# Patient Record
Sex: FEMALE | Race: WHITE | NOT HISPANIC OR LATINO | Employment: OTHER | URBAN - METROPOLITAN AREA
[De-identification: names, ages, dates, MRNs, and addresses within clinical notes are randomized per-mention and may not be internally consistent; named-entity substitution may affect disease eponyms.]

---

## 2017-01-25 ENCOUNTER — HOSPITAL ENCOUNTER (OUTPATIENT)
Dept: URGENT CARE | Facility: CLINIC | Age: 82
Setting detail: SPECIMEN
Discharge: HOME | End: 2017-01-25
Attending: FAMILY MEDICINE | Admitting: FAMILY MEDICINE

## 2017-01-25 LAB
AMPICILLIN SUSC ISLT: ABNORMAL
AZTREONAM SUSC ISLT: ABNORMAL
BACTERIA ISLT: ABNORMAL
BACTERIA SPEC AEROBE CULT: ABNORMAL
CEFAZOLIN SUSC ISLT: ABNORMAL
CEFEPIME SUSC ISLT: ABNORMAL
CEFTRIAXONE SUSC ISLT: ABNORMAL
CIPROFLOXACIN SUSC ISLT: ABNORMAL
COLONY COUNT: ABNORMAL
ERTAPENEM SUSC ISLT: ABNORMAL
LEVOFLOXACIN SUSC ISLT: ABNORMAL
Lab: ABNORMAL
MEROPENEM SUSC ISLT: ABNORMAL
MICRO REPORT STATUS: ABNORMAL
NITROFURANTOIN SUSC ISLT: ABNORMAL
PIP+TAZO SUSC ISLT: ABNORMAL
SPECIMEN SOURCE: ABNORMAL
SUSC METH SPEC: ABNORMAL
TETRACYCLINE SUSC ISLT: ABNORMAL
TOBRAMYCIN SUSC ISLT: ABNORMAL
TRIMETHOPRIM/SULFA: ABNORMAL

## 2017-02-01 ENCOUNTER — HOSPITAL ENCOUNTER (OUTPATIENT)
Dept: ORTHOPEDIC SURGERY | Facility: CLINIC | Age: 82
Discharge: HOME | End: 2017-02-01
Attending: PHYSICIAN ASSISTANT | Admitting: PHYSICIAN ASSISTANT

## 2017-04-17 ENCOUNTER — HOSPITAL ENCOUNTER (OUTPATIENT)
Dept: ORTHOPEDIC SURGERY | Facility: CLINIC | Age: 82
Discharge: HOME | End: 2017-04-17
Attending: PODIATRIST | Admitting: PODIATRIST

## 2017-06-09 ENCOUNTER — HOSPITAL ENCOUNTER (OUTPATIENT)
Dept: OTHER | Facility: HOSPITAL | Age: 82
Discharge: HOME | End: 2017-06-09
Attending: PODIATRIST | Admitting: PODIATRIST

## 2017-06-09 ENCOUNTER — HOSPITAL ENCOUNTER (OUTPATIENT)
Dept: URGENT CARE | Facility: CLINIC | Age: 82
Discharge: HOME | End: 2017-06-09
Attending: FAMILY MEDICINE | Admitting: FAMILY MEDICINE

## 2017-06-09 LAB
ANION GAP SERPL CALC-SCNC: 11 MMOL/L (ref 10–20)
BACTERIA SPEC AEROBE CULT: NORMAL
BASOPHILS # BLD AUTO: 0 10*3/UL (ref 0–0.2)
BASOPHILS NFR BLD AUTO: 0 % (ref 0–2)
BUN SERPL-MCNC: 14 MG/DL (ref 8–20)
BUN/CREAT SERPL: 20 (ref 5.4–26.2)
CALCIUM SERPL-MCNC: 8.9 MG/DL (ref 8.9–10.3)
CHLORIDE SERPL-SCNC: 110 MMOL/L (ref 101–111)
CONV CO2: 26 MMOL/L (ref 22–32)
CREAT UR-MCNC: 0.7 MG/DL (ref 0.4–1)
DIFFERENTIAL METHOD BLD: (no result)
EOSINOPHIL # BLD AUTO: 0.2 10*3/UL (ref 0–0.3)
EOSINOPHIL # BLD AUTO: 2 % (ref 0–3)
ERYTHROCYTE [DISTWIDTH] IN BLOOD BY AUTOMATED COUNT: 14.7 % (ref 11.5–14.5)
GLUCOSE SERPL-MCNC: 109 MG/DL (ref 65–99)
HCT VFR BLD AUTO: 35.8 % (ref 35–49)
HGB BLD-MCNC: 11.7 G/DL (ref 12–15)
LYMPHOCYTES # BLD AUTO: 2.1 10*3/UL (ref 0.8–4.8)
LYMPHOCYTES NFR BLD AUTO: 28 % (ref 18–42)
Lab: NORMAL
MCH RBC QN AUTO: 27.9 PG (ref 26–32)
MCHC RBC AUTO-ENTMCNC: 32.7 G/DL (ref 32–36)
MCV RBC AUTO: 85.2 FL (ref 80–94)
MICRO REPORT STATUS: NORMAL
MONOCYTES # BLD AUTO: 0.6 10*3/UL (ref 0.1–1.3)
MONOCYTES NFR BLD AUTO: 8 % (ref 2–11)
NEUTROPHILS # BLD AUTO: 4.5 10*3/UL (ref 2.3–8.6)
NEUTROPHILS NFR BLD AUTO: 62 % (ref 50–75)
NRBC BLD AUTO-RTO: 0 /100{WBCS}
NRBC/RBC NFR BLD MANUAL: 0 10*3/UL
PLATELET # BLD AUTO: 258 10*3/UL (ref 150–450)
PMV BLD AUTO: 7.9 FL (ref 7.4–10.4)
POTASSIUM SERPL-SCNC: 4 MMOL/L (ref 3.6–5.1)
RBC # BLD AUTO: 4.2 10*6/UL (ref 4–5.4)
SODIUM SERPL-SCNC: 143 MMOL/L (ref 136–144)
SPECIMEN SOURCE: NORMAL
WBC # BLD AUTO: 7.4 10*3/UL (ref 4.5–11.5)

## 2017-06-16 ENCOUNTER — HOSPITAL ENCOUNTER (OUTPATIENT)
Dept: PREOP | Facility: HOSPITAL | Age: 82
Setting detail: HOSPITAL OUTPATIENT SURGERY
Discharge: HOME | End: 2017-06-16
Attending: PODIATRIST | Admitting: PODIATRIST

## 2017-07-24 ENCOUNTER — HOSPITAL ENCOUNTER (OUTPATIENT)
Dept: ORTHOPEDIC SURGERY | Facility: CLINIC | Age: 82
Discharge: HOME | End: 2017-07-24
Attending: PODIATRIST | Admitting: PODIATRIST

## 2017-09-23 ENCOUNTER — HOSPITAL ENCOUNTER (OUTPATIENT)
Dept: URGENT CARE | Facility: CLINIC | Age: 82
Setting detail: SPECIMEN
Discharge: HOME | End: 2017-09-23
Attending: FAMILY MEDICINE | Admitting: FAMILY MEDICINE

## 2017-12-16 ENCOUNTER — HOSPITAL ENCOUNTER (OUTPATIENT)
Dept: URGENT CARE | Facility: CLINIC | Age: 82
Setting detail: SPECIMEN
Discharge: HOME | End: 2017-12-16
Attending: FAMILY MEDICINE | Admitting: FAMILY MEDICINE

## 2017-12-27 ENCOUNTER — HOSPITAL ENCOUNTER (OUTPATIENT)
Dept: ORTHOPEDIC SURGERY | Facility: CLINIC | Age: 82
Discharge: HOME | End: 2017-12-27
Attending: PHYSICIAN ASSISTANT | Admitting: PHYSICIAN ASSISTANT

## 2017-12-27 ENCOUNTER — HOSPITAL ENCOUNTER (OUTPATIENT)
Dept: URGENT CARE | Facility: CLINIC | Age: 82
Setting detail: SPECIMEN
Discharge: HOME | End: 2017-12-27
Attending: EMERGENCY MEDICINE | Admitting: EMERGENCY MEDICINE

## 2017-12-28 ENCOUNTER — HOSPITAL ENCOUNTER (OUTPATIENT)
Dept: MRI IMAGING | Facility: HOSPITAL | Age: 82
Discharge: HOME | End: 2017-12-28
Attending: PHYSICIAN ASSISTANT | Admitting: PHYSICIAN ASSISTANT

## 2018-01-03 ENCOUNTER — HOSPITAL ENCOUNTER (OUTPATIENT)
Dept: LAB | Facility: HOSPITAL | Age: 83
Discharge: HOME | End: 2018-01-03
Attending: ORTHOPAEDIC SURGERY | Admitting: ORTHOPAEDIC SURGERY

## 2018-02-14 ENCOUNTER — OFFICE (AMBULATORY)
Dept: URBAN - METROPOLITAN AREA CLINIC 64 | Facility: CLINIC | Age: 83
End: 2018-02-14
Payer: COMMERCIAL

## 2018-02-14 VITALS
SYSTOLIC BLOOD PRESSURE: 117 MMHG | HEIGHT: 62 IN | WEIGHT: 126 LBS | DIASTOLIC BLOOD PRESSURE: 61 MMHG | HEART RATE: 57 BPM

## 2018-02-14 DIAGNOSIS — R13.10 DYSPHAGIA, UNSPECIFIED: ICD-10-CM

## 2018-02-14 DIAGNOSIS — R10.84 GENERALIZED ABDOMINAL PAIN: ICD-10-CM

## 2018-02-14 DIAGNOSIS — K21.9 GASTRO-ESOPHAGEAL REFLUX DISEASE WITHOUT ESOPHAGITIS: ICD-10-CM

## 2018-02-14 DIAGNOSIS — K59.00 CONSTIPATION, UNSPECIFIED: ICD-10-CM

## 2018-02-14 DIAGNOSIS — I21.9 ACUTE MYOCARDIAL INFARCTION, UNSPECIFIED: ICD-10-CM

## 2018-02-14 DIAGNOSIS — R14.0 ABDOMINAL DISTENSION (GASEOUS): ICD-10-CM

## 2018-02-14 PROCEDURE — 99214 OFFICE O/P EST MOD 30 MIN: CPT | Performed by: NURSE PRACTITIONER

## 2018-02-14 RX ORDER — LUBIPROSTONE 24 UG/1
24 CAPSULE, GELATIN COATED ORAL
Qty: 30 | Refills: 12 | Status: COMPLETED
Start: 2018-02-14 | End: 2018-02-16

## 2018-03-16 ENCOUNTER — HOSPITAL ENCOUNTER (OUTPATIENT)
Dept: URGENT CARE | Facility: CLINIC | Age: 83
Setting detail: SPECIMEN
Discharge: HOME | End: 2018-03-16
Attending: FAMILY MEDICINE | Admitting: FAMILY MEDICINE

## 2018-04-06 ENCOUNTER — HOSPITAL ENCOUNTER (OUTPATIENT)
Dept: ORTHOPEDIC SURGERY | Facility: CLINIC | Age: 83
Discharge: HOME | End: 2018-04-06
Attending: PHYSICIAN ASSISTANT | Admitting: PHYSICIAN ASSISTANT

## 2018-06-18 ENCOUNTER — HOSPITAL ENCOUNTER (OUTPATIENT)
Dept: URGENT CARE | Facility: CLINIC | Age: 83
Setting detail: SPECIMEN
Discharge: HOME | End: 2018-06-18
Attending: EMERGENCY MEDICINE | Admitting: EMERGENCY MEDICINE

## 2018-06-18 LAB
BACTERIA SPEC AEROBE CULT: NORMAL
Lab: NORMAL
MICRO REPORT STATUS: NORMAL
SPECIMEN SOURCE: NORMAL

## 2018-06-25 ENCOUNTER — HOSPITAL ENCOUNTER (OUTPATIENT)
Dept: URGENT CARE | Facility: CLINIC | Age: 83
Setting detail: SPECIMEN
Discharge: HOME | End: 2018-06-25
Attending: FAMILY MEDICINE | Admitting: FAMILY MEDICINE

## 2018-06-25 LAB
AMPICILLIN SUSC ISLT: ABNORMAL
AZTREONAM SUSC ISLT: ABNORMAL
BACTERIA ISLT: ABNORMAL
BACTERIA SPEC AEROBE CULT: ABNORMAL
CEFAZOLIN SUSC ISLT: ABNORMAL
CEFEPIME SUSC ISLT: ABNORMAL
CEFTRIAXONE SUSC ISLT: ABNORMAL
CIPROFLOXACIN SUSC ISLT: ABNORMAL
COLONY COUNT: ABNORMAL
LEVOFLOXACIN SUSC ISLT: ABNORMAL
Lab: ABNORMAL
MEROPENEM SUSC ISLT: ABNORMAL
MICRO REPORT STATUS: ABNORMAL
NITROFURANTOIN SUSC ISLT: ABNORMAL
PIP+TAZO SUSC ISLT: ABNORMAL
SPECIMEN SOURCE: ABNORMAL
SUSC METH SPEC: ABNORMAL
TETRACYCLINE SUSC ISLT: ABNORMAL
TOBRAMYCIN SUSC ISLT: ABNORMAL
TRIMETHOPRIM/SULFA: ABNORMAL

## 2018-07-06 ENCOUNTER — OFFICE (AMBULATORY)
Dept: URBAN - METROPOLITAN AREA CLINIC 64 | Facility: CLINIC | Age: 83
End: 2018-07-06
Payer: COMMERCIAL

## 2018-07-06 VITALS
DIASTOLIC BLOOD PRESSURE: 65 MMHG | HEART RATE: 60 BPM | HEIGHT: 62 IN | SYSTOLIC BLOOD PRESSURE: 126 MMHG | WEIGHT: 125 LBS

## 2018-07-06 DIAGNOSIS — R10.84 GENERALIZED ABDOMINAL PAIN: ICD-10-CM

## 2018-07-06 DIAGNOSIS — R11.0 NAUSEA: ICD-10-CM

## 2018-07-06 PROCEDURE — 99213 OFFICE O/P EST LOW 20 MIN: CPT | Performed by: NURSE PRACTITIONER

## 2018-08-22 ENCOUNTER — OFFICE (AMBULATORY)
Dept: URBAN - METROPOLITAN AREA CLINIC 64 | Facility: CLINIC | Age: 83
End: 2018-08-22
Payer: COMMERCIAL

## 2018-08-22 VITALS
SYSTOLIC BLOOD PRESSURE: 123 MMHG | WEIGHT: 127 LBS | HEIGHT: 62 IN | HEART RATE: 79 BPM | DIASTOLIC BLOOD PRESSURE: 70 MMHG

## 2018-08-22 DIAGNOSIS — K59.00 CONSTIPATION, UNSPECIFIED: ICD-10-CM

## 2018-08-22 DIAGNOSIS — R14.0 ABDOMINAL DISTENSION (GASEOUS): ICD-10-CM

## 2018-08-22 DIAGNOSIS — R13.10 DYSPHAGIA, UNSPECIFIED: ICD-10-CM

## 2018-08-22 PROCEDURE — 99213 OFFICE O/P EST LOW 20 MIN: CPT | Performed by: INTERNAL MEDICINE

## 2018-08-22 RX ORDER — PANTOPRAZOLE SODIUM 40 MG/1
40 TABLET, DELAYED RELEASE ORAL
Qty: 90 | Refills: 4 | Status: ACTIVE
Start: 2018-08-22

## 2018-08-22 RX ORDER — LACTULOSE 10 G/15ML
600 SOLUTION ORAL
Qty: 1800 | Refills: 11 | Status: COMPLETED
Start: 2018-02-16 | End: 2018-08-22 | Stop reason: SINTOL

## 2018-09-07 ENCOUNTER — HOSPITAL ENCOUNTER (OUTPATIENT)
Dept: PAIN MEDICINE | Facility: HOSPITAL | Age: 83
Discharge: HOME | End: 2018-09-07
Attending: ANESTHESIOLOGY | Admitting: ANESTHESIOLOGY

## 2018-09-21 ENCOUNTER — HOSPITAL ENCOUNTER (OUTPATIENT)
Dept: PAIN MEDICINE | Facility: HOSPITAL | Age: 83
Discharge: HOME | End: 2018-09-21
Attending: ANESTHESIOLOGY | Admitting: ANESTHESIOLOGY

## 2018-10-03 ENCOUNTER — ON CAMPUS - OUTPATIENT (AMBULATORY)
Dept: URBAN - METROPOLITAN AREA HOSPITAL 2 | Facility: HOSPITAL | Age: 83
End: 2018-10-03
Payer: COMMERCIAL

## 2018-10-03 VITALS
HEART RATE: 47 BPM | RESPIRATION RATE: 15 BRPM | SYSTOLIC BLOOD PRESSURE: 118 MMHG | RESPIRATION RATE: 20 BRPM | OXYGEN SATURATION: 99 % | DIASTOLIC BLOOD PRESSURE: 51 MMHG | RESPIRATION RATE: 16 BRPM | HEART RATE: 56 BPM | OXYGEN SATURATION: 100 % | RESPIRATION RATE: 18 BRPM | OXYGEN SATURATION: 98 % | DIASTOLIC BLOOD PRESSURE: 67 MMHG | TEMPERATURE: 97.6 F | SYSTOLIC BLOOD PRESSURE: 109 MMHG | SYSTOLIC BLOOD PRESSURE: 119 MMHG | HEART RATE: 58 BPM | SYSTOLIC BLOOD PRESSURE: 114 MMHG | OXYGEN SATURATION: 97 % | RESPIRATION RATE: 21 BRPM | HEIGHT: 62 IN | HEART RATE: 71 BPM | DIASTOLIC BLOOD PRESSURE: 58 MMHG | DIASTOLIC BLOOD PRESSURE: 64 MMHG | WEIGHT: 119 LBS | HEART RATE: 52 BPM | SYSTOLIC BLOOD PRESSURE: 134 MMHG | DIASTOLIC BLOOD PRESSURE: 63 MMHG | SYSTOLIC BLOOD PRESSURE: 173 MMHG

## 2018-10-03 DIAGNOSIS — Z48.815 ENCOUNTER FOR SURGICAL AFTERCARE FOLLOWING SURGERY ON THE DI: ICD-10-CM

## 2018-10-03 DIAGNOSIS — K21.9 GASTRO-ESOPHAGEAL REFLUX DISEASE WITHOUT ESOPHAGITIS: ICD-10-CM

## 2018-10-03 DIAGNOSIS — K20.9 ESOPHAGITIS, UNSPECIFIED: ICD-10-CM

## 2018-10-03 DIAGNOSIS — R13.10 DYSPHAGIA, UNSPECIFIED: ICD-10-CM

## 2018-10-03 PROCEDURE — 43450 DILATE ESOPHAGUS 1/MULT PASS: CPT | Performed by: INTERNAL MEDICINE

## 2018-10-03 PROCEDURE — 43235 EGD DIAGNOSTIC BRUSH WASH: CPT | Performed by: INTERNAL MEDICINE

## 2018-10-03 RX ORDER — PANTOPRAZOLE SODIUM 40 MG/1
40 TABLET, DELAYED RELEASE ORAL
Qty: 90 | Refills: 4 | Status: ACTIVE
Start: 2018-08-22

## 2018-10-12 ENCOUNTER — HOSPITAL ENCOUNTER (OUTPATIENT)
Dept: PAIN MEDICINE | Facility: HOSPITAL | Age: 83
Discharge: HOME | End: 2018-10-12
Attending: ANESTHESIOLOGY | Admitting: ANESTHESIOLOGY

## 2018-11-16 ENCOUNTER — HOSPITAL ENCOUNTER (OUTPATIENT)
Dept: PAIN MEDICINE | Facility: HOSPITAL | Age: 83
Discharge: HOME | End: 2018-11-16
Attending: ANESTHESIOLOGY | Admitting: ANESTHESIOLOGY

## 2019-05-15 ENCOUNTER — APPOINTMENT (OUTPATIENT)
Dept: PREADMISSION TESTING | Facility: HOSPITAL | Age: 84
End: 2019-05-15

## 2019-05-15 VITALS
WEIGHT: 128 LBS | OXYGEN SATURATION: 98 % | TEMPERATURE: 97.2 F | RESPIRATION RATE: 20 BRPM | DIASTOLIC BLOOD PRESSURE: 69 MMHG | HEIGHT: 62 IN | HEART RATE: 55 BPM | SYSTOLIC BLOOD PRESSURE: 141 MMHG | BODY MASS INDEX: 23.55 KG/M2

## 2019-05-15 LAB
ANION GAP SERPL CALCULATED.3IONS-SCNC: 11.3 MMOL/L
BUN BLD-MCNC: 21 MG/DL (ref 8–23)
BUN/CREAT SERPL: 30.4 (ref 7–25)
CALCIUM SPEC-SCNC: 9 MG/DL (ref 8.2–9.6)
CHLORIDE SERPL-SCNC: 105 MMOL/L (ref 98–107)
CO2 SERPL-SCNC: 24.7 MMOL/L (ref 22–29)
CREAT BLD-MCNC: 0.69 MG/DL (ref 0.57–1)
DEPRECATED RDW RBC AUTO: 44.2 FL (ref 37–54)
ERYTHROCYTE [DISTWIDTH] IN BLOOD BY AUTOMATED COUNT: 13.3 % (ref 12.3–15.4)
GFR SERPL CREATININE-BSD FRML MDRD: 79 ML/MIN/1.73
GLUCOSE BLD-MCNC: 103 MG/DL (ref 65–99)
HCT VFR BLD AUTO: 36.9 % (ref 34–46.6)
HGB BLD-MCNC: 11.7 G/DL (ref 12–15.9)
MCH RBC QN AUTO: 28.8 PG (ref 26.6–33)
MCHC RBC AUTO-ENTMCNC: 31.7 G/DL (ref 31.5–35.7)
MCV RBC AUTO: 90.9 FL (ref 79–97)
PLATELET # BLD AUTO: 226 10*3/MM3 (ref 140–450)
PMV BLD AUTO: 10.2 FL (ref 6–12)
POTASSIUM BLD-SCNC: 4.2 MMOL/L (ref 3.5–5.2)
RBC # BLD AUTO: 4.06 10*6/MM3 (ref 3.77–5.28)
SODIUM BLD-SCNC: 141 MMOL/L (ref 136–145)
WBC NRBC COR # BLD: 5.8 10*3/MM3 (ref 3.4–10.8)

## 2019-05-15 PROCEDURE — 80048 BASIC METABOLIC PNL TOTAL CA: CPT | Performed by: OTOLARYNGOLOGY

## 2019-05-15 PROCEDURE — 36415 COLL VENOUS BLD VENIPUNCTURE: CPT

## 2019-05-15 PROCEDURE — 85027 COMPLETE CBC AUTOMATED: CPT | Performed by: OTOLARYNGOLOGY

## 2019-05-15 RX ORDER — CARVEDILOL 3.12 MG/1
3.12 TABLET ORAL 2 TIMES DAILY WITH MEALS
COMMUNITY
End: 2020-02-28

## 2019-05-15 NOTE — DISCHARGE INSTRUCTIONS
Take the following medications the morning of surgery with a small sip of water:    CARVEDILOL, AMLODIPINE, ISOSORBIDE    ARRIVE TO OUTPT SURGERY AT 7 AM ON 5/23/19    General Instructions:  • Do not eat solid food after midnight the night before surgery.  • You may drink clear liquids day of surgery but must stop at least one hour before your hospital arrival time.  • It is beneficial for you to have a clear drink that contains carbohydrates the day of surgery.  We suggest a 12 to 20 ounce bottle of Gatorade or Powerade for non-diabetic patients or a 12 to 20 ounce bottle of G2 or Powerade Zero for diabetic patients. (Pediatric patients, are not advised to drink a 12 to 20 ounce carbohydrate drink)    Clear liquids are liquids you can see through.  Nothing red in color.     Plain water                               Sports drinks  Sodas                                   Gelatin (Jell-O)  Fruit juices without pulp such as white grape juice and apple juice  Popsicles that contain no fruit or yogurt  Tea or coffee (no cream or milk added)  Gatorade / Powerade  G2 / Powerade Zero    • Infants may have breast milk up to four hours before surgery.  • Infants drinking formula may drink formula up to six hours before surgery.   • Patients who avoid smoking, chewing tobacco and alcohol for 4 weeks prior to surgery have a reduced risk of post-operative complications.  Quit smoking as many days before surgery as you can.  • Do not smoke, use chewing tobacco or drink alcohol the day of surgery.   • If applicable bring your C-PAP/ BI-PAP machine.  • Bring any papers given to you in the doctor’s office.  • Wear clean comfortable clothes and socks.  • Do not wear contact lenses, false eyelashes or make-up.  Bring a case for your glasses.   • Bring crutches or walker if applicable.  • Remove all piercings.  Leave jewelry and any other valuables at home.  • Hair extensions with metal clips must be removed prior to surgery.  • The  Pre-Admission Testing nurse will instruct you to bring medications if unable to obtain an accurate list in Pre-Admission Testing.        Preventing a Surgical Site Infection:  • For 2 to 3 days before surgery, avoid shaving with a razor because the razor can irritate skin and make it easier to develop an infection.    • Any areas of open skin can increase the risk of a post-operative wound infection by allowing bacteria to enter and travel throughout the body.  Notify your surgeon if you have any skin wounds / rashes even if it is not near the expected surgical site.  The area will need assessed to determine if surgery should be delayed until it is healed.  • The night prior to surgery sleep in a clean bed with clean clothing.  Do not allow pets to sleep with you.  • Shower on the morning of surgery using a fresh bar of anti-bacterial soap (such as Dial) and clean washcloth.  Dry with a clean towel and dress in clean clothing.  • Ask your surgeon if you will be receiving antibiotics prior to surgery.  • Make sure you, your family, and all healthcare providers clean their hands with soap and water or an alcohol based hand  before caring for you or your wound.    Day of surgery:  Upon arrival, a Pre-op nurse and Anesthesiologist will review your health history, obtain vital signs, and answer questions you may have.  The only belongings needed at this time will be a list of your home medications and if applicable your C-PAP/BI-PAP machine.  If you are staying overnight your family can leave the rest of your belongings in the car and bring them to your room later.  A Pre-op nurse will start an IV and you may receive medication in preparation for surgery, including something to help you relax.  Your family will be able to see you in the Pre-op area.  While you are in surgery your family should notify the waiting room  if they leave the waiting room area and provide a contact phone number.    Please  be aware that surgery does come with discomfort.  We want to make every effort to control your discomfort so please discuss any uncontrolled symptoms with your nurse.   Your doctor will most likely have prescribed pain medications.      If you are going home after surgery you will receive individualized written care instructions before being discharged.  A responsible adult must drive you to and from the hospital on the day of your surgery and stay with you for 24 hours.    If you are staying overnight following surgery, you will be transported to your hospital room following the recovery period.  [facility] has all private rooms.    You have received a list of surgical assistants for your reference.  If you have any questions please call Pre-Admission Testing at [phone].  Deductibles and co-payments are collected on the day of service. Please be prepared to pay the required co-pay, deductible or deposit on the day of service as defined by your plan.

## 2019-05-23 ENCOUNTER — ANESTHESIA EVENT (OUTPATIENT)
Dept: PERIOP | Facility: HOSPITAL | Age: 84
End: 2019-05-23

## 2019-05-23 ENCOUNTER — ANESTHESIA (OUTPATIENT)
Dept: PERIOP | Facility: HOSPITAL | Age: 84
End: 2019-05-23

## 2019-05-23 ENCOUNTER — HOSPITAL ENCOUNTER (OUTPATIENT)
Facility: HOSPITAL | Age: 84
Setting detail: HOSPITAL OUTPATIENT SURGERY
Discharge: HOME | End: 2019-05-23
Attending: OTOLARYNGOLOGY | Admitting: OTOLARYNGOLOGY

## 2019-05-23 VITALS
OXYGEN SATURATION: 96 % | SYSTOLIC BLOOD PRESSURE: 169 MMHG | HEART RATE: 63 BPM | RESPIRATION RATE: 16 BRPM | BODY MASS INDEX: 23.43 KG/M2 | TEMPERATURE: 97.2 F | DIASTOLIC BLOOD PRESSURE: 84 MMHG | WEIGHT: 128.09 LBS

## 2019-05-23 PROCEDURE — 25010000002 FENTANYL CITRATE (PF) 100 MCG/2ML SOLUTION: Performed by: NURSE ANESTHETIST, CERTIFIED REGISTERED

## 2019-05-23 PROCEDURE — 25010000002 ONDANSETRON PER 1 MG: Performed by: NURSE ANESTHETIST, CERTIFIED REGISTERED

## 2019-05-23 PROCEDURE — 25010000002 PROPOFOL 10 MG/ML EMULSION: Performed by: NURSE ANESTHETIST, CERTIFIED REGISTERED

## 2019-05-23 PROCEDURE — 25010000002 PROPOFOL 1000 MG/ML EMULSION: Performed by: NURSE ANESTHETIST, CERTIFIED REGISTERED

## 2019-05-23 PROCEDURE — 25010000002 DEXAMETHASONE PER 1 MG: Performed by: NURSE ANESTHETIST, CERTIFIED REGISTERED

## 2019-05-23 RX ORDER — SODIUM CHLORIDE, SODIUM LACTATE, POTASSIUM CHLORIDE, CALCIUM CHLORIDE 600; 310; 30; 20 MG/100ML; MG/100ML; MG/100ML; MG/100ML
9 INJECTION, SOLUTION INTRAVENOUS CONTINUOUS
Status: DISCONTINUED | OUTPATIENT
Start: 2019-05-23 | End: 2019-05-23 | Stop reason: HOSPADM

## 2019-05-23 RX ORDER — ONDANSETRON 2 MG/ML
INJECTION INTRAMUSCULAR; INTRAVENOUS AS NEEDED
Status: DISCONTINUED | OUTPATIENT
Start: 2019-05-23 | End: 2019-05-23 | Stop reason: SURG

## 2019-05-23 RX ORDER — MIDAZOLAM HYDROCHLORIDE 1 MG/ML
2 INJECTION INTRAMUSCULAR; INTRAVENOUS
Status: DISCONTINUED | OUTPATIENT
Start: 2019-05-23 | End: 2019-05-23 | Stop reason: HOSPADM

## 2019-05-23 RX ORDER — LIDOCAINE HYDROCHLORIDE 20 MG/ML
INJECTION, SOLUTION INFILTRATION; PERINEURAL AS NEEDED
Status: DISCONTINUED | OUTPATIENT
Start: 2019-05-23 | End: 2019-05-23 | Stop reason: SURG

## 2019-05-23 RX ORDER — ACETAMINOPHEN 325 MG/1
650 TABLET ORAL ONCE
Status: DISCONTINUED | OUTPATIENT
Start: 2019-05-23 | End: 2019-05-23

## 2019-05-23 RX ORDER — PROMETHAZINE HYDROCHLORIDE 25 MG/ML
6.25 INJECTION, SOLUTION INTRAMUSCULAR; INTRAVENOUS
Status: DISCONTINUED | OUTPATIENT
Start: 2019-05-23 | End: 2019-05-23 | Stop reason: HOSPADM

## 2019-05-23 RX ORDER — SODIUM CHLORIDE, SODIUM LACTATE, POTASSIUM CHLORIDE, CALCIUM CHLORIDE 600; 310; 30; 20 MG/100ML; MG/100ML; MG/100ML; MG/100ML
50 INJECTION, SOLUTION INTRAVENOUS CONTINUOUS
Status: DISCONTINUED | OUTPATIENT
Start: 2019-05-23 | End: 2019-05-23 | Stop reason: HOSPADM

## 2019-05-23 RX ORDER — PROMETHAZINE HYDROCHLORIDE 25 MG/ML
12.5 INJECTION, SOLUTION INTRAMUSCULAR; INTRAVENOUS ONCE AS NEEDED
Status: DISCONTINUED | OUTPATIENT
Start: 2019-05-23 | End: 2019-05-23 | Stop reason: HOSPADM

## 2019-05-23 RX ORDER — MIDAZOLAM HYDROCHLORIDE 1 MG/ML
1 INJECTION INTRAMUSCULAR; INTRAVENOUS
Status: DISCONTINUED | OUTPATIENT
Start: 2019-05-23 | End: 2019-05-23 | Stop reason: HOSPADM

## 2019-05-23 RX ORDER — ACETAMINOPHEN 325 MG/1
650 TABLET ORAL ONCE AS NEEDED
Status: DISCONTINUED | OUTPATIENT
Start: 2019-05-23 | End: 2019-05-23 | Stop reason: HOSPADM

## 2019-05-23 RX ORDER — SODIUM CHLORIDE 0.9 % (FLUSH) 0.9 %
1-10 SYRINGE (ML) INJECTION AS NEEDED
Status: DISCONTINUED | OUTPATIENT
Start: 2019-05-23 | End: 2019-05-23 | Stop reason: HOSPADM

## 2019-05-23 RX ORDER — ACETAMINOPHEN 500 MG
TABLET ORAL
Status: COMPLETED
Start: 2019-05-23 | End: 2019-05-23

## 2019-05-23 RX ORDER — DEXAMETHASONE SODIUM PHOSPHATE 10 MG/ML
INJECTION INTRAMUSCULAR; INTRAVENOUS AS NEEDED
Status: DISCONTINUED | OUTPATIENT
Start: 2019-05-23 | End: 2019-05-23 | Stop reason: SURG

## 2019-05-23 RX ORDER — HYDROCODONE BITARTRATE AND ACETAMINOPHEN 5; 325 MG/1; MG/1
1 TABLET ORAL ONCE AS NEEDED
Status: DISCONTINUED | OUTPATIENT
Start: 2019-05-23 | End: 2019-05-23 | Stop reason: HOSPADM

## 2019-05-23 RX ORDER — OXYMETAZOLINE HYDROCHLORIDE 0.05 G/100ML
SPRAY NASAL AS NEEDED
Status: DISCONTINUED | OUTPATIENT
Start: 2019-05-23 | End: 2019-05-23 | Stop reason: HOSPADM

## 2019-05-23 RX ORDER — PROPOFOL 10 MG/ML
VIAL (ML) INTRAVENOUS AS NEEDED
Status: DISCONTINUED | OUTPATIENT
Start: 2019-05-23 | End: 2019-05-23 | Stop reason: SURG

## 2019-05-23 RX ORDER — NALOXONE HCL 0.4 MG/ML
0.2 VIAL (ML) INJECTION AS NEEDED
Status: DISCONTINUED | OUTPATIENT
Start: 2019-05-23 | End: 2019-05-23 | Stop reason: HOSPADM

## 2019-05-23 RX ORDER — FENTANYL CITRATE 50 UG/ML
50 INJECTION, SOLUTION INTRAMUSCULAR; INTRAVENOUS
Status: DISCONTINUED | OUTPATIENT
Start: 2019-05-23 | End: 2019-05-23 | Stop reason: HOSPADM

## 2019-05-23 RX ORDER — HYDRALAZINE HYDROCHLORIDE 20 MG/ML
5 INJECTION INTRAMUSCULAR; INTRAVENOUS
Status: DISCONTINUED | OUTPATIENT
Start: 2019-05-23 | End: 2019-05-23 | Stop reason: HOSPADM

## 2019-05-23 RX ORDER — ERYTHROMYCIN 5 MG/G
OINTMENT OPHTHALMIC 2 TIMES DAILY
Qty: 3.5 G | Refills: 2 | Status: SHIPPED | OUTPATIENT
Start: 2019-05-23 | End: 2019-05-30

## 2019-05-23 RX ORDER — ALBUTEROL SULFATE 2.5 MG/3ML
2.5 SOLUTION RESPIRATORY (INHALATION) ONCE AS NEEDED
Status: DISCONTINUED | OUTPATIENT
Start: 2019-05-23 | End: 2019-05-23 | Stop reason: HOSPADM

## 2019-05-23 RX ORDER — PROMETHAZINE HYDROCHLORIDE 25 MG/1
25 TABLET ORAL ONCE AS NEEDED
Status: DISCONTINUED | OUTPATIENT
Start: 2019-05-23 | End: 2019-05-23 | Stop reason: HOSPADM

## 2019-05-23 RX ORDER — PROMETHAZINE HYDROCHLORIDE 25 MG/1
25 SUPPOSITORY RECTAL ONCE AS NEEDED
Status: DISCONTINUED | OUTPATIENT
Start: 2019-05-23 | End: 2019-05-23 | Stop reason: HOSPADM

## 2019-05-23 RX ORDER — ACETAMINOPHEN 650 MG/1
650 SUPPOSITORY RECTAL ONCE AS NEEDED
Status: DISCONTINUED | OUTPATIENT
Start: 2019-05-23 | End: 2019-05-23 | Stop reason: HOSPADM

## 2019-05-23 RX ORDER — LIDOCAINE HYDROCHLORIDE 10 MG/ML
0.5 INJECTION, SOLUTION EPIDURAL; INFILTRATION; INTRACAUDAL; PERINEURAL ONCE AS NEEDED
Status: DISCONTINUED | OUTPATIENT
Start: 2019-05-23 | End: 2019-05-23 | Stop reason: HOSPADM

## 2019-05-23 RX ORDER — DIPHENHYDRAMINE HCL 25 MG
25 CAPSULE ORAL
Status: DISCONTINUED | OUTPATIENT
Start: 2019-05-23 | End: 2019-05-23 | Stop reason: HOSPADM

## 2019-05-23 RX ORDER — FENTANYL CITRATE 50 UG/ML
INJECTION, SOLUTION INTRAMUSCULAR; INTRAVENOUS AS NEEDED
Status: DISCONTINUED | OUTPATIENT
Start: 2019-05-23 | End: 2019-05-23 | Stop reason: SURG

## 2019-05-23 RX ORDER — EPHEDRINE SULFATE 50 MG/ML
5 INJECTION, SOLUTION INTRAVENOUS ONCE AS NEEDED
Status: DISCONTINUED | OUTPATIENT
Start: 2019-05-23 | End: 2019-05-23 | Stop reason: HOSPADM

## 2019-05-23 RX ORDER — FAMOTIDINE 10 MG/ML
20 INJECTION, SOLUTION INTRAVENOUS ONCE
Status: COMPLETED | OUTPATIENT
Start: 2019-05-23 | End: 2019-05-23

## 2019-05-23 RX ORDER — ACETAMINOPHEN 500 MG
500 TABLET ORAL ONCE
Status: COMPLETED | OUTPATIENT
Start: 2019-05-23 | End: 2019-05-23

## 2019-05-23 RX ORDER — FLUMAZENIL 0.1 MG/ML
0.2 INJECTION INTRAVENOUS AS NEEDED
Status: DISCONTINUED | OUTPATIENT
Start: 2019-05-23 | End: 2019-05-23 | Stop reason: HOSPADM

## 2019-05-23 RX ORDER — ERYTHROMYCIN 5 MG/G
OINTMENT OPHTHALMIC AS NEEDED
Status: DISCONTINUED | OUTPATIENT
Start: 2019-05-23 | End: 2019-05-23 | Stop reason: HOSPADM

## 2019-05-23 RX ORDER — MAGNESIUM HYDROXIDE 1200 MG/15ML
LIQUID ORAL AS NEEDED
Status: DISCONTINUED | OUTPATIENT
Start: 2019-05-23 | End: 2019-05-23 | Stop reason: HOSPADM

## 2019-05-23 RX ORDER — ONDANSETRON 2 MG/ML
4 INJECTION INTRAMUSCULAR; INTRAVENOUS ONCE AS NEEDED
Status: COMPLETED | OUTPATIENT
Start: 2019-05-23 | End: 2019-05-23

## 2019-05-23 RX ADMIN — LIDOCAINE HYDROCHLORIDE 60 MG: 20 INJECTION, SOLUTION INFILTRATION; PERINEURAL at 09:36

## 2019-05-23 RX ADMIN — FENTANYL CITRATE 25 MCG: 50 INJECTION INTRAMUSCULAR; INTRAVENOUS at 09:46

## 2019-05-23 RX ADMIN — ONDANSETRON HYDROCHLORIDE 4 MG: 2 SOLUTION INTRAMUSCULAR; INTRAVENOUS at 12:23

## 2019-05-23 RX ADMIN — SODIUM CHLORIDE, POTASSIUM CHLORIDE, SODIUM LACTATE AND CALCIUM CHLORIDE 9 ML/HR: 600; 310; 30; 20 INJECTION, SOLUTION INTRAVENOUS at 10:41

## 2019-05-23 RX ADMIN — FENTANYL CITRATE 25 MCG: 50 INJECTION INTRAMUSCULAR; INTRAVENOUS at 10:16

## 2019-05-23 RX ADMIN — PROPOFOL 100 MCG/KG/MIN: 10 INJECTION, EMULSION INTRAVENOUS at 09:38

## 2019-05-23 RX ADMIN — Medication 500 MG: at 12:03

## 2019-05-23 RX ADMIN — FENTANYL CITRATE 50 MCG: 50 INJECTION INTRAMUSCULAR; INTRAVENOUS at 13:13

## 2019-05-23 RX ADMIN — SODIUM CHLORIDE, POTASSIUM CHLORIDE, SODIUM LACTATE AND CALCIUM CHLORIDE 9 ML/HR: 600; 310; 30; 20 INJECTION, SOLUTION INTRAVENOUS at 08:43

## 2019-05-23 RX ADMIN — FENTANYL CITRATE 25 MCG: 50 INJECTION INTRAMUSCULAR; INTRAVENOUS at 10:23

## 2019-05-23 RX ADMIN — FENTANYL CITRATE 25 MCG: 50 INJECTION INTRAMUSCULAR; INTRAVENOUS at 09:36

## 2019-05-23 RX ADMIN — ACETAMINOPHEN 500 MG: 500 TABLET, FILM COATED ORAL at 12:03

## 2019-05-23 RX ADMIN — PROPOFOL 100 MG: 10 INJECTION, EMULSION INTRAVENOUS at 09:36

## 2019-05-23 RX ADMIN — DEXAMETHASONE SODIUM PHOSPHATE 8 MG: 10 INJECTION INTRAMUSCULAR; INTRAVENOUS at 09:41

## 2019-05-23 RX ADMIN — FENTANYL CITRATE 50 MCG: 50 INJECTION INTRAMUSCULAR; INTRAVENOUS at 10:52

## 2019-05-23 RX ADMIN — ONDANSETRON 4 MG: 2 INJECTION INTRAMUSCULAR; INTRAVENOUS at 10:10

## 2019-05-23 RX ADMIN — FAMOTIDINE 20 MG: 10 INJECTION INTRAVENOUS at 08:43

## 2019-05-23 NOTE — ANESTHESIA PROCEDURE NOTES
Airway  Urgency: elective    Airway not difficult    General Information and Staff    Patient location during procedure: OR  Anesthesiologist: Matthew Gupta MD  CRNA: Julia Arceo CRNA    Indications and Patient Condition  Indications for airway management: airway protection    Preoxygenated: yes  Mask difficulty assessment: 1 - vent by mask    Final Airway Details  Final airway type: supraglottic airway      Successful airway: classic  Size 4    Number of attempts at approach: 1    Additional Comments  PreO2, IV induction, easy mask, LMA placed w/o difficulty,secured in placed, EBBSH, +etCO2, atraumatic, teeth and lips as preop.

## 2019-05-23 NOTE — ANESTHESIA PREPROCEDURE EVALUATION
Anesthesia Evaluation     Patient summary reviewed and Nursing notes reviewed   history of anesthetic complications: PONV  NPO Solid Status: > 8 hours  NPO Liquid Status: > 2 hours           Airway   Mallampati: II  no difficulty expected  Dental - normal exam         Pulmonary     breath sounds clear to auscultation  (+) a smoker Former,   Cardiovascular     ECG reviewed  Rhythm: regular  Rate: normal    (+) hypertension,       Neuro/Psych  GI/Hepatic/Renal/Endo    (+)  GERD,      Musculoskeletal     Abdominal    Substance History      OB/GYN          Other                        Anesthesia Plan    ASA 3     general     intravenous induction   Anesthetic plan, all risks, benefits, and alternatives have been provided, discussed and informed consent has been obtained with: patient.    ? TIVA

## 2019-05-23 NOTE — NURSING NOTE
Dr moreira called and per patient and family request for a script to [facility] for zofran for nausea  MD  Agreed to call script

## 2019-05-23 NOTE — ANESTHESIA POSTPROCEDURE EVALUATION
Patient: Carrie Golden    Procedure Summary     Date:  05/23/19 Room / Location:   [facility]    Anesthesia Start:  0930 Anesthesia Stop:  1029    Procedures:       bilateral upper lid blepharoplasty (Bilateral Eye)      bilateral lower lid ectropion repair, bilateral punctoplasty (Bilateral Eye)      ALBERTO TUBE (Bilateral Eye) Diagnosis:      Surgeon:  Mauricio Pennington MD Provider:  Matthew Gupta MD    Anesthesia Type:  general ASA Status:  3          Anesthesia Type: general  Last vitals  BP   163/83 (05/23/19 1040)   Temp   36.2 °C (97.2 °F) (05/23/19 1024)   Pulse   64 (05/23/19 1024)   Resp   14 (05/23/19 1040)     SpO2   98 % (05/23/19 1024)     Post Anesthesia Care and Evaluation    Patient location during evaluation: bedside  Patient participation: complete - patient participated  Level of consciousness: awake  Pain score: 2  Pain management: adequate  Airway patency: patent  Anesthetic complications: No anesthetic complications    Cardiovascular status: acceptable  Respiratory status: acceptable  Hydration status: acceptable    Comments: /83   Pulse 64   Temp 36.2 °C (97.2 °F) (Oral)   Resp 14   Wt 58.1 kg (128 lb 1.4 oz)   SpO2 98%   BMI 23.43 kg/m²

## 2019-05-23 NOTE — H&P
History & Physical       Patient: Carrie Golden    Date of Admission: 5/23/2019  6:39 AM    YOB: 1925    Medical Record Number: [id number]      Chief Complaints: bilateral upper eyelid dermatochalasis, bilateral lower eyelid ectropion, bilateral punctal stenosis      History of Present Illness: 89 y.o. female presents with above. No new meds/health problems since office visit      Allergies:   Allergies   Allergen Reactions   • Codeine Nausea And Vomiting and Dizziness   • Hydrocodone Nausea And Vomiting and Dizziness   • Sulfa Antibiotics Rash       10 point review of systems negative, except pertaining to the HPI    Medications:   Home Medications:  No current facility-administered medications on file prior to encounter.      Current Outpatient Medications on File Prior to Encounter   Medication Sig   • acetaminophen (TYLENOL) 500 MG tablet Take 500 mg by mouth Every 6 (Six) Hours As Needed for mild pain (1-3).   • amLODIPine (NORVASC) 5 MG tablet Take 5 mg by mouth Every Morning.   • aspirin 81 MG EC tablet Take 81 mg by mouth Every Other Day.   • isosorbide mononitrate (IMDUR) 60 MG 24 hr tablet Take 60 mg by mouth Every Morning.   • simvastatin (ZOCOR) 10 MG tablet Take 10 mg by mouth Every Night.     Current Medications:  Scheduled Meds:  Continuous Infusions:  No current facility-administered medications for this encounter.   PRN Meds:.    Past Medical History:   Diagnosis Date   • Arthritis    • Frequent UTI    • GERD (gastroesophageal reflux disease)    • Hearing loss     bilateral hearing aides   • 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 sure which 1  contracted at age 8   • History of transfusion    • Hypertension    • Past heart attack     X2 MILD   • PONV (postoperative nausea and vomiting)         Past Surgical History:   Procedure Laterality Date   • ANTERIOR AND POSTERIOR VAGINAL REPAIR     • CATARACT EXTRACTION EXTRACAPSULAR W/ INTRAOCULAR LENS IMPLANTATION Bilateral    • CERVICAL SPINE  ANTERIOR      with instrumentation   • COLON SURGERY      for obstruction   • FOOT FUSION Left 2016    Procedure: LEFT HALLUX METATARSALPHALANGEAL ARTHRODESIS SILVER BUNIONECTOMY METATARSAL HEAD RESECTION 2-5 CALCANEAL BONE GRAFT;  Surgeon: Monster Harper Jr., MD;  Location: [facility];  Service:    • HYSTERECTOMY     • INGUINAL HERNIA REPAIR Bilateral    • STOMACH SURGERY      for ulcer        Social History     Occupational History   • Not on file   Tobacco Use   • Smoking status: Former Smoker     Years: 10.00     Types: Cigarettes     Last attempt to quit:      Years since quittin.4   • Smokeless tobacco: Never Used   • Tobacco comment: 1 pk a week   Substance and Sexual Activity   • Alcohol use: No   • Drug use: No   • Sexual activity: Defer    Social History     Social History Narrative   • Not on file        Family History   Problem Relation Age of Onset   • Malig Hyperthermia Neg Hx            Physical Exam   Constitutional: Alert, cooperative, in no acute distress    Head: Normocephalic.   Eyes:   bilateral upper eyelid dermatochalasis, bilateral lower eyelid ectropion, bilateral punctal stenosis  Neck: Normal range of motion.   Cardiovascular: Normal rate.    Pulmonary/Chest: Effort normal.   Neurological: Alert.   Skin: Skin is warm.   Psychiatric: Normal mood and affect.       Assessment/Plan:  The patient voiced understanding of the risks, benefits, and alternative forms of treatment that were discussed and the patient consents to proceed with bilateral upper lid blephorplasty.       Miguel Gudino Jr, MD

## 2019-05-23 NOTE — OP NOTE
OPERATIVE NOTE    Patient Identification:  Name: Carrie Golden  Age: 89 y.o.  Sex: female  :  1925  MRN: [id number]                                               Preoperative diagnosis: Bilateral upper eyelid dermatochalasis, bilateral lower eyelid ectropion, bilateral punctal stenosis  Postoperative diagnosis: same  Procedure: Bilateral upper eyelid blepharoplasty, bilateral lower eyelid ectropion repair, bilateral punctoplasty with mckay tube  Surgeon: Dr. Mauricio Pennington who was present and scrubbed throughout all critical portions of the operation  Assistants: Miguel Gudino Jr, MD   Anesthesia: General  EBL: less than 50cc  Specimens:  * No orders in the log *    Description of the procedure: The patient was taken to the operating room and placed on the table in the supine position, where anesthesia was induced. 2% lidocaine with epinephrine and 0.5% marcaine in a 1:1 fashion was injected over the surgical site, and the patient was prepped and draped in the usual manner for orbitofacial surgery.     Corneal protectors were placed in both eyes.    A 15 Bard-Torito blade incision was made 6 mm from the eyelash margin, across the entire horizontal extent of the left upper eyelid. A second incision was made 12 mm inferior to the junction of the brow and eyelid, and a pinch test was used to ensure the amount of skin excision was appropriate. The intervening tissue was excised with a 15 Bard-Torito blade and Kimani scissors. Excessive orbicularis tissue was removed. The orbital septum was opened horizontally, and excessive fatty tissue was also removed. Bleeding was controlled with electrocauterization. The skin was then closed with 5-0 fast absorbing suture in an interrupted and running fashion, with care to incorporate the prestarsal orbicularis over the pupil, nasal and temporal limbi respectively. The lid position and contour were examined and found to be satisfactory.     The exact same procedure  was preformed over the contralateral upper lid.     Attention was then turned to the punctoplasty.     A one-snip punctal incision was made through the upper and lower puncti on the left side. Mast tubing was inserted through the upper and lower canaliculi, through the nasal lacrimal duct, and into the nose, where they were tied in 3 half knots.    Attention was then turned to the ectropion repair.      A 15 Bard-Torito blade incision was made at the left lateral canthus. Sharp dissection was carried down to the lateral orbital rim periosteum. The inferior ramus of the lateral canthal tendon was identified and severed with sharp dissection. A full-thickness en bloc excision of the lateral aspect of the tarsal plate was carried out with sharp dissection, and bleeding was controlled with electrocauterization. The cut edge of the tarsal plate was advanced to the lateral orbital rim periosteum, where it was sutured with 5-0 vicryl suture to the internal aspect of the lateral orbital rim periosteum. The skin  was closed with 5-0 fast absorbing suture.     The exact same procedure was preformed over the contralateral lid    The corneal protectors were removed and antibiotic ophthalmic ointment was placed over the surgical site.     The patient was then awakened and taken from the operating room in good condition, having tolerated the procedure well. There were no complications, and the estimated blood loss was less than 50 cc.

## 2019-06-02 ENCOUNTER — HOSPITAL ENCOUNTER (OUTPATIENT)
Dept: URGENT CARE | Facility: CLINIC | Age: 84
Setting detail: SPECIMEN
Discharge: HOME | End: 2019-06-02
Attending: FAMILY MEDICINE | Admitting: FAMILY MEDICINE

## 2019-06-02 ENCOUNTER — CONVERSION ENCOUNTER (OUTPATIENT)
Dept: CARDIOLOGY | Facility: CLINIC | Age: 84
End: 2019-06-02

## 2019-06-04 VITALS
SYSTOLIC BLOOD PRESSURE: 123 MMHG | OXYGEN SATURATION: 98 % | BODY MASS INDEX: 22.45 KG/M2 | DIASTOLIC BLOOD PRESSURE: 73 MMHG | HEART RATE: 49 BPM | WEIGHT: 122 LBS | HEIGHT: 62 IN

## 2019-06-06 NOTE — PROGRESS NOTES
Foul-smelling cloudy urine    89-year-old female presents with complaints of cloudy foul urine.  Noticed it today.  States that she feels like she has to go to the bathroom but when she does she has very scant output.  No fever no chills no back pain.    Vital Signs:    Patient Profile:    89 Years Old Female  Height:     62 inches (157.48 cm)  Weight:     122 pounds  BMI:        22.31     O2 Sat:     98 %  Temp:       98.1 degrees F Temperol  Pulse rate: 49 / minute  BP Sittin / 73  (left arm)    Cuff size:  regular      Problems: Active problems were reviewed with the patient during this visit.  Medications: Medications were reviewed with the patient during this visit.  Allergies: Allergies were reviewed with the patient during this visit.        Vitals Entered By: Kain Bal (2019 2:50 PM)    Active Medications (reviewed today):  SIMVASTATIN 40MG    TAB (SIMVASTATIN) TAKE 1 TABLET BY MOUTH ONCE DAILY  CARVEDILOL 3.125MG  TAB (CARVEDILOL) TAKE 1 TABLET BY MOUTH TWICE DAILY  AMLODIPINE 2.5MG TAB (AMLODIPINE BESYLATE) TAKE 1 TABLET BY MOUTH ONCE DAILY  ISOSORB MONO ER 60MG TAB (ISOSORBIDE MONONITRATE) TAKE 1 TABLET BY MOUTH ONCE DAILY  ASPIRIN 81 MG ORAL TABLET (ASPIRIN) Take one by mouth daily    Current Allergies (reviewed today):  CODEINE (Critical)  SULFA (Critical)  HYDROCODONE (Critical)  MACRODANTIN (NITROFURANTOIN MACROCRYSTAL CAPS) (Critical)      Past Medical History:     Reviewed history from 2017 and no changes required:        Foraminal stenosis        Compression fracture L2        Chronic Thoracic back pain        G E R D        Cervicalgia        Degeneration CX        Right medial meniscal tear        Pneumonia        Hepatitis        stomach ulcer        Benign abd. wall mass 2012        LLQ abd. wall mass--hernia repair mesh w/scar tissue 2013        Bilateral inguinal hernias 2013                        Coronary Artery Disease        Hyperlipidemia         Myocardial Infarction - non ST segment elevated; 4/9/2014        RECENT MILD HEART ATTACK 2014        low back pain         DR Castro Left L4 SNRB -x2 no relief        Physical therapy C and L spine 2015           tens unit         CT guided Left SI injection performed at radiology 2015    Past Surgical History:     Reviewed history from 08/21/2018 and no changes required:        Kyphoplasty--Dr. Recio        Hysterectomy        Bladder repair        Stomach Ulcer surgery        Hands and feet surgery        Cholecystectomy        Colon Resection: Blockage        Cataract extraction with intraocular lens implant        Cervical fusion        Fusion both thumbs        Carpal tunnel release both hands        Left bunionectomy        Right knee surgery x2        Eye surgery 5/2019        left foot reconstruction        Esophageal Dilatation - 1/2016        Left hernia repair        CT guided bx. abd. wall mass 11/21/2012        Open right inguinal hernia repair w/PPS system & exc. left abdominal wall        mesh phlegmon w/repair of left inguinal hernia w/Ventralight mesh 1/7/2013        Heart Catherization - 4/9/2014; LAD diag branch 80 - 90 %; RCA proximal 50%        ACDF C3-4 2004  previous Dr Curtis        Vaginal/Bladder/Rectal Repair - 2/2015        foot reconstruction         Heart Cath - 7/27/17 - no stents        PDC & PSF L3-L4 3/9/2018 Dr. Recio    Family History Summary:      Reviewed history Last on 11/16/2018 and no changes required:06/02/2019  Sister - Has Family History of Other Cancer - Entered On: 11/6/2015  Father - Has Family History of Lung/Respiratory Disease - Entered On: 11/6/2015    General Comments - FH:  FH Stroke  FH Other Cancer      Social History:     Reviewed history from 06/18/2018 and no changes required:        Patient is a former smoker.        Passive Smoke: N        Alcohol Use: N        Drug Use: N        HIV/High Risk: N        Regular Exercise: N        Hx Domestic Abuse: N         Hoahaoism Affecting Care: N                2 cups of coffee daily        Risk Factors:     Smoked Tobacco Use:  Former smoker     Cigarettes:  Yes      Pack-years:  15 yrs at 1/3 ppd        Year quit:  1990        Years Since Last Quit:  29  Smokeless Tobacco Use:  Never  Passive smoke exposure:  no  Drug use:  no  HIV high-risk behavior:  no  Caffeine use:  3 drinks per day  Alcohol use:  no  Exercise:  no  Seatbelt use:  50 %  Sun Exposure:  frequently    Family History Risk Factors:     Family History of MI in females < 65 years old:  no     Family History of MI in males < 55 years old:  no    Previous Tobacco Use: Signed On - 11/16/2018  Smoked Tobacco Use:  Former smoker     Cigarettes:  Yes      Pack-years:  15 yrs at 1/3 ppd        Year quit:  1990        Years Since Last Quit:  29 years, 5 months, 1 days  Smokeless Tobacco Use:  Never  Passive smoke exposure:  no  Drug use:  no  HIV high-risk behavior:  no  Caffeine use:  3 drinks per day    Previous Alcohol Use: Signed On - 07/15/2018  Alcohol use:  no  Exercise:  no  Seatbelt use:  50 %  Sun Exposure:  frequently    Family History Risk Factors:     Family History of MI in females < 65 years old:  no     Family History of MI in males < 55 years old:  no    Colonoscopy History:     Date of Last Colonoscopy:  12/25/2015    Mammogram History:     Date of Last Mammogram:  12/17/2015      Review of Systems   General: Denies fevers, chills, sweats.   Gastrointestinal: Denies abdominal pain.   Genitourinary: Complains of urinary frequency. Denies incontinence, hematuria.         Physical Exam    General:      well developed, well nourished, in no acute distress.    Neck:      no masses, thyromegaly, or abnormal cervical nodes.    Lungs:      clear bilaterally to auscultation.    Heart:      regular rhythm and no murmurs.    Abdomen:        Bowel sounds present, soft, nontender, no HSM or mass  Additional Exam:       UA reviewed large leukocyte esterase 30  milligrams/deciliter protein and large blood      Blood Pressure:  Today's BP: 123/73 mm Hg            Impression & Recommendations:    Problem # 1:  Cystitis (ICD-595.9) (HTV96-G30.90)    Her updated medication list for this problem includes:     Cephalexin 500 Mg Oral Capsule (Cephalexin) ..... 1 cap p.o. bid    Orders:  Ofc Vst, Est Level III ([id number])  [facility]URE[facility]INE ([facility])      Medications Added to Medication List This Visit:  1)  Cephalexin 500 Mg Oral Capsule (Cephalexin) .... 1 cap p.o. bid    Other Orders:  Urinalysis Dip Stick/Tablet Rgnt AUTO W/O Sergei ([id number])      Patient Instructions:  1)  The exam and treatment that you have received at the Urgent Care has been rendered on an urgent or immediate care basis only and is not intended to to be a substitute for your primary care doctor. It is important that you report any persistant or   worsening problems and see your physician for follow up care. It is impossible to recognize and treat all elements of an injury or illness in a single visit. If you are unable to contact your physician please call or return to the Urgent Care Center.  2)  Please call in 1-3 days for the results of your laboratory and/or x-ray reports.  3)  After treatment you will need to check your urine at your physician's office to document that the blood has cleared  from your urine      ]          Laceration/ Wound     Objective     cm  Assessment:     Plan:   Rx:   CEPHALEXIN 500 MG ORAL CAPSULE 1 cap p.o. bid.      Technician: Kain Bal    Date/Time Collected: June 2, 2019 3:00 PM)  Date/Time Received: June 2, 2019 3:00 PM)  Performed by: Jacoby    Routine Urinalysis          Normal Range   Color:      dark            Color: Yellow   Appearance:  cloudy         Appearance: Clear  Leukocytes:  large      Leukocytes: Negative   Nitrite:         negative       Nitrite: Negative  Protein:     30 mg/dL       Protein:  Negative mg/dL  pH:      5.0        pH:  4.5-8.0  Blood:        large      Blood:  Negative  Spec. Gravity:   1.015      Spec Gravity:  1.005-1.030  Ketones:     negative mg/dL     Ketones:  Negative mg/dL  Bilirubin:   small      Bilirubin:  Negative  Glucose:     negative mg/dL     Glucose:  Negative mg/dL                      Electronically signed by Andrea Young MD on 06/02/2019 at 3:09 PM  ________________________________________________________________________  Clinical Lists Changes    Orders:  Added new Test order of [facility]ULTURE[facility]URINE ([facility]) - Signed                          Electronically signed by Mandy Wesley on 06/02/2019 at 3:55 PM  ________________________________________________________________________       Disclaimer: Converted Note message may not contain all data elements that existed in the legacy source system. Please see [facility] for the original note details.

## 2019-07-17 RX ORDER — SIMVASTATIN 40 MG
TABLET ORAL
Qty: 90 TABLET | Refills: 1 | Status: SHIPPED | OUTPATIENT
Start: 2019-07-17 | End: 2019-10-28

## 2019-07-19 ENCOUNTER — OFFICE (AMBULATORY)
Dept: URBAN - METROPOLITAN AREA CLINIC 64 | Facility: CLINIC | Age: 84
End: 2019-07-19
Payer: COMMERCIAL

## 2019-07-19 VITALS
SYSTOLIC BLOOD PRESSURE: 136 MMHG | WEIGHT: 132 LBS | HEIGHT: 62 IN | HEART RATE: 58 BPM | DIASTOLIC BLOOD PRESSURE: 72 MMHG

## 2019-07-19 DIAGNOSIS — R10.9 UNSPECIFIED ABDOMINAL PAIN: ICD-10-CM

## 2019-07-19 DIAGNOSIS — K59.00 CONSTIPATION, UNSPECIFIED: ICD-10-CM

## 2019-07-19 DIAGNOSIS — R13.10 DYSPHAGIA, UNSPECIFIED: ICD-10-CM

## 2019-07-19 DIAGNOSIS — K21.9 GASTRO-ESOPHAGEAL REFLUX DISEASE WITHOUT ESOPHAGITIS: ICD-10-CM

## 2019-07-19 PROCEDURE — 99213 OFFICE O/P EST LOW 20 MIN: CPT | Performed by: NURSE PRACTITIONER

## 2019-07-19 RX ORDER — SUCRALFATE 1 G/10ML
800 SUSPENSION ORAL
Qty: 1600 | Refills: 2 | Status: COMPLETED
Start: 2019-07-19 | End: 2022-02-03

## 2019-07-19 RX ORDER — PANTOPRAZOLE SODIUM 40 MG/1
80 TABLET, DELAYED RELEASE ORAL
Qty: 180 | Refills: 3 | Status: COMPLETED
Start: 2019-07-19 | End: 2022-02-03

## 2019-09-22 PROCEDURE — 87186 SC STD MICRODIL/AGAR DIL: CPT | Performed by: FAMILY MEDICINE

## 2019-09-22 PROCEDURE — 87088 URINE BACTERIA CULTURE: CPT | Performed by: FAMILY MEDICINE

## 2019-09-22 PROCEDURE — 87086 URINE CULTURE/COLONY COUNT: CPT | Performed by: FAMILY MEDICINE

## 2019-10-28 ENCOUNTER — HOSPITAL ENCOUNTER (INPATIENT)
Facility: HOSPITAL | Age: 84
LOS: 1 days | Discharge: HOME | End: 2019-10-29
Attending: INTERNAL MEDICINE | Admitting: INTERNAL MEDICINE

## 2019-10-28 ENCOUNTER — APPOINTMENT (OUTPATIENT)
Dept: CT IMAGING | Facility: HOSPITAL | Age: 84
End: 2019-10-28

## 2019-10-28 DIAGNOSIS — R10.11 RIGHT UPPER QUADRANT ABDOMINAL PAIN: Primary | ICD-10-CM

## 2019-10-28 DIAGNOSIS — K56.7 ILEUS (HCC): ICD-10-CM

## 2019-10-28 LAB
ALBUMIN SERPL-MCNC: 3.9 G/DL (ref 3.5–5.2)
ALBUMIN/GLOB SERPL: 1.6 G/DL
ALP SERPL-CCNC: 104 U/L (ref 39–117)
ALT SERPL W P-5'-P-CCNC: 12 U/L (ref 1–33)
ANION GAP SERPL CALCULATED.3IONS-SCNC: 10 MMOL/L (ref 5–15)
APAP SERPL-MCNC: 12.5 MCG/ML (ref 10–30)
AST SERPL-CCNC: 19 U/L (ref 1–32)
BASOPHILS # BLD AUTO: 0 10*3/MM3 (ref 0–0.2)
BASOPHILS NFR BLD AUTO: 0.6 % (ref 0–1.5)
BILIRUB SERPL-MCNC: 0.5 MG/DL (ref 0.2–1.2)
BUN BLD-MCNC: 18 MG/DL (ref 8–23)
BUN/CREAT SERPL: 24 (ref 7–25)
CALCIUM SPEC-SCNC: 9 MG/DL (ref 8.2–9.6)
CHLORIDE SERPL-SCNC: 107 MMOL/L (ref 98–107)
CO2 SERPL-SCNC: 26 MMOL/L (ref 22–29)
CREAT BLD-MCNC: 0.75 MG/DL (ref 0.57–1)
DEPRECATED RDW RBC AUTO: 45.1 FL (ref 37–54)
EOSINOPHIL # BLD AUTO: 0.2 10*3/MM3 (ref 0–0.4)
EOSINOPHIL NFR BLD AUTO: 4.1 % (ref 0.3–6.2)
ERYTHROCYTE [DISTWIDTH] IN BLOOD BY AUTOMATED COUNT: 14.6 % (ref 12.3–15.4)
GFR SERPL CREATININE-BSD FRML MDRD: 72 ML/MIN/1.73
GLOBULIN UR ELPH-MCNC: 2.4 GM/DL
GLUCOSE BLD-MCNC: 98 MG/DL (ref 65–99)
HCT VFR BLD AUTO: 36.7 % (ref 34–46.6)
HGB BLD-MCNC: 12 G/DL (ref 12–15.9)
LIPASE SERPL-CCNC: 13 U/L (ref 13–60)
LYMPHOCYTES # BLD AUTO: 1.4 10*3/MM3 (ref 0.7–3.1)
LYMPHOCYTES NFR BLD AUTO: 27.3 % (ref 19.6–45.3)
MCH RBC QN AUTO: 28.6 PG (ref 26.6–33)
MCHC RBC AUTO-ENTMCNC: 32.6 G/DL (ref 31.5–35.7)
MCV RBC AUTO: 87.7 FL (ref 79–97)
MONOCYTES # BLD AUTO: 0.5 10*3/MM3 (ref 0.1–0.9)
MONOCYTES NFR BLD AUTO: 9.2 % (ref 5–12)
NEUTROPHILS # BLD AUTO: 3.1 10*3/MM3 (ref 1.7–7)
NEUTROPHILS NFR BLD AUTO: 58.8 % (ref 42.7–76)
NRBC BLD AUTO-RTO: 0.1 /100 WBC (ref 0–0.2)
PLATELET # BLD AUTO: 235 10*3/MM3 (ref 140–450)
PMV BLD AUTO: 7.6 FL (ref 6–12)
POTASSIUM BLD-SCNC: 4.2 MMOL/L (ref 3.5–5.2)
PROT SERPL-MCNC: 6.3 G/DL (ref 6–8.5)
RBC # BLD AUTO: 4.18 10*6/MM3 (ref 3.77–5.28)
SODIUM BLD-SCNC: 143 MMOL/L (ref 136–145)
WBC NRBC COR # BLD: 5.3 10*3/MM3 (ref 3.4–10.8)

## 2019-10-28 PROCEDURE — 74177 CT ABD & PELVIS W/CONTRAST: CPT

## 2019-10-28 PROCEDURE — 83690 ASSAY OF LIPASE: CPT | Performed by: NURSE PRACTITIONER

## 2019-10-28 PROCEDURE — 80053 COMPREHEN METABOLIC PANEL: CPT | Performed by: NURSE PRACTITIONER

## 2019-10-28 PROCEDURE — 80307 DRUG TEST PRSMV CHEM ANLYZR: CPT | Performed by: NURSE PRACTITIONER

## 2019-10-28 PROCEDURE — 85025 COMPLETE CBC W/AUTO DIFF WBC: CPT | Performed by: NURSE PRACTITIONER

## 2019-10-28 PROCEDURE — 99284 EMERGENCY DEPT VISIT MOD MDM: CPT

## 2019-10-28 PROCEDURE — 99222 1ST HOSP IP/OBS MODERATE 55: CPT | Performed by: INTERNAL MEDICINE

## 2019-10-28 PROCEDURE — 0 IOPAMIDOL PER 1 ML: Performed by: NURSE PRACTITIONER

## 2019-10-28 RX ORDER — ACETAMINOPHEN 325 MG/1
650 TABLET ORAL EVERY 4 HOURS PRN
Status: DISCONTINUED | OUTPATIENT
Start: 2019-10-28 | End: 2019-10-29 | Stop reason: HOSPADM

## 2019-10-28 RX ORDER — ONDANSETRON 2 MG/ML
4 INJECTION INTRAMUSCULAR; INTRAVENOUS EVERY 6 HOURS PRN
Status: DISCONTINUED | OUTPATIENT
Start: 2019-10-28 | End: 2019-10-29 | Stop reason: HOSPADM

## 2019-10-28 RX ORDER — ATORVASTATIN CALCIUM 20 MG/1
20 TABLET, FILM COATED ORAL DAILY
Status: DISCONTINUED | OUTPATIENT
Start: 2019-10-28 | End: 2019-10-29 | Stop reason: HOSPADM

## 2019-10-28 RX ORDER — SODIUM CHLORIDE 9 MG/ML
75 INJECTION, SOLUTION INTRAVENOUS CONTINUOUS
Status: DISCONTINUED | OUTPATIENT
Start: 2019-10-28 | End: 2019-10-29 | Stop reason: HOSPADM

## 2019-10-28 RX ORDER — LACTULOSE 10 G/15ML
10 SOLUTION ORAL 2 TIMES DAILY
COMMUNITY
End: 2019-12-18

## 2019-10-28 RX ORDER — ISOSORBIDE MONONITRATE 60 MG/1
60 TABLET, EXTENDED RELEASE ORAL EVERY MORNING
Status: DISCONTINUED | OUTPATIENT
Start: 2019-10-29 | End: 2019-10-29 | Stop reason: HOSPADM

## 2019-10-28 RX ORDER — ASPIRIN 81 MG/1
81 TABLET ORAL DAILY
Status: DISCONTINUED | OUTPATIENT
Start: 2019-10-29 | End: 2019-10-29 | Stop reason: HOSPADM

## 2019-10-28 RX ORDER — ACETAMINOPHEN 160 MG/5ML
650 SOLUTION ORAL EVERY 4 HOURS PRN
Status: DISCONTINUED | OUTPATIENT
Start: 2019-10-28 | End: 2019-10-29 | Stop reason: HOSPADM

## 2019-10-28 RX ORDER — ACETAMINOPHEN 650 MG/1
650 SUPPOSITORY RECTAL EVERY 4 HOURS PRN
Status: DISCONTINUED | OUTPATIENT
Start: 2019-10-28 | End: 2019-10-29 | Stop reason: HOSPADM

## 2019-10-28 RX ORDER — AMLODIPINE BESYLATE 5 MG/1
2.5 TABLET ORAL EVERY MORNING
Status: DISCONTINUED | OUTPATIENT
Start: 2019-10-29 | End: 2019-10-29 | Stop reason: HOSPADM

## 2019-10-28 RX ORDER — LACTULOSE 10 G/15ML
10 SOLUTION ORAL 2 TIMES DAILY
Status: DISCONTINUED | OUTPATIENT
Start: 2019-10-28 | End: 2019-10-29 | Stop reason: HOSPADM

## 2019-10-28 RX ORDER — BISACODYL 5 MG/1
5 TABLET, DELAYED RELEASE ORAL DAILY PRN
Status: DISCONTINUED | OUTPATIENT
Start: 2019-10-28 | End: 2019-10-29 | Stop reason: HOSPADM

## 2019-10-28 RX ORDER — SODIUM CHLORIDE 0.9 % (FLUSH) 0.9 %
10 SYRINGE (ML) INJECTION EVERY 12 HOURS SCHEDULED
Status: DISCONTINUED | OUTPATIENT
Start: 2019-10-28 | End: 2019-10-29 | Stop reason: HOSPADM

## 2019-10-28 RX ORDER — ONDANSETRON 4 MG/1
4 TABLET, FILM COATED ORAL EVERY 6 HOURS PRN
Status: DISCONTINUED | OUTPATIENT
Start: 2019-10-28 | End: 2019-10-29 | Stop reason: HOSPADM

## 2019-10-28 RX ORDER — SODIUM CHLORIDE 0.9 % (FLUSH) 0.9 %
10 SYRINGE (ML) INJECTION AS NEEDED
Status: DISCONTINUED | OUTPATIENT
Start: 2019-10-28 | End: 2019-10-29 | Stop reason: HOSPADM

## 2019-10-28 RX ORDER — CARVEDILOL 3.12 MG/1
3.12 TABLET ORAL 2 TIMES DAILY WITH MEALS
Status: DISCONTINUED | OUTPATIENT
Start: 2019-10-28 | End: 2019-10-29 | Stop reason: HOSPADM

## 2019-10-28 RX ORDER — BISACODYL 10 MG
10 SUPPOSITORY, RECTAL RECTAL DAILY PRN
Status: DISCONTINUED | OUTPATIENT
Start: 2019-10-28 | End: 2019-10-29 | Stop reason: HOSPADM

## 2019-10-28 RX ADMIN — LACTULOSE 10 G: 10 SOLUTION ORAL at 20:37

## 2019-10-28 RX ADMIN — IOPAMIDOL 100 ML: 755 INJECTION, SOLUTION INTRAVENOUS at 13:56

## 2019-10-28 RX ADMIN — SODIUM CHLORIDE 500 ML: 900 INJECTION, SOLUTION INTRAVENOUS at 12:41

## 2019-10-28 RX ADMIN — SODIUM CHLORIDE 75 ML/HR: 900 INJECTION, SOLUTION INTRAVENOUS at 17:12

## 2019-10-28 RX ADMIN — ACETAMINOPHEN 650 MG: 325 TABLET ORAL at 20:37

## 2019-10-28 RX ADMIN — CARVEDILOL 3.12 MG: 3.12 TABLET, FILM COATED ORAL at 20:38

## 2019-10-28 RX ADMIN — ATORVASTATIN CALCIUM 20 MG: 20 TABLET, FILM COATED ORAL at 20:37

## 2019-10-29 ENCOUNTER — APPOINTMENT (OUTPATIENT)
Dept: GENERAL RADIOLOGY | Facility: HOSPITAL | Age: 84
End: 2019-10-29

## 2019-10-29 VITALS
SYSTOLIC BLOOD PRESSURE: 161 MMHG | HEART RATE: 51 BPM | DIASTOLIC BLOOD PRESSURE: 75 MMHG | OXYGEN SATURATION: 98 % | HEIGHT: 62 IN | TEMPERATURE: 97.9 F | BODY MASS INDEX: 24.14 KG/M2 | RESPIRATION RATE: 14 BRPM | WEIGHT: 131.17 LBS

## 2019-10-29 LAB
ANION GAP SERPL CALCULATED.3IONS-SCNC: 10 MMOL/L (ref 5–15)
BASOPHILS # BLD AUTO: 0 10*3/MM3 (ref 0–0.2)
BASOPHILS NFR BLD AUTO: 0.7 % (ref 0–1.5)
BUN BLD-MCNC: 14 MG/DL (ref 8–23)
BUN/CREAT SERPL: 22.2 (ref 7–25)
CALCIUM SPEC-SCNC: 8.3 MG/DL (ref 8.2–9.6)
CHLORIDE SERPL-SCNC: 110 MMOL/L (ref 98–107)
CO2 SERPL-SCNC: 24 MMOL/L (ref 22–29)
CREAT BLD-MCNC: 0.63 MG/DL (ref 0.57–1)
DEPRECATED RDW RBC AUTO: 45.1 FL (ref 37–54)
EOSINOPHIL # BLD AUTO: 0.2 10*3/MM3 (ref 0–0.4)
EOSINOPHIL NFR BLD AUTO: 5.1 % (ref 0.3–6.2)
ERYTHROCYTE [DISTWIDTH] IN BLOOD BY AUTOMATED COUNT: 14.5 % (ref 12.3–15.4)
GFR SERPL CREATININE-BSD FRML MDRD: 88 ML/MIN/1.73
GLUCOSE BLD-MCNC: 84 MG/DL (ref 65–99)
HCT VFR BLD AUTO: 32.8 % (ref 34–46.6)
HGB BLD-MCNC: 10.8 G/DL (ref 12–15.9)
LYMPHOCYTES # BLD AUTO: 1.2 10*3/MM3 (ref 0.7–3.1)
LYMPHOCYTES NFR BLD AUTO: 26.4 % (ref 19.6–45.3)
MCH RBC QN AUTO: 29 PG (ref 26.6–33)
MCHC RBC AUTO-ENTMCNC: 33 G/DL (ref 31.5–35.7)
MCV RBC AUTO: 87.7 FL (ref 79–97)
MONOCYTES # BLD AUTO: 0.5 10*3/MM3 (ref 0.1–0.9)
MONOCYTES NFR BLD AUTO: 10.7 % (ref 5–12)
NEUTROPHILS # BLD AUTO: 2.5 10*3/MM3 (ref 1.7–7)
NEUTROPHILS NFR BLD AUTO: 57.1 % (ref 42.7–76)
NRBC BLD AUTO-RTO: 0.1 /100 WBC (ref 0–0.2)
PLATELET # BLD AUTO: 188 10*3/MM3 (ref 140–450)
PMV BLD AUTO: 7.5 FL (ref 6–12)
POTASSIUM BLD-SCNC: 3.5 MMOL/L (ref 3.5–5.2)
RBC # BLD AUTO: 3.73 10*6/MM3 (ref 3.77–5.28)
SODIUM BLD-SCNC: 144 MMOL/L (ref 136–145)
WBC NRBC COR # BLD: 4.4 10*3/MM3 (ref 3.4–10.8)

## 2019-10-29 PROCEDURE — 99238 HOSP IP/OBS DSCHRG MGMT 30/<: CPT | Performed by: INTERNAL MEDICINE

## 2019-10-29 PROCEDURE — 85025 COMPLETE CBC W/AUTO DIFF WBC: CPT | Performed by: NURSE PRACTITIONER

## 2019-10-29 PROCEDURE — 74018 RADEX ABDOMEN 1 VIEW: CPT

## 2019-10-29 PROCEDURE — 80048 BASIC METABOLIC PNL TOTAL CA: CPT | Performed by: NURSE PRACTITIONER

## 2019-10-29 RX ADMIN — LACTULOSE 10 G: 10 SOLUTION ORAL at 09:16

## 2019-10-29 RX ADMIN — SODIUM CHLORIDE 75 ML/HR: 900 INJECTION, SOLUTION INTRAVENOUS at 06:14

## 2019-10-29 RX ADMIN — ASPIRIN 81 MG: 81 TABLET, COATED ORAL at 09:17

## 2019-10-29 RX ADMIN — ATORVASTATIN CALCIUM 20 MG: 20 TABLET, FILM COATED ORAL at 09:16

## 2019-10-29 RX ADMIN — CARVEDILOL 3.12 MG: 3.12 TABLET, FILM COATED ORAL at 09:16

## 2019-10-29 RX ADMIN — Medication 10 ML: at 09:19

## 2019-10-29 RX ADMIN — ISOSORBIDE MONONITRATE 60 MG: 60 TABLET, EXTENDED RELEASE ORAL at 06:14

## 2019-10-29 RX ADMIN — AMLODIPINE BESYLATE 2.5 MG: 5 TABLET ORAL at 06:14

## 2019-10-30 ENCOUNTER — READMISSION MANAGEMENT (OUTPATIENT)
Dept: CALL CENTER | Facility: HOSPITAL | Age: 84
End: 2019-10-30

## 2019-10-30 NOTE — OUTREACH NOTE
Prep Survey      Responses   Facility patient discharged from?  [facility]   Is patient eligible?  Yes   Discharge diagnosis  ABDOMINAL PAIN   Does the patient have one of the following disease processes/diagnoses(primary or secondary)?  Other   Does the patient have Home health ordered?  No   Is there a DME ordered?  No   Medication alerts for this patient  SEE AVS   Prep survey completed?  Yes          Kim Charles LPN

## 2019-10-31 ENCOUNTER — READMISSION MANAGEMENT (OUTPATIENT)
Dept: CALL CENTER | Facility: HOSPITAL | Age: 84
End: 2019-10-31

## 2019-10-31 NOTE — OUTREACH NOTE
Medical Week 1 Survey      Responses   Facility patient discharged from?  [facility]   Does the patient have one of the following disease processes/diagnoses(primary or secondary)?  Other   Is there a successful TCM telephone encounter documented?  No   Week 1 attempt successful?  Yes   Call start time  1440   Call end time  1443   Discharge diagnosis  ABDOMINAL PAIN   Meds reviewed with patient/caregiver?  Yes   Does the patient have all medications ordered at discharge?  N/A   Is the patient taking all medications as directed (includes completed medication regime)?  Yes   Does the patient have a primary care provider?   Yes   Does the patient have an appointment with their PCP within 7 days of discharge?  No   What is preventing the patient from scheduling follow up appointments within 7 days of discharge?  Haven't had time [PATIENT DECLINED ASSISTANCE MAKING FOLLOW UP APPOINTMENT]   Nursing Interventions  Educated patient on importance of making appointment, Advised patient to make appointment   Has the patient kept scheduled appointments due by today?  N/A   Has home health visited the patient within 72 hours of discharge?  N/A   Did the patient receive a copy of their discharge instructions?  Yes   Nursing interventions  Reviewed instructions with patient   What is the patient's perception of their health status since discharge?  Improving   Is the patient/caregiver able to teach back signs and symptoms related to disease process for when to call PCP?  Yes   Is the patient/caregiver able to teach back signs and symptoms related to disease process for when to call 911?  Yes   Is the patient/caregiver able to teach back the hierarchy of who to call/visit for symptoms/problems? PCP, Specialist, Home health nurse, Urgent Care, ED, 911  Yes   Week 1 call completed?  Yes   Graduated  Yes   Did the patient feel the follow up calls were helpful during their recovery period?  Yes   Was the number of calls appropriate?  Yes           Kim Charles LPN

## 2019-12-01 ENCOUNTER — HOSPITAL ENCOUNTER (INPATIENT)
Facility: HOSPITAL | Age: 84
LOS: 1 days | Discharge: HOME | End: 2019-12-04
Attending: EMERGENCY MEDICINE | Admitting: INTERNAL MEDICINE

## 2019-12-01 ENCOUNTER — APPOINTMENT (OUTPATIENT)
Dept: GENERAL RADIOLOGY | Facility: HOSPITAL | Age: 84
End: 2019-12-01

## 2019-12-01 DIAGNOSIS — R07.9 CHEST PAIN, UNSPECIFIED TYPE: Primary | ICD-10-CM

## 2019-12-01 DIAGNOSIS — I25.110 CORONARY ARTERY DISEASE INVOLVING NATIVE HEART WITH UNSTABLE ANGINA PECTORIS, UNSPECIFIED VESSEL OR LESION TYPE (HCC): ICD-10-CM

## 2019-12-01 DIAGNOSIS — I20.0 UNSTABLE ANGINA PECTORIS (HCC): ICD-10-CM

## 2019-12-01 PROBLEM — K56.7 ILEUS (HCC): Status: RESOLVED | Noted: 2019-10-28 | Resolved: 2019-12-01

## 2019-12-01 PROBLEM — E78.5 DYSLIPIDEMIA: Status: ACTIVE | Noted: 2019-12-01

## 2019-12-01 PROBLEM — K21.9 GASTROESOPHAGEAL REFLUX DISEASE: Status: ACTIVE | Noted: 2019-12-01

## 2019-12-01 PROBLEM — R10.11 RIGHT UPPER QUADRANT ABDOMINAL PAIN: Status: RESOLVED | Noted: 2019-10-28 | Resolved: 2019-12-01

## 2019-12-01 PROBLEM — I10 ESSENTIAL HYPERTENSION: Status: ACTIVE | Noted: 2017-08-14

## 2019-12-01 LAB
ANION GAP SERPL CALCULATED.3IONS-SCNC: 10 MMOL/L (ref 5–15)
APTT PPP: 25.6 SECONDS (ref 24–31)
BASOPHILS # BLD AUTO: 0 10*3/MM3 (ref 0–0.2)
BASOPHILS NFR BLD AUTO: 0.5 % (ref 0–1.5)
BILIRUB UR QL STRIP: NEGATIVE
BUN BLD-MCNC: 16 MG/DL (ref 8–23)
BUN/CREAT SERPL: 23.5 (ref 7–25)
CALCIUM SPEC-SCNC: 8.7 MG/DL (ref 8.2–9.6)
CHLORIDE SERPL-SCNC: 108 MMOL/L (ref 98–107)
CLARITY UR: CLEAR
CO2 SERPL-SCNC: 23 MMOL/L (ref 22–29)
COLOR UR: YELLOW
CREAT BLD-MCNC: 0.68 MG/DL (ref 0.57–1)
DEPRECATED RDW RBC AUTO: 46.4 FL (ref 37–54)
EOSINOPHIL # BLD AUTO: 0.2 10*3/MM3 (ref 0–0.4)
EOSINOPHIL NFR BLD AUTO: 3.7 % (ref 0.3–6.2)
ERYTHROCYTE [DISTWIDTH] IN BLOOD BY AUTOMATED COUNT: 14.9 % (ref 12.3–15.4)
GFR SERPL CREATININE-BSD FRML MDRD: 81 ML/MIN/1.73
GLUCOSE BLD-MCNC: 174 MG/DL (ref 65–99)
GLUCOSE UR STRIP-MCNC: NEGATIVE MG/DL
HCT VFR BLD AUTO: 35.1 % (ref 34–46.6)
HGB BLD-MCNC: 11.8 G/DL (ref 12–15.9)
HGB UR QL STRIP.AUTO: NEGATIVE
INR PPP: 0.95 (ref 0.9–1.1)
KETONES UR QL STRIP: NEGATIVE
LEUKOCYTE ESTERASE UR QL STRIP.AUTO: NEGATIVE
LYMPHOCYTES # BLD AUTO: 1.7 10*3/MM3 (ref 0.7–3.1)
LYMPHOCYTES NFR BLD AUTO: 28.2 % (ref 19.6–45.3)
MCH RBC QN AUTO: 29.4 PG (ref 26.6–33)
MCHC RBC AUTO-ENTMCNC: 33.8 G/DL (ref 31.5–35.7)
MCV RBC AUTO: 87 FL (ref 79–97)
MONOCYTES # BLD AUTO: 0.4 10*3/MM3 (ref 0.1–0.9)
MONOCYTES NFR BLD AUTO: 7.5 % (ref 5–12)
NEUTROPHILS # BLD AUTO: 3.6 10*3/MM3 (ref 1.7–7)
NEUTROPHILS NFR BLD AUTO: 60.1 % (ref 42.7–76)
NITRITE UR QL STRIP: NEGATIVE
NRBC BLD AUTO-RTO: 0.1 /100 WBC (ref 0–0.2)
PH UR STRIP.AUTO: 6 [PH] (ref 5–8)
PLATELET # BLD AUTO: 245 10*3/MM3 (ref 140–450)
PMV BLD AUTO: 7.4 FL (ref 6–12)
POTASSIUM BLD-SCNC: 3.7 MMOL/L (ref 3.5–5.2)
POTASSIUM BLD-SCNC: 3.9 MMOL/L (ref 3.5–5.2)
PROT UR QL STRIP: NEGATIVE
PROTHROMBIN TIME: 10.1 SECONDS (ref 9.6–11.7)
RBC # BLD AUTO: 4.03 10*6/MM3 (ref 3.77–5.28)
SODIUM BLD-SCNC: 141 MMOL/L (ref 136–145)
SP GR UR STRIP: 1.01 (ref 1–1.03)
TROPONIN T SERPL-MCNC: <0.01 NG/ML (ref 0–0.03)
UROBILINOGEN UR QL STRIP: NORMAL
WBC NRBC COR # BLD: 5.9 10*3/MM3 (ref 3.4–10.8)

## 2019-12-01 PROCEDURE — 93005 ELECTROCARDIOGRAM TRACING: CPT | Performed by: EMERGENCY MEDICINE

## 2019-12-01 PROCEDURE — 85610 PROTHROMBIN TIME: CPT | Performed by: EMERGENCY MEDICINE

## 2019-12-01 PROCEDURE — 85730 THROMBOPLASTIN TIME PARTIAL: CPT | Performed by: EMERGENCY MEDICINE

## 2019-12-01 PROCEDURE — 71045 X-RAY EXAM CHEST 1 VIEW: CPT

## 2019-12-01 PROCEDURE — G0378 HOSPITAL OBSERVATION PER HR: HCPCS

## 2019-12-01 PROCEDURE — 80048 BASIC METABOLIC PNL TOTAL CA: CPT | Performed by: EMERGENCY MEDICINE

## 2019-12-01 PROCEDURE — 99223 1ST HOSP IP/OBS HIGH 75: CPT | Performed by: INTERNAL MEDICINE

## 2019-12-01 PROCEDURE — 84484 ASSAY OF TROPONIN QUANT: CPT | Performed by: EMERGENCY MEDICINE

## 2019-12-01 PROCEDURE — 73560 X-RAY EXAM OF KNEE 1 OR 2: CPT

## 2019-12-01 PROCEDURE — 99284 EMERGENCY DEPT VISIT MOD MDM: CPT

## 2019-12-01 PROCEDURE — 25010000002 ENOXAPARIN PER 10 MG: Performed by: INTERNAL MEDICINE

## 2019-12-01 PROCEDURE — 84132 ASSAY OF SERUM POTASSIUM: CPT | Performed by: INTERNAL MEDICINE

## 2019-12-01 PROCEDURE — 85025 COMPLETE CBC W/AUTO DIFF WBC: CPT | Performed by: EMERGENCY MEDICINE

## 2019-12-01 PROCEDURE — 81003 URINALYSIS AUTO W/O SCOPE: CPT | Performed by: INTERNAL MEDICINE

## 2019-12-01 RX ORDER — NICOTINE 21 MG/24HR
1 PATCH, TRANSDERMAL 24 HOURS TRANSDERMAL EVERY 24 HOURS
Status: DISCONTINUED | OUTPATIENT
Start: 2019-12-01 | End: 2019-12-02

## 2019-12-01 RX ORDER — MAGNESIUM SULFATE HEPTAHYDRATE 40 MG/ML
2 INJECTION, SOLUTION INTRAVENOUS AS NEEDED
Status: DISCONTINUED | OUTPATIENT
Start: 2019-12-01 | End: 2019-12-04 | Stop reason: HOSPADM

## 2019-12-01 RX ORDER — NITROGLYCERIN 0.4 MG/1
0.4 TABLET SUBLINGUAL
Status: DISCONTINUED | OUTPATIENT
Start: 2019-12-01 | End: 2019-12-01

## 2019-12-01 RX ORDER — ACETAMINOPHEN 160 MG/5ML
650 SOLUTION ORAL EVERY 4 HOURS PRN
Status: DISCONTINUED | OUTPATIENT
Start: 2019-12-01 | End: 2019-12-04 | Stop reason: HOSPADM

## 2019-12-01 RX ORDER — FAMOTIDINE 20 MG/1
40 TABLET, FILM COATED ORAL DAILY
Status: DISCONTINUED | OUTPATIENT
Start: 2019-12-01 | End: 2019-12-01

## 2019-12-01 RX ORDER — ATORVASTATIN CALCIUM 10 MG/1
10 TABLET, FILM COATED ORAL NIGHTLY
Status: DISCONTINUED | OUTPATIENT
Start: 2019-12-01 | End: 2019-12-04 | Stop reason: HOSPADM

## 2019-12-01 RX ORDER — ISOSORBIDE MONONITRATE 60 MG/1
60 TABLET, EXTENDED RELEASE ORAL EVERY MORNING
Status: DISCONTINUED | OUTPATIENT
Start: 2019-12-02 | End: 2019-12-04 | Stop reason: HOSPADM

## 2019-12-01 RX ORDER — ACETAMINOPHEN 325 MG/1
650 TABLET ORAL EVERY 4 HOURS PRN
Status: DISCONTINUED | OUTPATIENT
Start: 2019-12-01 | End: 2019-12-04 | Stop reason: HOSPADM

## 2019-12-01 RX ORDER — CALCIUM GLUCONATE 20 MG/ML
2 INJECTION, SOLUTION INTRAVENOUS AS NEEDED
Status: DISCONTINUED | OUTPATIENT
Start: 2019-12-01 | End: 2019-12-04 | Stop reason: HOSPADM

## 2019-12-01 RX ORDER — ASPIRIN 81 MG/1
81 TABLET ORAL DAILY
Status: DISCONTINUED | OUTPATIENT
Start: 2019-12-02 | End: 2019-12-04 | Stop reason: HOSPADM

## 2019-12-01 RX ORDER — AMLODIPINE BESYLATE 5 MG/1
2.5 TABLET ORAL EVERY MORNING
Status: DISCONTINUED | OUTPATIENT
Start: 2019-12-02 | End: 2019-12-04 | Stop reason: HOSPADM

## 2019-12-01 RX ORDER — SODIUM CHLORIDE 0.9 % (FLUSH) 0.9 %
10 SYRINGE (ML) INJECTION EVERY 12 HOURS SCHEDULED
Status: DISCONTINUED | OUTPATIENT
Start: 2019-12-01 | End: 2019-12-04 | Stop reason: HOSPADM

## 2019-12-01 RX ORDER — ACETAMINOPHEN 500 MG
500 TABLET ORAL EVERY 6 HOURS PRN
Status: DISCONTINUED | OUTPATIENT
Start: 2019-12-01 | End: 2019-12-01

## 2019-12-01 RX ORDER — NITROGLYCERIN 0.4 MG/1
0.4 TABLET SUBLINGUAL
Status: DISCONTINUED | OUTPATIENT
Start: 2019-12-01 | End: 2019-12-04 | Stop reason: HOSPADM

## 2019-12-01 RX ORDER — ASPIRIN 81 MG/1
243 TABLET, CHEWABLE ORAL ONCE
Status: COMPLETED | OUTPATIENT
Start: 2019-12-01 | End: 2019-12-01

## 2019-12-01 RX ORDER — POTASSIUM CHLORIDE 20 MEQ/1
40 TABLET, EXTENDED RELEASE ORAL AS NEEDED
Status: DISCONTINUED | OUTPATIENT
Start: 2019-12-01 | End: 2019-12-04 | Stop reason: HOSPADM

## 2019-12-01 RX ORDER — DOCUSATE SODIUM 100 MG/1
100 CAPSULE, LIQUID FILLED ORAL 2 TIMES DAILY PRN
Status: DISCONTINUED | OUTPATIENT
Start: 2019-12-01 | End: 2019-12-04 | Stop reason: HOSPADM

## 2019-12-01 RX ORDER — ONDANSETRON 2 MG/ML
4 INJECTION INTRAMUSCULAR; INTRAVENOUS EVERY 6 HOURS PRN
Status: DISCONTINUED | OUTPATIENT
Start: 2019-12-01 | End: 2019-12-03

## 2019-12-01 RX ORDER — SODIUM CHLORIDE 0.9 % (FLUSH) 0.9 %
10 SYRINGE (ML) INJECTION AS NEEDED
Status: DISCONTINUED | OUTPATIENT
Start: 2019-12-01 | End: 2019-12-04 | Stop reason: HOSPADM

## 2019-12-01 RX ORDER — SODIUM CHLORIDE 9 MG/ML
100 INJECTION, SOLUTION INTRAVENOUS CONTINUOUS
Status: DISCONTINUED | OUTPATIENT
Start: 2019-12-01 | End: 2019-12-04 | Stop reason: HOSPADM

## 2019-12-01 RX ORDER — ACETAMINOPHEN 650 MG/1
650 SUPPOSITORY RECTAL EVERY 4 HOURS PRN
Status: DISCONTINUED | OUTPATIENT
Start: 2019-12-01 | End: 2019-12-04 | Stop reason: HOSPADM

## 2019-12-01 RX ORDER — MAGNESIUM SULFATE HEPTAHYDRATE 40 MG/ML
4 INJECTION, SOLUTION INTRAVENOUS AS NEEDED
Status: DISCONTINUED | OUTPATIENT
Start: 2019-12-01 | End: 2019-12-04 | Stop reason: HOSPADM

## 2019-12-01 RX ORDER — KETOROLAC TROMETHAMINE 15 MG/ML
15 INJECTION, SOLUTION INTRAMUSCULAR; INTRAVENOUS EVERY 6 HOURS PRN
Status: DISCONTINUED | OUTPATIENT
Start: 2019-12-01 | End: 2019-12-02

## 2019-12-01 RX ORDER — CALCIUM GLUCONATE 20 MG/ML
1 INJECTION, SOLUTION INTRAVENOUS AS NEEDED
Status: DISCONTINUED | OUTPATIENT
Start: 2019-12-01 | End: 2019-12-04 | Stop reason: HOSPADM

## 2019-12-01 RX ORDER — POTASSIUM CHLORIDE 1.5 G/1.77G
40 POWDER, FOR SOLUTION ORAL AS NEEDED
Status: DISCONTINUED | OUTPATIENT
Start: 2019-12-01 | End: 2019-12-04 | Stop reason: HOSPADM

## 2019-12-01 RX ORDER — LACTULOSE 10 G/15ML
10 SOLUTION ORAL 2 TIMES DAILY
Status: DISCONTINUED | OUTPATIENT
Start: 2019-12-01 | End: 2019-12-04 | Stop reason: HOSPADM

## 2019-12-01 RX ORDER — CARVEDILOL 3.12 MG/1
3.12 TABLET ORAL 2 TIMES DAILY WITH MEALS
Status: DISCONTINUED | OUTPATIENT
Start: 2019-12-01 | End: 2019-12-04 | Stop reason: HOSPADM

## 2019-12-01 RX ORDER — CHOLECALCIFEROL (VITAMIN D3) 125 MCG
5 CAPSULE ORAL NIGHTLY PRN
Status: DISCONTINUED | OUTPATIENT
Start: 2019-12-01 | End: 2019-12-04 | Stop reason: HOSPADM

## 2019-12-01 RX ADMIN — ENOXAPARIN SODIUM 40 MG: 40 INJECTION SUBCUTANEOUS at 22:56

## 2019-12-01 RX ADMIN — NITROGLYCERIN 0.4 MG: 0.4 TABLET SUBLINGUAL at 15:05

## 2019-12-01 RX ADMIN — Medication 10 ML: at 22:55

## 2019-12-01 RX ADMIN — CARVEDILOL 3.12 MG: 3.12 TABLET, FILM COATED ORAL at 22:58

## 2019-12-01 RX ADMIN — SODIUM CHLORIDE 100 ML/HR: 900 INJECTION, SOLUTION INTRAVENOUS at 22:55

## 2019-12-01 RX ADMIN — ATORVASTATIN CALCIUM 10 MG: 10 TABLET, FILM COATED ORAL at 22:55

## 2019-12-01 RX ADMIN — ASPIRIN 81 MG 243 MG: 81 TABLET ORAL at 15:03

## 2019-12-01 NOTE — ED PROVIDER NOTES
Subjective   89-year-old female with a history of coronary artery disease presents with chest pain.  Patient states the pain started about 30 minutes prior to arrival.  Pain is in the center of her chest and radiates to her bilateral shoulders as well as her neck and jaw.  She denies any shortness of breath, diaphoresis, nausea, palpitations.  She denies any alleviating or exacerbating factors.  She states the pain was moderate to severe when it first started but now it has eased up some.  She states the pain is no longer radiating and she just has mild central chest pain.  She also states that she is having left knee pain.  She states she felt a pop in her left knee earlier today and has been having pain since that time.  She denies any other injuries to the knee.        History provided by:  Patient      Review of Systems   Constitutional: Negative for fatigue and fever.   HENT: Negative for congestion and sore throat.    Eyes: Negative for pain and redness.   Respiratory: Negative for cough and shortness of breath.    Cardiovascular: Positive for chest pain. Negative for palpitations.   Gastrointestinal: Negative for abdominal pain and vomiting.   Genitourinary: Negative for dysuria and frequency.   Musculoskeletal: Negative for back pain.   Skin: Negative for rash.   Neurological: Negative for dizziness and headaches.   Psychiatric/Behavioral: Negative for behavioral problems and confusion.       Past Medical History:   Diagnosis Date   • Arthritis    • Frequent UTI    • GERD (gastroesophageal reflux disease)    • Hearing loss     bilateral hearing aides   • 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 sure which 1  contracted at age 8   • History of transfusion    • Hypertension    • Past heart attack     X2 MILD   • PONV (postoperative nausea and vomiting)        Allergies   Allergen Reactions   • Codeine Nausea And Vomiting and Dizziness   • Hydrocodone Nausea And Vomiting and Dizziness   • Sulfa Antibiotics Rash  "      Past Surgical History:   Procedure Laterality Date   • ANTERIOR AND POSTERIOR VAGINAL REPAIR     • BLEPHAROPLASTY Bilateral 2019    Procedure: bilateral upper lid blepharoplasty;  Surgeon: Mauricio Pennington MD;  [place] [facility] [place] [facility] [place]  Service: Ophthalmology   • CATARACT EXTRACTION EXTRACAPSULAR W/ INTRAOCULAR LENS IMPLANTATION Bilateral    • CERVICAL SPINE ANTERIOR      with instrumentation   • COLON SURGERY      for obstruction   • ALBERTO TUBE INSERTION Bilateral 2019    Procedure: ALBERTO TUBE;  Surgeon: Mauricio Peninngton MD;  [place] [facility] [place] [facility] [place]  Service: Ophthalmology   • ECTROPION REPAIR Bilateral 2019    Procedure: bilateral lower lid ectropion repair, bilateral punctoplasty;  Surgeon: Mauricio Pennington MD;  [place] [facility] [place] [facility] [place]  Service: Ophthalmology   • FOOT FUSION Left 2016    Procedure: LEFT HALLUX METATARSALPHALANGEAL ARTHRODESIS SILVER BUNIONECTOMY METATARSAL HEAD RESECTION 2-5 CALCANEAL BONE GRAFT;  Surgeon: Monster Harper Jr., MD;  Location: [facility];  Service:    • HYSTERECTOMY     • INGUINAL HERNIA REPAIR Bilateral    • STOMACH SURGERY      for ulcer       Family History   Problem Relation Age of Onset   • Malig Hyperthermia Neg Hx        Social History     Socioeconomic History   • Marital status:      Spouse name: Not on file   • Number of children: Not on file   • Years of education: Not on file   • Highest education level: Not on file   Tobacco Use   • Smoking status: Former Smoker     Years: 10.00     Types: Cigarettes     Last attempt to quit:      Years since quittin.9   • Smokeless tobacco: Never Used   • Tobacco comment: 1 pk a week   Substance and Sexual Activity   • Alcohol use: No   • Drug use: No   • Sexual activity: Defer       /69   Pulse 58   Temp 97.5 °F (36.4 °C) (Oral)   Resp 14   Ht 157.5 cm (62\")   Wt 56.2 kg (124 lb)   SpO2 99%   BMI 22.68 kg/m²       Objective   Physical Exam   Constitutional: She " is oriented to person, place, and time. She appears well-developed and well-nourished.   HENT:   Head: Normocephalic and atraumatic.   Eyes: EOM are normal. Pupils are equal, round, and reactive to light.   Neck: Normal range of motion. Neck supple.   Cardiovascular: Normal rate, regular rhythm and normal heart sounds.   Pulmonary/Chest: Effort normal and breath sounds normal. No respiratory distress.   Abdominal: Soft. Bowel sounds are normal. There is no tenderness.   Musculoskeletal: Normal range of motion.        Right lower leg: Normal.        Left lower leg: Normal.   There is some tenderness about the left knee with no obvious deformity, no rest intact distally   Neurological: She is alert and oriented to person, place, and time.   Skin: Skin is warm and dry.   Nursing note and vitals reviewed.      Procedures           ED Course      My interpretation of EKG shows sinus rhythm, rate of 72, no ST elevation.    Results for orders placed or performed during the hospital encounter of 12/01/19   Basic Metabolic Panel   Result Value Ref Range    Glucose 174 (H) 65 - 99 mg/dL    BUN 16 8 - 23 mg/dL    Creatinine 0.68 0.57 - 1.00 mg/dL    Sodium 141 136 - 145 mmol/L    Potassium 3.7 3.5 - 5.2 mmol/L    Chloride 108 (H) 98 - 107 mmol/L    CO2 23.0 22.0 - 29.0 mmol/L    Calcium 8.7 8.2 - 9.6 mg/dL    eGFR Non African Amer 81 >60 mL/min/1.73    BUN/Creatinine Ratio 23.5 7.0 - 25.0    Anion Gap 10.0 5.0 - 15.0 mmol/L   Protime-INR   Result Value Ref Range    Protime 10.1 9.6 - 11.7 Seconds    INR 0.95 0.90 - 1.10   aPTT   Result Value Ref Range    PTT 25.6 24.0 - 31.0 seconds   Troponin   Result Value Ref Range    Troponin T <0.010 0.000 - 0.030 ng/mL   CBC Auto Differential   Result Value Ref Range    WBC 5.90 3.40 - 10.80 10*3/mm3    RBC 4.03 3.77 - 5.28 10*6/mm3    Hemoglobin 11.8 (L) 12.0 - 15.9 g/dL    Hematocrit 35.1 34.0 - 46.6 %    MCV 87.0 79.0 - 97.0 fL    MCH 29.4 26.6 - 33.0 pg    MCHC 33.8 31.5 - 35.7 g/dL     RDW 14.9 12.3 - 15.4 %    RDW-SD 46.4 37.0 - 54.0 fl    MPV 7.4 6.0 - 12.0 fL    Platelets 245 140 - 450 10*3/mm3    Neutrophil % 60.1 42.7 - 76.0 %    Lymphocyte % 28.2 19.6 - 45.3 %    Monocyte % 7.5 5.0 - 12.0 %    Eosinophil % 3.7 0.3 - 6.2 %    Basophil % 0.5 0.0 - 1.5 %    Neutrophils, Absolute 3.60 1.70 - 7.00 10*3/mm3    Lymphocytes, Absolute 1.70 0.70 - 3.10 10*3/mm3    Monocytes, Absolute 0.40 0.10 - 0.90 10*3/mm3    Eosinophils, Absolute 0.20 0.00 - 0.40 10*3/mm3    Basophils, Absolute 0.00 0.00 - 0.20 10*3/mm3    nRBC 0.1 0.0 - 0.2 /100 WBC     Xr Knee 1 Or 2 View Left    Result Date: 12/1/2019  Impression: 1. Negative for fracture or other acute process. 2. Bony demineralization.  Electronically Signed BySabi Mejia On:12/1/2019 3:28 PM This report was finalized on [id number] by  Gaby Mejia .    Xr Chest 1 View    Result Date: 12/1/2019   1. Stable exam, with mild cardiomegaly and no evidence of active lung disease.  Electronically Signed BySabi Mejia On:12/1/2019 3:27 PM This report was finalized on [id number] by  Gaby Mejia, .            MDM   Patient had the above evaluation.  Results were discussed with the patient.  Patient was given 1 sublingual nitro and she had complete resolution of her pain.  She states she feels fine now.  She was also given aspirin.  Work-up is been unremarkable so far.  Chest x-ray shows no acute disease.  EKG shows no acute ischemia.  Troponin is negative.  X-ray of the left knee was also unremarkable.  Patient will be admitted for further evaluation of her chest pain.  I discussed with the hospitalist who agreed to admit.      Final diagnoses:   Chest pain, unspecified type              Jason Martins MD  12/01/19 [id number]

## 2019-12-01 NOTE — MEDICAL STUDENT
PA Student Note     Primary Care : Monster Myrick MD    CHIEF COMPLAINT: Chest pain    HISTORY OF PRESENT ILLNESS:    MG is a 89 yo WF with a PMH of Heart attack x 2, HTN, HLD, GERD, Hearing loss, and Arthritis who drove herself to [facility] ED 12/1/19 with chest pain since 1330. Pt was at home sitting down when she started having chest pain described as constant, 5/10, aching, and radiating to her back and jaw. The pain is different from when she had her last heart attack sometime between the first one in 2014 and now. She reports that nothing makes the pain better or worse and after it not dissipating she decided to come in. Pt has also been having left knee pain after hearing it pop earlier today. Admits chest pain. Admits left knee pain. Denies dyspnea, diaphoresis, dizziness, headache, cough, nausea, and injury.    In the ED labs showed Troponin <0.010 and Glucose 174  - EKG Sinus rhythm, Low voltage, precordial leads  - CXR 1. Stable exam, with mild cardiomegaly and no evidence of active lung disease.  - XR knee 1. Negative for fracture or other acute process. 2. Bony demineralization.  Pt was given Aspirin and sublingual Nitroglycerin with resolution of her chest pain.    Past Medical History:   Diagnosis Date   • Arthritis    • Frequent UTI    • GERD (gastroesophageal reflux disease)    • Hearing loss     bilateral hearing aides   • 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 sure which 1  contracted at age 8   • History of transfusion    • Hypertension    • Past heart attack     X2 MILD   • PONV (postoperative nausea and vomiting)        Past Surgical History:   Procedure Laterality Date   • ANTERIOR AND POSTERIOR VAGINAL REPAIR     • BLEPHAROPLASTY Bilateral 5/23/2019    Procedure: bilateral upper lid blepharoplasty;  Surgeon: Mauricio Pennington MD;  [place] [facility] [place] [facility] [place]  Service: Ophthalmology   • CATARACT EXTRACTION EXTRACAPSULAR W/ INTRAOCULAR LENS IMPLANTATION Bilateral    • CERVICAL SPINE ANTERIOR      with  instrumentation   • COLON SURGERY      for obstruction   • ALBERTO TUBE INSERTION Bilateral 2019    Procedure: ALBERTO TUBE;  Surgeon: Mauricio Pennington MD;  Location: [facility] OR [facility] [place];  Service: Ophthalmology   • ECTROPION REPAIR Bilateral 2019    Procedure: bilateral lower lid ectropion repair, bilateral punctoplasty;  Surgeon: Mauricio Pennington MD;  Location: [facility] [place] [facility] [place];  Service: Ophthalmology   • FOOT FUSION Left 2016    Procedure: LEFT HALLUX METATARSALPHALANGEAL ARTHRODESIS SILVER BUNIONECTOMY METATARSAL HEAD RESECTION 2-5 CALCANEAL BONE GRAFT;  Surgeon: Monster Harper Jr., MD;  Location: [facility] MAIN OR;  Service:    • HYSTERECTOMY     • INGUINAL HERNIA REPAIR Bilateral    • STOMACH SURGERY      for ulcer       Family History   Problem Relation Age of Onset   • Malig Hyperthermia Neg Hx        Social History     Tobacco Use   • Smoking status: Former Smoker     Years: 10.00     Types: Cigarettes     Last attempt to quit:      Years since quittin.9   • Smokeless tobacco: Never Used   • Tobacco comment: 1 pk a week   Substance Use Topics   • Alcohol use: No   • Drug use: No         (Not in a hospital admission)    Allergies:  Codeine; Hydrocodone; and Sulfa antibiotics      There is no immunization history on file for this patient.    REVIEW OF SYSTEMS:   Review of Systems   Constitutional: Negative for diaphoresis, fatigue and fever.   HENT: Positive for hearing loss. Negative for ear pain and sore throat.    Eyes: Negative for visual disturbance.   Respiratory: Negative for cough and shortness of breath.    Cardiovascular: Positive for chest pain. Negative for palpitations and leg swelling.   Gastrointestinal: Negative for abdominal pain, diarrhea, nausea and vomiting.   Genitourinary: Negative for difficulty urinating.   Musculoskeletal:        Left knee pain   Skin: Negative for color change.   Allergic/Immunologic: Negative for immunocompromised state.   Neurological: Negative  for dizziness, syncope and headaches.   Hematological: Negative for adenopathy.   Psychiatric/Behavioral: Negative for agitation.         Vital Signs  Temp:  [97.5 °F (36.4 °C)] 97.5 °F (36.4 °C)  Heart Rate:  [58-76] 58  Resp:  [14] 14  BP: (153-166)/(69-72) 153/69      Physical Exam   Constitutional: She is oriented to person, place, and time. She appears well-developed and well-nourished. No distress.   HENT:   Head: Normocephalic and atraumatic.   Eyes: Conjunctivae and EOM are normal. Pupils are equal, round, and reactive to light. No scleral icterus.   Neck: Normal range of motion. Neck supple.   Cardiovascular: Normal rate, regular rhythm, normal heart sounds and intact distal pulses.   Pulmonary/Chest: Effort normal and breath sounds normal.   Abdominal: Soft. Bowel sounds are normal.   Musculoskeletal: Normal range of motion.   Neurological: She is alert and oriented to person, place, and time.   Skin: Skin is warm and dry. Capillary refill takes less than 2 seconds. She is not diaphoretic. No erythema.   Psychiatric: She has a normal mood and affect.      Results Review:      Lab Results (most recent)     Procedure Component Value Units Date/Time    Basic Metabolic Panel [[id number]]  (Abnormal) Collected:  12/01/19 1451    Specimen:  Blood Updated:  12/01/19 1522     Glucose 174 mg/dL      BUN 16 mg/dL      Creatinine 0.68 mg/dL      Sodium 141 mmol/L      Potassium 3.7 mmol/L      Chloride 108 mmol/L      CO2 23.0 mmol/L      Calcium 8.7 mg/dL      eGFR Non African Amer 81 mL/min/1.73      BUN/Creatinine Ratio 23.5     Anion Gap 10.0 mmol/L     Narrative:       GFR Normal >60  Chronic Kidney Disease <60  Kidney Failure <15    Troponin [[id number]]  (Normal) Collected:  12/01/19 1451    Specimen:  Blood Updated:  12/01/19 1522     Troponin T <0.010 ng/mL     Narrative:       Troponin T Reference Range:  <= 0.03 ng/mL-   Negative for AMI  >0.03 ng/mL-     Abnormal for myocardial necrosis.  Clinicians would  have to utilize clinical acumen, EKG, Troponin and serial changes to determine if it is an Acute Myocardial Infarction or myocardial injury due to an underlying chronic condition.     Protime-INR [[id number]]  (Normal) Collected:  12/01/19 1451    Specimen:  Blood Updated:  12/01/19 1504     Protime 10.1 Seconds      INR 0.95    aPTT [[id number]]  (Normal) Collected:  12/01/19 1451    Specimen:  Blood Updated:  12/01/19 1504     PTT 25.6 seconds     CBC & Differential [[id number]] Collected:  12/01/19 1451    Specimen:  Blood Updated:  12/01/19 1457    Narrative:       The following orders were created for panel order CBC & Differential.  Procedure                               Abnormality         Status                     ---------                               -----------         ------                     CBC Auto Differential[[id number]]        Abnormal            Final result                 Please view results for these tests on the individual orders.    CBC Auto Differential [[id number]]  (Abnormal) Collected:  12/01/19 1451    Specimen:  Blood Updated:  12/01/19 1457     WBC 5.90 10*3/mm3      RBC 4.03 10*6/mm3      Hemoglobin 11.8 g/dL      Hematocrit 35.1 %      MCV 87.0 fL      MCH 29.4 pg      MCHC 33.8 g/dL      RDW 14.9 %      RDW-SD 46.4 fl      MPV 7.4 fL      Platelets 245 10*3/mm3      Neutrophil % 60.1 %      Lymphocyte % 28.2 %      Monocyte % 7.5 %      Eosinophil % 3.7 %      Basophil % 0.5 %      Neutrophils, Absolute 3.60 10*3/mm3      Lymphocytes, Absolute 1.70 10*3/mm3      Monocytes, Absolute 0.40 10*3/mm3      Eosinophils, Absolute 0.20 10*3/mm3      Basophils, Absolute 0.00 10*3/mm3      nRBC 0.1 /100 WBC           Imaging Results (Most Recent)     Procedure Component Value Units Date/Time    XR Knee 1 or 2 View Left [[id number]] Collected:  12/01/19 1527     Updated:  12/01/19 1530    Narrative:       Examination: XR KNEE 1 OR 2 VW LEFT-     Date of Exam: 12/1/2019 2:45 PM     Indication:  left knee pain.     Comparison: None available.     Technique: Three radiographic views of the knee were obtained.     Findings:  There is probably bony demineralization. The knee joint space is  preserved. There is no fracture or bone destruction. There is minor  spurring on the anterior superior patella. There is no significant joint  effusion. There is arterial vascular calcification. There is mild  meniscal calcification.       Impression:       Impression:  1. Negative for fracture or other acute process.  2. Bony demineralization.     Electronically Signed BySabi Mejia On:12/1/2019 3:28 PM  This report was finalized on [id number] by  Gaby Mejia .    XR Chest 1 View [[id number]] Collected:  12/01/19 1526     Updated:  12/01/19 1529    Narrative:       Examination: XR CHEST 1 VW-     Date of Exam: 12/1/2019 2:47 PM     Indication: chest pain.       Comparison: 02/27/2019     Technique: 1 view of the chest      FINDINGS:  There is mild cardiomegaly. Mediastinal and hilar contours are stable.  There is no vascular congestion, airspace opacity, or pleural effusion.  There is a thoracic scoliosis.       Impression:          1. Stable exam, with mild cardiomegaly and no evidence of active lung  disease.     Electronically Signed By-Gaby Mejia On:12/1/2019 3:27 PM  This report was finalized on [id number] by  Gaby Mejia .            ECG/EMG Results (most recent)     Procedure Component Value Units Date/Time    ECG 12 Lead [[id number]] Collected:  12/01/19 1417     Updated:  12/01/19 1420    Narrative:       HEART RATE= 72  bpm  RR Interval= 836  ms  VA Interval= 162  ms  P Horizontal Axis= 2  deg  P Front Axis= 49  deg  QRSD Interval= 86  ms  QT Interval= 408  ms  QRS Axis= -19  deg  T Wave Axis= 30  deg  - OTHERWISE NORMAL ECG -  Sinus rhythm  Low voltage, precordial leads  Electronically Signed By:   Date and Time of Study: 2019-12-01 14:17:22            Assessment/Plan     Active Hospital Problems     Diagnosis  POA   • **Chest pain [R07.9]  Yes      Resolved Hospital Problems   No resolved problems to display.     Chest pain most likely due to CAD  - Troponin <0.010, trend  - EKG Sinus rhythm, Low voltage, precordial leads  - CXR 1. Stable exam, with mild cardiomegaly and no evidence of active lung disease.  - HEART score Moderate (4-6)  - Consult cardiology    CAD chronic  - Continue Aspirin 81 mg PO daily  - Continue Carvedilol 3.125 mg PO BID  - Continue Isosorbide mononitrate 60 mg PO AC    Left knee pain most likely due to arthritis  - XR knee 1. Negative for fracture or other acute process. 2. Bony demineralization.  - Order MRI of left knee    HTN chronic  - Continue Amlodipine 2.5 mg PO daily    HLD chronic  - Continue Simvastatin 40 mg PO HS  - Order lipid panel    GERD chronic  - Pt says it doesn't bother her enough to treat    Arthritis chronic  - Pt says it doesn't bother her enough to treat    Constipation chronic  - Continue Lactulose 10 g PO BID

## 2019-12-02 PROBLEM — I24.9 ACUTE CORONARY SYNDROME: Status: ACTIVE | Noted: 2019-12-02

## 2019-12-02 PROBLEM — I20.0 UNSTABLE ANGINA PECTORIS: Status: ACTIVE | Noted: 2019-12-01

## 2019-12-02 PROBLEM — I25.110 CORONARY ARTERY DISEASE INVOLVING NATIVE HEART WITH UNSTABLE ANGINA PECTORIS: Status: ACTIVE | Noted: 2019-12-01

## 2019-12-02 PROBLEM — I21.4 NSTEMI, INITIAL EPISODE OF CARE (HCC): Status: ACTIVE | Noted: 2019-12-02

## 2019-12-02 LAB
ALBUMIN SERPL-MCNC: 3.5 G/DL (ref 3.5–5.2)
ALBUMIN/GLOB SERPL: 1.6 G/DL
ALP SERPL-CCNC: 89 U/L (ref 39–117)
ALT SERPL W P-5'-P-CCNC: 13 U/L (ref 1–33)
ANION GAP SERPL CALCULATED.3IONS-SCNC: 11 MMOL/L (ref 5–15)
AST SERPL-CCNC: 24 U/L (ref 1–32)
BASOPHILS # BLD AUTO: 0 10*3/MM3 (ref 0–0.2)
BASOPHILS NFR BLD AUTO: 0.4 % (ref 0–1.5)
BILIRUB SERPL-MCNC: 0.4 MG/DL (ref 0.2–1.2)
BUN BLD-MCNC: 11 MG/DL (ref 8–23)
BUN/CREAT SERPL: 19.3 (ref 7–25)
CALCIUM SPEC-SCNC: 8.4 MG/DL (ref 8.2–9.6)
CHLORIDE SERPL-SCNC: 111 MMOL/L (ref 98–107)
CHOLEST SERPL-MCNC: 114 MG/DL (ref 0–200)
CK SERPL-CCNC: 91 U/L (ref 20–180)
CO2 SERPL-SCNC: 23 MMOL/L (ref 22–29)
CREAT BLD-MCNC: 0.57 MG/DL (ref 0.57–1)
CRP SERPL-MCNC: 0.16 MG/DL (ref 0–0.5)
D-LACTATE SERPL-SCNC: 0.6 MMOL/L (ref 0.5–2)
DEPRECATED RDW RBC AUTO: 45.1 FL (ref 37–54)
EOSINOPHIL # BLD AUTO: 0.2 10*3/MM3 (ref 0–0.4)
EOSINOPHIL NFR BLD AUTO: 4.8 % (ref 0.3–6.2)
ERYTHROCYTE [DISTWIDTH] IN BLOOD BY AUTOMATED COUNT: 14.7 % (ref 12.3–15.4)
ERYTHROCYTE [SEDIMENTATION RATE] IN BLOOD: 9 MM/HR (ref 0–30)
FERRITIN SERPL-MCNC: 42.2 NG/ML (ref 13–150)
FOLATE SERPL-MCNC: >20 NG/ML (ref 4.78–24.2)
GFR SERPL CREATININE-BSD FRML MDRD: 99 ML/MIN/1.73
GLOBULIN UR ELPH-MCNC: 2.2 GM/DL
GLUCOSE BLD-MCNC: 86 MG/DL (ref 65–99)
HBA1C MFR BLD: 5.8 % (ref 3.5–5.6)
HCT VFR BLD AUTO: 33.3 % (ref 34–46.6)
HDLC SERPL-MCNC: 42 MG/DL (ref 40–60)
HGB BLD-MCNC: 11 G/DL (ref 12–15.9)
LDLC SERPL CALC-MCNC: 55 MG/DL (ref 0–100)
LDLC/HDLC SERPL: 1.3 {RATIO}
LYMPHOCYTES # BLD AUTO: 1.5 10*3/MM3 (ref 0.7–3.1)
LYMPHOCYTES NFR BLD AUTO: 30.1 % (ref 19.6–45.3)
MAGNESIUM SERPL-MCNC: 2 MG/DL (ref 1.7–2.3)
MCH RBC QN AUTO: 28.8 PG (ref 26.6–33)
MCHC RBC AUTO-ENTMCNC: 33.1 G/DL (ref 31.5–35.7)
MCV RBC AUTO: 87.2 FL (ref 79–97)
MONOCYTES # BLD AUTO: 0.5 10*3/MM3 (ref 0.1–0.9)
MONOCYTES NFR BLD AUTO: 9.3 % (ref 5–12)
NEUTROPHILS # BLD AUTO: 2.7 10*3/MM3 (ref 1.7–7)
NEUTROPHILS NFR BLD AUTO: 55.4 % (ref 42.7–76)
NRBC BLD AUTO-RTO: 0.1 /100 WBC (ref 0–0.2)
NT-PROBNP SERPL-MCNC: 974.5 PG/ML (ref 5–1800)
PHOSPHATE SERPL-MCNC: 3.2 MG/DL (ref 2.5–4.5)
PLATELET # BLD AUTO: 218 10*3/MM3 (ref 140–450)
PMV BLD AUTO: 7.2 FL (ref 6–12)
POTASSIUM BLD-SCNC: 3.8 MMOL/L (ref 3.5–5.2)
PROCALCITONIN SERPL-MCNC: 0.02 NG/ML (ref 0.1–0.25)
PROT SERPL-MCNC: 5.7 G/DL (ref 6–8.5)
RBC # BLD AUTO: 3.82 10*6/MM3 (ref 3.77–5.28)
SODIUM BLD-SCNC: 145 MMOL/L (ref 136–145)
TRIGL SERPL-MCNC: 86 MG/DL (ref 0–150)
TROPONIN T SERPL-MCNC: 0.2 NG/ML (ref 0–0.03)
TSH SERPL DL<=0.05 MIU/L-ACNC: 2.23 UIU/ML (ref 0.27–4.2)
URATE SERPL-MCNC: 3.7 MG/DL (ref 2.4–5.7)
VIT B12 BLD-MCNC: 390 PG/ML (ref 211–946)
VLDLC SERPL-MCNC: 17.2 MG/DL
WBC NRBC COR # BLD: 4.9 10*3/MM3 (ref 3.4–10.8)

## 2019-12-02 PROCEDURE — 82728 ASSAY OF FERRITIN: CPT | Performed by: INTERNAL MEDICINE

## 2019-12-02 PROCEDURE — 99214 OFFICE O/P EST MOD 30 MIN: CPT | Performed by: INTERNAL MEDICINE

## 2019-12-02 PROCEDURE — 84145 PROCALCITONIN (PCT): CPT | Performed by: INTERNAL MEDICINE

## 2019-12-02 PROCEDURE — 83605 ASSAY OF LACTIC ACID: CPT | Performed by: INTERNAL MEDICINE

## 2019-12-02 PROCEDURE — 82550 ASSAY OF CK (CPK): CPT | Performed by: INTERNAL MEDICINE

## 2019-12-02 PROCEDURE — G0378 HOSPITAL OBSERVATION PER HR: HCPCS

## 2019-12-02 PROCEDURE — 83880 ASSAY OF NATRIURETIC PEPTIDE: CPT | Performed by: INTERNAL MEDICINE

## 2019-12-02 PROCEDURE — 84484 ASSAY OF TROPONIN QUANT: CPT | Performed by: INTERNAL MEDICINE

## 2019-12-02 PROCEDURE — 85652 RBC SED RATE AUTOMATED: CPT | Performed by: INTERNAL MEDICINE

## 2019-12-02 PROCEDURE — 83036 HEMOGLOBIN GLYCOSYLATED A1C: CPT | Performed by: INTERNAL MEDICINE

## 2019-12-02 PROCEDURE — 85025 COMPLETE CBC W/AUTO DIFF WBC: CPT | Performed by: INTERNAL MEDICINE

## 2019-12-02 PROCEDURE — 82607 VITAMIN B-12: CPT | Performed by: INTERNAL MEDICINE

## 2019-12-02 PROCEDURE — 99232 SBSQ HOSP IP/OBS MODERATE 35: CPT | Performed by: INTERNAL MEDICINE

## 2019-12-02 PROCEDURE — 82746 ASSAY OF FOLIC ACID SERUM: CPT | Performed by: INTERNAL MEDICINE

## 2019-12-02 PROCEDURE — 84100 ASSAY OF PHOSPHORUS: CPT | Performed by: INTERNAL MEDICINE

## 2019-12-02 PROCEDURE — 80053 COMPREHEN METABOLIC PANEL: CPT | Performed by: INTERNAL MEDICINE

## 2019-12-02 PROCEDURE — 80061 LIPID PANEL: CPT | Performed by: INTERNAL MEDICINE

## 2019-12-02 PROCEDURE — 83735 ASSAY OF MAGNESIUM: CPT | Performed by: INTERNAL MEDICINE

## 2019-12-02 PROCEDURE — 84443 ASSAY THYROID STIM HORMONE: CPT | Performed by: INTERNAL MEDICINE

## 2019-12-02 PROCEDURE — 86140 C-REACTIVE PROTEIN: CPT | Performed by: INTERNAL MEDICINE

## 2019-12-02 PROCEDURE — 84550 ASSAY OF BLOOD/URIC ACID: CPT | Performed by: INTERNAL MEDICINE

## 2019-12-02 RX ORDER — NICOTINE 21 MG/24HR
1 PATCH, TRANSDERMAL 24 HOURS TRANSDERMAL
Status: DISCONTINUED | OUTPATIENT
Start: 2019-12-02 | End: 2019-12-04 | Stop reason: HOSPADM

## 2019-12-02 RX ADMIN — ATORVASTATIN CALCIUM 10 MG: 10 TABLET, FILM COATED ORAL at 21:04

## 2019-12-02 RX ADMIN — AMLODIPINE BESYLATE 2.5 MG: 5 TABLET ORAL at 06:03

## 2019-12-02 RX ADMIN — Medication 10 ML: at 09:00

## 2019-12-02 RX ADMIN — ASPIRIN 81 MG: 81 TABLET, DELAYED RELEASE ORAL at 08:05

## 2019-12-02 RX ADMIN — SODIUM CHLORIDE 100 ML/HR: 900 INJECTION, SOLUTION INTRAVENOUS at 16:51

## 2019-12-02 RX ADMIN — Medication 10 ML: at 21:05

## 2019-12-02 RX ADMIN — SODIUM CHLORIDE 100 ML/HR: 900 INJECTION, SOLUTION INTRAVENOUS at 08:06

## 2019-12-02 RX ADMIN — CARVEDILOL 3.12 MG: 3.12 TABLET, FILM COATED ORAL at 08:05

## 2019-12-02 RX ADMIN — ISOSORBIDE MONONITRATE 60 MG: 60 TABLET, EXTENDED RELEASE ORAL at 06:03

## 2019-12-02 RX ADMIN — CARVEDILOL 3.12 MG: 3.12 TABLET, FILM COATED ORAL at 16:50

## 2019-12-02 NOTE — ASSESSMENT & PLAN NOTE
Patient with a history of CAD and has chest pain which is concerning for unstable angina.    -Patient looks pretty good for her age and I will proceed with a stress Myoview on her.  We will continue her aspirin/Coreg/simvastatin.    Patient will be on telemetry and will rule out MI for her.

## 2019-12-02 NOTE — CONSULTS
CARDIOLOGY CONSULT NOTE      Referring Provider: Dr. Ashraf    Reason for Consultation: ACS/NSTEMI    Attending: Peter Ashraf MD    Chief complaint    Chest pain    Subjective .     History of present illness:  Carrie Golden is a 89 y.o. female who presents with complaints of chest pain that started while she was at rest.  She described this as a heavy aching pressure in her chest that radiated into herneck and jaw.  She has not had other episodes of chest pain like this recently.  It was so severe that she decided she should come to the emergency room for evaluation.    The patient does have a history of coronary artery disease with previous cardiac catheterization in 2017 showing significant disease in the diagonal that was thought to be a difficult location for intervention.  She was treated with antianginal and maximal medical therapy.    Her chest pain is resolved at this time.  Her initial troponin was less than 0.03 and subsequent troponin  0.19.    She reports compliance with medical therapy    Review of Systems   Cardiovascular: Positive for chest pain.   All other systems reviewed and are negative.      History  Past Medical History:   Diagnosis Date   • Arthritis    • Frequent UTI    • GERD (gastroesophageal reflux disease)    • Hearing loss     bilateral hearing aides   • 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 sure which 1  contracted at age 8   • History of transfusion    • Hypertension    • Past heart attack     X2 MILD   • PONV (postoperative nausea and vomiting)        Past Surgical History:   Procedure Laterality Date   • ANTERIOR AND POSTERIOR VAGINAL REPAIR     • BACK SURGERY     • BLEPHAROPLASTY Bilateral 5/23/2019    Procedure: bilateral upper lid blepharoplasty;  Surgeon: Mauricio Pennington MD;  [place] [facility] [place] [facility] [place]  Service: Ophthalmology   • CATARACT EXTRACTION EXTRACAPSULAR W/ INTRAOCULAR LENS IMPLANTATION Bilateral    • CERVICAL SPINE ANTERIOR      with instrumentation   • COLON SURGERY       for obstruction   • ALBERTO TUBE INSERTION Bilateral 2019    Procedure: ALBERTO TUBE;  Surgeon: Mauricio Pennington MD;  [place] [facility] [place] [facility] [place]  Service: Ophthalmology   • ECTROPION REPAIR Bilateral 2019    Procedure: bilateral lower lid ectropion repair, bilateral punctoplasty;  Surgeon: Mauricio Pennington MD;  [facility] [place] [facility] [place]  Service: Ophthalmology   • EYE SURGERY     • FOOT FUSION Left 2016    Procedure: LEFT HALLUX METATARSALPHALANGEAL ARTHRODESIS SILVER BUNIONECTOMY METATARSAL HEAD RESECTION 2-5 CALCANEAL BONE GRAFT;  Surgeon: Monster Harper Jr., MD;  Location: [facility] MAIN OR;  Service:    • HYSTERECTOMY     • INGUINAL HERNIA REPAIR Bilateral    • STOMACH SURGERY      for ulcer       Family History   Problem Relation Age of Onset   • Malig Hyperthermia Neg Hx        Social History     Tobacco Use   • Smoking status: Former Smoker     Years: 10.00     Types: Cigarettes     Last attempt to quit:      Years since quittin.9   • Smokeless tobacco: Never Used   • Tobacco comment: 1 pk a week   Substance Use Topics   • Alcohol use: No   • Drug use: No        Medications Prior to Admission   Medication Sig Dispense Refill Last Dose   • acetaminophen (TYLENOL) 500 MG tablet Take 500 mg by mouth Every 6 (Six) Hours As Needed for mild pain (1-3).   Past Week at Unknown time   • amLODIPine (NORVASC) 5 MG tablet Take 2.5 mg by mouth Every Morning.   2019 at Unknown time   • aspirin 81 MG EC tablet Take 81 mg by mouth Daily.   2019 at Unknown time   • carvedilol (COREG) 3.125 MG tablet Take 3.125 mg by mouth 2 (Two) Times a Day With Meals.   2019 at Unknown time   • isosorbide mononitrate (IMDUR) 60 MG 24 hr tablet Take 60 mg by mouth Every Morning.   2019 at Unknown time   • lactulose (CHRONULAC) 10 GM/15ML solution Take 10 g by mouth 2 (Two) Times a Day.   2019 at Unknown time   • simvastatin (ZOCOR) 10 MG tablet Take 40 mg by mouth Every Night.   2019  "at Unknown time         Codeine; Hydrocodone; and Sulfa antibiotics    Scheduled Meds:    amLODIPine 2.5 mg Oral QAM   aspirin 81 mg Oral Daily   atorvastatin 10 mg Oral Nightly   carvedilol 3.125 mg Oral BID With Meals   enoxaparin 40 mg Subcutaneous Daily   isosorbide mononitrate 60 mg Oral QAM   lactulose 10 g Oral BID   nicotine 1 patch Transdermal Q24H   sodium chloride 10 mL Intravenous Q12H     Continuous Infusions:    sodium chloride 100 mL/hr Last Rate: 100 mL/hr (12/02/19 0806)     PRN Meds:.•  acetaminophen **OR** acetaminophen **OR** acetaminophen  •  Calcium Gluconate-NaCl **AND** calcium gluconate **AND** Calcium, Ionized  •  docusate sodium  •  ketamine (KETALAR) infusion **AND** Ketamine Vital Signs & Assessment  •  ketorolac  •  magnesium sulfate **OR** magnesium sulfate **OR** magnesium sulfate  •  melatonin  •  nitroglycerin  •  ondansetron  •  potassium & sodium phosphates **OR** potassium & sodium phosphates  •  potassium chloride  •  potassium chloride  •  [COMPLETED] Insert peripheral IV **AND** sodium chloride  •  sodium chloride    Objective     VITAL SIGNS  Vitals:    12/01/19 1503 12/01/19 2011 12/02/19 0452 12/02/19 0804   BP: 153/69 141/68 133/65 162/74   BP Location:  Left arm Left arm Left arm   Patient Position:  Lying Lying Lying   Pulse: 58 51 51 57   Resp:  17 16 16   Temp:  97.5 °F (36.4 °C) 97.7 °F (36.5 °C)    TempSrc:  Oral Oral    SpO2: 99% 98% 93%    Weight:   58.3 kg (128 lb 8.5 oz)    Height:           Flowsheet Rows      First Filed Value   Admission Height  157.5 cm (62\") Documented at 12/01/2019 1408   Admission Weight  56.2 kg (124 lb) Documented at 12/01/2019 1408           TELEMETRY: SR    Physical Exam:  Physical Exam   Constitutional: She is oriented to person, place, and time. She appears well-developed and well-nourished. No distress.   Very Koi   HENT:   Head: Normocephalic and atraumatic.   Eyes: Pupils are equal, round, and reactive to light.   Neck: Normal " range of motion. Neck supple. No JVD present.   Cardiovascular: Normal rate, regular rhythm, S1 normal, S2 normal, normal heart sounds and intact distal pulses.   No murmur heard.  Pulmonary/Chest: Effort normal and breath sounds normal.   Abdominal: Soft. Normal appearance. She exhibits no distension. There is no tenderness.   Musculoskeletal: Normal range of motion. She exhibits no edema.   Neurological: She is alert and oriented to person, place, and time.   Skin: Skin is warm and dry.   Psychiatric: She has a normal mood and affect.        Results Review:   I reviewed the patient's new clinical results.    CBC    Results from last 7 days   Lab Units 12/02/19 0442 12/01/19  1451   WBC 10*3/mm3 4.90 5.90   HEMOGLOBIN g/dL 11.0* 11.8*   PLATELETS 10*3/mm3 218 245     BMP   Results from last 7 days   Lab Units 12/02/19 0442 12/01/19  1847 12/01/19  1451   SODIUM mmol/L 145  --  141   POTASSIUM mmol/L 3.8 3.9 3.7   CHLORIDE mmol/L 111*  --  108*   CO2 mmol/L 23.0  --  23.0   BUN mg/dL 11  --  16   CREATININE mg/dL 0.57  --  0.68   GLUCOSE mg/dL 86  --  174*   MAGNESIUM mg/dL 2.0  --   --    PHOSPHORUS mg/dL 3.2  --   --      Cr Clearance Estimated Creatinine Clearance: 39.6 mL/min (by C-G formula based on SCr of 0.57 mg/dL).  Coag   Results from last 7 days   Lab Units 12/01/19  1451   INR  0.95   APTT seconds 25.6     HbA1C No results found for: HGBA1C  Blood Glucose No results found for: POCGLU  Infection   Results from last 7 days   Lab Units 12/02/19  0442   PROCALCITONIN ng/mL 0.02*     CMP   Results from last 7 days   Lab Units 12/02/19  0442 12/01/19  1847 12/01/19  1451   SODIUM mmol/L 145  --  141   POTASSIUM mmol/L 3.8 3.9 3.7   CHLORIDE mmol/L 111*  --  108*   CO2 mmol/L 23.0  --  23.0   BUN mg/dL 11  --  16   CREATININE mg/dL 0.57  --  0.68   GLUCOSE mg/dL 86  --  174*   ALBUMIN g/dL 3.50  --   --    BILIRUBIN mg/dL 0.4  --   --    ALK PHOS U/L 89  --   --    AST (SGOT) U/L 24  --   --    ALT (SGPT) U/L  13  --   --      ABG      UA    Results from last 7 days   Lab Units 12/01/19  2300   NITRITE UA  Negative     DOREEN  No results found for: POCMETH, POCAMPHET, POCBARBITUR, POCBENZO, POCCOCAINE, POCOPIATES, POCOXYCODO, POCPHENCYC, POCPROPOXY, POCTHC, POCTRICYC  Lysis Labs   Results from last 7 days   Lab Units 12/02/19  0442 12/01/19  1451   INR   --  0.95   APTT seconds  --  25.6   HEMOGLOBIN g/dL 11.0* 11.8*   PLATELETS 10*3/mm3 218 245   CREATININE mg/dL 0.57 0.68     Radiology(recent) Xr Knee 1 Or 2 View Left    Result Date: 12/1/2019  Impression: 1. Negative for fracture or other acute process. 2. Bony demineralization.  Electronically Signed BySabi Mejia On:12/1/2019 3:28 PM This report was finalized on [id number] by  Gaby Mejia, .    Xr Chest 1 View    Result Date: 12/1/2019   1. Stable exam, with mild cardiomegaly and no evidence of active lung disease.  Electronically Signed BySabi Mejia On:12/1/2019 3:27 PM This report was finalized on [id number] by  Gaby Mejia, .      Results from last 7 days   Lab Units 12/02/19  0442   CK TOTAL U/L 91   TROPONIN T ng/mL 0.196*       Imaging Results (Last 24 Hours)     Procedure Component Value Units Date/Time    XR Knee 1 or 2 View Left [[id number]] Collected:  12/01/19 1527     Updated:  12/01/19 1530    Narrative:       Examination: XR KNEE 1 OR 2 VW LEFT-     Date of Exam: 12/1/2019 2:45 PM     Indication: left knee pain.     Comparison: None available.     Technique: Three radiographic views of the knee were obtained.     Findings:  There is probably bony demineralization. The knee joint space is  preserved. There is no fracture or bone destruction. There is minor  spurring on the anterior superior patella. There is no significant joint  effusion. There is arterial vascular calcification. There is mild  meniscal calcification.       Impression:       Impression:  1. Negative for fracture or other acute process.  2. Bony demineralization.      Electronically Signed By-Gaby Mejia On:12/1/2019 3:28 PM  This report was finalized on [id number] by  Gaby Mejia .    XR Chest 1 View [[id number]] Collected:  12/01/19 1526     Updated:  12/01/19 1529    Narrative:       Examination: XR CHEST 1 VW-     Date of Exam: 12/1/2019 2:47 PM     Indication: chest pain.       Comparison: 02/27/2019     Technique: 1 view of the chest      FINDINGS:  There is mild cardiomegaly. Mediastinal and hilar contours are stable.  There is no vascular congestion, airspace opacity, or pleural effusion.  There is a thoracic scoliosis.       Impression:          1. Stable exam, with mild cardiomegaly and no evidence of active lung  disease.     Electronically Signed By-Gaby Mejia On:12/1/2019 3:27 PM  This report was finalized on [id number] by  Gaby Mejia .          EKG          I personally viewed and interpreted the patient's EKG/Telemetry data:    ECHOCARDIOGRAM:      STRESS MYOVIEW:    CARDIAC CATHETERIZATION:   2017        OTHER:         Assessment/Plan       Chest pain    Acute coronary syndrome (CMS/[facility])    NSTEMI, initial episode of care (CMS/[facility])    Coronary artery disease    Dyslipidemia    Essential hypertension    Gastroesophageal reflux disease      Plan:  Patient's troponins ruled in for non-STEMI.  Last cardiac catheterization was in 2017 which showed 50 to 60% proximal LAD lesion with a 70 to 80% first diagonal lesion.  Other vessels had nonobstructive disease    Patient has been compliant with dual antiplatelet therapy, beta-blocker, statin, nitrates, amlodipine.    We will have interventional radiology review previous cath films.  Consider cardiac catheterization and possible PCI.    Additional recommendations pending review of Dr. Morales.    I discussed the patients findings and my recommendations with patient and Dr. Carmen Tobar, APRN  12/02/19  10:13 AM

## 2019-12-02 NOTE — NURSING NOTE
Patients morning labs resulted with elevated troponin 0.196.  Place call to NP, Spoke with joe Bryant consult cardio.

## 2019-12-02 NOTE — PROGRESS NOTES
Discharge Planning Assessment   [place]     Patient Name: Carrie Golden  MRN: [id number]  Today's Date: 12/2/2019    Admit Date: 12/1/2019    Discharge Needs Assessment     Row Name 12/02/19 1106       Living Environment    Lives With  alone    Current Living Arrangements  home/apartment/condo    Primary Care Provided by  self    Provides Primary Care For  no one    Family Caregiver if Needed  other (see comments) Neice    Able to Return to Prior Arrangements  yes       Resource/Environmental Concerns    Resource/Environmental Concerns  none    Transportation Concerns  car, none       Transition Planning    Patient/Family Anticipates Transition to  home    Patient/Family Anticipated Services at Transition  none    Transportation Anticipated  car, drives self       Discharge Needs Assessment    Readmission Within the Last 30 Days  no previous admission in last 30 days    Concerns to be Addressed  no discharge needs identified;denies needs/concerns at this time    Equipment Currently Used at Home  none    Anticipated Changes Related to Illness  none    Equipment Needed After Discharge  none        Discharge Plan     Row Name 12/02/19 1107       Plan    Plan  Anticipate routine home.     Patient/Family in Agreement with Plan  yes    Plan Comments  Met with patient at bedside who reports no anticipated needs at discharge. Barrier: Plan for myoview.             Expected Discharge Date and Time     Expected Discharge Date Expected Discharge Time    Dec 3, 2019         Demographic Summary     Row Name 12/02/19 1106       General Information    Admission Type  observation    Arrived From  home    Required Notices Provided  Observation Status Notice    Referral Source  admission list    Reason for Consult  discharge planning        Functional Status     Row Name 12/02/19 1106       Functional Status    Usual Activity Tolerance  good    Current Activity Tolerance  good       Functional Status, IADL    Medications  independent     Meal Preparation  independent    Housekeeping  independent    Laundry  independent    Shopping  independent       Mental Status    General Appearance WDL  WDL       Mental Status Summary    Recent Changes in Mental Status/Cognitive Functioning  no changes            Patient Forms     Row Name 12/02/19 1106       Patient Forms    Important Message from Medicare ([facility])  -- [place] 12/1    Observation Status/Condition Code 44 Notice  Delivered          Minerva Hanley  [phone]

## 2019-12-02 NOTE — PLAN OF CARE
Problem: Patient Care Overview  Goal: Plan of Care Review  Outcome: Ongoing (interventions implemented as appropriate)   12/02/19 0600   Coping/Psychosocial   Plan of Care Reviewed With patient   Plan of Care Review   Progress no change   OTHER   Outcome Summary Patient rested comfortably in bed throughout the night without complaints of chest pain. Troponin elevated at 0.196, cardio consulted.        Problem: Cardiac: ACS (Acute Coronary Syndrome) (Adult)  Goal: Signs and Symptoms of Listed Potential Problems Will be Absent, Minimized or Managed (Cardiac: ACS)  Outcome: Ongoing (interventions implemented as appropriate)   12/02/19 0607   Goal/Outcome Evaluation   Problems Assessed (Acute Coronary Syndrome) all   Problems Present (Acute Coronary Syn) none

## 2019-12-02 NOTE — ASSESSMENT & PLAN NOTE
Patient is on aspirin/beta-blocker/statin therapy.    Cardiology was consulted  Previous Angiogram in 2017 was reviewed.  Plan for cardiac Cath today - schedule in the afternoon.

## 2019-12-02 NOTE — H&P
[facility] Medicine Services      Patient Name: Carrie Golden  : 1925  MRN: [id number]  Primary Care Physician: Monster Myrick MD  Date of admission: 2019    Patient Care Team:  Monster Myrick MD as PCP - General (Family Medicine)  Monster Myrick MD as PCP - Claims Attributed          Subjective   History Present Illness     Chief Complaint:   Chief Complaint   Patient presents with   • Chest Pain     89 yo WF with a PMH of Heart attack x 2, HTN, HLD, GERD, Hearing loss, and Arthritis who drove herself to [facility] ED 19 with chest pain since 1330. Pt was at home sitting down when she started having chest pain described as constant, 5/10, aching, and radiating to her back and jaw. The pain is different from when she had her last heart attack sometime between the first one in  and now. She reports that nothing makes the pain better or worse and after it not dissipating she decided to come in. Pt has also been having left knee pain after hearing it pop earlier today. Admits chest pain. Admits left knee pain. Denies dyspnea, diaphoresis, dizziness, headache, cough, nausea, and injury.     In the ED labs showed Troponin <0.010 and Glucose 174  - EKG Sinus rhythm, Low voltage, precordial leads  - CXR 1. Stable exam, with mild cardiomegaly and no evidence of active lung disease.  - XR knee 1. Negative for fracture or other acute process. 2. Bony demineralization.  Pt was given Aspirin and sublingual Nitroglycerin with resolution of her chest pain.           History of Present Illness    Review of Systems   All other systems reviewed and are negative.          Personal History     Past Medical History:   Past Medical History:   Diagnosis Date   • Arthritis    • Frequent UTI    • GERD (gastroesophageal reflux disease)    • Hearing loss     bilateral hearing aides   • 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 sure which 1  contracted at age 8   • History of transfusion    • Hypertension    •  Past heart attack     X2 MILD   • PONV (postoperative nausea and vomiting)        Surgical History:      Past Surgical History:   Procedure Laterality Date   • ANTERIOR AND POSTERIOR VAGINAL REPAIR     • BACK SURGERY     • BLEPHAROPLASTY Bilateral 5/23/2019    Procedure: bilateral upper lid blepharoplasty;  Surgeon: Mauricio Pennington MD;  [place] [facility] [place] [facility] [place]  Service: [place]mology   • CATARACT EXTRACTION EXTRACAPSULAR W/ INTRAOCULAR LENS IMPLANTATION Bilateral    • CERVICAL SPINE ANTERIOR      with instrumentation   • COLON SURGERY      for obstruction   • ALBERTO TUBE INSERTION Bilateral 5/23/2019    Procedure: ALBERTO TUBE;  Surgeon: Mauricio Pennington MD;  [place] [facility] [place] [facility] [place]  Service: Ophthalmology   • ECTROPION REPAIR Bilateral 5/23/2019    Procedure: bilateral lower lid ectropion repair, bilateral punctoplasty;  Surgeon: Mauricio Pennington MD;  [place] [facility] [place] [facility] [place]  Service: Ophthalmology   • EYE SURGERY     • FOOT FUSION Left 12/30/2016    Procedure: LEFT HALLUX METATARSALPHALANGEAL ARTHRODESIS SILVER BUNIONECTOMY METATARSAL HEAD RESECTION 2-5 CALCANEAL BONE GRAFT;  Surgeon: Monster Harper Jr., MD;  Location: [facility];  Service:    • HYSTERECTOMY     • INGUINAL HERNIA REPAIR Bilateral    • STOMACH SURGERY      for ulcer           Family History: family history is not on file. Otherwise pertinent FHx was reviewed and unremarkable.     Social History:  reports that she quit smoking about 33 years ago. Her smoking use included cigarettes. She quit after 10.00 years of use. She has never used smokeless tobacco. She reports that she does not drink alcohol or use drugs.      Medications:  Prior to Admission medications    Medication Sig Start Date End Date Taking? Authorizing Provider   acetaminophen (TYLENOL) 500 MG tablet Take 500 mg by mouth Every 6 (Six) Hours As Needed for mild pain (1-3).   Yes Provider, MD Melania   amLODIPine (NORVASC) 5 MG tablet Take 2.5 mg by mouth Every  Morning.   Yes Melania Tomlin MD   aspirin 81 MG EC tablet Take 81 mg by mouth Daily.   Yes Melania Tomlin MD   carvedilol (COREG) 3.125 MG tablet Take 3.125 mg by mouth 2 (Two) Times a Day With Meals.   Yes Melania Tomlin MD   isosorbide mononitrate (IMDUR) 60 MG 24 hr tablet Take 60 mg by mouth Every Morning.   Yes Melania Tomlin MD   lactulose (CHRONULAC) 10 GM/15ML solution Take 10 g by mouth 2 (Two) Times a Day.   Yes Melania Tomlin MD   simvastatin (ZOCOR) 10 MG tablet Take 40 mg by mouth Every Night.   Yes Melania Tomlin MD       Allergies:    Allergies   Allergen Reactions   • Codeine Nausea And Vomiting and Dizziness   • Hydrocodone Nausea And Vomiting and Dizziness   • Sulfa Antibiotics Rash       Objective   Objective     Vital Signs  Temp:  [97.5 °F (36.4 °C)] 97.5 °F (36.4 °C)  Heart Rate:  [51-76] 51  Resp:  [14-17] 17  BP: (141-166)/(68-72) 141/68  SpO2:  [98 %-99 %] 98 %  on   ;   Device (Oxygen Therapy): room air  Body mass index is 22.68 kg/m².    Physical Exam   Constitutional: She is oriented to person, place, and time. She appears well-developed and well-nourished. No distress.   HENT:   Head: Normocephalic and atraumatic.   Right Ear: External ear normal.   Left Ear: External ear normal.   Nose: Nose normal.   Mouth/Throat: Oropharynx is clear and moist. No oropharyngeal exudate.   Eyes: Conjunctivae and EOM are normal. Pupils are equal, round, and reactive to light. Right eye exhibits no discharge. Left eye exhibits no discharge. No scleral icterus.   Neck: Normal range of motion. No JVD present. No tracheal deviation present. No thyromegaly present.   Cardiovascular: Normal rate, regular rhythm, normal heart sounds and intact distal pulses. Exam reveals no gallop and no friction rub.   No murmur heard.  Pulmonary/Chest: Effort normal and breath sounds normal. No stridor. No respiratory distress. She has no wheezes. She has no rales. She exhibits no  tenderness.   Abdominal: Soft. Bowel sounds are normal. She exhibits no distension and no mass. There is no tenderness. There is no rebound and no guarding. No hernia.   Musculoskeletal: Normal range of motion. She exhibits no edema, tenderness or deformity.   Lymphadenopathy:     She has no cervical adenopathy.   Neurological: She is alert and oriented to person, place, and time. No cranial nerve deficit or sensory deficit. She exhibits normal muscle tone. Coordination normal.   Skin: Skin is warm and dry. No rash noted. She is not diaphoretic. No erythema.   Psychiatric: She has a normal mood and affect. Her behavior is normal.   Nursing note and vitals reviewed.      Results Review:  I have personally reviewed most recent cardiac tracings and agree with findings, most notably: Normal sinus rhythm.    Results from last 7 days   Lab Units 12/01/19  1451   WBC 10*3/mm3 5.90   HEMOGLOBIN g/dL 11.8*   HEMATOCRIT % 35.1   PLATELETS 10*3/mm3 245   INR  0.95     Results from last 7 days   Lab Units 12/01/19  1847 12/01/19  1451   SODIUM mmol/L  --  141   POTASSIUM mmol/L 3.9 3.7   CHLORIDE mmol/L  --  108*   CO2 mmol/L  --  23.0   BUN mg/dL  --  16   CREATININE mg/dL  --  0.68   GLUCOSE mg/dL  --  174*   CALCIUM mg/dL  --  8.7   TROPONIN T ng/mL  --  <0.010     Estimated Creatinine Clearance: 38.1 mL/min (by C-G formula based on SCr of 0.68 mg/dL).  Brief Urine Lab Results  (Last result in the past 365 days)      Color   Clarity   Blood   Leuk Est   Nitrite   Protein   CREAT   Urine HCG        12/01/19 2300 Yellow Clear Negative Negative Negative Negative               Microbiology Results (last 10 days)     ** No results found for the last 240 hours. **          ECG/EMG Results (most recent)     Procedure Component Value Units Date/Time    ECG 12 Lead [[id number]] Collected:  12/01/19 1417     Updated:  12/01/19 1420    Narrative:       HEART RATE= 72  bpm  RR Interval= 836  ms  WY Interval= 162  ms  P Horizontal Axis= 2   deg  P Front Axis= 49  deg  QRSD Interval= 86  ms  QT Interval= 408  ms  QRS Axis= -19  deg  T Wave Axis= 30  deg  - OTHERWISE NORMAL ECG -  Sinus rhythm  Low voltage, precordial leads  Electronically Signed By:   Date and Time of Study: 2019-12-01 14:17:22                    Xr Knee 1 Or 2 View Left    Result Date: 12/1/2019  Impression: 1. Negative for fracture or other acute process. 2. Bony demineralization.  Electronically Signed By-Gaby Mejia On:12/1/2019 3:28 PM This report was finalized on [id number] by  Gaby Mejia, .    Xr Chest 1 View    Result Date: 12/1/2019   1. Stable exam, with mild cardiomegaly and no evidence of active lung disease.  Electronically Signed By-Gaby Mejia On:12/1/2019 3:27 PM This report was finalized on [id number] by  Gaby Mejia, .        Estimated Creatinine Clearance: 38.1 mL/min (by C-G formula based on SCr of 0.68 mg/dL).    Assessment/Plan   Assessment/Plan       Active Hospital Problems:  * Chest pain- (present on admission)     Patient with a history of CAD and has chest pain which is concerning for unstable angina.    -Patient looks pretty good for her age and I will proceed with a stress Myoview on her.  We will continue her aspirin/Coreg/simvastatin.    Patient will be on telemetry and will rule out MI for her.     Coronary artery disease- (present on admission)     Patient is on aspirin/beta-blocker/statin therapy.    Stable.     Gastroesophageal reflux disease- (present on admission)     Patient is not on PPI and I will start 1.     Dyslipidemia- (present on admission)     Patient is on statin therapy and will check lipid panel for her.                 VTE Prophylaxis - Lovenox 40 mg SC daily.    CODE STATUS:    Code Status and Medical Interventions:   Ordered at: 12/01/19 1821     Level Of Support Discussed With:    Patient     Code Status:    CPR     Medical Interventions (Level of Support Prior to Arrest):    Full       Admission Status:  I believe this  patient meets observation criteria.      I discussed the patients findings and my recommendations with patient.        Electronically signed by Clemente Warner MD, 12/01/19, 11:28 PM.  [facility] [place] Hospitalist Team

## 2019-12-03 LAB
ANION GAP SERPL CALCULATED.3IONS-SCNC: 11 MMOL/L (ref 5–15)
BASOPHILS # BLD AUTO: 0 10*3/MM3 (ref 0–0.2)
BASOPHILS NFR BLD AUTO: 0.5 % (ref 0–1.5)
BUN BLD-MCNC: 9 MG/DL (ref 8–23)
BUN/CREAT SERPL: 15.5 (ref 7–25)
CALCIUM SPEC-SCNC: 8.5 MG/DL (ref 8.2–9.6)
CHLORIDE SERPL-SCNC: 109 MMOL/L (ref 98–107)
CO2 SERPL-SCNC: 22 MMOL/L (ref 22–29)
CREAT BLD-MCNC: 0.58 MG/DL (ref 0.57–1)
DEPRECATED RDW RBC AUTO: 43.8 FL (ref 37–54)
EOSINOPHIL # BLD AUTO: 0.2 10*3/MM3 (ref 0–0.4)
EOSINOPHIL NFR BLD AUTO: 4.1 % (ref 0.3–6.2)
ERYTHROCYTE [DISTWIDTH] IN BLOOD BY AUTOMATED COUNT: 14.3 % (ref 12.3–15.4)
GFR SERPL CREATININE-BSD FRML MDRD: 97 ML/MIN/1.73
GLUCOSE BLD-MCNC: 98 MG/DL (ref 65–99)
HCT VFR BLD AUTO: 33.1 % (ref 34–46.6)
HGB BLD-MCNC: 11.1 G/DL (ref 12–15.9)
INR PPP: 1.05 (ref 0.9–1.1)
LYMPHOCYTES # BLD AUTO: 1.4 10*3/MM3 (ref 0.7–3.1)
LYMPHOCYTES NFR BLD AUTO: 24.6 % (ref 19.6–45.3)
MCH RBC QN AUTO: 29.2 PG (ref 26.6–33)
MCHC RBC AUTO-ENTMCNC: 33.5 G/DL (ref 31.5–35.7)
MCV RBC AUTO: 87 FL (ref 79–97)
MONOCYTES # BLD AUTO: 0.5 10*3/MM3 (ref 0.1–0.9)
MONOCYTES NFR BLD AUTO: 9.7 % (ref 5–12)
NEUTROPHILS # BLD AUTO: 3.4 10*3/MM3 (ref 1.7–7)
NEUTROPHILS NFR BLD AUTO: 61.1 % (ref 42.7–76)
NRBC BLD AUTO-RTO: 0 /100 WBC (ref 0–0.2)
PLATELET # BLD AUTO: 219 10*3/MM3 (ref 140–450)
PMV BLD AUTO: 8 FL (ref 6–12)
POTASSIUM BLD-SCNC: 3.9 MMOL/L (ref 3.5–5.2)
PROTHROMBIN TIME: 10.9 SECONDS (ref 9.6–11.7)
RBC # BLD AUTO: 3.8 10*6/MM3 (ref 3.77–5.28)
SODIUM BLD-SCNC: 142 MMOL/L (ref 136–145)
WBC NRBC COR # BLD: 5.6 10*3/MM3 (ref 3.4–10.8)

## 2019-12-03 PROCEDURE — C1769 GUIDE WIRE: HCPCS | Performed by: INTERNAL MEDICINE

## 2019-12-03 PROCEDURE — 85610 PROTHROMBIN TIME: CPT | Performed by: NURSE PRACTITIONER

## 2019-12-03 PROCEDURE — 99232 SBSQ HOSP IP/OBS MODERATE 35: CPT | Performed by: INTERNAL MEDICINE

## 2019-12-03 PROCEDURE — 4A023N7 MEASUREMENT OF CARDIAC SAMPLING AND PRESSURE, LEFT HEART, PERCUTANEOUS APPROACH: ICD-10-PCS | Performed by: PSYCHIATRY & NEUROLOGY

## 2019-12-03 PROCEDURE — 25010000002 MIDAZOLAM PER 1 MG: Performed by: INTERNAL MEDICINE

## 2019-12-03 PROCEDURE — 80048 BASIC METABOLIC PNL TOTAL CA: CPT | Performed by: NURSE PRACTITIONER

## 2019-12-03 PROCEDURE — B2151ZZ FLUOROSCOPY OF LEFT HEART USING LOW OSMOLAR CONTRAST: ICD-10-PCS | Performed by: PSYCHIATRY & NEUROLOGY

## 2019-12-03 PROCEDURE — 85025 COMPLETE CBC W/AUTO DIFF WBC: CPT | Performed by: NURSE PRACTITIONER

## 2019-12-03 PROCEDURE — C1894 INTRO/SHEATH, NON-LASER: HCPCS | Performed by: INTERNAL MEDICINE

## 2019-12-03 PROCEDURE — 93458 L HRT ARTERY/VENTRICLE ANGIO: CPT | Performed by: INTERNAL MEDICINE

## 2019-12-03 PROCEDURE — B2111ZZ FLUOROSCOPY OF MULTIPLE CORONARY ARTERIES USING LOW OSMOLAR CONTRAST: ICD-10-PCS | Performed by: PSYCHIATRY & NEUROLOGY

## 2019-12-03 PROCEDURE — 0 IOPAMIDOL PER 1 ML: Performed by: INTERNAL MEDICINE

## 2019-12-03 RX ORDER — MIDAZOLAM HYDROCHLORIDE 1 MG/ML
INJECTION INTRAMUSCULAR; INTRAVENOUS AS NEEDED
Status: DISCONTINUED | OUTPATIENT
Start: 2019-12-03 | End: 2019-12-03 | Stop reason: HOSPADM

## 2019-12-03 RX ORDER — ONDANSETRON 4 MG/1
4 TABLET, FILM COATED ORAL EVERY 6 HOURS PRN
Status: DISCONTINUED | OUTPATIENT
Start: 2019-12-03 | End: 2019-12-04 | Stop reason: HOSPADM

## 2019-12-03 RX ORDER — ONDANSETRON 2 MG/ML
4 INJECTION INTRAMUSCULAR; INTRAVENOUS EVERY 6 HOURS PRN
Status: DISCONTINUED | OUTPATIENT
Start: 2019-12-03 | End: 2019-12-04 | Stop reason: HOSPADM

## 2019-12-03 RX ORDER — ATROPINE SULFATE 1 MG/ML
.5-1 INJECTION, SOLUTION INTRAMUSCULAR; INTRAVENOUS; SUBCUTANEOUS
Status: DISCONTINUED | OUTPATIENT
Start: 2019-12-03 | End: 2019-12-04 | Stop reason: HOSPADM

## 2019-12-03 RX ORDER — DIPHENHYDRAMINE HCL 25 MG
25 TABLET ORAL EVERY 6 HOURS PRN
Status: DISCONTINUED | OUTPATIENT
Start: 2019-12-03 | End: 2019-12-04 | Stop reason: HOSPADM

## 2019-12-03 RX ORDER — SODIUM CHLORIDE 9 MG/ML
250 INJECTION, SOLUTION INTRAVENOUS ONCE AS NEEDED
Status: DISCONTINUED | OUTPATIENT
Start: 2019-12-03 | End: 2019-12-04 | Stop reason: HOSPADM

## 2019-12-03 RX ORDER — ACETAMINOPHEN 325 MG/1
650 TABLET ORAL EVERY 4 HOURS PRN
Status: DISCONTINUED | OUTPATIENT
Start: 2019-12-03 | End: 2019-12-04 | Stop reason: HOSPADM

## 2019-12-03 RX ORDER — DOCUSATE SODIUM 100 MG/1
100 CAPSULE, LIQUID FILLED ORAL 2 TIMES DAILY
Status: DISCONTINUED | OUTPATIENT
Start: 2019-12-03 | End: 2019-12-04 | Stop reason: HOSPADM

## 2019-12-03 RX ORDER — SODIUM CHLORIDE 9 MG/ML
INJECTION, SOLUTION INTRAVENOUS CONTINUOUS PRN
Status: COMPLETED | OUTPATIENT
Start: 2019-12-03 | End: 2019-12-03

## 2019-12-03 RX ORDER — LIDOCAINE HYDROCHLORIDE 20 MG/ML
INJECTION, SOLUTION INFILTRATION; PERINEURAL AS NEEDED
Status: DISCONTINUED | OUTPATIENT
Start: 2019-12-03 | End: 2019-12-03 | Stop reason: HOSPADM

## 2019-12-03 RX ADMIN — CARVEDILOL 3.12 MG: 3.12 TABLET, FILM COATED ORAL at 08:26

## 2019-12-03 RX ADMIN — CARVEDILOL 3.12 MG: 3.12 TABLET, FILM COATED ORAL at 20:17

## 2019-12-03 RX ADMIN — DOCUSATE SODIUM 100 MG: 100 CAPSULE, LIQUID FILLED ORAL at 20:16

## 2019-12-03 RX ADMIN — Medication 10 ML: at 20:19

## 2019-12-03 RX ADMIN — ISOSORBIDE MONONITRATE 60 MG: 60 TABLET, EXTENDED RELEASE ORAL at 05:22

## 2019-12-03 RX ADMIN — AMLODIPINE BESYLATE 2.5 MG: 5 TABLET ORAL at 05:21

## 2019-12-03 RX ADMIN — ATORVASTATIN CALCIUM 10 MG: 10 TABLET, FILM COATED ORAL at 20:16

## 2019-12-03 RX ADMIN — ASPIRIN 81 MG: 81 TABLET, DELAYED RELEASE ORAL at 05:22

## 2019-12-03 RX ADMIN — Medication 10 ML: at 08:25

## 2019-12-03 NOTE — PROGRESS NOTES
CARDIOLOGY FOLLOW-UP PROGRESS NOTE      Reason for follow-up:    Non-ST MI  CAD     Attending: Peter Ashraf MD      SUBJECTIVE:    Sitting up in bed comfortably with no complaints of chest pain or shortness of air or palpitations.  Anticipating cardiac catheterization with possible PCI today     Review of Systems   General: denies fever, chills, anorexia, weight loss  Eyes: denies blurring, diplopia  Ear/Nose/Throat: denies ear pain, nosebleeds, hoarseness  Cardiovascular: See HPI  Respiratory: denies excessive sputum, hemoptysis, wheezing  Gastrointestinal: denies nausea, vomiting, change in bowel habits, abdominal pain  Genitourinary: denies dysuria and hematuria  Musculoskeletal: denies back pain, joint pain, joint swelling, muscle cramps, weakness  Skin: denies rashes, itching, suspicious lesions  Neurologic: denies focal neuro deficits  Psychiatric: denies depression, anxiety  Endocrine: denies cold intolerance, heat intolerance  Hematologic/Lymphatic: denies abnormal bruising, bleeding  Allergic/Immunologic: denies urticaria or persistent infections      Allergies: Codeine; Hydrocodone; and Sulfa antibiotics    Scheduled Meds:  amLODIPine 2.5 mg Oral QAM   aspirin 81 mg Oral Daily   atorvastatin 10 mg Oral Nightly   carvedilol 3.125 mg Oral BID With Meals   enoxaparin 60 mg Subcutaneous Once   isosorbide mononitrate 60 mg Oral QAM   lactulose 10 g Oral BID   nicotine 1 patch Transdermal Q24H   sodium chloride 10 mL Intravenous Q12H       Continuous Infusions:  sodium chloride 100 mL/hr Last Rate: Stopped (12/03/19 4287)       PRN Meds:.•  acetaminophen **OR** acetaminophen **OR** acetaminophen  •  Calcium Gluconate-NaCl **AND** calcium gluconate **AND** Calcium, Ionized  •  docusate sodium  •  ketamine (KETALAR) infusion **AND** Ketamine Vital Signs & Assessment  •  magnesium sulfate **OR** magnesium sulfate **OR** magnesium sulfate  •  melatonin  •  nitroglycerin  •  ondansetron  •  potassium & sodium  phosphates **OR** potassium & sodium phosphates  •  potassium chloride  •  potassium chloride  •  [COMPLETED] Insert peripheral IV **AND** sodium chloride  •  sodium chloride    Objective     Vital Signs  Vitals:    12/02/19 1645 12/02/19 1955 12/03/19 0345 12/03/19 0824   BP: 142/69 152/67 171/74 145/71   BP Location: Left arm Left arm Right arm Left arm   Patient Position: Sitting Lying Lying Lying   Pulse: 64 54 60    Resp: 18 16 16    Temp:  98 °F (36.7 °C) 98.4 °F (36.9 °C)    TempSrc:   Oral    SpO2: 96% 96% 94%    Weight:       Height:         Wt Readings from Last 1 Encounters:   12/02/19 58.3 kg (128 lb 8.5 oz)       Physical Exam:     General Appearance:    Alert, oriented, no acute distress   Neck:  Supple without JVD or bruit   Lungs:    Grossly clear with adequate airflow bilaterally    Heart:    Regular rate and rhythm, normal S1 and S2, grade 2/6 systolic ejection murmur   Chest Wall/Thorax:   Moderate kyphosis   Abdomen:     Normal bowel sounds, no masses, no organomegaly, soft        non-tender, non-distended, no guarding, no rebound                tenderness   Extremities:  Range of motion adequate, no edema, no cyanosis, no             redness   Pulses:   Pulses palpable and equal bilaterally   Skin:   No bleeding, bruising or rash   Neurologic:  No focal deficits               Results Review:     CBC    Results from last 7 days   Lab Units 12/03/19  0402 12/02/19  0442 12/01/19  1451   WBC 10*3/mm3 5.60 4.90 5.90   HEMOGLOBIN g/dL 11.1* 11.0* 11.8*   PLATELETS 10*3/mm3 219 218 245     BMP   Results from last 7 days   Lab Units 12/03/19  0402 12/02/19  0442 12/01/19  1847 12/01/19  1451   SODIUM mmol/L 142 145  --  141   POTASSIUM mmol/L 3.9 3.8 3.9 3.7   CHLORIDE mmol/L 109* 111*  --  108*   CO2 mmol/L 22.0 23.0  --  23.0   BUN mg/dL 9 11  --  16   CREATININE mg/dL 0.58 0.57  --  0.68   GLUCOSE mg/dL 98 86  --  174*   MAGNESIUM mg/dL  --  2.0  --   --    PHOSPHORUS mg/dL  --  3.2  --   --       HbA1C   Lab Results   Component Value Date    HGBA1C 5.8 (H) 12/02/2019     Radiology(recent) Xr Knee 1 Or 2 View Left    Result Date: 12/1/2019  Impression: 1. Negative for fracture or other acute process. 2. Bony demineralization.  Electronically Signed BySabi Mejia On:12/1/2019 3:28 PM This report was finalized on [id number] by  Gaby Mejia .    Xr Chest 1 View    Result Date: 12/1/2019   1. Stable exam, with mild cardiomegaly and no evidence of active lung disease.  Electronically Signed BySabi Mejia On:12/1/2019 3:27 PM This report was finalized on [id number] by  Gaby Mejia .      Cardiac Studies:  No new studies; scheduled for cath with possible PCI today    Medication Review:   Scheduled Meds:  amLODIPine 2.5 mg Oral QAM   aspirin 81 mg Oral Daily   atorvastatin 10 mg Oral Nightly   carvedilol 3.125 mg Oral BID With Meals   enoxaparin 60 mg Subcutaneous Once   isosorbide mononitrate 60 mg Oral QAM   lactulose 10 g Oral BID   nicotine 1 patch Transdermal Q24H   sodium chloride 10 mL Intravenous Q12H     Continuous Infusions:  sodium chloride 100 mL/hr Last Rate: Stopped (12/03/19 0509)     PRN Meds:.•  acetaminophen **OR** acetaminophen **OR** acetaminophen  •  Calcium Gluconate-NaCl **AND** calcium gluconate **AND** Calcium, Ionized  •  docusate sodium  •  ketamine (KETALAR) infusion **AND** Ketamine Vital Signs & Assessment  •  magnesium sulfate **OR** magnesium sulfate **OR** magnesium sulfate  •  melatonin  •  nitroglycerin  •  ondansetron  •  potassium & sodium phosphates **OR** potassium & sodium phosphates  •  potassium chloride  •  potassium chloride  •  [COMPLETED] Insert peripheral IV **AND** sodium chloride  •  sodium chloride    Assessment:  #1 active 89-year-old female with known LAD and diagonal branch atherosclerotic plaque presents with non-ST MI with modest bump in troponin and no significant change on EKG    #2 essential hypertension    #3 history of GERD, dyslipidemia  and thoracic kyphosis    Plan:  Elective diagnostic cardiac catheterization scheduled for 1230 today per Dr. Briseno.  Will be considered for PCI to LAD and ostial diagonal branch if coronary anatomy is amenable.  We will continue current medical therapy including dual antiplatelet therapy, statin and beta-blocker.  Further recommendations pending outcome of her catheterization.    I discussed the patients findings and my recommendations with patient and bedside nurse as well as multiple family members present during my evaluation, for coordination of care.      ROBERTO CARLOS Morales MD  12/03/19  1:31 PM

## 2019-12-03 NOTE — PLAN OF CARE
Problem: Patient Care Overview  Goal: Plan of Care Review  Outcome: Ongoing (interventions implemented as appropriate)   12/02/19 1900   Coping/Psychosocial   Plan of Care Reviewed With patient;family   OTHER   Outcome Summary No complaints of chest pain today. VSS. Very [place]. Cardiac Cath tomorrow.     Goal: Interprofessional Rounds/Family Conf  Outcome: Ongoing (interventions implemented as appropriate)      Problem: Cardiac: ACS (Acute Coronary Syndrome) (Adult)  Goal: Signs and Symptoms of Listed Potential Problems Will be Absent, Minimized or Managed (Cardiac: ACS)  Outcome: Ongoing (interventions implemented as appropriate)

## 2019-12-03 NOTE — PROGRESS NOTES
[facility]   INPATIENT PROGRESS NOTE    Date of Admission: 12/1/2019  Length of Stay: 0  Primary Care Physician: Monster Myrick MD    Subjective   Subjective   CC: CP subsided    HPI: CAD,   Cath tomorrow      Review Of Systems:       Objective   Objective    Physical Exam:  Temp:  [97.5 °F (36.4 °C)-98.2 °F (36.8 °C)] 98.2 °F (36.8 °C)  Heart Rate:  [51-64] 64  Resp:  [16-18] 18  BP: (133-162)/(65-74) 142/69  Physical Exam   Constitutional: She is oriented to person, place, and time. She appears well-developed and well-nourished. No distress.   HENT:   Head: Normocephalic and atraumatic.   Right Ear: External ear normal.   Left Ear: External ear normal.   Nose: Nose normal.   Mouth/Throat: Oropharynx is clear and moist. No oropharyngeal exudate.   Eyes: Conjunctivae and EOM are normal. Pupils are equal, round, and reactive to light. Right eye exhibits no discharge. Left eye exhibits no discharge. No scleral icterus.   Neck: Normal range of motion. No JVD present. No tracheal deviation present. No thyromegaly present.   Cardiovascular: Normal rate, regular rhythm, normal heart sounds and intact distal pulses. Exam reveals no gallop and no friction rub.   No murmur heard.  Pulmonary/Chest: Effort normal and breath sounds normal. No stridor. No respiratory distress. She has no wheezes. She has no rales. She exhibits no tenderness.   Abdominal: Soft. Bowel sounds are normal. She exhibits no distension and no mass. There is no tenderness. There is no rebound and no guarding. No hernia.   Musculoskeletal: Normal range of motion. She exhibits no edema, tenderness or deformity.   Lymphadenopathy:     She has no cervical adenopathy.   Neurological: She is alert and oriented to person, place, and time. No cranial nerve deficit or sensory deficit. She exhibits normal muscle tone. Coordination normal.   Skin: Skin is warm and dry. No rash noted. She is not diaphoretic. No erythema.   Psychiatric: She has a normal mood and  affect. Her behavior is normal.   Nursing note and vitals reviewed.         Results Review:    I have personally reviewed most recent lab results and agree with findings, most notably: as below      Results from last 7 days   Lab Units 12/02/19  0442 12/01/19  1451   WBC 10*3/mm3 4.90 5.90   HEMOGLOBIN g/dL 11.0* 11.8*   HEMATOCRIT % 33.3* 35.1   PLATELETS 10*3/mm3 218 245   INR   --  0.95     Results from last 7 days   Lab Units 12/02/19  0442 12/01/19  1847 12/01/19  1451   SODIUM mmol/L 145  --  141   POTASSIUM mmol/L 3.8 3.9 3.7   CHLORIDE mmol/L 111*  --  108*   CO2 mmol/L 23.0  --  23.0   BUN mg/dL 11  --  16   CREATININE mg/dL 0.57  --  0.68   GLUCOSE mg/dL 86  --  174*   CALCIUM mg/dL 8.4  --  8.7   ALK PHOS U/L 89  --   --    ALT (SGPT) U/L 13  --   --    AST (SGOT) U/L 24  --   --      Hemoglobin A1C (%)   Date/Time Value   12/02/2019 0442 5.8 (H)     TSH (uIU/mL)   Date/Time Value   12/02/2019 0442 2.230     Microbiology Results Abnormal     None        Xr Knee 1 Or 2 View Left    Result Date: 12/1/2019  Impression: 1. Negative for fracture or other acute process. 2. Bony demineralization.  Electronically Signed BySabi Mejia On:12/1/2019 3:28 PM This report was finalized on [id number] by  Gaby Mejia .    Xr Chest 1 View    Result Date: 12/1/2019   1. Stable exam, with mild cardiomegaly and no evidence of active lung disease.  Electronically Signed BySabi Mejia On:12/1/2019 3:27 PM This report was finalized on [id number] by  Gaby Mejia, .           I have reviewed the medications.    Assessment/Plan   Assessment/Plan   Brief Hospital Course:  Cardiology was consulted. Plan for cath tomorrow    Active Hospital Problems:  * Chest pain- (present on admission)     Patient with a history of CAD and has chest pain which is concerning for unstable angina.    -Patient looks pretty good for her age and I will proceed with a stress Myoview on her.  We will continue her  aspirin/Coreg/simvastatin.    Patient will be on telemetry and will rule out MI for her.     Gastroesophageal reflux disease- (present on admission)     Patient is not on PPI and I will start 1.     Dyslipidemia- (present on admission)     Patient is on statin therapy and will check lipid panel for her.     Coronary artery disease- (present on admission)     Patient is on aspirin/beta-blocker/statin therapy.    Cardiology was consulted  Previous Angiogram in 2017 ws reviewed.  Plan for cardiac Cath tomorrow                 DVT prophylaxis:  Discharge Planning: I expect patient to be discharged to  2-3 days    Peter Ashraf MD, 12/02/19 7:43 PM

## 2019-12-03 NOTE — PROGRESS NOTES
[facility]   INPATIENT PROGRESS NOTE    Date of Admission: 12/1/2019  Length of Stay: 0  Primary Care Physician: Monster Myrick MD    Subjective   Subjective   CC: CP    HPI:CP - subsided  H/o CAD, Cath - 2007  Another angiogram was scheduled this afternoon      Review Of Systems: HPI and HMP, unremarkable      Objective   Objective    Physical Exam:  Temp:  [98 °F (36.7 °C)-98.4 °F (36.9 °C)] 98 °F (36.7 °C)  Heart Rate:  [54-64] 58  Resp:  [16-18] 17  BP: (139-171)/(67-74) 139/73    Physical Exam   Constitutional: She is oriented to person, place, and time. She appears well-developed and well-nourished. No distress.   HENT:   Head: Normocephalic and atraumatic.   Eyes: Conjunctivae and EOM are normal. Pupils are equal, round, and reactive to light. No scleral icterus.   Neck: Normal range of motion. Neck supple.   Cardiovascular: Normal rate, regular rhythm, normal heart sounds and intact distal pulses.   Pulmonary/Chest: Effort normal and breath sounds normal.   Abdominal: Soft. Bowel sounds are normal.   Musculoskeletal: Normal range of motion.   Neurological: She is alert and oriented to person, place, and time.   Skin: Skin is warm and dry. Capillary refill takes less than 2 seconds. She is not diaphoretic. No erythema.   Psychiatric: She has a normal mood and affect.                     Results Review:    I have personally reviewed most recent labs and agree with findings, most notably:       Results from last 7 days   Lab Units 12/03/19  0402 12/02/19  0442 12/01/19  1451   WBC 10*3/mm3 5.60 4.90 5.90   HEMOGLOBIN g/dL 11.1* 11.0* 11.8*   HEMATOCRIT % 33.1* 33.3* 35.1   PLATELETS 10*3/mm3 219 218 245   INR  1.05  --  0.95     Results from last 7 days   Lab Units 12/03/19  0402 12/02/19  0442 12/01/19  1847 12/01/19  1451   SODIUM mmol/L 142 145  --  141   POTASSIUM mmol/L 3.9 3.8 3.9 3.7   CHLORIDE mmol/L 109* 111*  --  108*   CO2 mmol/L 22.0 23.0  --  23.0   BUN mg/dL 9 11  --  16   CREATININE mg/dL  0.58 0.57  --  0.68   GLUCOSE mg/dL 98 86  --  174*   CALCIUM mg/dL 8.5 8.4  --  8.7   ALK PHOS U/L  --  89  --   --    ALT (SGPT) U/L  --  13  --   --    AST (SGOT) U/L  --  24  --   --      Hemoglobin A1C (%)   Date/Time Value   12/02/2019 0442 5.8 (H)     TSH (uIU/mL)   Date/Time Value   12/02/2019 0442 2.230     Microbiology Results Abnormal     None        No radiology results for the last day         I have reviewed the medications.    Assessment/Plan   Assessment/Plan   Brief Hospital Course: cath today      Active Hospital Problems:  * Chest pain- (present on admission)     Patient with a history of CAD and has chest pain which is concerning for unstable angina.    -Patient looks pretty good for her age and I will proceed with a stress Myoview on her.  We will continue her aspirin/Coreg/simvastatin.    Patient will be on telemetry and will rule out MI for her.     Coronary artery disease- (present on admission)     Patient is on aspirin/beta-blocker/statin therapy.    Cardiology was consulted  Previous Angiogram in 2017 was reviewed.  Plan for cardiac Cath today - schedule in the afternoon.         Gastroesophageal reflux disease- (present on admission)     Patient is not on PPI and I will start 1.     Dyslipidemia- (present on admission)     Patient is on statin therapy and will check lipid panel for her.             DVT prophylaxis:  Discharge Planning: I expect patient to be discharged to  1-2days    Peter Ashraf MD, 12/03/19 3:31 PM

## 2019-12-04 VITALS
HEIGHT: 62 IN | BODY MASS INDEX: 23.65 KG/M2 | RESPIRATION RATE: 16 BRPM | SYSTOLIC BLOOD PRESSURE: 125 MMHG | OXYGEN SATURATION: 95 % | DIASTOLIC BLOOD PRESSURE: 69 MMHG | TEMPERATURE: 98.1 F | WEIGHT: 128.53 LBS | HEART RATE: 63 BPM

## 2019-12-04 LAB
ANION GAP SERPL CALCULATED.3IONS-SCNC: 13 MMOL/L (ref 5–15)
BUN BLD-MCNC: 12 MG/DL (ref 8–23)
BUN/CREAT SERPL: 19.4 (ref 7–25)
CALCIUM SPEC-SCNC: 8.7 MG/DL (ref 8.2–9.6)
CHLORIDE SERPL-SCNC: 104 MMOL/L (ref 98–107)
CHOLEST SERPL-MCNC: 120 MG/DL (ref 0–200)
CO2 SERPL-SCNC: 22 MMOL/L (ref 22–29)
CREAT BLD-MCNC: 0.62 MG/DL (ref 0.57–1)
DEPRECATED RDW RBC AUTO: 44.2 FL (ref 37–54)
ERYTHROCYTE [DISTWIDTH] IN BLOOD BY AUTOMATED COUNT: 14.4 % (ref 12.3–15.4)
GFR SERPL CREATININE-BSD FRML MDRD: 90 ML/MIN/1.73
GLUCOSE BLD-MCNC: 93 MG/DL (ref 65–99)
HCT VFR BLD AUTO: 36 % (ref 34–46.6)
HDLC SERPL-MCNC: 43 MG/DL (ref 40–60)
HGB BLD-MCNC: 12.1 G/DL (ref 12–15.9)
LDLC SERPL CALC-MCNC: 58 MG/DL (ref 0–100)
LDLC/HDLC SERPL: 1.35 {RATIO}
MCH RBC QN AUTO: 29.4 PG (ref 26.6–33)
MCHC RBC AUTO-ENTMCNC: 33.7 G/DL (ref 31.5–35.7)
MCV RBC AUTO: 87.2 FL (ref 79–97)
PLATELET # BLD AUTO: 235 10*3/MM3 (ref 140–450)
PMV BLD AUTO: 7.5 FL (ref 6–12)
POTASSIUM BLD-SCNC: 3.8 MMOL/L (ref 3.5–5.2)
RBC # BLD AUTO: 4.13 10*6/MM3 (ref 3.77–5.28)
SODIUM BLD-SCNC: 139 MMOL/L (ref 136–145)
TRIGL SERPL-MCNC: 94 MG/DL (ref 0–150)
VLDLC SERPL-MCNC: 18.8 MG/DL
WBC NRBC COR # BLD: 6.7 10*3/MM3 (ref 3.4–10.8)

## 2019-12-04 PROCEDURE — 93005 ELECTROCARDIOGRAM TRACING: CPT | Performed by: INTERNAL MEDICINE

## 2019-12-04 PROCEDURE — 80061 LIPID PANEL: CPT | Performed by: INTERNAL MEDICINE

## 2019-12-04 PROCEDURE — 99239 HOSP IP/OBS DSCHRG MGMT >30: CPT | Performed by: INTERNAL MEDICINE

## 2019-12-04 PROCEDURE — 80048 BASIC METABOLIC PNL TOTAL CA: CPT | Performed by: INTERNAL MEDICINE

## 2019-12-04 PROCEDURE — 85027 COMPLETE CBC AUTOMATED: CPT | Performed by: INTERNAL MEDICINE

## 2019-12-04 PROCEDURE — 99232 SBSQ HOSP IP/OBS MODERATE 35: CPT | Performed by: INTERNAL MEDICINE

## 2019-12-04 RX ADMIN — ISOSORBIDE MONONITRATE 60 MG: 60 TABLET, EXTENDED RELEASE ORAL at 06:19

## 2019-12-04 RX ADMIN — Medication 10 ML: at 09:05

## 2019-12-04 RX ADMIN — AMLODIPINE BESYLATE 2.5 MG: 5 TABLET ORAL at 06:19

## 2019-12-04 RX ADMIN — ASPIRIN 81 MG: 81 TABLET, DELAYED RELEASE ORAL at 09:04

## 2019-12-04 RX ADMIN — DOCUSATE SODIUM 100 MG: 100 CAPSULE, LIQUID FILLED ORAL at 09:04

## 2019-12-04 RX ADMIN — CARVEDILOL 3.12 MG: 3.12 TABLET, FILM COATED ORAL at 09:08

## 2019-12-04 NOTE — PROGRESS NOTES
Case Management Discharge Note      Final Note: Home.        Final Discharge Disposition Code: 01 - home or self-care

## 2019-12-04 NOTE — PLAN OF CARE
Problem: Patient Care Overview  Goal: Plan of Care Review  Outcome: Ongoing (interventions implemented as appropriate)   12/04/19 3776   Coping/Psychosocial   Plan of Care Reviewed With patient   Plan of Care Review   Progress no change   OTHER   Outcome Summary Pt did well throughout the night following the heart cath. Still refusing the bedalarm.     Goal: Discharge Needs Assessment  Outcome: Ongoing (interventions implemented as appropriate)

## 2019-12-04 NOTE — CONSULTS
Patient given Cardiac Rehab brochure. Educated on risk factors for CAD, anti-platelet medication, post discharge exercise, and healthy heart diet.

## 2019-12-04 NOTE — PROGRESS NOTES
CARDIOLOGY FOLLOW-UP PROGRESS NOTE      Reason for follow-up:    Non-ST MI  CAD  HPTN     Attending: Peter Ashraf MD      SUBJECTIVE:    Sitting up in bed comfortably with no complaints of chest pain or shortness of air or palpitations.  Subjectively looks quite good and is anticipating discharge to home today     Review of Systems   General: denies fever, chills, anorexia, weight loss  Eyes: denies blurring, diplopia  Ear/Nose/Throat: denies ear pain, nosebleeds, hoarseness  Cardiovascular: See HPI  Respiratory: denies excessive sputum, hemoptysis, wheezing  Gastrointestinal: denies nausea, vomiting, change in bowel habits, abdominal pain  Genitourinary: denies dysuria and hematuria  Musculoskeletal: denies back pain, joint pain, joint swelling, muscle cramps, weakness  Skin: denies rashes, itching, suspicious lesions  Neurologic: denies focal neuro deficits  Psychiatric: denies depression, anxiety  Endocrine: denies cold intolerance, heat intolerance  Hematologic/Lymphatic: denies abnormal bruising, bleeding  Allergic/Immunologic: denies urticaria or persistent infections      Allergies: Codeine; Hydrocodone; and Sulfa antibiotics    Scheduled Meds:    amLODIPine 2.5 mg Oral QAM   aspirin 81 mg Oral Daily   atorvastatin 10 mg Oral Nightly   carvedilol 3.125 mg Oral BID With Meals   docusate sodium 100 mg Oral BID   enoxaparin 60 mg Subcutaneous Once   isosorbide mononitrate 60 mg Oral QAM   lactulose 10 g Oral BID   nicotine 1 patch Transdermal Q24H   sodium chloride 10 mL Intravenous Q12H       Continuous Infusions:    sodium chloride 100 mL/hr Last Rate: Stopped (12/03/19 6364)       PRN Meds:.•  acetaminophen **OR** acetaminophen **OR** acetaminophen  •  acetaminophen  •  atropine  •  Calcium Gluconate-NaCl **AND** calcium gluconate **AND** Calcium, Ionized  •  diphenhydrAMINE  •  docusate sodium  •  ketamine (KETALAR) infusion **AND** Ketamine Vital Signs & Assessment  •  magnesium sulfate **OR**  magnesium sulfate **OR** magnesium sulfate  •  melatonin  •  nitroglycerin  •  ondansetron **OR** ondansetron  •  potassium & sodium phosphates **OR** potassium & sodium phosphates  •  potassium chloride  •  potassium chloride  •  [COMPLETED] Insert peripheral IV **AND** sodium chloride  •  sodium chloride  •  sodium chloride    Objective     Vital Signs  Vitals:    12/03/19 2100 12/03/19 2130 12/04/19 0326 12/04/19 0705   BP: 157/73 155/78 148/65 125/69   BP Location: Left arm Left arm Left arm Left arm   Patient Position: Lying Lying Lying Sitting   Pulse: 57 64 57 63   Resp: 16 16 16 16   Temp: 97.8 °F (36.6 °C) 98.1 °F (36.7 °C) 98.1 °F (36.7 °C)    TempSrc: Oral Oral Oral    SpO2: 95% 95% 94% 95%   Weight:       Height:         Wt Readings from Last 1 Encounters:   12/02/19 58.3 kg (128 lb 8.5 oz)       Physical Exam:     General Appearance:    Alert, oriented, no acute distress   Neck:  Supple without JVD or bruit   Lungs:    Grossly clear with adequate airflow bilaterally    Heart:    Regular rate and rhythm, normal S1 and S2, grade 2/6 systolic ejection murmur   Chest Wall/Thorax:   Moderate kyphosis   Abdomen:     Normal bowel sounds, no masses, no organomegaly, soft        non-tender, non-distended, no guarding, no rebound                tenderness   Extremities:  Range of motion adequate, no edema, no cyanosis, no             redness; right femoral artery puncture site looks good with no evidence of complication   Pulses:   Pulses palpable and equal bilaterally   Skin:   No bleeding, bruising or rash   Neurologic:  No focal deficits               Results Review:     CBC    Results from last 7 days   Lab Units 12/04/19  0410 12/03/19  0402 12/02/19  0442 12/01/19  1451   WBC 10*3/mm3 6.70 5.60 4.90 5.90   HEMOGLOBIN g/dL 12.1 11.1* 11.0* 11.8*   PLATELETS 10*3/mm3 235 219 218 245     BMP   Results from last 7 days   Lab Units 12/04/19  0410 12/03/19  0402 12/02/19  0442 12/01/19  1847 12/01/19  1451    SODIUM mmol/L 139 142 145  --  141   POTASSIUM mmol/L 3.8 3.9 3.8 3.9 3.7   CHLORIDE mmol/L 104 109* 111*  --  108*   CO2 mmol/L 22.0 22.0 23.0  --  23.0   BUN mg/dL 12 9 11  --  16   CREATININE mg/dL 0.62 0.58 0.57  --  0.68   GLUCOSE mg/dL 93 98 86  --  174*   MAGNESIUM mg/dL  --   --  2.0  --   --    PHOSPHORUS mg/dL  --   --  3.2  --   --      HbA1C   Lab Results   Component Value Date    HGBA1C 5.8 (H) 12/02/2019     Radiology(recent) No radiology results for the last day    Cardiac Studies:  No new studies; scheduled for cath with possible PCI today    Medication Review:   Scheduled Meds:    amLODIPine 2.5 mg Oral QAM   aspirin 81 mg Oral Daily   atorvastatin 10 mg Oral Nightly   carvedilol 3.125 mg Oral BID With Meals   docusate sodium 100 mg Oral BID   enoxaparin 60 mg Subcutaneous Once   isosorbide mononitrate 60 mg Oral QAM   lactulose 10 g Oral BID   nicotine 1 patch Transdermal Q24H   sodium chloride 10 mL Intravenous Q12H     Continuous Infusions:    sodium chloride 100 mL/hr Last Rate: Stopped (12/03/19 0509)     PRN Meds:.•  acetaminophen **OR** acetaminophen **OR** acetaminophen  •  acetaminophen  •  atropine  •  Calcium Gluconate-NaCl **AND** calcium gluconate **AND** Calcium, Ionized  •  diphenhydrAMINE  •  docusate sodium  •  ketamine (KETALAR) infusion **AND** Ketamine Vital Signs & Assessment  •  magnesium sulfate **OR** magnesium sulfate **OR** magnesium sulfate  •  melatonin  •  nitroglycerin  •  ondansetron **OR** ondansetron  •  potassium & sodium phosphates **OR** potassium & sodium phosphates  •  potassium chloride  •  potassium chloride  •  [COMPLETED] Insert peripheral IV **AND** sodium chloride  •  sodium chloride  •  sodium chloride    Assessment:  #1 active 89-year-old female with known LAD and diagonal branch atherosclerotic plaque presents with non-ST MI with modest bump in troponin and no significant change on EKG  -Remains hemodynamic Freddy stable and angina free at this  time    #2 essential hypertension  -We will regulated on current medications    #3 history of GERD, dyslipidemia and thoracic kyphosis    Plan:  Angiographic findings reviewed with patient and family.  I pointed out that while there is still high-grade ostial LAD diagonal branch disease, the proximal LAD and even the diagonal flow remained very satisfactory and appeared no different on coronary angiogram and they did 2 years ago.  Given that she is hemodynamic Freddy stable and angina free at this point, feel that she is in stable and satisfactory condition for discharge to home on current medications as listed and reviewed in detail by me personally  I would be happy to see Carrie for follow-up in my office in 2 to 3 weeks or sooner for any cardio pony issues in the interim.    I discussed the patients findings and my recommendations with patient and bedside nurse as well as multiple family members present during my evaluation, for coordination of care.      ROBERTO CARLOS Morales MD  12/04/19  11:14 AM

## 2019-12-04 NOTE — DISCHARGE SUMMARY
[facility]   DISCHARGE SUMMARY    Patient Name: Carrie Golden  : 1925  MRN: [id number]    Date of Admission: 2019  Date of Discharge:  2019    Primary Care Physician: Monster Myrick MD    Consults     Date and Time Order Name Status Description    2019 0547 Inpatient Cardiology Consult Completed     2019 1546 Hospitalist (on-call MD unless specified) Completed           Hospital Course     Presenting Problem:   Chest pain, unspecified type [R07.9]  Chest pain, unspecified type [R07.9]    Active Hospital Problems:  * Chest pain- (present on admission)     Patient with a history of CAD and has chest pain which is concerning for unstable angina.    -Patient looks pretty good for her age and I will proceed with a stress Myoview on her.  We will continue her aspirin/Coreg/simvastatin.    Patient will be on telemetry and will rule out MI for her.     Coronary artery disease- (present on admission)     Patient is on aspirin/beta-blocker/statin therapy.    Cardiology was consulted  Previous Angiogram in 2017 was reviewed.  Plan for cardiac Cath today - schedule in the afternoon.         Gastroesophageal reflux disease- (present on admission)     Patient is not on PPI and I will start 1.     Dyslipidemia- (present on admission)     Patient is on statin therapy and will check lipid panel for her.         Resolved Hospital Problems:  No notes have been filed under this hospital service.  Service: Hospitalist        Hospital Course:  Carrie Golden is a 89 y.o. female patient was presented to ER due to chest pain. Although she is 89 years old, she used to be functional. Thus stress test was ordered.   The stress test reveal myocardial ischemia. Cardiology was consulted. She has h/o previous angiogam in 2017.  Angiogram was done.   The official report as follows:     Single-vessel coronary artery disease  Intermediate lesion of ostial D1 branch of LAD.  This was not different from cardiac  catheterization done in 2017  Preserved left ventricular function with mild global hypokinesis    Case briefly discussed with Dr. Ramirez.  She is cleared to be discharged.  Currently home medication will be continued including Imdur.  She will be followed by cardiologist in 1-2 weeks        Day of Discharge     HPI: as above      Vital Signs:   Temp:  [97.2 °F (36.2 °C)-98.1 °F (36.7 °C)] 98.1 °F (36.7 °C)  Heart Rate:  [54-83] 63  Resp:  [14-16] 16  BP: (125-174)/(62-86) 125/69     Physical Exam:  Constitutional: She is oriented to person, place, and time. She appears well-developed and well-nourished. No distress.   HENT:   Head: Normocephalic and atraumatic.   Eyes: Conjunctivae and EOM are normal. Pupils are equal, round, and reactive to light. No scleral icterus.   Neck: Normal range of motion. Neck supple.   Cardiovascular: Normal rate, regular rhythm, normal heart sounds and intact distal pulses.   Pulmonary/Chest: Effort normal and breath sounds normal.   Abdominal: Soft. Bowel sounds are normal.   Musculoskeletal: Normal range of motion.   Neurological: She is alert and oriented to person, place, and time.   Skin: Skin is warm and dry. Capillary refill takes less than 2 seconds. She is not diaphoretic. No erythema.   Psychiatric: She has a normal mood and affect.        Pertinent  and/or Most Recent Results     Results from last 7 days   Lab Units 12/04/19  0410 12/03/19  0402 12/02/19  0442 12/01/19  1847 12/01/19  1451   WBC 10*3/mm3 6.70 5.60 4.90  --  5.90   HEMOGLOBIN g/dL 12.1 11.1* 11.0*  --  11.8*   HEMATOCRIT % 36.0 33.1* 33.3*  --  35.1   PLATELETS 10*3/mm3 235 219 218  --  245   SODIUM mmol/L 139 142 145  --  141   POTASSIUM mmol/L 3.8 3.9 3.8 3.9 3.7   CHLORIDE mmol/L 104 109* 111*  --  108*   CO2 mmol/L 22.0 22.0 23.0  --  23.0   BUN mg/dL 12 9 11  --  16   CREATININE mg/dL 0.62 0.58 0.57  --  0.68   GLUCOSE mg/dL 93 98 86  --  174*   CALCIUM mg/dL 8.7 8.5 8.4  --  8.7     Results from last 7  days   Lab Units 12/03/19  0402 12/02/19  0442 12/01/19  1451   BILIRUBIN mg/dL  --  0.4  --    ALK PHOS U/L  --  89  --    ALT (SGPT) U/L  --  13  --    AST (SGOT) U/L  --  24  --    PROTIME Seconds 10.9  --  10.1   INR  1.05  --  0.95   APTT seconds  --   --  25.6     Results from last 7 days   Lab Units 12/04/19  0410 12/02/19  0442   CHOLESTEROL mg/dL 120 114   TRIGLYCERIDES mg/dL 94 86   HDL CHOL mg/dL 43 42     Results from last 7 days   Lab Units 12/02/19  0442 12/01/19  1451   TSH uIU/mL 2.230  --    HEMOGLOBIN A1C % 5.8*  --    PROBNP pg/mL 974.5  --    TROPONIN T ng/mL 0.196* <0.010   PROCALCITONIN ng/mL 0.02*  --    LACTATE mmol/L 0.6  --        Brief Urine Lab Results  (Last result in the past 365 days)      Color   Clarity   Blood   Leuk Est   Nitrite   Protein   CREAT   Urine HCG        12/01/19 2300 Yellow Clear Negative Negative Negative Negative               Microbiology Results Abnormal     None          Xr Knee 1 Or 2 View Left    Result Date: 12/1/2019  Impression: Impression: 1. Negative for fracture or other acute process. 2. Bony demineralization.  Electronically Signed BySabi Mejia On:12/1/2019 3:28 PM This report was finalized on [id number] by  Gaby Mejia, .    Xr Chest 1 View    Result Date: 12/1/2019  Impression:  1. Stable exam, with mild cardiomegaly and no evidence of active lung disease.  Electronically Signed BySabi Mejia On:12/1/2019 3:27 PM This report was finalized on [id number] by  Gaby Mejia, .                       Discharge Details        Discharge Medications      Continue These Medications      Instructions Start Date   acetaminophen 500 MG tablet  Commonly known as:  TYLENOL   500 mg, Oral, Every 6 Hours PRN      amLODIPine 5 MG tablet  Commonly known as:  NORVASC   2.5 mg, Oral, Every Morning      aspirin 81 MG EC tablet   81 mg, Oral, Daily      carvedilol 3.125 MG tablet  Commonly known as:  COREG   3.125 mg, Oral, 2 Times Daily With Meals       isosorbide mononitrate 60 MG 24 hr tablet  Commonly known as:  IMDUR   60 mg, Oral, Every Morning      lactulose 10 GM/15ML solution  Commonly known as:  CHRONULAC   10 g, Oral, 2 Times Daily      simvastatin 10 MG tablet  Commonly known as:  ZOCOR   40 mg, Oral, Nightly             Allergies   Allergen Reactions   • Codeine Nausea And Vomiting and Dizziness   • Hydrocodone Nausea And Vomiting and Dizziness   • Sulfa Antibiotics Rash         Discharge Disposition:  Home or Self Care    Diet:  Hospital:No active diet order        Discharge Activity:         CODE STATUS:    Code Status and Medical Interventions:   Ordered at: 12/03/19 1811     Level Of Support Discussed With:    Patient     Code Status:    CPR     Medical Interventions (Level of Support Prior to Arrest):    Full         Future Appointments   Date Time Provider Department Center   5/12/2020 10:15 AM ROBERTO CARLOS Morales MD [facility]           Time spent on Discharge including face to face service: 35 minutes    Electronically signed by Peter Ashraf MD, 12/04/19, 4:17 PM.

## 2019-12-05 ENCOUNTER — READMISSION MANAGEMENT (OUTPATIENT)
Dept: CALL CENTER | Facility: HOSPITAL | Age: 84
End: 2019-12-05

## 2019-12-05 NOTE — OUTREACH NOTE
Prep Survey      Responses   Facility patient discharged from?  [facility]   Is patient eligible?  Yes   Discharge diagnosis  CHEST PAIN   Does the patient have one of the following disease processes/diagnoses(primary or secondary)?  Other   Does the patient have Home health ordered?  No   Is there a DME ordered?  No   Medication alerts for this patient  NO CHANGES   Prep survey completed?  Yes          Kim Charles LPN

## 2019-12-06 ENCOUNTER — READMISSION MANAGEMENT (OUTPATIENT)
Dept: CALL CENTER | Facility: HOSPITAL | Age: 84
End: 2019-12-06

## 2019-12-06 NOTE — OUTREACH NOTE
Medical Week 2 Survey      Responses   Facility patient discharged from?  [facility]   Does the patient have one of the following disease processes/diagnoses(primary or secondary)?  Other   Call start time  0958   Discharge diagnosis  CHEST PAIN   Call end time  1002   Medication alerts for this patient  NO CHANGES   Does the patient have all medications ordered at discharge?  N/A   Does the patient have a primary care provider?   Yes   Comments regarding PCP  PATIENT VOICES THAT SHE COULD NOT GET AN APPOINTMENT WITH HER PCP UNTIL LATE JANUARY. PATIENT STATES SHE WAS OFFERED AN APPT WITH ANOTHER PROVIDER IN THE OFFICE, BUT SHE DECLINED.    What is preventing the patient from scheduling follow up appointments within 7 days of discharge?  Earlier appointment not available   Nursing Interventions  Verified appointment date/time/provider   Has the patient kept scheduled appointments due by today?  N/A   Has home health visited the patient within 72 hours of discharge?  N/A   Did the patient receive a copy of their discharge instructions?  Yes   Nursing interventions  Reviewed instructions with patient   What is the patient's perception of their health status since discharge?  Improving   Is the patient/caregiver able to teach back signs and symptoms related to disease process for when to call PCP?  Yes   Is the patient/caregiver able to teach back signs and symptoms related to disease process for when to call 911?  Yes   Is the patient/caregiver able to teach back the hierarchy of who to call/visit for symptoms/problems? PCP, Specialist, Home health nurse, Urgent Care, ED, 911  Yes   Graduated  Yes   Did the patient feel the follow up calls were helpful during their recovery period?  Yes   Was the number of calls appropriate?  Yes          Kim Charles LPN

## 2019-12-16 ENCOUNTER — TELEPHONE (OUTPATIENT)
Dept: CARDIAC REHAB | Facility: HOSPITAL | Age: 84
End: 2019-12-16

## 2019-12-16 NOTE — TELEPHONE ENCOUNTER
Spoke with pt.  She is to see Dr. Morales in a few days and wants to talk to him about whether she should come to cardiac rehab.  She will call us back if she is interested.

## 2019-12-18 ENCOUNTER — OFFICE VISIT (OUTPATIENT)
Dept: CARDIOLOGY | Facility: CLINIC | Age: 84
End: 2019-12-18

## 2019-12-18 VITALS
OXYGEN SATURATION: 97 % | HEIGHT: 62 IN | DIASTOLIC BLOOD PRESSURE: 74 MMHG | WEIGHT: 128.8 LBS | BODY MASS INDEX: 23.7 KG/M2 | SYSTOLIC BLOOD PRESSURE: 128 MMHG | HEART RATE: 63 BPM

## 2019-12-18 DIAGNOSIS — I10 ESSENTIAL HYPERTENSION: ICD-10-CM

## 2019-12-18 DIAGNOSIS — I25.118 CORONARY ARTERY DISEASE OF NATIVE HEART WITH STABLE ANGINA PECTORIS, UNSPECIFIED VESSEL OR LESION TYPE (HCC): Primary | ICD-10-CM

## 2019-12-18 PROCEDURE — 93000 ELECTROCARDIOGRAM COMPLETE: CPT | Performed by: NURSE PRACTITIONER

## 2019-12-18 PROCEDURE — 99213 OFFICE O/P EST LOW 20 MIN: CPT | Performed by: NURSE PRACTITIONER

## 2019-12-18 RX ORDER — NITROGLYCERIN 0.4 MG/1
TABLET SUBLINGUAL
Qty: 100 TABLET | Refills: 11 | Status: SHIPPED | OUTPATIENT
Start: 2019-12-18 | End: 2021-10-17

## 2019-12-18 RX ORDER — AMLODIPINE BESYLATE 2.5 MG/1
2.5 TABLET ORAL EVERY MORNING
Refills: 6 | COMMUNITY
Start: 2019-11-09 | End: 2020-08-17

## 2019-12-30 PROCEDURE — 93010 ELECTROCARDIOGRAM REPORT: CPT | Performed by: INTERNAL MEDICINE

## 2019-12-30 NOTE — PROGRESS NOTES
Cardiology Office Follow Up Visit      Primary Care Provider:  Monster Myrick MD    Reason for f/u:     CAD, non-STEMI      Subjective     CC:    1 isolated episode of chest pain occurring last night since hospital discharge    History of Present Illness       Carrie Golden is a 89 y.o. female.  She was recently admitted at [facility] when she presented with chest pain and had positive troponin.  She went on to have cardiac catheterization which revealed 10 to 20% in the left main, 30% in the proximal LAD, first diagonal 60 to 70% stenosis which appeared relatively unchanged from last cardiac catheterization in 2017, left circumflex diffuse 25 to 30% stenosis RCA 20% proximal stenosis.    It was thought best to proceed with medical therapy since this was not a significant change from cardiac catheterization in 2017.    Since her hospital discharge she has been feeling well she did say that she had one episode of chest pain that occurred last night that awoke her from sleep.  Otherwise she has had no exertional chest pain or dyspnea, she denies PND, orthopnea, palpitations, near syncope, lower extremity edema or feelings of her heart racing.  She reports she has been compliant with medical therapy.      Past Medical History:   Diagnosis Date   • Arthritis    • Frequent UTI    • GERD (gastroesophageal reflux disease)    • Hearing loss     bilateral hearing aides   • 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 sure which 1  contracted at age 8   • History of transfusion    • Hypertension    • Past heart attack     X2 MILD   • PONV (postoperative nausea and vomiting)        Past Surgical History:   Procedure Laterality Date   • ANTERIOR AND POSTERIOR VAGINAL REPAIR     • BACK SURGERY     • BLEPHAROPLASTY Bilateral 5/23/2019    Procedure: bilateral upper lid blepharoplasty;  Surgeon: Mauricio Pennington MD;  [place] [facility] [place] [facility] [place]  Service: Ophthalmology   • CARDIAC CATHETERIZATION N/A 12/3/2019    Procedure: Left Heart  Cath;  Surgeon: Puma Briseno MD;  Location: [facility] CATH INVASIVE LOCATION;  Service: Cardiovascular   • CARDIAC CATHETERIZATION N/A 12/3/2019    Procedure: Coronary angiography;  Surgeon: Puma Briseno MD;  Location: [facility] CATH INVASIVE LOCATION;  Service: Cardiovascular   • CARDIAC CATHETERIZATION N/A 12/3/2019    Procedure: Left ventriculography;  Surgeon: Puma Briseno MD;  Location: [facility] CATH INVASIVE LOCATION;  Service: Cardiovascular   • CATARACT EXTRACTION EXTRACAPSULAR W/ INTRAOCULAR LENS IMPLANTATION Bilateral    • CERVICAL SPINE ANTERIOR      with instrumentation   • COLON SURGERY      for obstruction   • ALBERTO TUBE INSERTION Bilateral 5/23/2019    Procedure: ALBERTO TUBE;  Surgeon: Mauricio Pennington MD;  [place] [facility] [place] [facility] [place]  Service: Op[place]mology   • ECTROPION REPAIR Bilateral 5/23/2019    Procedure: bilateral lower lid ectropion repair, bilateral punctoplasty;  Surgeon: Mauricio Pennington MD;  [place] [facility] [place] [facility] [place]  Service: Ophthalmology   • EYE SURGERY     • FOOT FUSION Left 12/30/2016    Procedure: LEFT HALLUX METATARSALPHALANGEAL ARTHRODESIS SILVER BUNIONECTOMY METATARSAL HEAD RESECTION 2-5 CALCANEAL BONE GRAFT;  Surgeon: Monster Harper Jr., MD;  Location: [facility] MAIN OR;  Service:    • HYSTERECTOMY     • INGUINAL HERNIA REPAIR Bilateral    • STOMACH SURGERY      for ulcer         Current Outpatient Medications:   •  acetaminophen (TYLENOL) 500 MG tablet, Take 500 mg by mouth Every 6 (Six) Hours As Needed for mild pain (1-3)., Disp: , Rfl:   •  amLODIPine (NORVASC) 2.5 MG tablet, Take 2.5 mg by mouth Daily., Disp: , Rfl: 6  •  aspirin 81 MG EC tablet, Take 81 mg by mouth Daily., Disp: , Rfl:   •  carvedilol (COREG) 3.125 MG tablet, Take 3.125 mg by mouth 2 (Two) Times a Day With Meals., Disp: , Rfl:   •  isosorbide mononitrate (IMDUR) 60 MG 24 hr tablet, Take 60 mg by mouth Every Morning., Disp: , Rfl:   •  simvastatin (ZOCOR) 10 MG tablet, Take 40 mg by mouth Every Night., Disp: ,  Rfl:   •  nitroglycerin (NITROSTAT) 0.4 MG SL tablet, 1 under the tongue as needed for angina, may repeat q5mins for up three doses, Disp: 100 tablet, Rfl: 11    Social History     Socioeconomic History   • Marital status:      Spouse name: Not on file   • Number of children: Not on file   • Years of education: Not on file   • Highest education level: Not on file   Tobacco Use   • Smoking status: Former Smoker     Years: 10.00     Types: Cigarettes     Last attempt to quit:      Years since quittin.0   • Smokeless tobacco: Never Used   • Tobacco comment: 1 pk a week   Substance and Sexual Activity   • Alcohol use: No   • Drug use: No   • Sexual activity: Defer       Family History   Problem Relation Age of Onset   • Malig Hyperthermia Neg Hx        The following portions of the patient's history were reviewed and updated as appropriate: allergies, current medications, past family history, past medical history, past social history, past surgical history and problem list.    Review of Systems   Constitution: Negative for decreased appetite and diaphoresis.   HENT: Negative for congestion, hearing loss and nosebleeds.    Cardiovascular: Positive for chest pain. Negative for claudication, dyspnea on exertion, irregular heartbeat, leg swelling, near-syncope, orthopnea, palpitations, paroxysmal nocturnal dyspnea and syncope.   Respiratory: Negative for cough, shortness of breath and sleep disturbances due to breathing.    Endocrine: Negative for polyuria.   Hematologic/Lymphatic: Does not bruise/bleed easily.   Skin: Negative for itching and rash.   Musculoskeletal: Negative for back pain, muscle weakness and myalgias.   Gastrointestinal: Negative for abdominal pain, change in bowel habit and nausea.   Genitourinary: Negative for dysuria, flank pain, frequency and hesitancy.   Neurological: Negative for dizziness, tremors and weakness.   Psychiatric/Behavioral: Negative for altered mental status. The  "patient does not have insomnia.      /74   Pulse 63   Ht 157.5 cm (62\")   Wt 58.4 kg (128 lb 12.8 oz)   SpO2 97% Comment: room air  BMI 23.56 kg/m² .  Objective     Physical Exam   Constitutional: She is oriented to person, place, and time. She appears well-developed and well-nourished. No distress.   HENT:   Head: Normocephalic and atraumatic.   Eyes: Pupils are equal, round, and reactive to light.   Neck: Normal range of motion. Neck supple. No JVD present.   Cardiovascular: Normal rate, regular rhythm, S1 normal, S2 normal, normal heart sounds and intact distal pulses.   No murmur heard.  Pulmonary/Chest: Effort normal and breath sounds normal.   Abdominal: Soft. Normal appearance. She exhibits no distension. There is no tenderness.   Musculoskeletal: Normal range of motion. She exhibits no edema.   Neurological: She is alert and oriented to person, place, and time.   Skin: Skin is warm and dry.   Psychiatric: She has a normal mood and affect.           ECG 12 Lead  Date/Time: 12/18/2019 3:09 PM  Performed by: Jessica Tobar APRN  Authorized by: Jessica Tobar APRN   Comparison: compared with previous ECG from 12/4/2019  Rhythm: sinus rhythm  Rate: normal  BPM: 63  Q waves: V1 and V2      Clinical impression: abnormal EKG                     Diagnoses and all orders for this visit:    1. Coronary artery disease of native heart with stable angina pectoris, unspecified vessel or lesion type (CMS/HCC) (Primary)    2. Essential hypertension    Other orders  -     nitroglycerin (NITROSTAT) 0.4 MG SL tablet; 1 under the tongue as needed for angina, may repeat q5mins for up three doses  Dispense: 100 tablet; Refill: 11                Plan:  Continue aspirin beta blocker nitrates statin  We will give a prescription for sublingual nitroglycerin and has been instructed on its use with specific instructions given to use caution and know that it can affect her blood pressure.    I have advised patient to " contact her office if she has any worsening chest pain symptoms otherwise we will plan on seeing her back for follow-up in 3 months.  Additionally have advised her to change the timing of her Imdur to evening since the majority of the time that she has chest pain seems to be in the overnight hours.

## 2020-01-21 RX ORDER — SIMVASTATIN 40 MG
TABLET ORAL
Qty: 90 TABLET | Refills: 0 | Status: SHIPPED | OUTPATIENT
Start: 2020-01-21 | End: 2020-04-24

## 2020-01-31 ENCOUNTER — OFFICE (AMBULATORY)
Dept: URBAN - METROPOLITAN AREA CLINIC 64 | Facility: CLINIC | Age: 85
End: 2020-01-31
Payer: COMMERCIAL

## 2020-01-31 VITALS
DIASTOLIC BLOOD PRESSURE: 79 MMHG | WEIGHT: 128 LBS | SYSTOLIC BLOOD PRESSURE: 149 MMHG | HEART RATE: 65 BPM | HEIGHT: 62 IN

## 2020-01-31 DIAGNOSIS — K56.690 OTHER PARTIAL INTESTINAL OBSTRUCTION: ICD-10-CM

## 2020-01-31 DIAGNOSIS — K59.00 CONSTIPATION, UNSPECIFIED: ICD-10-CM

## 2020-01-31 DIAGNOSIS — K21.9 GASTRO-ESOPHAGEAL REFLUX DISEASE WITHOUT ESOPHAGITIS: ICD-10-CM

## 2020-01-31 DIAGNOSIS — R13.10 DYSPHAGIA, UNSPECIFIED: ICD-10-CM

## 2020-01-31 PROCEDURE — 99213 OFFICE O/P EST LOW 20 MIN: CPT | Performed by: INTERNAL MEDICINE

## 2020-02-06 ENCOUNTER — APPOINTMENT (OUTPATIENT)
Dept: PREADMISSION TESTING | Facility: HOSPITAL | Age: 85
End: 2020-02-06

## 2020-02-06 VITALS
HEIGHT: 62 IN | HEART RATE: 59 BPM | DIASTOLIC BLOOD PRESSURE: 77 MMHG | SYSTOLIC BLOOD PRESSURE: 166 MMHG | OXYGEN SATURATION: 98 % | WEIGHT: 128 LBS | RESPIRATION RATE: 16 BRPM | TEMPERATURE: 97.6 F | BODY MASS INDEX: 23.55 KG/M2

## 2020-02-06 LAB
ANION GAP SERPL CALCULATED.3IONS-SCNC: 11.9 MMOL/L (ref 5–15)
BUN BLD-MCNC: 17 MG/DL (ref 8–23)
BUN/CREAT SERPL: 23 (ref 7–25)
CALCIUM SPEC-SCNC: 8.9 MG/DL (ref 8.2–9.6)
CHLORIDE SERPL-SCNC: 103 MMOL/L (ref 98–107)
CO2 SERPL-SCNC: 24.1 MMOL/L (ref 22–29)
CREAT BLD-MCNC: 0.74 MG/DL (ref 0.57–1)
DEPRECATED RDW RBC AUTO: 40.6 FL (ref 37–54)
ERYTHROCYTE [DISTWIDTH] IN BLOOD BY AUTOMATED COUNT: 12.9 % (ref 12.3–15.4)
GFR SERPL CREATININE-BSD FRML MDRD: 73 ML/MIN/1.73
GLUCOSE BLD-MCNC: 100 MG/DL (ref 65–99)
HCT VFR BLD AUTO: 35.4 % (ref 34–46.6)
HGB BLD-MCNC: 11.2 G/DL (ref 12–15.9)
MCH RBC QN AUTO: 27.8 PG (ref 26.6–33)
MCHC RBC AUTO-ENTMCNC: 31.6 G/DL (ref 31.5–35.7)
MCV RBC AUTO: 87.8 FL (ref 79–97)
PLATELET # BLD AUTO: 237 10*3/MM3 (ref 140–450)
PMV BLD AUTO: 9.1 FL (ref 6–12)
POTASSIUM BLD-SCNC: 4.2 MMOL/L (ref 3.5–5.2)
RBC # BLD AUTO: 4.03 10*6/MM3 (ref 3.77–5.28)
SODIUM BLD-SCNC: 139 MMOL/L (ref 136–145)
WBC NRBC COR # BLD: 6.74 10*3/MM3 (ref 3.4–10.8)

## 2020-02-06 PROCEDURE — 80048 BASIC METABOLIC PNL TOTAL CA: CPT | Performed by: OPHTHALMOLOGY

## 2020-02-06 PROCEDURE — 85027 COMPLETE CBC AUTOMATED: CPT | Performed by: OPHTHALMOLOGY

## 2020-02-06 PROCEDURE — 36415 COLL VENOUS BLD VENIPUNCTURE: CPT

## 2020-02-06 NOTE — DISCHARGE INSTRUCTIONS
Take the following medications the morning of surgery:    AMLODIPINE, CARVEDILOL, ISOSORBIDE    ARRIVE AT 8:00    General Instructions:  • Do not eat solid food after midnight the night before surgery.  • You may drink clear liquids day of surgery but must stop at least one hour before your hospital arrival time.  • It is beneficial for you to have a clear drink that contains carbohydrates the day of surgery.  We suggest a 12 to 20 ounce bottle of Gatorade or Powerade for non-diabetic patients or a 12 to 20 ounce bottle of G2 or Powerade Zero for diabetic patients. (Pediatric patients, are not advised to drink a 12 to 20 ounce carbohydrate drink)    Clear liquids are liquids you can see through.  Nothing red in color.     Plain water                               Sports drinks  Sodas                                   Gelatin (Jell-O)  Fruit juices without pulp such as white grape juice and apple juice  Popsicles that contain no fruit or yogurt  Tea or coffee (no cream or milk added)  Gatorade / Powerade  G2 / Powerade Zero    • Infants may have breast milk up to four hours before surgery.  • Infants drinking formula may drink formula up to six hours before surgery.   • Patients who avoid smoking, chewing tobacco and alcohol for 4 weeks prior to surgery have a reduced risk of post-operative complications.  Quit smoking as many days before surgery as you can.  • Do not smoke, use chewing tobacco or drink alcohol the day of surgery.   • If applicable bring your C-PAP/ BI-PAP machine.  • Bring any papers given to you in the doctor’s office.  • Wear clean comfortable clothes.  • Do not wear contact lenses, false eyelashes or make-up.  Bring a case for your glasses.   • Bring crutches or walker if applicable.  • Remove all piercings.  Leave jewelry and any other valuables at home.  • Hair extensions with metal clips must be removed prior to surgery.  • The Pre-Admission Testing nurse will instruct you to bring medications  if unable to obtain an accurate list in Pre-Admission Testing.        If you were given a blood bank ID arm band remember to bring it with you the day of surgery.    Preventing a Surgical Site Infection:  • For 2 to 3 days before surgery, avoid shaving with a razor because the razor can irritate skin and make it easier to develop an infection.    • Any areas of open skin can increase the risk of a post-operative wound infection by allowing bacteria to enter and travel throughout the body.  Notify your surgeon if you have any skin wounds / rashes even if it is not near the expected surgical site.  The area will need assessed to determine if surgery should be delayed until it is healed.  • The night prior to surgery sleep in a clean bed with clean clothing.  Do not allow pets to sleep with you.  • Shower on the morning of surgery using a fresh bar of anti-bacterial soap (such as Dial) and clean washcloth.  Dry with a clean towel and dress in clean clothing.  • Ask your surgeon if you will be receiving antibiotics prior to surgery.  • Make sure you, your family, and all healthcare providers clean their hands with soap and water or an alcohol based hand  before caring for you or your wound.    Day of surgery:  Your arrival time is approximately two hours before your scheduled surgery time.  Upon arrival, a Pre-op nurse and Anesthesiologist will review your health history, obtain vital signs, and answer questions you may have.  The only belongings needed at this time will be a list of your home medications and if applicable your C-PAP/BI-PAP machine.  If you are staying overnight your family can leave the rest of your belongings in the car and bring them to your room later.  A Pre-op nurse will start an IV and you may receive medication in preparation for surgery, including something to help you relax.  Your family will be able to see you in the Pre-op area.  Two visitors at a time will be allowed in the Pre-op  room.  While you are in surgery your family should notify the waiting room  if they leave the waiting room area and provide a contact phone number.    Please be aware that surgery does come with discomfort.  We want to make every effort to control your discomfort so please discuss any uncontrolled symptoms with your nurse.   Your doctor will most likely have prescribed pain medications.      If you are going home after surgery you will receive individualized written care instructions before being discharged.  A responsible adult must drive you to and from the hospital on the day of your surgery and stay with you for 24 hours.    If you are staying overnight following surgery, you will be transported to your hospital room following the recovery period.  [facility] has all private rooms.    If you have any questions please call Pre-Admission Testing at [phone].  Deductibles and co-payments are collected on the day of service. Please be prepared to pay the required co-pay, deductible or deposit on the day of service as defined by your plan.

## 2020-02-13 ENCOUNTER — ANESTHESIA (OUTPATIENT)
Dept: PERIOP | Facility: HOSPITAL | Age: 85
End: 2020-02-13

## 2020-02-13 ENCOUNTER — ANESTHESIA EVENT (OUTPATIENT)
Dept: PERIOP | Facility: HOSPITAL | Age: 85
End: 2020-02-13

## 2020-02-13 ENCOUNTER — HOSPITAL ENCOUNTER (OUTPATIENT)
Facility: HOSPITAL | Age: 85
Setting detail: HOSPITAL OUTPATIENT SURGERY
Discharge: HOME | End: 2020-02-13
Attending: OPHTHALMOLOGY | Admitting: OPHTHALMOLOGY

## 2020-02-13 VITALS
HEART RATE: 59 BPM | OXYGEN SATURATION: 100 % | DIASTOLIC BLOOD PRESSURE: 51 MMHG | RESPIRATION RATE: 16 BRPM | SYSTOLIC BLOOD PRESSURE: 134 MMHG | TEMPERATURE: 98 F

## 2020-02-13 PROCEDURE — 25010000002 ONDANSETRON PER 1 MG: Performed by: NURSE ANESTHETIST, CERTIFIED REGISTERED

## 2020-02-13 PROCEDURE — 25010000002 PROPOFOL 10 MG/ML EMULSION: Performed by: NURSE ANESTHETIST, CERTIFIED REGISTERED

## 2020-02-13 RX ORDER — PROMETHAZINE HYDROCHLORIDE 25 MG/ML
6.25 INJECTION, SOLUTION INTRAMUSCULAR; INTRAVENOUS ONCE AS NEEDED
Status: DISCONTINUED | OUTPATIENT
Start: 2020-02-13 | End: 2020-02-13 | Stop reason: HOSPADM

## 2020-02-13 RX ORDER — FLUMAZENIL 0.1 MG/ML
0.2 INJECTION INTRAVENOUS AS NEEDED
Status: DISCONTINUED | OUTPATIENT
Start: 2020-02-13 | End: 2020-02-13 | Stop reason: HOSPADM

## 2020-02-13 RX ORDER — ONDANSETRON 2 MG/ML
4 INJECTION INTRAMUSCULAR; INTRAVENOUS ONCE AS NEEDED
Status: DISCONTINUED | OUTPATIENT
Start: 2020-02-13 | End: 2020-02-13 | Stop reason: HOSPADM

## 2020-02-13 RX ORDER — TETRACAINE HYDROCHLORIDE 5 MG/ML
SOLUTION OPHTHALMIC AS NEEDED
Status: DISCONTINUED | OUTPATIENT
Start: 2020-02-13 | End: 2020-02-13 | Stop reason: HOSPADM

## 2020-02-13 RX ORDER — HYDROMORPHONE HYDROCHLORIDE 1 MG/ML
0.5 INJECTION, SOLUTION INTRAMUSCULAR; INTRAVENOUS; SUBCUTANEOUS
Status: DISCONTINUED | OUTPATIENT
Start: 2020-02-13 | End: 2020-02-13 | Stop reason: HOSPADM

## 2020-02-13 RX ORDER — MAGNESIUM HYDROXIDE 1200 MG/15ML
LIQUID ORAL AS NEEDED
Status: DISCONTINUED | OUTPATIENT
Start: 2020-02-13 | End: 2020-02-13 | Stop reason: HOSPADM

## 2020-02-13 RX ORDER — OXYCODONE AND ACETAMINOPHEN 7.5; 325 MG/1; MG/1
1 TABLET ORAL EVERY 4 HOURS PRN
Status: DISCONTINUED | OUTPATIENT
Start: 2020-02-13 | End: 2020-02-13 | Stop reason: HOSPADM

## 2020-02-13 RX ORDER — SODIUM CHLORIDE 0.9 % (FLUSH) 0.9 %
3 SYRINGE (ML) INJECTION EVERY 12 HOURS SCHEDULED
Status: DISCONTINUED | OUTPATIENT
Start: 2020-02-13 | End: 2020-02-13 | Stop reason: HOSPADM

## 2020-02-13 RX ORDER — HYDROCODONE BITARTRATE AND ACETAMINOPHEN 7.5; 325 MG/1; MG/1
1 TABLET ORAL ONCE AS NEEDED
Status: DISCONTINUED | OUTPATIENT
Start: 2020-02-13 | End: 2020-02-13 | Stop reason: HOSPADM

## 2020-02-13 RX ORDER — PROPOFOL 10 MG/ML
VIAL (ML) INTRAVENOUS CONTINUOUS PRN
Status: DISCONTINUED | OUTPATIENT
Start: 2020-02-13 | End: 2020-02-13 | Stop reason: SURG

## 2020-02-13 RX ORDER — LIDOCAINE HYDROCHLORIDE 10 MG/ML
0.5 INJECTION, SOLUTION EPIDURAL; INFILTRATION; INTRACAUDAL; PERINEURAL ONCE AS NEEDED
Status: DISCONTINUED | OUTPATIENT
Start: 2020-02-13 | End: 2020-02-13 | Stop reason: HOSPADM

## 2020-02-13 RX ORDER — ONDANSETRON 2 MG/ML
INJECTION INTRAMUSCULAR; INTRAVENOUS AS NEEDED
Status: DISCONTINUED | OUTPATIENT
Start: 2020-02-13 | End: 2020-02-13 | Stop reason: SURG

## 2020-02-13 RX ORDER — ACETAMINOPHEN 500 MG
500 TABLET ORAL ONCE
Status: CANCELLED | OUTPATIENT
Start: 2020-02-13

## 2020-02-13 RX ORDER — PROMETHAZINE HYDROCHLORIDE 25 MG/1
25 TABLET ORAL ONCE AS NEEDED
Status: DISCONTINUED | OUTPATIENT
Start: 2020-02-13 | End: 2020-02-13 | Stop reason: HOSPADM

## 2020-02-13 RX ORDER — LIDOCAINE HYDROCHLORIDE 20 MG/ML
INJECTION, SOLUTION INFILTRATION; PERINEURAL AS NEEDED
Status: DISCONTINUED | OUTPATIENT
Start: 2020-02-13 | End: 2020-02-13 | Stop reason: SURG

## 2020-02-13 RX ORDER — FAMOTIDINE 10 MG/ML
20 INJECTION, SOLUTION INTRAVENOUS ONCE
Status: COMPLETED | OUTPATIENT
Start: 2020-02-13 | End: 2020-02-13

## 2020-02-13 RX ORDER — SODIUM CHLORIDE, SODIUM LACTATE, POTASSIUM CHLORIDE, CALCIUM CHLORIDE 600; 310; 30; 20 MG/100ML; MG/100ML; MG/100ML; MG/100ML
9 INJECTION, SOLUTION INTRAVENOUS CONTINUOUS
Status: DISCONTINUED | OUTPATIENT
Start: 2020-02-13 | End: 2020-02-13 | Stop reason: HOSPADM

## 2020-02-13 RX ORDER — FENTANYL CITRATE 50 UG/ML
50 INJECTION, SOLUTION INTRAMUSCULAR; INTRAVENOUS
Status: DISCONTINUED | OUTPATIENT
Start: 2020-02-13 | End: 2020-02-13 | Stop reason: HOSPADM

## 2020-02-13 RX ORDER — SODIUM CHLORIDE 0.9 % (FLUSH) 0.9 %
3-10 SYRINGE (ML) INJECTION AS NEEDED
Status: DISCONTINUED | OUTPATIENT
Start: 2020-02-13 | End: 2020-02-13 | Stop reason: HOSPADM

## 2020-02-13 RX ORDER — HYDRALAZINE HYDROCHLORIDE 20 MG/ML
5 INJECTION INTRAMUSCULAR; INTRAVENOUS
Status: DISCONTINUED | OUTPATIENT
Start: 2020-02-13 | End: 2020-02-13 | Stop reason: HOSPADM

## 2020-02-13 RX ORDER — PROMETHAZINE HYDROCHLORIDE 25 MG/1
25 SUPPOSITORY RECTAL ONCE AS NEEDED
Status: DISCONTINUED | OUTPATIENT
Start: 2020-02-13 | End: 2020-02-13 | Stop reason: HOSPADM

## 2020-02-13 RX ORDER — MIDAZOLAM HYDROCHLORIDE 1 MG/ML
1 INJECTION INTRAMUSCULAR; INTRAVENOUS
Status: DISCONTINUED | OUTPATIENT
Start: 2020-02-13 | End: 2020-02-13 | Stop reason: HOSPADM

## 2020-02-13 RX ORDER — MIDAZOLAM HYDROCHLORIDE 1 MG/ML
2 INJECTION INTRAMUSCULAR; INTRAVENOUS
Status: DISCONTINUED | OUTPATIENT
Start: 2020-02-13 | End: 2020-02-13 | Stop reason: HOSPADM

## 2020-02-13 RX ORDER — PROPOFOL 10 MG/ML
VIAL (ML) INTRAVENOUS AS NEEDED
Status: DISCONTINUED | OUTPATIENT
Start: 2020-02-13 | End: 2020-02-13 | Stop reason: SURG

## 2020-02-13 RX ORDER — EPHEDRINE SULFATE 50 MG/ML
5 INJECTION, SOLUTION INTRAVENOUS ONCE AS NEEDED
Status: DISCONTINUED | OUTPATIENT
Start: 2020-02-13 | End: 2020-02-13 | Stop reason: HOSPADM

## 2020-02-13 RX ORDER — FENTANYL CITRATE 50 UG/ML
100 INJECTION, SOLUTION INTRAMUSCULAR; INTRAVENOUS
Status: DISCONTINUED | OUTPATIENT
Start: 2020-02-13 | End: 2020-02-13 | Stop reason: HOSPADM

## 2020-02-13 RX ORDER — ACETAMINOPHEN 500 MG
500 TABLET ORAL ONCE
Status: COMPLETED | OUTPATIENT
Start: 2020-02-13 | End: 2020-02-13

## 2020-02-13 RX ORDER — ERYTHROMYCIN 5 MG/G
OINTMENT OPHTHALMIC NIGHTLY
Qty: 3.5 G | Refills: 1 | Status: SHIPPED | OUTPATIENT
Start: 2020-02-13 | End: 2020-03-14

## 2020-02-13 RX ORDER — ERYTHROMYCIN 5 MG/G
OINTMENT OPHTHALMIC AS NEEDED
Status: DISCONTINUED | OUTPATIENT
Start: 2020-02-13 | End: 2020-02-13 | Stop reason: HOSPADM

## 2020-02-13 RX ADMIN — PROPOFOL 100 MCG/KG/MIN: 10 INJECTION, EMULSION INTRAVENOUS at 09:30

## 2020-02-13 RX ADMIN — ACETAMINOPHEN 500 MG: 500 TABLET, FILM COATED ORAL at 11:12

## 2020-02-13 RX ADMIN — ONDANSETRON HYDROCHLORIDE 4 MG: 2 SOLUTION INTRAMUSCULAR; INTRAVENOUS at 10:12

## 2020-02-13 RX ADMIN — LIDOCAINE HYDROCHLORIDE 60 MG: 20 INJECTION, SOLUTION INFILTRATION; PERINEURAL at 09:30

## 2020-02-13 RX ADMIN — SODIUM CHLORIDE, POTASSIUM CHLORIDE, SODIUM LACTATE AND CALCIUM CHLORIDE 9 ML/HR: 600; 310; 30; 20 INJECTION, SOLUTION INTRAVENOUS at 08:32

## 2020-02-13 RX ADMIN — PROPOFOL 50 MG: 10 INJECTION, EMULSION INTRAVENOUS at 09:30

## 2020-02-13 RX ADMIN — FAMOTIDINE 20 MG: 10 INJECTION INTRAVENOUS at 08:35

## 2020-02-13 NOTE — H&P
History & Physical       Patient: Carrie Golden    Date of Admission: No admission date for patient encounter.    YOB: 1925    Medical Record Number: [id number]      Chief Complaints: brow droop still blocking vision      History of Present Illness: 89 y.o. female presents with bul brow ptosis that is restricting superior field. No new meds/health problems since office visit      Allergies:   Allergies   Allergen Reactions   • Codeine Nausea And Vomiting and Dizziness   • Hydrocodone Nausea And Vomiting and Dizziness   • Lortab [Hydrocodone-Acetaminophen] Nausea And Vomiting   • Sulfa Antibiotics Rash       10 point review of systems negative, except pertaining to the HPI    Medications:   Home Medications:  No current facility-administered medications on file prior to encounter.      Current Outpatient Medications on File Prior to Encounter   Medication Sig   • acetaminophen (TYLENOL) 500 MG tablet Take 500 mg by mouth Every 6 (Six) Hours As Needed for mild pain (1-3).   • amLODIPine (NORVASC) 2.5 MG tablet Take 2.5 mg by mouth Every Morning.   • aspirin 81 MG EC tablet Take 81 mg by mouth Daily. PT HOLDING FOR SURGERY   • carvedilol (COREG) 3.125 MG tablet Take 3.125 mg by mouth 2 (Two) Times a Day With Meals.   • isosorbide mononitrate (IMDUR) 60 MG 24 hr tablet Take 60 mg by mouth Every Morning.   • nitroglycerin (NITROSTAT) 0.4 MG SL tablet 1 under the tongue as needed for angina, may repeat q5mins for up three doses (Patient taking differently: Place  under the tongue Every 5 (Five) Minutes As Needed. 1 under the tongue as needed for angina, may repeat q5mins for up three doses)   • simvastatin (ZOCOR) 40 MG tablet TAKE 1 TABLET BY MOUTH ONCE DAILY (Patient taking differently: Take 40 mg by mouth Every Night.)     Current Medications:  Scheduled Meds:  Continuous Infusions:  No current facility-administered medications for this encounter.   PRN Meds:.    Past Medical History:   Diagnosis Date    • Arthritis    • Drooping eyelid, bilateral    • Frequent UTI    • GERD (gastroesophageal reflux disease)    • Hearing loss     bilateral hearing aides   • 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 sure which 1  contracted at age 8   • History of transfusion    • Koi (hard of hearing)    • Hypertension    • Past heart attack     X2 MILD LAST ONE OCT 2019   • PONV (postoperative nausea and vomiting)         Past Surgical History:   Procedure Laterality Date   • ANTERIOR AND POSTERIOR VAGINAL REPAIR     • BACK SURGERY     • BLEPHAROPLASTY Bilateral 5/23/2019    Procedure: bilateral upper lid blepharoplasty;  Surgeon: Mauricio Pennington MD;  [place] [facility] [place] [facility] [place]  Service: Ophthalmology   • CARDIAC CATHETERIZATION N/A 12/3/2019    Procedure: Left Heart Cath;  Surgeon: Puma Briseno MD;  Location: [facility] CATH INVASIVE LOCATION;  Service: Cardiovascular   • CARDIAC CATHETERIZATION N/A 12/3/2019    Procedure: Coronary angiography;  Surgeon: Puma Briseno MD;  Location: [facility] CATH INVASIVE LOCATION;  Service: Cardiovascular   • CARDIAC CATHETERIZATION N/A 12/3/2019    Procedure: Left ventriculography;  Surgeon: Puma Briseno MD;  Location: [facility] CATH INVASIVE LOCATION;  Service: Cardiovascular   • CATARACT EXTRACTION EXTRACAPSULAR W/ INTRAOCULAR LENS IMPLANTATION Bilateral    • CERVICAL SPINE ANTERIOR      with instrumentation   • COLON SURGERY      for obstruction   • ALBERTO TUBE INSERTION Bilateral 5/23/2019    Procedure: ALBERTO TUBE;  Surgeon: Mauricio Pennington MD;  [place] [facility] [place] [facility] [place];  Service: Ophthalmology   • ECTROPION REPAIR Bilateral 5/23/2019    Procedure: bilateral lower lid ectropion repair, bilateral punctoplasty;  Surgeon: Mauricio Pennington MD;  [place] [facility] [place] [facility] [place];  Service: Ophthalmology   • EYE SURGERY     • FOOT FUSION Left 12/30/2016    Procedure: LEFT HALLUX METATARSALPHALANGEAL ARTHRODESIS SILVER BUNIONECTOMY METATARSAL HEAD RESECTION 2-5 CALCANEAL BONE GRAFT;  Surgeon: Monster Harper Jr.,  MD;  Location: [facility] OR;  Service:    • HYSTERECTOMY     • INGUINAL HERNIA REPAIR Bilateral    • ROTATOR CUFF REPAIR Right    • STOMACH SURGERY      for ulcer        Social History     Occupational History   • Not on file   Tobacco Use   • Smoking status: Former Smoker     Packs/day: 0.25     Years: 10.00     Pack years: 2.50     Types: Cigarettes     Last attempt to quit:      Years since quittin.1   • Smokeless tobacco: Never Used   Substance and Sexual Activity   • Alcohol use: No   • Drug use: No   • Sexual activity: Defer    Social History     Social History Narrative   • Not on file        Family History   Problem Relation Age of Onset   • Malig Hyperthermia Neg Hx            Physical Exam   There were no vitals taken for this visit.  Constitutional: Alert, cooperative, in no acute distress    Head: Normocephalic.   Eyes:   bul brow ptosis  Neck: Normal range of motion.   Cardiovascular: Normal rate.    Pulmonary/Chest: Effort normal.   Neurological: Alert.   Skin: Skin is warm.   Psychiatric: Normal mood and affect.       Assessment/Plan:  The patient voiced understanding of the risks, benefits, and alternative forms of treatment that were discussed and the patient consents to proceed with BILATERAL TEMPORAL DIRECT BROWLIFT.       Sreekanth Bird MD

## 2020-02-13 NOTE — ANESTHESIA POSTPROCEDURE EVALUATION
Patient: Carrie Golden    Procedure Summary     Date:  02/13/20 Room / Location:   [facility]    Anesthesia Start:  0924 Anesthesia Stop:  1022    Procedure:  BILATERAL TEMPORAL DIRECT BROWLIFT (Bilateral Face) Diagnosis:      Surgeon:  Sreekanth Bird MD Provider:  Willie Gandhi MD    Anesthesia Type:  MAC ASA Status:  3          Anesthesia Type: MAC    Vitals  Vitals Value Taken Time   /73 2/13/2020 10:35 AM   Temp 36.7 °C (98 °F) 2/13/2020 10:19 AM   Pulse 51 2/13/2020 10:35 AM   Resp 16 2/13/2020 10:35 AM   SpO2 100 % 2/13/2020 10:35 AM           Post Anesthesia Care and Evaluation    Patient location during evaluation: bedside  Patient participation: complete - patient participated  Level of consciousness: awake  Pain score: 1  Pain management: adequate  Airway patency: patent  Anesthetic complications: No anesthetic complications  PONV Status: controlled  Cardiovascular status: acceptable  Respiratory status: acceptable  Hydration status: acceptable    Comments: --------------------            02/13/20               1035     --------------------   BP:       149/73     Pulse:      51       Resp:       16       Temp:                SpO2:      100%     --------------------

## 2020-02-13 NOTE — OP NOTE
OPERATIVE NOTE    Patient Identification:  Name: Carrie Golden  Age: 89 y.o.  Sex: female  :  1925  MRN: [id number]                                               Preoperative diagnosis: Bilateral brow ptosis  Postoperative diagnosis: same  Procedure: Bilateral Temporal direct brow lift  Surgeon: Dr. Bird who was present and scrubbed throughout all critical portions of the operation  Assistants: Chucky White MD  Anesthesia: MAC  EBL: less than 50cc  Specimens:  * No orders in the log *    Description of the procedure:     The patient was taken to the operating room and placed on the table in the supine position, where anesthesia was induced. 2% lidocaine with epinephrine and 0.5% marcaine in a 1:1 fashion was injected over the surgical site, and the patient was prepped and draped in the usual manner for orbitofacial surgery.     Corneal protectors were placed in both eyes.     A 15 Bard-Torito blade incision was made above the right eyebrow line, across the temporal horizontal extent of the brow. A second incision line was drawn 7 mm above the eyebrow line, and the intervening tissue was excised with a Bard Torito blade and Kimani scissors. Sharp dissection was carried down to the frontalis muscle. The eyebrow was undermined inferiorly and was mobilized superiorly. The brow was fixated superiorly to the frontalis muscle with 3-0 vicryl sutures deeply. The incision was then closed with 5-0 Vicryl sutures subcutaneously and 6-0 Prolene sutures superficially.     The exact same procedure was performed on the contralateral side.    The corneal protectors were removed and antibiotic ophthalmic ointment was placed over the surgical site.     The patient was then awakened and taken from the operating room in good condition, having tolerated the procedure well. There were no complications, and the estimated blood loss was less than 50 cc.

## 2020-02-13 NOTE — ANESTHESIA PREPROCEDURE EVALUATION
Anesthesia Evaluation     Patient summary reviewed and Nursing notes reviewed   history of anesthetic complications: PONV  NPO Solid Status: > 8 hours  NPO Liquid Status: > 2 hours           Airway   Mallampati: II  Neck ROM: limited  no difficulty expected  Dental - normal exam   (+) partials        Pulmonary     breath sounds clear to auscultation  (+) a smoker Former,   Cardiovascular     ECG reviewed  Rhythm: regular  Rate: normal    (+) hypertension, past MI , CAD,     ROS comment: No cp/soa    Neuro/Psych  GI/Hepatic/Renal/Endo    (+)  GERD,      Musculoskeletal     Abdominal    Substance History      OB/GYN          Other                          Anesthesia Plan    ASA 3     MAC     intravenous induction     Anesthetic plan, all risks, benefits, and alternatives have been provided, discussed and informed consent has been obtained with: patient.    Plan discussed with CRNA.    ? TIVA

## 2020-02-26 ENCOUNTER — ON CAMPUS - OUTPATIENT (AMBULATORY)
Dept: URBAN - METROPOLITAN AREA HOSPITAL 77 | Facility: HOSPITAL | Age: 85
End: 2020-02-26
Payer: COMMERCIAL

## 2020-02-26 DIAGNOSIS — K21.0 GASTRO-ESOPHAGEAL REFLUX DISEASE WITH ESOPHAGITIS: ICD-10-CM

## 2020-02-26 DIAGNOSIS — R13.10 DYSPHAGIA, UNSPECIFIED: ICD-10-CM

## 2020-02-26 PROCEDURE — 43249 ESOPH EGD DILATION <30 MM: CPT | Performed by: INTERNAL MEDICINE

## 2020-02-28 RX ORDER — CARVEDILOL 3.12 MG/1
TABLET ORAL
Qty: 180 TABLET | Refills: 0 | Status: SHIPPED | OUTPATIENT
Start: 2020-02-28 | End: 2020-05-27

## 2020-03-12 PROBLEM — H02.839 DERMATOCHALASIS: Status: ACTIVE | Noted: 2020-03-12

## 2020-03-12 PROBLEM — H02.834 DERMATOCHALASIS OF LEFT UPPER EYELID: Status: ACTIVE | Noted: 2020-03-12

## 2020-03-12 PROBLEM — Z96.1 PRESENCE OF INTRAOCULAR LENS: Status: ACTIVE | Noted: 2020-03-12

## 2020-03-12 PROBLEM — H01.009 BLEPHARITIS: Status: ACTIVE | Noted: 2020-03-12

## 2020-03-12 PROBLEM — H02.831 DERMATOCHALASIS OF RIGHT UPPER EYELID: Status: ACTIVE | Noted: 2020-03-12

## 2020-03-12 PROBLEM — Z96.1 LENS REPLACED BY OTHER MEANS: Status: ACTIVE | Noted: 2020-03-12

## 2020-03-12 PROBLEM — H02.109 ECTROPION: Status: ACTIVE | Noted: 2020-03-12

## 2020-03-12 PROBLEM — H35.371 EPIRETINAL MEMBRANE (ERM) OF RIGHT EYE: Status: ACTIVE | Noted: 2020-03-12

## 2020-03-12 PROBLEM — H04.129 TEAR FILM INSUFFICIENCY: Status: ACTIVE | Noted: 2020-03-12

## 2020-03-12 PROBLEM — H53.002 AMBLYOPIA, LEFT: Status: ACTIVE | Noted: 2020-03-12

## 2020-03-17 PROBLEM — M54.12 CERVICAL RADICULOPATHY: Status: ACTIVE | Noted: 2020-03-17

## 2020-03-17 PROBLEM — M77.42 METATARSALGIA OF LEFT FOOT: Status: ACTIVE | Noted: 2020-03-17

## 2020-03-17 PROBLEM — M54.16 LUMBAR RADICULOPATHY: Status: ACTIVE | Noted: 2020-03-17

## 2020-03-17 PROBLEM — M19.91 LOCALIZED, PRIMARY OSTEOARTHRITIS: Status: ACTIVE | Noted: 2020-03-17

## 2020-04-24 RX ORDER — SIMVASTATIN 40 MG
TABLET ORAL
Qty: 90 TABLET | Refills: 2 | Status: SHIPPED | OUTPATIENT
Start: 2020-04-24 | End: 2021-01-25

## 2020-05-12 ENCOUNTER — OFFICE VISIT (OUTPATIENT)
Dept: CARDIOLOGY | Facility: CLINIC | Age: 85
End: 2020-05-12

## 2020-05-12 VITALS — HEIGHT: 63 IN | WEIGHT: 123 LBS | BODY MASS INDEX: 21.79 KG/M2

## 2020-05-12 DIAGNOSIS — E78.2 MIXED HYPERLIPIDEMIA: ICD-10-CM

## 2020-05-12 DIAGNOSIS — I21.4 NSTEMI, INITIAL EPISODE OF CARE (HCC): ICD-10-CM

## 2020-05-12 DIAGNOSIS — I25.110 CORONARY ARTERY DISEASE INVOLVING NATIVE CORONARY ARTERY OF NATIVE HEART WITH UNSTABLE ANGINA PECTORIS (HCC): Primary | ICD-10-CM

## 2020-05-12 DIAGNOSIS — I10 ESSENTIAL HYPERTENSION: ICD-10-CM

## 2020-05-12 PROBLEM — I24.9 ACUTE CORONARY SYNDROME (HCC): Status: RESOLVED | Noted: 2019-12-02 | Resolved: 2020-05-12

## 2020-05-12 PROBLEM — I20.0 UNSTABLE ANGINA PECTORIS: Status: RESOLVED | Noted: 2019-12-01 | Resolved: 2020-05-12

## 2020-05-12 PROCEDURE — 99442 PR PHYS/QHP TELEPHONE EVALUATION 11-20 MIN: CPT | Performed by: INTERNAL MEDICINE

## 2020-05-12 NOTE — ASSESSMENT & PLAN NOTE
Coronary artery disease is treated medically despite borderline LAD diag branch stenosis of 60 to 70% with multiple nonobstructive lesions in all 3 native coronary arteries per cardiac catheterization December 2019

## 2020-05-12 NOTE — ASSESSMENT & PLAN NOTE
Coronary artery disease is remarkably well compensated despite having cath proven 10 to 20% left main narrowing with 30% proximal LAD and a 60 to 70% diagonal branch stenosis, +25 to 30% LCx and 20% RCA stenotic lesions-per cardiac catheterization last performed December 2019.  Remains hemodynamically stable and angina free with remarkable functional activity status on current medical therapy.

## 2020-05-12 NOTE — ASSESSMENT & PLAN NOTE
Hypertension is currently very well regulated on multidrug regimen listed reviewed in detail today

## 2020-05-12 NOTE — PROGRESS NOTES
Cardiology Office Visit Note      Referring physician:  Dr. Monster Myrick     Reason For Followup: 6 month follow up  You have chosen to receive care through a telephone visit. Do you consent to use a telephone visit for your medical care today? yes  HPI:  Carrie Golden is a 89 y.o. female for a telephone visit. Patient has hx of chest pain and has underwent cardiac catheterization 12/2019 which revealed 10 to 20% in the left main, 30% in the proximal LAD, first diagonal 60 to 70% stenosis which appeared relatively unchanged from last cardiac catheterization in 2017, left circumflex diffuse 25 to 30% stenosis RCA 20% proximal stenosis.    It was thought best to proceed with medical therapy since this was not a significant change from cardiac catheterization in 2017.    Patient walks 5 miles a day and has been known to walk 10 miles per day which she did about 1 week ago!.    Patient denies any chest pain or dyspnea, she denies PND, orthopnea, palpitations, near syncope, lower extremity edema or feelings of her heart racing.  She reports she has been compliant with medical therapy.  This visit has been rescheduled as a phone visit to comply with patient safety concerns in accordance with CDC recommendations. Total time of discussion was 14 minutes.      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 sure which 1  contracted at age 8   • History of transfusion    • [place] (hard of hearing)    • Hyperlipidemia    • Hypertension    • Past heart attack     X2 MILD LAST ONE OCT 2019   • PONV (postoperative nausea and vomiting)        Past Surgical History:   Procedure Laterality Date   • ANTERIOR AND POSTERIOR VAGINAL REPAIR     • BACK SURGERY     • BLEPHAROPLASTY Bilateral 5/23/2019    Procedure: bilateral upper lid  blepharoplasty;  Surgeon: Mauricio Pennington MD;  Location: [facility] OR [facility] [place];  Service: Ophthalmology   • BROW LIFT Bilateral 2/13/2020    Procedure: BILATERAL TEMPORAL DIRECT BROWLIFT;  Surgeon: Sreekanth Bird MD;  Location: [facility] OR [facility] [place];  Service: Ophthalmology;  Laterality: Bilateral;   • CARDIAC CATHETERIZATION N/A 12/3/2019    Procedure: Left Heart Cath;  Surgeon: Puma Briseno MD;  Location: [facility] CATH INVASIVE LOCATION;  Service: Cardiovascular   • CARDIAC CATHETERIZATION N/A 12/3/2019    Procedure: Coronary angiography;  Surgeon: Puma Briseno MD;  Location: [facility] CATH INVASIVE LOCATION;  Service: Cardiovascular   • CARDIAC CATHETERIZATION N/A 12/3/2019    Procedure: Left ventriculography;  Surgeon: Puma Briseno MD;  Location: [facility] CATH INVASIVE LOCATION;  Service: Cardiovascular   • CATARACT EXTRACTION EXTRACAPSULAR W/ INTRAOCULAR LENS IMPLANTATION Bilateral    • CERVICAL SPINE ANTERIOR      with instrumentation   • COLON SURGERY      for obstruction   • ALBERTO TUBE INSERTION Bilateral 5/23/2019    Procedure: ALBERTO TUBE;  Surgeon: Mauricio Pennington MD;  Location: [facility] OR [facility] [place];  Service: Ophthalmology   • ECTROPION REPAIR Bilateral 5/23/2019    Procedure: bilateral lower lid ectropion repair, bilateral punctoplasty;  Surgeon: Mauricio Pennington MD;  Location: [facility] OR [facility] [place];  Service: Ophthalmology   • EYE SURGERY     • FOOT FUSION Left 12/30/2016    Procedure: LEFT HALLUX METATARSALPHALANGEAL ARTHRODESIS SILVER BUNIONECTOMY METATARSAL HEAD RESECTION 2-5 CALCANEAL BONE GRAFT;  Surgeon: Monster Harper Jr., MD;  Location: [facility] OR;  Service:    • HYSTERECTOMY     • INGUINAL HERNIA REPAIR Bilateral    • ROTATOR CUFF REPAIR Right    • STOMACH SURGERY      for ulcer         Current Outpatient Medications:   •  acetaminophen (TYLENOL) 500 MG tablet, Take 500 mg by mouth Every 6 (Six) Hours As Needed for mild pain (1-3)., Disp: , Rfl:   •  amLODIPine (NORVASC) 2.5 MG tablet, Take 2.5 mg by mouth  Every Morning., Disp: , Rfl: 6  •  carvedilol (COREG) 3.125 MG tablet, Take 1 tablet by mouth twice daily, Disp: 180 tablet, Rfl: 0  •  isosorbide mononitrate (IMDUR) 60 MG 24 hr tablet, Take 60 mg by mouth Every Morning., Disp: , Rfl:   •  nitroglycerin (NITROSTAT) 0.4 MG SL tablet, 1 under the tongue as needed for angina, may repeat q5mins for up three doses (Patient taking differently: Place  under the tongue Every 5 (Five) Minutes As Needed. 1 under the tongue as needed for angina, may repeat q5mins for up three doses), Disp: 100 tablet, Rfl: 11  •  simvastatin (ZOCOR) 40 MG tablet, Take 1 tablet by mouth once daily, Disp: 90 tablet, Rfl: 2    Social History     Socioeconomic History   • Marital status:      Spouse name: Not on file   • Number of children: Not on file   • Years of education: Not on file   • Highest education level: Not on file   Tobacco Use   • Smoking status: Former Smoker     Packs/day: 0.25     Years: 10.00     Pack years: 2.50     Types: Cigarettes     Last attempt to quit:      Years since quittin.3   • Smokeless tobacco: Never Used   Substance and Sexual Activity   • Alcohol use: No   • Drug use: No   • Sexual activity: Defer       Family History   Problem Relation Age of Onset   • [place] Hyperthermia Neg Hx          Review of Systems   General: denies fever, chills, anorexia, weight loss  Eyes: denies blurring, diplopia  Ear/Nose/Throat: denies ear pain, nosebleeds, hoarseness  Cardiovascular: See HPI  Respiratory: denies excessive sputum, hemoptysis, wheezing  Gastrointestinal: denies nausea, vomiting, change in bowel habits, abdominal pain  Genitourinary: No hematuria or dysuria  Musculoskeletal: Modest low back pain and minor weightbearing arthralgias yet walks 5 or more miles a day!.  Skin: denies rashes, itching, suspicious lesions  Neurologic: denies focal neuro deficits  Psychiatric: denies depression, anxiety  Endocrine: denies cold intolerance, heat  "intolerance  Hematologic/Lymphatic: denies abnormal bruising, bleeding  Allergic/Immunologic: denies urticaria or persistent infections      Objective     Visit Vitals  Ht 158.8 cm (62.52\")   Wt 55.8 kg (123 lb)   BMI 22.12 kg/m²           Physical Exam-limited physical exam description based on patient's observations as reported to me during today's telephone visit General:      Remarkably pleasant and functionally active, well developed,, in no acute distress.    HEENT: Unremarkable with no scleral icterus or visual changes or oropharyngeal lesions reported.  Neck: Remains supple with adequate ROM and no JVD or neck masses noted.  Lungs: Appear to have satisfactory respiratory rate and pattern with no conversational dyspnea.  Cardiac: Reports normal heart rhythm and satisfactory heart rate with no ectopy or dysrhythmia appreciated.  Abdomen: Soft without detectable masses or localizing tenderness.  Msk:    no deformity; adequate R OM  Pulses:    pulses normal in all 4 extremities.    Extremities:    no clubbing, cyanosis, edema   Neurologic:    no focal sensory or motor deficits  Skin:    intact without lesions or rashes.    Psych:    alert and cooperative; normal mood and affect; normal attention span and concentration.          Procedures      Assessment:   Problems Addressed this Visit        Cardiovascular and Mediastinum    Essential hypertension     Hypertension is currently very well regulated on multidrug regimen listed reviewed in detail today         NSTEMI, initial episode of care (CMS/HCC)     Coronary artery disease is treated medically despite borderline LAD diag branch stenosis of 60 to 70% with multiple nonobstructive lesions in all 3 native coronary arteries per cardiac catheterization December 2019         Coronary artery disease involving native heart with unstable angina pectoris (CMS/HCC) - Primary     Coronary artery disease is remarkably well compensated despite having cath proven 10 to 20% " left main narrowing with 30% proximal LAD and a 60 to 70% diagonal branch stenosis, +25 to 30% LCx and 20% RCA stenotic lesions-per cardiac catheterization last performed December 2019.  Remains hemodynamically stable and angina free with remarkable functional activity status on current medical therapy.         Mixed hyperlipidemia     Lipid abnormalities are maintained on simvastatin; followed by PCP                 Plan:  Ms. Sheehan is advised to continue with current medications as listed reviewed in detail today she appears remarkably well compensated with exception of functional activity tolerance.  She is encouraged to continue her remarkable daily walking activities though cautioned about taking any undue risk for falls.  We hope to see Carrie deng for follow-up in the office in 6 months or sooner if needed.    ROBERTO CARLOS Morales MD  5/12/2020 15:49    This report was generated using the Dragon voice recognition system.

## 2020-05-27 RX ORDER — CARVEDILOL 3.12 MG/1
TABLET ORAL
Qty: 180 TABLET | Refills: 0 | Status: SHIPPED | OUTPATIENT
Start: 2020-05-27 | End: 2020-08-24

## 2020-06-10 RX ORDER — ISOSORBIDE MONONITRATE 60 MG/1
TABLET, EXTENDED RELEASE ORAL
Qty: 90 TABLET | Refills: 0 | Status: SHIPPED | OUTPATIENT
Start: 2020-06-10 | End: 2020-09-08

## 2020-07-20 ENCOUNTER — OFFICE VISIT (OUTPATIENT)
Dept: PODIATRY | Facility: CLINIC | Age: 85
End: 2020-07-20

## 2020-07-20 VITALS
WEIGHT: 127 LBS | SYSTOLIC BLOOD PRESSURE: 150 MMHG | HEART RATE: 56 BPM | HEIGHT: 63 IN | BODY MASS INDEX: 22.5 KG/M2 | DIASTOLIC BLOOD PRESSURE: 88 MMHG

## 2020-07-20 DIAGNOSIS — M79.672 FOOT PAIN, BILATERAL: Primary | ICD-10-CM

## 2020-07-20 DIAGNOSIS — M20.22 HALLUX RIGIDUS, LEFT FOOT: ICD-10-CM

## 2020-07-20 DIAGNOSIS — M79.671 FOOT PAIN, BILATERAL: Primary | ICD-10-CM

## 2020-07-20 DIAGNOSIS — M21.962 DEFORMITY OF LEFT FOOT: ICD-10-CM

## 2020-07-20 DIAGNOSIS — M77.42 METATARSALGIA OF LEFT FOOT: ICD-10-CM

## 2020-07-20 PROCEDURE — 99213 OFFICE O/P EST LOW 20 MIN: CPT | Performed by: PODIATRIST

## 2020-07-20 RX ORDER — LACTULOSE 10 G/15ML
10 SOLUTION ORAL NIGHTLY
COMMUNITY
Start: 2020-06-29 | End: 2021-08-19

## 2020-07-20 RX ORDER — PANTOPRAZOLE SODIUM 40 MG/1
40 TABLET, DELAYED RELEASE ORAL EVERY MORNING
COMMUNITY
Start: 2020-05-24 | End: 2021-05-13 | Stop reason: ALTCHOICE

## 2020-07-21 NOTE — PROGRESS NOTES
07/20/2020  Foot and Ankle Surgery - Established Patient/Follow-up  Provider: Dr. Owen Rees DPM  Location: [facility]    Subjective:  Carrie Golden is a 89 y.o. female.     Chief Complaint   Patient presents with   • Left Foot - Pain   • Right Foot - Pain       HPI: Patient is a 89-year-old female that I have seen in the past and perform surgery on.  She has had multiple surgeries involving the left foot.  Most recently, we proceeded with first metatarsal phalangeal joint fusion takedown with Patricio implant.  Patient states that she did quite well after surgery and only started noticing discomfort approximately 6 months ago.  She localizes the pain to the lateral aspect of the foot with weightbearing activities.  Symptoms are improved with rest.  She rates the pain an 8 out of 10.  She states that she remains quite active and loves to walk.  She would like to discuss treatment options for this issue.  She has similar symptoms on the right foot which is mild.    Allergies   Allergen Reactions   • Acetaminophen Other (See Comments)   • Codeine Nausea And Vomiting and Dizziness   • Hydrocodone Nausea And Vomiting and Dizziness   • Lortab [Hydrocodone-Acetaminophen] Nausea And Vomiting   • Nitrofurantoin Other (See Comments)   • Sulfa Antibiotics Rash       Current Outpatient Medications on File Prior to Visit   Medication Sig Dispense Refill   • acetaminophen (TYLENOL) 500 MG tablet Take 500 mg by mouth Every 6 (Six) Hours As Needed for mild pain (1-3).     • amLODIPine (NORVASC) 2.5 MG tablet Take 2.5 mg by mouth Every Morning.  6   • carvedilol (COREG) 3.125 MG tablet Take 1 tablet by mouth twice daily 180 tablet 0   • isosorbide mononitrate (IMDUR) 60 MG 24 hr tablet Take 1 tablet by mouth once daily 90 tablet 0   • lactulose (CHRONULAC) 10 GM/15ML solution TAKE 15 ML BY MOUTH TWICE DAILY     • nitroglycerin (NITROSTAT) 0.4 MG SL tablet 1 under the tongue as needed for angina, may repeat q5mins for  "up three doses (Patient taking differently: Place  under the tongue Every 5 (Five) Minutes As Needed. 1 under the tongue as needed for angina, may repeat q5mins for up three doses) 100 tablet 11   • pantoprazole (PROTONIX) 40 MG EC tablet TAKE 1 TABLET BY MOUTH ONCE DAILY IN THE MORNING 30 MINUTES BEFORE MEAL(S)     • simvastatin (ZOCOR) 40 MG tablet Take 1 tablet by mouth once daily 90 tablet 2     No current facility-administered medications on file prior to visit.        Objective   /88   Pulse 56   Ht 158.8 cm (62.5\")   Wt 57.6 kg (127 lb)   BMI 22.86 kg/m²     Podiatry Exam       General Appearance:  well nourished; well hydrated; no acute distress  Mental Status Exam        Judgement and Insight:  Intact       Orientation:  Oriented to time, place, and person       Memory:  Intact for recent and remote events  Cardiovascular (Left)       Dorsalis Pedis Pulse (Lt):   2/4       Posterior Tibialis Pulse (Lt):   2/4       Capillary Filling Time (Lt):  1-3 Seconds       Edema (Lt):  No Edema       Varicosities (Lt):   positive       Telangiectasias (Lt):  positive  Dermatological Exam       Temperature:  warm to warm       Hair Growth:  sparse to absent       Skin Elasticity:  normal skin elasticity  Skin: Grossly intact without any open wounds or signs of infection.  Moderate fat pad atrophy  Neurological Exam (Left)       Paresthesia (Lt):  negative       Patella DTRs (Lt):  symmetric       Achilles DTRs (Lt):  symmetric       Tinel over Tarsal Tunnel(Lt):  negative       Intermedial Dorsal Cutaneous Nerve (Lt):  negative  Monofilament Test (Lt):   intact        MusculoSkeletal Exam (Left)       Gait and Stance (Lt):  Boot assisted       ROM (Lt):  , Not rigidity to the first metatarsal phalangeal joint.  No significant tenderness with palpation or attempted range of motion.  Other metatarsal phalangeal joints are also limited       Muscle Strength (Lt):  symmetrical 5/5       Subluxed Digits (Lt):  " Rectus alignment of the hallux       Inflammed Joints (Lt):  no inflammation of joints       Post tibial tendon (Lt):  no soreness noted       Peroneal Tendon (Lt):  no soreness noted  Additional Musculoskelatal Findings  Moderate discomfort with palpation to the lateral column of the foot and the fifth metatarsal head region.  No obvious progressive deformity or instability    Assessment/Plan   Carrie was seen today for pain and pain.    Diagnoses and all orders for this visit:    Foot pain, bilateral  -     XR Foot 3+ View Bilateral    Deformity of left foot    Hallux rigidus, left foot    Metatarsalgia of left foot      Patient presents with pain which has been progressive over the last 6 months.  She isolates the majority of the discomfort to the lateral column of the foot.  Symptoms are present with increased activity and typically improved with rest.  Imaging was performed today showing progressive consolidation at the level of the metatarsal phalangeal joints.  Clinically, she is very rigid to all these joint levels as well.  I explained that her symptoms are secondary to excessive stress and lack of support to the lateral aspect of the foot.  We did discuss treatment options including offloading with accommodative type inserts.  I have asked that she acquire a pair of over-the-counter devices.  We also reviewed appropriate shoes and to avoid barefoot walking.  I do feel that this is the most appropriate treatment option to proceed with at this time.  She does understand that surgical options are available however I would prefer to defer if improvement is noted with different shoes and inserts.  I would like to see her in 4 weeks for reevaluation    Orders Placed This Encounter   Procedures   • XR Foot 3+ View Bilateral     RM 10     Order Specific Question:   Reason for Exam:     Answer:   bilateral foot pain     Order Specific Question:   Does this patient have a diabetic monitoring/medication delivering  device on?     Answer:   No          Note is dictated utilizing voice recognition software. Unfortunately this leads to occasional typographical errors. I apologize in advance if the situation occurs. If questions occur please do not hesitate to call our office.

## 2020-08-17 ENCOUNTER — OFFICE VISIT (OUTPATIENT)
Dept: PODIATRY | Facility: CLINIC | Age: 85
End: 2020-08-17

## 2020-08-17 VITALS
BODY MASS INDEX: 22.75 KG/M2 | WEIGHT: 128.4 LBS | HEART RATE: 57 BPM | SYSTOLIC BLOOD PRESSURE: 135 MMHG | DIASTOLIC BLOOD PRESSURE: 69 MMHG | HEIGHT: 63 IN

## 2020-08-17 DIAGNOSIS — M77.42 METATARSALGIA OF LEFT FOOT: Primary | ICD-10-CM

## 2020-08-17 DIAGNOSIS — M21.962 DEFORMITY OF LEFT FOOT: ICD-10-CM

## 2020-08-17 DIAGNOSIS — M20.22 HALLUX RIGIDUS, LEFT FOOT: ICD-10-CM

## 2020-08-17 PROCEDURE — 99213 OFFICE O/P EST LOW 20 MIN: CPT | Performed by: PODIATRIST

## 2020-08-17 RX ORDER — AMLODIPINE BESYLATE 2.5 MG/1
TABLET ORAL
Qty: 90 TABLET | Refills: 0 | Status: SHIPPED | OUTPATIENT
Start: 2020-08-17 | End: 2020-11-17

## 2020-08-17 NOTE — PROGRESS NOTES
08/17/2020  Foot and Ankle Surgery - Established Patient/Follow-up  Provider: Dr. Owen Rees DPM  Location: [facility]    Subjective:  Carrie Golden is a 89 y.o. female.     Chief Complaint   Patient presents with   • Left Foot - Follow-up       HPI: Patient returns for follow-up on left foot pain.  She did obtain the inserts and feels that the shoes and inserts have made her symptoms worse.  She is very concerned that she will be unable to perform normal daily activities.  She does want to discuss further treatment options.    Allergies   Allergen Reactions   • Acetaminophen Other (See Comments)   • Codeine Nausea And Vomiting and Dizziness   • Hydrocodone Nausea And Vomiting and Dizziness   • Lortab [Hydrocodone-Acetaminophen] Nausea And Vomiting   • Nitrofurantoin Other (See Comments)   • Sulfa Antibiotics Rash       Current Outpatient Medications on File Prior to Visit   Medication Sig Dispense Refill   • acetaminophen (TYLENOL) 500 MG tablet Take 500 mg by mouth Every 6 (Six) Hours As Needed for mild pain (1-3).     • amLODIPine (NORVASC) 2.5 MG tablet Take 1 tablet by mouth once daily 90 tablet 0   • carvedilol (COREG) 3.125 MG tablet Take 1 tablet by mouth twice daily 180 tablet 0   • isosorbide mononitrate (IMDUR) 60 MG 24 hr tablet Take 1 tablet by mouth once daily 90 tablet 0   • lactulose (CHRONULAC) 10 GM/15ML solution TAKE 15 ML BY MOUTH TWICE DAILY     • nitroglycerin (NITROSTAT) 0.4 MG SL tablet 1 under the tongue as needed for angina, may repeat q5mins for up three doses (Patient taking differently: Place  under the tongue Every 5 (Five) Minutes As Needed. 1 under the tongue as needed for angina, may repeat q5mins for up three doses) 100 tablet 11   • pantoprazole (PROTONIX) 40 MG EC tablet TAKE 1 TABLET BY MOUTH ONCE DAILY IN THE MORNING 30 MINUTES BEFORE MEAL(S)     • simvastatin (ZOCOR) 40 MG tablet Take 1 tablet by mouth once daily 90 tablet 2   • [DISCONTINUED] amLODIPine  "(NORVASC) 2.5 MG tablet Take 2.5 mg by mouth Every Morning.  6     No current facility-administered medications on file prior to visit.        Objective   /69   Pulse 57   Ht 158.8 cm (62.5\")   Wt 58.2 kg (128 lb 6.4 oz)   BMI 23.11 kg/m²     Podiatry Exam       General Appearance:  well nourished; well hydrated; no acute distress  Mental Status Exam        Judgement and Insight:  Intact       Orientation:  Oriented to time, place, and person       Memory:  Intact for recent and remote events  Cardiovascular (Left)       Dorsalis Pedis Pulse (Lt):   2/4       Posterior Tibialis Pulse (Lt):   2/4       Capillary Filling Time (Lt):  1-3 Seconds       Edema (Lt):  No Edema       Varicosities (Lt):   positive       Telangiectasias (Lt):  positive  Dermatological Exam       Temperature:  warm to warm       Hair Growth:  sparse to absent       Skin Elasticity:  normal skin elasticity  Skin: Grossly intact without any open wounds or signs of infection.  Moderate fat pad atrophy  Neurological Exam (Left)       Paresthesia (Lt):  negative       Patella DTRs (Lt):  symmetric       Achilles DTRs (Lt):  symmetric       Tinel over Tarsal Tunnel(Lt):  negative       Intermedial Dorsal Cutaneous Nerve (Lt):  negative  Monofilament Test (Lt):   intact        MusculoSkeletal Exam (Left)       Gait and Stance (Lt):  Boot assisted       ROM (Lt):  , Not rigidity to the first metatarsal phalangeal joint.  No significant tenderness with palpation or attempted range of motion.  Other metatarsal phalangeal joints are also limited       Muscle Strength (Lt):  symmetrical 5/5       Subluxed Digits (Lt):  Rectus alignment of the hallux       Inflammed Joints (Lt):  no inflammation of joints       Post tibial tendon (Lt):  no soreness noted       Peroneal Tendon (Lt):  no soreness noted  Additional Musculoskelatal Findings  Moderate discomfort with palpation to the lateral column of the foot and the fifth metatarsal head region.  No " obvious progressive deformity or instability    Assessment/Plan   Carrie was seen today for follow-up.    Diagnoses and all orders for this visit:    Metatarsalgia of left foot    Deformity of left foot  -     CBC (No Diff); Future  -     Basic Metabolic Panel; Future  -     ECG 12 Lead; Future  -     XR Chest 2 View; Future  -     Case Request; Standing  -     Case Request    Hallux rigidus, left foot  -     CBC (No Diff); Future  -     Basic Metabolic Panel; Future  -     ECG 12 Lead; Future  -     XR Chest 2 View; Future  -     Case Request; Standing  -     Case Request    Other orders  -     Follow Anesthesia Guidelines / Standing Orders; Future  -     Obtain Informed Consent; Future  -     Provide NPO Instructions to Patient; Future  -     Chlorhexidine Skin Prep; Future      Patient returns with continued pain involving the left foot.  She feels that the shoes and inserts actually made her symptoms worse.  She continues to work and states that she ideally would like to walk 5 miles a day for activity.  She is concerned that if she becomes less active that this will cause further deterioration to her body.  She feels that she is very healthy and would like to remain as active as she possibly can.  She does have very significant deformity involving the left foot from previous surgery.  She did respond well after previous Patricio implant to the great toe.  Her symptoms are located more to the lateral aspect of the foot at this time.  Given her symptoms and limitation, we did discuss consideration for surgery involving partial bony resection to metatarsals 2 through 5 along with replacement of the Patricio implant.  I did review the procedures, risks, goals, and recovery at length.  She does understand that a fairly palliative surgery option.  She is anxious to proceed with surgery when possible.  We will plan for the near future.    Orders Placed This Encounter   Procedures   • XR Chest 2 View     Standing Status:    Future     Standing Expiration Date:   8/17/2021     Order Specific Question:   Reason for Exam:     Answer:   Preop   • CBC (No Diff)     Standing Status:   Future     Standing Expiration Date:   8/17/2021   • Basic Metabolic Panel     Standing Status:   Future     Standing Expiration Date:   8/17/2021   • Follow Anesthesia Guidelines / Standing Orders     Standing Status:   Future   • Obtain Informed Consent     Standing Status:   Future     Order Specific Question:   Informed Consent Given For     Answer:   Implant removal and replacement to the first metatarsal phalangeal joint with partial bone resection of metatarsals 2, 3, 4, and 5 all to the left foot   • Provide NPO Instructions to Patient     Standing Status:   Future   • Chlorhexidine Skin Prep     Chlorhexidine Skin Prep and Instructions For All Patients Having A Procedure Requiring an Outward Incision if Not Allergic. If Allergic, Give Antibacterial Skin Wipes and Instructions. Do Not Use For Facial Cases or on Any Mucus Membranes.     Standing Status:   Future   • ECG 12 Lead     Standing Status:   Future     Standing Expiration Date:   8/17/2021     Order Specific Question:   Reason for Exam:     Answer:   Preop          Note is dictated utilizing voice recognition software. Unfortunately this leads to occasional typographical errors. I apologize in advance if the situation occurs. If questions occur please do not hesitate to call our office.

## 2020-08-18 PROBLEM — M20.22 HALLUX RIGIDUS, LEFT FOOT: Status: ACTIVE | Noted: 2020-08-18

## 2020-08-18 PROBLEM — M21.962 DEFORMITY OF LEFT FOOT: Status: ACTIVE | Noted: 2020-08-18

## 2020-08-20 RX ORDER — ASPIRIN 81 MG/1
81 TABLET ORAL EVERY OTHER DAY
COMMUNITY
End: 2021-10-20 | Stop reason: HOSPADM

## 2020-08-24 RX ORDER — CARVEDILOL 3.12 MG/1
TABLET ORAL
Qty: 180 TABLET | Refills: 0 | Status: SHIPPED | OUTPATIENT
Start: 2020-08-24 | End: 2020-11-25

## 2020-08-26 ENCOUNTER — LAB (OUTPATIENT)
Dept: LAB | Facility: HOSPITAL | Age: 85
End: 2020-08-26

## 2020-08-26 ENCOUNTER — HOSPITAL ENCOUNTER (OUTPATIENT)
Dept: GENERAL RADIOLOGY | Facility: HOSPITAL | Age: 85
Discharge: HOME | End: 2020-08-26
Admitting: PODIATRIST

## 2020-08-26 ENCOUNTER — HOSPITAL ENCOUNTER (OUTPATIENT)
Dept: CARDIOLOGY | Facility: HOSPITAL | Age: 85
Discharge: HOME | End: 2020-08-26

## 2020-08-26 DIAGNOSIS — M20.22 HALLUX RIGIDUS, LEFT FOOT: ICD-10-CM

## 2020-08-26 DIAGNOSIS — M21.962 DEFORMITY OF LEFT FOOT: ICD-10-CM

## 2020-08-26 LAB
ANION GAP SERPL CALCULATED.3IONS-SCNC: 8.3 MMOL/L (ref 5–15)
BUN SERPL-MCNC: 15 MG/DL (ref 8–23)
BUN/CREAT SERPL: 17.6 (ref 7–25)
CALCIUM SPEC-SCNC: 8.9 MG/DL (ref 8.2–9.6)
CHLORIDE SERPL-SCNC: 110 MMOL/L (ref 98–107)
CO2 SERPL-SCNC: 26.7 MMOL/L (ref 22–29)
CREAT SERPL-MCNC: 0.85 MG/DL (ref 0.57–1)
DEPRECATED RDW RBC AUTO: 41.6 FL (ref 37–54)
ERYTHROCYTE [DISTWIDTH] IN BLOOD BY AUTOMATED COUNT: 13.3 % (ref 12.3–15.4)
GFR SERPL CREATININE-BSD FRML MDRD: 62 ML/MIN/1.73
GLUCOSE SERPL-MCNC: 83 MG/DL (ref 65–99)
HCT VFR BLD AUTO: 37.2 % (ref 34–46.6)
HGB BLD-MCNC: 12.1 G/DL (ref 12–15.9)
MCH RBC QN AUTO: 28.2 PG (ref 26.6–33)
MCHC RBC AUTO-ENTMCNC: 32.5 G/DL (ref 31.5–35.7)
MCV RBC AUTO: 86.7 FL (ref 79–97)
MRSA DNA SPEC QL NAA+PROBE: NORMAL
PLATELET # BLD AUTO: 227 10*3/MM3 (ref 140–450)
PMV BLD AUTO: 9.5 FL (ref 6–12)
POTASSIUM SERPL-SCNC: 4.1 MMOL/L (ref 3.5–5.2)
RBC # BLD AUTO: 4.29 10*6/MM3 (ref 3.77–5.28)
SODIUM SERPL-SCNC: 145 MMOL/L (ref 136–145)
WBC # BLD AUTO: 5.46 10*3/MM3 (ref 3.4–10.8)

## 2020-08-26 PROCEDURE — 85027 COMPLETE CBC AUTOMATED: CPT

## 2020-08-26 PROCEDURE — U0002 COVID-19 LAB TEST NON-CDC: HCPCS

## 2020-08-26 PROCEDURE — 80048 BASIC METABOLIC PNL TOTAL CA: CPT

## 2020-08-26 PROCEDURE — C9803 HOPD COVID-19 SPEC COLLECT: HCPCS

## 2020-08-26 PROCEDURE — U0004 COV-19 TEST NON-CDC HGH THRU: HCPCS

## 2020-08-26 PROCEDURE — 93005 ELECTROCARDIOGRAM TRACING: CPT | Performed by: PODIATRIST

## 2020-08-26 PROCEDURE — 87641 MR-STAPH DNA AMP PROBE: CPT

## 2020-08-26 PROCEDURE — 71046 X-RAY EXAM CHEST 2 VIEWS: CPT

## 2020-08-26 PROCEDURE — 36415 COLL VENOUS BLD VENIPUNCTURE: CPT

## 2020-08-26 PROCEDURE — 93010 ELECTROCARDIOGRAM REPORT: CPT | Performed by: INTERNAL MEDICINE

## 2020-08-27 ENCOUNTER — ANESTHESIA EVENT (OUTPATIENT)
Dept: PERIOP | Facility: HOSPITAL | Age: 85
End: 2020-08-27

## 2020-08-27 LAB
REF LAB TEST METHOD: NORMAL
SARS-COV-2 RNA RESP QL NAA+PROBE: NOT DETECTED

## 2020-08-28 ENCOUNTER — ANESTHESIA (OUTPATIENT)
Dept: PERIOP | Facility: HOSPITAL | Age: 85
End: 2020-08-28

## 2020-08-28 ENCOUNTER — HOSPITAL ENCOUNTER (OUTPATIENT)
Facility: HOSPITAL | Age: 85
Setting detail: HOSPITAL OUTPATIENT SURGERY
Discharge: HOME | End: 2020-08-28
Attending: PODIATRIST | Admitting: PODIATRIST

## 2020-08-28 VITALS
BODY MASS INDEX: 22.66 KG/M2 | OXYGEN SATURATION: 94 % | HEIGHT: 63 IN | HEART RATE: 54 BPM | RESPIRATION RATE: 16 BRPM | DIASTOLIC BLOOD PRESSURE: 77 MMHG | TEMPERATURE: 97.6 F | WEIGHT: 127.87 LBS | SYSTOLIC BLOOD PRESSURE: 169 MMHG

## 2020-08-28 DIAGNOSIS — M21.962 DEFORMITY OF LEFT FOOT: ICD-10-CM

## 2020-08-28 DIAGNOSIS — M20.22 HALLUX RIGIDUS, LEFT FOOT: ICD-10-CM

## 2020-08-28 PROCEDURE — 28289 CORRJ HALUX RIGDUS W/O IMPLT: CPT | Performed by: PODIATRIST

## 2020-08-28 PROCEDURE — 25010000003 LIDOCAINE 1 % SOLUTION 20 ML VIAL: Performed by: PODIATRIST

## 2020-08-28 PROCEDURE — 28124 PARTIAL REMOVAL OF TOE: CPT | Performed by: PODIATRIST

## 2020-08-28 PROCEDURE — 25010000002 PROPOFOL 10 MG/ML EMULSION: Performed by: ANESTHESIOLOGY

## 2020-08-28 PROCEDURE — 25010000002 FENTANYL CITRATE (PF) 100 MCG/2ML SOLUTION: Performed by: ANESTHESIOLOGY

## 2020-08-28 PROCEDURE — 28113 PART REMOVAL OF METATARSAL: CPT | Performed by: PODIATRIST

## 2020-08-28 RX ORDER — BUPIVACAINE HYDROCHLORIDE 5 MG/ML
INJECTION, SOLUTION PERINEURAL AS NEEDED
Status: DISCONTINUED | OUTPATIENT
Start: 2020-08-28 | End: 2020-08-28 | Stop reason: HOSPADM

## 2020-08-28 RX ORDER — SODIUM CHLORIDE, SODIUM LACTATE, POTASSIUM CHLORIDE, CALCIUM CHLORIDE 600; 310; 30; 20 MG/100ML; MG/100ML; MG/100ML; MG/100ML
9 INJECTION, SOLUTION INTRAVENOUS CONTINUOUS PRN
Status: DISCONTINUED | OUTPATIENT
Start: 2020-08-28 | End: 2020-08-28 | Stop reason: HOSPADM

## 2020-08-28 RX ORDER — FENTANYL CITRATE 50 UG/ML
25 INJECTION, SOLUTION INTRAMUSCULAR; INTRAVENOUS
Status: DISCONTINUED | OUTPATIENT
Start: 2020-08-28 | End: 2020-08-28 | Stop reason: HOSPADM

## 2020-08-28 RX ORDER — SODIUM CHLORIDE 0.9 % (FLUSH) 0.9 %
10 SYRINGE (ML) INJECTION AS NEEDED
Status: DISCONTINUED | OUTPATIENT
Start: 2020-08-28 | End: 2020-08-28 | Stop reason: HOSPADM

## 2020-08-28 RX ORDER — HYDROCODONE BITARTRATE AND ACETAMINOPHEN 7.5; 325 MG/1; MG/1
1 TABLET ORAL EVERY 6 HOURS PRN
Qty: 28 TABLET | Refills: 0 | Status: SHIPPED | OUTPATIENT
Start: 2020-08-28 | End: 2020-09-16

## 2020-08-28 RX ORDER — FENTANYL CITRATE 50 UG/ML
INJECTION, SOLUTION INTRAMUSCULAR; INTRAVENOUS AS NEEDED
Status: DISCONTINUED | OUTPATIENT
Start: 2020-08-28 | End: 2020-08-28 | Stop reason: SURG

## 2020-08-28 RX ORDER — ONDANSETRON 2 MG/ML
4 INJECTION INTRAMUSCULAR; INTRAVENOUS ONCE AS NEEDED
Status: DISCONTINUED | OUTPATIENT
Start: 2020-08-28 | End: 2020-08-28 | Stop reason: HOSPADM

## 2020-08-28 RX ORDER — SODIUM CHLORIDE 0.9 % (FLUSH) 0.9 %
10 SYRINGE (ML) INJECTION EVERY 12 HOURS SCHEDULED
Status: DISCONTINUED | OUTPATIENT
Start: 2020-08-28 | End: 2020-08-28 | Stop reason: HOSPADM

## 2020-08-28 RX ADMIN — FENTANYL CITRATE 50 MCG: 50 INJECTION, SOLUTION INTRAMUSCULAR; INTRAVENOUS at 08:08

## 2020-08-28 RX ADMIN — SODIUM CHLORIDE, SODIUM LACTATE, POTASSIUM CHLORIDE, AND CALCIUM CHLORIDE 9 ML/HR: 600; 310; 30; 20 INJECTION, SOLUTION INTRAVENOUS at 06:21

## 2020-08-28 RX ADMIN — CEFAZOLIN SODIUM 2 G: 1 INJECTION, POWDER, FOR SOLUTION INTRAMUSCULAR; INTRAVENOUS at 07:43

## 2020-08-28 RX ADMIN — PROPOFOL 50 MCG/KG/MIN: 10 INJECTION, EMULSION INTRAVENOUS at 07:39

## 2020-08-28 RX ADMIN — FENTANYL CITRATE 50 MCG: 50 INJECTION, SOLUTION INTRAMUSCULAR; INTRAVENOUS at 08:19

## 2020-08-28 NOTE — ANESTHESIA POSTPROCEDURE EVALUATION
Patient: Carrie Golden    Procedure Summary     Date:  08/28/20 Room / Location:  [facility] OR 09 / [facility] MAIN OR    Anesthesia Start:  0738 Anesthesia Stop:  0903    Procedures:       CHEILECTOMY (Left )      Metatarsal head resection 5 (Left Foot) Diagnosis:       Deformity of left foot      Hallux rigidus, left foot      (Deformity of left foot [M21.962])      (Hallux rigidus, left foot [M20.22])    Surgeon:  GAB Rees DPM Provider:  Art Hayes MD    Anesthesia Type:  MAC ASA Status:  3          Anesthesia Type: MAC    Vitals  Vitals Value Taken Time   /70 8/28/2020  9:32 AM   Temp 96.9 °F (36.1 °C) 8/28/2020  9:03 AM   Pulse 53 8/28/2020  9:32 AM   Resp 16 8/28/2020  9:32 AM   SpO2 94 % 8/28/2020  9:32 AM   Vitals shown include unvalidated device data.        Post Anesthesia Care and Evaluation    Patient location during evaluation: PACU  Patient participation: complete - patient participated  Level of consciousness: awake  Pain scale: See nurse's notes for pain score.  Pain management: adequate  Airway patency: patent  Anesthetic complications: No anesthetic complications  PONV Status: none  Cardiovascular status: acceptable  Respiratory status: acceptable  Hydration status: acceptable    Comments: Patient seen and examined postoperatively; vital signs stable; SpO2 greater than or equal to 90%; cardiopulmonary status stable; nausea/vomiting adequately controlled; pain adequately controlled; no apparent anesthesia complications; patient discharged from anesthesia care when discharge criteria were met

## 2020-08-28 NOTE — ANESTHESIA PREPROCEDURE EVALUATION
Anesthesia Evaluation     Patient summary reviewed and Nursing notes reviewed   history of anesthetic complications: PONV  NPO Solid Status: > 8 hours  NPO Liquid Status: > 8 hours           Airway   Mallampati: II  TM distance: >3 FB  Neck ROM: full  No difficulty expected  Dental - normal exam     Pulmonary    Cardiovascular     (+) hypertension, past MI , CAD, angina, hyperlipidemia,       Neuro/Psych  (+) numbness,     GI/Hepatic/Renal/Endo    (+)  GERD,      Musculoskeletal     Abdominal     Bowel sounds: normal.   Substance History      OB/GYN          Other   arthritis,                      Anesthesia Plan    ASA 3     MAC     intravenous induction     Anesthetic plan, all risks, benefits, and alternatives have been provided, discussed and informed consent has been obtained with: patient.

## 2020-09-08 RX ORDER — ISOSORBIDE MONONITRATE 60 MG/1
TABLET, EXTENDED RELEASE ORAL
Qty: 90 TABLET | Refills: 0 | Status: SHIPPED | OUTPATIENT
Start: 2020-09-08 | End: 2020-12-10

## 2020-09-16 ENCOUNTER — OFFICE VISIT (OUTPATIENT)
Dept: PODIATRY | Facility: CLINIC | Age: 85
End: 2020-09-16

## 2020-09-16 VITALS
HEIGHT: 63 IN | HEART RATE: 51 BPM | SYSTOLIC BLOOD PRESSURE: 155 MMHG | BODY MASS INDEX: 22.5 KG/M2 | DIASTOLIC BLOOD PRESSURE: 70 MMHG | WEIGHT: 127 LBS

## 2020-09-16 DIAGNOSIS — M21.962 DEFORMITY OF LEFT FOOT: Primary | ICD-10-CM

## 2020-09-16 PROCEDURE — 99024 POSTOP FOLLOW-UP VISIT: CPT | Performed by: PODIATRIST

## 2020-09-17 NOTE — PROGRESS NOTES
09/16/2020  Foot and Ankle Surgery - Established Patient/Follow-up  Provider: Dr. Owen Rees DPM  Location: [facility]    Subjective:  Carrie Golden is a 89 y.o. female.     Chief Complaint   Patient presents with   • Left Foot - Follow-up, Post-op       HPI: Patient returns 2 weeks after left foot surgery.  She states that she is doing well with only mild discomfort.  She has been ambulating in the postop shoe.  She is anxious to return to baseline activity.  No additional issues today    Allergies   Allergen Reactions   • Codeine Nausea And Vomiting and Dizziness   • Hydrocodone Nausea And Vomiting and Dizziness   • Lortab [Hydrocodone-Acetaminophen] Nausea And Vomiting   • Nitrofurantoin Other (See Comments)   • Sulfa Antibiotics Rash       Current Outpatient Medications on File Prior to Visit   Medication Sig Dispense Refill   • acetaminophen (TYLENOL) 500 MG tablet Take 500 mg by mouth Every 6 (Six) Hours As Needed for mild pain (1-3).     • amLODIPine (NORVASC) 2.5 MG tablet Take 1 tablet by mouth once daily (Patient taking differently: Take 2.5 mg by mouth Every Morning. Take DOS) 90 tablet 0   • aspirin 81 MG EC tablet Take 81 mg by mouth Daily. LD 8/19     • carvedilol (COREG) 3.125 MG tablet Take 1 tablet by mouth twice daily 180 tablet 0   • isosorbide mononitrate (IMDUR) 60 MG 24 hr tablet Take 1 tablet by mouth once daily 90 tablet 0   • lactulose (CHRONULAC) 10 GM/15ML solution Take 10 g by mouth Every Night.     • nitroglycerin (NITROSTAT) 0.4 MG SL tablet 1 under the tongue as needed for angina, may repeat q5mins for up three doses (Patient taking differently: Place  under the tongue Every 5 (Five) Minutes As Needed. 1 under the tongue as needed for angina, may repeat q5mins for up three doses) 100 tablet 11   • pantoprazole (PROTONIX) 40 MG EC tablet Take 40 mg by mouth Every Morning.     • simvastatin (ZOCOR) 40 MG tablet Take 1 tablet by mouth once daily (Patient taking  "differently: Take 20 mg by mouth Every Night.) 90 tablet 2     No current facility-administered medications on file prior to visit.        Objective   /70   Pulse 51   Ht 160 cm (63\")   Wt 57.6 kg (127 lb)   BMI 22.50 kg/m²     Podiatry Exam       General Appearance:  well nourished; well hydrated; no acute distress  Mental Status Exam        Judgement and Insight:  Intact       Orientation:  Oriented to time, place, and person       Memory:  Intact for recent and remote events  Cardiovascular (Left)       Dorsalis Pedis Pulse (Lt):   2/4       Posterior Tibialis Pulse (Lt):   2/4       Capillary Filling Time (Lt):  1-3 Seconds       Edema (Lt):  No Edema       Varicosities (Lt):   positive       Telangiectasias (Lt):  positive  Dermatological Exam       Temperature:  warm to warm       Hair Growth:  sparse to absent       Skin Elasticity:  normal skin elasticity  Skin: Grossly intact without any open wounds or signs of infection.  Moderate fat pad atrophy  Neurological Exam (Left)       Paresthesia (Lt):  negative       Patella DTRs (Lt):  symmetric       Achilles DTRs (Lt):  symmetric       Tinel over Tarsal Tunnel(Lt):  negative       Intermedial Dorsal Cutaneous Nerve (Lt):  negative  Monofilament Test (Lt):   intact        MusculoSkeletal Exam (Left)       Gait and Stance (Lt): Postop shoe assisted       ROM (Lt): Improved range of motion to the first metatarsal phalangeal joint.       Muscle Strength (Lt):  symmetrical 5/5       Subluxed Digits (Lt):  Rectus alignment of the hallux       Inflammed Joints (Lt):  no inflammation of joints       Post tibial tendon (Lt):  no soreness noted       Peroneal Tendon (Lt):  no soreness noted  Additional Musculoskelatal Findings  Less discomfort with palpation to the plantar aspect of the forefoot.  No progressive deformity or instability.    Assessment/Plan   Carrie was seen today for follow-up and post-op.    Diagnoses and all orders for this " visit:    Deformity of left foot      Patient appears to be doing quite well at this time.  She does have improved range of motion to the first metatarsal phalangeal joint and no excessive plantar pressure to the fifth metatarsal head region.  Sutures were removed today without complication.  I have asked that she start range of motion exercises and gradually increase activity as tolerated.  I would like her to avoid excessive walking until completely comfortable.  We did discuss appropriate shoes and inserts.  I have asked that she follow-up with me in 4 weeks for reevaluation and imaging.    No orders of the defined types were placed in this encounter.         Note is dictated utilizing voice recognition software. Unfortunately this leads to occasional typographical errors. I apologize in advance if the situation occurs. If questions occur please do not hesitate to call our office.

## 2020-11-12 ENCOUNTER — OFFICE VISIT (OUTPATIENT)
Dept: CARDIOLOGY | Facility: CLINIC | Age: 85
End: 2020-11-12

## 2020-11-12 VITALS
DIASTOLIC BLOOD PRESSURE: 66 MMHG | OXYGEN SATURATION: 98 % | WEIGHT: 127 LBS | HEART RATE: 53 BPM | BODY MASS INDEX: 22.5 KG/M2 | SYSTOLIC BLOOD PRESSURE: 142 MMHG | HEIGHT: 63 IN

## 2020-11-12 DIAGNOSIS — E78.5 DYSLIPIDEMIA: ICD-10-CM

## 2020-11-12 DIAGNOSIS — I20.0 UNSTABLE ANGINA PECTORIS (HCC): ICD-10-CM

## 2020-11-12 DIAGNOSIS — I25.110 CORONARY ARTERY DISEASE INVOLVING NATIVE CORONARY ARTERY OF NATIVE HEART WITH UNSTABLE ANGINA PECTORIS (HCC): ICD-10-CM

## 2020-11-12 DIAGNOSIS — I21.4 NSTEMI, INITIAL EPISODE OF CARE (HCC): ICD-10-CM

## 2020-11-12 DIAGNOSIS — I10 ESSENTIAL HYPERTENSION: Primary | ICD-10-CM

## 2020-11-12 PROCEDURE — 99213 OFFICE O/P EST LOW 20 MIN: CPT | Performed by: INTERNAL MEDICINE

## 2020-11-12 PROCEDURE — 93000 ELECTROCARDIOGRAM COMPLETE: CPT | Performed by: INTERNAL MEDICINE

## 2020-11-12 NOTE — PROGRESS NOTES
Cardiology Office Visit Note      Referring physician:  Monster Myrick MD    Reason For Followup: 6 month    HPI:  Carrie Golden is a 89 y.o. female.  Presents to [facility] for a follow up visit. Patient has hx of chest pain and has underwent cardiac catheterization 12/2019 which revealed 10 to 20% in the left main, 30% in the proximal LAD, first diagonal 60 to 70% stenosis which appeared relatively unchanged from last cardiac catheterization in 2017, left circumflex diffuse 25 to 30% stenosis RCA 20% proximal stenosis.    It was thought best to proceed with medical therapy since this was not a significant change from cardiac catheterization in 2017.    Patient walks 5 miles a day and has been known to walk 10 miles per day at times.     Patient denies any chest pain or dyspnea, she denies PND, orthopnea, palpitations, near syncope, lower extremity edema or feelings of her heart racing.  She reports she has been compliant with medical therapy.            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 sure which 1  contracted at age 8   • History of transfusion    • [place] (hard of hearing)    • Hyperlipidemia    • Hypertension    • Past heart attack     X2 MILD LAST ONE OCT 2019   • PONV (postoperative nausea and vomiting)        Past Surgical History:   Procedure Laterality Date   • ANTERIOR AND POSTERIOR VAGINAL REPAIR     • BACK SURGERY     • BLEPHAROPLASTY Bilateral 5/23/2019    Procedure: bilateral upper lid blepharoplasty;  Surgeon: Mauricio Pennington MD;  Location: [facility] [place] [facility] [place];  Service: Ophthalmology   • BROW LIFT Bilateral 2/13/2020    Procedure: BILATERAL TEMPORAL DIRECT BROWLIFT;  Surgeon: Sreekanth Bird MD;  Location: [facility];  Service: Ophthalmology;  Laterality: Bilateral;   • CARDIAC  CATHETERIZATION N/A 12/3/2019    Procedure: Left Heart Cath;  Surgeon: Puma Briseno MD;  Location: [facility] CATH INVASIVE LOCATION;  Service: Cardiovascular   • CARDIAC CATHETERIZATION N/A 12/3/2019    Procedure: Coronary angiography;  Surgeon: Puma Briseno MD;  Location: [facility] CATH INVASIVE LOCATION;  Service: Cardiovascular   • CARDIAC CATHETERIZATION N/A 12/3/2019    Procedure: Left ventriculography;  Surgeon: Puma Briseno MD;  Location: [facility] CATH INVASIVE LOCATION;  Service: Cardiovascular   • CATARACT EXTRACTION EXTRACAPSULAR W/ INTRAOCULAR LENS IMPLANTATION Bilateral    • CERVICAL SPINE ANTERIOR      with instrumentation   • CHEILECTOMY Left 8/28/2020    Procedure: CHEILECTOMY;  Surgeon: GAB Rees DPM;  Location: [facility] MAIN OR;  Service: Podiatry;  Laterality: Left;   • COLON SURGERY      for obstruction   • ALBERTO TUBE INSERTION Bilateral 5/23/2019    Procedure: ALBERTO TUBE;  Surgeon: Mauricio Pennington MD;  [place] [facility] [place] [facility] [place];  Service: Ophthalmology   • ECTROPION REPAIR Bilateral 5/23/2019    Procedure: bilateral lower lid ectropion repair, bilateral punctoplasty;  Surgeon: Mauricio Pennington MD;  Location: [facility] [place] [facility] [place];  Service: Ophthalmology   • EYE SURGERY     • FOOT FUSION Left 12/30/2016    Procedure: LEFT HALLUX METATARSALPHALANGEAL ARTHRODESIS SILVER BUNIONECTOMY METATARSAL HEAD RESECTION 2-5 CALCANEAL BONE GRAFT;  Surgeon: Monster Harper Jr., MD;  Location: [facility] MAIN OR;  Service:    • HYSTERECTOMY     • INGUINAL HERNIA REPAIR Bilateral    • METATARSAL OSTEOTOMY Left 8/28/2020    Procedure: Metatarsal head resection 5;  Surgeon: GAB Rees DPM;  Location: [facility] MAIN OR;  Service: Podiatry;  Laterality: Left;   • ROTATOR CUFF REPAIR Right    • STOMACH SURGERY      for ulcer         Current Outpatient Medications:   •  acetaminophen (TYLENOL) 500 MG tablet, Take 500 mg by mouth Every 6 (Six) Hours As Needed for mild pain (1-3)., Disp: , Rfl:   •  amLODIPine (NORVASC)  2.5 MG tablet, Take 1 tablet by mouth once daily (Patient taking differently: Take 2.5 mg by mouth Every Morning. Take DOS), Disp: 90 tablet, Rfl: 0  •  aspirin 81 MG EC tablet, Take 81 mg by mouth Daily. LD , Disp: , Rfl:   •  carvedilol (COREG) 3.125 MG tablet, Take 1 tablet by mouth twice daily, Disp: 180 tablet, Rfl: 0  •  isosorbide mononitrate (IMDUR) 60 MG 24 hr tablet, Take 1 tablet by mouth once daily, Disp: 90 tablet, Rfl: 0  •  lactulose (CHRONULAC) 10 GM/15ML solution, Take 10 g by mouth Every Night., Disp: , Rfl:   •  nitroglycerin (NITROSTAT) 0.4 MG SL tablet, 1 under the tongue as needed for angina, may repeat q5mins for up three doses (Patient taking differently: Place  under the tongue Every 5 (Five) Minutes As Needed. 1 under the tongue as needed for angina, may repeat q5mins for up three doses), Disp: 100 tablet, Rfl: 11  •  pantoprazole (PROTONIX) 40 MG EC tablet, Take 40 mg by mouth Every Morning., Disp: , Rfl:   •  simvastatin (ZOCOR) 40 MG tablet, Take 1 tablet by mouth once daily (Patient taking differently: Take 20 mg by mouth Every Night.), Disp: 90 tablet, Rfl: 2    Social History     Socioeconomic History   • Marital status:      Spouse name: Not on file   • Number of children: Not on file   • Years of education: Not on file   • Highest education level: Not on file   Tobacco Use   • Smoking status: Former Smoker     Packs/day: 0.25     Years: 10.00     Pack years: 2.50     Types: Cigarettes     Quit date:      Years since quittin.8   • Smokeless tobacco: Never Used   Substance and Sexual Activity   • Alcohol use: No   • Drug use: No   • Sexual activity: Defer       Family History   Problem Relation Age of Onset   • [place] Hyperthermia Neg Hx          Review of Systems   General: denies fever, chills, anorexia, weight loss  Eyes: denies blurring, diplopia  Ear/Nose/Throat: denies ear pain, nosebleeds, hoarseness  Cardiovascular: See HPI  Respiratory: denies excessive  "sputum, hemoptysis, wheezing  Gastrointestinal: denies nausea, vomiting, change in bowel habits, abdominal pain  Genitourinary: No hematuria or dysuria reported  Musculoskeletal: Only minor generalized arthralgias, he remains remarkably functionally active  Skin: denies rashes, itching, suspicious lesions  Neurologic: denies focal neuro deficits  Psychiatric: denies depression, anxiety  Endocrine: denies cold intolerance, heat intolerance  Hematologic/Lymphatic: denies abnormal bruising, bleeding  Allergic/Immunologic: denies urticaria or persistent infections      Objective     Visit Vitals  /66   Pulse 53   Ht 160 cm (62.99\")   Wt 57.6 kg (127 lb)   SpO2 98%   BMI 22.50 kg/m²           Physical Exam  General:     Obese, well developed,, in no acute distress.    Head:     normocephalic and atraumatic.    Eyes:    PERRL/EOM intact, conjunctiva and sclera clear with out nystagmus.    Neck:    no jvd or bruits  Chest Wall:    no deformities aside from mild to moderate thoracic kyphosis  Lungs:    clear bilaterally to auscultation with adequate global airflow   Heart:    non-displaced PMI; regular rate and rhythm, normal S1, S2 without murmurs, rubs, or gallops  Abdomen:  Soft, nontender without HSM  Msk:    no deformity; adequate R OM with modest DJD changes for age  Pulses:    pulses normal in all 4 extremities.    Extremities:    no clubbing, cyanosis, edema   Neurologic:    no focal sensory or motor deficits  Skin:    intact without lesions or rashes.    Psych:    alert and cooperative; normal mood and affect; normal attention span and concentration.            ECG 12 Lead    Date/Time: 11/12/2020 9:32 AM  Performed by: ROBERTO CARLOS Morales MD  Authorized by: ROBERTO CARLOS Morales MD   Comparison: compared with previous ECG from 8/26/2020  Similar to previous ECG  Rhythm: sinus rhythm    Clinical impression: normal ECG  Comments: No change in previous tracing              Assessment:   Problems Addressed this Visit  "       Cardiovascular and Mediastinum    Essential hypertension - Primary  -Remains well-regulated on current medication listed in detail      NSTEMI, initial episode of care (CMS/[facility])  -Remains hemodynamically stable and angina free with only borderline CAD by last heart catheterization.  Advised to continue with medical therapy      Coronary artery disease involving native heart with stable angina pectoris (CMS/[facility])  -Remains hemodynamically stable well compensated and angina free at this time with very good functional activity tolerance       Nervous and Auditory    Chest pain-now chest pain-free       Other    Dyslipidemia-maintained on statin therapy; followed by PCP      Diagnoses       Codes Comments    Essential hypertension    -  Primary ICD-10-CM: I10  ICD-9-CM: 401.9     NSTEMI, initial episode of care (CMS/[facility])     ICD-10-CM: I21.4  ICD-9-CM: 410.71     Coronary artery disease involving native coronary artery     ICD-10-CM: I25.110  ICD-9-CM: 414.01, 411.1           Dyslipidemia     ICD-10-CM: E78.5  ICD-9-CM: 272.4             Plan:  Continue with current medication as listed and reviewed in detail today.  Cautioned against risk for falls.  Return to clinic annually or sooner if needed.      Stacey Barragan MA  11/12/2020 11:37 EST    This report was generated using the Dragon voice recognition system.

## 2020-11-19 RX ORDER — AMLODIPINE BESYLATE 2.5 MG/1
TABLET ORAL
Qty: 90 TABLET | Refills: 1 | Status: SHIPPED | OUTPATIENT
Start: 2020-11-19 | End: 2021-02-16

## 2020-11-24 DIAGNOSIS — I10 ESSENTIAL HYPERTENSION: Primary | ICD-10-CM

## 2020-11-25 RX ORDER — CARVEDILOL 3.12 MG/1
TABLET ORAL
Qty: 180 TABLET | Refills: 0 | Status: SHIPPED | OUTPATIENT
Start: 2020-11-25 | End: 2021-02-25

## 2020-12-10 RX ORDER — ISOSORBIDE MONONITRATE 60 MG/1
TABLET, EXTENDED RELEASE ORAL
Qty: 90 TABLET | Refills: 0 | Status: SHIPPED | OUTPATIENT
Start: 2020-12-10 | End: 2021-03-12

## 2020-12-14 ENCOUNTER — LAB (OUTPATIENT)
Dept: LAB | Facility: HOSPITAL | Age: 85
End: 2020-12-14

## 2020-12-14 ENCOUNTER — TELEPHONE (OUTPATIENT)
Dept: CARDIOLOGY | Facility: CLINIC | Age: 85
End: 2020-12-14

## 2020-12-14 DIAGNOSIS — I10 ESSENTIAL HYPERTENSION: ICD-10-CM

## 2020-12-14 LAB
ALBUMIN SERPL-MCNC: 4.1 G/DL (ref 3.5–5.2)
ALBUMIN/GLOB SERPL: 1.7 G/DL
ALP SERPL-CCNC: 108 U/L (ref 39–117)
ALT SERPL W P-5'-P-CCNC: 12 U/L (ref 1–33)
ANION GAP SERPL CALCULATED.3IONS-SCNC: 9.2 MMOL/L (ref 5–15)
AST SERPL-CCNC: 19 U/L (ref 1–32)
BILIRUB SERPL-MCNC: 0.6 MG/DL (ref 0–1.2)
BUN SERPL-MCNC: 16 MG/DL (ref 8–23)
BUN/CREAT SERPL: 21.6 (ref 7–25)
CALCIUM SPEC-SCNC: 8.9 MG/DL (ref 8.2–9.6)
CHLORIDE SERPL-SCNC: 107 MMOL/L (ref 98–107)
CHOLEST SERPL-MCNC: 126 MG/DL (ref 0–200)
CO2 SERPL-SCNC: 26.8 MMOL/L (ref 22–29)
CREAT SERPL-MCNC: 0.74 MG/DL (ref 0.57–1)
GFR SERPL CREATININE-BSD FRML MDRD: 73 ML/MIN/1.73
GLOBULIN UR ELPH-MCNC: 2.4 GM/DL
GLUCOSE SERPL-MCNC: 107 MG/DL (ref 65–99)
HDLC SERPL-MCNC: 59 MG/DL (ref 40–60)
LDLC SERPL CALC-MCNC: 52 MG/DL (ref 0–100)
LDLC/HDLC SERPL: 0.88 {RATIO}
POTASSIUM SERPL-SCNC: 4.2 MMOL/L (ref 3.5–5.2)
PROT SERPL-MCNC: 6.5 G/DL (ref 6–8.5)
SODIUM SERPL-SCNC: 143 MMOL/L (ref 136–145)
TRIGL SERPL-MCNC: 74 MG/DL (ref 0–150)
VLDLC SERPL-MCNC: 15 MG/DL (ref 5–40)

## 2020-12-14 PROCEDURE — 80053 COMPREHEN METABOLIC PANEL: CPT

## 2020-12-14 PROCEDURE — 36415 COLL VENOUS BLD VENIPUNCTURE: CPT

## 2020-12-14 PROCEDURE — 80061 LIPID PANEL: CPT

## 2021-01-25 RX ORDER — SIMVASTATIN 40 MG
20 TABLET ORAL NIGHTLY
Qty: 90 TABLET | Refills: 3 | Status: SHIPPED | OUTPATIENT
Start: 2021-01-25 | End: 2022-04-20 | Stop reason: SDUPTHER

## 2021-02-16 RX ORDER — AMLODIPINE BESYLATE 2.5 MG/1
TABLET ORAL
Qty: 90 TABLET | Refills: 0 | Status: SHIPPED | OUTPATIENT
Start: 2021-02-16 | End: 2021-04-28 | Stop reason: HOSPADM

## 2021-02-25 RX ORDER — CARVEDILOL 3.12 MG/1
TABLET ORAL
Qty: 180 TABLET | Refills: 1 | OUTPATIENT
Start: 2021-02-25 | End: 2021-04-28 | Stop reason: HOSPADM

## 2021-03-12 RX ORDER — ISOSORBIDE MONONITRATE 60 MG/1
TABLET, EXTENDED RELEASE ORAL
Qty: 90 TABLET | Refills: 1 | Status: SHIPPED | OUTPATIENT
Start: 2021-03-12 | End: 2021-09-13

## 2021-04-26 ENCOUNTER — HOSPITAL ENCOUNTER (OUTPATIENT)
Facility: HOSPITAL | Age: 86
Setting detail: OBSERVATION
Discharge: HOME | End: 2021-04-28
Attending: EMERGENCY MEDICINE | Admitting: FAMILY MEDICINE

## 2021-04-26 ENCOUNTER — TELEPHONE (OUTPATIENT)
Dept: CARDIOLOGY | Facility: CLINIC | Age: 86
End: 2021-04-26

## 2021-04-26 ENCOUNTER — APPOINTMENT (OUTPATIENT)
Dept: CARDIOLOGY | Facility: HOSPITAL | Age: 86
End: 2021-04-26

## 2021-04-26 ENCOUNTER — APPOINTMENT (OUTPATIENT)
Dept: GENERAL RADIOLOGY | Facility: HOSPITAL | Age: 86
End: 2021-04-26

## 2021-04-26 ENCOUNTER — APPOINTMENT (OUTPATIENT)
Dept: RESPIRATORY THERAPY | Facility: HOSPITAL | Age: 86
End: 2021-04-26

## 2021-04-26 DIAGNOSIS — R55 NEAR SYNCOPE: ICD-10-CM

## 2021-04-26 DIAGNOSIS — R07.9 CHEST PAIN, UNSPECIFIED TYPE: Primary | ICD-10-CM

## 2021-04-26 LAB
ALBUMIN SERPL-MCNC: 4 G/DL (ref 3.5–5.2)
ALBUMIN/GLOB SERPL: 1.7 G/DL
ALP SERPL-CCNC: 111 U/L (ref 39–117)
ALT SERPL W P-5'-P-CCNC: 10 U/L (ref 1–33)
ANION GAP SERPL CALCULATED.3IONS-SCNC: 10 MMOL/L (ref 5–15)
AST SERPL-CCNC: 15 U/L (ref 1–32)
BASOPHILS # BLD AUTO: 0 10*3/MM3 (ref 0–0.2)
BASOPHILS NFR BLD AUTO: 0.5 % (ref 0–1.5)
BH CV ECHO MEAS - ACS: 1.6 CM
BH CV ECHO MEAS - AO MAX PG (FULL): 1.8 MMHG
BH CV ECHO MEAS - AO MAX PG: 5.1 MMHG
BH CV ECHO MEAS - AO MEAN PG (FULL): 0.63 MMHG
BH CV ECHO MEAS - AO MEAN PG: 2.4 MMHG
BH CV ECHO MEAS - AO ROOT AREA (BSA CORRECTED): 2
BH CV ECHO MEAS - AO ROOT AREA: 8.3 CM^2
BH CV ECHO MEAS - AO ROOT DIAM: 3.3 CM
BH CV ECHO MEAS - AO V2 MAX: 113 CM/SEC
BH CV ECHO MEAS - AO V2 MEAN: 71.5 CM/SEC
BH CV ECHO MEAS - AO V2 VTI: 25.8 CM
BH CV ECHO MEAS - AORTIC HR: 57.5 BPM
BH CV ECHO MEAS - AORTIC R-R: 1 SEC
BH CV ECHO MEAS - ASC AORTA: 2.4 CM
BH CV ECHO MEAS - AVA(I,A): 2.6 CM^2
BH CV ECHO MEAS - AVA(I,D): 2.6 CM^2
BH CV ECHO MEAS - AVA(V,A): 2.2 CM^2
BH CV ECHO MEAS - AVA(V,D): 2.2 CM^2
BH CV ECHO MEAS - BSA(HAYCOCK): 1.6 M^2
BH CV ECHO MEAS - BSA: 1.6 M^2
BH CV ECHO MEAS - BZI_BMI: 22.3 KILOGRAMS/M^2
BH CV ECHO MEAS - BZI_METRIC_HEIGHT: 160 CM
BH CV ECHO MEAS - BZI_METRIC_WEIGHT: 57.2 KG
BH CV ECHO MEAS - CI(AO): 7.8 L/MIN/M^2
BH CV ECHO MEAS - CI(CUBED): 5.8 L/MIN/M^2
BH CV ECHO MEAS - CI(LVOT): 2.4 L/MIN/M^2
BH CV ECHO MEAS - CI(MOD-SP4): 3.1 L/MIN/M^2
BH CV ECHO MEAS - CI(TEICH): 4.5 L/MIN/M^2
BH CV ECHO MEAS - CO(AO): 12.4 L/MIN
BH CV ECHO MEAS - CO(CUBED): 9.2 L/MIN
BH CV ECHO MEAS - CO(LVOT): 3.8 L/MIN
BH CV ECHO MEAS - CO(MOD-SP4): 4.8 L/MIN
BH CV ECHO MEAS - CO(TEICH): 7.2 L/MIN
BH CV ECHO MEAS - EDV(CUBED): 122 ML
BH CV ECHO MEAS - EDV(MOD-SP4): 61.3 ML
BH CV ECHO MEAS - EDV(TEICH): 116 ML
BH CV ECHO MEAS - EF(CUBED): 38.8 %
BH CV ECHO MEAS - EF(MOD-SP4): 40.9 %
BH CV ECHO MEAS - EF(TEICH): 31.9 %
BH CV ECHO MEAS - ESV(CUBED): 74.7 ML
BH CV ECHO MEAS - ESV(MOD-SP4): 36.2 ML
BH CV ECHO MEAS - ESV(TEICH): 79 ML
BH CV ECHO MEAS - FS: 15.1 %
BH CV ECHO MEAS - IVS/LVPW: 0.72
BH CV ECHO MEAS - IVSD: 0.66 CM
BH CV ECHO MEAS - LA DIMENSION(2D): 3.5 CM
BH CV ECHO MEAS - LV DIASTOLIC VOL/BSA (35-75): 38.6 ML/M^2
BH CV ECHO MEAS - LV MASS(C)D: 131.9 GRAMS
BH CV ECHO MEAS - LV MASS(C)DI: 83 GRAMS/M^2
BH CV ECHO MEAS - LV MAX PG: 3.3 MMHG
BH CV ECHO MEAS - LV MEAN PG: 1.8 MMHG
BH CV ECHO MEAS - LV SYSTOLIC VOL/BSA (12-30): 22.8 ML/M^2
BH CV ECHO MEAS - LV V1 MAX: 91.2 CM/SEC
BH CV ECHO MEAS - LV V1 MEAN: 61.6 CM/SEC
BH CV ECHO MEAS - LV V1 VTI: 24.4 CM
BH CV ECHO MEAS - LVIDD: 5 CM
BH CV ECHO MEAS - LVIDS: 4.2 CM
BH CV ECHO MEAS - LVOT AREA: 2.7 CM^2
BH CV ECHO MEAS - LVOT DIAM: 1.9 CM
BH CV ECHO MEAS - LVPWD: 0.92 CM
BH CV ECHO MEAS - MM HR: 193.4 BPM
BH CV ECHO MEAS - MM R-R INT: 0.31 SEC
BH CV ECHO MEAS - MV A MAX VEL: 62.3 CM/SEC
BH CV ECHO MEAS - MV DEC SLOPE: 451.3 CM/SEC^2
BH CV ECHO MEAS - MV DEC TIME: 0.18 SEC
BH CV ECHO MEAS - MV E MAX VEL: 81.7 CM/SEC
BH CV ECHO MEAS - MV E/A: 1.3
BH CV ECHO MEAS - MV MAX PG: 4 MMHG
BH CV ECHO MEAS - MV MEAN PG: 1.3 MMHG
BH CV ECHO MEAS - MV V2 MAX: 100.2 CM/SEC
BH CV ECHO MEAS - MV V2 MEAN: 53.6 CM/SEC
BH CV ECHO MEAS - MV V2 VTI: 34.9 CM
BH CV ECHO MEAS - MVA(VTI): 1.9 CM^2
BH CV ECHO MEAS - PA ACC TIME: 0.07 SEC
BH CV ECHO MEAS - PA MAX PG (FULL): 1.8 MMHG
BH CV ECHO MEAS - PA MAX PG: 4.3 MMHG
BH CV ECHO MEAS - PA MEAN PG (FULL): 0.45 MMHG
BH CV ECHO MEAS - PA MEAN PG: 1.9 MMHG
BH CV ECHO MEAS - PA PR(ACCEL): 48.2 MMHG
BH CV ECHO MEAS - PA V2 MAX: 104 CM/SEC
BH CV ECHO MEAS - PA V2 MEAN: 63.8 CM/SEC
BH CV ECHO MEAS - PA V2 VTI: 23.1 CM
BH CV ECHO MEAS - PULM A REVS DUR: 0.13 SEC
BH CV ECHO MEAS - PULM A REVS VEL: 27.3 CM/SEC
BH CV ECHO MEAS - PULM DIAS VEL: 36.6 CM/SEC
BH CV ECHO MEAS - PULM S/D: 1.6
BH CV ECHO MEAS - PULM SYS VEL: 57.5 CM/SEC
BH CV ECHO MEAS - PVA(I,A): 7.4 CM^2
BH CV ECHO MEAS - PVA(I,D): 7.4 CM^2
BH CV ECHO MEAS - PVA(V,A): 5.9 CM^2
BH CV ECHO MEAS - PVA(V,D): 5.9 CM^2
BH CV ECHO MEAS - QP/QS: 2.6
BH CV ECHO MEAS - RAP SYSTOLE: 8 MMHG
BH CV ECHO MEAS - RV MAX PG: 2.5 MMHG
BH CV ECHO MEAS - RV MEAN PG: 1.4 MMHG
BH CV ECHO MEAS - RV V1 MAX: 79.3 CM/SEC
BH CV ECHO MEAS - RV V1 MEAN: 56.4 CM/SEC
BH CV ECHO MEAS - RV V1 VTI: 22.1 CM
BH CV ECHO MEAS - RVDD: 1.7 CM
BH CV ECHO MEAS - RVOT AREA: 7.7 CM^2
BH CV ECHO MEAS - RVOT DIAM: 3.1 CM
BH CV ECHO MEAS - RVSP: 42.2 MMHG
BH CV ECHO MEAS - SI(AO): 135.3 ML/M^2
BH CV ECHO MEAS - SI(CUBED): 29.8 ML/M^2
BH CV ECHO MEAS - SI(LVOT): 41.6 ML/M^2
BH CV ECHO MEAS - SI(MOD-SP4): 15.8 ML/M^2
BH CV ECHO MEAS - SI(TEICH): 23.3 ML/M^2
BH CV ECHO MEAS - SV(AO): 214.9 ML
BH CV ECHO MEAS - SV(CUBED): 47.3 ML
BH CV ECHO MEAS - SV(LVOT): 66.1 ML
BH CV ECHO MEAS - SV(MOD-SP4): 25.1 ML
BH CV ECHO MEAS - SV(RVOT): 170.2 ML
BH CV ECHO MEAS - SV(TEICH): 37 ML
BH CV ECHO MEAS - TR MAX VEL: 292.5 CM/SEC
BILIRUB SERPL-MCNC: 0.6 MG/DL (ref 0–1.2)
BILIRUB UR QL STRIP: NEGATIVE
BUN SERPL-MCNC: 18 MG/DL (ref 8–23)
BUN/CREAT SERPL: 25.4 (ref 7–25)
CALCIUM SPEC-SCNC: 8.8 MG/DL (ref 8.2–9.6)
CHLORIDE SERPL-SCNC: 108 MMOL/L (ref 98–107)
CHOLEST SERPL-MCNC: 113 MG/DL (ref 0–200)
CLARITY UR: CLEAR
CO2 SERPL-SCNC: 24 MMOL/L (ref 22–29)
COLOR UR: YELLOW
CREAT SERPL-MCNC: 0.71 MG/DL (ref 0.57–1)
D DIMER PPP FEU-MCNC: 2.66 MG/L (FEU) (ref 0–0.59)
DEPRECATED RDW RBC AUTO: 42.9 FL (ref 37–54)
EOSINOPHIL # BLD AUTO: 0.2 10*3/MM3 (ref 0–0.4)
EOSINOPHIL NFR BLD AUTO: 2.8 % (ref 0.3–6.2)
ERYTHROCYTE [DISTWIDTH] IN BLOOD BY AUTOMATED COUNT: 14.1 % (ref 12.3–15.4)
GFR SERPL CREATININE-BSD FRML MDRD: 76 ML/MIN/1.73
GLOBULIN UR ELPH-MCNC: 2.3 GM/DL
GLUCOSE SERPL-MCNC: 96 MG/DL (ref 65–99)
GLUCOSE UR STRIP-MCNC: NEGATIVE MG/DL
HCT VFR BLD AUTO: 35.5 % (ref 34–46.6)
HDLC SERPL-MCNC: 48 MG/DL (ref 40–60)
HGB BLD-MCNC: 11.9 G/DL (ref 12–15.9)
HGB UR QL STRIP.AUTO: NEGATIVE
HOLD SPECIMEN: NORMAL
KETONES UR QL STRIP: NEGATIVE
LDLC SERPL CALC-MCNC: 50 MG/DL (ref 0–100)
LDLC/HDLC SERPL: 1.04 {RATIO}
LEUKOCYTE ESTERASE UR QL STRIP.AUTO: NEGATIVE
LYMPHOCYTES # BLD AUTO: 1.7 10*3/MM3 (ref 0.7–3.1)
LYMPHOCYTES NFR BLD AUTO: 26.5 % (ref 19.6–45.3)
MCH RBC QN AUTO: 28.9 PG (ref 26.6–33)
MCHC RBC AUTO-ENTMCNC: 33.4 G/DL (ref 31.5–35.7)
MCV RBC AUTO: 86.7 FL (ref 79–97)
MONOCYTES # BLD AUTO: 0.6 10*3/MM3 (ref 0.1–0.9)
MONOCYTES NFR BLD AUTO: 9 % (ref 5–12)
NEUTROPHILS NFR BLD AUTO: 4 10*3/MM3 (ref 1.7–7)
NEUTROPHILS NFR BLD AUTO: 61.2 % (ref 42.7–76)
NITRITE UR QL STRIP: NEGATIVE
NRBC BLD AUTO-RTO: 0.1 /100 WBC (ref 0–0.2)
PH UR STRIP.AUTO: 6.5 [PH] (ref 5–8)
PLATELET # BLD AUTO: 249 10*3/MM3 (ref 140–450)
PMV BLD AUTO: 7.3 FL (ref 6–12)
POTASSIUM SERPL-SCNC: 4.3 MMOL/L (ref 3.5–5.2)
PROT SERPL-MCNC: 6.3 G/DL (ref 6–8.5)
PROT UR QL STRIP: NEGATIVE
RBC # BLD AUTO: 4.1 10*6/MM3 (ref 3.77–5.28)
SODIUM SERPL-SCNC: 142 MMOL/L (ref 136–145)
SP GR UR STRIP: 1.01 (ref 1–1.03)
TRIGL SERPL-MCNC: 75 MG/DL (ref 0–150)
TROPONIN T SERPL-MCNC: <0.01 NG/ML (ref 0–0.03)
TROPONIN T SERPL-MCNC: <0.01 NG/ML (ref 0–0.03)
TSH SERPL DL<=0.05 MIU/L-ACNC: 0.97 UIU/ML (ref 0.27–4.2)
UROBILINOGEN UR QL STRIP: NORMAL
VLDLC SERPL-MCNC: 15 MG/DL (ref 5–40)
WBC # BLD AUTO: 6.5 10*3/MM3 (ref 3.4–10.8)
WHOLE BLOOD HOLD SPECIMEN: NORMAL

## 2021-04-26 PROCEDURE — 93306 TTE W/DOPPLER COMPLETE: CPT | Performed by: INTERNAL MEDICINE

## 2021-04-26 PROCEDURE — 85025 COMPLETE CBC W/AUTO DIFF WBC: CPT | Performed by: EMERGENCY MEDICINE

## 2021-04-26 PROCEDURE — 96372 THER/PROPH/DIAG INJ SC/IM: CPT

## 2021-04-26 PROCEDURE — 84484 ASSAY OF TROPONIN QUANT: CPT | Performed by: EMERGENCY MEDICINE

## 2021-04-26 PROCEDURE — 93005 ELECTROCARDIOGRAM TRACING: CPT | Performed by: EMERGENCY MEDICINE

## 2021-04-26 PROCEDURE — 96360 HYDRATION IV INFUSION INIT: CPT

## 2021-04-26 PROCEDURE — 25010000002 HEPARIN (PORCINE) PER 1000 UNITS: Performed by: FAMILY MEDICINE

## 2021-04-26 PROCEDURE — 84134 ASSAY OF PREALBUMIN: CPT | Performed by: FAMILY MEDICINE

## 2021-04-26 PROCEDURE — 80053 COMPREHEN METABOLIC PANEL: CPT | Performed by: EMERGENCY MEDICINE

## 2021-04-26 PROCEDURE — 84484 ASSAY OF TROPONIN QUANT: CPT | Performed by: FAMILY MEDICINE

## 2021-04-26 PROCEDURE — G0378 HOSPITAL OBSERVATION PER HR: HCPCS

## 2021-04-26 PROCEDURE — 99283 EMERGENCY DEPT VISIT LOW MDM: CPT

## 2021-04-26 PROCEDURE — 85379 FIBRIN DEGRADATION QUANT: CPT | Performed by: FAMILY MEDICINE

## 2021-04-26 PROCEDURE — 96361 HYDRATE IV INFUSION ADD-ON: CPT

## 2021-04-26 PROCEDURE — 71045 X-RAY EXAM CHEST 1 VIEW: CPT

## 2021-04-26 PROCEDURE — 81003 URINALYSIS AUTO W/O SCOPE: CPT | Performed by: FAMILY MEDICINE

## 2021-04-26 PROCEDURE — 80061 LIPID PANEL: CPT | Performed by: FAMILY MEDICINE

## 2021-04-26 PROCEDURE — 93225 XTRNL ECG REC<48 HRS REC: CPT

## 2021-04-26 PROCEDURE — 99214 OFFICE O/P EST MOD 30 MIN: CPT | Performed by: INTERNAL MEDICINE

## 2021-04-26 PROCEDURE — 84443 ASSAY THYROID STIM HORMONE: CPT | Performed by: FAMILY MEDICINE

## 2021-04-26 PROCEDURE — 93306 TTE W/DOPPLER COMPLETE: CPT

## 2021-04-26 RX ORDER — TRAMADOL HYDROCHLORIDE 50 MG/1
50 TABLET ORAL EVERY 6 HOURS PRN
Status: DISCONTINUED | OUTPATIENT
Start: 2021-04-26 | End: 2021-04-27

## 2021-04-26 RX ORDER — SODIUM CHLORIDE 0.9 % (FLUSH) 0.9 %
3 SYRINGE (ML) INJECTION EVERY 12 HOURS SCHEDULED
Status: DISCONTINUED | OUTPATIENT
Start: 2021-04-26 | End: 2021-04-28 | Stop reason: HOSPADM

## 2021-04-26 RX ORDER — ACETAMINOPHEN 500 MG
500 TABLET ORAL EVERY 6 HOURS PRN
Status: DISCONTINUED | OUTPATIENT
Start: 2021-04-26 | End: 2021-04-28 | Stop reason: HOSPADM

## 2021-04-26 RX ORDER — ATORVASTATIN CALCIUM 20 MG/1
20 TABLET, FILM COATED ORAL NIGHTLY
Status: DISCONTINUED | OUTPATIENT
Start: 2021-04-26 | End: 2021-04-28 | Stop reason: HOSPADM

## 2021-04-26 RX ORDER — ISOSORBIDE MONONITRATE 60 MG/1
60 TABLET, EXTENDED RELEASE ORAL DAILY
Status: DISCONTINUED | OUTPATIENT
Start: 2021-04-27 | End: 2021-04-28 | Stop reason: HOSPADM

## 2021-04-26 RX ORDER — SODIUM CHLORIDE 9 MG/ML
100 INJECTION, SOLUTION INTRAVENOUS CONTINUOUS
Status: DISCONTINUED | OUTPATIENT
Start: 2021-04-26 | End: 2021-04-27

## 2021-04-26 RX ORDER — PANTOPRAZOLE SODIUM 40 MG/1
40 TABLET, DELAYED RELEASE ORAL EVERY MORNING
Status: DISCONTINUED | OUTPATIENT
Start: 2021-04-27 | End: 2021-04-28 | Stop reason: HOSPADM

## 2021-04-26 RX ORDER — POLYETHYLENE GLYCOL 3350 17 G/17G
17 POWDER, FOR SOLUTION ORAL DAILY PRN
Status: DISCONTINUED | OUTPATIENT
Start: 2021-04-26 | End: 2021-04-28 | Stop reason: HOSPADM

## 2021-04-26 RX ORDER — SODIUM CHLORIDE 0.9 % (FLUSH) 0.9 %
10 SYRINGE (ML) INJECTION AS NEEDED
Status: DISCONTINUED | OUTPATIENT
Start: 2021-04-26 | End: 2021-04-28 | Stop reason: HOSPADM

## 2021-04-26 RX ORDER — BISACODYL 5 MG/1
5 TABLET, DELAYED RELEASE ORAL DAILY PRN
Status: DISCONTINUED | OUTPATIENT
Start: 2021-04-26 | End: 2021-04-28 | Stop reason: HOSPADM

## 2021-04-26 RX ORDER — AMLODIPINE BESYLATE 2.5 MG/1
2.5 TABLET ORAL DAILY
Status: DISCONTINUED | OUTPATIENT
Start: 2021-04-27 | End: 2021-04-28 | Stop reason: HOSPADM

## 2021-04-26 RX ORDER — CARVEDILOL 3.12 MG/1
3.12 TABLET ORAL 2 TIMES DAILY
Status: DISCONTINUED | OUTPATIENT
Start: 2021-04-26 | End: 2021-04-27

## 2021-04-26 RX ORDER — ASPIRIN 325 MG
325 TABLET ORAL ONCE
Status: COMPLETED | OUTPATIENT
Start: 2021-04-26 | End: 2021-04-26

## 2021-04-26 RX ORDER — ONDANSETRON 2 MG/ML
4 INJECTION INTRAMUSCULAR; INTRAVENOUS EVERY 6 HOURS PRN
Status: DISCONTINUED | OUTPATIENT
Start: 2021-04-26 | End: 2021-04-28 | Stop reason: HOSPADM

## 2021-04-26 RX ORDER — CHOLECALCIFEROL (VITAMIN D3) 125 MCG
5 CAPSULE ORAL NIGHTLY PRN
Status: DISCONTINUED | OUTPATIENT
Start: 2021-04-26 | End: 2021-04-28 | Stop reason: HOSPADM

## 2021-04-26 RX ORDER — CALCIUM CARBONATE 200(500)MG
2 TABLET,CHEWABLE ORAL 2 TIMES DAILY PRN
Status: DISCONTINUED | OUTPATIENT
Start: 2021-04-26 | End: 2021-04-28 | Stop reason: HOSPADM

## 2021-04-26 RX ORDER — ASPIRIN 81 MG/1
81 TABLET ORAL DAILY
Status: DISCONTINUED | OUTPATIENT
Start: 2021-04-27 | End: 2021-04-28 | Stop reason: HOSPADM

## 2021-04-26 RX ORDER — SODIUM CHLORIDE 0.9 % (FLUSH) 0.9 %
3-10 SYRINGE (ML) INJECTION AS NEEDED
Status: DISCONTINUED | OUTPATIENT
Start: 2021-04-26 | End: 2021-04-28 | Stop reason: HOSPADM

## 2021-04-26 RX ORDER — AMOXICILLIN 250 MG
2 CAPSULE ORAL 2 TIMES DAILY
Status: DISCONTINUED | OUTPATIENT
Start: 2021-04-26 | End: 2021-04-28 | Stop reason: HOSPADM

## 2021-04-26 RX ORDER — ONDANSETRON 4 MG/1
4 TABLET, FILM COATED ORAL EVERY 6 HOURS PRN
Status: DISCONTINUED | OUTPATIENT
Start: 2021-04-26 | End: 2021-04-28 | Stop reason: HOSPADM

## 2021-04-26 RX ORDER — HEPARIN SODIUM 5000 [USP'U]/ML
5000 INJECTION, SOLUTION INTRAVENOUS; SUBCUTANEOUS EVERY 12 HOURS SCHEDULED
Status: DISCONTINUED | OUTPATIENT
Start: 2021-04-26 | End: 2021-04-28 | Stop reason: HOSPADM

## 2021-04-26 RX ORDER — ACETAMINOPHEN 325 MG/1
650 TABLET ORAL EVERY 4 HOURS PRN
Status: DISCONTINUED | OUTPATIENT
Start: 2021-04-26 | End: 2021-04-28 | Stop reason: HOSPADM

## 2021-04-26 RX ORDER — LACTULOSE 10 G/15ML
10 SOLUTION ORAL NIGHTLY
Status: DISCONTINUED | OUTPATIENT
Start: 2021-04-26 | End: 2021-04-28 | Stop reason: HOSPADM

## 2021-04-26 RX ORDER — NITROGLYCERIN 0.4 MG/1
0.4 TABLET SUBLINGUAL
Status: DISCONTINUED | OUTPATIENT
Start: 2021-04-26 | End: 2021-04-28 | Stop reason: HOSPADM

## 2021-04-26 RX ORDER — BISACODYL 10 MG
10 SUPPOSITORY, RECTAL RECTAL DAILY PRN
Status: DISCONTINUED | OUTPATIENT
Start: 2021-04-26 | End: 2021-04-28 | Stop reason: HOSPADM

## 2021-04-26 RX ORDER — ACETAMINOPHEN 650 MG/1
650 SUPPOSITORY RECTAL EVERY 4 HOURS PRN
Status: DISCONTINUED | OUTPATIENT
Start: 2021-04-26 | End: 2021-04-28 | Stop reason: HOSPADM

## 2021-04-26 RX ORDER — ACETAMINOPHEN 160 MG/5ML
650 SOLUTION ORAL EVERY 4 HOURS PRN
Status: DISCONTINUED | OUTPATIENT
Start: 2021-04-26 | End: 2021-04-28 | Stop reason: HOSPADM

## 2021-04-26 RX ADMIN — ASPIRIN 325 MG ORAL TABLET 325 MG: 325 PILL ORAL at 11:27

## 2021-04-26 RX ADMIN — Medication 3 ML: at 20:08

## 2021-04-26 RX ADMIN — ATORVASTATIN CALCIUM 20 MG: 20 TABLET, FILM COATED ORAL at 20:08

## 2021-04-26 RX ADMIN — SODIUM CHLORIDE 100 ML/HR: 9 INJECTION, SOLUTION INTRAVENOUS at 14:54

## 2021-04-26 RX ADMIN — DOCUSATE SODIUM 50 MG AND SENNOSIDES 8.6 MG 2 TABLET: 8.6; 5 TABLET, FILM COATED ORAL at 20:08

## 2021-04-26 RX ADMIN — HEPARIN SODIUM 5000 UNITS: 5000 INJECTION INTRAVENOUS; SUBCUTANEOUS at 20:08

## 2021-04-26 NOTE — TELEPHONE ENCOUNTER
Patient called this morning with complaints on chest pressure and near syncope over the weekend. She reports she felt very dizzy Saturday and nearly passed out but caught herself. Advised going to the ER for evaluation. She verbalized she would go.

## 2021-04-27 ENCOUNTER — APPOINTMENT (OUTPATIENT)
Dept: CT IMAGING | Facility: HOSPITAL | Age: 86
End: 2021-04-27

## 2021-04-27 LAB
ANION GAP SERPL CALCULATED.3IONS-SCNC: 11 MMOL/L (ref 5–15)
BASOPHILS # BLD AUTO: 0 10*3/MM3 (ref 0–0.2)
BASOPHILS NFR BLD AUTO: 0.7 % (ref 0–1.5)
BUN SERPL-MCNC: 15 MG/DL (ref 8–23)
BUN/CREAT SERPL: 21.4 (ref 7–25)
CALCIUM SPEC-SCNC: 8.7 MG/DL (ref 8.2–9.6)
CHLORIDE SERPL-SCNC: 109 MMOL/L (ref 98–107)
CO2 SERPL-SCNC: 24 MMOL/L (ref 22–29)
CREAT SERPL-MCNC: 0.7 MG/DL (ref 0.57–1)
DEPRECATED RDW RBC AUTO: 42.4 FL (ref 37–54)
EOSINOPHIL # BLD AUTO: 0.2 10*3/MM3 (ref 0–0.4)
EOSINOPHIL NFR BLD AUTO: 3.8 % (ref 0.3–6.2)
ERYTHROCYTE [DISTWIDTH] IN BLOOD BY AUTOMATED COUNT: 14.1 % (ref 12.3–15.4)
GFR SERPL CREATININE-BSD FRML MDRD: 78 ML/MIN/1.73
GLUCOSE SERPL-MCNC: 78 MG/DL (ref 65–99)
HCT VFR BLD AUTO: 33.3 % (ref 34–46.6)
HGB BLD-MCNC: 11.1 G/DL (ref 12–15.9)
LYMPHOCYTES # BLD AUTO: 1.8 10*3/MM3 (ref 0.7–3.1)
LYMPHOCYTES NFR BLD AUTO: 35 % (ref 19.6–45.3)
MCH RBC QN AUTO: 28.7 PG (ref 26.6–33)
MCHC RBC AUTO-ENTMCNC: 33.2 G/DL (ref 31.5–35.7)
MCV RBC AUTO: 86.3 FL (ref 79–97)
MONOCYTES # BLD AUTO: 0.5 10*3/MM3 (ref 0.1–0.9)
MONOCYTES NFR BLD AUTO: 8.7 % (ref 5–12)
NEUTROPHILS NFR BLD AUTO: 2.7 10*3/MM3 (ref 1.7–7)
NEUTROPHILS NFR BLD AUTO: 51.8 % (ref 42.7–76)
NRBC BLD AUTO-RTO: 0 /100 WBC (ref 0–0.2)
PLATELET # BLD AUTO: 195 10*3/MM3 (ref 140–450)
PMV BLD AUTO: 7.6 FL (ref 6–12)
POTASSIUM SERPL-SCNC: 4.1 MMOL/L (ref 3.5–5.2)
PREALB SERPL-MCNC: 16.7 MG/DL (ref 20–40)
QT INTERVAL: 461 MS
RBC # BLD AUTO: 3.86 10*6/MM3 (ref 3.77–5.28)
SARS-COV-2 ORF1AB RESP QL NAA+PROBE: NOT DETECTED
SODIUM SERPL-SCNC: 144 MMOL/L (ref 136–145)
WBC # BLD AUTO: 5.2 10*3/MM3 (ref 3.4–10.8)

## 2021-04-27 PROCEDURE — 0 IOPAMIDOL PER 1 ML: Performed by: FAMILY MEDICINE

## 2021-04-27 PROCEDURE — 71275 CT ANGIOGRAPHY CHEST: CPT

## 2021-04-27 PROCEDURE — 96372 THER/PROPH/DIAG INJ SC/IM: CPT

## 2021-04-27 PROCEDURE — 85025 COMPLETE CBC W/AUTO DIFF WBC: CPT | Performed by: FAMILY MEDICINE

## 2021-04-27 PROCEDURE — G0378 HOSPITAL OBSERVATION PER HR: HCPCS

## 2021-04-27 PROCEDURE — 96361 HYDRATE IV INFUSION ADD-ON: CPT

## 2021-04-27 PROCEDURE — 99214 OFFICE O/P EST MOD 30 MIN: CPT | Performed by: INTERNAL MEDICINE

## 2021-04-27 PROCEDURE — 80048 BASIC METABOLIC PNL TOTAL CA: CPT | Performed by: FAMILY MEDICINE

## 2021-04-27 PROCEDURE — U0004 COV-19 TEST NON-CDC HGH THRU: HCPCS | Performed by: FAMILY MEDICINE

## 2021-04-27 PROCEDURE — 25010000002 HEPARIN (PORCINE) PER 1000 UNITS: Performed by: FAMILY MEDICINE

## 2021-04-27 RX ORDER — MIRTAZAPINE 7.5 MG/1
7.5 TABLET, FILM COATED ORAL NIGHTLY
Status: DISCONTINUED | OUTPATIENT
Start: 2021-04-27 | End: 2021-04-28 | Stop reason: HOSPADM

## 2021-04-27 RX ORDER — SODIUM CHLORIDE 9 MG/ML
50 INJECTION, SOLUTION INTRAVENOUS CONTINUOUS
Status: DISCONTINUED | OUTPATIENT
Start: 2021-04-27 | End: 2021-04-28 | Stop reason: HOSPADM

## 2021-04-27 RX ADMIN — DOCUSATE SODIUM 50 MG AND SENNOSIDES 8.6 MG 2 TABLET: 8.6; 5 TABLET, FILM COATED ORAL at 20:38

## 2021-04-27 RX ADMIN — IOPAMIDOL 100 ML: 755 INJECTION, SOLUTION INTRAVENOUS at 10:43

## 2021-04-27 RX ADMIN — PANTOPRAZOLE SODIUM 40 MG: 40 TABLET, DELAYED RELEASE ORAL at 06:00

## 2021-04-27 RX ADMIN — Medication 3 ML: at 20:39

## 2021-04-27 RX ADMIN — Medication 3 ML: at 08:08

## 2021-04-27 RX ADMIN — MIRTAZAPINE 7.5 MG: 7.5 TABLET ORAL at 20:38

## 2021-04-27 RX ADMIN — ISOSORBIDE MONONITRATE 60 MG: 60 TABLET, EXTENDED RELEASE ORAL at 08:08

## 2021-04-27 RX ADMIN — SODIUM CHLORIDE 100 ML/HR: 9 INJECTION, SOLUTION INTRAVENOUS at 01:47

## 2021-04-27 RX ADMIN — SODIUM CHLORIDE 50 ML/HR: 9 INJECTION, SOLUTION INTRAVENOUS at 15:15

## 2021-04-27 RX ADMIN — ATORVASTATIN CALCIUM 20 MG: 20 TABLET, FILM COATED ORAL at 20:38

## 2021-04-27 RX ADMIN — ASPIRIN 81 MG: 81 TABLET, COATED ORAL at 08:08

## 2021-04-27 RX ADMIN — HEPARIN SODIUM 5000 UNITS: 5000 INJECTION INTRAVENOUS; SUBCUTANEOUS at 20:38

## 2021-04-27 RX ADMIN — HEPARIN SODIUM 5000 UNITS: 5000 INJECTION INTRAVENOUS; SUBCUTANEOUS at 08:08

## 2021-04-27 RX ADMIN — DOCUSATE SODIUM 50 MG AND SENNOSIDES 8.6 MG 2 TABLET: 8.6; 5 TABLET, FILM COATED ORAL at 08:08

## 2021-04-28 ENCOUNTER — TELEPHONE (OUTPATIENT)
Dept: CARDIOLOGY | Facility: CLINIC | Age: 86
End: 2021-04-28

## 2021-04-28 VITALS
HEART RATE: 68 BPM | TEMPERATURE: 97.6 F | OXYGEN SATURATION: 97 % | HEIGHT: 63 IN | DIASTOLIC BLOOD PRESSURE: 75 MMHG | WEIGHT: 149.25 LBS | RESPIRATION RATE: 14 BRPM | BODY MASS INDEX: 26.45 KG/M2 | SYSTOLIC BLOOD PRESSURE: 178 MMHG

## 2021-04-28 LAB
ANION GAP SERPL CALCULATED.3IONS-SCNC: 9 MMOL/L (ref 5–15)
BASOPHILS # BLD AUTO: 0 10*3/MM3 (ref 0–0.2)
BASOPHILS NFR BLD AUTO: 0.6 % (ref 0–1.5)
BUN SERPL-MCNC: 14 MG/DL (ref 8–23)
BUN/CREAT SERPL: 19.2 (ref 7–25)
CALCIUM SPEC-SCNC: 8.8 MG/DL (ref 8.2–9.6)
CHLORIDE SERPL-SCNC: 109 MMOL/L (ref 98–107)
CO2 SERPL-SCNC: 25 MMOL/L (ref 22–29)
CREAT SERPL-MCNC: 0.73 MG/DL (ref 0.57–1)
DEPRECATED RDW RBC AUTO: 42.9 FL (ref 37–54)
EOSINOPHIL # BLD AUTO: 0.3 10*3/MM3 (ref 0–0.4)
EOSINOPHIL NFR BLD AUTO: 4.5 % (ref 0.3–6.2)
ERYTHROCYTE [DISTWIDTH] IN BLOOD BY AUTOMATED COUNT: 14.1 % (ref 12.3–15.4)
GFR SERPL CREATININE-BSD FRML MDRD: 74 ML/MIN/1.73
GLUCOSE SERPL-MCNC: 81 MG/DL (ref 65–99)
HCT VFR BLD AUTO: 33.2 % (ref 34–46.6)
HGB BLD-MCNC: 11.4 G/DL (ref 12–15.9)
LYMPHOCYTES # BLD AUTO: 1.9 10*3/MM3 (ref 0.7–3.1)
LYMPHOCYTES NFR BLD AUTO: 33.7 % (ref 19.6–45.3)
MCH RBC QN AUTO: 29.2 PG (ref 26.6–33)
MCHC RBC AUTO-ENTMCNC: 34.2 G/DL (ref 31.5–35.7)
MCV RBC AUTO: 85.4 FL (ref 79–97)
MONOCYTES # BLD AUTO: 0.6 10*3/MM3 (ref 0.1–0.9)
MONOCYTES NFR BLD AUTO: 10.5 % (ref 5–12)
NEUTROPHILS NFR BLD AUTO: 2.9 10*3/MM3 (ref 1.7–7)
NEUTROPHILS NFR BLD AUTO: 50.7 % (ref 42.7–76)
NRBC BLD AUTO-RTO: 0.1 /100 WBC (ref 0–0.2)
PLATELET # BLD AUTO: 218 10*3/MM3 (ref 140–450)
PMV BLD AUTO: 7.4 FL (ref 6–12)
POTASSIUM SERPL-SCNC: 3.7 MMOL/L (ref 3.5–5.2)
RBC # BLD AUTO: 3.89 10*6/MM3 (ref 3.77–5.28)
SODIUM SERPL-SCNC: 143 MMOL/L (ref 136–145)
WBC # BLD AUTO: 5.8 10*3/MM3 (ref 3.4–10.8)

## 2021-04-28 PROCEDURE — 25010000002 HEPARIN (PORCINE) PER 1000 UNITS: Performed by: FAMILY MEDICINE

## 2021-04-28 PROCEDURE — 96372 THER/PROPH/DIAG INJ SC/IM: CPT

## 2021-04-28 PROCEDURE — G0378 HOSPITAL OBSERVATION PER HR: HCPCS

## 2021-04-28 PROCEDURE — 80048 BASIC METABOLIC PNL TOTAL CA: CPT | Performed by: FAMILY MEDICINE

## 2021-04-28 PROCEDURE — 85025 COMPLETE CBC W/AUTO DIFF WBC: CPT | Performed by: FAMILY MEDICINE

## 2021-04-28 PROCEDURE — 93227 XTRNL ECG REC<48 HR R&I: CPT | Performed by: INTERNAL MEDICINE

## 2021-04-28 RX ORDER — MIRTAZAPINE 7.5 MG/1
7.5 TABLET, FILM COATED ORAL NIGHTLY
Qty: 30 TABLET | Refills: 0 | Status: SHIPPED | OUTPATIENT
Start: 2021-04-28 | End: 2021-05-13

## 2021-04-28 RX ORDER — AMLODIPINE BESYLATE 5 MG/1
5 TABLET ORAL DAILY
Qty: 30 TABLET | Refills: 0 | Status: SHIPPED | OUTPATIENT
Start: 2021-04-28 | End: 2021-05-24 | Stop reason: SDUPTHER

## 2021-04-28 RX ADMIN — DOCUSATE SODIUM 50 MG AND SENNOSIDES 8.6 MG 2 TABLET: 8.6; 5 TABLET, FILM COATED ORAL at 08:06

## 2021-04-28 RX ADMIN — HEPARIN SODIUM 5000 UNITS: 5000 INJECTION INTRAVENOUS; SUBCUTANEOUS at 08:08

## 2021-04-28 RX ADMIN — PANTOPRAZOLE SODIUM 40 MG: 40 TABLET, DELAYED RELEASE ORAL at 08:06

## 2021-04-28 RX ADMIN — AMLODIPINE BESYLATE 2.5 MG: 2.5 TABLET ORAL at 08:06

## 2021-04-28 RX ADMIN — ISOSORBIDE MONONITRATE 60 MG: 60 TABLET, EXTENDED RELEASE ORAL at 08:06

## 2021-04-28 RX ADMIN — Medication 3 ML: at 08:08

## 2021-04-28 RX ADMIN — ASPIRIN 81 MG: 81 TABLET, COATED ORAL at 08:06

## 2021-05-13 ENCOUNTER — OFFICE VISIT (OUTPATIENT)
Dept: CARDIOLOGY | Facility: CLINIC | Age: 86
End: 2021-05-13

## 2021-05-13 VITALS
WEIGHT: 128 LBS | HEIGHT: 63 IN | SYSTOLIC BLOOD PRESSURE: 150 MMHG | HEART RATE: 68 BPM | OXYGEN SATURATION: 98 % | BODY MASS INDEX: 22.68 KG/M2 | DIASTOLIC BLOOD PRESSURE: 72 MMHG

## 2021-05-13 DIAGNOSIS — E78.2 MIXED HYPERLIPIDEMIA: ICD-10-CM

## 2021-05-13 DIAGNOSIS — I10 ESSENTIAL HYPERTENSION: ICD-10-CM

## 2021-05-13 DIAGNOSIS — I25.110 CORONARY ARTERY DISEASE INVOLVING NATIVE CORONARY ARTERY OF NATIVE HEART WITH UNSTABLE ANGINA PECTORIS (HCC): Primary | ICD-10-CM

## 2021-05-13 PROCEDURE — 99213 OFFICE O/P EST LOW 20 MIN: CPT | Performed by: INTERNAL MEDICINE

## 2021-05-13 PROCEDURE — 93000 ELECTROCARDIOGRAM COMPLETE: CPT | Performed by: INTERNAL MEDICINE

## 2021-05-24 RX ORDER — AMLODIPINE BESYLATE 5 MG/1
5 TABLET ORAL DAILY
Qty: 30 TABLET | Refills: 6 | Status: SHIPPED | OUTPATIENT
Start: 2021-05-24 | End: 2022-01-04 | Stop reason: SDUPTHER

## 2021-05-24 NOTE — TELEPHONE ENCOUNTER
Needs amlodipine 5 mg sent to [facility] on [address] Was put on this in the hospital

## 2021-08-19 ENCOUNTER — OFFICE VISIT (OUTPATIENT)
Dept: CARDIOLOGY | Facility: CLINIC | Age: 86
End: 2021-08-19

## 2021-08-19 VITALS
OXYGEN SATURATION: 98 % | HEIGHT: 63 IN | HEART RATE: 61 BPM | WEIGHT: 124 LBS | BODY MASS INDEX: 21.97 KG/M2 | SYSTOLIC BLOOD PRESSURE: 150 MMHG | DIASTOLIC BLOOD PRESSURE: 68 MMHG

## 2021-08-19 DIAGNOSIS — I10 ESSENTIAL HYPERTENSION: ICD-10-CM

## 2021-08-19 DIAGNOSIS — I25.110 CORONARY ARTERY DISEASE INVOLVING NATIVE CORONARY ARTERY OF NATIVE HEART WITH UNSTABLE ANGINA PECTORIS (HCC): Primary | ICD-10-CM

## 2021-08-19 PROCEDURE — 93000 ELECTROCARDIOGRAM COMPLETE: CPT | Performed by: INTERNAL MEDICINE

## 2021-08-19 PROCEDURE — 99213 OFFICE O/P EST LOW 20 MIN: CPT | Performed by: INTERNAL MEDICINE

## 2021-08-19 NOTE — PROGRESS NOTES
Cardiology Office Visit Note      Referring physician:  Monster Myrick MD    Reason For Followup: CAD, HTN    HPI:  Carrie Golden is a 89 y.o. female who returns to the office for a 3 month follow up, following most recent hospitalization for breakthrough angina pectoris which was treated conservatively in view of her advanced age and her wishes to avoid further invasive evaluation or treatment.  Consequently, I uptitrated her isosorbide mononitrate, and she has done well clinically with no further episodes of unstable angina pectoris.  She also denies any PND, orthopnea or lower leg edema and has had no palpitations, dizziness or near syncope.  Carrie remains remarkably functionally active for her advanced age.     She has a history of chest pain, CAD and HLD.  An Echo was performed on 4/26/2021 revealed essentially normal valve structures and leaflet excursion aside from modest trileaflet aortic valve with thickening with adequate opening coaptation. Modest Doppler abnormalities include mild pan valvular regurgitation with RV systolic pressure quantitated at 42 mmHg, EF is 51-55%. Cardiac catheterization 12/2019 which revealed 10 to 20% in the left main, 30% in the proximal LAD, first diagonal 60 to 70% stenosis which appeared relatively unchanged from last cardiac catheterization in 2017, left circumflex diffuse 25 to 30% stenosis RCA 20% proximal stenosis. She has been started on Norvasc 5mg- Coreg was discontinued due to bradycardia.      Today, she is feeling much better- no chest pain, shortness of air, dizziness or edema.  She denies other constitutional symptoms aside from mild chronic back pain with known advanced kyphoscoliosis.    She has received COVID-19 vaccination and she is very compliant with medications as listed and reviewed in detail today.        Past Medical History:   Diagnosis Date   • Arthritis    • Coronary artery disease    • Drooping eyelid, bilateral    • Frequent UTI    • GERD  (gastroesophageal reflux disease)    • Hearing loss     bilateral hearing aides   • 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 sure which 1  contracted at age 8   • History of transfusion    • [place] (hard of hearing)    • Hyperlipidemia    • Hypertension    • Past heart attack     X2 MILD LAST ONE OCT 2019   • PONV (postoperative nausea and vomiting)        Past Surgical History:   Procedure Laterality Date   • ANTERIOR AND POSTERIOR VAGINAL REPAIR     • BACK SURGERY     • BLEPHAROPLASTY Bilateral 5/23/2019    Procedure: bilateral upper lid blepharoplasty;  Surgeon: Mauricio Pennington MD;  Location: [facility] OR [facility] [place];  Service: Ophthalmology   • BROW LIFT Bilateral 2/13/2020    Procedure: BILATERAL TEMPORAL DIRECT BROWLIFT;  Surgeon: Sreekanth Bird MD;  Location: [facility] OR [facility] [place];  Service: Ophthalmology;  Laterality: Bilateral;   • CARDIAC CATHETERIZATION N/A 12/3/2019    Procedure: Left Heart Cath;  Surgeon: Puma Briseno MD;  Location: [facility] CATH INVASIVE LOCATION;  Service: Cardiovascular   • CARDIAC CATHETERIZATION N/A 12/3/2019    Procedure: Coronary angiography;  Surgeon: Puma Briseno MD;  Location: [facility] CATH INVASIVE LOCATION;  Service: Cardiovascular   • CARDIAC CATHETERIZATION N/A 12/3/2019    Procedure: Left ventriculography;  Surgeon: Puma Briseno MD;  Location: [facility] CATH INVASIVE LOCATION;  Service: Cardiovascular   • CATARACT EXTRACTION EXTRACAPSULAR W/ INTRAOCULAR LENS IMPLANTATION Bilateral    • CERVICAL SPINE ANTERIOR      with instrumentation   • CHEILECTOMY Left 8/28/2020    Procedure: CHEILECTOMY;  Surgeon: GAB Rees DPM;  Location: [facility] MAIN OR;  Service: Podiatry;  Laterality: Left;   • COLON SURGERY      for obstruction   • ALBERTO TUBE INSERTION Bilateral 5/23/2019    Procedure: ALBERTO TUBE;  Surgeon: Mauricio Pennington MD;  Location: [facility] OR [facility] [place];  Service: Ophthalmology   • ECTROPION REPAIR Bilateral 5/23/2019    Procedure: bilateral lower lid ectropion repair, bilateral  punctoplasty;  Surgeon: Mauricio Pennington MD;  Location: [facility] OR [facility] [place];  Service: Ophthalmology   • EYE SURGERY     • FOOT FUSION Left 2016    Procedure: LEFT HALLUX METATARSALPHALANGEAL ARTHRODESIS SILVER BUNIONECTOMY METATARSAL HEAD RESECTION 2-5 CALCANEAL BONE GRAFT;  Surgeon: Monster Harper Jr., MD;  Location: [facility] MAIN OR;  Service:    • HYSTERECTOMY     • INGUINAL HERNIA REPAIR Bilateral    • METATARSAL OSTEOTOMY Left 2020    Procedure: Metatarsal head resection 5;  Surgeon: GAB Rees DPM;  Location: [facility] MAIN OR;  Service: Podiatry;  Laterality: Left;   • ROTATOR CUFF REPAIR Right    • STOMACH SURGERY      for ulcer         Current Outpatient Medications:   •  acetaminophen (TYLENOL) 500 MG tablet, Take 500 mg by mouth Every 6 (Six) Hours As Needed for mild pain (1-3)., Disp: , Rfl:   •  amLODIPine (NORVASC) 5 MG tablet, Take 1 tablet by mouth Daily., Disp: 30 tablet, Rfl: 6  •  aspirin 81 MG EC tablet, Take 81 mg by mouth Daily. LD , Disp: , Rfl:   •  isosorbide mononitrate (IMDUR) 60 MG 24 hr tablet, Take 1 tablet by mouth once daily, Disp: 90 tablet, Rfl: 1  •  simvastatin (ZOCOR) 40 MG tablet, Take 0.5 tablets by mouth Every Night., Disp: 90 tablet, Rfl: 3  •  nitroglycerin (NITROSTAT) 0.4 MG SL tablet, 1 under the tongue as needed for angina, may repeat q5mins for up three doses (Patient taking differently: Place  under the tongue Every 5 (Five) Minutes As Needed. 1 under the tongue as needed for angina, may repeat q5mins for up three doses), Disp: 100 tablet, Rfl: 11    Social History     Socioeconomic History   • Marital status:      Spouse name: Not on file   • Number of children: Not on file   • Years of education: Not on file   • Highest education level: Not on file   Tobacco Use   • Smoking status: Former Smoker     Packs/day: 0.25     Years: 10.00     Pack years: 2.50     Types: Cigarettes     Quit date:      Years since quittin.6   • Smokeless tobacco:  "Never Used   Vaping Use   • Vaping Use: Never used   Substance and Sexual Activity   • Alcohol use: No   • Drug use: No   • Sexual activity: Defer       Family History   Problem Relation Age of Onset   • No Known Problems Mother    • No Known Problems Father    • Malig Hyperthermia Neg Hx          Review of Systems   General: denies fever, chills, anorexia, weight loss  Eyes: denies blurring, diplopia  Ear/Nose/Throat: denies ear pain, nosebleeds, hoarseness  Cardiovascular: See HPI  Respiratory: denies excessive sputum, hemoptysis, wheezing  Gastrointestinal: denies nausea, vomiting, change in bowel habits, abdominal pain  Genitourinary: Denies hematuria dysuria has some urinary incontinence  Musculoskeletal: Chronic back pain with known kyphoscoliosis-see above, it remains impressively functionally active.  Skin: denies rashes, itching, suspicious lesions  Neurologic: denies focal neuro deficits  Psychiatric: denies depression, anxiety  Endocrine: denies cold intolerance, heat intolerance  Hematologic/Lymphatic: denies abnormal bruising, bleeding  Allergic/Immunologic: denies urticaria or persistent infections      Objective     Visit Vitals  /68   Pulse 61   Ht 160 cm (63\")   Wt 56.2 kg (124 lb)   SpO2 98%   BMI 21.97 kg/m²           Physical Exam  General:      Very pleasant elderly lady who appears well-nourished, well developed,, in no acute distress.    Head:     normocephalic and atraumatic.    Eyes:    PERRL/EOM intact, conjunctiva and sclera clear with out nystagmus.    Neck:    no jvd or bruits  Chest Wall:     Moderate kyphoscoliosis  Lungs:    clear bilaterally to auscultation with adequate though diminished airflow in the bases  Heart:    non-displaced PMI; regular rate and rhythm, normal S1, S2 without murmurs, rubs, or gallops  Abdomen:  Soft, nontender without HSM  Msk:    no deformity; adequate R OM  Pulses:    pulses normal in all 4 extremities.    Extremities:    no clubbing, cyanosis, " edema   Neurologic:    no focal sensory or motor deficits  Skin:    intact without lesions or rashes.    Psych:    alert and cooperative; normal mood and affect; normal attention span and concentration.            ECG 12 Lead    Date/Time: 8/19/2021 7:59 AM  Performed by: ROBERTO CARLOS Morales MD  Authorized by: ROBERTO CARLOS Morales MD   Comparison: compared with previous ECG from 5/13/2021  Similar to previous ECG  Rhythm: sinus rhythm  Rate: normal  QRS axis: left    Clinical impression: normal ECG and non-specific ECG              Assessment:   Problems Addressed this Visit        Other    Essential hypertension  -Generally adequately regulated on medications listed and reviewed  -Has mild whitecoat syndrome/office hypertension      Coronary artery disease involving native heart with unstable angina pectoris (CMS/HCC) - Primary  -Remains hemodynamically stable well compensated and angina free on current medical therapy    DJD with kyphosis-stable      Diagnoses       Codes Comments    Coronary artery disease involving native coronary artery of native heart with unstable angina pectoris (CMS/HCC)    -  Primary ICD-10-CM: I25.110  ICD-9-CM: 414.01, 411.1     Essential hypertension     ICD-10-CM: I10  ICD-9-CM: 401.9             Plan:  Cardiac dunbar, Ms. Golden appears hemodynamically stable well compensated and angina free at this time on medications as listed and reviewed.  She is advised to continue on current medical therapy and strongly cautioned against risk of falls.  RTC 6 months or sooner if needed.    ROBERTO CARLOS Morales MD  8/19/2021 22:18 EDT    This report was generated using the Dragon voice recognition system.

## 2021-09-13 RX ORDER — ISOSORBIDE MONONITRATE 60 MG/1
TABLET, EXTENDED RELEASE ORAL
Qty: 90 TABLET | Refills: 0 | Status: SHIPPED | OUTPATIENT
Start: 2021-09-13 | End: 2021-12-20

## 2021-09-24 PROCEDURE — 87186 SC STD MICRODIL/AGAR DIL: CPT | Performed by: NURSE PRACTITIONER

## 2021-09-24 PROCEDURE — 87086 URINE CULTURE/COLONY COUNT: CPT | Performed by: NURSE PRACTITIONER

## 2021-09-24 PROCEDURE — 87077 CULTURE AEROBIC IDENTIFY: CPT | Performed by: NURSE PRACTITIONER

## 2021-10-02 ENCOUNTER — HOSPITAL ENCOUNTER (INPATIENT)
Facility: HOSPITAL | Age: 86
LOS: 6 days | Discharge: HOME | End: 2021-10-09
Attending: EMERGENCY MEDICINE | Admitting: FAMILY MEDICINE

## 2021-10-02 ENCOUNTER — APPOINTMENT (OUTPATIENT)
Dept: GENERAL RADIOLOGY | Facility: HOSPITAL | Age: 86
End: 2021-10-02

## 2021-10-02 ENCOUNTER — APPOINTMENT (OUTPATIENT)
Dept: CT IMAGING | Facility: HOSPITAL | Age: 86
End: 2021-10-02

## 2021-10-02 DIAGNOSIS — R11.2 NON-INTRACTABLE VOMITING WITH NAUSEA, UNSPECIFIED VOMITING TYPE: ICD-10-CM

## 2021-10-02 DIAGNOSIS — K52.9 COLITIS: Primary | ICD-10-CM

## 2021-10-02 DIAGNOSIS — K62.5 RECTAL BLEEDING: ICD-10-CM

## 2021-10-02 DIAGNOSIS — R10.84 GENERALIZED ABDOMINAL PAIN: ICD-10-CM

## 2021-10-02 LAB
ALBUMIN SERPL-MCNC: 4.3 G/DL (ref 3.5–5.2)
ALBUMIN/GLOB SERPL: 1.7 G/DL
ALP SERPL-CCNC: 120 U/L (ref 39–117)
ALT SERPL W P-5'-P-CCNC: 17 U/L (ref 1–33)
ANION GAP SERPL CALCULATED.3IONS-SCNC: 15 MMOL/L (ref 5–15)
AST SERPL-CCNC: 21 U/L (ref 1–32)
BASOPHILS # BLD AUTO: 0 10*3/MM3 (ref 0–0.2)
BASOPHILS NFR BLD AUTO: 0.3 % (ref 0–1.5)
BILIRUB SERPL-MCNC: 0.9 MG/DL (ref 0–1.2)
BUN SERPL-MCNC: 40 MG/DL (ref 8–23)
BUN/CREAT SERPL: 41.7 (ref 7–25)
CALCIUM SPEC-SCNC: 9 MG/DL (ref 8.2–9.6)
CHLORIDE SERPL-SCNC: 104 MMOL/L (ref 98–107)
CO2 SERPL-SCNC: 20 MMOL/L (ref 22–29)
CREAT SERPL-MCNC: 0.96 MG/DL (ref 0.57–1)
D-LACTATE SERPL-SCNC: 1.4 MMOL/L (ref 0.5–2)
DEPRECATED RDW RBC AUTO: 44.6 FL (ref 37–54)
EOSINOPHIL # BLD AUTO: 0.2 10*3/MM3 (ref 0–0.4)
EOSINOPHIL NFR BLD AUTO: 1.4 % (ref 0.3–6.2)
ERYTHROCYTE [DISTWIDTH] IN BLOOD BY AUTOMATED COUNT: 14.7 % (ref 12.3–15.4)
GFR SERPL CREATININE-BSD FRML MDRD: 54 ML/MIN/1.73
GLOBULIN UR ELPH-MCNC: 2.6 GM/DL
GLUCOSE SERPL-MCNC: 173 MG/DL (ref 65–99)
HCT VFR BLD AUTO: 40.4 % (ref 34–46.6)
HGB BLD-MCNC: 13.4 G/DL (ref 12–15.9)
HOLD SPECIMEN: NORMAL
HOLD SPECIMEN: NORMAL
LYMPHOCYTES # BLD AUTO: 2.1 10*3/MM3 (ref 0.7–3.1)
LYMPHOCYTES NFR BLD AUTO: 14.3 % (ref 19.6–45.3)
MCH RBC QN AUTO: 29 PG (ref 26.6–33)
MCHC RBC AUTO-ENTMCNC: 33.1 G/DL (ref 31.5–35.7)
MCV RBC AUTO: 87.6 FL (ref 79–97)
MONOCYTES # BLD AUTO: 1.1 10*3/MM3 (ref 0.1–0.9)
MONOCYTES NFR BLD AUTO: 7.2 % (ref 5–12)
NEUTROPHILS NFR BLD AUTO: 11.4 10*3/MM3 (ref 1.7–7)
NEUTROPHILS NFR BLD AUTO: 76.8 % (ref 42.7–76)
NRBC BLD AUTO-RTO: 0 /100 WBC (ref 0–0.2)
PLATELET # BLD AUTO: 297 10*3/MM3 (ref 140–450)
PMV BLD AUTO: 7.2 FL (ref 6–12)
POTASSIUM SERPL-SCNC: 4.3 MMOL/L (ref 3.5–5.2)
PROT SERPL-MCNC: 6.9 G/DL (ref 6–8.5)
RBC # BLD AUTO: 4.61 10*6/MM3 (ref 3.77–5.28)
SARS-COV-2 RNA PNL SPEC NAA+PROBE: NOT DETECTED
SODIUM SERPL-SCNC: 139 MMOL/L (ref 136–145)
WBC # BLD AUTO: 14.8 10*3/MM3 (ref 3.4–10.8)
WHOLE BLOOD HOLD SPECIMEN: NORMAL

## 2021-10-02 PROCEDURE — 71045 X-RAY EXAM CHEST 1 VIEW: CPT

## 2021-10-02 PROCEDURE — 93005 ELECTROCARDIOGRAM TRACING: CPT | Performed by: EMERGENCY MEDICINE

## 2021-10-02 PROCEDURE — G0378 HOSPITAL OBSERVATION PER HR: HCPCS

## 2021-10-02 PROCEDURE — 25010000002 HYDROMORPHONE PER 4 MG: Performed by: EMERGENCY MEDICINE

## 2021-10-02 PROCEDURE — 80053 COMPREHEN METABOLIC PANEL: CPT | Performed by: EMERGENCY MEDICINE

## 2021-10-02 PROCEDURE — U0003 INFECTIOUS AGENT DETECTION BY NUCLEIC ACID (DNA OR RNA); SEVERE ACUTE RESPIRATORY SYNDROME CORONAVIRUS 2 (SARS-COV-2) (CORONAVIRUS DISEASE [COVID-19]), AMPLIFIED PROBE TECHNIQUE, MAKING USE OF HIGH THROUGHPUT TECHNOLOGIES AS DESCRIBED BY CMS-2020-01-R: HCPCS | Performed by: EMERGENCY MEDICINE

## 2021-10-02 PROCEDURE — 99284 EMERGENCY DEPT VISIT MOD MDM: CPT

## 2021-10-02 PROCEDURE — 25010000002 CEFTRIAXONE PER 250 MG: Performed by: EMERGENCY MEDICINE

## 2021-10-02 PROCEDURE — 25010000002 ONDANSETRON PER 1 MG: Performed by: EMERGENCY MEDICINE

## 2021-10-02 PROCEDURE — 74176 CT ABD & PELVIS W/O CONTRAST: CPT

## 2021-10-02 PROCEDURE — 83605 ASSAY OF LACTIC ACID: CPT

## 2021-10-02 PROCEDURE — 85025 COMPLETE CBC W/AUTO DIFF WBC: CPT | Performed by: EMERGENCY MEDICINE

## 2021-10-02 RX ORDER — NITROGLYCERIN 0.4 MG/1
0.4 TABLET SUBLINGUAL
Status: ACTIVE | OUTPATIENT
Start: 2021-10-02 | End: 2021-10-03

## 2021-10-02 RX ORDER — SODIUM CHLORIDE 0.9 % (FLUSH) 0.9 %
10 SYRINGE (ML) INJECTION AS NEEDED
Status: DISCONTINUED | OUTPATIENT
Start: 2021-10-02 | End: 2021-10-09 | Stop reason: HOSPADM

## 2021-10-02 RX ORDER — ONDANSETRON 2 MG/ML
4 INJECTION INTRAMUSCULAR; INTRAVENOUS ONCE
Status: COMPLETED | OUTPATIENT
Start: 2021-10-02 | End: 2021-10-02

## 2021-10-02 RX ORDER — ISOSORBIDE MONONITRATE 60 MG/1
60 TABLET, EXTENDED RELEASE ORAL DAILY
Status: DISCONTINUED | OUTPATIENT
Start: 2021-10-03 | End: 2021-10-04

## 2021-10-02 RX ORDER — HYDROMORPHONE HCL 110MG/55ML
0.5 PATIENT CONTROLLED ANALGESIA SYRINGE INTRAVENOUS ONCE
Status: COMPLETED | OUTPATIENT
Start: 2021-10-02 | End: 2021-10-02

## 2021-10-02 RX ORDER — ACETAMINOPHEN 325 MG/1
650 TABLET ORAL EVERY 4 HOURS PRN
Status: DISCONTINUED | OUTPATIENT
Start: 2021-10-02 | End: 2021-10-03

## 2021-10-02 RX ORDER — SODIUM CHLORIDE 0.9 % (FLUSH) 0.9 %
10 SYRINGE (ML) INJECTION EVERY 12 HOURS SCHEDULED
Status: DISCONTINUED | OUTPATIENT
Start: 2021-10-02 | End: 2021-10-09 | Stop reason: HOSPADM

## 2021-10-02 RX ORDER — CHOLECALCIFEROL (VITAMIN D3) 125 MCG
5 CAPSULE ORAL NIGHTLY PRN
Status: DISCONTINUED | OUTPATIENT
Start: 2021-10-02 | End: 2021-10-09 | Stop reason: HOSPADM

## 2021-10-02 RX ORDER — ONDANSETRON 2 MG/ML
4 INJECTION INTRAMUSCULAR; INTRAVENOUS EVERY 6 HOURS PRN
Status: DISCONTINUED | OUTPATIENT
Start: 2021-10-02 | End: 2021-10-06 | Stop reason: SDUPTHER

## 2021-10-02 RX ORDER — SODIUM CHLORIDE, SODIUM LACTATE, POTASSIUM CHLORIDE, CALCIUM CHLORIDE 600; 310; 30; 20 MG/100ML; MG/100ML; MG/100ML; MG/100ML
125 INJECTION, SOLUTION INTRAVENOUS CONTINUOUS
Status: DISCONTINUED | OUTPATIENT
Start: 2021-10-02 | End: 2021-10-03

## 2021-10-02 RX ORDER — AMLODIPINE BESYLATE 5 MG/1
5 TABLET ORAL DAILY
Status: DISCONTINUED | OUTPATIENT
Start: 2021-10-03 | End: 2021-10-04

## 2021-10-02 RX ORDER — ACETAMINOPHEN 500 MG
500 TABLET ORAL EVERY 6 HOURS PRN
Status: DISCONTINUED | OUTPATIENT
Start: 2021-10-02 | End: 2021-10-02 | Stop reason: SDUPTHER

## 2021-10-02 RX ADMIN — HYDROMORPHONE HYDROCHLORIDE 0.5 MG: 2 INJECTION, SOLUTION INTRAMUSCULAR; INTRAVENOUS; SUBCUTANEOUS at 16:25

## 2021-10-02 RX ADMIN — ONDANSETRON 4 MG: 2 INJECTION INTRAMUSCULAR; INTRAVENOUS at 18:45

## 2021-10-02 RX ADMIN — SODIUM CHLORIDE, POTASSIUM CHLORIDE, SODIUM LACTATE AND CALCIUM CHLORIDE 125 ML/HR: 600; 310; 30; 20 INJECTION, SOLUTION INTRAVENOUS at 16:25

## 2021-10-02 RX ADMIN — CEFTRIAXONE SODIUM 1 G: 10 INJECTION, POWDER, FOR SOLUTION INTRAVENOUS at 16:25

## 2021-10-02 RX ADMIN — ONDANSETRON 4 MG: 2 INJECTION INTRAMUSCULAR; INTRAVENOUS at 15:14

## 2021-10-02 RX ADMIN — ACETAMINOPHEN 650 MG: 325 TABLET, FILM COATED ORAL at 20:57

## 2021-10-02 RX ADMIN — HYDROMORPHONE HYDROCHLORIDE 0.5 MG: 2 INJECTION, SOLUTION INTRAMUSCULAR; INTRAVENOUS; SUBCUTANEOUS at 15:14

## 2021-10-02 RX ADMIN — SODIUM CHLORIDE, POTASSIUM CHLORIDE, SODIUM LACTATE AND CALCIUM CHLORIDE 500 ML: 600; 310; 30; 20 INJECTION, SOLUTION INTRAVENOUS at 14:46

## 2021-10-02 RX ADMIN — Medication 10 ML: at 20:57

## 2021-10-02 NOTE — ED PROVIDER NOTES
Subjective   History of Present Illness  89-year-old female has a 2-hour history of diffuse abdominal pain associated with nausea and vomiting but no diarrhea. There has been no fever chills cough or congestion. The patient has a previous history of surgery for bowel obstruction a cholecystectomy as well as a hysterectomy. Last bowel movement was 2 days ago  Review of Systems   Constitutional: Positive for activity change.   HENT: Negative.    Eyes: Negative.    Respiratory: Negative.    Cardiovascular: Negative.    Gastrointestinal: Positive for abdominal pain, nausea and vomiting.   Endocrine: Negative.    Genitourinary: Negative.    Musculoskeletal: Positive for back pain.   Skin: Negative.    Allergic/Immunologic: Negative.    Neurological: Negative.    Hematological: Negative.    Psychiatric/Behavioral: Negative.        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 sure which 1  contracted at age 8   • History of transfusion    • [place] (hard of hearing)    • Hyperlipidemia    • Hypertension    • Past heart attack     X2 MILD LAST ONE OCT 2019   • PONV (postoperative nausea and vomiting)        Allergies   Allergen Reactions   • Codeine Nausea And Vomiting and Dizziness   • Lortab [Hydrocodone-Acetaminophen] Nausea And Vomiting   • Nitrofurantoin Other (See Comments)   • Sulfa Antibiotics Rash       Past Surgical History:   Procedure Laterality Date   • ANTERIOR AND POSTERIOR VAGINAL REPAIR     • BACK SURGERY     • BLEPHAROPLASTY Bilateral 5/23/2019    Procedure: bilateral upper lid blepharoplasty;  Surgeon: Mauricio Pennington MD;  [place] [facility] [place] [facility] [place]  Service: Ophthalmology   • BROW LIFT Bilateral 2/13/2020    Procedure: BILATERAL TEMPORAL DIRECT BROWLIFT;  Surgeon: Sreekanth Bird MD;   [place] [facility] [place] [facility] [place];  Service: Ophthalmology;  Laterality: Bilateral;   • CARDIAC CATHETERIZATION N/A 12/3/2019    Procedure: Left Heart Cath;  Surgeon: Puma Briseno MD;  Location: [facility] CATH INVASIVE LOCATION;  Service: Cardiovascular   • CARDIAC CATHETERIZATION N/A 12/3/2019    Procedure: Coronary angiography;  Surgeon: Puma Briseno MD;  Location: [facility] CATH INVASIVE LOCATION;  Service: Cardiovascular   • CARDIAC CATHETERIZATION N/A 12/3/2019    Procedure: Left ventriculography;  Surgeon: Puma Briseno MD;  Location: [facility] CATH INVASIVE LOCATION;  Service: Cardiovascular   • CATARACT EXTRACTION EXTRACAPSULAR W/ INTRAOCULAR LENS IMPLANTATION Bilateral    • CERVICAL SPINE ANTERIOR      with instrumentation   • CHEILECTOMY Left 8/28/2020    Procedure: CHEILECTOMY;  Surgeon: GAB Rees DPM;  Location: [facility] MAIN OR;  Service: Podiatry;  Laterality: Left;   • COLON SURGERY      for obstruction   • ALBERTO TUBE INSERTION Bilateral 5/23/2019    Procedure: ALBERTO TUBE;  Surgeon: Mauricio Pennington MD;  Location: [facility] [place] [facility] [place];  Service: Ophthalmology   • ECTROPION REPAIR Bilateral 5/23/2019    Procedure: bilateral lower lid ectropion repair, bilateral punctoplasty;  Surgeon: Mauricio Pennington MD;  Location: [facility] [place] [facility] [place];  Service: Ophthalmology   • EYE SURGERY     • FOOT FUSION Left 12/30/2016    Procedure: LEFT HALLUX METATARSALPHALANGEAL ARTHRODESIS SILVER BUNIONECTOMY METATARSAL HEAD RESECTION 2-5 CALCANEAL BONE GRAFT;  Surgeon: Monster Harper Jr., MD;  Location: [facility] OR;  Service:    • HYSTERECTOMY     • INGUINAL HERNIA REPAIR Bilateral    • METATARSAL OSTEOTOMY Left 8/28/2020    Procedure: Metatarsal head resection 5;  Surgeon: GAB Rees DPM;  Location: [facility] MAIN OR;  Service: Podiatry;  Laterality: Left;   • ROTATOR CUFF REPAIR Right    • STOMACH SURGERY      for ulcer       Family History   Problem Relation Age of Onset   • No Known Problems Mother    • No Known Problems Father     • Malig Hyperthermia Neg Hx        Social History     Socioeconomic History   • Marital status:      Spouse name: Not on file   • Number of children: Not on file   • Years of education: Not on file   • Highest education level: Not on file   Tobacco Use   • Smoking status: Former Smoker     Packs/day: 0.25     Years: 10.00     Pack years: 2.50     Types: Cigarettes     Quit date:      Years since quittin.7   • Smokeless tobacco: Never Used   Vaping Use   • Vaping Use: Never used   Substance and Sexual Activity   • Alcohol use: No   • Drug use: No   • Sexual activity: Defer           Objective   Physical Exam  Patient is awake and alert but complaining of severe abdominal pain. The patient is vomiting. The HEENT exam shows no scleral icterus mucous membranes look to be dry no exudate was noted chest is clear cardiovascular exam reveals a regular rhythm her abdomen is slightly distended bowel sounds are diminished she has diffuse tenderness she has multiple scars from prior surgeries the patient has no CVAT she has no cyanosis clubbing or edema she has tone in all extremities neurologic exam shows no focal deficit.  Procedures           ED Course           Results for orders placed or performed during the hospital encounter of 10/02/21   Comprehensive Metabolic Panel    Specimen: Blood   Result Value Ref Range    Glucose 173 (H) 65 - 99 mg/dL    BUN 40 (H) 8 - 23 mg/dL    Creatinine 0.96 0.57 - 1.00 mg/dL    Sodium 139 136 - 145 mmol/L    Potassium 4.3 3.5 - 5.2 mmol/L    Chloride 104 98 - 107 mmol/L    CO2 20.0 (L) 22.0 - 29.0 mmol/L    Calcium 9.0 8.2 - 9.6 mg/dL    Total Protein 6.9 6.0 - 8.5 g/dL    Albumin 4.30 3.50 - 5.20 g/dL    ALT (SGPT) 17 1 - 33 U/L    AST (SGOT) 21 1 - 32 U/L    Alkaline Phosphatase 120 (H) 39 - 117 U/L    Total Bilirubin 0.9 0.0 - 1.2 mg/dL    eGFR Non African Amer 54 (L) >60 mL/min/1.73    Globulin 2.6 gm/dL    A/G Ratio 1.7 g/dL    BUN/Creatinine Ratio 41.7 (H) 7.0 -  25.0    Anion Gap 15.0 5.0 - 15.0 mmol/L   CBC Auto Differential    Specimen: Blood   Result Value Ref Range    WBC 14.80 (H) 3.40 - 10.80 10*3/mm3    RBC 4.61 3.77 - 5.28 10*6/mm3    Hemoglobin 13.4 12.0 - 15.9 g/dL    Hematocrit 40.4 34.0 - 46.6 %    MCV 87.6 79.0 - 97.0 fL    MCH 29.0 26.6 - 33.0 pg    MCHC 33.1 31.5 - 35.7 g/dL    RDW 14.7 12.3 - 15.4 %    RDW-SD 44.6 37.0 - 54.0 fl    MPV 7.2 6.0 - 12.0 fL    Platelets 297 140 - 450 10*3/mm3    Neutrophil % 76.8 (H) 42.7 - 76.0 %    Lymphocyte % 14.3 (L) 19.6 - 45.3 %    Monocyte % 7.2 5.0 - 12.0 %    Eosinophil % 1.4 0.3 - 6.2 %    Basophil % 0.3 0.0 - 1.5 %    Neutrophils, Absolute 11.40 (H) 1.70 - 7.00 10*3/mm3    Lymphocytes, Absolute 2.10 0.70 - 3.10 10*3/mm3    Monocytes, Absolute 1.10 (H) 0.10 - 0.90 10*3/mm3    Eosinophils, Absolute 0.20 0.00 - 0.40 10*3/mm3    Basophils, Absolute 0.00 0.00 - 0.20 10*3/mm3    nRBC 0.0 0.0 - 0.2 /100 WBC   POC Lactate    Specimen: Blood   Result Value Ref Range    Lactate 1.4 0.5 - 2.0 mmol/L   ECG 12 Lead   Result Value Ref Range    QT Interval 389 ms   Green Top (Gel)   Result Value Ref Range    Extra Tube Hold for add-ons.    Lavender Top   Result Value Ref Range    Extra Tube hold for add-on    Gold Top - SST   Result Value Ref Range    Extra Tube Hold for add-ons.      Medications   sodium chloride 0.9 % flush 10 mL (has no administration in time range)   sodium chloride 0.9 % flush 10 mL (has no administration in time range)   lactated ringers bolus 500 mL (500 mL Intravenous New Bag 10/2/21 7381)   HYDROmorphone (DILAUDID) injection 0.5 mg (0.5 mg Intravenous Given 10/2/21 1514)   ondansetron (ZOFRAN) injection 4 mg (4 mg Intravenous Given 10/2/21 1514)     CT Abdomen Pelvis Without Contrast    Result Date: 10/2/2021   1. There is abnormal thickening of the wall the colon in the left upper quadrant. This involves the transverse colon, splenic flexure, and ascending colon consistent with a nonspecific colitis. 2.  There is also thickening of several small bowel loops in the left upper quadrant and the lower pelvis suspicious for a superimposed enteritis. 3. Postsurgical changes. 4. No free air or abscess formation. 5. Additional findings as noted above. The study is limited by noncontrast technique.  Electronically Signed By-Winston Scott MD On:10/2/2021 3:54 PM This report was finalized on [id number] by  Winston Scott MD.    XR Chest 1 View    Result Date: 10/2/2021   1. Mild cardiomegaly. 2. No active pulmonary disease.  Electronically Signed By-Winston Scott MD On:10/2/2021 3:42 PM This report was finalized on [id number] by  Winston Scott MD.                                    MDM  Patient has evidence of colitis on her CT scan but no evidence of bowel obstruction or diverticulitis.  She does have a slight elevation of her white count of 14,500 but there is no immature forms.  The patient felt better after IV fluids as well as pain meds and antiemetics.  She also had a bowel movement and felt better.  Patient to be admitted overnight on IV fluids and will be given a dose of antibiotics.  Patient is not being treated for sepsis  Final diagnoses:   Colitis   Non-intractable vomiting with nausea, unspecified vomiting type   Generalized abdominal pain       ED Disposition  ED Disposition     None          No follow-up provider specified.       Medication List      No changes were made to your prescriptions during this visit.          Dmitri Golden MD  10/02/21 2879       Dmitri Golden MD  10/02/21 2139

## 2021-10-02 NOTE — ED NOTES
Pt c/o abdominal pain that started 1230 today.     Mary Melendez, LPN  10/02/21 3103     Rhombic Flap Text: The defect edges were debeveled with a #15 scalpel blade.  Given the location of the defect and the proximity to free margins a rhombic flap was deemed most appropriate.  Using a sterile surgical marker, an appropriate rhombic flap was drawn incorporating the defect.    The area thus outlined was incised deep to adipose tissue with a #15 scalpel blade.  The skin margins were undermined to an appropriate distance in all directions utilizing iris scissors.

## 2021-10-03 PROBLEM — M51.36 DEGENERATION OF LUMBAR INTERVERTEBRAL DISC: Status: ACTIVE | Noted: 2021-10-03

## 2021-10-03 PROBLEM — M17.9 OSTEOARTHRITIS OF KNEE: Status: ACTIVE | Noted: 2021-10-03

## 2021-10-03 PROBLEM — M51.369 DEGENERATION OF LUMBAR INTERVERTEBRAL DISC: Status: ACTIVE | Noted: 2021-10-03

## 2021-10-03 LAB
ANION GAP SERPL CALCULATED.3IONS-SCNC: 14 MMOL/L (ref 5–15)
BASOPHILS # BLD AUTO: 0.1 10*3/MM3 (ref 0–0.2)
BASOPHILS NFR BLD AUTO: 0.3 % (ref 0–1.5)
BUN SERPL-MCNC: 28 MG/DL (ref 8–23)
BUN/CREAT SERPL: 41.8 (ref 7–25)
CALCIUM SPEC-SCNC: 7.8 MG/DL (ref 8.2–9.6)
CHLORIDE SERPL-SCNC: 106 MMOL/L (ref 98–107)
CO2 SERPL-SCNC: 19 MMOL/L (ref 22–29)
CREAT SERPL-MCNC: 0.67 MG/DL (ref 0.57–1)
DEPRECATED RDW RBC AUTO: 45.1 FL (ref 37–54)
EOSINOPHIL # BLD AUTO: 0 10*3/MM3 (ref 0–0.4)
EOSINOPHIL NFR BLD AUTO: 0 % (ref 0.3–6.2)
ERYTHROCYTE [DISTWIDTH] IN BLOOD BY AUTOMATED COUNT: 14.7 % (ref 12.3–15.4)
GFR SERPL CREATININE-BSD FRML MDRD: 82 ML/MIN/1.73
GLUCOSE SERPL-MCNC: 149 MG/DL (ref 65–99)
HCT VFR BLD AUTO: 35.5 % (ref 34–46.6)
HGB BLD-MCNC: 11.8 G/DL (ref 12–15.9)
LYMPHOCYTES # BLD AUTO: 0.9 10*3/MM3 (ref 0.7–3.1)
LYMPHOCYTES NFR BLD AUTO: 3.8 % (ref 19.6–45.3)
MCH RBC QN AUTO: 28.9 PG (ref 26.6–33)
MCHC RBC AUTO-ENTMCNC: 33.1 G/DL (ref 31.5–35.7)
MCV RBC AUTO: 87.2 FL (ref 79–97)
MONOCYTES # BLD AUTO: 1 10*3/MM3 (ref 0.1–0.9)
MONOCYTES NFR BLD AUTO: 3.9 % (ref 5–12)
NEUTROPHILS NFR BLD AUTO: 22.5 10*3/MM3 (ref 1.7–7)
NEUTROPHILS NFR BLD AUTO: 92 % (ref 42.7–76)
NRBC BLD AUTO-RTO: 0 /100 WBC (ref 0–0.2)
PLATELET # BLD AUTO: 246 10*3/MM3 (ref 140–450)
PMV BLD AUTO: 7.2 FL (ref 6–12)
POTASSIUM SERPL-SCNC: 4.2 MMOL/L (ref 3.5–5.2)
RBC # BLD AUTO: 4.07 10*6/MM3 (ref 3.77–5.28)
SODIUM SERPL-SCNC: 139 MMOL/L (ref 136–145)
WBC # BLD AUTO: 24.4 10*3/MM3 (ref 3.4–10.8)

## 2021-10-03 PROCEDURE — 36415 COLL VENOUS BLD VENIPUNCTURE: CPT | Performed by: EMERGENCY MEDICINE

## 2021-10-03 PROCEDURE — 80048 BASIC METABOLIC PNL TOTAL CA: CPT | Performed by: EMERGENCY MEDICINE

## 2021-10-03 PROCEDURE — 25010000002 PIPERACILLIN SOD-TAZOBACTAM PER 1 G: Performed by: FAMILY MEDICINE

## 2021-10-03 PROCEDURE — 85025 COMPLETE CBC W/AUTO DIFF WBC: CPT | Performed by: EMERGENCY MEDICINE

## 2021-10-03 RX ORDER — TRAMADOL HYDROCHLORIDE 50 MG/1
50 TABLET ORAL EVERY 6 HOURS PRN
Status: DISCONTINUED | OUTPATIENT
Start: 2021-10-03 | End: 2021-10-05

## 2021-10-03 RX ORDER — ROSUVASTATIN CALCIUM 5 MG/1
5 TABLET, COATED ORAL NIGHTLY
Status: DISCONTINUED | OUTPATIENT
Start: 2021-10-03 | End: 2021-10-09 | Stop reason: HOSPADM

## 2021-10-03 RX ORDER — ACETAMINOPHEN 325 MG/1
325 TABLET ORAL EVERY 4 HOURS PRN
Status: DISCONTINUED | OUTPATIENT
Start: 2021-10-03 | End: 2021-10-09 | Stop reason: HOSPADM

## 2021-10-03 RX ORDER — HYDROMORPHONE HCL 110MG/55ML
0.25 PATIENT CONTROLLED ANALGESIA SYRINGE INTRAVENOUS EVERY 4 HOURS PRN
Status: DISCONTINUED | OUTPATIENT
Start: 2021-10-03 | End: 2021-10-09 | Stop reason: HOSPADM

## 2021-10-03 RX ORDER — IPRATROPIUM BROMIDE AND ALBUTEROL SULFATE 2.5; .5 MG/3ML; MG/3ML
3 SOLUTION RESPIRATORY (INHALATION) 3 TIMES DAILY
Status: DISCONTINUED | OUTPATIENT
Start: 2021-10-03 | End: 2021-10-04

## 2021-10-03 RX ORDER — SODIUM CHLORIDE, SODIUM LACTATE, POTASSIUM CHLORIDE, CALCIUM CHLORIDE 600; 310; 30; 20 MG/100ML; MG/100ML; MG/100ML; MG/100ML
50 INJECTION, SOLUTION INTRAVENOUS CONTINUOUS
Status: DISPENSED | OUTPATIENT
Start: 2021-10-03 | End: 2021-10-03

## 2021-10-03 RX ADMIN — PIPERACILLIN AND TAZOBACTAM 3.38 G: 3; .375 INJECTION, POWDER, LYOPHILIZED, FOR SOLUTION INTRAVENOUS at 15:43

## 2021-10-03 RX ADMIN — ROSUVASTATIN CALCIUM 5 MG: 5 TABLET, FILM COATED ORAL at 22:00

## 2021-10-03 RX ADMIN — TRAMADOL HYDROCHLORIDE 50 MG: 50 TABLET, FILM COATED ORAL at 11:48

## 2021-10-03 RX ADMIN — SODIUM CHLORIDE, POTASSIUM CHLORIDE, SODIUM LACTATE AND CALCIUM CHLORIDE 125 ML/HR: 600; 310; 30; 20 INJECTION, SOLUTION INTRAVENOUS at 01:08

## 2021-10-03 RX ADMIN — TRAMADOL HYDROCHLORIDE 50 MG: 50 TABLET, FILM COATED ORAL at 22:00

## 2021-10-03 RX ADMIN — AMLODIPINE BESYLATE 5 MG: 5 TABLET ORAL at 08:39

## 2021-10-03 RX ADMIN — ISOSORBIDE MONONITRATE 60 MG: 60 TABLET, EXTENDED RELEASE ORAL at 08:39

## 2021-10-03 RX ADMIN — Medication 10 ML: at 08:39

## 2021-10-03 RX ADMIN — Medication 5 MG: at 22:00

## 2021-10-03 RX ADMIN — ACETAMINOPHEN 650 MG: 325 TABLET, FILM COATED ORAL at 04:39

## 2021-10-03 RX ADMIN — PIPERACILLIN AND TAZOBACTAM 3.38 G: 3; .375 INJECTION, POWDER, LYOPHILIZED, FOR SOLUTION INTRAVENOUS at 08:39

## 2021-10-03 RX ADMIN — Medication 10 ML: at 22:01

## 2021-10-03 RX ADMIN — PIPERACILLIN AND TAZOBACTAM 3.38 G: 3; .375 INJECTION, POWDER, LYOPHILIZED, FOR SOLUTION INTRAVENOUS at 22:01

## 2021-10-03 NOTE — PLAN OF CARE
Pt admitted to [facility] 411 at approximately 2030 on 10/02/2021. Family member at bedside during admission. C/o pain treated per MAR. Pt reports improvement in nausea after being treated in ER. Pt ambulatory in room, up to BS with stand by assistance. IVF therapy maintained; LR @ 125 mL/hr. Call light within reach. Will continue to monitor.

## 2021-10-03 NOTE — ED NOTES
Assisted pt to toilet. Pt co feeling cold. Temp taken reading 98.1. pt asking for tylenol. Will give per orders.      Reny Klein RN  10/02/21 2001

## 2021-10-03 NOTE — PLAN OF CARE
Goal Outcome Evaluation:  Plan of Care Reviewed With: patient        Progress: improving  Outcome Summary: Pt is stable and resting in bed. Having small bright red stools. Zosyn started today. Will continue to monitor.

## 2021-10-03 NOTE — H&P
Patient Care Team:  Monster Myrick MD as PCP - General (Family Medicine)  ROBERTO CARLOS Morales MD as Consulting Physician (Cardiology)    Chief Conplaint  Subjective     The patient is a 89 y.o. female presents with a several hour of some progressive abdominal pain with evidence of an acute colitis necessitating inpatient hospitalization for definitive care    HPI  The patient was in usual state of health until the day of presentation when she began to experience the rather sudden onset of some progressive abdominal pain which was located diffusely across her abdomen.  She attempted to rest and use home medications but decompensated quite quickly and presented to the [facility] emergency room for evaluation where she was admitted.  At the time of my evaluation she is feeling somewhat better.  She denies shortness of breath orthopnea proximal nocturnal dyspnea or other constitutional complaint.  She denied gross hematochezia.    Review of Systems  Review of Systems   Constitutional: Positive for activity change and appetite change.   Respiratory: Negative.    Cardiovascular: Negative.    Gastrointestinal: Positive for abdominal distention, abdominal pain and nausea. Negative for anal bleeding, blood in stool, constipation, diarrhea and vomiting.   Genitourinary: Negative.        History  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 sure which 1  contracted at age 8   • History of transfusion    • Tribe (hard of hearing)    • Hyperlipidemia    • Hypertension    • Past heart attack     X2 MILD LAST ONE OCT 2019   • PONV (postoperative nausea and vomiting)      Past Surgical History:   Procedure Laterality Date   • ANTERIOR AND POSTERIOR VAGINAL REPAIR     • BACK SURGERY     • BLEPHAROPLASTY Bilateral  5/23/2019    Procedure: bilateral upper lid blepharoplasty;  Surgeon: Mauricio Pennington MD;  Location: [facility] OR [facility];  Service: Ophthalmology   • BROW LIFT Bilateral 2/13/2020    Procedure: BILATERAL TEMPORAL DIRECT BROWLIFT;  Surgeon: Sreekanth Bird MD;  Location: [facility] OR [facility];  Service: Ophthalmology;  Laterality: Bilateral;   • CARDIAC CATHETERIZATION N/A 12/3/2019    Procedure: Left Heart Cath;  Surgeon: Puma Briseno MD;  Location: [facility] CATH INVASIVE LOCATION;  Service: Cardiovascular   • CARDIAC CATHETERIZATION N/A 12/3/2019    Procedure: Coronary angiography;  Surgeon: Puma Briseno MD;  Location: [facility] CATH INVASIVE LOCATION;  Service: Cardiovascular   • CARDIAC CATHETERIZATION N/A 12/3/2019    Procedure: Left ventriculography;  Surgeon: Puma Briseno MD;  Location: [facility] CATH INVASIVE LOCATION;  Service: Cardiovascular   • CATARACT EXTRACTION EXTRACAPSULAR W/ INTRAOCULAR LENS IMPLANTATION Bilateral    • CERVICAL SPINE ANTERIOR      with instrumentation   • CHEILECTOMY Left 8/28/2020    Procedure: CHEILECTOMY;  Surgeon: GAB Rees DPM;  Location: [facility] MAIN OR;  Service: Podiatry;  Laterality: Left;   • COLON SURGERY      for obstruction   • ALBERTO TUBE INSERTION Bilateral 5/23/2019    Procedure: ALBERTO TUBE;  Surgeon: Mauricio Pennington MD;  Location: [facility] OR [facility];  Service: Ophthalmology   • ECTROPION REPAIR Bilateral 5/23/2019    Procedure: bilateral lower lid ectropion repair, bilateral punctoplasty;  Surgeon: Mauricio Pennington MD;  Location: [facility] OR [facility];  Service: Ophthalmology   • EYE SURGERY     • FOOT FUSION Left 12/30/2016    Procedure: LEFT HALLUX METATARSALPHALANGEAL ARTHRODESIS SILVER BUNIONECTOMY METATARSAL HEAD RESECTION 2-5 CALCANEAL BONE GRAFT;  Surgeon: Monster Harper Jr., MD;  Location: [facility] MAIN OR;  Service:    • HYSTERECTOMY     • INGUINAL HERNIA REPAIR Bilateral    • METATARSAL OSTEOTOMY Left 8/28/2020    Procedure: Metatarsal head resection 5;  Surgeon:  GAB Rees DPM;  Location: [facility] MAIN OR;  Service: Podiatry;  Laterality: Left;   • ROTATOR CUFF REPAIR Right    • STOMACH SURGERY      for ulcer     Family History   Problem Relation Age of Onset   • No Known Problems Mother    • No Known Problems Father    • Malig Hyperthermia Neg Hx      Social History     Tobacco Use   • Smoking status: Former Smoker     Packs/day: 0.25     Years: 10.00     Pack years: 2.50     Types: Cigarettes     Quit date:      Years since quittin.7   • Smokeless tobacco: Never Used   Vaping Use   • Vaping Use: Never used   Substance Use Topics   • Alcohol use: No   • Drug use: No     Allergies:  Codeine, Lortab [hydrocodone-acetaminophen], Nitrofurantoin, and Sulfa antibiotics    Objective     Vital Signs  Temp:  [97.9 °F (36.6 °C)-100.5 °F (38.1 °C)] 99.5 °F (37.5 °C)  Heart Rate:  [] 87  Resp:  [16-24] 16  BP: (119-168)/(62-89) 141/65      Physical Exam:   Physical Exam  Vitals and nursing note reviewed.   Constitutional:       Appearance: Normal appearance.   Cardiovascular:      Rate and Rhythm: Normal rate.      Pulses: Normal pulses.   Pulmonary:      Breath sounds: Normal breath sounds.   Abdominal:      Palpations: Abdomen is soft.   Skin:     General: Skin is warm.   Neurological:      Mental Status: She is alert.              Results Review:   CBC    Results from last 7 days   Lab Units 10/03/21  0308 10/02/21  1442   WBC 10*3/mm3 24.40* 14.80*   HEMOGLOBIN g/dL 11.8* 13.4   PLATELETS 10*3/mm3 246 297     BMP   Results from last 7 days   Lab Units 10/03/21  0308 10/02/21  1442   SODIUM mmol/L 139 139   POTASSIUM mmol/L 4.2 4.3   CHLORIDE mmol/L 106 104   CO2 mmol/L 19.0* 20.0*   BUN mg/dL 28* 40*   CREATININE mg/dL 0.67 0.96   GLUCOSE mg/dL 149* 173*     Cr Clearance Estimated Creatinine Clearance: 36.9 mL/min (by C-G formula based on SCr of 0.67 mg/dL).  Coag     HbA1C   Lab Results   Component Value Date    HGBA1C 5.8 (H) 2019     Blood Glucose No  results found for: POCGLU  Infection     CMP   Results from last 7 days   Lab Units 10/03/21  0308 10/02/21  1442   SODIUM mmol/L 139 139   POTASSIUM mmol/L 4.2 4.3   CHLORIDE mmol/L 106 104   CO2 mmol/L 19.0* 20.0*   BUN mg/dL 28* 40*   CREATININE mg/dL 0.67 0.96   GLUCOSE mg/dL 149* 173*   ALBUMIN g/dL  --  4.30   BILIRUBIN mg/dL  --  0.9   ALK PHOS U/L  --  120*   AST (SGOT) U/L  --  21   ALT (SGPT) U/L  --  17     UA      Radiology(recent) CT Abdomen Pelvis Without Contrast    Result Date: 10/2/2021   1. There is abnormal thickening of the wall the colon in the left upper quadrant. This involves the transverse colon, splenic flexure, and ascending colon consistent with a nonspecific colitis. 2. There is also thickening of several small bowel loops in the left upper quadrant and the lower pelvis suspicious for a superimposed enteritis. 3. Postsurgical changes. 4. No free air or abscess formation. 5. Additional findings as noted above. The study is limited by noncontrast technique.  Electronically Signed By-Winston Scott MD On:10/2/2021 3:54 PM This report was finalized on [id number] by  Winston Scott MD.    XR Chest 1 View    Result Date: 10/2/2021   1. Mild cardiomegaly. 2. No active pulmonary disease.  Electronically Signed By-Winston Scott MD On:10/2/2021 3:42 PM This report was finalized on [id number] by  Winston Scott MD.       Assessment:      Colitis    Acute colitis  Acute enteritis  Acute abdominal pain secondary to the above  Atherosclerotic heart disease of native coronary arteries with angina pectoris  Hypertension associated chronic kidney disease stage II  Chronic kidney disease stage II  Panlobular COPD with emphysema  Dyslipidemia  History of myocardial infarction  Gastroesophageal reflux disease without esophagitis  Dermatochalasis  Degenerative disc disease lumbosacral spine  Osteoarthritis    Plan:  Empiric antimicrobial therapy//bowel rest//pain control  Expected Length of Stay 2-3 days    I  discussed the patients findings and my recommendations with patient and nursing staff.     Winston Amaya MD  10/03/21  07:51 EDT

## 2021-10-04 ENCOUNTER — INPATIENT HOSPITAL (AMBULATORY)
Dept: URBAN - METROPOLITAN AREA HOSPITAL 84 | Facility: HOSPITAL | Age: 86
End: 2021-10-04
Payer: MEDICARE

## 2021-10-04 ENCOUNTER — APPOINTMENT (OUTPATIENT)
Dept: CARDIOLOGY | Facility: HOSPITAL | Age: 86
End: 2021-10-04

## 2021-10-04 DIAGNOSIS — K52.9 NONINFECTIVE GASTROENTERITIS AND COLITIS, UNSPECIFIED: ICD-10-CM

## 2021-10-04 DIAGNOSIS — R10.30 LOWER ABDOMINAL PAIN, UNSPECIFIED: ICD-10-CM

## 2021-10-04 DIAGNOSIS — R11.2 NAUSEA WITH VOMITING, UNSPECIFIED: ICD-10-CM

## 2021-10-04 LAB
ANION GAP SERPL CALCULATED.3IONS-SCNC: 11 MMOL/L (ref 5–15)
BASOPHILS # BLD AUTO: 0.1 10*3/MM3 (ref 0–0.2)
BASOPHILS NFR BLD AUTO: 0.3 % (ref 0–1.5)
BH CV ECHO MEAS - ACS: 1.7 CM
BH CV ECHO MEAS - AO MAX PG (FULL): 2.9 MMHG
BH CV ECHO MEAS - AO MAX PG: 6.9 MMHG
BH CV ECHO MEAS - AO MEAN PG (FULL): 1.2 MMHG
BH CV ECHO MEAS - AO MEAN PG: 3.7 MMHG
BH CV ECHO MEAS - AO ROOT AREA (BSA CORRECTED): 2
BH CV ECHO MEAS - AO ROOT AREA: 8 CM^2
BH CV ECHO MEAS - AO ROOT DIAM: 3.2 CM
BH CV ECHO MEAS - AO V2 MAX: 130.9 CM/SEC
BH CV ECHO MEAS - AO V2 MEAN: 90.4 CM/SEC
BH CV ECHO MEAS - AO V2 VTI: 20.6 CM
BH CV ECHO MEAS - AORTIC HR: 258.4 BPM
BH CV ECHO MEAS - AORTIC R-R: 0.23 SEC
BH CV ECHO MEAS - ASC AORTA: 2.7 CM
BH CV ECHO MEAS - AVA(I,A): 2.7 CM^2
BH CV ECHO MEAS - AVA(I,D): 2.7 CM^2
BH CV ECHO MEAS - AVA(V,A): 2.4 CM^2
BH CV ECHO MEAS - AVA(V,D): 2.4 CM^2
BH CV ECHO MEAS - BSA(HAYCOCK): 1.6 M^2
BH CV ECHO MEAS - BSA: 1.6 M^2
BH CV ECHO MEAS - BZI_BMI: 22.1 KILOGRAMS/M^2
BH CV ECHO MEAS - BZI_METRIC_HEIGHT: 160 CM
BH CV ECHO MEAS - BZI_METRIC_WEIGHT: 56.7 KG
BH CV ECHO MEAS - CI(AO): 26.9 L/MIN/M^2
BH CV ECHO MEAS - CI(LVOT): 9.1 L/MIN/M^2
BH CV ECHO MEAS - CO(AO): 42.6 L/MIN
BH CV ECHO MEAS - CO(LVOT): 14.3 L/MIN
BH CV ECHO MEAS - EDV(CUBED): 105.3 ML
BH CV ECHO MEAS - EDV(MOD-SP4): 44.2 ML
BH CV ECHO MEAS - EDV(TEICH): 103.5 ML
BH CV ECHO MEAS - EF(CUBED): 81.7 %
BH CV ECHO MEAS - EF(MOD-BP): 57 %
BH CV ECHO MEAS - EF(MOD-SP4): 57.3 %
BH CV ECHO MEAS - EF(TEICH): 74.4 %
BH CV ECHO MEAS - ESV(CUBED): 19.2 ML
BH CV ECHO MEAS - ESV(MOD-SP4): 18.9 ML
BH CV ECHO MEAS - ESV(TEICH): 26.5 ML
BH CV ECHO MEAS - FS: 43.3 %
BH CV ECHO MEAS - IVS/LVPW: 0.56
BH CV ECHO MEAS - IVSD: 0.68 CM
BH CV ECHO MEAS - LA DIMENSION(2D): 3.3 CM
BH CV ECHO MEAS - LV DIASTOLIC VOL/BSA (35-75): 27.9 ML/M^2
BH CV ECHO MEAS - LV MASS(C)D: 152.5 GRAMS
BH CV ECHO MEAS - LV MASS(C)DI: 96.3 GRAMS/M^2
BH CV ECHO MEAS - LV MAX PG: 3.9 MMHG
BH CV ECHO MEAS - LV MEAN PG: 2.5 MMHG
BH CV ECHO MEAS - LV SYSTOLIC VOL/BSA (12-30): 11.9 ML/M^2
BH CV ECHO MEAS - LV V1 MAX: 98.9 CM/SEC
BH CV ECHO MEAS - LV V1 MEAN: 74.6 CM/SEC
BH CV ECHO MEAS - LV V1 VTI: 17.3 CM
BH CV ECHO MEAS - LVIDD: 4.7 CM
BH CV ECHO MEAS - LVIDS: 2.7 CM
BH CV ECHO MEAS - LVOT AREA: 3.2 CM^2
BH CV ECHO MEAS - LVOT DIAM: 2 CM
BH CV ECHO MEAS - LVPWD: 1.2 CM
BH CV ECHO MEAS - MR MAX PG: 74 MMHG
BH CV ECHO MEAS - MR MAX VEL: 430 CM/SEC
BH CV ECHO MEAS - MV MAX PG: 7.3 MMHG
BH CV ECHO MEAS - MV MEAN PG: 2.4 MMHG
BH CV ECHO MEAS - MV V2 MAX: 134.9 CM/SEC
BH CV ECHO MEAS - MV V2 MEAN: 69 CM/SEC
BH CV ECHO MEAS - MV V2 VTI: 25.2 CM
BH CV ECHO MEAS - MVA(VTI): 2.2 CM^2
BH CV ECHO MEAS - PA ACC TIME: 0.08 SEC
BH CV ECHO MEAS - PA MAX PG (FULL): 2.3 MMHG
BH CV ECHO MEAS - PA MAX PG: 6.3 MMHG
BH CV ECHO MEAS - PA MEAN PG (FULL): 1.2 MMHG
BH CV ECHO MEAS - PA MEAN PG: 2.7 MMHG
BH CV ECHO MEAS - PA PR(ACCEL): 43 MMHG
BH CV ECHO MEAS - PA V2 MAX: 125.7 CM/SEC
BH CV ECHO MEAS - PA V2 MEAN: 74.9 CM/SEC
BH CV ECHO MEAS - PA V2 VTI: 18.5 CM
BH CV ECHO MEAS - PI END-D VEL: 91.1 CM/SEC
BH CV ECHO MEAS - PI MAX PG: 18.5 MMHG
BH CV ECHO MEAS - PI MAX VEL: 215.2 CM/SEC
BH CV ECHO MEAS - PULM A REVS DUR: 0.08 SEC
BH CV ECHO MEAS - PULM A REVS VEL: 36.2 CM/SEC
BH CV ECHO MEAS - PULM DIAS VEL: 54 CM/SEC
BH CV ECHO MEAS - PULM S/D: 0.89
BH CV ECHO MEAS - PULM SYS VEL: 48.2 CM/SEC
BH CV ECHO MEAS - PVA(I,A): 4 CM^2
BH CV ECHO MEAS - PVA(I,D): 4 CM^2
BH CV ECHO MEAS - PVA(V,A): 3.7 CM^2
BH CV ECHO MEAS - PVA(V,D): 3.7 CM^2
BH CV ECHO MEAS - QP/QS: 1.3
BH CV ECHO MEAS - RAP SYSTOLE: 3 MMHG
BH CV ECHO MEAS - RV MAX PG: 4 MMHG
BH CV ECHO MEAS - RV MEAN PG: 1.5 MMHG
BH CV ECHO MEAS - RV V1 MAX: 100.4 CM/SEC
BH CV ECHO MEAS - RV V1 MEAN: 55.6 CM/SEC
BH CV ECHO MEAS - RV V1 VTI: 16.1 CM
BH CV ECHO MEAS - RVDD: 1.8 CM
BH CV ECHO MEAS - RVOT AREA: 4.6 CM^2
BH CV ECHO MEAS - RVOT DIAM: 2.4 CM
BH CV ECHO MEAS - RVSP: 32 MMHG
BH CV ECHO MEAS - SI(AO): 104.2 ML/M^2
BH CV ECHO MEAS - SI(CUBED): 54.3 ML/M^2
BH CV ECHO MEAS - SI(LVOT): 35.1 ML/M^2
BH CV ECHO MEAS - SI(MOD-SP4): 16 ML/M^2
BH CV ECHO MEAS - SI(TEICH): 48.6 ML/M^2
BH CV ECHO MEAS - SV(AO): 165 ML
BH CV ECHO MEAS - SV(CUBED): 86.1 ML
BH CV ECHO MEAS - SV(LVOT): 55.5 ML
BH CV ECHO MEAS - SV(MOD-SP4): 25.3 ML
BH CV ECHO MEAS - SV(RVOT): 74.3 ML
BH CV ECHO MEAS - SV(TEICH): 77 ML
BH CV ECHO MEAS - TR MAX VEL: 269.1 CM/SEC
BUN SERPL-MCNC: 21 MG/DL (ref 8–23)
BUN/CREAT SERPL: 28 (ref 7–25)
CALCIUM SPEC-SCNC: 7.9 MG/DL (ref 8.2–9.6)
CHLORIDE SERPL-SCNC: 104 MMOL/L (ref 98–107)
CO2 SERPL-SCNC: 21 MMOL/L (ref 22–29)
CREAT SERPL-MCNC: 0.75 MG/DL (ref 0.57–1)
D-LACTATE SERPL-SCNC: 1.3 MMOL/L (ref 0.5–2)
DEPRECATED RDW RBC AUTO: 45.5 FL (ref 37–54)
EOSINOPHIL # BLD AUTO: 0.1 10*3/MM3 (ref 0–0.4)
EOSINOPHIL NFR BLD AUTO: 0.3 % (ref 0.3–6.2)
ERYTHROCYTE [DISTWIDTH] IN BLOOD BY AUTOMATED COUNT: 14.7 % (ref 12.3–15.4)
GFR SERPL CREATININE-BSD FRML MDRD: 72 ML/MIN/1.73
GLUCOSE SERPL-MCNC: 95 MG/DL (ref 65–99)
HCT VFR BLD AUTO: 32.6 % (ref 34–46.6)
HGB BLD-MCNC: 10.8 G/DL (ref 12–15.9)
LYMPHOCYTES # BLD AUTO: 1.3 10*3/MM3 (ref 0.7–3.1)
LYMPHOCYTES NFR BLD AUTO: 6.9 % (ref 19.6–45.3)
MCH RBC QN AUTO: 29 PG (ref 26.6–33)
MCHC RBC AUTO-ENTMCNC: 33.1 G/DL (ref 31.5–35.7)
MCV RBC AUTO: 87.7 FL (ref 79–97)
MONOCYTES # BLD AUTO: 0.9 10*3/MM3 (ref 0.1–0.9)
MONOCYTES NFR BLD AUTO: 5 % (ref 5–12)
NEUTROPHILS NFR BLD AUTO: 16 10*3/MM3 (ref 1.7–7)
NEUTROPHILS NFR BLD AUTO: 87.5 % (ref 42.7–76)
NRBC BLD AUTO-RTO: 0 /100 WBC (ref 0–0.2)
PLATELET # BLD AUTO: 203 10*3/MM3 (ref 140–450)
PMV BLD AUTO: 7 FL (ref 6–12)
POTASSIUM SERPL-SCNC: 3.9 MMOL/L (ref 3.5–5.2)
RBC # BLD AUTO: 3.72 10*6/MM3 (ref 3.77–5.28)
SODIUM SERPL-SCNC: 136 MMOL/L (ref 136–145)
TROPONIN T SERPL-MCNC: 0.01 NG/ML (ref 0–0.03)
WBC # BLD AUTO: 18.3 10*3/MM3 (ref 3.4–10.8)

## 2021-10-04 PROCEDURE — 93005 ELECTROCARDIOGRAM TRACING: CPT | Performed by: FAMILY MEDICINE

## 2021-10-04 PROCEDURE — 99222 1ST HOSP IP/OBS MODERATE 55: CPT | Performed by: INTERNAL MEDICINE

## 2021-10-04 PROCEDURE — 83605 ASSAY OF LACTIC ACID: CPT | Performed by: FAMILY MEDICINE

## 2021-10-04 PROCEDURE — 93010 ELECTROCARDIOGRAM REPORT: CPT | Performed by: INTERNAL MEDICINE

## 2021-10-04 PROCEDURE — 93306 TTE W/DOPPLER COMPLETE: CPT | Performed by: INTERNAL MEDICINE

## 2021-10-04 PROCEDURE — 84484 ASSAY OF TROPONIN QUANT: CPT | Performed by: FAMILY MEDICINE

## 2021-10-04 PROCEDURE — 93306 TTE W/DOPPLER COMPLETE: CPT

## 2021-10-04 PROCEDURE — 25010000002 PIPERACILLIN SOD-TAZOBACTAM PER 1 G: Performed by: FAMILY MEDICINE

## 2021-10-04 PROCEDURE — 99221 1ST HOSP IP/OBS SF/LOW 40: CPT | Performed by: NURSE PRACTITIONER

## 2021-10-04 PROCEDURE — 85025 COMPLETE CBC W/AUTO DIFF WBC: CPT | Performed by: FAMILY MEDICINE

## 2021-10-04 PROCEDURE — 80048 BASIC METABOLIC PNL TOTAL CA: CPT | Performed by: FAMILY MEDICINE

## 2021-10-04 RX ORDER — AMIODARONE HYDROCHLORIDE 200 MG/1
300 TABLET ORAL EVERY 12 HOURS SCHEDULED
Status: DISCONTINUED | OUTPATIENT
Start: 2021-10-04 | End: 2021-10-04

## 2021-10-04 RX ORDER — AMIODARONE HYDROCHLORIDE 200 MG/1
200 TABLET ORAL
Status: DISCONTINUED | OUTPATIENT
Start: 2021-10-05 | End: 2021-10-09 | Stop reason: HOSPADM

## 2021-10-04 RX ORDER — IPRATROPIUM BROMIDE AND ALBUTEROL SULFATE 2.5; .5 MG/3ML; MG/3ML
3 SOLUTION RESPIRATORY (INHALATION) EVERY 6 HOURS PRN
Status: DISCONTINUED | OUTPATIENT
Start: 2021-10-04 | End: 2021-10-08

## 2021-10-04 RX ORDER — AMIODARONE HYDROCHLORIDE 200 MG/1
200 TABLET ORAL EVERY 12 HOURS SCHEDULED
Status: DISCONTINUED | OUTPATIENT
Start: 2021-10-04 | End: 2021-10-04

## 2021-10-04 RX ADMIN — PIPERACILLIN AND TAZOBACTAM 3.38 G: 3; .375 INJECTION, POWDER, LYOPHILIZED, FOR SOLUTION INTRAVENOUS at 06:29

## 2021-10-04 RX ADMIN — SODIUM CHLORIDE 250 ML: 9 INJECTION, SOLUTION INTRAVENOUS at 05:37

## 2021-10-04 RX ADMIN — AMIODARONE HYDROCHLORIDE 200 MG: 200 TABLET ORAL at 22:08

## 2021-10-04 RX ADMIN — Medication 10 ML: at 09:03

## 2021-10-04 RX ADMIN — ISOSORBIDE MONONITRATE 60 MG: 60 TABLET, EXTENDED RELEASE ORAL at 09:04

## 2021-10-04 RX ADMIN — PIPERACILLIN AND TAZOBACTAM 3.38 G: 3; .375 INJECTION, POWDER, LYOPHILIZED, FOR SOLUTION INTRAVENOUS at 15:24

## 2021-10-04 RX ADMIN — ROSUVASTATIN CALCIUM 5 MG: 5 TABLET, FILM COATED ORAL at 22:08

## 2021-10-04 RX ADMIN — PIPERACILLIN AND TAZOBACTAM 3.38 G: 3; .375 INJECTION, POWDER, LYOPHILIZED, FOR SOLUTION INTRAVENOUS at 22:08

## 2021-10-04 RX ADMIN — AMLODIPINE BESYLATE 5 MG: 5 TABLET ORAL at 09:04

## 2021-10-04 RX ADMIN — METOPROLOL TARTRATE 12.5 MG: 25 TABLET, FILM COATED ORAL at 12:02

## 2021-10-04 RX ADMIN — Medication 10 ML: at 22:09

## 2021-10-04 RX ADMIN — METOPROLOL TARTRATE 12.5 MG: 25 TABLET, FILM COATED ORAL at 22:08

## 2021-10-04 NOTE — CASE MANAGEMENT/SOCIAL WORK
Discharge Planning Assessment  [facility]     Patient Name: Carrie Golden  MRN: [id number]  Today's Date: 10/4/2021    Admit Date: 10/2/2021    Discharge Needs Assessment     Row Name 10/04/21 1245       Living Environment    Lives With  alone    Current Living Arrangements  home/apartment/condo    Primary Care Provided by  self    Provides Primary Care For  no one    Family Caregiver if Needed  other relative(s)    Family Caregiver Names  Niece-Rita    Quality of Family Relationships  helpful;involved;supportive    Able to Return to Prior Arrangements  yes       Resource/Environmental Concerns    Resource/Environmental Concerns  none    Transportation Concerns  car, none       Transition Planning    Patient/Family Anticipates Transition to  home    Patient/Family Anticipated Services at Transition  none    Transportation Anticipated  family or friend will provide       Discharge Needs Assessment    Readmission Within the Last 30 Days  no previous admission in last 30 days    Equipment Currently Used at Home  none    Concerns to be Addressed  denies needs/concerns at this time;no discharge needs identified    Anticipated Changes Related to Illness  none    Equipment Needed After Discharge  none    Provided Post Acute Provider List?  N/A    Provided Post Acute Provider Quality & Resource List?  N/A        Discharge Plan     Row Name 10/04/21 5773       Plan    Plan  DC plan: anticipate routine home. No equipment needs/services identified at this time.    Provided Post Acute Provider List?  N/A    Provided Post Acute Provider Quality & Resource List?  N/A    Patient/Family in Agreement with Plan  yes    Plan Comments  Met with patient and niece at bedside. Patient lives at home alone and is independent of ADL's. She does not use any equipment and denies any need. Confirmed PCP and preferred pharmacy-[facility] on [address]. Her niece will be able to drive home upon discarge. Denies any needs/services at this time.         Demographic Summary     Row Name 10/04/21 1244       General Information    Admission Type  inpatient    Arrived From  emergency department    Referral Source  admission list    Reason for Consult  discharge planning;care coordination/care conference    Preferred Language  English     Used During This Interaction  no       Contact Information    Permission Granted to Share Info With          Functional Status     Row Name 10/04/21 1244       Functional Status    Usual Activity Tolerance  good    Current Activity Tolerance  good       Functional Status, IADL    Medications  independent    Meal Preparation  independent    Housekeeping  independent    Laundry  independent    Shopping  independent        Met with patient in room wearing PPE: mask, goggles.    Maintained distance greater than six feet and spent less than 15 minutes in the room.      Megan Naegele, RN      Office Phone: [phone]  Office Cell: [phone]

## 2021-10-04 NOTE — PLAN OF CARE
Pt resting. C/o pain treated per MAR. No c/o nausea or vomiting, but pt reported poor appetite yesterday. At approximately 0215, this nurse took note of change in pt's vital signs. HR was fluctuating frequently between 120-140 bpm and blood pressure was hypotensive. EKG ordered and significant changes were noted. MD notified; cardiology consulted and 250cc bolus administered. Continuous cardiac monitoring and  initiated. Call light within reach. Will continue to monitor.

## 2021-10-04 NOTE — PLAN OF CARE
Goal Outcome Evaluation:  Plan of Care Reviewed With: patient      Pt has not complained of pain this shift. Pt ambulated in hallway with stand by assistance IVF therapy maintained; LR @ 125 mL/hr. Call light within reach. Will continue to monitor.   Progress: improving

## 2021-10-04 NOTE — PROGRESS NOTES
LOS: 1 day   Patient Care Team:  Monster Myrick MD as PCP - General (Family Medicine)  ROBERTO CARLOS Morales MD as Consulting Physician (Cardiology)    Subjective:  Progressive abdominal pain//palpitations    Objective:   Tachyarrhythmia//relative hypotension//status post fluid bolus last evening      Review of Systems:   Review of Systems   Constitutional: Positive for activity change and appetite change.   Respiratory: Negative.    Cardiovascular: Positive for chest pain and palpitations.   Gastrointestinal: Positive for abdominal distention and abdominal pain.   Neurological: Positive for weakness.   Psychiatric/Behavioral: Negative.        Vital Signs  Temp:  [98.1 °F (36.7 °C)-99.1 °F (37.3 °C)] 98.1 °F (36.7 °C)  Heart Rate:  [] 89  Resp:  [16-17] 16  BP: ()/(56-67) 110/67    Physical Exam:  Physical Exam  Vitals and nursing note reviewed.   Constitutional:       Appearance: Normal appearance.   Cardiovascular:      Rate and Rhythm: Rhythm irregular.      Heart sounds: Normal heart sounds.   Pulmonary:      Breath sounds: Normal breath sounds.   Skin:     General: Skin is warm.   Neurological:      General: No focal deficit present.      Mental Status: She is alert and oriented to person, place, and time.          Radiology:  CT Abdomen Pelvis Without Contrast    Result Date: 10/2/2021   1. There is abnormal thickening of the wall the colon in the left upper quadrant. This involves the transverse colon, splenic flexure, and ascending colon consistent with a nonspecific colitis. 2. There is also thickening of several small bowel loops in the left upper quadrant and the lower pelvis suspicious for a superimposed enteritis. 3. Postsurgical changes. 4. No free air or abscess formation. 5. Additional findings as noted above. The study is limited by noncontrast technique.  Electronically Signed By-Winston Scott MD On:10/2/2021 3:54 PM This report was finalized on [id number] by  Winston Scott MD.    XR  Chest 1 View    Result Date: 10/2/2021   1. Mild cardiomegaly. 2. No active pulmonary disease.  Electronically Signed By-Winston Scott MD On:10/2/2021 3:42 PM This report was finalized on [id number] by  Winston Scott MD.         Results Review:     I reviewed the patient's new clinical results.  I reviewed the patient's new imaging results and agree with the interpretation.    Medication Review:   Scheduled Meds:amLODIPine, 5 mg, Oral, Daily  ipratropium-albuterol, 3 mL, Nebulization, TID  isosorbide mononitrate, 60 mg, Oral, Daily  piperacillin-tazobactam, 3.375 g, Intravenous, Q8H  rosuvastatin, 5 mg, Oral, Nightly  sodium chloride, 10 mL, Intravenous, Q12H      Continuous Infusions:   PRN Meds:.•  acetaminophen  •  HYDROmorphone  •  melatonin  •  ondansetron  •  sodium chloride  •  [COMPLETED] Insert peripheral IV **AND** sodium chloride  •  sodium chloride  •  traMADol    Labs:    CBC    Results from last 7 days   Lab Units 10/04/21  0259 10/03/21  0308 10/02/21  1442   WBC 10*3/mm3 18.30* 24.40* 14.80*   HEMOGLOBIN g/dL 10.8* 11.8* 13.4   PLATELETS 10*3/mm3 203 246 297     BMP   Results from last 7 days   Lab Units 10/04/21  0259 10/03/21  0308 10/02/21  1442   SODIUM mmol/L 136 139 139   POTASSIUM mmol/L 3.9 4.2 4.3   CHLORIDE mmol/L 104 106 104   CO2 mmol/L 21.0* 19.0* 20.0*   BUN mg/dL 21 28* 40*   CREATININE mg/dL 0.75 0.67 0.96   GLUCOSE mg/dL 95 149* 173*     Cr Clearance Estimated Creatinine Clearance: 36.9 mL/min (by C-G formula based on SCr of 0.75 mg/dL).  Coag     HbA1C   Lab Results   Component Value Date    HGBA1C 5.8 (H) 12/02/2019     Blood Glucose No results found for: POCGLU  Infection     CMP   Results from last 7 days   Lab Units 10/04/21  0259 10/03/21  0308 10/02/21  1442   SODIUM mmol/L 136 139 139   POTASSIUM mmol/L 3.9 4.2 4.3   CHLORIDE mmol/L 104 106 104   CO2 mmol/L 21.0* 19.0* 20.0*   BUN mg/dL 21 28* 40*   CREATININE mg/dL 0.75 0.67 0.96   GLUCOSE mg/dL 95 149* 173*   ALBUMIN g/dL   --   --  4.30   BILIRUBIN mg/dL  --   --  0.9   ALK PHOS U/L  --   --  120*   AST (SGOT) U/L  --   --  21   ALT (SGPT) U/L  --   --  17     UA      Radiology(recent) CT Abdomen Pelvis Without Contrast    Result Date: 10/2/2021   1. There is abnormal thickening of the wall the colon in the left upper quadrant. This involves the transverse colon, splenic flexure, and ascending colon consistent with a nonspecific colitis. 2. There is also thickening of several small bowel loops in the left upper quadrant and the lower pelvis suspicious for a superimposed enteritis. 3. Postsurgical changes. 4. No free air or abscess formation. 5. Additional findings as noted above. The study is limited by noncontrast technique.  Electronically Signed By-Winston Scott MD On:10/2/2021 3:54 PM This report was finalized on [id number] by  Winston Scott MD.    XR Chest 1 View    Result Date: 10/2/2021   1. Mild cardiomegaly. 2. No active pulmonary disease.  Electronically Signed By-Winston Scott MD On:10/2/2021 3:42 PM This report was finalized on [id number] by  Winston Scott MD.     Assessment:      Acute tachyarrhythmia  Acute colitis  Acute enteritis  Acute abdominal pain secondary to the above  Atherosclerotic heart disease of native coronary arteries with angina pectoris  Hypertension associated chronic kidney disease stage II  Chronic kidney disease stage II  Panlobular COPD with emphysema  Dyslipidemia  History of myocardial infarction  Gastroesophageal reflux disease without esophagitis  Dermatochalasis  Degenerative disc disease lumbosacral spine  Osteoarthritis    Plan:  Cardiology and gastroenterology evaluations//pain control//hemodynamic support        Winston Amaya MD  10/04/21  08:13 EDT

## 2021-10-04 NOTE — CONSULTS
Cardiology progress note  Gavin Quiroz MD, PhD      Patient Care Team:  Monster Myrick MD as PCP - General (Family Medicine)  Gavin Quiroz MD as Consulting Physician (Cardiology)    CHIEF COMPLAINT: New onset atrial fibrillation    HISTORY OF PRESENT ILLNESS:    At the pleasure to see this 89-year-old female who is a known patient of cardiology but is a new patient to me today.  She has history of essential hypertension hyperlipidemia, mild heart attack in the past 2019 most recently, there are no stents no history bypass surgery with disease that was felt to be small vessel not amenable to intervention.  She does not meet conditions for secondary prevention goals but is again at advanced age at 89 with overall reasonable functional status.  She presented with acute onset lower abdominal pain a couple of days ago which is improved since admission.  She had some nausea and vomiting diarrhea was ultimately seen to have colitis on CT scan.  In the setting of this acute illness she had A. fib RVR which responded with spontaneous conversion back to sinus rhythm on my encounter today at rate of 70s to 80s she also had some low blood pressures likely in the setting of acute illness in combination with doses of Imdur and amlodipine given with volume depletion as well.  This responded to IV fluids.  She denies any chest pain, she is on room air with daughter at bedside.  She is in sinus rhythm on telemetry with no ischemic changes and she denies any chest pain.    She is well compensated with no heart failure signs or symptoms presently on my encounter    Review of systems negative x14 point review of systems except was mentioned above    Historical data copied forward from previous encounters in EMR is unchanged          Past Medical History:   Diagnosis Date   • Arthritis    • Coronary artery disease    • Drooping eyelid, bilateral    • Frequent UTI    • GERD (gastroesophageal reflux disease)    • Hearing loss      bilateral hearing aides   • 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 sure which 1  contracted at age 8   • History of transfusion    • [place] (hard of hearing)    • Hyperlipidemia    • Hypertension    • Past heart attack     X2 MILD LAST ONE OCT 2019   • PONV (postoperative nausea and vomiting)      Past Surgical History:   Procedure Laterality Date   • ANTERIOR AND POSTERIOR VAGINAL REPAIR     • BACK SURGERY     • BLEPHAROPLASTY Bilateral 5/23/2019    Procedure: bilateral upper lid blepharoplasty;  Surgeon: Mauricio Pennington MD;  [place] [facility];  Service: Ophthalmology   • BROW LIFT Bilateral 2/13/2020    Procedure: BILATERAL TEMPORAL DIRECT BROWLIFT;  Surgeon: Sreekanth Bird MD;  [facility] [facility];  Service: Ophthalmology;  Laterality: Bilateral;   • CARDIAC CATHETERIZATION N/A 12/3/2019    Procedure: Left Heart Cath;  Surgeon: Puma Briseno MD;  Location: [facility] CATH INVASIVE LOCATION;  Service: Cardiovascular   • CARDIAC CATHETERIZATION N/A 12/3/2019    Procedure: Coronary angiography;  Surgeon: Puma Briseno MD;  Location: [facility] CATH INVASIVE LOCATION;  Service: Cardiovascular   • CARDIAC CATHETERIZATION N/A 12/3/2019    Procedure: Left ventriculography;  Surgeon: Puma Briseno MD;  Location: [facility] CATH INVASIVE LOCATION;  Service: Cardiovascular   • CATARACT EXTRACTION EXTRACAPSULAR W/ INTRAOCULAR LENS IMPLANTATION Bilateral    • CERVICAL SPINE ANTERIOR      with instrumentation   • CHEILECTOMY Left 8/28/2020    Procedure: CHEILECTOMY;  Surgeon: GAB Rees DPM;  Location: [facility] MAIN OR;  Service: Podiatry;  Laterality: Left;   • COLON SURGERY      for obstruction   • ALBERTO TUBE INSERTION Bilateral 5/23/2019    Procedure: ALBERTO TUBE;  Surgeon: Mauricio Pennington MD;  [facility] [facility];  Service: Ophthalmology   • ECTROPION REPAIR Bilateral 5/23/2019    Procedure: bilateral lower lid ectropion repair, bilateral punctoplasty;  Surgeon: Mauricio Pennington MD;  [facility] [place];  Service: Ophthalmology   • EYE SURGERY     • FOOT FUSION Left 2016    Procedure: LEFT HALLUX METATARSALPHALANGEAL ARTHRODESIS SILVER BUNIONECTOMY METATARSAL HEAD RESECTION 2-5 CALCANEAL BONE GRAFT;  Surgeon: Monster Harper Jr., MD;  Location: [facility] MAIN OR;  Service:    • HYSTERECTOMY     • INGUINAL HERNIA REPAIR Bilateral    • METATARSAL OSTEOTOMY Left 2020    Procedure: Metatarsal head resection 5;  Surgeon: GAB Rees DPM;  Location: [facility] MAIN OR;  Service: Podiatry;  Laterality: Left;   • ROTATOR CUFF REPAIR Right    • STOMACH SURGERY      for ulcer     Family History   Problem Relation Age of Onset   • No Known Problems Mother    • No Known Problems Father    • [place]ig Hyperthermia Neg Hx      Social History     Tobacco Use   • Smoking status: Former Smoker     Packs/day: 0.25     Years: 10.00     Pack years: 2.50     Types: Cigarettes     Quit date:      Years since quittin.   • Smokeless tobacco: Never Used   Vaping Use   • Vaping Use: Never used   Substance Use Topics   • Alcohol use: No   • Drug use: No     Medications Prior to Admission   Medication Sig Dispense Refill Last Dose   • acetaminophen (TYLENOL) 500 MG tablet Take 500 mg by mouth Every 6 (Six) Hours As Needed for mild pain (1-3).      • amLODIPine (NORVASC) 5 MG tablet Take 1 tablet by mouth Daily. 30 tablet 6 10/2/2021 at Unknown time   • aspirin 81 MG EC tablet Take 81 mg by mouth Daily. LD 8/19   10/2/2021 at Unknown time   • isosorbide mononitrate (IMDUR) 60 MG 24 hr tablet Take 1 tablet by mouth once daily 90 tablet 0 10/2/2021 at Unknown time   • nitroglycerin (NITROSTAT) 0.4 MG SL tablet 1 under the tongue as needed for angina, may repeat q5mins for up three doses (Patient taking differently: Place  under the tongue Every 5 (Five) Minutes As Needed. 1 under the tongue as needed for angina, may repeat q5mins for up three doses) 100 tablet 11    • simvastatin (ZOCOR) 40 MG tablet Take 0.5 tablets by mouth  "Every Night. 90 tablet 3 10/1/2021 at Unknown time     Allergies:  Codeine, Lortab [hydrocodone-acetaminophen], Nitrofurantoin, and Sulfa antibiotics    REVIEW OF SYSTEMS:  Please see the above history of present illness for pertinent positives and negatives.  The remainder of the patient's systems have been reviewed and are negative.     Vital Signs  Temp:  [98.1 °F (36.7 °C)-99.3 °F (37.4 °C)] 99.3 °F (37.4 °C)  Heart Rate:  [] 68  Resp:  [16-18] 17  BP: ()/(56-67) 104/60    Flowsheet Rows      First Filed Value   Admission Height  160 cm (63\") Documented at 10/02/2021 1422   Admission Weight  54.4 kg (120 lb) Documented at 10/02/2021 1422           Physical Exam:  Physical Exam   Constitutional: Patient appears well-developed and well-nourished and in no acute distress   HEENT:   Head: Normocephalic and atraumatic.   Eyes:  Pupils are equal, round, and reactive to light. EOM are intact. Sclerae are anicteric and noninjected.  Mouth and Throat: Patient has moist mucous membranes. Oropharynx is clear of any erythema or exudate.     Neck: Neck supple. No JVD present. No thyromegaly present. No lymphadenopathy present.  Cardiovascular: Regular rate, regular rhythm, S1 normal and S2 normal.  Exam reveals no gallop and no friction rub.  No murmur heard.  Pulmonary/Chest: Lungs are clear to auscultation bilaterally. No respiratory distress. No wheezes. No rhonchi. No rales.   Abdominal: Soft. Bowel sounds are normal. No distension and no mass. There is no hepatosplenomegaly. There is no tenderness.   Musculoskeletal: Normal muscle tone  Extremities: No edema. Pulses are palpable in all 4 extremities.  Neurological: Patient is alert and oriented to person, place, and time. Cranial nerves II-XII are grossly intact with no focal deficits.  Skin: Skin is warm. No rash noted. Nails show no clubbing.  No cyanosis or erythema.     Results Review:    I reviewed the patient's new clinical results.  Lab Results (most " recent)     Procedure Component Value Units Date/Time    Troponin [[id number]]  (Normal) Collected: 10/04/21 0924    Specimen: Blood Updated: 10/04/21 0951     Troponin T 0.013 ng/mL     Narrative:      Troponin T Reference Range:  <= 0.03 ng/mL-   Negative for AMI  >0.03 ng/mL-     Abnormal for myocardial necrosis.  Clinicians would have to utilize clinical acumen, EKG, Troponin and serial changes to determine if it is an Acute Myocardial Infarction or myocardial injury due to an underlying chronic condition.       Results may be falsely decreased if patient taking Biotin.      Basic Metabolic Panel [[id number]]  (Abnormal) Collected: 10/04/21 0259    Specimen: Blood Updated: 10/04/21 0347     Glucose 95 mg/dL      BUN 21 mg/dL      Creatinine 0.75 mg/dL      Sodium 136 mmol/L      Potassium 3.9 mmol/L      Chloride 104 mmol/L      CO2 21.0 mmol/L      Calcium 7.9 mg/dL      eGFR Non African Amer 72 mL/min/1.73      BUN/Creatinine Ratio 28.0     Anion Gap 11.0 mmol/L     Narrative:      GFR Normal >60  Chronic Kidney Disease <60  Kidney Failure <15      Lactic Acid, Plasma [[id number]]  (Normal) Collected: 10/04/21 0259    Specimen: Blood Updated: 10/04/21 0346     Lactate 1.3 mmol/L     CBC & Differential [[id number]]  (Abnormal) Collected: 10/04/21 0259    Specimen: Blood Updated: 10/04/21 0321    Narrative:      The following orders were created for panel order CBC & Differential.  Procedure                               Abnormality         Status                     ---------                               -----------         ------                     CBC Auto Differential[[id number]]        Abnormal            Final result                 Please view results for these tests on the individual orders.    CBC Auto Differential [[id number]]  (Abnormal) Collected: 10/04/21 0259    Specimen: Blood Updated: 10/04/21 0321     WBC 18.30 10*3/mm3      RBC 3.72 10*6/mm3      Hemoglobin 10.8 g/dL      Hematocrit 32.6 %      MCV  87.7 fL      MCH 29.0 pg      MCHC 33.1 g/dL      RDW 14.7 %      RDW-SD 45.5 fl      MPV 7.0 fL      Platelets 203 10*3/mm3      Neutrophil % 87.5 %      Lymphocyte % 6.9 %      Monocyte % 5.0 %      Eosinophil % 0.3 %      Basophil % 0.3 %      Neutrophils, Absolute 16.00 10*3/mm3      Lymphocytes, Absolute 1.30 10*3/mm3      Monocytes, Absolute 0.90 10*3/mm3      Eosinophils, Absolute 0.10 10*3/mm3      Basophils, Absolute 0.10 10*3/mm3      nRBC 0.0 /100 WBC     Basic Metabolic Panel [[id number]]  (Abnormal) Collected: 10/03/21 0308    Specimen: Blood Updated: 10/03/21 0421     Glucose 149 mg/dL      BUN 28 mg/dL      Creatinine 0.67 mg/dL      Sodium 139 mmol/L      Potassium 4.2 mmol/L      Chloride 106 mmol/L      CO2 19.0 mmol/L      Calcium 7.8 mg/dL      eGFR Non African Amer 82 mL/min/1.73      BUN/Creatinine Ratio 41.8     Anion Gap 14.0 mmol/L     Narrative:      GFR Normal >60  Chronic Kidney Disease <60  Kidney Failure <15      CBC Auto Differential [[id number]]  (Abnormal) Collected: 10/03/21 0308    Specimen: Blood Updated: 10/03/21 0404     WBC 24.40 10*3/mm3      RBC 4.07 10*6/mm3      Hemoglobin 11.8 g/dL      Hematocrit 35.5 %      MCV 87.2 fL      MCH 28.9 pg      MCHC 33.1 g/dL      RDW 14.7 %      RDW-SD 45.1 fl      MPV 7.2 fL      Platelets 246 10*3/mm3      Neutrophil % 92.0 %      Lymphocyte % 3.8 %      Monocyte % 3.9 %      Eosinophil % 0.0 %      Basophil % 0.3 %      Neutrophils, Absolute 22.50 10*3/mm3      Lymphocytes, Absolute 0.90 10*3/mm3      Monocytes, Absolute 1.00 10*3/mm3      Eosinophils, Absolute 0.00 10*3/mm3      Basophils, Absolute 0.10 10*3/mm3      nRBC 0.0 /100 WBC     [place] Draw [[id number]] Collected: 10/02/21 1442    Specimen: Blood Updated: 10/02/21 2256    Narrative:      The following orders were created for panel order [place] Draw.  Procedure                               Abnormality         Status                     ---------                                -----------         ------                     Green Top (Gel)[[id number]]                                  Final result               Lavender Top[[id number]]                                     Final result               Gold Top - SST[[id number]]                                                              Light Blue Top[[id number]]                                                                Please view results for these tests on the individual orders.    COVID PRE-OP / PRE-PROCEDURE SCREENING ORDER (NO ISOLATION) - Swab, Nasopharynx [[id number]]  (Normal) Collected: 10/02/21 1615    Specimen: Swab from Nasopharynx Updated: 10/02/21 1737    Narrative:      The following orders were created for panel order COVID PRE-OP / PRE-PROCEDURE SCREENING ORDER (NO ISOLATION) - Swab, Nasopharynx.  Procedure                               Abnormality         Status                     ---------                               -----------         ------                     COVID-19,CEPHEID/KALA/BD...[[id number]]  Normal              Final result                 Please view results for these tests on the individual orders.    COVID-19,CEPHEID/KALA/BDMAX,COR/TIGRE/PAD/DARLING IN-HOUSE(OR EMERGENT/ADD-ON),NP SWAB IN TRANSPORT MEDIA 3-4 HR TAT, RT-PCR - Swab, Nasopharynx [[id number]]  (Normal) Collected: 10/02/21 1615    Specimen: Swab from Nasopharynx Updated: 10/02/21 1737     COVID19 Not Detected    Narrative:      Fact sheet for providers: https://www.fda.gov/media/817011/download     Fact sheet for patients: https://www.fda.gov/media/110257/download  Fact sheet for providers: https://www.fda.gov/media/676780/download     Fact sheet for patients: https://www.fda.gov/media/724827/download    Extra Tubes [[id number]] Collected: 10/02/21 1442    Specimen: Blood, Venous Line Updated: 10/02/21 9530    Narrative:      The following orders were created for panel order Extra Tubes.  Procedure                               Abnormality         Status                      ---------                               -----------         ------                     Gold Top - SST[[id number]]                                   Final result                 Please view results for these tests on the individual orders.    Gold Top - SST [[id number]] Collected: 10/02/21 1442    Specimen: Blood Updated: 10/02/21 1546     Extra Tube Hold for add-ons.     Comment: Auto resulted.       Green Top (Gel) [[id number]] Collected: 10/02/21 1442    Specimen: Blood Updated: 10/02/21 1546     Extra Tube Hold for add-ons.     Comment: Auto resulted.       Lavender Top [[id number]] Collected: 10/02/21 1442    Specimen: Blood Updated: 10/02/21 1546     Extra Tube hold for add-on     Comment: Auto resulted       Comprehensive Metabolic Panel [[id number]]  (Abnormal) Collected: 10/02/21 1442    Specimen: Blood Updated: 10/02/21 1510     Glucose 173 mg/dL      BUN 40 mg/dL      Creatinine 0.96 mg/dL      Sodium 139 mmol/L      Potassium 4.3 mmol/L      Comment: Slight hemolysis detected by analyzer. Results may be affected.        Chloride 104 mmol/L      CO2 20.0 mmol/L      Calcium 9.0 mg/dL      Total Protein 6.9 g/dL      Albumin 4.30 g/dL      ALT (SGPT) 17 U/L      AST (SGOT) 21 U/L      Comment: Slight hemolysis detected by analyzer. Results may be affected.        Alkaline Phosphatase 120 U/L      Total Bilirubin 0.9 mg/dL      eGFR Non African Amer 54 mL/min/1.73      Globulin 2.6 gm/dL      A/G Ratio 1.7 g/dL      BUN/Creatinine Ratio 41.7     Anion Gap 15.0 mmol/L     Narrative:      GFR Normal >60  Chronic Kidney Disease <60  Kidney Failure <15      POC Lactate [[id number]]  (Normal) Collected: 10/02/21 1504    Specimen: Blood Updated: 10/02/21 1504    CBC & Differential [[id number]]  (Abnormal) Collected: 10/02/21 1442    Specimen: Blood Updated: 10/02/21 1451    Narrative:      The following orders were created for panel order CBC & Differential.  Procedure                                Abnormality         Status                     ---------                               -----------         ------                     CBC Auto Differential[[id number]]        Abnormal            Final result                 Please view results for these tests on the individual orders.    POC Lactate [[id number]]  (Normal) Collected: 10/02/21 1448    Specimen: Blood Updated: 10/02/21 1449     Lactate 1.4 mmol/L      Comment: Serial Number: [id number]:  [id number]             Imaging Results (Most Recent)     Procedure Component Value Units Date/Time    CT Abdomen Pelvis Without Contrast [[id number]] Collected: 10/02/21 1542     Updated: 10/02/21 1556    Narrative:         DATE OF EXAM:  10/2/2021 3:18 PM     PROCEDURE:  CT ABDOMEN PELVIS WO CONTRAST-     INDICATIONS:  Abdominal pain and vomiting and history of bowel obstruction     COMPARISON:  10/28/2019.     TECHNIQUE:  Routine transaxial slices were obtained through the abdomen and pelvis  without the administration of intravenous contrast. Reconstructed  coronal and sagittal images were also obtained. Automated exposure  control and iterative construction methods were used.      FINDINGS:  There are several bowel loops within the left aspect of the abdomen  which demonstrate wall thickening. This involves mainly the transverse  colon, splenic flexure, and ascending colon suspicious for a nonspecific  colitis. There is also some thickening of the wall the adjacent small  bowel loops in the left upper quadrant and within the lower pelvis  consistent with a nonspecific enteritis. The patient has had previous  gastric bypass surgery. There are lipomatous changes of the ileocecal  valve. The appendix is not visualized and is likely surgically absent.  The gallbladder is surgically absent. The liver, adrenal glands,  pancreas, and spleen are normal. There are parapelvic cysts in both  kidneys. There are atherosclerotic vascular calcifications in the  abdomen and  pelvis. The uterus is surgically absent. The urinary bladder  is normal. There is no free air or abscess formation. There is chronic  scarring in the lung bases. There are no suspicious osteolytic or  sclerotic lesions within the bony structures. The patient has had a  previous L2 kyphoplasty procedure. There is a Coflex type device  posteriorly at L4-L5.        Impression:         1. There is abnormal thickening of the wall the colon in the left upper  quadrant. This involves the transverse colon, splenic flexure, and  ascending colon consistent with a nonspecific colitis.  2. There is also thickening of several small bowel loops in the left  upper quadrant and the lower pelvis suspicious for a superimposed  enteritis.  3. Postsurgical changes.  4. No free air or abscess formation.  5. Additional findings as noted above. The study is limited by  noncontrast technique.     Electronically Signed By-Winston Scott MD On:10/2/2021 3:54 PM  This report was finalized on [id number] by  Winston Scott MD.    XR Chest 1 View [[id number]] Collected: 10/02/21 1541     Updated: 10/02/21 1554    Narrative:      DATE OF EXAM:  10/2/2021 3:22 PM     PROCEDURE:  XR CHEST 1 VW-     INDICATIONS:  Abdominal pain and vomiting      COMPARISON:  04/26/2021.     TECHNIQUE:   Single radiographic view of the chest was obtained.     FINDINGS:  The heart is enlarged. The pulmonary vascular markings are normal. The  lungs and pleural spaces are clear of active disease.  There are chronic  age-related changes involving the bony thorax and thoracic aorta. There  is fusion hardware in the lower cervical spine.       Impression:         1. Mild cardiomegaly.  2. No active pulmonary disease.     Electronically Signed By-Winston Scott MD On:10/2/2021 3:42 PM  This report was finalized on [id number] by  Winston Scott MD.        reviewed    ECG/EMG Results (most recent)     Procedure Component Value Units Date/Time    ECG 12 Lead [[id number]] Collected:  10/02/21 1503     Updated: 10/02/21 1504     QT Interval 389 ms     Narrative:      HEART RATE= 79  bpm  RR Interval= 764  ms  OH Interval= 132  ms  P Horizontal Axis= 245  deg  P Front Axis= -1  deg  QRSD Interval= 84  ms  QT Interval= 389  ms  QRS Axis= -30  deg  T Wave Axis= 11  deg  - OTHERWISE NORMAL ECG -  Sinus rhythm  Low voltage, precordial leads  Electronically Signed By:   Date and Time of Study: 2021-10-02 15:03:08    ECG 12 Lead [[id number]] Collected: 10/04/21 0330     Updated: 10/04/21 0333     QT Interval 311 ms     Narrative:      HEART RATE= 130  bpm  RR Interval= 461  ms  OH Interval=   ms  P Horizontal Axis=   deg  P Front Axis=   deg  QRSD Interval= 75  ms  QT Interval= 311  ms  QRS Axis= -15  deg  T Wave Axis= 0  deg  - ABNORMAL ECG -  Atrial fibrillation  Inferior infarct, old  When compared with ECG of 02-Oct-2021 15:03:08,  Significant change in rhythm: previously sinus  New or worsened ischemia or infarction  Significant axis, voltage or hypertrophy change  Electronically Signed By:   Date and Time of Study: 2021-10-04 03:30:27    Adult Transthoracic Echo Complete W/ Cont if Necessary Per Protocol [[id number]] Resulted: 10/04/21 1435     Updated: 10/04/21 1455     Target HR (85%) 105 bpm      Max. Pred. HR (100%) 124 bpm         Reviewed    EKG reviewed interpreted by me demonstrates sinus rhythm with no ischemic changes on present encounter  Prior EKG during this admission reviewed interpreted me demonstrates A. fib RVR rate 150s to 160s    Assessment/Plan    New onset atrial fibrillation now converted to sinus rhythm  Coronary disease  Essential hypertension hyperlipidemia  Advanced age  High risk medicines    New onset atrial fibrillation, spontaneous cardioversion to sinus rhythm  Paroxysmal by nature  Likely the secondary of acute illness and hypotension  Hold amlodipine and isosorbide mononitrate without any chest pain and low blood pressures previously  Start amiodarone in the  short-term 200 twice daily, would like to stop this in 3 months or even sooner may be at 30 days  Start metoprolol 12.5 twice daily  Watch blood pressures and will treat for goal heart rate in the 60s to 70s and systolics in the 120s to 130s  No underlying conduction abnormality seen  No indication for pacemaker  No indication for ischemic evaluation at this time, no evidence of ACS  Continue Zocor for secondary prevention and lipid-lowering therapy with good functional status  Likely high risk for anticoagulation 89 years old will pursue aspirin only at this point  Monitor for high risk medicines    Continue to follow, modify medicines as clinically indicated  Hemodynamically electrically stable on my encounter today    Gavin Quiroz MD, PhD    I discussed the patient's findings and my recommendations with patient and staff    Gavin Quiroz MD  10/04/21  15:16 EDT

## 2021-10-04 NOTE — CONSULTS
GI CONSULT  NOTE:    Referring Provider:  Dr. Amaya     Chief complaint: Abdominal pain, colitis, enteritis    Subjective .     History of present illness: Carrie Golden is a 89 y.o. female with history of hypertension, hyperlipidemia, CAD, hysterectomy, inguinal hernia repair, and Billroth I anatomy who presents with complaints of abdominal pain.  The patient reports that she had an acute onset of lower abdominal pain 2 days ago.  States that her pain has gradually improved since admission and is now described as a soreness.  She states that with the acute onset of her pain, she was having nausea/vomiting and began having diarrhea with rectal bleeding.  She reports multiple bowel movements with bright red blood per rectum.  Denies any melena.  States that rectal bleeding has now resolved.  Nausea/vomiting has also now resolved.  She denies any hematemesis or coffee-ground emesis.  Does complain of heartburn along with dysphagia to both solids and liquids.  Does admit to using ibuprofen as needed, but denies daily NSAID use.  Also reports recent fever.      Endo History:  2/2020 EGD (Dr. Parks) -LA grade B erosive esophagitis, Billroth I anatomy, empiric dilation to 18 mm  10/2018 EGD (Dr. Parks) -LA grade a erosive esophagitis, empiric dilation to 50 French, Billroth I anatomy  Multiple other previous EGDs.  1/2015 EGD/colonoscopy (Dr. Parks) -normal EGD, empiric dilation to 52 French, polyps (TA, HP), grade 2 internal hemorrhoids    Past Medical History:  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 sure which 1  contracted at age 8   • History of transfusion    • [place]hard [place] hearing[place]    • Hyperlipidemia    • Hypertension    • Past heart attack     X2 MILD LAST ONE OCT 2019   • PONV (postoperative  nausea and vomiting)        Past Surgical History:  Past Surgical History:   Procedure Laterality Date   • ANTERIOR AND POSTERIOR VAGINAL REPAIR     • BACK SURGERY     • BLEPHAROPLASTY Bilateral 5/23/2019    Procedure: bilateral upper lid blepharoplasty;  Surgeon: Mauricio Pennington MD;  Location: [facility] OR [facility];  Service: Ophthalmology   • BROW LIFT Bilateral 2/13/2020    Procedure: BILATERAL TEMPORAL DIRECT BROWLIFT;  Surgeon: Sreekanth Bird MD;  Location: [facility];  Service: Ophthalmology;  Laterality: Bilateral;   • CARDIAC CATHETERIZATION N/A 12/3/2019    Procedure: Left Heart Cath;  Surgeon: Puma Briseno MD;  Location: [facility] CATH INVASIVE LOCATION;  Service: Cardiovascular   • CARDIAC CATHETERIZATION N/A 12/3/2019    Procedure: Coronary angiography;  Surgeon: Puma Briseno MD;  Location: [facility] CATH INVASIVE LOCATION;  Service: Cardiovascular   • CARDIAC CATHETERIZATION N/A 12/3/2019    Procedure: Left ventriculography;  Surgeon: Puma Briseno MD;  Location: [facility] CATH INVASIVE LOCATION;  Service: Cardiovascular   • CATARACT EXTRACTION EXTRACAPSULAR W/ INTRAOCULAR LENS IMPLANTATION Bilateral    • CERVICAL SPINE ANTERIOR      with instrumentation   • CHEILECTOMY Left 8/28/2020    Procedure: CHEILECTOMY;  Surgeon: GAB Rees DPM;  Location: [facility] MAIN OR;  Service: Podiatry;  Laterality: Left;   • COLON SURGERY      for obstruction   • ALBERTO TUBE INSERTION Bilateral 5/23/2019    Procedure: ALBERTO TUBE;  Surgeon: Mauricio Pennington MD;  Location: [facility];  Service: Ophthalmology   • ECTROPION REPAIR Bilateral 5/23/2019    Procedure: bilateral lower lid ectropion repair, bilateral punctoplasty;  Surgeon: Mauricio Pennington MD;  Location: [facility];  Service: Ophthalmology   • EYE SURGERY     • FOOT FUSION Left 12/30/2016    Procedure: LEFT HALLUX METATARSALPHALANGEAL ARTHRODESIS SILVER BUNIONECTOMY METATARSAL HEAD RESECTION 2-5 CALCANEAL BONE GRAFT;  Surgeon: Monster Harper Jr.,  MD;  Location: [facility] MAIN OR;  Service:    • HYSTERECTOMY     • INGUINAL HERNIA REPAIR Bilateral    • METATARSAL OSTEOTOMY Left 2020    Procedure: Metatarsal head resection 5;  Surgeon: GAB Rees DPM;  Location: [facility] MAIN OR;  Service: Podiatry;  Laterality: Left;   • ROTATOR CUFF REPAIR Right    • STOMACH SURGERY      for ulcer       Social History:  Social History     Tobacco Use   • Smoking status: Former Smoker     Packs/day: 0.25     Years: 10.00     Pack years: 2.50     Types: Cigarettes     Quit date:      Years since quittin.7   • Smokeless tobacco: Never Used   Vaping Use   • Vaping Use: Never used   Substance Use Topics   • Alcohol use: No   • Drug use: No       Family History:  Family History   Problem Relation Age of Onset   • No Known Problems Mother    • No Known Problems Father    • [place]ig Hyperthermia Neg Hx        Medications:  Medications Prior to Admission   Medication Sig Dispense Refill Last Dose   • acetaminophen (TYLENOL) 500 MG tablet Take 500 mg by mouth Every 6 (Six) Hours As Needed for mild pain (1-3).      • amLODIPine (NORVASC) 5 MG tablet Take 1 tablet by mouth Daily. 30 tablet 6 10/2/2021 at Unknown time   • aspirin 81 MG EC tablet Take 81 mg by mouth Daily. LD 8/19   10/2/2021 at Unknown time   • isosorbide mononitrate (IMDUR) 60 MG 24 hr tablet Take 1 tablet by mouth once daily 90 tablet 0 10/2/2021 at Unknown time   • nitroglycerin (NITROSTAT) 0.4 MG SL tablet 1 under the tongue as needed for angina, may repeat q5mins for up three doses (Patient taking differently: Place  under the tongue Every 5 (Five) Minutes As Needed. 1 under the tongue as needed for angina, may repeat q5mins for up three doses) 100 tablet 11    • simvastatin (ZOCOR) 40 MG tablet Take 0.5 tablets by mouth Every Night. 90 tablet 3 10/1/2021 at Unknown time       Scheduled Meds:amiodarone, 200 mg, Oral, Q12H  metoprolol tartrate, 12.5 mg, Oral, Q12H  piperacillin-tazobactam, 3.375 g,  Intravenous, Q8H  rosuvastatin, 5 mg, Oral, Nightly  sodium chloride, 10 mL, Intravenous, Q12H      Continuous Infusions:   PRN Meds:.•  acetaminophen  •  HYDROmorphone  •  ipratropium-albuterol  •  melatonin  •  ondansetron  •  sodium chloride  •  [COMPLETED] Insert peripheral IV **AND** sodium chloride  •  sodium chloride  •  traMADol    ALLERGIES:  Codeine, Lortab [hydrocodone-acetaminophen], Nitrofurantoin, and Sulfa antibiotics    ROS:  The following systems were reviewed;   Constitution:  Fevers, no chills, no unintentional weight loss  Skin: no rash, no jaundice  Eyes:  No blurry vision, no eye pain  HENT:  No change in hearing or smell  Resp:  No dyspnea or cough  CV:  No chest pain or palpitations  :  No dysuria, hematuria  Musculoskeletal:  No leg cramps or arthralgias  Neuro:  No tremor, no numbness  Psych:  No depression or confusion    Objective     Vital Signs:   Vitals:    10/04/21 0418 10/04/21 0500 10/04/21 0542 10/04/21 0834   BP: 110/67   92/56   BP Location: Left arm   Left arm   Patient Position: Lying   Lying   Pulse: (!) 137 118 89 76   Resp: 16   18   Temp: 98.1 °F (36.7 °C)   98.3 °F (36.8 °C)   TempSrc: Oral   Oral   SpO2: 97% 98% 95% 93%   Weight:       Height:           Physical Exam:       General Appearance:    Awake and alert, in no acute distress   Head:    Normocephalic, without obvious abnormality, atraumatic   Throat:   No oral lesions, no thrush, oral mucosa moist   Lungs:     Respirations regular, even and unlabored   Chest Wall:    No abnormalities observed   Abdomen:     Soft, mild lower abdominal tenderness, no rebound or guarding, non-distended   Rectal:     Deferred   Extremities:   Moves all extremities, no edema, no cyanosis   Pulses:   Pulses palpable and equal bilaterally   Skin:   No rash, no jaundice, normal palpation   Lymph nodes:   No cervical, supraclavicular or submandibular palpable adenopathy   Neurologic:   Cranial nerves 2 - 12 grossly intact, no asterixis        Results Review:   I reviewed the patient's labs and imaging.  CBC    Results from last 7 days   Lab Units 10/04/21  0259 10/03/21  0308 10/02/21  1442   WBC 10*3/mm3 18.30* 24.40* 14.80*   HEMOGLOBIN g/dL 10.8* 11.8* 13.4   PLATELETS 10*3/mm3 203 246 297     CMP   Results from last 7 days   Lab Units 10/04/21  0259 10/03/21  0308 10/02/21  1442   SODIUM mmol/L 136 139 139   POTASSIUM mmol/L 3.9 4.2 4.3   CHLORIDE mmol/L 104 106 104   CO2 mmol/L 21.0* 19.0* 20.0*   BUN mg/dL 21 28* 40*   CREATININE mg/dL 0.75 0.67 0.96   GLUCOSE mg/dL 95 149* 173*   ALBUMIN g/dL  --   --  4.30   BILIRUBIN mg/dL  --   --  0.9   ALK PHOS U/L  --   --  120*   AST (SGOT) U/L  --   --  21   ALT (SGPT) U/L  --   --  17     Cr Clearance Estimated Creatinine Clearance: 36.9 mL/min (by C-G formula based on SCr of 0.75 mg/dL).  Coag     HbA1C   Lab Results   Component Value Date    HGBA1C 5.8 (H) 12/02/2019     Blood Glucose No results found for: POCGLU  Infection     UA      Radiology(recent) CT Abdomen Pelvis Without Contrast    Result Date: 10/2/2021   1. There is abnormal thickening of the wall the colon in the left upper quadrant. This involves the transverse colon, splenic flexure, and ascending colon consistent with a nonspecific colitis. 2. There is also thickening of several small bowel loops in the left upper quadrant and the lower pelvis suspicious for a superimposed enteritis. 3. Postsurgical changes. 4. No free air or abscess formation. 5. Additional findings as noted above. The study is limited by noncontrast technique.  Electronically Signed By-Winston Scott MD On:10/2/2021 3:54 PM This report was finalized on [id number] by  Winston Scott MD.    XR Chest 1 View    Result Date: 10/2/2021   1. Mild cardiomegaly. 2. No active pulmonary disease.  Electronically Signed By-Winston Scott MD On:10/2/2021 3:42 PM This report was finalized on [id number] by  Winston Scott MD.         ASSESSMENT:  -Lower abdominal pain  -Abnormal CT  showing colitis and enteritis  -Leukocytosis/fever  -Nausea/vomiting -resolved  -Diarrhea with rectal bleeding -resolved  -Dyspepsia  -Dysphagia  -Mild normocytic anemia  -Hypertension  -Hyperlipidemia  -CAD  -History of Billroth I anatomy  -History of hysterectomy  -History of inguinal hernia repair    PLAN:  Patient presented on 10/2 with complaints of abdominal pain.  CT abdomen/pelvis on admission does show abnormal wall thickening of the colon in the transverse, splenic flexure, and ascending colon.  There is also superimposed enteritis.  Significant leukocytosis (WBC 24.4) on admission.  Down to 18.3 today.  Differential diagnosis includes infectious etiology vs ischemia vs other.  Patient reports that nausea/vomiting, diarrhea, and rectal bleeding have resolved.  Abdominal pain is slowly improving.  Continue antibiotics.  Hemoglobin is currently stable at 10.8.  Continue to monitor H/H and transfuse as needed.  No plans for endoscopy at this time unless there is persistent rectal bleeding or significant drop in hemoglobin.  Could consider outpatient EGD/colonoscopy for complaints of dysphagia and to further evaluate CT findings.  If patient is able to tolerate regular diet as ordered by PCP without worsening symptoms and there is no further rectal bleeding, likely okay for discharge from GI standpoint in the morning if hemoglobin remains stable.  Supportive care.      I discussed the patients findings and my recommendations with the patient.  I will discuss the case with Dr. Vaz and change plan accordingly.    We appreciate the referral.    Electronically signed by EFREN Jones, 10/04/21, 12:59 PM EDT.

## 2021-10-05 ENCOUNTER — INPATIENT HOSPITAL (AMBULATORY)
Dept: URBAN - METROPOLITAN AREA HOSPITAL 84 | Facility: HOSPITAL | Age: 86
End: 2021-10-05
Payer: MEDICARE

## 2021-10-05 DIAGNOSIS — R50.9 FEVER, UNSPECIFIED: ICD-10-CM

## 2021-10-05 DIAGNOSIS — R10.30 LOWER ABDOMINAL PAIN, UNSPECIFIED: ICD-10-CM

## 2021-10-05 DIAGNOSIS — D72.829 ELEVATED WHITE BLOOD CELL COUNT, UNSPECIFIED: ICD-10-CM

## 2021-10-05 DIAGNOSIS — R93.3 ABNORMAL FINDINGS ON DIAGNOSTIC IMAGING OF OTHER PARTS OF DI: ICD-10-CM

## 2021-10-05 DIAGNOSIS — K30 FUNCTIONAL DYSPEPSIA: ICD-10-CM

## 2021-10-05 DIAGNOSIS — D64.89 OTHER SPECIFIED ANEMIAS: ICD-10-CM

## 2021-10-05 DIAGNOSIS — R13.10 DYSPHAGIA, UNSPECIFIED: ICD-10-CM

## 2021-10-05 LAB
ALBUMIN SERPL-MCNC: 2.8 G/DL (ref 3.5–5.2)
ALBUMIN/GLOB SERPL: 1.3 G/DL
ALP SERPL-CCNC: 148 U/L (ref 39–117)
ALT SERPL W P-5'-P-CCNC: 11 U/L (ref 1–33)
ANION GAP SERPL CALCULATED.3IONS-SCNC: 11 MMOL/L (ref 5–15)
AST SERPL-CCNC: 21 U/L (ref 1–32)
BASOPHILS # BLD AUTO: 0 10*3/MM3 (ref 0–0.2)
BASOPHILS NFR BLD AUTO: 0.3 % (ref 0–1.5)
BILIRUB SERPL-MCNC: 0.8 MG/DL (ref 0–1.2)
BUN SERPL-MCNC: 13 MG/DL (ref 8–23)
BUN/CREAT SERPL: 18.6 (ref 7–25)
CALCIUM SPEC-SCNC: 7.7 MG/DL (ref 8.2–9.6)
CHLORIDE SERPL-SCNC: 105 MMOL/L (ref 98–107)
CO2 SERPL-SCNC: 20 MMOL/L (ref 22–29)
CREAT SERPL-MCNC: 0.7 MG/DL (ref 0.57–1)
DEPRECATED RDW RBC AUTO: 45.9 FL (ref 37–54)
EOSINOPHIL # BLD AUTO: 0.4 10*3/MM3 (ref 0–0.4)
EOSINOPHIL NFR BLD AUTO: 2.5 % (ref 0.3–6.2)
ERYTHROCYTE [DISTWIDTH] IN BLOOD BY AUTOMATED COUNT: 14.9 % (ref 12.3–15.4)
GFR SERPL CREATININE-BSD FRML MDRD: 78 ML/MIN/1.73
GLOBULIN UR ELPH-MCNC: 2.2 GM/DL
GLUCOSE SERPL-MCNC: 94 MG/DL (ref 65–99)
HCT VFR BLD AUTO: 30.9 % (ref 34–46.6)
HGB BLD-MCNC: 10.1 G/DL (ref 12–15.9)
LYMPHOCYTES # BLD AUTO: 1.2 10*3/MM3 (ref 0.7–3.1)
LYMPHOCYTES NFR BLD AUTO: 8.2 % (ref 19.6–45.3)
MCH RBC QN AUTO: 28.6 PG (ref 26.6–33)
MCHC RBC AUTO-ENTMCNC: 32.8 G/DL (ref 31.5–35.7)
MCV RBC AUTO: 87.1 FL (ref 79–97)
MONOCYTES # BLD AUTO: 0.8 10*3/MM3 (ref 0.1–0.9)
MONOCYTES NFR BLD AUTO: 5.1 % (ref 5–12)
NEUTROPHILS NFR BLD AUTO: 12.5 10*3/MM3 (ref 1.7–7)
NEUTROPHILS NFR BLD AUTO: 83.9 % (ref 42.7–76)
NRBC BLD AUTO-RTO: 0 /100 WBC (ref 0–0.2)
PLATELET # BLD AUTO: 204 10*3/MM3 (ref 140–450)
PMV BLD AUTO: 6.7 FL (ref 6–12)
POTASSIUM SERPL-SCNC: 3.8 MMOL/L (ref 3.5–5.2)
PROT SERPL-MCNC: 5 G/DL (ref 6–8.5)
RBC # BLD AUTO: 3.54 10*6/MM3 (ref 3.77–5.28)
SODIUM SERPL-SCNC: 136 MMOL/L (ref 136–145)
WBC # BLD AUTO: 14.9 10*3/MM3 (ref 3.4–10.8)

## 2021-10-05 PROCEDURE — 85025 COMPLETE CBC W/AUTO DIFF WBC: CPT | Performed by: NURSE PRACTITIONER

## 2021-10-05 PROCEDURE — 25010000002 ONDANSETRON PER 1 MG: Performed by: FAMILY MEDICINE

## 2021-10-05 PROCEDURE — 25010000002 PIPERACILLIN SOD-TAZOBACTAM PER 1 G: Performed by: FAMILY MEDICINE

## 2021-10-05 PROCEDURE — 80053 COMPREHEN METABOLIC PANEL: CPT | Performed by: NURSE PRACTITIONER

## 2021-10-05 PROCEDURE — 99233 SBSQ HOSP IP/OBS HIGH 50: CPT | Performed by: NURSE PRACTITIONER

## 2021-10-05 PROCEDURE — 25010000002 HYDROMORPHONE PER 4 MG: Performed by: FAMILY MEDICINE

## 2021-10-05 PROCEDURE — 99233 SBSQ HOSP IP/OBS HIGH 50: CPT | Performed by: INTERNAL MEDICINE

## 2021-10-05 RX ORDER — TRAMADOL HYDROCHLORIDE 50 MG/1
50 TABLET ORAL EVERY 4 HOURS PRN
Status: DISCONTINUED | OUTPATIENT
Start: 2021-10-05 | End: 2021-10-09 | Stop reason: HOSPADM

## 2021-10-05 RX ORDER — SODIUM, POTASSIUM,MAG SULFATES 17.5-3.13G
1 SOLUTION, RECONSTITUTED, ORAL ORAL ONCE
Status: COMPLETED | OUTPATIENT
Start: 2021-10-05 | End: 2021-10-05

## 2021-10-05 RX ADMIN — METOPROLOL TARTRATE 12.5 MG: 25 TABLET, FILM COATED ORAL at 08:19

## 2021-10-05 RX ADMIN — METOPROLOL TARTRATE 12.5 MG: 25 TABLET, FILM COATED ORAL at 20:31

## 2021-10-05 RX ADMIN — ONDANSETRON 4 MG: 2 INJECTION INTRAMUSCULAR; INTRAVENOUS at 15:33

## 2021-10-05 RX ADMIN — AMIODARONE HYDROCHLORIDE 200 MG: 200 TABLET ORAL at 18:24

## 2021-10-05 RX ADMIN — AMIODARONE HYDROCHLORIDE 200 MG: 200 TABLET ORAL at 12:46

## 2021-10-05 RX ADMIN — ROSUVASTATIN CALCIUM 5 MG: 5 TABLET, FILM COATED ORAL at 20:31

## 2021-10-05 RX ADMIN — PIPERACILLIN AND TAZOBACTAM 3.38 G: 3; .375 INJECTION, POWDER, LYOPHILIZED, FOR SOLUTION INTRAVENOUS at 23:40

## 2021-10-05 RX ADMIN — HYDROMORPHONE HYDROCHLORIDE 0.25 MG: 2 INJECTION INTRAMUSCULAR; INTRAVENOUS; SUBCUTANEOUS at 23:47

## 2021-10-05 RX ADMIN — Medication 10 ML: at 20:31

## 2021-10-05 RX ADMIN — Medication 1 BOTTLE: at 13:22

## 2021-10-05 RX ADMIN — HYDROMORPHONE HYDROCHLORIDE 0.25 MG: 2 INJECTION INTRAMUSCULAR; INTRAVENOUS; SUBCUTANEOUS at 15:33

## 2021-10-05 RX ADMIN — AMIODARONE HYDROCHLORIDE 200 MG: 200 TABLET ORAL at 08:19

## 2021-10-05 RX ADMIN — PIPERACILLIN AND TAZOBACTAM 3.38 G: 3; .375 INJECTION, POWDER, LYOPHILIZED, FOR SOLUTION INTRAVENOUS at 06:26

## 2021-10-05 RX ADMIN — PIPERACILLIN AND TAZOBACTAM 3.38 G: 3; .375 INJECTION, POWDER, LYOPHILIZED, FOR SOLUTION INTRAVENOUS at 16:16

## 2021-10-05 RX ADMIN — Medication 10 ML: at 08:19

## 2021-10-05 NOTE — PROGRESS NOTES
LOS: 2 days   Patient Care Team:  Monster Myrick MD as PCP - General (Family Medicine)  ROBERTO CARLOS Morales MD as Consulting Physician (Cardiology)      Subjective     Interval History:     Subjective: Patient reports rectal bleeding this morning.  Also complains of persistent left lower quadrant pain.      ROS:   No chest pain, shortness of breath, or cough.        Medication Review:     Current Facility-Administered Medications:   •  acetaminophen (TYLENOL) tablet 325 mg, 325 mg, Oral, Q4H PRN, Winston Amaya MD  •  amiodarone (PACERONE) tablet 200 mg, 200 mg, Oral, TID Richie BROWN Matthew Cody Lee, MD, 200 mg at 10/05/21 0819  •  HYDROmorphone (DILAUDID) injection 0.25 mg, 0.25 mg, Intravenous, Q4H PRN, Winston Amaya MD  •  ipratropium-albuterol (DUO-NEB) nebulizer solution 3 mL, 3 mL, Nebulization, Q6H PRN, Yaa Miranda APRN  •  melatonin tablet 5 mg, 5 mg, Oral, Nightly PRN, Winston Amaya MD, 5 mg at 10/03/21 2200  •  metoprolol tartrate (LOPRESSOR) half tablet 12.5 mg, 12.5 mg, Oral, Q12H, Winston Amaya MD, 12.5 mg at 10/05/21 0819  •  ondansetron (ZOFRAN) injection 4 mg, 4 mg, Intravenous, Q6H PRN, Winston Amaya MD, 4 mg at 10/02/21 1845  •  piperacillin-tazobactam (ZOSYN) IVPB 3.375 g in 100 mL NS (CD), 3.375 g, Intravenous, Q8H, Winston Amaya MD, 3.375 g at 10/05/21 0626  •  rosuvastatin (CRESTOR) tablet 5 mg, 5 mg, Oral, Nightly, Winston Amaya MD, 5 mg at 10/04/21 2208  •  sodium chloride 0.9 % flush 10 mL, 10 mL, Intravenous, PRN, Winston Amaya MD  •  [COMPLETED] Insert peripheral IV, , , Once **AND** sodium chloride 0.9 % flush 10 mL, 10 mL, Intravenous, PRN, Winston Amaya MD  •  sodium chloride 0.9 % flush 10 mL, 10 mL, Intravenous, Q12H, Winston Amaya MD, 10 mL at 10/05/21 0819  •  sodium chloride 0.9 % flush 10 mL, 10 mL, Intravenous, PRN, Winston Amaya MD  •  sodium-potassium-magnesium sulfates (SUPREP) oral solution 1 bottle, 1 bottle, Oral, Once, Kyra Weinberg,  APRN  •  traMADol (ULTRAM) tablet 50 mg, 50 mg, Oral, Q4H PRN, Winston Amaya MD      Objective     Vital Signs  Vitals:    10/04/21 2100 10/05/21 0008 10/05/21 0500 10/05/21 0828   BP: 95/55 95/59 103/61 127/67   BP Location: Right arm Right arm Right arm    Patient Position: Lying Lying Lying    Pulse: 71 61 66 75   Resp: 18 18 18 16   Temp: 98 °F (36.7 °C) 98.7 °F (37.1 °C) 97.7 °F (36.5 °C) 97.9 °F (36.6 °C)   TempSrc: Oral Oral Oral Oral   SpO2: 95% 96% 93% 94%   Weight:   54.8 kg (120 lb 13 oz)    Height:           Physical Exam:     General Appearance:    Awake and alert, in no acute distress   Head:    Normocephalic, without obvious abnormality   Eyes:          Conjunctivae normal, anicteric sclera   Throat:   No oral lesions, no thrush, oral mucosa moist   Neck:   No adenopathy, supple, no JVD   Lungs:     respirations regular, even and unlabored   Abdomen:     Soft, LLQ tenderness, no rebound or guarding, non-distended   Rectal:     Deferred   Extremities:   No edema, no cyanosis   Skin:   No bruising or rash, no jaundice        Results Review:    CBC    Results from last 7 days   Lab Units 10/05/21  0325 10/04/21  0259 10/03/21  0308 10/02/21  1442   WBC 10*3/mm3 14.90* 18.30* 24.40* 14.80*   HEMOGLOBIN g/dL 10.1* 10.8* 11.8* 13.4   PLATELETS 10*3/mm3 204 203 246 297     CMP   Results from last 7 days   Lab Units 10/05/21  0325 10/04/21  0259 10/03/21  0308 10/02/21  1442   SODIUM mmol/L 136 136 139 139   POTASSIUM mmol/L 3.8 3.9 4.2 4.3   CHLORIDE mmol/L 105 104 106 104   CO2 mmol/L 20.0* 21.0* 19.0* 20.0*   BUN mg/dL 13 21 28* 40*   CREATININE mg/dL 0.70 0.75 0.67 0.96   GLUCOSE mg/dL 94 95 149* 173*   ALBUMIN g/dL 2.80*  --   --  4.30   BILIRUBIN mg/dL 0.8  --   --  0.9   ALK PHOS U/L 148*  --   --  120*   AST (SGOT) U/L 21  --   --  21   ALT (SGPT) U/L 11  --   --  17     Cr Clearance Estimated Creatinine Clearance: 35.6 mL/min (by C-G formula based on SCr of 0.7 mg/dL).  Coag     HbA1C   Lab  Results   Component Value Date    HGBA1C 5.8 (H) 12/02/2019     Blood Glucose No results found for: POCGLU  Infection     UA      Radiology(recent) No radiology results for the last day       Assessment/Plan     ASSESSMENT:  -Lower abdominal pain  -Abnormal CT showing colitis and enteritis  -Leukocytosis/fever  -Nausea/vomiting -resolved  -Diarrhea with rectal bleeding -resolved  -Dyspepsia  -Dysphagia  -Mild normocytic anemia  -Hypertension  -Hyperlipidemia  -CAD  -History of Billroth I anatomy  -History of hysterectomy  -History of inguinal hernia repair     PLAN:  Patient reports rectal bleeding overnight.  Continues to have left lower quadrant pain.  CT abdomen/pelvis on admission does show abnormal wall thickening of the colon in the transverse, splenic flexure, and ascending colon.  There is also superimposed enteritis.  WBC down to 14.9 from 18.3 yesterday.  Continue antibiotics.  Hemoglobin 10.1 today from 10.8 yesterday.  Continue to monitor H/H and transfuse as needed.  Could consider flexible sigmoidoscopy for persistent rectal bleeding, but suspect that symptoms are due to ischemic colitis and this would likely not .  We will plan a one-time dose of movie prep today and monitor for further bleeding.  Decrease to clear liquid diet.  Continue supportive care.    Electronically signed by EFREN Jones, 10/05/21, 10:10 AM EDT.

## 2021-10-05 NOTE — PLAN OF CARE
Pt resting. No c/o pain during this shift. Pt still not tolerating food r/t loss of appetite/difficulty swallowing. Pt had two bloody bowel movements during this shift. GI aware. Pt likely to d/c home today or tomorrow. Call light within reach. VSS. Will continue to monitor.

## 2021-10-05 NOTE — PLAN OF CARE
Goal Outcome Evaluation:   Pt has had bloody stools when she gets up to [facility]SC. She complained of nausea and abdominal cramping due to bowel prep prescribed. She has had several large bowel movements since. Patient is now resting quietly. Oral hydration encouraged - diet reduced to clear liquids. Will continue to monitor.

## 2021-10-05 NOTE — PROGRESS NOTES
LOS: 2 days   Patient Care Team:  Monster Myrick MD as PCP - General (Family Medicine)  ROBERTO CARLOS Morales MD as Consulting Physician (Cardiology)    Subjective:  Discomfort and distress    Objective:   Afebrile      Review of Systems:   Review of Systems   Constitutional: Positive for activity change and appetite change.   Respiratory: Negative.    Cardiovascular: Negative.    Gastrointestinal: Positive for abdominal pain. Negative for abdominal distention and blood in stool.   Neurological: Positive for weakness.           Vital Signs  Temp:  [97.7 °F (36.5 °C)-99.3 °F (37.4 °C)] 97.7 °F (36.5 °C)  Heart Rate:  [61-76] 66  Resp:  [16-18] 18  BP: ()/(53-61) 103/61    Physical Exam:  Physical Exam  Vitals and nursing note reviewed.   Constitutional:       Appearance: Normal appearance.   Cardiovascular:      Rate and Rhythm: Normal rate.   Pulmonary:      Breath sounds: Normal breath sounds.   Skin:     General: Skin is warm.   Neurological:      General: No focal deficit present.      Mental Status: She is alert and oriented to person, place, and time.          Radiology:  CT Abdomen Pelvis Without Contrast    Result Date: 10/2/2021   1. There is abnormal thickening of the wall the colon in the left upper quadrant. This involves the transverse colon, splenic flexure, and ascending colon consistent with a nonspecific colitis. 2. There is also thickening of several small bowel loops in the left upper quadrant and the lower pelvis suspicious for a superimposed enteritis. 3. Postsurgical changes. 4. No free air or abscess formation. 5. Additional findings as noted above. The study is limited by noncontrast technique.  Electronically Signed By-Winston Scott MD On:10/2/2021 3:54 PM This report was finalized on [id number] by  Winston Scott MD.    XR Chest 1 View    Result Date: 10/2/2021   1. Mild cardiomegaly. 2. No active pulmonary disease.  Electronically Signed By-Winston Scott MD On:10/2/2021 3:42 PM This  report was finalized on [id number] by  Winston Scott MD.         Results Review:     I reviewed the patient's new clinical results.  I reviewed the patient's new imaging results and agree with the interpretation.    Medication Review:   Scheduled Meds:amiodarone, 200 mg, Oral, TID AC  metoprolol tartrate, 12.5 mg, Oral, Q12H  piperacillin-tazobactam, 3.375 g, Intravenous, Q8H  rosuvastatin, 5 mg, Oral, Nightly  sodium chloride, 10 mL, Intravenous, Q12H      Continuous Infusions:   PRN Meds:.•  acetaminophen  •  HYDROmorphone  •  ipratropium-albuterol  •  melatonin  •  ondansetron  •  sodium chloride  •  [COMPLETED] Insert peripheral IV **AND** sodium chloride  •  sodium chloride  •  traMADol    Labs:    CBC    Results from last 7 days   Lab Units 10/05/21  0325 10/04/21  0259 10/03/21  0308 10/02/21  1442   WBC 10*3/mm3 14.90* 18.30* 24.40* 14.80*   HEMOGLOBIN g/dL 10.1* 10.8* 11.8* 13.4   PLATELETS 10*3/mm3 204 203 246 297     BMP   Results from last 7 days   Lab Units 10/05/21  0325 10/04/21  0259 10/03/21  0308 10/02/21  1442   SODIUM mmol/L 136 136 139 139   POTASSIUM mmol/L 3.8 3.9 4.2 4.3   CHLORIDE mmol/L 105 104 106 104   CO2 mmol/L 20.0* 21.0* 19.0* 20.0*   BUN mg/dL 13 21 28* 40*   CREATININE mg/dL 0.70 0.75 0.67 0.96   GLUCOSE mg/dL 94 95 149* 173*     Cr Clearance Estimated Creatinine Clearance: 35.6 mL/min (by C-G formula based on SCr of 0.7 mg/dL).  Coag     HbA1C   Lab Results   Component Value Date    HGBA1C 5.8 (H) 12/02/2019     Blood Glucose No results found for: POCGLU  Infection     CMP   Results from last 7 days   Lab Units 10/05/21  0325 10/04/21  0259 10/03/21  0308 10/02/21  1442   SODIUM mmol/L 136 136 139 139   POTASSIUM mmol/L 3.8 3.9 4.2 4.3   CHLORIDE mmol/L 105 104 106 104   CO2 mmol/L 20.0* 21.0* 19.0* 20.0*   BUN mg/dL 13 21 28* 40*   CREATININE mg/dL 0.70 0.75 0.67 0.96   GLUCOSE mg/dL 94 95 149* 173*   ALBUMIN g/dL 2.80*  --   --  4.30   BILIRUBIN mg/dL 0.8  --   --  0.9   ALK  PHOS U/L 148*  --   --  120*   AST (SGOT) U/L 21  --   --  21   ALT (SGPT) U/L 11  --   --  17     UA      Radiology(recent) No radiology results for the last day     Assessment:    Acute tachyarrhythmia  Acute colitis  Acute enteritis  Acute abdominal pain secondary to the above  History of peptic ulcer surgery/Billroth I  Atherosclerotic heart disease of native coronary arteries with angina pectoris  Hypertension associated chronic kidney disease stage II  Chronic kidney disease stage II  Panlobular COPD with emphysema  Dyslipidemia  History of myocardial infarction  Gastroesophageal reflux disease without esophagitis  Dermatochalasis  Degenerative disc disease lumbosacral spine  Osteoarthritis    Plan:  Continue present approach//pain control//antimicrobial therapy        Winston Amaya MD  10/05/21  07:06 EDT

## 2021-10-06 ENCOUNTER — ANESTHESIA (OUTPATIENT)
Dept: GASTROENTEROLOGY | Facility: HOSPITAL | Age: 86
End: 2021-10-06

## 2021-10-06 ENCOUNTER — ANESTHESIA EVENT (OUTPATIENT)
Dept: GASTROENTEROLOGY | Facility: HOSPITAL | Age: 86
End: 2021-10-06

## 2021-10-06 ENCOUNTER — INPATIENT HOSPITAL (AMBULATORY)
Dept: URBAN - METROPOLITAN AREA HOSPITAL 84 | Facility: HOSPITAL | Age: 86
End: 2021-10-06
Payer: MEDICARE

## 2021-10-06 DIAGNOSIS — K62.5 HEMORRHAGE OF ANUS AND RECTUM: ICD-10-CM

## 2021-10-06 DIAGNOSIS — K55.9 VASCULAR DISORDER OF INTESTINE, UNSPECIFIED: ICD-10-CM

## 2021-10-06 LAB
ALBUMIN SERPL-MCNC: 2.9 G/DL (ref 3.5–5.2)
ALBUMIN/GLOB SERPL: 1.3 G/DL
ALP SERPL-CCNC: 79 U/L (ref 39–117)
ALT SERPL W P-5'-P-CCNC: 13 U/L (ref 1–33)
ANION GAP SERPL CALCULATED.3IONS-SCNC: 14 MMOL/L (ref 5–15)
AST SERPL-CCNC: 16 U/L (ref 1–32)
BASOPHILS # BLD AUTO: 0 10*3/MM3 (ref 0–0.2)
BASOPHILS NFR BLD AUTO: 0.3 % (ref 0–1.5)
BILIRUB SERPL-MCNC: 0.6 MG/DL (ref 0–1.2)
BUN SERPL-MCNC: 11 MG/DL (ref 8–23)
BUN/CREAT SERPL: 19 (ref 7–25)
CALCIUM SPEC-SCNC: 7.6 MG/DL (ref 8.2–9.6)
CHLORIDE SERPL-SCNC: 107 MMOL/L (ref 98–107)
CO2 SERPL-SCNC: 19 MMOL/L (ref 22–29)
CREAT SERPL-MCNC: 0.58 MG/DL (ref 0.57–1)
DEPRECATED RDW RBC AUTO: 44.2 FL (ref 37–54)
EOSINOPHIL # BLD AUTO: 0.1 10*3/MM3 (ref 0–0.4)
EOSINOPHIL NFR BLD AUTO: 0.9 % (ref 0.3–6.2)
ERYTHROCYTE [DISTWIDTH] IN BLOOD BY AUTOMATED COUNT: 14.4 % (ref 12.3–15.4)
GFR SERPL CREATININE-BSD FRML MDRD: 96 ML/MIN/1.73
GLOBULIN UR ELPH-MCNC: 2.2 GM/DL
GLUCOSE SERPL-MCNC: 88 MG/DL (ref 65–99)
HCT VFR BLD AUTO: 31.5 % (ref 34–46.6)
HGB BLD-MCNC: 10.4 G/DL (ref 12–15.9)
LYMPHOCYTES # BLD AUTO: 0.9 10*3/MM3 (ref 0.7–3.1)
LYMPHOCYTES NFR BLD AUTO: 5.7 % (ref 19.6–45.3)
MCH RBC QN AUTO: 29.1 PG (ref 26.6–33)
MCHC RBC AUTO-ENTMCNC: 33.1 G/DL (ref 31.5–35.7)
MCV RBC AUTO: 87.9 FL (ref 79–97)
MONOCYTES # BLD AUTO: 0.9 10*3/MM3 (ref 0.1–0.9)
MONOCYTES NFR BLD AUTO: 5.5 % (ref 5–12)
NEUTROPHILS NFR BLD AUTO: 14 10*3/MM3 (ref 1.7–7)
NEUTROPHILS NFR BLD AUTO: 87.6 % (ref 42.7–76)
NRBC BLD AUTO-RTO: 0 /100 WBC (ref 0–0.2)
PLATELET # BLD AUTO: 230 10*3/MM3 (ref 140–450)
PMV BLD AUTO: 7.4 FL (ref 6–12)
POTASSIUM SERPL-SCNC: 3.6 MMOL/L (ref 3.5–5.2)
PROT SERPL-MCNC: 5.1 G/DL (ref 6–8.5)
RBC # BLD AUTO: 3.58 10*6/MM3 (ref 3.77–5.28)
SODIUM SERPL-SCNC: 140 MMOL/L (ref 136–145)
WBC # BLD AUTO: 16 10*3/MM3 (ref 3.4–10.8)

## 2021-10-06 PROCEDURE — 93005 ELECTROCARDIOGRAM TRACING: CPT | Performed by: FAMILY MEDICINE

## 2021-10-06 PROCEDURE — 45331 SIGMOIDOSCOPY AND BIOPSY: CPT | Performed by: INTERNAL MEDICINE

## 2021-10-06 PROCEDURE — 88305 TISSUE EXAM BY PATHOLOGIST: CPT | Performed by: INTERNAL MEDICINE

## 2021-10-06 PROCEDURE — 0DBM8ZX EXCISION OF DESCENDING COLON, VIA NATURAL OR ARTIFICIAL OPENING ENDOSCOPIC, DIAGNOSTIC: ICD-10-PCS | Performed by: INTERNAL MEDICINE

## 2021-10-06 PROCEDURE — 93010 ELECTROCARDIOGRAM REPORT: CPT | Performed by: INTERNAL MEDICINE

## 2021-10-06 PROCEDURE — 25010000002 PIPERACILLIN SOD-TAZOBACTAM PER 1 G: Performed by: INTERNAL MEDICINE

## 2021-10-06 PROCEDURE — 25010000002 ONDANSETRON PER 1 MG: Performed by: FAMILY MEDICINE

## 2021-10-06 PROCEDURE — 25010000002 ONDANSETRON PER 1 MG: Performed by: INTERNAL MEDICINE

## 2021-10-06 PROCEDURE — 25010000002 PROPOFOL 10 MG/ML EMULSION: Performed by: ANESTHESIOLOGY

## 2021-10-06 PROCEDURE — 25010000002 PIPERACILLIN SOD-TAZOBACTAM PER 1 G: Performed by: FAMILY MEDICINE

## 2021-10-06 PROCEDURE — 80053 COMPREHEN METABOLIC PANEL: CPT | Performed by: NURSE PRACTITIONER

## 2021-10-06 PROCEDURE — 0DBL8ZX EXCISION OF TRANSVERSE COLON, VIA NATURAL OR ARTIFICIAL OPENING ENDOSCOPIC, DIAGNOSTIC: ICD-10-PCS | Performed by: INTERNAL MEDICINE

## 2021-10-06 PROCEDURE — 25010000002 HYDROMORPHONE PER 4 MG: Performed by: FAMILY MEDICINE

## 2021-10-06 PROCEDURE — 85025 COMPLETE CBC W/AUTO DIFF WBC: CPT | Performed by: NURSE PRACTITIONER

## 2021-10-06 PROCEDURE — 99233 SBSQ HOSP IP/OBS HIGH 50: CPT | Performed by: INTERNAL MEDICINE

## 2021-10-06 RX ORDER — LIDOCAINE HYDROCHLORIDE 10 MG/ML
INJECTION, SOLUTION EPIDURAL; INFILTRATION; INTRACAUDAL; PERINEURAL AS NEEDED
Status: DISCONTINUED | OUTPATIENT
Start: 2021-10-06 | End: 2021-10-06 | Stop reason: SURG

## 2021-10-06 RX ORDER — NALBUPHINE HCL 10 MG/ML
10 AMPUL (ML) INJECTION EVERY 4 HOURS PRN
Status: CANCELLED | OUTPATIENT
Start: 2021-10-06

## 2021-10-06 RX ORDER — ONDANSETRON 2 MG/ML
4 INJECTION INTRAMUSCULAR; INTRAVENOUS ONCE AS NEEDED
Status: CANCELLED | OUTPATIENT
Start: 2021-10-06

## 2021-10-06 RX ORDER — MEPERIDINE HYDROCHLORIDE 25 MG/ML
12.5 INJECTION INTRAMUSCULAR; INTRAVENOUS; SUBCUTANEOUS
Status: CANCELLED | OUTPATIENT
Start: 2021-10-06 | End: 2021-10-07

## 2021-10-06 RX ORDER — SODIUM, POTASSIUM,MAG SULFATES 17.5-3.13G
1 SOLUTION, RECONSTITUTED, ORAL ORAL ONCE
Status: DISCONTINUED | OUTPATIENT
Start: 2021-10-07 | End: 2021-10-09 | Stop reason: HOSPADM

## 2021-10-06 RX ORDER — LORAZEPAM 2 MG/ML
1 INJECTION INTRAMUSCULAR
Status: CANCELLED | OUTPATIENT
Start: 2021-10-06 | End: 2021-10-16

## 2021-10-06 RX ORDER — FLUMAZENIL 0.1 MG/ML
0.2 INJECTION INTRAVENOUS AS NEEDED
Status: CANCELLED | OUTPATIENT
Start: 2021-10-06

## 2021-10-06 RX ORDER — DIPHENHYDRAMINE HYDROCHLORIDE 50 MG/ML
12.5 INJECTION INTRAMUSCULAR; INTRAVENOUS
Status: CANCELLED | OUTPATIENT
Start: 2021-10-06

## 2021-10-06 RX ORDER — HYDROMORPHONE HCL 110MG/55ML
0.5 PATIENT CONTROLLED ANALGESIA SYRINGE INTRAVENOUS
Status: CANCELLED | OUTPATIENT
Start: 2021-10-06 | End: 2021-10-16

## 2021-10-06 RX ORDER — NALOXONE HCL 0.4 MG/ML
0.4 VIAL (ML) INJECTION AS NEEDED
Status: CANCELLED | OUTPATIENT
Start: 2021-10-06

## 2021-10-06 RX ORDER — SODIUM CHLORIDE 0.9 % (FLUSH) 0.9 %
3-10 SYRINGE (ML) INJECTION AS NEEDED
Status: CANCELLED | OUTPATIENT
Start: 2021-10-06

## 2021-10-06 RX ORDER — NALBUPHINE HCL 10 MG/ML
2 AMPUL (ML) INJECTION EVERY 4 HOURS PRN
Status: CANCELLED | OUTPATIENT
Start: 2021-10-06

## 2021-10-06 RX ORDER — MIDAZOLAM HYDROCHLORIDE 1 MG/ML
1 INJECTION INTRAMUSCULAR; INTRAVENOUS
Status: CANCELLED | OUTPATIENT
Start: 2021-10-06

## 2021-10-06 RX ORDER — ALBUTEROL SULFATE 2.5 MG/3ML
2.5 SOLUTION RESPIRATORY (INHALATION) ONCE AS NEEDED
Status: CANCELLED | OUTPATIENT
Start: 2021-10-06

## 2021-10-06 RX ORDER — SODIUM CHLORIDE 0.9 % (FLUSH) 0.9 %
3 SYRINGE (ML) INJECTION EVERY 12 HOURS SCHEDULED
Status: CANCELLED | OUTPATIENT
Start: 2021-10-06

## 2021-10-06 RX ORDER — ONDANSETRON 4 MG/1
4 TABLET, FILM COATED ORAL EVERY 6 HOURS PRN
Status: DISCONTINUED | OUTPATIENT
Start: 2021-10-06 | End: 2021-10-09 | Stop reason: HOSPADM

## 2021-10-06 RX ORDER — ONDANSETRON 2 MG/ML
4 INJECTION INTRAMUSCULAR; INTRAVENOUS EVERY 6 HOURS PRN
Status: DISCONTINUED | OUTPATIENT
Start: 2021-10-06 | End: 2021-10-09 | Stop reason: HOSPADM

## 2021-10-06 RX ORDER — SODIUM CHLORIDE 9 MG/ML
INJECTION, SOLUTION INTRAVENOUS CONTINUOUS PRN
Status: DISCONTINUED | OUTPATIENT
Start: 2021-10-06 | End: 2021-10-06 | Stop reason: SURG

## 2021-10-06 RX ORDER — HYDROMORPHONE HCL 110MG/55ML
0.5 PATIENT CONTROLLED ANALGESIA SYRINGE INTRAVENOUS
Status: CANCELLED | OUTPATIENT
Start: 2021-10-06

## 2021-10-06 RX ORDER — SODIUM, POTASSIUM,MAG SULFATES 17.5-3.13G
1 SOLUTION, RECONSTITUTED, ORAL ORAL ONCE
Status: COMPLETED | OUTPATIENT
Start: 2021-10-06 | End: 2021-10-06

## 2021-10-06 RX ORDER — PROPOFOL 10 MG/ML
VIAL (ML) INTRAVENOUS AS NEEDED
Status: DISCONTINUED | OUTPATIENT
Start: 2021-10-06 | End: 2021-10-06 | Stop reason: SURG

## 2021-10-06 RX ADMIN — SODIUM CHLORIDE: 0.9 INJECTION, SOLUTION INTRAVENOUS at 15:05

## 2021-10-06 RX ADMIN — Medication 10 ML: at 20:51

## 2021-10-06 RX ADMIN — HYDROMORPHONE HYDROCHLORIDE 0.25 MG: 2 INJECTION INTRAMUSCULAR; INTRAVENOUS; SUBCUTANEOUS at 10:48

## 2021-10-06 RX ADMIN — PIPERACILLIN AND TAZOBACTAM 3.38 G: 3; .375 INJECTION, POWDER, LYOPHILIZED, FOR SOLUTION INTRAVENOUS at 06:16

## 2021-10-06 RX ADMIN — ONDANSETRON 4 MG: 2 INJECTION INTRAMUSCULAR; INTRAVENOUS at 19:19

## 2021-10-06 RX ADMIN — AMIODARONE HYDROCHLORIDE 200 MG: 200 TABLET ORAL at 12:05

## 2021-10-06 RX ADMIN — AMIODARONE HYDROCHLORIDE 200 MG: 200 TABLET ORAL at 08:24

## 2021-10-06 RX ADMIN — PROPOFOL 300 MG: 10 INJECTION, EMULSION INTRAVENOUS at 15:03

## 2021-10-06 RX ADMIN — HYDROMORPHONE HYDROCHLORIDE 0.25 MG: 2 INJECTION INTRAMUSCULAR; INTRAVENOUS; SUBCUTANEOUS at 14:19

## 2021-10-06 RX ADMIN — LIDOCAINE HYDROCHLORIDE 50 MG: 10 INJECTION, SOLUTION EPIDURAL; INFILTRATION; INTRACAUDAL; PERINEURAL at 15:03

## 2021-10-06 RX ADMIN — ACETAMINOPHEN 325 MG: 325 TABLET, FILM COATED ORAL at 20:51

## 2021-10-06 RX ADMIN — PIPERACILLIN AND TAZOBACTAM 3.38 G: 3; .375 INJECTION, POWDER, LYOPHILIZED, FOR SOLUTION INTRAVENOUS at 17:25

## 2021-10-06 RX ADMIN — AMIODARONE HYDROCHLORIDE 200 MG: 200 TABLET ORAL at 17:25

## 2021-10-06 RX ADMIN — METOPROLOL TARTRATE 12.5 MG: 25 TABLET, FILM COATED ORAL at 08:24

## 2021-10-06 RX ADMIN — TRAMADOL HYDROCHLORIDE 50 MG: 50 TABLET, FILM COATED ORAL at 08:25

## 2021-10-06 RX ADMIN — ROSUVASTATIN CALCIUM 5 MG: 5 TABLET, FILM COATED ORAL at 20:51

## 2021-10-06 RX ADMIN — Medication 1 BOTTLE: at 10:49

## 2021-10-06 RX ADMIN — Medication 10 ML: at 08:24

## 2021-10-06 RX ADMIN — METOPROLOL TARTRATE 12.5 MG: 25 TABLET, FILM COATED ORAL at 20:51

## 2021-10-06 RX ADMIN — ONDANSETRON 4 MG: 2 INJECTION INTRAMUSCULAR; INTRAVENOUS at 10:48

## 2021-10-06 NOTE — ANESTHESIA PREPROCEDURE EVALUATION
Anesthesia Evaluation     Patient summary reviewed and Nursing notes reviewed   history of anesthetic complications: PONV  NPO Solid Status: > 8 hours  NPO Liquid Status: > 8 hours           Airway   Mallampati: II  TM distance: >3 FB  Neck ROM: full  No difficulty expected  Dental - normal exam     Pulmonary - negative pulmonary ROS and normal exam    breath sounds clear to auscultation  Cardiovascular - normal exam  Exercise tolerance: unable to assess    ECG reviewed  Rhythm: regular  Rate: normal    (+) hypertension, past MI , CAD, angina, CHF Diastolic >=55%, hyperlipidemia,     ROS comment: Interpretation Summary    · Estimated left ventricular EF was in agreement with the calculated left ventricular EF. Left ventricular ejection fraction appears to be 56 - 60%. Left ventricular systolic function is normal.  · Left ventricular diastolic function is consistent with (grade II w/high LAP) pseudonormalization.  · Left atrial volume is mildly increased.  · Estimated right ventricular systolic pressure from tricuspid regurgitation is normal (<35 mmHg).       Cath 2019 : 1 V CAD  Med TX    Neuro/Psych- negative ROS  GI/Hepatic/Renal/Endo    (+)  GERD, GI bleeding lower ,     Musculoskeletal     Abdominal  - normal exam   Substance History - negative use     OB/GYN negative ob/gyn ROS         Other   arthritis,                      Anesthesia Plan    ASA 4     MAC     intravenous induction     Anesthetic plan, all risks, benefits, and alternatives have been provided, discussed and informed consent has been obtained with: patient.

## 2021-10-06 NOTE — PROGRESS NOTES
LOS: 3 days   Patient Care Team:  Monster Myrick MD as PCP - General (Family Medicine)  ROBERTO CARLOS Morales MD as Consulting Physician (Cardiology)    Subjective:  Pain somewhat improved    Objective:   Resolution of paroxysmal atrial fibrillation    Review of Systems:   Review of Systems   Constitutional: Positive for activity change.   Respiratory: Negative.    Cardiovascular: Positive for palpitations.   Gastrointestinal: Positive for abdominal pain.   Neurological: Positive for weakness.           Vital Signs  Temp:  [97.3 °F (36.3 °C)-98.9 °F (37.2 °C)] 97.3 °F (36.3 °C)  Heart Rate:  [54-84] 54  Resp:  [16-18] 16  BP: (116-134)/(64-72) 123/72    Physical Exam:  Physical Exam  Cardiovascular:      Rate and Rhythm: Rhythm irregular.      Heart sounds: Normal heart sounds.   Pulmonary:      Breath sounds: Normal breath sounds.   Abdominal:      Palpations: There is no mass.      Tenderness: There is abdominal tenderness. There is no guarding or rebound.      Hernia: No hernia is present.   Skin:     General: Skin is warm.   Neurological:      Mental Status: She is alert.          Radiology:  CT Abdomen Pelvis Without Contrast    Result Date: 10/2/2021   1. There is abnormal thickening of the wall the colon in the left upper quadrant. This involves the transverse colon, splenic flexure, and ascending colon consistent with a nonspecific colitis. 2. There is also thickening of several small bowel loops in the left upper quadrant and the lower pelvis suspicious for a superimposed enteritis. 3. Postsurgical changes. 4. No free air or abscess formation. 5. Additional findings as noted above. The study is limited by noncontrast technique.  Electronically Signed By-Winston Scott MD On:10/2/2021 3:54 PM This report was finalized on [id number] by  Winston Scott MD.    XR Chest 1 View    Result Date: 10/2/2021   1. Mild cardiomegaly. 2. No active pulmonary disease.  Electronically Signed By-Winston Scott MD On:10/2/2021  3:42 PM This report was finalized on [id number] by  Winston Scott MD.         Results Review:     I reviewed the patient's new clinical results.  I reviewed the patient's new imaging results and agree with the interpretation.    Medication Review:   Scheduled Meds:amiodarone, 200 mg, Oral, TID AC  metoprolol tartrate, 12.5 mg, Oral, Q12H  piperacillin-tazobactam, 3.375 g, Intravenous, Q8H  rosuvastatin, 5 mg, Oral, Nightly  sodium chloride, 10 mL, Intravenous, Q12H      Continuous Infusions:   PRN Meds:.•  acetaminophen  •  HYDROmorphone  •  ipratropium-albuterol  •  melatonin  •  ondansetron  •  sodium chloride  •  [COMPLETED] Insert peripheral IV **AND** sodium chloride  •  sodium chloride  •  traMADol    Labs:    CBC    Results from last 7 days   Lab Units 10/06/21  0040 10/05/21  0325 10/04/21  0259 10/03/21  0308 10/02/21  1442   WBC 10*3/mm3 16.00* 14.90* 18.30* 24.40* 14.80*   HEMOGLOBIN g/dL 10.4* 10.1* 10.8* 11.8* 13.4   PLATELETS 10*3/mm3 230 204 203 246 297     BMP   Results from last 7 days   Lab Units 10/06/21  0040 10/05/21  0325 10/04/21  0259 10/03/21  0308 10/02/21  1442   SODIUM mmol/L 140 136 136 139 139   POTASSIUM mmol/L 3.6 3.8 3.9 4.2 4.3   CHLORIDE mmol/L 107 105 104 106 104   CO2 mmol/L 19.0* 20.0* 21.0* 19.0* 20.0*   BUN mg/dL 11 13 21 28* 40*   CREATININE mg/dL 0.58 0.70 0.75 0.67 0.96   GLUCOSE mg/dL 88 94 95 149* 173*     Cr Clearance Estimated Creatinine Clearance: 35.6 mL/min (by C-G formula based on SCr of 0.58 mg/dL).  Coag     HbA1C   Lab Results   Component Value Date    HGBA1C 5.8 (H) 12/02/2019     Blood Glucose No results found for: POCGLU  Infection     CMP   Results from last 7 days   Lab Units 10/06/21  0040 10/05/21  0325 10/04/21  0259 10/03/21  0308 10/02/21  1442   SODIUM mmol/L 140 136 136 139 139   POTASSIUM mmol/L 3.6 3.8 3.9 4.2 4.3   CHLORIDE mmol/L 107 105 104 106 104   CO2 mmol/L 19.0* 20.0* 21.0* 19.0* 20.0*   BUN mg/dL 11 13 21 28* 40*   CREATININE mg/dL 0.58  0.70 0.75 0.67 0.96   GLUCOSE mg/dL 88 94 95 149* 173*   ALBUMIN g/dL 2.90* 2.80*  --   --  4.30   BILIRUBIN mg/dL 0.6 0.8  --   --  0.9   ALK PHOS U/L 79 148*  --   --  120*   AST (SGOT) U/L 16 21  --   --  21   ALT (SGPT) U/L 13 11  --   --  17     UA      Radiology(recent) No radiology results for the last day   Assessment:    Acute tachyarrhythmia  Acute atrial fibrillation with rapid ventricular response  Acute colitis  Acute enteritis  Acute abdominal pain secondary to the above  History of peptic ulcer surgery/[place] I  Atherosclerotic heart disease of native coronary arteries with angina pectoris  Hypertension associated chronic kidney disease stage II  Chronic kidney disease stage II  Panlobular COPD with emphysema  Dyslipidemia  History of myocardial infarction  Gastroesophageal reflux disease without esophagitis  Dermatochalasis  Degenerative disc disease lumbosacral spine  Osteoarthritis    Plan:    Continue present approach//agree with use of antiarrhythmics        Winston Amaya MD  10/06/21  07:33 EDT

## 2021-10-06 NOTE — PLAN OF CARE
Goal Outcome Evaluation:  Plan of Care Reviewed With: patient, durable power of         Progress: improving      Patient resting comfortably in bed currently. Patient was able to ambulate in room independently. Patient had sigmoidoscopy today. Patient has some complaints of pain and nausea but able to have relief with PRN pain and anti-nausea medications. Patient's VSS. Will continue to monitor.

## 2021-10-06 NOTE — PROGRESS NOTES
Cardiology progress note  Gavin Quiroz MD, PhD      Patient Care Team:  Monster Myrick MD as PCP - General (Family Medicine)  Gavin Quiroz MD as Consulting Physician (Cardiology)    CHIEF COMPLAINT: New onset atrial fibrillation    HISTORY OF PRESENT ILLNESS:    At the pleasure to see this 89-year-old female who is a known patient of cardiology but is a new patient to me today.  She has history of essential hypertension hyperlipidemia, mild heart attack in the past 2019 most recently, there are no stents no history bypass surgery with disease that was felt to be small vessel not amenable to intervention.  She does not meet conditions for secondary prevention goals but is again at advanced age at 89 with overall reasonable functional status.  She presented with acute onset lower abdominal pain a couple of days ago which is improved since admission.  She had some nausea and vomiting diarrhea was ultimately seen to have colitis on CT scan.  In the setting of this acute illness she had A. fib RVR which responded with spontaneous conversion back to sinus rhythm on my encounter today at rate of 70s to 80s she also had some low blood pressures likely in the setting of acute illness in combination with doses of Imdur and amlodipine given with volume depletion as well.  This responded to IV fluids.  She denies any chest pain, she is on room air with daughter at bedside.  She is in sinus rhythm on telemetry with no ischemic changes and she denies any chest pain.    She is well compensated with no heart failure signs or symptoms presently on my encounter  ============================================  Seen and examined, resting comfortably, no acute CV events overnight, sinus rhythm  Status post sigmoidoscopy   GI suspicious of ischemic colitis.  Afterload reduction with amlodipine has been discontinued in lieu of of amiodarone for rate and rhythm control strategy of A. fib RVR as well as very low-dose beta-blocker, heart  rates in the 60s to 80s while resting maintaining sinus rhythm today.  No clinical heart failure.  Avoid hypotension we will not restart antihypertensives at this time, discussed with nursing staff at bedside with parameters    Still off anticoagulation, agree given GI bleeding  Limited atrial fibrillation now back to sinus rhythm with improvement as of acute illness    Review of systems negative x14 point review of systems except was mentioned above    Historical data copied forward from previous encounters in EMR is unchanged          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 sure which 1  contracted at age 8   • History of transfusion    • [place] (hard of hearing)    • Hyperlipidemia    • Hypertension    • Past heart attack     X2 MILD LAST ONE OCT 2019   • PONV (postoperative nausea and vomiting)      Past Surgical History:   Procedure Laterality Date   • ANTERIOR AND POSTERIOR VAGINAL REPAIR     • BACK SURGERY     • BLEPHAROPLASTY Bilateral 5/23/2019    Procedure: bilateral upper lid blepharoplasty;  Surgeon: Mauricio Pennington MD;  Location:  [facility];  Service: Ophthalmology   • BROW LIFT Bilateral 2/13/2020    Procedure: BILATERAL TEMPORAL DIRECT BROWLIFT;  Surgeon: Sreekanth Bird MD;  [facility];  Service: Ophthalmology;  Laterality: Bilateral;   • CARDIAC CATHETERIZATION N/A 12/3/2019    Procedure: Left Heart Cath;  Surgeon: Puma Briseno MD;  Location: [facility] CATH INVASIVE LOCATION;  Service: Cardiovascular   • CARDIAC CATHETERIZATION N/A 12/3/2019    Procedure: Coronary angiography;  Surgeon: Puma Briseno MD;  Location: [facility] CATH INVASIVE LOCATION;  Service: Cardiovascular   • CARDIAC CATHETERIZATION N/A 12/3/2019    Procedure: Left ventriculography;  Surgeon: Finn  Puma DE GUZMAN MD;  Location: [facility] CATH INVASIVE LOCATION;  Service: Cardiovascular   • CATARACT EXTRACTION EXTRACAPSULAR W/ INTRAOCULAR LENS IMPLANTATION Bilateral    • CERVICAL SPINE ANTERIOR      with instrumentation   • CHEILECTOMY Left 2020    Procedure: CHEILECTOMY;  Surgeon: GAB Rees DPM;  Location: [facility] MAIN OR;  Service: Podiatry;  Laterality: Left;   • COLON SURGERY      for obstruction   • ALBERTO TUBE INSERTION Bilateral 2019    Procedure: ALBERTO TUBE;  Surgeon: Mauricio Pennington MD;  Location: [facility];  Service: Ophthalmology   • ECTROPION REPAIR Bilateral 2019    Procedure: bilateral lower lid ectropion repair, bilateral punctoplasty;  Surgeon: Mauricio Pennington MD;  Location: [facility] OR [facility];  Service: Ophthalmology   • EYE SURGERY     • FOOT FUSION Left 2016    Procedure: LEFT HALLUX METATARSALPHALANGEAL ARTHRODESIS SILVER BUNIONECTOMY METATARSAL HEAD RESECTION 2-5 CALCANEAL BONE GRAFT;  Surgeon: Monster Harper Jr., MD;  Location: [facility] OR;  Service:    • HYSTERECTOMY     • INGUINAL HERNIA REPAIR Bilateral    • METATARSAL OSTEOTOMY Left 2020    Procedure: Metatarsal head resection 5;  Surgeon: GAB Rees DPM;  Location: [facility] MAIN OR;  Service: Podiatry;  Laterality: Left;   • ROTATOR CUFF REPAIR Right    • STOMACH SURGERY      for ulcer     Family History   Problem Relation Age of Onset   • No Known Problems Mother    • No Known Problems Father    • Malig Hyperthermia Neg Hx      Social History     Tobacco Use   • Smoking status: Former Smoker     Packs/day: 0.25     Years: 10.00     Pack years: 2.50     Types: Cigarettes     Quit date:      Years since quittin.7   • Smokeless tobacco: Never Used   Vaping Use   • Vaping Use: Never used   Substance Use Topics   • Alcohol use: No   • Drug use: No     Medications Prior to Admission   Medication Sig Dispense Refill Last Dose   • acetaminophen (TYLENOL) 500 MG tablet Take 500 mg by mouth Every  "6 (Six) Hours As Needed for mild pain (1-3).      • amLODIPine (NORVASC) 5 MG tablet Take 1 tablet by mouth Daily. 30 tablet 6 10/2/2021 at Unknown time   • aspirin 81 MG EC tablet Take 81 mg by mouth Daily. LD 8/19   10/2/2021 at Unknown time   • isosorbide mononitrate (IMDUR) 60 MG 24 hr tablet Take 1 tablet by mouth once daily 90 tablet 0 10/2/2021 at Unknown time   • nitroglycerin (NITROSTAT) 0.4 MG SL tablet 1 under the tongue as needed for angina, may repeat q5mins for up three doses (Patient taking differently: Place  under the tongue Every 5 (Five) Minutes As Needed. 1 under the tongue as needed for angina, may repeat q5mins for up three doses) 100 tablet 11    • simvastatin (ZOCOR) 40 MG tablet Take 0.5 tablets by mouth Every Night. 90 tablet 3 10/1/2021 at Unknown time     Allergies:  Codeine, Lortab [hydrocodone-acetaminophen], Nitrofurantoin, and Sulfa antibiotics    REVIEW OF SYSTEMS:  Please see the above history of present illness for pertinent positives and negatives.  The remainder of the patient's systems have been reviewed and are negative.     Vital Signs  Temp:  [97.3 °F (36.3 °C)-98.9 °F (37.2 °C)] 98.2 °F (36.8 °C)  Heart Rate:  [54-91] 91  Resp:  [16-28] 20  BP: (115-155)/(47-75) 155/71    Flowsheet Rows      First Filed Value   Admission Height  160 cm (63\") Documented at 10/02/2021 1422   Admission Weight  54.4 kg (120 lb) Documented at 10/02/2021 1422           Physical Exam:  Physical Exam   Constitutional: Patient appears well-developed and well-nourished and in no acute distress Resting comfortably on encounter  HEENT:   Head: Normocephalic and atraumatic.   Eyes:  Pupils are equal, round, and reactive to light. EOM are intact. Sclerae are anicteric and noninjected.  Mouth and Throat: Patient has moist mucous membranes. Oropharynx is clear of any erythema or exudate.     Neck: Neck supple. No JVD present. No thyromegaly present. No lymphadenopathy present.  Cardiovascular: Regular rate, " regular rhythm, S1 normal and S2 normal.  Exam reveals no gallop and no friction rub.  No murmur heard.  Pulmonary/Chest: Lungs are clear to auscultation bilaterally. No respiratory distress. No wheezes. No rhonchi. No rales.   Abdominal: Soft. Bowel sounds are normal. No distension and no mass. There is no hepatosplenomegaly. There is no tenderness.   Musculoskeletal: Normal muscle tone  Extremities: No edema. Pulses are palpable in all 4 extremities.  Neurological: Patient is alert and oriented to person, place, and time. Cranial nerves II-XII are grossly intact with no focal deficits.  Skin: Skin is warm. No rash noted. Nails show no clubbing.  No cyanosis or erythema.     Results Review:    I reviewed the patient's new clinical results.  Lab Results (most recent)     Procedure Component Value Units Date/Time    Troponin [[id number]]  (Normal) Collected: 10/04/21 0924    Specimen: Blood Updated: 10/04/21 0951     Troponin T 0.013 ng/mL     Narrative:      Troponin T Reference Range:  <= 0.03 ng/mL-   Negative for AMI  >0.03 ng/mL-     Abnormal for myocardial necrosis.  Clinicians would have to utilize clinical acumen, EKG, Troponin and serial changes to determine if it is an Acute Myocardial Infarction or myocardial injury due to an underlying chronic condition.       Results may be falsely decreased if patient taking Biotin.      Basic Metabolic Panel [[id number]]  (Abnormal) Collected: 10/04/21 0259    Specimen: Blood Updated: 10/04/21 0347     Glucose 95 mg/dL      BUN 21 mg/dL      Creatinine 0.75 mg/dL      Sodium 136 mmol/L      Potassium 3.9 mmol/L      Chloride 104 mmol/L      CO2 21.0 mmol/L      Calcium 7.9 mg/dL      eGFR Non African Amer 72 mL/min/1.73      BUN/Creatinine Ratio 28.0     Anion Gap 11.0 mmol/L     Narrative:      GFR Normal >60  Chronic Kidney Disease <60  Kidney Failure <15      Lactic Acid, Plasma [[id number]]  (Normal) Collected: 10/04/21 0259    Specimen: Blood Updated: 10/04/21 0346      Lactate 1.3 mmol/L     CBC & Differential [[id number]]  (Abnormal) Collected: 10/04/21 0259    Specimen: Blood Updated: 10/04/21 0321    Narrative:      The following orders were created for panel order CBC & Differential.  Procedure                               Abnormality         Status                     ---------                               -----------         ------                     CBC Auto Differential[[id number]]        Abnormal            Final result                 Please view results for these tests on the individual orders.    CBC Auto Differential [[id number]]  (Abnormal) Collected: 10/04/21 0259    Specimen: Blood Updated: 10/04/21 0321     WBC 18.30 10*3/mm3      RBC 3.72 10*6/mm3      Hemoglobin 10.8 g/dL      Hematocrit 32.6 %      MCV 87.7 fL      MCH 29.0 pg      MCHC 33.1 g/dL      RDW 14.7 %      RDW-SD 45.5 fl      MPV 7.0 fL      Platelets 203 10*3/mm3      Neutrophil % 87.5 %      Lymphocyte % 6.9 %      Monocyte % 5.0 %      Eosinophil % 0.3 %      Basophil % 0.3 %      Neutrophils, Absolute 16.00 10*3/mm3      Lymphocytes, Absolute 1.30 10*3/mm3      Monocytes, Absolute 0.90 10*3/mm3      Eosinophils, Absolute 0.10 10*3/mm3      Basophils, Absolute 0.10 10*3/mm3      nRBC 0.0 /100 WBC     Basic Metabolic Panel [[id number]]  (Abnormal) Collected: 10/03/21 0308    Specimen: Blood Updated: 10/03/21 0421     Glucose 149 mg/dL      BUN 28 mg/dL      Creatinine 0.67 mg/dL      Sodium 139 mmol/L      Potassium 4.2 mmol/L      Chloride 106 mmol/L      CO2 19.0 mmol/L      Calcium 7.8 mg/dL      eGFR Non African Amer 82 mL/min/1.73      BUN/Creatinine Ratio 41.8     Anion Gap 14.0 mmol/L     Narrative:      GFR Normal >60  Chronic Kidney Disease <60  Kidney Failure <15      CBC Auto Differential [[id number]]  (Abnormal) Collected: 10/03/21 0308    Specimen: Blood Updated: 10/03/21 0404     WBC 24.40 10*3/mm3      RBC 4.07 10*6/mm3      Hemoglobin 11.8 g/dL      Hematocrit 35.5 %      MCV 87.2  fL      MCH 28.9 pg      MCHC 33.1 g/dL      RDW 14.7 %      RDW-SD 45.1 fl      MPV 7.2 fL      Platelets 246 10*3/mm3      Neutrophil % 92.0 %      Lymphocyte % 3.8 %      Monocyte % 3.9 %      Eosinophil % 0.0 %      Basophil % 0.3 %      Neutrophils, Absolute 22.50 10*3/mm3      Lymphocytes, Absolute 0.90 10*3/mm3      Monocytes, Absolute 1.00 10*3/mm3      Eosinophils, Absolute 0.00 10*3/mm3      Basophils, Absolute 0.10 10*3/mm3      nRBC 0.0 /100 WBC     [place] Draw [[id number]] Collected: 10/02/21 1442    Specimen: Blood Updated: 10/02/21 5706    Narrative:      The following orders were created for panel order [place] Draw.  Procedure                               Abnormality         Status                     ---------                               -----------         ------                     Green Top (Gel)[[id number]]                                  Final result               Lavender Top[[id number]]                                     Final result               Gold Top - SST[[id number]]                                                              Light Blue Top[[id number]]                                                                Please view results for these tests on the individual orders.    COVID PRE-OP / PRE-PROCEDURE SCREENING ORDER (NO ISOLATION) - Swab, Nasopharynx [[id number]]  (Normal) Collected: 10/02/21 1615    Specimen: Swab from Nasopharynx Updated: 10/02/21 2410    Narrative:      The following orders were created for panel order COVID PRE-OP / PRE-PROCEDURE SCREENING ORDER (NO ISOLATION) - Swab, Nasopharynx.  Procedure                               Abnormality         Status                     ---------                               -----------         ------                     COVID-19,CEPHEID/KALA/BD...[[id number]]  Normal              Final result                 Please view results for these tests on the individual orders.    COVID-19,CEPHEID/KALA/BDMAX,COR/TIGRE/PAD/DARLING  IN-HOUSE(OR EMERGENT/ADD-ON),NP SWAB IN TRANSPORT MEDIA 3-4 HR TAT, RT-PCR - Swab, Nasopharynx [[id number]]  (Normal) Collected: 10/02/21 1615    Specimen: Swab from Nasopharynx Updated: 10/02/21 1737     COVID19 Not Detected    Narrative:      Fact sheet for providers: https://www.fda.gov/media/176026/download     Fact sheet for patients: https://www.fda.gov/media/386634/download  Fact sheet for providers: https://www.fda.gov/media/037830/download     Fact sheet for patients: https://www.fda.gov/media/278918/download    Extra Tubes [[id number]] Collected: 10/02/21 1442    Specimen: Blood, Venous Line Updated: 10/02/21 1546    Narrative:      The following orders were created for panel order Extra Tubes.  Procedure                               Abnormality         Status                     ---------                               -----------         ------                     Gold Top - SST[[id number]]                                   Final result                 Please view results for these tests on the individual orders.    Gold Top - SST [[id number]] Collected: 10/02/21 1442    Specimen: Blood Updated: 10/02/21 1546     Extra Tube Hold for add-ons.     Comment: Auto resulted.       Green Top (Gel) [[id number]] Collected: 10/02/21 1442    Specimen: Blood Updated: 10/02/21 1546     Extra Tube Hold for add-ons.     Comment: Auto resulted.       Lavender Top [[id number]] Collected: 10/02/21 1442    Specimen: Blood Updated: 10/02/21 1546     Extra Tube hold for add-on     Comment: Auto resulted       Comprehensive Metabolic Panel [[id number]]  (Abnormal) Collected: 10/02/21 1442    Specimen: Blood Updated: 10/02/21 1510     Glucose 173 mg/dL      BUN 40 mg/dL      Creatinine 0.96 mg/dL      Sodium 139 mmol/L      Potassium 4.3 mmol/L      Comment: Slight hemolysis detected by analyzer. Results may be affected.        Chloride 104 mmol/L      CO2 20.0 mmol/L      Calcium 9.0 mg/dL      Total Protein 6.9 g/dL      Albumin  4.30 g/dL      ALT (SGPT) 17 U/L      AST (SGOT) 21 U/L      Comment: Slight hemolysis detected by analyzer. Results may be affected.        Alkaline Phosphatase 120 U/L      Total Bilirubin 0.9 mg/dL      eGFR Non African Amer 54 mL/min/1.73      Globulin 2.6 gm/dL      A/G Ratio 1.7 g/dL      BUN/Creatinine Ratio 41.7     Anion Gap 15.0 mmol/L     Narrative:      GFR Normal >60  Chronic Kidney Disease <60  Kidney Failure <15      POC Lactate [[id number]]  (Normal) Collected: 10/02/21 1504    Specimen: Blood Updated: 10/02/21 1504    CBC & Differential [[id number]]  (Abnormal) Collected: 10/02/21 1442    Specimen: Blood Updated: 10/02/21 1451    Narrative:      The following orders were created for panel order CBC & Differential.  Procedure                               Abnormality         Status                     ---------                               -----------         ------                     CBC Auto Differential[[id number]]        Abnormal            Final result                 Please view results for these tests on the individual orders.    POC Lactate [[id number]]  (Normal) Collected: 10/02/21 1448    Specimen: Blood Updated: 10/02/21 1449     Lactate 1.4 mmol/L      Comment: Serial Number: [id number]:  [id number]             Imaging Results (Most Recent)     Procedure Component Value Units Date/Time    CT Abdomen Pelvis Without Contrast [[id number]] Collected: 10/02/21 1542     Updated: 10/02/21 1556    Narrative:         DATE OF EXAM:  10/2/2021 3:18 PM     PROCEDURE:  CT ABDOMEN PELVIS WO CONTRAST-     INDICATIONS:  Abdominal pain and vomiting and history of bowel obstruction     COMPARISON:  10/28/2019.     TECHNIQUE:  Routine transaxial slices were obtained through the abdomen and pelvis  without the administration of intravenous contrast. Reconstructed  coronal and sagittal images were also obtained. Automated exposure  control and iterative construction methods were used.      FINDINGS:  There  are several bowel loops within the left aspect of the abdomen  which demonstrate wall thickening. This involves mainly the transverse  colon, splenic flexure, and ascending colon suspicious for a nonspecific  colitis. There is also some thickening of the wall the adjacent small  bowel loops in the left upper quadrant and within the lower pelvis  consistent with a nonspecific enteritis. The patient has had previous  gastric bypass surgery. There are lipomatous changes of the ileocecal  valve. The appendix is not visualized and is likely surgically absent.  The gallbladder is surgically absent. The liver, adrenal glands,  pancreas, and spleen are normal. There are parapelvic cysts in both  kidneys. There are atherosclerotic vascular calcifications in the  abdomen and pelvis. The uterus is surgically absent. The urinary bladder  is normal. There is no free air or abscess formation. There is chronic  scarring in the lung bases. There are no suspicious osteolytic or  sclerotic lesions within the bony structures. The patient has had a  previous L2 kyphoplasty procedure. There is a Coflex type device  posteriorly at L4-L5.        Impression:         1. There is abnormal thickening of the wall the colon in the left upper  quadrant. This involves the transverse colon, splenic flexure, and  ascending colon consistent with a nonspecific colitis.  2. There is also thickening of several small bowel loops in the left  upper quadrant and the lower pelvis suspicious for a superimposed  enteritis.  3. Postsurgical changes.  4. No free air or abscess formation.  5. Additional findings as noted above. The study is limited by  noncontrast technique.     Electronically Signed By-Winston Scott MD On:10/2/2021 3:54 PM  This report was finalized on [id number] by  Winston Scott MD.    XR Chest 1 View [[id number]] Collected: 10/02/21 1541     Updated: 10/02/21 1554    Narrative:      DATE OF EXAM:  10/2/2021 3:22 PM     PROCEDURE:  XR CHEST 1  VW-     INDICATIONS:  Abdominal pain and vomiting      COMPARISON:  04/26/2021.     TECHNIQUE:   Single radiographic view of the chest was obtained.     FINDINGS:  The heart is enlarged. The pulmonary vascular markings are normal. The  lungs and pleural spaces are clear of active disease.  There are chronic  age-related changes involving the bony thorax and thoracic aorta. There  is fusion hardware in the lower cervical spine.       Impression:         1. Mild cardiomegaly.  2. No active pulmonary disease.     Electronically Signed By-Winston Scott MD On:10/2/2021 3:42 PM  This report was finalized on [id number] by  Winston Scott MD.        reviewed    ECG/EMG Results (most recent)     Procedure Component Value Units Date/Time    ECG 12 Lead [[id number]] Collected: 10/02/21 1503     Updated: 10/02/21 1504     QT Interval 389 ms     Narrative:      HEART RATE= 79  bpm  RR Interval= 764  ms  MN Interval= 132  ms  P Horizontal Axis= 245  deg  P Front Axis= -1  deg  QRSD Interval= 84  ms  QT Interval= 389  ms  QRS Axis= -30  deg  T Wave Axis= 11  deg  - OTHERWISE NORMAL ECG -  Sinus rhythm  Low voltage, precordial leads  Electronically Signed By:   Date and Time of Study: 2021-10-02 15:03:08    ECG 12 Lead [[id number]] Collected: 10/04/21 0330     Updated: 10/04/21 0333     QT Interval 311 ms     Narrative:      HEART RATE= 130  bpm  RR Interval= 461  ms  MN Interval=   ms  P Horizontal Axis=   deg  P Front Axis=   deg  QRSD Interval= 75  ms  QT Interval= 311  ms  QRS Axis= -15  deg  T Wave Axis= 0  deg  - ABNORMAL ECG -  Atrial fibrillation  Inferior infarct, old  When compared with ECG of 02-Oct-2021 15:03:08,  Significant change in rhythm: previously sinus  New or worsened ischemia or infarction  Significant axis, voltage or hypertrophy change  Electronically Signed By:   Date and Time of Study: 2021-10-04 03:30:27    Adult Transthoracic Echo Complete W/ Cont if Necessary Per Protocol [[id number]] Collected: 10/04/21  1434     Updated: 10/04/21 2315     BSA 1.6 m^2      RVIDd 1.8 cm      IVSd 0.68 cm      LVIDd 4.7 cm      LVIDs 2.7 cm      LVPWd 1.2 cm      IVS/LVPW 0.56     FS 43.3 %      EDV(Teich) 103.5 ml      ESV(Teich) 26.5 ml      EF(Teich) 74.4 %      EDV(cubed) 105.3 ml      ESV(cubed) 19.2 ml      EF(cubed) 81.7 %      LV mass(C)d 152.5 grams      LV mass(C)dI 96.3 grams/m^2      SV(Teich) 77.0 ml      SI(Teich) 48.6 ml/m^2      SV(cubed) 86.1 ml      SI(cubed) 54.3 ml/m^2      Ao root diam 3.2 cm      Ao root area 8.0 cm^2      ACS 1.7 cm      asc Aorta Diam 2.7 cm      LVOT diam 2.0 cm      LVOT area 3.2 cm^2      RVOT diam 2.4 cm      RVOT area 4.6 cm^2      EDV(MOD-sp4) 44.2 ml      ESV(MOD-sp4) 18.9 ml      EF(MOD-sp4) 57.3 %      SV(MOD-sp4) 25.3 ml      SI(MOD-sp4) 16.0 ml/m^2      Ao root area (BSA corrected) 2.0     LV Conklin Vol (BSA corrected) 27.9 ml/m^2      LV Sys Vol (BSA corrected) 11.9 ml/m^2      Aortic R-R 0.23 sec      Aortic .4 BPM      MV V2 max 134.9 cm/sec      MV max PG 7.3 mmHg      MV V2 mean 69.0 cm/sec      MV mean PG 2.4 mmHg      MV V2 VTI 25.2 cm      MVA(VTI) 2.2 cm^2      Ao pk chriss 130.9 cm/sec      Ao max PG 6.9 mmHg      Ao max PG (full) 2.9 mmHg      Ao V2 mean 90.4 cm/sec      Ao mean PG 3.7 mmHg      Ao mean PG (full) 1.2 mmHg      Ao V2 VTI 20.6 cm      JOHANA(I,A) 2.7 cm^2      JOHANA(I,D) 2.7 cm^2      JOHANA(V,A) 2.4 cm^2      JOHANA(V,D) 2.4 cm^2      LV V1 max PG 3.9 mmHg      LV V1 mean PG 2.5 mmHg      LV V1 max 98.9 cm/sec      LV V1 mean 74.6 cm/sec      LV V1 VTI 17.3 cm      MR max chriss 430.0 cm/sec      MR max PG 74.0 mmHg      CO(Ao) 42.6 l/min      CI(Ao) 26.9 l/min/m^2      SV(Ao) 165.0 ml      SI(Ao) 104.2 ml/m^2      CO(LVOT) 14.3 l/min      CI(LVOT) 9.1 l/min/m^2      SV(LVOT) 55.5 ml      SV(RVOT) 74.3 ml      SI(LVOT) 35.1 ml/m^2      PA V2 max 125.7 cm/sec      PA max PG 6.3 mmHg      PA max PG (full) 2.3 mmHg      PA V2 mean 74.9 cm/sec      PA mean PG 2.7 mmHg       PA mean PG (full) 1.2 mmHg      PA V2 VTI 18.5 cm      PVA(I,A) 4.0 cm^2       CV ECHO ZURI - PVA(I,D) 4.0 cm^2       CV ECHO ZURI - PVA(V,A) 3.7 cm^2       CV ECHO ZURI - PVA(V,D) 3.7 cm^2      PA acc time 0.08 sec      PI end-d ty 91.1 cm/sec      PI max ty 215.2 cm/sec      PI max PG 18.5 mmHg      RV V1 max PG 4.0 mmHg      RV V1 mean PG 1.5 mmHg      RV V1 max 100.4 cm/sec      RV V1 mean 55.6 cm/sec      RV V1 VTI 16.1 cm      TR max ty 269.1 cm/sec      RVSP(TR) 32.0 mmHg      RAP systole 3.0 mmHg      PA pr(Accel) 43.0 mmHg      Pulm Sys Ty 48.2 cm/sec      Pulm Conklin Ty 54.0 cm/sec      Pulm S/D 0.89     Qp/Qs 1.3     Pulm A Revs Dur 0.08 sec      Pulm A Revs Ty 36.2 cm/sec       CV ECHO ZURI - BZI_BMI 22.1 kilograms/m^2       CV ECHO ZURI - BSA(HAYCOCK) 1.6 m^2       CV ECHO ZURI - BZI_METRIC_WEIGHT 56.7 kg       CV ECHO ZURI - BZI_METRIC_HEIGHT 160.0 cm      EF(MOD-bp) 57.0 %      LA dimension(2D) 3.3 cm     Narrative:      · Estimated left ventricular EF was in agreement with the calculated left   ventricular EF. Left ventricular ejection fraction appears to be 56 - 60%.   Left ventricular systolic function is normal.  · Left ventricular diastolic function is consistent with (grade II w/high   LAP) pseudonormalization.  · Left atrial volume is mildly increased.  · Estimated right ventricular systolic pressure from tricuspid   regurgitation is normal (<35 mmHg).           Reviewed    EKG reviewed interpreted by me demonstrates sinus rhythm with no ischemic changes on present encounter  Prior EKG during this admission reviewed interpreted me demonstrates A. fib RVR rate 150s to 160s    Assessment/Plan    New onset atrial fibrillation now converted to sinus rhythm  Coronary disease  Essential hypertension hyperlipidemia  Advanced age  High risk medicines    New onset atrial fibrillation, spontaneous cardioversion to sinus rhythm  Paroxysmal by nature  Likely the secondary of acute illness  and hypotension, GI bleeding  Hold amlodipine and isosorbide mononitrate without any chest pain and low blood pressures previously, possibly with ischemic colitis, avoid hypotension  Continue amiodarone in the short-term 200 twice daily, would like to stop this in 3 months or even sooner may be at 30 days  Start metoprolol 12.5 twice daily  Watch blood pressures and will treat for goal heart rate in the 60s to 70s and systolics in the 120s to 130s  No underlying conduction abnormality seen  No indication for pacemaker  No indication for ischemic evaluation at this time, no evidence of ACS  Continue Zocor for secondary prevention and lipid-lowering therapy with good functional status  Likely high risk for anticoagulation 89 years old will pursue aspirin only at this point, GI bleeding also prevent anticoagulation systemically  Monitor for high risk medicines    Avoid hypotension  Continue antiarrhythmics  Monitor for side effects  Continue telemetry  Further recommendations to follow findings    Continue to follow, modify medicines as clinically indicated  Hemodynamically electrically stable on my encounter today    Maintaining sinus rhythm  Continue antiarrhythmics  Continue off anticoagulation with GI bleeding and possible ischemic colitis  Avoid hypotension  Continue on metoprolol and amiodarone off other afterload reduction, maintain good blood pressures  Static status post sigmoidoscopy with severe colitis, ischemic colitis descending and splenic flexure, biopsies obtained        Gavin Quiroz MD, PhD    I discussed the patient's findings and my recommendations with patient and staff    Gavin Quiroz MD  10/06/21  18:39 EDT

## 2021-10-06 NOTE — OP NOTE
SIGMOIDOSCOPY Procedure Report    Patient Name:  Carrie Golden  YOB: 1925    Date of Surgery:  10/6/2021     Pre-Op Diagnosis:  Rectal bleeding [K62.5]       Post-Op Diagnosis Codes:     * Rectal bleeding [K62.5]      Procedure/CPT® Codes:      Procedure(s):  SIGMOIDOSCOPY WITH BIOPSY X1 AREA    Staff:  Surgeon(s):  Uche Vaz MD      Anesthesia: Monitored Anesthesia Care    Description of Procedure:  A description of the procedure as well as risks, benefits and alternative methods were explained to the patient who voiced understanding and signed the corresponding consent form. A physical exam was performed and vital signs were monitored throughout the procedure.    A rectal exam was performed which was normal. An Olympus EGD scope was placed into the rectum and proceeded under direct visualization through the colon until the transverse colon was reached. Careful visualization occurred upon slow withdraw of the scope. The scope was then retroflexed and the distal rectum was visualized. The quality of the prep was good. The procedure was not difficult and there were no immediate complications.  There was no blood loss.    Impression:  1.  Severe colitis with the appearance of ischemic colitis in the descending colon and splenic flexure.  Cold forcep biopsies were taken to confirm  2.  Otherwise normal flexible sigmoidoscopy to the transverse colon    Recommendations:  Follow-up biopsy results  Imaging of the mesenteric vessels       Uche Vaz MD     Date: 10/6/2021    Time: 15:33 EDT       normal sinus rhythm

## 2021-10-06 NOTE — PROGRESS NOTES
Cardiology progress note  Gavin Quiroz MD, PhD      Patient Care Team:  Monster Myrick MD as PCP - General (Family Medicine)  Gavin Quiroz MD as Consulting Physician (Cardiology)    CHIEF COMPLAINT: New onset atrial fibrillation    HISTORY OF PRESENT ILLNESS:    At the pleasure to see this 89-year-old female who is a known patient of cardiology but is a new patient to me today.  She has history of essential hypertension hyperlipidemia, mild heart attack in the past 2019 most recently, there are no stents no history bypass surgery with disease that was felt to be small vessel not amenable to intervention.  She does not meet conditions for secondary prevention goals but is again at advanced age at 89 with overall reasonable functional status.  She presented with acute onset lower abdominal pain a couple of days ago which is improved since admission.  She had some nausea and vomiting diarrhea was ultimately seen to have colitis on CT scan.  In the setting of this acute illness she had A. fib RVR which responded with spontaneous conversion back to sinus rhythm on my encounter today at rate of 70s to 80s she also had some low blood pressures likely in the setting of acute illness in combination with doses of Imdur and amlodipine given with volume depletion as well.  This responded to IV fluids.  She denies any chest pain, she is on room air with daughter at bedside.  She is in sinus rhythm on telemetry with no ischemic changes and she denies any chest pain.    She is well compensated with no heart failure signs or symptoms presently on my encounter  ============================================  Seen and examined, resting comfortably, reported about bloody bowel movements today, GI suspicious of ischemic colitis.  Afterload reduction with amlodipine has been discontinued in lieu of of amiodarone for rate and rhythm control strategy of A. fib RVR as well as very low-dose beta-blocker, heart rates in the 60s to 80s  while resting maintaining sinus rhythm today.  No clinical heart failure.  Avoid hypotension we will not restart antihypertensives at this time, discussed with nursing staff at bedside with parameters    Review of systems negative x14 point review of systems except was mentioned above    Historical data copied forward from previous encounters in EMR is unchanged          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 sure which 1  contracted at age 8   • History of transfusion    • [place] (hard of hearing)    • Hyperlipidemia    • Hypertension    • Past heart attack     X2 MILD LAST ONE OCT 2019   • PONV (postoperative nausea and vomiting)      Past Surgical History:   Procedure Laterality Date   • ANTERIOR AND POSTERIOR VAGINAL REPAIR     • BACK SURGERY     • BLEPHAROPLASTY Bilateral 5/23/2019    Procedure: bilateral upper lid blepharoplasty;  Surgeon: Mauricio Pennington MD;  Location: [facility] OR [facility] [place];  Service: Ophthalmology   • BROW LIFT Bilateral 2/13/2020    Procedure: BILATERAL TEMPORAL DIRECT BROWLIFT;  Surgeon: Sreekanth Bird MD;  Location: [facility] OR [facility] [place];  Service: Ophthalmology;  Laterality: Bilateral;   • CARDIAC CATHETERIZATION N/A 12/3/2019    Procedure: Left Heart Cath;  Surgeon: Puma Briseno MD;  Location: [facility] CATH INVASIVE LOCATION;  Service: Cardiovascular   • CARDIAC CATHETERIZATION N/A 12/3/2019    Procedure: Coronary angiography;  Surgeon: Puma Briseno MD;  Location: [facility] CATH INVASIVE LOCATION;  Service: Cardiovascular   • CARDIAC CATHETERIZATION N/A 12/3/2019    Procedure: Left ventriculography;  Surgeon: Puma Briseno MD;  Location: [facility] CATH INVASIVE LOCATION;  Service: Cardiovascular   • CATARACT EXTRACTION EXTRACAPSULAR W/ INTRAOCULAR LENS IMPLANTATION Bilateral    •  CERVICAL SPINE ANTERIOR      with instrumentation   • CHEILECTOMY Left 2020    Procedure: CHEILECTOMY;  Surgeon: GAB Rees DPM;  Location: [facility] MAIN OR;  Service: Podiatry;  Laterality: Left;   • COLON SURGERY      for obstruction   • LABERTO TUBE INSERTION Bilateral 2019    Procedure: ALBERTO TUBE;  Surgeon: Mauricio Pennington MD;  Location: [facility] OR [facility] [place];  Service: Ophthalmology   • ECTROPION REPAIR Bilateral 2019    Procedure: bilateral lower lid ectropion repair, bilateral punctoplasty;  Surgeon: Mauricio Pennington MD;  Location: [facility] OR [facility] [place];  Service: Ophthalmology   • EYE SURGERY     • FOOT FUSION Left 2016    Procedure: LEFT HALLUX METATARSALPHALANGEAL ARTHRODESIS SILVER BUNIONECTOMY METATARSAL HEAD RESECTION 2-5 CALCANEAL BONE GRAFT;  Surgeon: Monster Harper Jr., MD;  Location: [facility] MAIN OR;  Service:    • HYSTERECTOMY     • INGUINAL HERNIA REPAIR Bilateral    • METATARSAL OSTEOTOMY Left 2020    Procedure: Metatarsal head resection 5;  Surgeon: GAB Rees DPM;  Location: [facility] MAIN OR;  Service: Podiatry;  Laterality: Left;   • ROTATOR CUFF REPAIR Right    • STOMACH SURGERY      for ulcer     Family History   Problem Relation Age of Onset   • No Known Problems Mother    • No Known Problems Father    • Malig Hyperthermia Neg Hx      Social History     Tobacco Use   • Smoking status: Former Smoker     Packs/day: 0.25     Years: 10.00     Pack years: 2.50     Types: Cigarettes     Quit date:      Years since quittin.7   • Smokeless tobacco: Never Used   Vaping Use   • Vaping Use: Never used   Substance Use Topics   • Alcohol use: No   • Drug use: No     Medications Prior to Admission   Medication Sig Dispense Refill Last Dose   • acetaminophen (TYLENOL) 500 MG tablet Take 500 mg by mouth Every 6 (Six) Hours As Needed for mild pain (1-3).      • amLODIPine (NORVASC) 5 MG tablet Take 1 tablet by mouth Daily. 30 tablet 6 10/2/2021 at Unknown time   • aspirin  "81 MG EC tablet Take 81 mg by mouth Daily. LD 8/19   10/2/2021 at Unknown time   • isosorbide mononitrate (IMDUR) 60 MG 24 hr tablet Take 1 tablet by mouth once daily 90 tablet 0 10/2/2021 at Unknown time   • nitroglycerin (NITROSTAT) 0.4 MG SL tablet 1 under the tongue as needed for angina, may repeat q5mins for up three doses (Patient taking differently: Place  under the tongue Every 5 (Five) Minutes As Needed. 1 under the tongue as needed for angina, may repeat q5mins for up three doses) 100 tablet 11    • simvastatin (ZOCOR) 40 MG tablet Take 0.5 tablets by mouth Every Night. 90 tablet 3 10/1/2021 at Unknown time     Allergies:  Codeine, Lortab [hydrocodone-acetaminophen], Nitrofurantoin, and Sulfa antibiotics    REVIEW OF SYSTEMS:  Please see the above history of present illness for pertinent positives and negatives.  The remainder of the patient's systems have been reviewed and are negative.     Vital Signs  Temp:  [97.7 °F (36.5 °C)-98.7 °F (37.1 °C)] 98.7 °F (37.1 °C)  Heart Rate:  [61-84] 71  Resp:  [16-18] 16  BP: ()/(59-67) 126/64    Flowsheet Rows      First Filed Value   Admission Height  160 cm (63\") Documented at 10/02/2021 1422   Admission Weight  54.4 kg (120 lb) Documented at 10/02/2021 1422           Physical Exam:  Physical Exam   Constitutional: Patient appears well-developed and well-nourished and in no acute distress Resting comfortably on encounter  HEENT:   Head: Normocephalic and atraumatic.   Eyes:  Pupils are equal, round, and reactive to light. EOM are intact. Sclerae are anicteric and noninjected.  Mouth and Throat: Patient has moist mucous membranes. Oropharynx is clear of any erythema or exudate.     Neck: Neck supple. No JVD present. No thyromegaly present. No lymphadenopathy present.  Cardiovascular: Regular rate, regular rhythm, S1 normal and S2 normal.  Exam reveals no gallop and no friction rub.  No murmur heard.  Pulmonary/Chest: Lungs are clear to auscultation " bilaterally. No respiratory distress. No wheezes. No rhonchi. No rales.   Abdominal: Soft. Bowel sounds are normal. No distension and no mass. There is no hepatosplenomegaly. There is no tenderness.   Musculoskeletal: Normal muscle tone  Extremities: No edema. Pulses are palpable in all 4 extremities.  Neurological: Patient is alert and oriented to person, place, and time. Cranial nerves II-XII are grossly intact with no focal deficits.  Skin: Skin is warm. No rash noted. Nails show no clubbing.  No cyanosis or erythema.     Results Review:    I reviewed the patient's new clinical results.  Lab Results (most recent)     Procedure Component Value Units Date/Time    Troponin [[id number]]  (Normal) Collected: 10/04/21 0924    Specimen: Blood Updated: 10/04/21 0951     Troponin T 0.013 ng/mL     Narrative:      Troponin T Reference Range:  <= 0.03 ng/mL-   Negative for AMI  >0.03 ng/mL-     Abnormal for myocardial necrosis.  Clinicians would have to utilize clinical acumen, EKG, Troponin and serial changes to determine if it is an Acute Myocardial Infarction or myocardial injury due to an underlying chronic condition.       Results may be falsely decreased if patient taking Biotin.      Basic Metabolic Panel [[id number]]  (Abnormal) Collected: 10/04/21 0259    Specimen: Blood Updated: 10/04/21 0347     Glucose 95 mg/dL      BUN 21 mg/dL      Creatinine 0.75 mg/dL      Sodium 136 mmol/L      Potassium 3.9 mmol/L      Chloride 104 mmol/L      CO2 21.0 mmol/L      Calcium 7.9 mg/dL      eGFR Non African Amer 72 mL/min/1.73      BUN/Creatinine Ratio 28.0     Anion Gap 11.0 mmol/L     Narrative:      GFR Normal >60  Chronic Kidney Disease <60  Kidney Failure <15      Lactic Acid, Plasma [[id number]]  (Normal) Collected: 10/04/21 0259    Specimen: Blood Updated: 10/04/21 0346     Lactate 1.3 mmol/L     CBC & Differential [[id number]]  (Abnormal) Collected: 10/04/21 0259    Specimen: Blood Updated: 10/04/21 0321    Narrative:       The following orders were created for panel order CBC & Differential.  Procedure                               Abnormality         Status                     ---------                               -----------         ------                     CBC Auto Differential[[id number]]        Abnormal            Final result                 Please view results for these tests on the individual orders.    CBC Auto Differential [[id number]]  (Abnormal) Collected: 10/04/21 0259    Specimen: Blood Updated: 10/04/21 0321     WBC 18.30 10*3/mm3      RBC 3.72 10*6/mm3      Hemoglobin 10.8 g/dL      Hematocrit 32.6 %      MCV 87.7 fL      MCH 29.0 pg      MCHC 33.1 g/dL      RDW 14.7 %      RDW-SD 45.5 fl      MPV 7.0 fL      Platelets 203 10*3/mm3      Neutrophil % 87.5 %      Lymphocyte % 6.9 %      Monocyte % 5.0 %      Eosinophil % 0.3 %      Basophil % 0.3 %      Neutrophils, Absolute 16.00 10*3/mm3      Lymphocytes, Absolute 1.30 10*3/mm3      Monocytes, Absolute 0.90 10*3/mm3      Eosinophils, Absolute 0.10 10*3/mm3      Basophils, Absolute 0.10 10*3/mm3      nRBC 0.0 /100 WBC     Basic Metabolic Panel [[id number]]  (Abnormal) Collected: 10/03/21 0308    Specimen: Blood Updated: 10/03/21 0421     Glucose 149 mg/dL      BUN 28 mg/dL      Creatinine 0.67 mg/dL      Sodium 139 mmol/L      Potassium 4.2 mmol/L      Chloride 106 mmol/L      CO2 19.0 mmol/L      Calcium 7.8 mg/dL      eGFR Non African Amer 82 mL/min/1.73      BUN/Creatinine Ratio 41.8     Anion Gap 14.0 mmol/L     Narrative:      GFR Normal >60  Chronic Kidney Disease <60  Kidney Failure <15      CBC Auto Differential [[id number]]  (Abnormal) Collected: 10/03/21 0308    Specimen: Blood Updated: 10/03/21 0404     WBC 24.40 10*3/mm3      RBC 4.07 10*6/mm3      Hemoglobin 11.8 g/dL      Hematocrit 35.5 %      MCV 87.2 fL      MCH 28.9 pg      MCHC 33.1 g/dL      RDW 14.7 %      RDW-SD 45.1 fl      MPV 7.2 fL      Platelets 246 10*3/mm3      Neutrophil % 92.0 %       Lymphocyte % 3.8 %      Monocyte % 3.9 %      Eosinophil % 0.0 %      Basophil % 0.3 %      Neutrophils, Absolute 22.50 10*3/mm3      Lymphocytes, Absolute 0.90 10*3/mm3      Monocytes, Absolute 1.00 10*3/mm3      Eosinophils, Absolute 0.00 10*3/mm3      Basophils, Absolute 0.10 10*3/mm3      nRBC 0.0 /100 WBC     [place] Draw [[id number]] Collected: 10/02/21 1442    Specimen: Blood Updated: 10/02/21 3676    Narrative:      The following orders were created for panel order [place] Draw.  Procedure                               Abnormality         Status                     ---------                               -----------         ------                     Green Top (Gel)[[id number]]                                  Final result               Lavender Top[[id number]]                                     Final result               Gold Top - SST[[id number]]                                                              Light Blue Top[[id number]]                                                                Please view results for these tests on the individual orders.    COVID PRE-OP / PRE-PROCEDURE SCREENING ORDER (NO ISOLATION) - Swab, Nasopharynx [[id number]]  (Normal) Collected: 10/02/21 1615    Specimen: Swab from Nasopharynx Updated: 10/02/21 0700    Narrative:      The following orders were created for panel order COVID PRE-OP / PRE-PROCEDURE SCREENING ORDER (NO ISOLATION) - Swab, Nasopharynx.  Procedure                               Abnormality         Status                     ---------                               -----------         ------                     COVID-19,CEPHEID/KALA/BD...[[id number]]  Normal              Final result                 Please view results for these tests on the individual orders.    COVID-19,CEPHEID/KALA/BDMAX,COR/TIGRE/PAD/DARLING IN-HOUSE(OR EMERGENT/ADD-ON),NP SWAB IN TRANSPORT MEDIA 3-4 HR TAT, RT-PCR - Swab, Nasopharynx [[id number]]  (Normal) Collected: 10/02/21 1615    Specimen: Swab  from Nasopharynx Updated: 10/02/21 1737     COVID19 Not Detected    Narrative:      Fact sheet for providers: https://www.fda.gov/media/471212/download     Fact sheet for patients: https://www.fda.gov/media/498936/download  Fact sheet for providers: https://www.fda.gov/media/547823/download     Fact sheet for patients: https://www.fda.gov/media/677946/download    Extra Tubes [[id number]] Collected: 10/02/21 1442    Specimen: Blood, Venous Line Updated: 10/02/21 1546    Narrative:      The following orders were created for panel order Extra Tubes.  Procedure                               Abnormality         Status                     ---------                               -----------         ------                     Gold Top - SST[[id number]]                                   Final result                 Please view results for these tests on the individual orders.    Gold Top - SST [[id number]] Collected: 10/02/21 1442    Specimen: Blood Updated: 10/02/21 1546     Extra Tube Hold for add-ons.     Comment: Auto resulted.       Green Top (Gel) [[id number]] Collected: 10/02/21 1442    Specimen: Blood Updated: 10/02/21 1546     Extra Tube Hold for add-ons.     Comment: Auto resulted.       Lavender Top [[id number]] Collected: 10/02/21 1442    Specimen: Blood Updated: 10/02/21 1546     Extra Tube hold for add-on     Comment: Auto resulted       Comprehensive Metabolic Panel [[id number]]  (Abnormal) Collected: 10/02/21 1442    Specimen: Blood Updated: 10/02/21 1510     Glucose 173 mg/dL      BUN 40 mg/dL      Creatinine 0.96 mg/dL      Sodium 139 mmol/L      Potassium 4.3 mmol/L      Comment: Slight hemolysis detected by analyzer. Results may be affected.        Chloride 104 mmol/L      CO2 20.0 mmol/L      Calcium 9.0 mg/dL      Total Protein 6.9 g/dL      Albumin 4.30 g/dL      ALT (SGPT) 17 U/L      AST (SGOT) 21 U/L      Comment: Slight hemolysis detected by analyzer. Results may be affected.        Alkaline Phosphatase  120 U/L      Total Bilirubin 0.9 mg/dL      eGFR Non African Amer 54 mL/min/1.73      Globulin 2.6 gm/dL      A/G Ratio 1.7 g/dL      BUN/Creatinine Ratio 41.7     Anion Gap 15.0 mmol/L     Narrative:      GFR Normal >60  Chronic Kidney Disease <60  Kidney Failure <15      POC Lactate [[id number]]  (Normal) Collected: 10/02/21 1504    Specimen: Blood Updated: 10/02/21 1504    CBC & Differential [[id number]]  (Abnormal) Collected: 10/02/21 1442    Specimen: Blood Updated: 10/02/21 1451    Narrative:      The following orders were created for panel order CBC & Differential.  Procedure                               Abnormality         Status                     ---------                               -----------         ------                     CBC Auto Differential[[id number]]        Abnormal            Final result                 Please view results for these tests on the individual orders.    POC Lactate [[id number]]  (Normal) Collected: 10/02/21 1448    Specimen: Blood Updated: 10/02/21 1449     Lactate 1.4 mmol/L      Comment: Serial Number: [id number]:  [id number]             Imaging Results (Most Recent)     Procedure Component Value Units Date/Time    CT Abdomen Pelvis Without Contrast [[id number]] Collected: 10/02/21 1542     Updated: 10/02/21 1556    Narrative:         DATE OF EXAM:  10/2/2021 3:18 PM     PROCEDURE:  CT ABDOMEN PELVIS WO CONTRAST-     INDICATIONS:  Abdominal pain and vomiting and history of bowel obstruction     COMPARISON:  10/28/2019.     TECHNIQUE:  Routine transaxial slices were obtained through the abdomen and pelvis  without the administration of intravenous contrast. Reconstructed  coronal and sagittal images were also obtained. Automated exposure  control and iterative construction methods were used.      FINDINGS:  There are several bowel loops within the left aspect of the abdomen  which demonstrate wall thickening. This involves mainly the transverse  colon, splenic flexure,  and ascending colon suspicious for a nonspecific  colitis. There is also some thickening of the wall the adjacent small  bowel loops in the left upper quadrant and within the lower pelvis  consistent with a nonspecific enteritis. The patient has had previous  gastric bypass surgery. There are lipomatous changes of the ileocecal  valve. The appendix is not visualized and is likely surgically absent.  The gallbladder is surgically absent. The liver, adrenal glands,  pancreas, and spleen are normal. There are parapelvic cysts in both  kidneys. There are atherosclerotic vascular calcifications in the  abdomen and pelvis. The uterus is surgically absent. The urinary bladder  is normal. There is no free air or abscess formation. There is chronic  scarring in the lung bases. There are no suspicious osteolytic or  sclerotic lesions within the bony structures. The patient has had a  previous L2 kyphoplasty procedure. There is a Coflex type device  posteriorly at L4-L5.        Impression:         1. There is abnormal thickening of the wall the colon in the left upper  quadrant. This involves the transverse colon, splenic flexure, and  ascending colon consistent with a nonspecific colitis.  2. There is also thickening of several small bowel loops in the left  upper quadrant and the lower pelvis suspicious for a superimposed  enteritis.  3. Postsurgical changes.  4. No free air or abscess formation.  5. Additional findings as noted above. The study is limited by  noncontrast technique.     Electronically Signed By-Winsotn Scott MD On:10/2/2021 3:54 PM  This report was finalized on [id number] by  Winston Scott MD.    XR Chest 1 View [[id number]] Collected: 10/02/21 1541     Updated: 10/02/21 1554    Narrative:      DATE OF EXAM:  10/2/2021 3:22 PM     PROCEDURE:  XR CHEST 1 VW-     INDICATIONS:  Abdominal pain and vomiting      COMPARISON:  04/26/2021.     TECHNIQUE:   Single radiographic view of the chest was obtained.      FINDINGS:  The heart is enlarged. The pulmonary vascular markings are normal. The  lungs and pleural spaces are clear of active disease.  There are chronic  age-related changes involving the bony thorax and thoracic aorta. There  is fusion hardware in the lower cervical spine.       Impression:         1. Mild cardiomegaly.  2. No active pulmonary disease.     Electronically Signed By-Winston Scott MD On:10/2/2021 3:42 PM  This report was finalized on [id number] by  Winston Scott MD.        reviewed    ECG/EMG Results (most recent)     Procedure Component Value Units Date/Time    ECG 12 Lead [[id number]] Collected: 10/02/21 1503     Updated: 10/02/21 1504     QT Interval 389 ms     Narrative:      HEART RATE= 79  bpm  RR Interval= 764  ms  MO Interval= 132  ms  P Horizontal Axis= 245  deg  P Front Axis= -1  deg  QRSD Interval= 84  ms  QT Interval= 389  ms  QRS Axis= -30  deg  T Wave Axis= 11  deg  - OTHERWISE NORMAL ECG -  Sinus rhythm  Low voltage, precordial leads  Electronically Signed By:   Date and Time of Study: 2021-10-02 15:03:08    ECG 12 Lead [[id number]] Collected: 10/04/21 0330     Updated: 10/04/21 0333     QT Interval 311 ms     Narrative:      HEART RATE= 130  bpm  RR Interval= 461  ms  MO Interval=   ms  P Horizontal Axis=   deg  P Front Axis=   deg  QRSD Interval= 75  ms  QT Interval= 311  ms  QRS Axis= -15  deg  T Wave Axis= 0  deg  - ABNORMAL ECG -  Atrial fibrillation  Inferior infarct, old  When compared with ECG of 02-Oct-2021 15:03:08,  Significant change in rhythm: previously sinus  New or worsened ischemia or infarction  Significant axis, voltage or hypertrophy change  Electronically Signed By:   Date and Time of Study: 2021-10-04 03:30:27    Adult Transthoracic Echo Complete W/ Cont if Necessary Per Protocol [[id number]] Collected: 10/04/21 1434     Updated: 10/04/21 2314     BSA 1.6 m^2      RVIDd 1.8 cm      IVSd 0.68 cm      LVIDd 4.7 cm      LVIDs 2.7 cm      LVPWd 1.2 cm      IVS/LVPW  0.56     FS 43.3 %      EDV(Teich) 103.5 ml      ESV(Teich) 26.5 ml      EF(Teich) 74.4 %      EDV(cubed) 105.3 ml      ESV(cubed) 19.2 ml      EF(cubed) 81.7 %      LV mass(C)d 152.5 grams      LV mass(C)dI 96.3 grams/m^2      SV(Teich) 77.0 ml      SI(Teich) 48.6 ml/m^2      SV(cubed) 86.1 ml      SI(cubed) 54.3 ml/m^2      Ao root diam 3.2 cm      Ao root area 8.0 cm^2      ACS 1.7 cm      asc Aorta Diam 2.7 cm      LVOT diam 2.0 cm      LVOT area 3.2 cm^2      RVOT diam 2.4 cm      RVOT area 4.6 cm^2      EDV(MOD-sp4) 44.2 ml      ESV(MOD-sp4) 18.9 ml      EF(MOD-sp4) 57.3 %      SV(MOD-sp4) 25.3 ml      SI(MOD-sp4) 16.0 ml/m^2      Ao root area (BSA corrected) 2.0     LV [place]klin Vol (BSA corrected) 27.9 ml/m^2      LV Sys Vol (BSA corrected) 11.9 ml/m^2      Aortic R-R 0.23 sec      Aortic .4 BPM      MV V2 max 134.9 cm/sec      MV max PG 7.3 mmHg      MV V2 mean 69.0 cm/sec      MV mean PG 2.4 mmHg      MV V2 VTI 25.2 cm      MVA(VTI) 2.2 cm^2      Ao pk chriss 130.9 cm/sec      Ao max PG 6.9 mmHg      Ao max PG (full) 2.9 mmHg      Ao V2 mean 90.4 cm/sec      Ao mean PG 3.7 mmHg      Ao mean PG (full) 1.2 mmHg      Ao V2 VTI 20.6 cm      JOHANA(I,A) 2.7 cm^2      JOHANA(I,D) 2.7 cm^2      JOHANA(V,A) 2.4 cm^2      JOHANA(V,D) 2.4 cm^2      LV V1 max PG 3.9 mmHg      LV V1 mean PG 2.5 mmHg      LV V1 max 98.9 cm/sec      LV V1 mean 74.6 cm/sec      LV V1 VTI 17.3 cm      MR max chriss 430.0 cm/sec      MR max PG 74.0 mmHg      CO(Ao) 42.6 l/min      CI(Ao) 26.9 l/min/m^2      SV(Ao) 165.0 ml      SI(Ao) 104.2 ml/m^2      CO(LVOT) 14.3 l/min      CI(LVOT) 9.1 l/min/m^2      SV(LVOT) 55.5 ml      SV(RVOT) 74.3 ml      SI(LVOT) 35.1 ml/m^2      PA V2 max 125.7 cm/sec      PA max PG 6.3 mmHg      PA max PG (full) 2.3 mmHg      PA V2 mean 74.9 cm/sec      PA mean PG 2.7 mmHg      PA mean PG (full) 1.2 mmHg      PA V2 VTI 18.5 cm      PVA(I,A) 4.0 cm^2      BH CV ECHO ZURI - PVA(I,D) 4.0 cm^2      BH CV ECHO ZURI - PVA(V,A) 3.7  cm^2       CV ECHO ZURI - PVA(V,D) 3.7 cm^2      PA acc time 0.08 sec      PI end-d ty 91.1 cm/sec      PI max ty 215.2 cm/sec      PI max PG 18.5 mmHg      RV V1 max PG 4.0 mmHg      RV V1 mean PG 1.5 mmHg      RV V1 max 100.4 cm/sec      RV V1 mean 55.6 cm/sec      RV V1 VTI 16.1 cm      TR max ty 269.1 cm/sec      RVSP(TR) 32.0 mmHg      RAP systole 3.0 mmHg      PA pr(Accel) 43.0 mmHg      Pulm Sys Ty 48.2 cm/sec      Pulm Conklin Ty 54.0 cm/sec      Pulm S/D 0.89     Qp/Qs 1.3     Pulm A Revs Dur 0.08 sec      Pulm A Revs Ty 36.2 cm/sec       CV ECHO ZURI - BZI_BMI 22.1 kilograms/m^2       CV ECHO ZURI - BSA(HAYCOCK) 1.6 m^2       CV ECHO ZURI - BZI_METRIC_WEIGHT 56.7 kg       CV ECHO ZURI - BZI_METRIC_HEIGHT 160.0 cm      EF(MOD-bp) 57.0 %      LA dimension(2D) 3.3 cm     Narrative:      · Estimated left ventricular EF was in agreement with the calculated left   ventricular EF. Left ventricular ejection fraction appears to be 56 - 60%.   Left ventricular systolic function is normal.  · Left ventricular diastolic function is consistent with (grade II w/high   LAP) pseudonormalization.  · Left atrial volume is mildly increased.  · Estimated right ventricular systolic pressure from tricuspid   regurgitation is normal (<35 mmHg).           Reviewed    EKG reviewed interpreted by me demonstrates sinus rhythm with no ischemic changes on present encounter  Prior EKG during this admission reviewed interpreted me demonstrates A. fib RVR rate 150s to 160s    Assessment/Plan    New onset atrial fibrillation now converted to sinus rhythm  Coronary disease  Essential hypertension hyperlipidemia  Advanced age  High risk medicines    New onset atrial fibrillation, spontaneous cardioversion to sinus rhythm  Paroxysmal by nature  Likely the secondary of acute illness and hypotension, GI bleeding  Hold amlodipine and isosorbide mononitrate without any chest pain and low blood pressures previously, possibly with  ischemic colitis, avoid hypotension  Continue amiodarone in the short-term 200 twice daily, would like to stop this in 3 months or even sooner may be at 30 days  Start metoprolol 12.5 twice daily  Watch blood pressures and will treat for goal heart rate in the 60s to 70s and systolics in the 120s to 130s  No underlying conduction abnormality seen  No indication for pacemaker  No indication for ischemic evaluation at this time, no evidence of ACS  Continue Zocor for secondary prevention and lipid-lowering therapy with good functional status  Likely high risk for anticoagulation 89 years old will pursue aspirin only at this point, GI bleeding also prevent anticoagulation systemically  Monitor for high risk medicines    Avoid hypotension  Continue antiarrhythmics  Monitor for side effects  Continue telemetry  Further recommendations to follow findings    Continue to follow, modify medicines as clinically indicated  Hemodynamically electrically stable on my encounter today    Gavin Quiroz MD, PhD    I discussed the patient's findings and my recommendations with patient and staff    Gavin Quiroz MD  10/05/21  22:20 EDT

## 2021-10-06 NOTE — PLAN OF CARE
Goal Outcome Evaluation:   Patient has been painful and abdomen is distended with tenderness on palpation to luq and llq.  3L NC applied after IV pain medication to maintain sats above 90.  Bed alarm set for safety due to pain medication, will continue to monitor

## 2021-10-07 ENCOUNTER — APPOINTMENT (OUTPATIENT)
Dept: MRI IMAGING | Facility: HOSPITAL | Age: 86
End: 2021-10-07

## 2021-10-07 ENCOUNTER — INPATIENT HOSPITAL (AMBULATORY)
Dept: URBAN - METROPOLITAN AREA HOSPITAL 84 | Facility: HOSPITAL | Age: 86
End: 2021-10-07
Payer: MEDICARE

## 2021-10-07 DIAGNOSIS — R10.30 LOWER ABDOMINAL PAIN, UNSPECIFIED: ICD-10-CM

## 2021-10-07 DIAGNOSIS — R93.3 ABNORMAL FINDINGS ON DIAGNOSTIC IMAGING OF OTHER PARTS OF DI: ICD-10-CM

## 2021-10-07 DIAGNOSIS — R11.2 NAUSEA WITH VOMITING, UNSPECIFIED: ICD-10-CM

## 2021-10-07 DIAGNOSIS — R50.9 FEVER, UNSPECIFIED: ICD-10-CM

## 2021-10-07 DIAGNOSIS — D72.829 ELEVATED WHITE BLOOD CELL COUNT, UNSPECIFIED: ICD-10-CM

## 2021-10-07 DIAGNOSIS — K55.9 VASCULAR DISORDER OF INTESTINE, UNSPECIFIED: ICD-10-CM

## 2021-10-07 LAB
ALBUMIN SERPL-MCNC: 2.9 G/DL (ref 3.5–5.2)
ALBUMIN/GLOB SERPL: 1.3 G/DL
ALP SERPL-CCNC: 77 U/L (ref 39–117)
ALT SERPL W P-5'-P-CCNC: 15 U/L (ref 1–33)
ANION GAP SERPL CALCULATED.3IONS-SCNC: 16 MMOL/L (ref 5–15)
AST SERPL-CCNC: 18 U/L (ref 1–32)
BILIRUB SERPL-MCNC: 0.8 MG/DL (ref 0–1.2)
BUN SERPL-MCNC: 11 MG/DL (ref 8–23)
BUN/CREAT SERPL: 18 (ref 7–25)
CALCIUM SPEC-SCNC: 7.8 MG/DL (ref 8.2–9.6)
CHLORIDE SERPL-SCNC: 107 MMOL/L (ref 98–107)
CO2 SERPL-SCNC: 21 MMOL/L (ref 22–29)
CREAT SERPL-MCNC: 0.61 MG/DL (ref 0.57–1)
DEPRECATED RDW RBC AUTO: 45.1 FL (ref 37–54)
ERYTHROCYTE [DISTWIDTH] IN BLOOD BY AUTOMATED COUNT: 14.5 % (ref 12.3–15.4)
GFR SERPL CREATININE-BSD FRML MDRD: 91 ML/MIN/1.73
GLOBULIN UR ELPH-MCNC: 2.2 GM/DL
GLUCOSE SERPL-MCNC: 123 MG/DL (ref 65–99)
HCT VFR BLD AUTO: 33.1 % (ref 34–46.6)
HGB BLD-MCNC: 10.9 G/DL (ref 12–15.9)
LARGE PLATELETS: ABNORMAL
LYMPHOCYTES # BLD MANUAL: 0.16 10*3/MM3 (ref 0.7–3.1)
LYMPHOCYTES NFR BLD MANUAL: 1 % (ref 19.6–45.3)
LYMPHOCYTES NFR BLD MANUAL: 2 % (ref 5–12)
MCH RBC QN AUTO: 28.9 PG (ref 26.6–33)
MCHC RBC AUTO-ENTMCNC: 32.9 G/DL (ref 31.5–35.7)
MCV RBC AUTO: 87.9 FL (ref 79–97)
METAMYELOCYTES NFR BLD MANUAL: 1 % (ref 0–0)
MONOCYTES # BLD AUTO: 0.33 10*3/MM3 (ref 0.1–0.9)
NEUTROPHILS # BLD AUTO: 15.65 10*3/MM3 (ref 1.7–7)
NEUTROPHILS NFR BLD MANUAL: 76 % (ref 42.7–76)
NEUTS BAND NFR BLD MANUAL: 20 % (ref 0–5)
PLATELET # BLD AUTO: 254 10*3/MM3 (ref 140–450)
PMV BLD AUTO: 7.1 FL (ref 6–12)
POTASSIUM SERPL-SCNC: 3.7 MMOL/L (ref 3.5–5.2)
PROT SERPL-MCNC: 5.1 G/DL (ref 6–8.5)
QT INTERVAL: 328 MS
QT INTERVAL: 389 MS
RBC # BLD AUTO: 3.76 10*6/MM3 (ref 3.77–5.28)
RBC MORPH BLD: NORMAL
SCAN SLIDE: NORMAL
SODIUM SERPL-SCNC: 144 MMOL/L (ref 136–145)
WBC # BLD AUTO: 16.3 10*3/MM3 (ref 3.4–10.8)
WBC MORPH BLD: NORMAL

## 2021-10-07 PROCEDURE — 25010000002 PIPERACILLIN SOD-TAZOBACTAM PER 1 G: Performed by: FAMILY MEDICINE

## 2021-10-07 PROCEDURE — C8902 MRA W/O FOL W/CONT, ABD: HCPCS

## 2021-10-07 PROCEDURE — 25010000002 PIPERACILLIN SOD-TAZOBACTAM PER 1 G: Performed by: INTERNAL MEDICINE

## 2021-10-07 PROCEDURE — 99233 SBSQ HOSP IP/OBS HIGH 50: CPT | Performed by: INTERNAL MEDICINE

## 2021-10-07 PROCEDURE — 85007 BL SMEAR W/DIFF WBC COUNT: CPT | Performed by: INTERNAL MEDICINE

## 2021-10-07 PROCEDURE — A9579 GAD-BASE MR CONTRAST NOS,1ML: HCPCS | Performed by: FAMILY MEDICINE

## 2021-10-07 PROCEDURE — 85025 COMPLETE CBC W/AUTO DIFF WBC: CPT | Performed by: INTERNAL MEDICINE

## 2021-10-07 PROCEDURE — 25010000002 GADOTERIDOL PER 1 ML: Performed by: FAMILY MEDICINE

## 2021-10-07 PROCEDURE — 99232 SBSQ HOSP IP/OBS MODERATE 35: CPT | Performed by: NURSE PRACTITIONER

## 2021-10-07 PROCEDURE — 80053 COMPREHEN METABOLIC PANEL: CPT | Performed by: INTERNAL MEDICINE

## 2021-10-07 RX ADMIN — METOPROLOL TARTRATE 12.5 MG: 25 TABLET, FILM COATED ORAL at 09:09

## 2021-10-07 RX ADMIN — AMIODARONE HYDROCHLORIDE 200 MG: 200 TABLET ORAL at 16:52

## 2021-10-07 RX ADMIN — AMIODARONE HYDROCHLORIDE 200 MG: 200 TABLET ORAL at 09:08

## 2021-10-07 RX ADMIN — PIPERACILLIN AND TAZOBACTAM 3.38 G: 3; .375 INJECTION, POWDER, LYOPHILIZED, FOR SOLUTION INTRAVENOUS at 01:42

## 2021-10-07 RX ADMIN — PIPERACILLIN AND TAZOBACTAM 3.38 G: 3; .375 INJECTION, POWDER, LYOPHILIZED, FOR SOLUTION INTRAVENOUS at 16:52

## 2021-10-07 RX ADMIN — Medication 10 ML: at 09:09

## 2021-10-07 RX ADMIN — GADOTERIDOL 20 ML: 279.3 INJECTION, SOLUTION INTRAVENOUS at 10:10

## 2021-10-07 RX ADMIN — PIPERACILLIN AND TAZOBACTAM 3.38 G: 3; .375 INJECTION, POWDER, LYOPHILIZED, FOR SOLUTION INTRAVENOUS at 09:08

## 2021-10-07 NOTE — CASE MANAGEMENT/SOCIAL WORK
Continued Stay Note  RANDALL Natarajan     Patient Name: Carrie Golden  MRN: [id number]  Today's Date: 10/7/2021    Admit Date: 10/2/2021    Discharge Plan     Row Name 10/07/21 1338       Plan    Plan Comments  DC Barriers: MRI abdomen, GI following. Anticipate dc home once medically cleared.        Phone communication or documentation only - no physical contact with patient or family.    Gaby Ratliff RN     Office Phone: [phone]  Office Cell: [phone]

## 2021-10-07 NOTE — PLAN OF CARE
Goal Outcome Evaluation:         Patient resting comfortably. Today. Did ambulate with walker today. Tolerating diet

## 2021-10-07 NOTE — PROGRESS NOTES
LOS: 4 days   Patient Care Team:  Monster Myrick MD as PCP - General (Family Medicine)  ROBERTO CARLOS Morales MD as Consulting Physician (Cardiology)    Subjective:  She looks and feels better today with less discomfort    Objective:   Labile heart rate with accelerations into the 110-120 range//no hematochezia//afebrile      Review of Systems:   Review of Systems   Constitutional: Positive for activity change and appetite change.   Respiratory: Negative.    Cardiovascular: Positive for palpitations.   Gastrointestinal: Positive for abdominal pain.   Neurological: Negative.            Vital Signs  Temp:  [97.4 °F (36.3 °C)-99.7 °F (37.6 °C)] 98.2 °F (36.8 °C)  Heart Rate:  [] 107  Resp:  [12-28] 15  BP: ()/(47-82) 120/74    Physical Exam:  Physical Exam  Constitutional:       Appearance: Normal appearance.   Cardiovascular:      Rate and Rhythm: Tachycardia present. Rhythm irregular.      Pulses: Normal pulses.   Pulmonary:      Breath sounds: Normal breath sounds.   Abdominal:      General: There is no distension.      Palpations: Abdomen is soft.      Tenderness: There is abdominal tenderness. There is no guarding or rebound.   Skin:     General: Skin is warm.   Neurological:      General: No focal deficit present.      Mental Status: She is alert and oriented to person, place, and time.          Radiology:  CT Abdomen Pelvis Without Contrast    Result Date: 10/2/2021   1. There is abnormal thickening of the wall the colon in the left upper quadrant. This involves the transverse colon, splenic flexure, and ascending colon consistent with a nonspecific colitis. 2. There is also thickening of several small bowel loops in the left upper quadrant and the lower pelvis suspicious for a superimposed enteritis. 3. Postsurgical changes. 4. No free air or abscess formation. 5. Additional findings as noted above. The study is limited by noncontrast technique.  Electronically Signed By-Winston Scott MD On:10/2/2021  3:54 PM This report was finalized on [id number] by  Winston Scott MD.    XR Chest 1 View    Result Date: 10/2/2021   1. Mild cardiomegaly. 2. No active pulmonary disease.  Electronically Signed By-Winston Scott MD On:10/2/2021 3:42 PM This report was finalized on [id number] by  Winston Scott MD.         Results Review:     I reviewed the patient's new clinical results.  I reviewed the patient's new imaging results and agree with the interpretation.    Medication Review:   Scheduled Meds:amiodarone, 200 mg, Oral, TID AC  metoprolol tartrate, 12.5 mg, Oral, Q12H  piperacillin-tazobactam, 3.375 g, Intravenous, Q8H  rosuvastatin, 5 mg, Oral, Nightly  sodium chloride, 10 mL, Intravenous, Q12H  sodium-potassium-magnesium sulfates, 1 bottle, Oral, Once      Continuous Infusions:   PRN Meds:.•  acetaminophen  •  HYDROmorphone  •  ipratropium-albuterol  •  melatonin  •  ondansetron **OR** ondansetron  •  sodium chloride  •  [COMPLETED] Insert peripheral IV **AND** sodium chloride  •  sodium chloride  •  traMADol    Labs:    CBC    Results from last 7 days   Lab Units 10/07/21  0115 10/06/21  0040 10/05/21  0325 10/04/21  0259 10/03/21  0308 10/02/21  1442   WBC 10*3/mm3 16.30* 16.00* 14.90* 18.30* 24.40* 14.80*   HEMOGLOBIN g/dL 10.9* 10.4* 10.1* 10.8* 11.8* 13.4   PLATELETS 10*3/mm3 254 230 204 203 246 297     BMP   Results from last 7 days   Lab Units 10/07/21  0115 10/06/21  0040 10/05/21  0325 10/04/21  0259 10/03/21  0308 10/02/21  1442   SODIUM mmol/L 144 140 136 136 139 139   POTASSIUM mmol/L 3.7 3.6 3.8 3.9 4.2 4.3   CHLORIDE mmol/L 107 107 105 104 106 104   CO2 mmol/L 21.0* 19.0* 20.0* 21.0* 19.0* 20.0*   BUN mg/dL 11 11 13 21 28* 40*   CREATININE mg/dL 0.61 0.58 0.70 0.75 0.67 0.96   GLUCOSE mg/dL 123* 88 94 95 149* 173*     Cr Clearance Estimated Creatinine Clearance: 35.4 mL/min (by C-G formula based on SCr of 0.61 mg/dL).  Coag     HbA1C   Lab Results   Component Value Date    HGBA1C 5.8 (H) 12/02/2019      Blood Glucose No results found for: POCGLU  Infection     CMP   Results from last 7 days   Lab Units 10/07/21  0115 10/06/21  0040 10/05/21  0325 10/04/21  0259 10/03/21  0308 10/02/21  1442   SODIUM mmol/L 144 140 136 136 139 139   POTASSIUM mmol/L 3.7 3.6 3.8 3.9 4.2 4.3   CHLORIDE mmol/L 107 107 105 104 106 104   CO2 mmol/L 21.0* 19.0* 20.0* 21.0* 19.0* 20.0*   BUN mg/dL 11 11 13 21 28* 40*   CREATININE mg/dL 0.61 0.58 0.70 0.75 0.67 0.96   GLUCOSE mg/dL 123* 88 94 95 149* 173*   ALBUMIN g/dL 2.90* 2.90* 2.80*  --   --  4.30   BILIRUBIN mg/dL 0.8 0.6 0.8  --   --  0.9   ALK PHOS U/L 77 79 148*  --   --  120*   AST (SGOT) U/L 18 16 21  --   --  21   ALT (SGPT) U/L 15 13 11  --   --  17     UA      Radiology(recent) No radiology results for the last day   Assessment:      Acute atrial fibrillation with rapid ventricular response  Acute ischemic descending colon and splenic flexure colitis  Acute enteritis  Acute abdominal pain secondary to the above  Status post elective sigmoidoscopy 10/6/2021  History of peptic ulcer surgery/Billroth I  Atherosclerotic heart disease of native coronary arteries with angina pectoris  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

## 2021-10-07 NOTE — PROGRESS NOTES
LOS: 4 days   Patient Care Team:  Monster Myrick MD as PCP - General (Family Medicine)  ROBERTO CARLOS Morales MD as Consulting Physician (Cardiology)      Subjective     Interval History:     Subjective: Patient reports that she is feeling better today.  Denies any current abdominal pain.  Denies any further rectal bleeding overnight.      ROS:   No chest pain, shortness of breath, or cough.        Medication Review:     Current Facility-Administered Medications:   •  acetaminophen (TYLENOL) tablet 325 mg, 325 mg, Oral, Q4H PRN, Uche Vaz MD, 325 mg at 10/06/21 2051  •  amiodarone (PACERONE) tablet 200 mg, 200 mg, Oral, TID AC, Uche Vaz MD, 200 mg at 10/07/21 0908  •  HYDROmorphone (DILAUDID) injection 0.25 mg, 0.25 mg, Intravenous, Q4H PRN, Uche Vaz MD, 0.25 mg at 10/06/21 1419  •  ipratropium-albuterol (DUO-NEB) nebulizer solution 3 mL, 3 mL, Nebulization, Q6H PRN, Uche Vaz MD  •  melatonin tablet 5 mg, 5 mg, Oral, Nightly PRN, Uche Vaz MD, 5 mg at 10/03/21 2200  •  metoprolol tartrate (LOPRESSOR) half tablet 12.5 mg, 12.5 mg, Oral, Q12H, Uche Vaz MD, 12.5 mg at 10/07/21 0909  •  ondansetron (ZOFRAN) tablet 4 mg, 4 mg, Oral, Q6H PRN **OR** ondansetron (ZOFRAN) injection 4 mg, 4 mg, Intravenous, Q6H PRN, Uche Vaz MD, 4 mg at 10/06/21 1919  •  piperacillin-tazobactam (ZOSYN) IVPB 3.375 g in 100 mL NS (CD), 3.375 g, Intravenous, Q8H, Winston Amaya MD, 3.375 g at 10/07/21 0908  •  rosuvastatin (CRESTOR) tablet 5 mg, 5 mg, Oral, Nightly, Uche Vaz MD, 5 mg at 10/06/21 2051  •  sodium chloride 0.9 % flush 10 mL, 10 mL, Intravenous, PRN, Uche Vaz MD  •  [COMPLETED] Insert peripheral IV, , , Once **AND** sodium chloride 0.9 % flush 10 mL, 10 mL, Intravenous, PRN, Uche Vaz MD  •  sodium chloride 0.9 % flush 10 mL, 10 mL, Intravenous, Q12H, Uche Vaz MD, 10 mL at 10/07/21 0909  •  sodium chloride 0.9 % flush 10 mL, 10 mL, Intravenous, PRN,  Uche Vaz MD  •  sodium-potassium-magnesium sulfates (SUPREP) oral solution 1 bottle, 1 bottle, Oral, Once, Uche Vaz MD  •  traMADol (ULTRAM) tablet 50 mg, 50 mg, Oral, Q4H PRN, Uche Vaz MD, 50 mg at 10/06/21 0825      Objective     Vital Signs  Vitals:    10/07/21 0131 10/07/21 0440 10/07/21 0732 10/07/21 0908   BP:  120/74 111/68    BP Location:  Right arm Right arm    Patient Position:  Lying Lying    Pulse: 68 107 106 104   Resp:  15 14    Temp:  98.2 °F (36.8 °C) 98.1 °F (36.7 °C)    TempSrc:  Oral Oral    SpO2:  94% 93%    Weight: 54.5 kg (120 lb 2.4 oz)      Height:           Physical Exam:     General Appearance:    Awake and alert, in no acute distress   Head:    Normocephalic, without obvious abnormality   Eyes:          Conjunctivae normal, anicteric sclera   Throat:   No oral lesions, no thrush, oral mucosa moist   Neck:   No adenopathy, supple, no JVD   Lungs:     respirations regular, even and unlabored   Abdomen:     Soft, non-tender, no rebound or guarding, non-distended   Rectal:     Deferred   Extremities:   No edema, no cyanosis   Skin:   No bruising or rash, no jaundice        Results Review:    CBC    Results from last 7 days   Lab Units 10/07/21  0115 10/06/21  0040 10/05/21  0325 10/04/21  0259 10/03/21  0308 10/02/21  1442   WBC 10*3/mm3 16.30* 16.00* 14.90* 18.30* 24.40* 14.80*   HEMOGLOBIN g/dL 10.9* 10.4* 10.1* 10.8* 11.8* 13.4   PLATELETS 10*3/mm3 254 230 204 203 246 297     CMP   Results from last 7 days   Lab Units 10/07/21  0115 10/06/21  0040 10/05/21  0325 10/04/21  0259 10/03/21  0308 10/02/21  1442   SODIUM mmol/L 144 140 136 136 139 139   POTASSIUM mmol/L 3.7 3.6 3.8 3.9 4.2 4.3   CHLORIDE mmol/L 107 107 105 104 106 104   CO2 mmol/L 21.0* 19.0* 20.0* 21.0* 19.0* 20.0*   BUN mg/dL 11 11 13 21 28* 40*   CREATININE mg/dL 0.61 0.58 0.70 0.75 0.67 0.96   GLUCOSE mg/dL 123* 88 94 95 149* 173*   ALBUMIN g/dL 2.90* 2.90* 2.80*  --   --  4.30   BILIRUBIN mg/dL 0.8  0.6 0.8  --   --  0.9   ALK PHOS U/L 77 79 148*  --   --  120*   AST (SGOT) U/L 18 16 21  --   --  21   ALT (SGPT) U/L 15 13 11  --   --  17     Cr Clearance Estimated Creatinine Clearance: 35.4 mL/min (by C-G formula based on SCr of 0.61 mg/dL).  Coag     HbA1C   Lab Results   Component Value Date    HGBA1C 5.8 (H) 12/02/2019     Blood Glucose No results found for: POCGLU  Infection     UA      Radiology(recent) No radiology results for the last day       Assessment/Plan     ASSESSMENT:  -Lower abdominal pain  -Abnormal CT showing colitis and enteritis  -Leukocytosis/fever  -Nausea/vomiting -resolved  -Diarrhea with rectal bleeding -resolved  -Dyspepsia  -Dysphagia  -Mild normocytic anemia  -Hypertension  -Hyperlipidemia  -CAD  -History of Billroth I anatomy  -History of hysterectomy  -History of inguinal hernia repair     PLAN:  Patient reports that she is feeling some better today.  Denies any current abdominal pain.  No further rectal bleeding overnight.  S/p flexible sigmoidoscopy yesterday showing severe colitis which is likely ischemic in etiology involving the descending colon and splenic flexure.  Await pathology.  MRA is planned for today to further evaluate the mesenteric vessels.  Hemoglobin is stable at 10.9 today from 10.4 yesterday.  Continue to monitor H/H and transfuse as needed.  Okay to advance diet as tolerated after MRI.  Continue supportive care.      Electronically signed by EFREN Jones, 10/07/21, 9:41 AM EDT.

## 2021-10-07 NOTE — NURSING NOTE
Patient's heart rhythm has been in and out of afib. Patient's on metoprolol PO and amiodarone. Will continue to monitor. MD has been notified.

## 2021-10-07 NOTE — NURSING NOTE
EKG ordered. Pt's heart rate started to be elevated,  running between 120s-140s, without any chest pain. Patient had an afib the other night, but converted back to sinus.  EKG is being done right now. Vital signs taken and recorded.

## 2021-10-07 NOTE — NURSING NOTE
Patient's heart rate is running right now between 107-115 denies any chest pain or shortness of air.

## 2021-10-07 NOTE — PROGRESS NOTES
Cardiology    Patient Care Team:  Monster Myrick MD as PCP - General (Family Medicine)  ROBERTO CARLOS Morales MD as Consulting Physician (Cardiology)        CHIEF COMPLAINT: Atrial fibrillation    ADMITTING DIAGNOSES: Atrial fibrillation    SUBJECTIVE: No complaints from cardiovascular standpoint    Review of Symptoms:  Constitutional: Patient afebrile no chills or unexpected weight changes  Respiratory: No cough, no wheezing or dyspnea  Cardiovascular: No chest pain, palpitations, dyspnea, orthopnea and no edema  Gastrointestinal: No nausea, vomiting, constipation or diarrhea.  No melena or dark stools    All other systems reviewed and are negative     PAST MEDICAL HISTORY:   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 sure which 1  contracted at age 8   • History of transfusion    • [place] (hard of hearing)    • Hyperlipidemia    • Hypertension    • Past heart attack     X2 MILD LAST ONE OCT 2019   • PONV (postoperative nausea and vomiting)        ALLERGIES:   Allergies   Allergen Reactions   • Codeine Nausea And Vomiting and Dizziness   • Lortab [Hydrocodone-Acetaminophen] Nausea And Vomiting   • Nitrofurantoin Other (See Comments)   • Sulfa Antibiotics Rash         PAST SURGICAL HISTORY:   Past Surgical History:   Procedure Laterality Date   • ANTERIOR AND POSTERIOR VAGINAL REPAIR     • BACK SURGERY     • BLEPHAROPLASTY Bilateral 5/23/2019    Procedure: bilateral upper lid blepharoplasty;  Surgeon: Mauricio Pennington MD;  Location: [facility] OR [facility];  Service: Ophthalmology   • BROW LIFT Bilateral 2/13/2020    Procedure: BILATERAL TEMPORAL DIRECT BROWLIFT;  Surgeon: Sreekanth Bird MD;  Location: [facility] OR [facility];  Service: Ophthalmology;  Laterality: Bilateral;   • CARDIAC CATHETERIZATION N/A 12/3/2019     Procedure: Left Heart Cath;  Surgeon: Puma Briseno MD;  Location: [facility] CATH INVASIVE LOCATION;  Service: Cardiovascular   • CARDIAC CATHETERIZATION N/A 12/3/2019    Procedure: Coronary angiography;  Surgeon: Puma Briseno MD;  Location: [facility] CATH INVASIVE LOCATION;  Service: Cardiovascular   • CARDIAC CATHETERIZATION N/A 12/3/2019    Procedure: Left ventriculography;  Surgeon: Puma Briseno MD;  Location: [facility] CATH INVASIVE LOCATION;  Service: Cardiovascular   • CATARACT EXTRACTION EXTRACAPSULAR W/ INTRAOCULAR LENS IMPLANTATION Bilateral    • CERVICAL SPINE ANTERIOR      with instrumentation   • CHEILECTOMY Left 8/28/2020    Procedure: CHEILECTOMY;  Surgeon: GAB Rees DPM;  Location: [facility] MAIN OR;  Service: Podiatry;  Laterality: Left;   • COLON SURGERY      for obstruction   • ALBERTO TUBE INSERTION Bilateral 5/23/2019    Procedure: ALBERTO TUBE;  Surgeon: Mauricio Pennington MD;  Location: [facility] OR [facility];  Service: Ophthalmology   • ECTROPION REPAIR Bilateral 5/23/2019    Procedure: bilateral lower lid ectropion repair, bilateral punctoplasty;  Surgeon: Mauricio Pennington MD;  Location: [facility] OR [facility];  Service: Ophthalmology   • EYE SURGERY     • FOOT FUSION Left 12/30/2016    Procedure: LEFT HALLUX METATARSALPHALANGEAL ARTHRODESIS SILVER BUNIONECTOMY METATARSAL HEAD RESECTION 2-5 CALCANEAL BONE GRAFT;  Surgeon: Monster Harper Jr., MD;  Location: [facility] MAIN OR;  Service:    • HYSTERECTOMY     • INGUINAL HERNIA REPAIR Bilateral    • METATARSAL OSTEOTOMY Left 8/28/2020    Procedure: Metatarsal head resection 5;  Surgeon: GAB Rees DPM;  Location: [facility] MAIN OR;  Service: Podiatry;  Laterality: Left;   • ROTATOR CUFF REPAIR Right    • STOMACH SURGERY      for ulcer          FAMILY HISTORY:   Family History   Problem Relation Age of Onset   • No Known Problems Mother    • No Known Problems Father    • Malig Hyperthermia Neg Hx          SOCIAL HISTORY:   Social History     Socioeconomic History    • Marital status:      Spouse name: Not on file   • Number of children: Not on file   • Years of education: Not on file   • Highest education level: Not on file   Tobacco Use   • Smoking status: Former Smoker     Packs/day: 0.25     Years: 10.00     Pack years: 2.50     Types: Cigarettes     Quit date:      Years since quittin.   • Smokeless tobacco: Never Used   Vaping Use   • Vaping Use: Never used   Substance and Sexual Activity   • Alcohol use: No   • Drug use: No   • Sexual activity: Defer       CURRENT MEDICATIONS:     Current Facility-Administered Medications:   •  acetaminophen (TYLENOL) tablet 325 mg, 325 mg, Oral, Q4H PRN, Uche Vaz MD, 325 mg at 10/06/21 2051  •  amiodarone (PACERONE) tablet 200 mg, 200 mg, Oral, TID AC, Uche Vaz MD, 200 mg at 10/07/21 0908  •  HYDROmorphone (DILAUDID) injection 0.25 mg, 0.25 mg, Intravenous, Q4H PRN, Uche Vaz MD, 0.25 mg at 10/06/21 1419  •  ipratropium-albuterol (DUO-NEB) nebulizer solution 3 mL, 3 mL, Nebulization, Q6H PRN, Uche Vaz MD  •  melatonin tablet 5 mg, 5 mg, Oral, Nightly PRN, Uche Vaz MD, 5 mg at 10/03/21 2200  •  metoprolol tartrate (LOPRESSOR) half tablet 12.5 mg, 12.5 mg, Oral, Q12H, Uche Vaz MD, 12.5 mg at 10/07/21 0909  •  ondansetron (ZOFRAN) tablet 4 mg, 4 mg, Oral, Q6H PRN **OR** ondansetron (ZOFRAN) injection 4 mg, 4 mg, Intravenous, Q6H PRN, Uche Vaz MD, 4 mg at 10/06/21 1919  •  piperacillin-tazobactam (ZOSYN) IVPB 3.375 g in 100 mL NS (CD), 3.375 g, Intravenous, Q8H, Winston Amaya MD, 3.375 g at 10/07/21 0908  •  rosuvastatin (CRESTOR) tablet 5 mg, 5 mg, Oral, Nightly, Uche Vaz MD, 5 mg at 10/06/21 2051  •  sodium chloride 0.9 % flush 10 mL, 10 mL, Intravenous, PRN, Uche Vaz MD  •  [COMPLETED] Insert peripheral IV, , , Once **AND** sodium chloride 0.9 % flush 10 mL, 10 mL, Intravenous, PRN, Uche Vaz MD  •  sodium chloride 0.9 % flush 10 mL, 10  mL, Intravenous, Q12H, Uche Vaz MD, 10 mL at 10/07/21 0909  •  sodium chloride 0.9 % flush 10 mL, 10 mL, Intravenous, PRN, Uche Vaz MD  •  sodium-potassium-magnesium sulfates (SUPREP) oral solution 1 bottle, 1 bottle, Oral, Once, Uche Vaz MD  •  traMADol (ULTRAM) tablet 50 mg, 50 mg, Oral, Q4H PRN, Uche Vaz MD, 50 mg at 10/06/21 0825      DIAGNOSTIC DATA:     I reviewed the patient's new clinical results.    Lab Results (last 24 hours)     Procedure Component Value Units Date/Time    Tissue Pathology Exam [[id number]] Collected: 10/06/21 1527    Specimen: Tissue from Large Intestine, Left / Descending Colon Updated: 10/07/21 1003    Manual Differential [[id number]]  (Abnormal) Collected: 10/07/21 0115    Specimen: Blood Updated: 10/07/21 0253     Neutrophil % 76.0 %      Lymphocyte % 1.0 %      Monocyte % 2.0 %      Bands %  20.0 %      Metamyelocyte % 1.0 %      Neutrophils Absolute 15.65 10*3/mm3      Lymphocytes Absolute 0.16 10*3/mm3      Monocytes Absolute 0.33 10*3/mm3      RBC Morphology Normal     WBC Morphology Normal     Large Platelets Slight/1+    CBC & Differential [[id number]]  (Abnormal) Collected: 10/07/21 0115    Specimen: Blood Updated: 10/07/21 0253    Narrative:      The following orders were created for panel order CBC & Differential.  Procedure                               Abnormality         Status                     ---------                               -----------         ------                     CBC Auto Differential[[id number]]        Abnormal            Final result               Scan Slide[[id number]]                                       Final result                 Please view results for these tests on the individual orders.    Scan Slide [[id number]] Collected: 10/07/21 0115    Specimen: Blood Updated: 10/07/21 0253     Scan Slide --     Comment: See Manual Differential Results       CBC Auto Differential [[id number]]  (Abnormal) Collected: 10/07/21  0115    Specimen: Blood Updated: 10/07/21 0253     WBC 16.30 10*3/mm3      RBC 3.76 10*6/mm3      Hemoglobin 10.9 g/dL      Hematocrit 33.1 %      MCV 87.9 fL      MCH 28.9 pg      MCHC 32.9 g/dL      RDW 14.5 %      RDW-SD 45.1 fl      MPV 7.1 fL      Platelets 254 10*3/mm3     Narrative:      The previously reported component NRBC is no longer being reported. Previous result was 0.0 /100 WBC (Reference Range: 0.0-0.2 /100 WBC) on 10/7/2021 at 0218 EDT.    Comprehensive Metabolic Panel [[id number]]  (Abnormal) Collected: 10/07/21 0115    Specimen: Blood Updated: 10/07/21 0242     Glucose 123 mg/dL      BUN 11 mg/dL      Creatinine 0.61 mg/dL      Sodium 144 mmol/L      Potassium 3.7 mmol/L      Chloride 107 mmol/L      CO2 21.0 mmol/L      Calcium 7.8 mg/dL      Total Protein 5.1 g/dL      Albumin 2.90 g/dL      ALT (SGPT) 15 U/L      AST (SGOT) 18 U/L      Alkaline Phosphatase 77 U/L      Total Bilirubin 0.8 mg/dL      eGFR Non African Amer 91 mL/min/1.73      Globulin 2.2 gm/dL      A/G Ratio 1.3 g/dL      BUN/Creatinine Ratio 18.0     Anion Gap 16.0 mmol/L     Narrative:      GFR Normal >60  Chronic Kidney Disease <60  Kidney Failure <15            Imaging Results (Last 24 Hours)     Procedure Component Value Units Date/Time    MRI Angiogram Abdomen With & Without Contrast [[id number]] Resulted: 10/07/21 1010     Updated: 10/07/21 1011          Xray reviewed personally by physician.      ECG reviewed personally by physician  ECG/EMG Results (most recent)     Procedure Component Value Units Date/Time    ECG 12 Lead [[id number]] Collected: 10/02/21 1503     Updated: 10/02/21 1504     QT Interval 389 ms     Narrative:      HEART RATE= 79  bpm  RR Interval= 764  ms  TN Interval= 132  ms  P Horizontal Axis= 245  deg  P Front Axis= -1  deg  QRSD Interval= 84  ms  QT Interval= 389  ms  QRS Axis= -30  deg  T Wave Axis= 11  deg  - OTHERWISE NORMAL ECG -  Sinus rhythm  Low voltage, precordial leads  Electronically Signed By:    Date and Time of Study: 2021-10-02 15:03:08    ECG 12 Lead [[id number]] Collected: 10/04/21 0330     Updated: 10/04/21 0333     QT Interval 311 ms     Narrative:      HEART RATE= 130  bpm  RR Interval= 461  ms  KY Interval=   ms  P Horizontal Axis=   deg  P Front Axis=   deg  QRSD Interval= 75  ms  QT Interval= 311  ms  QRS Axis= -15  deg  T Wave Axis= 0  deg  - ABNORMAL ECG -  Atrial fibrillation  Inferior infarct, old  When compared with ECG of 02-Oct-2021 15:03:08,  Significant change in rhythm: previously sinus  New or worsened ischemia or infarction  Significant axis, voltage or hypertrophy change  Electronically Signed By:   Date and Time of Study: 2021-10-04 03:30:27    Adult Transthoracic Echo Complete W/ Cont if Necessary Per Protocol [[id number]] Collected: 10/04/21 1434     Updated: 10/04/21 2314     BSA 1.6 m^2      RVIDd 1.8 cm      IVSd 0.68 cm      LVIDd 4.7 cm      LVIDs 2.7 cm      LVPWd 1.2 cm      IVS/LVPW 0.56     FS 43.3 %      EDV(Teich) 103.5 ml      ESV(Teich) 26.5 ml      EF(Teich) 74.4 %      EDV(cubed) 105.3 ml      ESV(cubed) 19.2 ml      EF(cubed) 81.7 %      LV mass(C)d 152.5 grams      LV mass(C)dI 96.3 grams/m^2      SV(Teich) 77.0 ml      SI(Teich) 48.6 ml/m^2      SV(cubed) 86.1 ml      SI(cubed) 54.3 ml/m^2      Ao root diam 3.2 cm      Ao root area 8.0 cm^2      ACS 1.7 cm      asc Aorta Diam 2.7 cm      LVOT diam 2.0 cm      LVOT area 3.2 cm^2      RVOT diam 2.4 cm      RVOT area 4.6 cm^2      EDV(MOD-sp4) 44.2 ml      ESV(MOD-sp4) 18.9 ml      EF(MOD-sp4) 57.3 %      SV(MOD-sp4) 25.3 ml      SI(MOD-sp4) 16.0 ml/m^2      Ao root area (BSA corrected) 2.0     LV Conklin Vol (BSA corrected) 27.9 ml/m^2      LV Sys Vol (BSA corrected) 11.9 ml/m^2      Aortic R-R 0.23 sec      Aortic .4 BPM      MV V2 max 134.9 cm/sec      MV max PG 7.3 mmHg      MV V2 mean 69.0 cm/sec      MV mean PG 2.4 mmHg      MV V2 VTI 25.2 cm      MVA(VTI) 2.2 cm^2      Ao pk chriss 130.9 cm/sec      Ao max PG  6.9 mmHg      Ao max PG (full) 2.9 mmHg      Ao V2 mean 90.4 cm/sec      Ao mean PG 3.7 mmHg      Ao mean PG (full) 1.2 mmHg      Ao V2 VTI 20.6 cm      JOHANA(I,A) 2.7 cm^2      JOHANA(I,D) 2.7 cm^2      JOHANA(V,A) 2.4 cm^2      JOHANA(V,D) 2.4 cm^2      LV V1 max PG 3.9 mmHg      LV V1 mean PG 2.5 mmHg      LV V1 max 98.9 cm/sec      LV V1 mean 74.6 cm/sec      LV V1 VTI 17.3 cm      MR max ty 430.0 cm/sec      MR max PG 74.0 mmHg      CO(Ao) 42.6 l/min      CI(Ao) 26.9 l/min/m^2      SV(Ao) 165.0 ml      SI(Ao) 104.2 ml/m^2      CO(LVOT) 14.3 l/min      CI(LVOT) 9.1 l/min/m^2      SV(LVOT) 55.5 ml      SV(RVOT) 74.3 ml      SI(LVOT) 35.1 ml/m^2      PA V2 max 125.7 cm/sec      PA max PG 6.3 mmHg      PA max PG (full) 2.3 mmHg      PA V2 mean 74.9 cm/sec      PA mean PG 2.7 mmHg      PA mean PG (full) 1.2 mmHg      PA V2 VTI 18.5 cm      PVA(I,A) 4.0 cm^2      BH CV ECHO ZURI - PVA(I,D) 4.0 cm^2      BH CV ECHO ZURI - PVA(V,A) 3.7 cm^2      BH CV ECHO ZURI - PVA(V,D) 3.7 cm^2      PA acc time 0.08 sec      PI end-d ty 91.1 cm/sec      PI max ty 215.2 cm/sec      PI max PG 18.5 mmHg      RV V1 max PG 4.0 mmHg      RV V1 mean PG 1.5 mmHg      RV V1 max 100.4 cm/sec      RV V1 mean 55.6 cm/sec      RV V1 VTI 16.1 cm      TR max ty 269.1 cm/sec      RVSP(TR) 32.0 mmHg      RAP systole 3.0 mmHg      PA pr(Accel) 43.0 mmHg      Pulm Sys Ty 48.2 cm/sec      Pulm Conklin Ty 54.0 cm/sec      Pulm S/D 0.89     Qp/Qs 1.3     Pulm A Revs Dur 0.08 sec      Pulm A Revs Ty 36.2 cm/sec      BH CV ECHO ZURI - BZI_BMI 22.1 kilograms/m^2      BH CV ECHO ZURI - BSA(HAYCOCK) 1.6 m^2       CV ECHO ZURI - BZI_METRIC_WEIGHT 56.7 kg       CV ECHO ZURI - BZI_METRIC_HEIGHT 160.0 cm      EF(MOD-bp) 57.0 %      LA dimension(2D) 3.3 cm     Narrative:      · Estimated left ventricular EF was in agreement with the calculated left   ventricular EF. Left ventricular ejection fraction appears to be 56 - 60%.   Left ventricular systolic function is  "normal.  · Left ventricular diastolic function is consistent with (grade II w/high   LAP) pseudonormalization.  · Left atrial volume is mildly increased.  · Estimated right ventricular systolic pressure from tricuspid   regurgitation is normal (<35 mmHg).       ECG 12 Lead [[id number]] Collected: 10/06/21 2214     Updated: 10/06/21 2215     QT Interval 328 ms     Narrative:      HEART RATE= 122  bpm  RR Interval= 492  ms  WV Interval= 74  ms  P Horizontal Axis= 179  deg  P Front Axis= 0  deg  QRSD Interval= 83  ms  QT Interval= 328  ms  QRS Axis= -13  deg  T Wave Axis= 3  deg  - BORDERLINE ECG -  Sinus tachycardia  Consider left ventricular hypertrophy  When compared with ECG of 04-Oct-2021 3:30:27,  Significant change in rhythm: previously atrial fibrillation  Significant axis, voltage or hypertrophy change  Electronically Signed By:   Date and Time of Study: 2021-10-06 22:14:23              VITAL SIGNS: Temp:  [97.4 °F (36.3 °C)-99.7 °F (37.6 °C)] 98.1 °F (36.7 °C)  Heart Rate:  [] 104  Resp:  [12-28] 14  BP: ()/(47-82) 111/68   Flowsheet Rows      First Filed Value   Admission Height  160 cm (63\") Documented at 10/02/2021 1422   Admission Weight  54.4 kg (120 lb) Documented at 10/02/2021 1422        Physical exam  Constitutional: well-nourished, and appears stated age in no acute distress  PERRL: Conjunctiva clear, no pallor, anicteric  HENMT: normocephalic, normal dentition, no cyanosis or pallor  Neck:no bruits, or thrills and bilateral normal carotid upstroke. Normal jugular venous pressure  Cardiovascular: No parasternal heaves an non-displaced focal PMI. Normal rate and rhythm: no rub, gallop, murmur or click and normal S1 and S2; no lower or upper extremity edema.   Lungs: unlabored, no wheezing with no rales or rhonchi on auscultation.  Extremities: Warm, no clubbing, cyanosis, or edema. Full and equal peripheral pulses in extremities with no bruits appreciated.   Abdomen: soft, non-tender, " non-distended  Musculoskeletal: no joint tenderness or swelling and no erythema  Skin: Warm and dry, non-erythematous   Neuro:alert and normal affect. Oriented to time, place and person.     ASSESSMENT AND PLAN:   New onset A. fib  CAD  Hypertension  Atherogenic dyslipidemia    New onset A. fib: Spontaneous conversion to sinus rhythm denoting paroxysmal atrial fibrillation.  This is likely secondary to underlying acute illness and hypotension.  We will continue amiodarone in the short-term 200 mg p.o. twice daily with AV david agent to be added in the form of metoprolol 12.5 twice daily.  Given her age and frailty and GI bleed, anticoagulation would not be safe for prudent at this point.    Greater than 35 minutes total of face-to-face/floor  time was spent with the patient and family and nursing coordinating plan and patient management.  Of which counseling of patient with regard specifically to atrial fibrillation comprised 50% of this total time.          Won Bhatia MD  10/07/21  11:03 EDT

## 2021-10-08 ENCOUNTER — INPATIENT HOSPITAL (AMBULATORY)
Dept: URBAN - METROPOLITAN AREA HOSPITAL 84 | Facility: HOSPITAL | Age: 86
End: 2021-10-08
Payer: MEDICARE

## 2021-10-08 DIAGNOSIS — Z98.0 INTESTINAL BYPASS AND ANASTOMOSIS STATUS: ICD-10-CM

## 2021-10-08 DIAGNOSIS — E78.5 HYPERLIPIDEMIA, UNSPECIFIED: ICD-10-CM

## 2021-10-08 DIAGNOSIS — D72.829 ELEVATED WHITE BLOOD CELL COUNT, UNSPECIFIED: ICD-10-CM

## 2021-10-08 DIAGNOSIS — R10.30 LOWER ABDOMINAL PAIN, UNSPECIFIED: ICD-10-CM

## 2021-10-08 DIAGNOSIS — R10.13 EPIGASTRIC PAIN: ICD-10-CM

## 2021-10-08 DIAGNOSIS — Z87.19 PERSONAL HISTORY OF OTHER DISEASES OF THE DIGESTIVE SYSTEM: ICD-10-CM

## 2021-10-08 DIAGNOSIS — R93.3 ABNORMAL FINDINGS ON DIAGNOSTIC IMAGING OF OTHER PARTS OF DI: ICD-10-CM

## 2021-10-08 DIAGNOSIS — R13.10 DYSPHAGIA, UNSPECIFIED: ICD-10-CM

## 2021-10-08 DIAGNOSIS — K52.9 NONINFECTIVE GASTROENTERITIS AND COLITIS, UNSPECIFIED: ICD-10-CM

## 2021-10-08 DIAGNOSIS — D64.9 ANEMIA, UNSPECIFIED: ICD-10-CM

## 2021-10-08 LAB
ALBUMIN SERPL-MCNC: 2.6 G/DL (ref 3.5–5.2)
ALBUMIN/GLOB SERPL: 1.1 G/DL
ALP SERPL-CCNC: 82 U/L (ref 39–117)
ALT SERPL W P-5'-P-CCNC: 15 U/L (ref 1–33)
ANION GAP SERPL CALCULATED.3IONS-SCNC: 14 MMOL/L (ref 5–15)
AST SERPL-CCNC: 22 U/L (ref 1–32)
BASOPHILS # BLD AUTO: 0 10*3/MM3 (ref 0–0.2)
BASOPHILS NFR BLD AUTO: 0.2 % (ref 0–1.5)
BILIRUB SERPL-MCNC: 0.6 MG/DL (ref 0–1.2)
BUN SERPL-MCNC: 10 MG/DL (ref 8–23)
BUN/CREAT SERPL: 17.2 (ref 7–25)
CALCIUM SPEC-SCNC: 7.9 MG/DL (ref 8.2–9.6)
CHLORIDE SERPL-SCNC: 105 MMOL/L (ref 98–107)
CO2 SERPL-SCNC: 21 MMOL/L (ref 22–29)
CREAT SERPL-MCNC: 0.58 MG/DL (ref 0.57–1)
DEPRECATED RDW RBC AUTO: 44.6 FL (ref 37–54)
EOSINOPHIL # BLD AUTO: 0.2 10*3/MM3 (ref 0–0.4)
EOSINOPHIL NFR BLD AUTO: 1.7 % (ref 0.3–6.2)
ERYTHROCYTE [DISTWIDTH] IN BLOOD BY AUTOMATED COUNT: 14.6 % (ref 12.3–15.4)
GFR SERPL CREATININE-BSD FRML MDRD: 96 ML/MIN/1.73
GLOBULIN UR ELPH-MCNC: 2.3 GM/DL
GLUCOSE SERPL-MCNC: 104 MG/DL (ref 65–99)
HCT VFR BLD AUTO: 30.4 % (ref 34–46.6)
HGB BLD-MCNC: 10 G/DL (ref 12–15.9)
LAB AP CASE REPORT: NORMAL
LYMPHOCYTES # BLD AUTO: 1.2 10*3/MM3 (ref 0.7–3.1)
LYMPHOCYTES NFR BLD AUTO: 10.3 % (ref 19.6–45.3)
MCH RBC QN AUTO: 29 PG (ref 26.6–33)
MCHC RBC AUTO-ENTMCNC: 33 G/DL (ref 31.5–35.7)
MCV RBC AUTO: 87.9 FL (ref 79–97)
MONOCYTES # BLD AUTO: 1.1 10*3/MM3 (ref 0.1–0.9)
MONOCYTES NFR BLD AUTO: 9.1 % (ref 5–12)
NEUTROPHILS NFR BLD AUTO: 78.7 % (ref 42.7–76)
NEUTROPHILS NFR BLD AUTO: 9.1 10*3/MM3 (ref 1.7–7)
NRBC BLD AUTO-RTO: 0 /100 WBC (ref 0–0.2)
PATH REPORT.FINAL DX SPEC: NORMAL
PATH REPORT.GROSS SPEC: NORMAL
PLATELET # BLD AUTO: 238 10*3/MM3 (ref 140–450)
PMV BLD AUTO: 7.2 FL (ref 6–12)
POTASSIUM SERPL-SCNC: 2.9 MMOL/L (ref 3.5–5.2)
POTASSIUM SERPL-SCNC: 3.3 MMOL/L (ref 3.5–5.2)
PROT SERPL-MCNC: 4.9 G/DL (ref 6–8.5)
RBC # BLD AUTO: 3.46 10*6/MM3 (ref 3.77–5.28)
SODIUM SERPL-SCNC: 140 MMOL/L (ref 136–145)
WBC # BLD AUTO: 11.6 10*3/MM3 (ref 3.4–10.8)

## 2021-10-08 PROCEDURE — 94799 UNLISTED PULMONARY SVC/PX: CPT

## 2021-10-08 PROCEDURE — 99231 SBSQ HOSP IP/OBS SF/LOW 25: CPT | Performed by: NURSE PRACTITIONER

## 2021-10-08 PROCEDURE — 80053 COMPREHEN METABOLIC PANEL: CPT | Performed by: NURSE PRACTITIONER

## 2021-10-08 PROCEDURE — 94640 AIRWAY INHALATION TREATMENT: CPT

## 2021-10-08 PROCEDURE — 85025 COMPLETE CBC W/AUTO DIFF WBC: CPT | Performed by: NURSE PRACTITIONER

## 2021-10-08 PROCEDURE — 93005 ELECTROCARDIOGRAM TRACING: CPT | Performed by: FAMILY MEDICINE

## 2021-10-08 PROCEDURE — 93010 ELECTROCARDIOGRAM REPORT: CPT | Performed by: INTERNAL MEDICINE

## 2021-10-08 PROCEDURE — 84132 ASSAY OF SERUM POTASSIUM: CPT | Performed by: FAMILY MEDICINE

## 2021-10-08 PROCEDURE — 99233 SBSQ HOSP IP/OBS HIGH 50: CPT | Performed by: INTERNAL MEDICINE

## 2021-10-08 PROCEDURE — 25010000002 PIPERACILLIN SOD-TAZOBACTAM PER 1 G: Performed by: FAMILY MEDICINE

## 2021-10-08 PROCEDURE — 25010000002 METHYLPREDNISOLONE PER 40 MG: Performed by: FAMILY MEDICINE

## 2021-10-08 PROCEDURE — 25010000002 ONDANSETRON PER 1 MG: Performed by: INTERNAL MEDICINE

## 2021-10-08 RX ORDER — IPRATROPIUM BROMIDE AND ALBUTEROL SULFATE 2.5; .5 MG/3ML; MG/3ML
3 SOLUTION RESPIRATORY (INHALATION)
Status: DISCONTINUED | OUTPATIENT
Start: 2021-10-08 | End: 2021-10-09 | Stop reason: HOSPADM

## 2021-10-08 RX ORDER — POTASSIUM CHLORIDE 20 MEQ/1
40 TABLET, EXTENDED RELEASE ORAL AS NEEDED
Status: DISCONTINUED | OUTPATIENT
Start: 2021-10-08 | End: 2021-10-09 | Stop reason: HOSPADM

## 2021-10-08 RX ORDER — POTASSIUM CHLORIDE 1.5 G/1.77G
40 POWDER, FOR SOLUTION ORAL AS NEEDED
Status: DISCONTINUED | OUTPATIENT
Start: 2021-10-08 | End: 2021-10-08 | Stop reason: SDUPTHER

## 2021-10-08 RX ORDER — MAGNESIUM SULFATE HEPTAHYDRATE 40 MG/ML
2 INJECTION, SOLUTION INTRAVENOUS AS NEEDED
Status: DISCONTINUED | OUTPATIENT
Start: 2021-10-08 | End: 2021-10-09 | Stop reason: HOSPADM

## 2021-10-08 RX ORDER — POTASSIUM CHLORIDE 7.45 MG/ML
10 INJECTION INTRAVENOUS
Status: DISCONTINUED | OUTPATIENT
Start: 2021-10-08 | End: 2021-10-08 | Stop reason: SDUPTHER

## 2021-10-08 RX ORDER — PANTOPRAZOLE SODIUM 40 MG/1
40 TABLET, DELAYED RELEASE ORAL
Status: DISCONTINUED | OUTPATIENT
Start: 2021-10-08 | End: 2021-10-09 | Stop reason: HOSPADM

## 2021-10-08 RX ORDER — MAGNESIUM SULFATE HEPTAHYDRATE 40 MG/ML
4 INJECTION, SOLUTION INTRAVENOUS AS NEEDED
Status: DISCONTINUED | OUTPATIENT
Start: 2021-10-08 | End: 2021-10-09 | Stop reason: HOSPADM

## 2021-10-08 RX ORDER — POTASSIUM CHLORIDE 20 MEQ/1
40 TABLET, EXTENDED RELEASE ORAL AS NEEDED
Status: DISCONTINUED | OUTPATIENT
Start: 2021-10-08 | End: 2021-10-08 | Stop reason: SDUPTHER

## 2021-10-08 RX ORDER — POTASSIUM CHLORIDE 7.45 MG/ML
10 INJECTION INTRAVENOUS
Status: DISCONTINUED | OUTPATIENT
Start: 2021-10-08 | End: 2021-10-09 | Stop reason: HOSPADM

## 2021-10-08 RX ORDER — POTASSIUM CHLORIDE 1.5 G/1.77G
40 POWDER, FOR SOLUTION ORAL AS NEEDED
Status: DISCONTINUED | OUTPATIENT
Start: 2021-10-08 | End: 2021-10-09 | Stop reason: HOSPADM

## 2021-10-08 RX ORDER — METHYLPREDNISOLONE SODIUM SUCCINATE 40 MG/ML
20 INJECTION, POWDER, LYOPHILIZED, FOR SOLUTION INTRAMUSCULAR; INTRAVENOUS EVERY 12 HOURS
Status: COMPLETED | OUTPATIENT
Start: 2021-10-08 | End: 2021-10-09

## 2021-10-08 RX ADMIN — Medication 10 ML: at 08:18

## 2021-10-08 RX ADMIN — METOPROLOL TARTRATE 12.5 MG: 25 TABLET, FILM COATED ORAL at 00:00

## 2021-10-08 RX ADMIN — METHYLPREDNISOLONE SODIUM SUCCINATE 20 MG: 40 INJECTION, POWDER, FOR SOLUTION INTRAMUSCULAR; INTRAVENOUS at 21:12

## 2021-10-08 RX ADMIN — IPRATROPIUM BROMIDE AND ALBUTEROL SULFATE 3 ML: 2.5; .5 SOLUTION RESPIRATORY (INHALATION) at 15:05

## 2021-10-08 RX ADMIN — METOPROLOL TARTRATE 12.5 MG: 25 TABLET, FILM COATED ORAL at 21:12

## 2021-10-08 RX ADMIN — PIPERACILLIN AND TAZOBACTAM 3.38 G: 3; .375 INJECTION, POWDER, LYOPHILIZED, FOR SOLUTION INTRAVENOUS at 08:18

## 2021-10-08 RX ADMIN — METHYLPREDNISOLONE SODIUM SUCCINATE 20 MG: 40 INJECTION, POWDER, FOR SOLUTION INTRAMUSCULAR; INTRAVENOUS at 11:35

## 2021-10-08 RX ADMIN — PIPERACILLIN AND TAZOBACTAM 3.38 G: 3; .375 INJECTION, POWDER, LYOPHILIZED, FOR SOLUTION INTRAVENOUS at 18:37

## 2021-10-08 RX ADMIN — POTASSIUM CHLORIDE 40 MEQ: 1500 TABLET, EXTENDED RELEASE ORAL at 08:09

## 2021-10-08 RX ADMIN — IPRATROPIUM BROMIDE AND ALBUTEROL SULFATE 3 ML: 2.5; .5 SOLUTION RESPIRATORY (INHALATION) at 02:00

## 2021-10-08 RX ADMIN — ROSUVASTATIN CALCIUM 5 MG: 5 TABLET, FILM COATED ORAL at 21:12

## 2021-10-08 RX ADMIN — Medication 10 ML: at 00:00

## 2021-10-08 RX ADMIN — Medication 10 ML: at 21:13

## 2021-10-08 RX ADMIN — ROSUVASTATIN CALCIUM 5 MG: 5 TABLET, FILM COATED ORAL at 00:00

## 2021-10-08 RX ADMIN — POTASSIUM CHLORIDE 40 MEQ: 1500 TABLET, EXTENDED RELEASE ORAL at 13:41

## 2021-10-08 RX ADMIN — PIPERACILLIN AND TAZOBACTAM 3.38 G: 3; .375 INJECTION, POWDER, LYOPHILIZED, FOR SOLUTION INTRAVENOUS at 01:09

## 2021-10-08 RX ADMIN — AMIODARONE HYDROCHLORIDE 200 MG: 200 TABLET ORAL at 08:10

## 2021-10-08 RX ADMIN — IPRATROPIUM BROMIDE AND ALBUTEROL SULFATE 3 ML: 2.5; .5 SOLUTION RESPIRATORY (INHALATION) at 19:19

## 2021-10-08 RX ADMIN — AMIODARONE HYDROCHLORIDE 200 MG: 200 TABLET ORAL at 11:36

## 2021-10-08 RX ADMIN — PANTOPRAZOLE SODIUM 40 MG: 40 TABLET, DELAYED RELEASE ORAL at 11:35

## 2021-10-08 RX ADMIN — AMIODARONE HYDROCHLORIDE 200 MG: 200 TABLET ORAL at 18:37

## 2021-10-08 RX ADMIN — ONDANSETRON 4 MG: 2 INJECTION INTRAMUSCULAR; INTRAVENOUS at 01:43

## 2021-10-08 NOTE — PLAN OF CARE
Goal Outcome Evaluation:  Plan of Care Reviewed With: patient        Progress: no change   Patient up in chair, Stated feeling better this afternoon after dose of steroids and breathing treatments. Continues on Iv antibiotics, 0 adverse reaction.

## 2021-10-08 NOTE — PROGRESS NOTES
LOS: 5 days   Patient Care Team:  Monster Myrick MD as PCP - General (Family Medicine)  ROBERTO CARLOS Morales MD as Consulting Physician (Cardiology)      Subjective     Interval History:     Subjective: Patient denies any further abdominal pain.  No rectal bleeding.  Tolerating diet.      ROS:   No chest pain, shortness of breath, or cough.        Medication Review:     Current Facility-Administered Medications:   •  acetaminophen (TYLENOL) tablet 325 mg, 325 mg, Oral, Q4H PRN, Uche Vaz MD, 325 mg at 10/06/21 2051  •  amiodarone (PACERONE) tablet 200 mg, 200 mg, Oral, TID AC, Uche Vaz MD, 200 mg at 10/08/21 0810  •  HYDROmorphone (DILAUDID) injection 0.25 mg, 0.25 mg, Intravenous, Q4H PRN, Winston Amaya MD, 0.25 mg at 10/06/21 1419  •  ipratropium-albuterol (DUO-NEB) nebulizer solution 3 mL, 3 mL, Nebulization, TID - RT, Winston Amaya MD  •  Magnesium Sulfate 2 gram Bolus, followed by 8 gram infusion (total Mg dose 10 grams)- Mg less than or equal to 1mg/dL, 2 g, Intravenous, PRN **OR** Magnesium Sulfate 2 gram / 50mL Infusion (GIVE X 3 BAGS TO EQUAL 6GM TOTAL DOSE) - Mg 1.1 - 1.5 mg/dl, 2 g, Intravenous, PRN **OR** Magnesium Sulfate 4 gram infusion- Mg 1.6-1.9 mg/dL, 4 g, Intravenous, PRN, Winston Amaya MD  •  melatonin tablet 5 mg, 5 mg, Oral, Nightly PRN, Uche Vaz MD, 5 mg at 10/03/21 2200  •  methylPREDNISolone sodium succinate (SOLU-Medrol) injection 20 mg, 20 mg, Intravenous, Q12H, Winston Amaya MD  •  ondansetron (ZOFRAN) tablet 4 mg, 4 mg, Oral, Q6H PRN **OR** ondansetron (ZOFRAN) injection 4 mg, 4 mg, Intravenous, Q6H PRN, Uche Vaz MD, 4 mg at 10/08/21 0143  •  pantoprazole (PROTONIX) EC tablet 40 mg, 40 mg, Oral, Q AM, Winston Amaya MD  •  piperacillin-tazobactam (ZOSYN) IVPB 3.375 g in 100 mL NS (CD), 3.375 g, Intravenous, Q8H, Winston Amaya MD, 3.375 g at 10/08/21 0818  •  potassium chloride (K-DUR,KLOR-CON) CR tablet 40 mEq, 40 mEq, Oral, PRN, 40 mEq at  10/08/21 0809 **OR** potassium chloride (KLOR-CON) packet 40 mEq, 40 mEq, Oral, PRN **OR** potassium chloride 10 mEq in 100 mL IVPB, 10 mEq, Intravenous, Q1H PRN, Winston Amaya MD  •  rosuvastatin (CRESTOR) tablet 5 mg, 5 mg, Oral, Nightly, Uche Vaz MD, 5 mg at 10/08/21 0000  •  sodium chloride 0.9 % flush 10 mL, 10 mL, Intravenous, PRN, Uche Vaz MD  •  [COMPLETED] Insert peripheral IV, , , Once **AND** sodium chloride 0.9 % flush 10 mL, 10 mL, Intravenous, PRN, Uche Vaz MD  •  sodium chloride 0.9 % flush 10 mL, 10 mL, Intravenous, Q12H, Uche Vaz MD, 10 mL at 10/08/21 0818  •  sodium chloride 0.9 % flush 10 mL, 10 mL, Intravenous, PRN, Uche Vaz MD  •  sodium-potassium-magnesium sulfates (SUPREP) oral solution 1 bottle, 1 bottle, Oral, Once, Uche Vaz MD  •  traMADol (ULTRAM) tablet 50 mg, 50 mg, Oral, Q4H PRN, Uche Vaz MD, 50 mg at 10/06/21 0825      Objective     Vital Signs  Vitals:    10/08/21 0200 10/08/21 0205 10/08/21 0500 10/08/21 0810   BP:   107/57    BP Location:       Patient Position:       Pulse: 69 67 70 80   Resp: 18 18 20 20   Temp:   98.3 °F (36.8 °C)    TempSrc:       SpO2: 97% 99% 97% 94%   Weight:   53.9 kg (118 lb 13.3 oz)    Height:         Physical Exam:     General Appearance:    Awake and alert, in no acute distress   Head:    Normocephalic, without obvious abnormality   Eyes:          Conjunctivae normal, anicteric sclera   Throat:   No oral lesions, no thrush, oral mucosa moist   Neck:   No adenopathy, supple, no JVD   Lungs:     respirations regular, even and unlabored   Abdomen:     Soft, non-tender, no rebound or guarding, non-distended   Rectal:     Deferred   Extremities:   No edema, no cyanosis   Skin:   No bruising or rash, no jaundice        Results Review:    CBC    Results from last 7 days   Lab Units 10/08/21  0307 10/07/21  0115 10/06/21  0040 10/05/21  0325 10/04/21  0259 10/03/21  0308 10/02/21  1442   WBC 10*3/mm3  11.60* 16.30* 16.00* 14.90* 18.30* 24.40* 14.80*   HEMOGLOBIN g/dL 10.0* 10.9* 10.4* 10.1* 10.8* 11.8* 13.4   PLATELETS 10*3/mm3 238 254 230 204 203 246 297     CMP   Results from last 7 days   Lab Units 10/08/21  0926 10/08/21  0307 10/07/21  0115 10/06/21  0040 10/05/21  0325 10/04/21  0259 10/03/21  0308 10/02/21  1442 10/02/21  1442   SODIUM mmol/L  --  140 144 140 136 136 139  --  139   POTASSIUM mmol/L 3.3* 2.9* 3.7 3.6 3.8 3.9 4.2   < > 4.3   CHLORIDE mmol/L  --  105 107 107 105 104 106  --  104   CO2 mmol/L  --  21.0* 21.0* 19.0* 20.0* 21.0* 19.0*  --  20.0*   BUN mg/dL  --  10 11 11 13 21 28*  --  40*   CREATININE mg/dL  --  0.58 0.61 0.58 0.70 0.75 0.67  --  0.96   GLUCOSE mg/dL  --  104* 123* 88 94 95 149*  --  173*   ALBUMIN g/dL  --  2.60* 2.90* 2.90* 2.80*  --   --   --  4.30   BILIRUBIN mg/dL  --  0.6 0.8 0.6 0.8  --   --   --  0.9   ALK PHOS U/L  --  82 77 79 148*  --   --   --  120*   AST (SGOT) U/L  --  22 18 16 21  --   --   --  21   ALT (SGPT) U/L  --  15 15 13 11  --   --   --  17    < > = values in this interval not displayed.     Cr Clearance Estimated Creatinine Clearance: 35 mL/min (by C-G formula based on SCr of 0.58 mg/dL).  Coag     HbA1C   Lab Results   Component Value Date    HGBA1C 5.8 (H) 12/02/2019     Blood Glucose No results found for: POCGLU  Infection     UA      Radiology(recent) MRI Angiogram Abdomen With & Without Contrast    Result Date: 10/7/2021  IMPRESSION :  1. Study is limited by motion degradation 2. Flow is identified in all three mesenteric vessels. 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 There is no significant hemodynamically significant narrowing of the mesenteric vessels.  Electronically Signed By-Monster Grant MD On:10/7/2021 11:34 AM This report was finalized on [id number] by  Monster Grant MD.         Assessment/Plan     ASSESSMENT:  -Lower abdominal pain  -Abnormal CT showing colitis and  enteritis  -Leukocytosis/fever  -Nausea/vomiting -resolved  -Diarrhea with rectal bleeding -resolved  -Dyspepsia  -Dysphagia  -Mild normocytic anemia  -Hypertension  -Hyperlipidemia  -CAD  -History of Billroth I anatomy  -History of hysterectomy  -History of inguinal hernia repair     PLAN:  Patient denies any further abdominal pain.  No further rectal bleeding.  S/p flexible sigmoidoscopy on 10/6/2021 showing severe colitis which is likely ischemic in etiology involving the descending colon and splenic flexure.  Await pathology.  MRA yesterday shows flow identified in all 3 mesenteric vessels.  Hemoglobin remained stable at 10.0.  Continue to monitor H/H and transfuse as needed.  Okay for discharge home from GI standpoint.  We will see as needed.    Electronically signed by EFREN Jones, 10/08/21, 11:13 AM EDT.

## 2021-10-08 NOTE — PLAN OF CARE
Pt resting. No c/o pain during this shift. Pt had an acute episode of SOA during shift; see nursing note for further details. Pt ambulatory in room with stand by assistance. Pt to d/c home when medically cleared. Call light within reach. Will continue to monitor.

## 2021-10-08 NOTE — PROGRESS NOTES
LOS: 5 days   Patient Care Team:  Monster Myrick MD as PCP - General (Family Medicine)  ROBERTO CARLOS Morales MD as Consulting Physician (Cardiology)    Subjective:  Short of breath today    Objective:   Wheezing//electrolytes noted//refusing nebulizer treatments      Review of Systems:   Review of Systems   Constitutional: Positive for activity change.   Respiratory: Positive for shortness of breath.    Musculoskeletal: Negative.            Vital Signs  Temp:  [97.6 °F (36.4 °C)-98.9 °F (37.2 °C)] 98.3 °F (36.8 °C)  Heart Rate:  [] 80  Resp:  [14-24] 20  BP: (107-144)/(56-75) 107/57    Physical Exam:  Physical Exam  Vitals reviewed.   Cardiovascular:      Rate and Rhythm: Rhythm irregular.      Heart sounds: Normal heart sounds.   Pulmonary:      Breath sounds: Wheezing present.   Skin:     General: Skin is warm.   Neurological:      Mental Status: She is alert. Mental status is at baseline.          Radiology:  CT Abdomen Pelvis Without Contrast    Result Date: 10/2/2021   1. There is abnormal thickening of the wall the colon in the left upper quadrant. This involves the transverse colon, splenic flexure, and ascending colon consistent with a nonspecific colitis. 2. There is also thickening of several small bowel loops in the left upper quadrant and the lower pelvis suspicious for a superimposed enteritis. 3. Postsurgical changes. 4. No free air or abscess formation. 5. Additional findings as noted above. The study is limited by noncontrast technique.  Electronically Signed By-Winston Scott MD On:10/2/2021 3:54 PM This report was finalized on [id number] by  Winston Scott MD.    XR Chest 1 View    Result Date: 10/2/2021   1. Mild cardiomegaly. 2. No active pulmonary disease.  Electronically Signed By-Winston Scott MD On:10/2/2021 3:42 PM This report was finalized on [id number] by  Winston Scott MD.    MRI Angiogram Abdomen With & Without Contrast    Result Date: 10/7/2021  IMPRESSION :  1. Study is limited by  motion degradation 2. Flow is identified in all three mesenteric vessels. 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 There is no significant hemodynamically significant narrowing of the mesenteric vessels.  Electronically Signed By-Monster Grant MD On:10/7/2021 11:34 AM This report was finalized on [id number] by  Monster Grant MD.         Results Review:     I reviewed the patient's new clinical results.  I reviewed the patient's new imaging results and agree with the interpretation.    Medication Review:   Scheduled Meds:amiodarone, 200 mg, Oral, TID AC  ipratropium-albuterol, 3 mL, Nebulization, TID  methylPREDNISolone sodium succinate, 20 mg, Intravenous, Q12H  pantoprazole, 40 mg, Oral, Q AM  piperacillin-tazobactam, 3.375 g, Intravenous, Q8H  rosuvastatin, 5 mg, Oral, Nightly  sodium chloride, 10 mL, Intravenous, Q12H  sodium-potassium-magnesium sulfates, 1 bottle, Oral, Once      Continuous Infusions:   PRN Meds:.•  acetaminophen  •  HYDROmorphone  •  magnesium sulfate **OR** magnesium sulfate **OR** magnesium sulfate  •  melatonin  •  ondansetron **OR** ondansetron  •  potassium chloride **OR** potassium chloride **OR** potassium chloride  •  potassium chloride **OR** potassium chloride **OR** potassium chloride  •  sodium chloride  •  [COMPLETED] Insert peripheral IV **AND** sodium chloride  •  sodium chloride  •  traMADol    Labs:    CBC    Results from last 7 days   Lab Units 10/08/21  0307 10/07/21  0115 10/06/21  0040 10/05/21  0325 10/04/21  0259 10/03/21  0308 10/02/21  1442   WBC 10*3/mm3 11.60* 16.30* 16.00* 14.90* 18.30* 24.40* 14.80*   HEMOGLOBIN g/dL 10.0* 10.9* 10.4* 10.1* 10.8* 11.8* 13.4   PLATELETS 10*3/mm3 238 254 230 204 203 246 297     BMP   Results from last 7 days   Lab Units 10/08/21  0307 10/07/21  0115 10/06/21  0040 10/05/21  0325 10/04/21  0259 10/03/21  0308 10/02/21  1442   SODIUM mmol/L 140 144 140 136 136 139 139   POTASSIUM mmol/L 2.9*  3.7 3.6 3.8 3.9 4.2 4.3   CHLORIDE mmol/L 105 107 107 105 104 106 104   CO2 mmol/L 21.0* 21.0* 19.0* 20.0* 21.0* 19.0* 20.0*   BUN mg/dL 10 11 11 13 21 28* 40*   CREATININE mg/dL 0.58 0.61 0.58 0.70 0.75 0.67 0.96   GLUCOSE mg/dL 104* 123* 88 94 95 149* 173*     Cr Clearance Estimated Creatinine Clearance: 35 mL/min (by C-G formula based on SCr of 0.58 mg/dL).  Coag     HbA1C   Lab Results   Component Value Date    HGBA1C 5.8 (H) 12/02/2019     Blood Glucose No results found for: POCGLU  Infection     CMP   Results from last 7 days   Lab Units 10/08/21  0307 10/07/21  0115 10/06/21  0040 10/05/21  0325 10/04/21  0259 10/03/21  0308 10/02/21  1442   SODIUM mmol/L 140 144 140 136 136 139 139   POTASSIUM mmol/L 2.9* 3.7 3.6 3.8 3.9 4.2 4.3   CHLORIDE mmol/L 105 107 107 105 104 106 104   CO2 mmol/L 21.0* 21.0* 19.0* 20.0* 21.0* 19.0* 20.0*   BUN mg/dL 10 11 11 13 21 28* 40*   CREATININE mg/dL 0.58 0.61 0.58 0.70 0.75 0.67 0.96   GLUCOSE mg/dL 104* 123* 88 94 95 149* 173*   ALBUMIN g/dL 2.60* 2.90* 2.90* 2.80*  --   --  4.30   BILIRUBIN mg/dL 0.6 0.8 0.6 0.8  --   --  0.9   ALK PHOS U/L 82 77 79 148*  --   --  120*   AST (SGOT) U/L 22 18 16 21  --   --  21   ALT (SGPT) U/L 15 15 13 11  --   --  17     UA      Radiology(recent) MRI Angiogram Abdomen With & Without Contrast    Result Date: 10/7/2021  IMPRESSION :  1. Study is limited by motion degradation 2. Flow is identified in all three mesenteric vessels. 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 There is no significant hemodynamically significant narrowing of the mesenteric vessels.  Electronically Signed By-Monster Grant MD On:10/7/2021 11:34 AM This report was finalized on [id number] by  Monster Grant MD.     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

## 2021-10-08 NOTE — NURSING NOTE
"Pt put on call light at approximately 0140. This nurse entered the room and pt stated she woke up from a \"hallucination\" and was experiencing an acute episode of difficulty breathing. Pt reports feeling anxious, tachypneic. This nurse connected pt to the  for continuous monitoring of oxygen level and implemented the use of 2L O2/NC. HOB was elevated and pt was repositioned. Pt stated that she felt nauseated and zofran was administered per MAR. This nurse contacted respiratory and a treatment was administered per MAR. Pt states she believes this is r/t to new medications being administered to her here at the hospital, possible allergy to metoprolol. Pt reports interventions effective; breathing and nausea improved at this time.   "

## 2021-10-09 ENCOUNTER — READMISSION MANAGEMENT (OUTPATIENT)
Dept: CALL CENTER | Facility: HOSPITAL | Age: 86
End: 2021-10-09

## 2021-10-09 VITALS
WEIGHT: 118.83 LBS | OXYGEN SATURATION: 96 % | DIASTOLIC BLOOD PRESSURE: 73 MMHG | HEART RATE: 63 BPM | SYSTOLIC BLOOD PRESSURE: 135 MMHG | RESPIRATION RATE: 16 BRPM | HEIGHT: 63 IN | TEMPERATURE: 97.5 F | BODY MASS INDEX: 21.05 KG/M2

## 2021-10-09 PROBLEM — K62.5 RECTAL BLEEDING: Status: RESOLVED | Noted: 2021-10-02 | Resolved: 2021-10-09

## 2021-10-09 LAB
ANION GAP SERPL CALCULATED.3IONS-SCNC: 12 MMOL/L (ref 5–15)
BASOPHILS # BLD AUTO: 0 10*3/MM3 (ref 0–0.2)
BASOPHILS NFR BLD AUTO: 0.1 % (ref 0–1.5)
BUN SERPL-MCNC: 15 MG/DL (ref 8–23)
BUN/CREAT SERPL: 28.3 (ref 7–25)
CALCIUM SPEC-SCNC: 7.8 MG/DL (ref 8.2–9.6)
CHLORIDE SERPL-SCNC: 108 MMOL/L (ref 98–107)
CO2 SERPL-SCNC: 19 MMOL/L (ref 22–29)
CREAT SERPL-MCNC: 0.53 MG/DL (ref 0.57–1)
DEPRECATED RDW RBC AUTO: 42.4 FL (ref 37–54)
EOSINOPHIL # BLD AUTO: 0 10*3/MM3 (ref 0–0.4)
EOSINOPHIL NFR BLD AUTO: 0.1 % (ref 0.3–6.2)
ERYTHROCYTE [DISTWIDTH] IN BLOOD BY AUTOMATED COUNT: 14.2 % (ref 12.3–15.4)
GFR SERPL CREATININE-BSD FRML MDRD: 107 ML/MIN/1.73
GLUCOSE BLDC GLUCOMTR-MCNC: 160 MG/DL (ref 70–105)
GLUCOSE BLDC GLUCOMTR-MCNC: 173 MG/DL (ref 70–105)
GLUCOSE SERPL-MCNC: 290 MG/DL (ref 65–99)
HCT VFR BLD AUTO: 28.9 % (ref 34–46.6)
HGB BLD-MCNC: 9.8 G/DL (ref 12–15.9)
LYMPHOCYTES # BLD AUTO: 0.3 10*3/MM3 (ref 0.7–3.1)
LYMPHOCYTES NFR BLD AUTO: 3.9 % (ref 19.6–45.3)
MAGNESIUM SERPL-MCNC: 1.9 MG/DL (ref 1.7–2.3)
MCH RBC QN AUTO: 29.4 PG (ref 26.6–33)
MCHC RBC AUTO-ENTMCNC: 34.1 G/DL (ref 31.5–35.7)
MCV RBC AUTO: 86 FL (ref 79–97)
MONOCYTES # BLD AUTO: 0.2 10*3/MM3 (ref 0.1–0.9)
MONOCYTES NFR BLD AUTO: 3.2 % (ref 5–12)
NEUTROPHILS NFR BLD AUTO: 6.9 10*3/MM3 (ref 1.7–7)
NEUTROPHILS NFR BLD AUTO: 92.7 % (ref 42.7–76)
NRBC BLD AUTO-RTO: 0 /100 WBC (ref 0–0.2)
PLATELET # BLD AUTO: 225 10*3/MM3 (ref 140–450)
PMV BLD AUTO: 7.6 FL (ref 6–12)
POTASSIUM SERPL-SCNC: 4.4 MMOL/L (ref 3.5–5.2)
RBC # BLD AUTO: 3.35 10*6/MM3 (ref 3.77–5.28)
SODIUM SERPL-SCNC: 139 MMOL/L (ref 136–145)
WBC # BLD AUTO: 7.4 10*3/MM3 (ref 3.4–10.8)

## 2021-10-09 PROCEDURE — 25010000002 PIPERACILLIN SOD-TAZOBACTAM PER 1 G: Performed by: FAMILY MEDICINE

## 2021-10-09 PROCEDURE — 94799 UNLISTED PULMONARY SVC/PX: CPT

## 2021-10-09 PROCEDURE — 25010000002 METHYLPREDNISOLONE PER 40 MG: Performed by: FAMILY MEDICINE

## 2021-10-09 PROCEDURE — 85025 COMPLETE CBC W/AUTO DIFF WBC: CPT | Performed by: FAMILY MEDICINE

## 2021-10-09 PROCEDURE — 82962 GLUCOSE BLOOD TEST: CPT

## 2021-10-09 PROCEDURE — 80048 BASIC METABOLIC PNL TOTAL CA: CPT | Performed by: FAMILY MEDICINE

## 2021-10-09 PROCEDURE — 83735 ASSAY OF MAGNESIUM: CPT | Performed by: FAMILY MEDICINE

## 2021-10-09 RX ORDER — PANTOPRAZOLE SODIUM 40 MG/1
40 TABLET, DELAYED RELEASE ORAL
Qty: 30 TABLET | Refills: 1 | Status: SHIPPED | OUTPATIENT
Start: 2021-10-10 | End: 2021-10-17

## 2021-10-09 RX ORDER — DEXTROSE MONOHYDRATE 25 G/50ML
25 INJECTION, SOLUTION INTRAVENOUS
Status: DISCONTINUED | OUTPATIENT
Start: 2021-10-09 | End: 2021-10-09 | Stop reason: HOSPADM

## 2021-10-09 RX ORDER — AMOXICILLIN AND CLAVULANATE POTASSIUM 875; 125 MG/1; MG/1
1 TABLET, FILM COATED ORAL EVERY 12 HOURS SCHEDULED
Qty: 6 TABLET | Refills: 0 | Status: SHIPPED | OUTPATIENT
Start: 2021-10-09 | End: 2021-10-12

## 2021-10-09 RX ORDER — INSULIN LISPRO 100 [IU]/ML
0-7 INJECTION, SOLUTION INTRAVENOUS; SUBCUTANEOUS AS NEEDED
Status: DISCONTINUED | OUTPATIENT
Start: 2021-10-09 | End: 2021-10-09 | Stop reason: HOSPADM

## 2021-10-09 RX ORDER — OLANZAPINE 10 MG/2ML
1 INJECTION, POWDER, LYOPHILIZED, FOR SOLUTION INTRAMUSCULAR
Status: DISCONTINUED | OUTPATIENT
Start: 2021-10-09 | End: 2021-10-09 | Stop reason: HOSPADM

## 2021-10-09 RX ORDER — INSULIN LISPRO 100 [IU]/ML
0-7 INJECTION, SOLUTION INTRAVENOUS; SUBCUTANEOUS
Status: DISCONTINUED | OUTPATIENT
Start: 2021-10-09 | End: 2021-10-09 | Stop reason: HOSPADM

## 2021-10-09 RX ORDER — AMIODARONE HYDROCHLORIDE 200 MG/1
200 TABLET ORAL
Qty: 90 TABLET | Refills: 0 | Status: SHIPPED | OUTPATIENT
Start: 2021-10-09 | End: 2021-10-17

## 2021-10-09 RX ORDER — AMOXICILLIN AND CLAVULANATE POTASSIUM 875; 125 MG/1; MG/1
1 TABLET, FILM COATED ORAL EVERY 12 HOURS SCHEDULED
Status: DISCONTINUED | OUTPATIENT
Start: 2021-10-09 | End: 2021-10-09 | Stop reason: HOSPADM

## 2021-10-09 RX ORDER — NICOTINE POLACRILEX 4 MG
15 LOZENGE BUCCAL
Status: DISCONTINUED | OUTPATIENT
Start: 2021-10-09 | End: 2021-10-09 | Stop reason: HOSPADM

## 2021-10-09 RX ADMIN — PIPERACILLIN AND TAZOBACTAM 3.38 G: 3; .375 INJECTION, POWDER, LYOPHILIZED, FOR SOLUTION INTRAVENOUS at 00:49

## 2021-10-09 RX ADMIN — Medication 10 ML: at 08:00

## 2021-10-09 RX ADMIN — AMOXICILLIN AND CLAVULANATE POTASSIUM 1 TABLET: 875; 125 TABLET, FILM COATED ORAL at 11:30

## 2021-10-09 RX ADMIN — METHYLPREDNISOLONE SODIUM SUCCINATE 20 MG: 40 INJECTION, POWDER, FOR SOLUTION INTRAMUSCULAR; INTRAVENOUS at 08:03

## 2021-10-09 RX ADMIN — AMIODARONE HYDROCHLORIDE 200 MG: 200 TABLET ORAL at 11:45

## 2021-10-09 RX ADMIN — PANTOPRAZOLE SODIUM 40 MG: 40 TABLET, DELAYED RELEASE ORAL at 06:11

## 2021-10-09 RX ADMIN — AMIODARONE HYDROCHLORIDE 200 MG: 200 TABLET ORAL at 07:55

## 2021-10-09 RX ADMIN — IPRATROPIUM BROMIDE AND ALBUTEROL SULFATE 3 ML: 2.5; .5 SOLUTION RESPIRATORY (INHALATION) at 07:37

## 2021-10-09 RX ADMIN — PIPERACILLIN AND TAZOBACTAM 3.38 G: 3; .375 INJECTION, POWDER, LYOPHILIZED, FOR SOLUTION INTRAVENOUS at 07:59

## 2021-10-09 RX ADMIN — METOPROLOL TARTRATE 12.5 MG: 25 TABLET, FILM COATED ORAL at 07:59

## 2021-10-09 NOTE — PROGRESS NOTES
Cardiology    Patient Care Team:  Monster Myrick MD as PCP - General (Family Medicine)  ROBERTO CARLOS Morales MD as Consulting Physician (Cardiology)        CHIEF COMPLAINT: Atrial fibrillation    ADMITTING DIAGNOSES: Atrial fibrillation    SUBJECTIVE: No complaints from cardiovascular standpoint, hemodynamically electrically stable, maintaining sinus rhythm  No chest pain or shortness of breath    Review of Symptoms:  Constitutional: Patient afebrile no chills or unexpected weight changes  Respiratory: No cough, no wheezing or dyspnea  Cardiovascular: No chest pain, palpitations, dyspnea, orthopnea and no edema  Gastrointestinal: No nausea, vomiting, constipation or diarrhea.  No melena or dark stools    All other systems reviewed and are negative     PAST MEDICAL HISTORY:   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 sure which 1  contracted at age 8   • History of transfusion    • [place] (hard of hearing)    • Hyperlipidemia    • Hypertension    • Past heart attack     X2 MILD LAST ONE OCT 2019   • PONV (postoperative nausea and vomiting)        ALLERGIES:   Allergies   Allergen Reactions   • Codeine Nausea And Vomiting and Dizziness   • Lortab [Hydrocodone-Acetaminophen] Nausea And Vomiting   • Nitrofurantoin Other (See Comments)   • Sulfa Antibiotics Rash         PAST SURGICAL HISTORY:   Past Surgical History:   Procedure Laterality Date   • ANTERIOR AND POSTERIOR VAGINAL REPAIR     • BACK SURGERY     • BLEPHAROPLASTY Bilateral 5/23/2019    Procedure: bilateral upper lid blepharoplasty;  Surgeon: Mauricio Pennington MD;  [place] [facility] [place] [facility] [place]  Service: Ophthalmology   • BROW LIFT Bilateral 2/13/2020    Procedure: BILATERAL TEMPORAL DIRECT BROWLIFT;  Surgeon: Sreekanth Bird MD;  Location: [facility];  Service: Ophthalmology;  Laterality: Bilateral;   • CARDIAC CATHETERIZATION N/A 12/3/2019    Procedure: Left Heart Cath;  Surgeon: Puma Briseno MD;  Location: [facility] CATH INVASIVE LOCATION;  Service: Cardiovascular   • CARDIAC CATHETERIZATION N/A 12/3/2019    Procedure: Coronary angiography;  Surgeon: Puma Briseno MD;  Location: [facility] CATH INVASIVE LOCATION;  Service: Cardiovascular   • CARDIAC CATHETERIZATION N/A 12/3/2019    Procedure: Left ventriculography;  Surgeon: Puma Briseno MD;  Location: [facility] CATH INVASIVE LOCATION;  Service: Cardiovascular   • CATARACT EXTRACTION EXTRACAPSULAR W/ INTRAOCULAR LENS IMPLANTATION Bilateral    • CERVICAL SPINE ANTERIOR      with instrumentation   • CHEILECTOMY Left 8/28/2020    Procedure: CHEILECTOMY;  Surgeon: GAB Rees DPM;  Location: [facility] MAIN OR;  Service: Podiatry;  Laterality: Left;   • COLON SURGERY      for obstruction   • ALBERTO TUBE INSERTION Bilateral 5/23/2019    Procedure: ALBERTO TUBE;  Surgeon: Mauricio Pennington MD;  [place] [facility] [place] [facility] [place];  Service: Ophthalmology   • ECTROPION REPAIR Bilateral 5/23/2019    Procedure: bilateral lower lid ectropion repair, bilateral punctoplasty;  Surgeon: Mauricio Pennington MD;  Location: [facility];  Service: Ophthalmology   • EYE SURGERY     • FOOT FUSION Left 12/30/2016    Procedure: LEFT HALLUX METATARSALPHALANGEAL ARTHRODESIS SILVER BUNIONECTOMY METATARSAL HEAD RESECTION 2-5 CALCANEAL BONE GRAFT;  Surgeon: Monster Harper Jr., MD;  Location: [facility] MAIN OR;  Service:    • HYSTERECTOMY     • INGUINAL HERNIA REPAIR Bilateral    • METATARSAL OSTEOTOMY Left 8/28/2020    Procedure: Metatarsal head resection 5;  Surgeon: GAB Rees DPM;  Location: [facility] MAIN OR;  Service: Podiatry;  Laterality: Left;   • ROTATOR CUFF REPAIR Right    • SIGMOIDOSCOPY N/A 10/6/2021    Procedure: SIGMOIDOSCOPY WITH BIOPSY X1 AREA;  Surgeon: Uche Vaz MD;  Location: [facility] ENDOSCOPY;  Service:  Gastroenterology;  Laterality: N/A;  ischemic colitis   • STOMACH SURGERY      for ulcer          FAMILY HISTORY:   Family History   Problem Relation Age of Onset   • No Known Problems Mother    • No Known Problems Father    • Malig Hyperthermia Neg Hx          SOCIAL HISTORY:   Social History     Socioeconomic History   • Marital status:      Spouse name: Not on file   • Number of children: Not on file   • Years of education: Not on file   • Highest education level: Not on file   Tobacco Use   • Smoking status: Former Smoker     Packs/day: 0.25     Years: 10.00     Pack years: 2.50     Types: Cigarettes     Quit date:      Years since quittin.7   • Smokeless tobacco: Never Used   Vaping Use   • Vaping Use: Never used   Substance and Sexual Activity   • Alcohol use: No   • Drug use: No   • Sexual activity: Defer       CURRENT MEDICATIONS:     Current Facility-Administered Medications:   •  acetaminophen (TYLENOL) tablet 325 mg, 325 mg, Oral, Q4H PRN, Uche Vaz MD, 325 mg at 10/06/21 2051  •  amiodarone (PACERONE) tablet 200 mg, 200 mg, Oral, TID AC, Uche Vaz MD, 200 mg at 10/08/21 1837  •  HYDROmorphone (DILAUDID) injection 0.25 mg, 0.25 mg, Intravenous, Q4H PRN, Winston Amaya MD, 0.25 mg at 10/06/21 1419  •  ipratropium-albuterol (DUO-NEB) nebulizer solution 3 mL, 3 mL, Nebulization, TID - RT, Winston Amaya MD, 3 mL at 10/08/21 1919  •  Magnesium Sulfate 2 gram Bolus, followed by 8 gram infusion (total Mg dose 10 grams)- Mg less than or equal to 1mg/dL, 2 g, Intravenous, PRN **OR** Magnesium Sulfate 2 gram / 50mL Infusion (GIVE X 3 BAGS TO EQUAL 6GM TOTAL DOSE) - Mg 1.1 - 1.5 mg/dl, 2 g, Intravenous, PRN **OR** Magnesium Sulfate 4 gram infusion- Mg 1.6-1.9 mg/dL, 4 g, Intravenous, PRN, Winston Amaya MD  •  melatonin tablet 5 mg, 5 mg, Oral, Nightly PRN, Uche Vaz MD, 5 mg at 10/03/21 2200  •  methylPREDNISolone sodium succinate (SOLU-Medrol) injection 20 mg, 20 mg,  Intravenous, Q12H, Winston Amaya MD, 20 mg at 10/08/21 2112  •  metoprolol tartrate (LOPRESSOR) half tablet 12.5 mg, 12.5 mg, Oral, Q12H, Vera Miguel MD, 12.5 mg at 10/08/21 2112  •  ondansetron (ZOFRAN) tablet 4 mg, 4 mg, Oral, Q6H PRN **OR** ondansetron (ZOFRAN) injection 4 mg, 4 mg, Intravenous, Q6H PRN, Uche Vaz MD, 4 mg at 10/08/21 0143  •  pantoprazole (PROTONIX) EC tablet 40 mg, 40 mg, Oral, Q AM, Winston Amaya MD, 40 mg at 10/08/21 1135  •  piperacillin-tazobactam (ZOSYN) IVPB 3.375 g in 100 mL NS (CD), 3.375 g, Intravenous, Q8H, Winston Amaya MD, 3.375 g at 10/08/21 1837  •  potassium chloride (K-DUR,KLOR-CON) CR tablet 40 mEq, 40 mEq, Oral, PRN, 40 mEq at 10/08/21 1341 **OR** potassium chloride (KLOR-CON) packet 40 mEq, 40 mEq, Oral, PRN **OR** potassium chloride 10 mEq in 100 mL IVPB, 10 mEq, Intravenous, Q1H PRN, Winston Amaya MD  •  rosuvastatin (CRESTOR) tablet 5 mg, 5 mg, Oral, Nightly, Uche Vaz MD, 5 mg at 10/08/21 2112  •  sodium chloride 0.9 % flush 10 mL, 10 mL, Intravenous, PRN, Uche Vaz MD  •  [COMPLETED] Insert peripheral IV, , , Once **AND** sodium chloride 0.9 % flush 10 mL, 10 mL, Intravenous, PRN, Uche Vaz MD  •  sodium chloride 0.9 % flush 10 mL, 10 mL, Intravenous, Q12H, Uche Vaz MD, 10 mL at 10/08/21 2113  •  sodium chloride 0.9 % flush 10 mL, 10 mL, Intravenous, PRN, Uche Vaz MD  •  sodium-potassium-magnesium sulfates (SUPREP) oral solution 1 bottle, 1 bottle, Oral, Once, Uche Vaz MD  •  traMADol (ULTRAM) tablet 50 mg, 50 mg, Oral, Q4H PRN, Uche Vaz MD, 50 mg at 10/06/21 0825      DIAGNOSTIC DATA:     I reviewed the patient's new clinical results.    Lab Results (last 24 hours)     Procedure Component Value Units Date/Time    Tissue Pathology Exam [[id number]] Collected: 10/06/21 1527    Specimen: Tissue from Large Intestine, Left / Descending Colon Updated: 10/08/21 1232     Case Report --     Surgical  "Pathology Report                         Case: [id number]                                  Authorizing Provider:  Uche Vaz MD        Collected:           10/06/2021 03:27 PM          Ordering Location:     [facility]  Received:            10/07/2021 10:03 AM                                 SUITES                                                                       Pathologist:           Wiley Almanza MD                                                            Specimen:    Large Intestine, Left / Descending Colon, ischemic colitis                                  Final Diagnosis --     Mucosa, descending, biopsies:    Ischemic colitis in a background of necroinflammatory debris    No dysplasia or malignancy identified    GABBY/tkd        Gross Description --     Received in formalin designated \"Descending colon\" are several fragments of tan tissue measuring up to 0.2 cm in greatest dimension, submitted in one cassette.     GABBY/sms       Potassium [[id number]]  (Abnormal) Collected: 10/08/21 0926    Specimen: Blood Updated: 10/08/21 1002     Potassium 3.3 mmol/L     Comprehensive Metabolic Panel [[id number]]  (Abnormal) Collected: 10/08/21 0307    Specimen: Blood Updated: 10/08/21 0404     Glucose 104 mg/dL      BUN 10 mg/dL      Creatinine 0.58 mg/dL      Sodium 140 mmol/L      Potassium 2.9 mmol/L      Chloride 105 mmol/L      CO2 21.0 mmol/L      Calcium 7.9 mg/dL      Total Protein 4.9 g/dL      Albumin 2.60 g/dL      ALT (SGPT) 15 U/L      AST (SGOT) 22 U/L      Alkaline Phosphatase 82 U/L      Total Bilirubin 0.6 mg/dL      eGFR Non African Amer 96 mL/min/1.73      Globulin 2.3 gm/dL      A/G Ratio 1.1 g/dL      BUN/Creatinine Ratio 17.2     Anion Gap 14.0 mmol/L     Narrative:      GFR Normal >60  Chronic Kidney Disease <60  Kidney Failure <15      CBC & Differential [[id number]]  (Abnormal) Collected: 10/08/21 0307    Specimen: Blood Updated: 10/08/21 0346    Narrative:      The following " orders were created for panel order CBC & Differential.  Procedure                               Abnormality         Status                     ---------                               -----------         ------                     CBC Auto Differential[[id number]]        Abnormal            Final result                 Please view results for these tests on the individual orders.    CBC Auto Differential [[id number]]  (Abnormal) Collected: 10/08/21 0307    Specimen: Blood Updated: 10/08/21 0346     WBC 11.60 10*3/mm3      RBC 3.46 10*6/mm3      Hemoglobin 10.0 g/dL      Hematocrit 30.4 %      MCV 87.9 fL      MCH 29.0 pg      MCHC 33.0 g/dL      RDW 14.6 %      RDW-SD 44.6 fl      MPV 7.2 fL      Platelets 238 10*3/mm3      Neutrophil % 78.7 %      Lymphocyte % 10.3 %      Monocyte % 9.1 %      Eosinophil % 1.7 %      Basophil % 0.2 %      Neutrophils, Absolute 9.10 10*3/mm3      Lymphocytes, Absolute 1.20 10*3/mm3      Monocytes, Absolute 1.10 10*3/mm3      Eosinophils, Absolute 0.20 10*3/mm3      Basophils, Absolute 0.00 10*3/mm3      nRBC 0.0 /100 WBC           Imaging Results (Last 24 Hours)     ** No results found for the last 24 hours. **          Xray reviewed personally by physician.      ECG reviewed personally by physician  ECG/EMG Results (most recent)     Procedure Component Value Units Date/Time    ECG 12 Lead [[id number]] Collected: 10/04/21 0330     Updated: 10/04/21 0333     QT Interval 311 ms     Narrative:      HEART RATE= 130  bpm  RR Interval= 461  ms  [place] Interval=   ms  P Horizontal Axis=   deg  P Front Axis=   deg  QRSD Interval= 75  ms  QT Interval= 311  ms  QRS Axis= -15  deg  T Wave Axis= 0  deg  - ABNORMAL ECG -  Atrial fibrillation  Inferior infarct, old  When compared with ECG of 02-Oct-2021 15:03:08,  Significant change in rhythm: previously sinus  New or worsened ischemia or infarction  Significant axis, voltage or hypertrophy change  Electronically Signed By:   Date and Time of Study:  2021-10-04 03:30:27    Adult Transthoracic Echo Complete W/ Cont if Necessary Per Protocol [[id number]] Collected: 10/04/21 1434     Updated: 10/04/21 2314     BSA 1.6 m^2      RVIDd 1.8 cm      IVSd 0.68 cm      LVIDd 4.7 cm      LVIDs 2.7 cm      LVPWd 1.2 cm      IVS/LVPW 0.56     FS 43.3 %      EDV(Teich) 103.5 ml      ESV(Teich) 26.5 ml      EF(Teich) 74.4 %      EDV(cubed) 105.3 ml      ESV(cubed) 19.2 ml      EF(cubed) 81.7 %      LV mass(C)d 152.5 grams      LV mass(C)dI 96.3 grams/m^2      SV(Teich) 77.0 ml      SI(Teich) 48.6 ml/m^2      SV(cubed) 86.1 ml      SI(cubed) 54.3 ml/m^2      Ao root diam 3.2 cm      Ao root area 8.0 cm^2      ACS 1.7 cm      asc Aorta Diam 2.7 cm      LVOT diam 2.0 cm      LVOT area 3.2 cm^2      RVOT diam 2.4 cm      RVOT area 4.6 cm^2      EDV(MOD-sp4) 44.2 ml      ESV(MOD-sp4) 18.9 ml      EF(MOD-sp4) 57.3 %      SV(MOD-sp4) 25.3 ml      SI(MOD-sp4) 16.0 ml/m^2      Ao root area (BSA corrected) 2.0     LV Conklin Vol (BSA corrected) 27.9 ml/m^2      LV Sys Vol (BSA corrected) 11.9 ml/m^2      Aortic R-R 0.23 sec      Aortic .4 BPM      MV V2 max 134.9 cm/sec      MV max PG 7.3 mmHg      MV V2 mean 69.0 cm/sec      MV mean PG 2.4 mmHg      MV V2 VTI 25.2 cm      MVA(VTI) 2.2 cm^2      Ao pk chriss 130.9 cm/sec      Ao max PG 6.9 mmHg      Ao max PG (full) 2.9 mmHg      Ao V2 mean 90.4 cm/sec      Ao mean PG 3.7 mmHg      Ao mean PG (full) 1.2 mmHg      Ao V2 VTI 20.6 cm      JOHANA(I,A) 2.7 cm^2      JOHANA(I,D) 2.7 cm^2      JOHANA(V,A) 2.4 cm^2      JOHANA(V,D) 2.4 cm^2      LV V1 max PG 3.9 mmHg      LV V1 mean PG 2.5 mmHg      LV V1 max 98.9 cm/sec      LV V1 mean 74.6 cm/sec      LV V1 VTI 17.3 cm      MR max chriss 430.0 cm/sec      MR max PG 74.0 mmHg      CO(Ao) 42.6 l/min      CI(Ao) 26.9 l/min/m^2      SV(Ao) 165.0 ml      SI(Ao) 104.2 ml/m^2      CO(LVOT) 14.3 l/min      CI(LVOT) 9.1 l/min/m^2      SV(LVOT) 55.5 ml      SV(RVOT) 74.3 ml      SI(LVOT) 35.1 ml/m^2      PA V2 max  125.7 cm/sec      PA max PG 6.3 mmHg      PA max PG (full) 2.3 mmHg      PA V2 mean 74.9 cm/sec      PA mean PG 2.7 mmHg      PA mean PG (full) 1.2 mmHg      PA V2 VTI 18.5 cm      PVA(I,A) 4.0 cm^2       CV ECHO ZURI - PVA(I,D) 4.0 cm^2       CV ECHO ZURI - PVA(V,A) 3.7 cm^2       CV ECHO ZURI - PVA(V,D) 3.7 cm^2      PA acc time 0.08 sec      PI end-d ty 91.1 cm/sec      PI max ty 215.2 cm/sec      PI max PG 18.5 mmHg      RV V1 max PG 4.0 mmHg      RV V1 mean PG 1.5 mmHg      RV V1 max 100.4 cm/sec      RV V1 mean 55.6 cm/sec      RV V1 VTI 16.1 cm      TR max ty 269.1 cm/sec      RVSP(TR) 32.0 mmHg      RAP systole 3.0 mmHg      PA pr(Accel) 43.0 mmHg      Pulm Sys Ty 48.2 cm/sec      Pulm Conklin Ty 54.0 cm/sec      Pulm S/D 0.89     Qp/Qs 1.3     Pulm A Revs Dur 0.08 sec      Pulm A Revs Ty 36.2 cm/sec       CV ECHO ZURI - BZI_BMI 22.1 kilograms/m^2       CV ECHO ZURI - BSA(HAYCOCK) 1.6 m^2       CV ECHO ZURI - BZI_METRIC_WEIGHT 56.7 kg       CV ECHO ZURI - BZI_METRIC_HEIGHT 160.0 cm      EF(MOD-bp) 57.0 %      LA dimension(2D) 3.3 cm     Narrative:      · Estimated left ventricular EF was in agreement with the calculated left   ventricular EF. Left ventricular ejection fraction appears to be 56 - 60%.   Left ventricular systolic function is normal.  · Left ventricular diastolic function is consistent with (grade II w/high   LAP) pseudonormalization.  · Left atrial volume is mildly increased.  · Estimated right ventricular systolic pressure from tricuspid   regurgitation is normal (<35 mmHg).       ECG 12 Lead [[id number]] Collected: 10/02/21 1503     Updated: 10/07/21 1240     QT Interval 389 ms     Narrative:      HEART RATE= 79  bpm  RR Interval= 764  ms  UT Interval= 132  ms  P Horizontal Axis= 245  deg  P Front Axis= -1  deg  QRSD Interval= 84  ms  QT Interval= 389  ms  QRS Axis= -30  deg  T Wave Axis= 11  deg  - OTHERWISE NORMAL ECG -  Sinus rhythm  Low voltage, precordial leads  When  "compared with ECG of 26-Apr-2021 10:20:42,  Significant rate increase  Significant axis, voltage or hypertrophy change  Electronically Signed By: Dmitri Golden ([facility]) 07-Oct-2021 12:40:22  Date and Time of Study: 2021-10-02 15:03:08    ECG 12 Lead [[id number]] Collected: 10/06/21 2214     Updated: 10/07/21 2029     QT Interval 328 ms     Narrative:      HEART RATE= 122  bpm  RR Interval= 492  ms  CT Interval= 74  ms  P Horizontal Axis= 179  deg  P Front Axis= 0  deg  QRSD Interval= 83  ms  QT Interval= 328  ms  QRS Axis= -13  deg  T Wave Axis= 3  deg  - BORDERLINE ECG -  Atrial fibrillation with rapid ventricle response  Consider left ventricular hypertrophy  When compared with ECG of 04-Oct-2021 3:30:27,  Significant change in rhythm: previously atrial fibrillation  Significant axis, voltage or hypertrophy change  Electronically Signed By: Jose King ([facility]) 07-Oct-2021 20:28:36  Date and Time of Study: 2021-10-06 22:14:23    ECG 12 Lead [[id number]] Collected: 10/08/21 2106     Updated: 10/08/21 2109     QT Interval 324 ms     Narrative:      HEART RATE= 131  bpm  RR Interval= 457  ms  CT Interval=   ms  P Horizontal Axis=   deg  P Front Axis=   deg  QRSD Interval= 78  ms  QT Interval= 324  ms  QRS Axis= -11  deg  T Wave Axis= 11  deg  - ABNORMAL ECG -  Atrial fibrillation  Consider left ventricular hypertrophy  When compared with ECG of 06-Oct-2021 22:14:23,  Significant change in rhythm  Electronically Signed By:   Date and Time of Study: 2021-10-08 21:06:46              VITAL SIGNS: Temp:  [97.9 °F (36.6 °C)-98.3 °F (36.8 °C)] 97.9 °F (36.6 °C)  Heart Rate:  [] 130  Resp:  [16-24] 18  BP: (107-144)/(57-75) 109/62   Flowsheet Rows      First Filed Value   Admission Height  160 cm (63\") Documented at 10/02/2021 1422   Admission Weight  54.4 kg (120 lb) Documented at 10/02/2021 1422        Physical exam  Constitutional: well-nourished, and appears stated age in no acute distress  PERRL: Conjunctiva clear, no " pallor, anicteric  HENMT: normocephalic, normal dentition, no cyanosis or pallor  Neck:no bruits, or thrills and bilateral normal carotid upstroke. Normal jugular venous pressure  Cardiovascular: No parasternal heaves an non-displaced focal PMI. Normal rate and rhythm: no rub, gallop, murmur or click and normal S1 and S2; no lower or upper extremity edema.   Lungs: unlabored, no wheezing with no rales or rhonchi on auscultation.  Extremities: Warm, no clubbing, cyanosis, or edema. Full and equal peripheral pulses in extremities with no bruits appreciated.   Abdomen: soft, non-tender, non-distended  Musculoskeletal: no joint tenderness or swelling and no erythema  Skin: Warm and dry, non-erythematous   Neuro:alert and normal affect. Oriented to time, place and person.     ASSESSMENT AND PLAN:   New onset A. fib  CAD  Hypertension  Atherogenic dyslipidemia    New onset A. fib: Spontaneous conversion to sinus rhythm denoting paroxysmal atrial fibrillation.  This is likely secondary to underlying acute illness and hypotension.  We will continue amiodarone in the short-term 200 mg p.o. twice daily with AV david agent to be added in the form of metoprolol 12.5 twice daily.  Given her age and frailty and GI bleed, anticoagulation would not be safe for prudent at this point.    Continue to monitor high risk medicines amiodarone for side effects  Plan to get this off as an outpatient in 1 to 3 months  Preserved LV systolic function  Continue medical management and optimization of medical therapy and rate and rhythm control strategy  No anticoagulation is advised as above documented    Greater than 35 minutes total of face-to-face/floor  time was spent with the patient and family and nursing coordinating plan and patient management.  Of which counseling of patient with regard specifically to atrial fibrillation comprised 50% of this total time.          Gavin Quiroz MD  10/08/21  21:43 EDT

## 2021-10-09 NOTE — PLAN OF CARE
Goal Outcome Evaluation:  Plan of Care Reviewed With: patient        Progress: improving    Patient doing well. Discharging soon.

## 2021-10-09 NOTE — DISCHARGE SUMMARY
Date of Discharge:  10/9/2021    Discharge Diagnosis: Rectal Bleeding, colitis, enteritis, N/V, diarrhea, leukocytosis, fever, Afib    Presenting Problem/History of Present Illness  Active Hospital Problems    Diagnosis  POA   • Colitis [K52.9]  Yes      Resolved Hospital Problems    Diagnosis Date Resolved POA   • **Rectal bleeding [K62.5] 10/09/2021 Unknown        Hospital Course  Patient is a 89 y.o. female presented to ER with abd pain and rectal bleeding. CT scan showed colitis/enteritis and she was started on IV antibiotics. GI was consulted and pt underwent a sigmoidoscopy that revealed severe ischemic colitis. Her pain, rectal bleeding and diarrhea resolved with IV meds and she is desiree a diet fairly well. Labs and VS are stable and she is anxious to go home. She will be DC home to finish out her course of antibiotics    Procedures Performed    Procedure(s):  SIGMOIDOSCOPY WITH BIOPSY X1 AREA  -------------------       Consults:   Consults     Date and Time Order Name Status Description    10/4/2021  8:12 AM Inpatient Gastroenterology Consult Completed     10/4/2021  4:15 AM Inpatient Cardiology Consult Completed           Pertinent Test Results:     Condition on Discharge:  Fair    Vital Signs  Temp:  [97.5 °F (36.4 °C)-98 °F (36.7 °C)] 98 °F (36.7 °C)  Heart Rate:  [] 70  Resp:  [16-18] 16  BP: (109-143)/(62-87) 143/87    Physical Exam:     General Appearance:    Alert, cooperative, in no acute distress   Head:    Normocephalic, without obvious abnormality, atraumatic   Eyes:            Lids and lashes normal, conjunctivae and sclerae normal, no   icterus   Ears:    Ears appear intact with no abnormalities noted   Throat:   No oral lesions, no thrush, oral mucosa moist   Neck:   No adenopathy, supple, trachea midline   Back:     No kyphosis present, no scoliosis present, no skin lesions,      erythema or scars, no tenderness to percussion or                   palpation,   range of motion normal    Lungs:     Clear to auscultation,respirations regular, even and                  unlabored    Heart:    Regular rhythm and normal rate, normal S1 and S2, no            murmur, no gallop, no rub, no click   Chest Wall:    No abnormalities observed   Abdomen:     Normal bowel sounds, no masses, no organomegaly, soft        non-tender, non-distended, no guarding, no rebound                tenderness   Rectal:     Deferred   Extremities:   Moves all extremities well, no edema, no cyanosis, no             redness   Pulses:   Pulses palpable and equal bilaterally   Skin:   No bleeding, bruising or rash   Lymph nodes:   No palpable adenopathy   Neurologic:   Cranial nerves 2 - 12 grossly intact, sensation intact       Discharge Disposition  Home or Self Care    Discharge Medications     Discharge Medications      New Medications      Instructions Start Date   amiodarone 200 MG tablet  Commonly known as: PACERONE   200 mg, Oral, 3 Times Daily Before Meals      amoxicillin-clavulanate 875-125 MG per tablet  Commonly known as: AUGMENTIN   1 tablet, Oral, Every 12 Hours Scheduled      metoprolol tartrate 25 MG tablet  Commonly known as: LOPRESSOR   12.5 mg, Oral, Every 12 Hours Scheduled      pantoprazole 40 MG EC tablet  Commonly known as: PROTONIX   40 mg, Oral, Every Early Morning   Start Date: October 10, 2021        Changes to Medications      Instructions Start Date   nitroglycerin 0.4 MG SL tablet  Commonly known as: NITROSTAT  What changed:   · how to take this  · when to take this  · reasons to take this   1 under the tongue as needed for angina, may repeat q5mins for up three doses         Continue These Medications      Instructions Start Date   acetaminophen 500 MG tablet  Commonly known as: TYLENOL   500 mg, Oral, Every 6 Hours PRN      amLODIPine 5 MG tablet  Commonly known as: NORVASC   5 mg, Oral, Daily      aspirin 81 MG EC tablet   81 mg, Oral, Daily, LD 8/19      isosorbide mononitrate 60 MG 24 hr  tablet  Commonly known as: IMDUR   Take 1 tablet by mouth once daily      simvastatin 40 MG tablet  Commonly known as: ZOCOR   20 mg, Oral, Nightly             Discharge Diet: As desiree    Activity at Discharge: As desiree    Follow-up Appointments  Future Appointments   Date Time Provider Department Center   2/14/2022 11:00 AM Gavin Quiroz MD [facility]         Test Results Pending at Discharge       Myrna Nick, EFREN  10/09/21  09:58 EDT

## 2021-10-09 NOTE — PLAN OF CARE
Goal Outcome Evaluation:      Patient alert oriented able to make needs known. Up in chair this shift. Patient went into Afib this shift. Call placed to Dr Miguel who restarted Lopressor 12.5 mg bid first dose now. Patient converted back to SR. Patient on room air.

## 2021-10-10 NOTE — OUTREACH NOTE
Prep Survey      Responses   [facility]? [facility]   Is LACE score < 7 ? No   Emergency Room discharge w/ pulse ox? No   Eligibility Readm Mgmt   Discharge diagnosis Rectal Bleeding, colitis, enteritis, N/V, diarrhea, leukocytosis, fever, Afib   Does the patient have one of the following disease processes/diagnoses(primary or secondary)? Other   Does the patient have Home health ordered? No   Is there a DME ordered? No   Prep survey completed? Yes          Siria Doty RN

## 2021-10-11 ENCOUNTER — TELEPHONE (OUTPATIENT)
Dept: CARDIOLOGY | Facility: CLINIC | Age: 86
End: 2021-10-11

## 2021-10-11 LAB — QT INTERVAL: 324 MS

## 2021-10-11 RX ORDER — FUROSEMIDE 20 MG/1
20 TABLET ORAL DAILY
Qty: 30 TABLET | Refills: 3 | Status: SHIPPED | OUTPATIENT
Start: 2021-10-11 | End: 2022-01-31

## 2021-10-11 NOTE — TELEPHONE ENCOUNTER
Per dr major, start on lasix 20mg daily. For the next two days she may take lasic 40mg. Spoke with patient. Will send to pharmacy.

## 2021-10-11 NOTE — CASE MANAGEMENT/SOCIAL WORK
Case Management Discharge Note      Final Note: Home    Provided Post Acute Provider List?: N/A  Provided Post Acute Provider Quality & Resource List?: N/A    Selected Continued Care - Discharged on 10/9/2021 Admission date: 10/2/2021 - Discharge disposition: Home or Self Care        Final Discharge Disposition Code: 01 - home or self-care

## 2021-10-11 NOTE — TELEPHONE ENCOUNTER
Patient called explaining she is short of breath and she said she has developed a cough. She stated that her feet and legs are swelling and she is concerned he may need to adjust her medicine.

## 2021-10-13 ENCOUNTER — READMISSION MANAGEMENT (OUTPATIENT)
Dept: CALL CENTER | Facility: HOSPITAL | Age: 86
End: 2021-10-13

## 2021-10-13 NOTE — OUTREACH NOTE
Medical Week 1 Survey      Responses   [facility] patient discharged from? [facility]   Does the patient have one of the following disease processes/diagnoses(primary or secondary)? Other   Week 1 attempt successful? No   Unsuccessful attempts Attempt 1          Gavi Hull LPN

## 2021-10-17 ENCOUNTER — HOSPITAL ENCOUNTER (INPATIENT)
Facility: HOSPITAL | Age: 86
LOS: 3 days | Discharge: HOME | End: 2021-10-20
Attending: INTERNAL MEDICINE | Admitting: INTERNAL MEDICINE

## 2021-10-17 ENCOUNTER — APPOINTMENT (OUTPATIENT)
Dept: GENERAL RADIOLOGY | Facility: HOSPITAL | Age: 86
End: 2021-10-17

## 2021-10-17 ENCOUNTER — APPOINTMENT (OUTPATIENT)
Dept: CARDIOLOGY | Facility: HOSPITAL | Age: 86
End: 2021-10-17

## 2021-10-17 ENCOUNTER — APPOINTMENT (OUTPATIENT)
Dept: CT IMAGING | Facility: HOSPITAL | Age: 86
End: 2021-10-17

## 2021-10-17 DIAGNOSIS — I82.4Z1 ACUTE DEEP VEIN THROMBOSIS (DVT) OF DISTAL VEIN OF RIGHT LOWER EXTREMITY (HCC): ICD-10-CM

## 2021-10-17 DIAGNOSIS — R60.0 LEG EDEMA, RIGHT: ICD-10-CM

## 2021-10-17 DIAGNOSIS — I26.99 BILATERAL PULMONARY EMBOLISM (HCC): ICD-10-CM

## 2021-10-17 DIAGNOSIS — M79.604 RIGHT LEG PAIN: Primary | ICD-10-CM

## 2021-10-17 LAB
ALBUMIN SERPL-MCNC: 3.6 G/DL (ref 3.5–5.2)
ALBUMIN/GLOB SERPL: 1.2 G/DL
ALP SERPL-CCNC: 106 U/L (ref 39–117)
ALT SERPL W P-5'-P-CCNC: 21 U/L (ref 1–33)
ANION GAP SERPL CALCULATED.3IONS-SCNC: 12 MMOL/L (ref 5–15)
ANION GAP SERPL CALCULATED.3IONS-SCNC: 16 MMOL/L (ref 5–15)
APTT PPP: 26.1 SECONDS (ref 24–31)
APTT PPP: 45.6 SECONDS (ref 61–76.5)
AST SERPL-CCNC: 27 U/L (ref 1–32)
BH CV LOW VAS RIGHT COMMON FEMORAL SPONT: 1
BH CV LOW VAS RIGHT DISTAL FEMORAL SPONT: 1
BH CV LOW VAS RIGHT GASTRONEMIUS VESSEL: 1
BH CV LOW VAS RIGHT LESSER SAPH VESSEL: 1
BH CV LOW VAS RIGHT MID FEMORAL SPONT: 1
BH CV LOW VAS RIGHT PERONEAL VESSEL: 1
BH CV LOW VAS RIGHT POPLITEAL SPONT: 1
BH CV LOW VAS RIGHT POSTERIOR TIBIAL VESSEL: 1
BH CV LOW VAS RIGHT PROXIMAL FEMORAL SPONT: 1
BH CV LOW VAS RIGHT SOLEAL VESSEL: 1
BH CV LOWER VASCULAR LEFT COMMON FEMORAL AUGMENT: NORMAL
BH CV LOWER VASCULAR LEFT COMMON FEMORAL COMPETENT: NORMAL
BH CV LOWER VASCULAR LEFT COMMON FEMORAL COMPRESS: NORMAL
BH CV LOWER VASCULAR LEFT COMMON FEMORAL PHASIC: NORMAL
BH CV LOWER VASCULAR LEFT COMMON FEMORAL SPONT: NORMAL
BH CV LOWER VASCULAR RIGHT COMMON FEMORAL AUGMENT: NORMAL
BH CV LOWER VASCULAR RIGHT COMMON FEMORAL COMPETENT: NORMAL
BH CV LOWER VASCULAR RIGHT COMMON FEMORAL COMPRESS: NORMAL
BH CV LOWER VASCULAR RIGHT COMMON FEMORAL PHASIC: NORMAL
BH CV LOWER VASCULAR RIGHT COMMON FEMORAL SPONT: NORMAL
BH CV LOWER VASCULAR RIGHT DISTAL FEMORAL AUGMENT: NORMAL
BH CV LOWER VASCULAR RIGHT DISTAL FEMORAL COMPETENT: NORMAL
BH CV LOWER VASCULAR RIGHT DISTAL FEMORAL COMPRESS: NORMAL
BH CV LOWER VASCULAR RIGHT DISTAL FEMORAL PHASIC: NORMAL
BH CV LOWER VASCULAR RIGHT DISTAL FEMORAL SPONT: NORMAL
BH CV LOWER VASCULAR RIGHT DISTAL FEMORAL THROMBUS: NORMAL
BH CV LOWER VASCULAR RIGHT GASTRONEMIUS COMPRESS: NORMAL
BH CV LOWER VASCULAR RIGHT GASTRONEMIUS THROMBUS: NORMAL
BH CV LOWER VASCULAR RIGHT GREATER SAPH AK COMPRESS: NORMAL
BH CV LOWER VASCULAR RIGHT GREATER SAPH BK COMPRESS: NORMAL
BH CV LOWER VASCULAR RIGHT LESSER SAPH COMPRESS: NORMAL
BH CV LOWER VASCULAR RIGHT LESSER SAPH THROMBUS: NORMAL
BH CV LOWER VASCULAR RIGHT MID FEMORAL AUGMENT: NORMAL
BH CV LOWER VASCULAR RIGHT MID FEMORAL COMPETENT: NORMAL
BH CV LOWER VASCULAR RIGHT MID FEMORAL COMPRESS: NORMAL
BH CV LOWER VASCULAR RIGHT MID FEMORAL PHASIC: NORMAL
BH CV LOWER VASCULAR RIGHT MID FEMORAL SPONT: NORMAL
BH CV LOWER VASCULAR RIGHT MID FEMORAL THROMBUS: NORMAL
BH CV LOWER VASCULAR RIGHT PERONEAL COMPRESS: NORMAL
BH CV LOWER VASCULAR RIGHT PERONEAL THROMBUS: NORMAL
BH CV LOWER VASCULAR RIGHT POPLITEAL AUGMENT: NORMAL
BH CV LOWER VASCULAR RIGHT POPLITEAL COMPETENT: NORMAL
BH CV LOWER VASCULAR RIGHT POPLITEAL COMPRESS: NORMAL
BH CV LOWER VASCULAR RIGHT POPLITEAL PHASIC: NORMAL
BH CV LOWER VASCULAR RIGHT POPLITEAL SPONT: NORMAL
BH CV LOWER VASCULAR RIGHT POPLITEAL THROMBUS: NORMAL
BH CV LOWER VASCULAR RIGHT POSTERIOR TIBIAL COMPRESS: NORMAL
BH CV LOWER VASCULAR RIGHT POSTERIOR TIBIAL THROMBUS: NORMAL
BH CV LOWER VASCULAR RIGHT PROXIMAL FEMORAL AUGMENT: NORMAL
BH CV LOWER VASCULAR RIGHT PROXIMAL FEMORAL COMPETENT: NORMAL
BH CV LOWER VASCULAR RIGHT PROXIMAL FEMORAL COMPRESS: NORMAL
BH CV LOWER VASCULAR RIGHT PROXIMAL FEMORAL PHASIC: NORMAL
BH CV LOWER VASCULAR RIGHT PROXIMAL FEMORAL SPONT: NORMAL
BH CV LOWER VASCULAR RIGHT PROXIMAL FEMORAL THROMBUS: NORMAL
BH CV LOWER VASCULAR RIGHT SAPHENOFEMORAL JUNCTION COMPRESS: NORMAL
BH CV LOWER VASCULAR RIGHT SOLEAL COMPRESS: NORMAL
BH CV LOWER VASCULAR RIGHT SOLEAL THROMBUS: NORMAL
BILIRUB SERPL-MCNC: 0.9 MG/DL (ref 0–1.2)
BUN SERPL-MCNC: 13 MG/DL (ref 8–23)
BUN SERPL-MCNC: 15 MG/DL (ref 8–23)
BUN/CREAT SERPL: 16.5 (ref 7–25)
BUN/CREAT SERPL: 18.3 (ref 7–25)
CALCIUM SPEC-SCNC: 7.6 MG/DL (ref 8.2–9.6)
CALCIUM SPEC-SCNC: 8.8 MG/DL (ref 8.2–9.6)
CHLORIDE SERPL-SCNC: 101 MMOL/L (ref 98–107)
CHLORIDE SERPL-SCNC: 103 MMOL/L (ref 98–107)
CO2 SERPL-SCNC: 23 MMOL/L (ref 22–29)
CO2 SERPL-SCNC: 24 MMOL/L (ref 22–29)
CREAT SERPL-MCNC: 0.71 MG/DL (ref 0.57–1)
CREAT SERPL-MCNC: 0.91 MG/DL (ref 0.57–1)
D DIMER PPP FEU-MCNC: 9.2 MG/L (FEU) (ref 0–0.59)
DEPRECATED RDW RBC AUTO: 44.6 FL (ref 37–54)
EOSINOPHIL # BLD MANUAL: 0.23 10*3/MM3 (ref 0–0.4)
EOSINOPHIL NFR BLD MANUAL: 2 % (ref 0.3–6.2)
ERYTHROCYTE [DISTWIDTH] IN BLOOD BY AUTOMATED COUNT: 14.9 % (ref 12.3–15.4)
GFR SERPL CREATININE-BSD FRML MDRD: 57 ML/MIN/1.73
GFR SERPL CREATININE-BSD FRML MDRD: 76 ML/MIN/1.73
GLOBULIN UR ELPH-MCNC: 3.1 GM/DL
GLUCOSE SERPL-MCNC: 103 MG/DL (ref 65–99)
GLUCOSE SERPL-MCNC: 115 MG/DL (ref 65–99)
HCT VFR BLD AUTO: 36 % (ref 34–46.6)
HGB BLD-MCNC: 12.1 G/DL (ref 12–15.9)
HOLD SPECIMEN: NORMAL
HOLD SPECIMEN: NORMAL
INR PPP: 0.96 (ref 0.93–1.1)
LYMPHOCYTES # BLD MANUAL: 1.94 10*3/MM3 (ref 0.7–3.1)
LYMPHOCYTES NFR BLD MANUAL: 10 % (ref 5–12)
LYMPHOCYTES NFR BLD MANUAL: 17 % (ref 19.6–45.3)
MAXIMAL PREDICTED HEART RATE: 124 BPM
MCH RBC QN AUTO: 28.6 PG (ref 26.6–33)
MCHC RBC AUTO-ENTMCNC: 33.6 G/DL (ref 31.5–35.7)
MCV RBC AUTO: 85.1 FL (ref 79–97)
MONOCYTES # BLD AUTO: 1.14 10*3/MM3 (ref 0.1–0.9)
NEUTROPHILS # BLD AUTO: 8.09 10*3/MM3 (ref 1.7–7)
NEUTROPHILS NFR BLD MANUAL: 69 % (ref 42.7–76)
NEUTS BAND NFR BLD MANUAL: 2 % (ref 0–5)
NRBC SPEC MANUAL: 1 /100 WBC (ref 0–0.2)
NT-PROBNP SERPL-MCNC: 1142 PG/ML (ref 0–1800)
PLAT MORPH BLD: NORMAL
PLATELET # BLD AUTO: 443 10*3/MM3 (ref 140–450)
PMV BLD AUTO: 6.9 FL (ref 6–12)
POTASSIUM SERPL-SCNC: 3 MMOL/L (ref 3.5–5.2)
POTASSIUM SERPL-SCNC: 3.6 MMOL/L (ref 3.5–5.2)
PROT SERPL-MCNC: 6.7 G/DL (ref 6–8.5)
PROTHROMBIN TIME: 10.7 SECONDS (ref 9.6–11.7)
RBC # BLD AUTO: 4.23 10*6/MM3 (ref 3.77–5.28)
RBC MORPH BLD: NORMAL
SARS-COV-2 RNA PNL SPEC NAA+PROBE: NOT DETECTED
SCAN SLIDE: NORMAL
SODIUM SERPL-SCNC: 138 MMOL/L (ref 136–145)
SODIUM SERPL-SCNC: 141 MMOL/L (ref 136–145)
STRESS TARGET HR: 105 BPM
TOXIC GRANULATION: ABNORMAL
WBC # BLD AUTO: 11.4 10*3/MM3 (ref 3.4–10.8)
WHOLE BLOOD HOLD SPECIMEN: NORMAL
WHOLE BLOOD HOLD SPECIMEN: NORMAL

## 2021-10-17 PROCEDURE — 85025 COMPLETE CBC W/AUTO DIFF WBC: CPT | Performed by: INTERNAL MEDICINE

## 2021-10-17 PROCEDURE — 83880 ASSAY OF NATRIURETIC PEPTIDE: CPT | Performed by: NURSE PRACTITIONER

## 2021-10-17 PROCEDURE — 25010000002 HEPARIN (PORCINE) 25000-0.45 UT/250ML-% SOLUTION: Performed by: NURSE PRACTITIONER

## 2021-10-17 PROCEDURE — 80053 COMPREHEN METABOLIC PANEL: CPT | Performed by: NURSE PRACTITIONER

## 2021-10-17 PROCEDURE — 85025 COMPLETE CBC W/AUTO DIFF WBC: CPT | Performed by: NURSE PRACTITIONER

## 2021-10-17 PROCEDURE — 85730 THROMBOPLASTIN TIME PARTIAL: CPT | Performed by: NURSE PRACTITIONER

## 2021-10-17 PROCEDURE — U0003 INFECTIOUS AGENT DETECTION BY NUCLEIC ACID (DNA OR RNA); SEVERE ACUTE RESPIRATORY SYNDROME CORONAVIRUS 2 (SARS-COV-2) (CORONAVIRUS DISEASE [COVID-19]), AMPLIFIED PROBE TECHNIQUE, MAKING USE OF HIGH THROUGHPUT TECHNOLOGIES AS DESCRIBED BY CMS-2020-01-R: HCPCS | Performed by: INTERNAL MEDICINE

## 2021-10-17 PROCEDURE — 71045 X-RAY EXAM CHEST 1 VIEW: CPT

## 2021-10-17 PROCEDURE — 85730 THROMBOPLASTIN TIME PARTIAL: CPT | Performed by: INTERNAL MEDICINE

## 2021-10-17 PROCEDURE — 99284 EMERGENCY DEPT VISIT MOD MDM: CPT

## 2021-10-17 PROCEDURE — 71275 CT ANGIOGRAPHY CHEST: CPT

## 2021-10-17 PROCEDURE — 85610 PROTHROMBIN TIME: CPT | Performed by: NURSE PRACTITIONER

## 2021-10-17 PROCEDURE — 0 IOPAMIDOL PER 1 ML: Performed by: NURSE PRACTITIONER

## 2021-10-17 PROCEDURE — 85379 FIBRIN DEGRADATION QUANT: CPT | Performed by: NURSE PRACTITIONER

## 2021-10-17 PROCEDURE — 85007 BL SMEAR W/DIFF WBC COUNT: CPT | Performed by: NURSE PRACTITIONER

## 2021-10-17 PROCEDURE — 85007 BL SMEAR W/DIFF WBC COUNT: CPT | Performed by: INTERNAL MEDICINE

## 2021-10-17 PROCEDURE — 93005 ELECTROCARDIOGRAM TRACING: CPT | Performed by: NURSE PRACTITIONER

## 2021-10-17 PROCEDURE — 93971 EXTREMITY STUDY: CPT

## 2021-10-17 RX ORDER — SODIUM CHLORIDE 0.9 % (FLUSH) 0.9 %
10 SYRINGE (ML) INJECTION AS NEEDED
Status: DISCONTINUED | OUTPATIENT
Start: 2021-10-17 | End: 2021-10-20 | Stop reason: HOSPADM

## 2021-10-17 RX ORDER — ONDANSETRON 2 MG/ML
4 INJECTION INTRAMUSCULAR; INTRAVENOUS EVERY 6 HOURS PRN
Status: DISCONTINUED | OUTPATIENT
Start: 2021-10-17 | End: 2021-10-20 | Stop reason: HOSPADM

## 2021-10-17 RX ORDER — SODIUM CHLORIDE 0.9 % (FLUSH) 0.9 %
3 SYRINGE (ML) INJECTION EVERY 12 HOURS SCHEDULED
Status: DISCONTINUED | OUTPATIENT
Start: 2021-10-17 | End: 2021-10-20 | Stop reason: HOSPADM

## 2021-10-17 RX ORDER — PANTOPRAZOLE SODIUM 40 MG/1
40 TABLET, DELAYED RELEASE ORAL
Status: DISCONTINUED | OUTPATIENT
Start: 2021-10-18 | End: 2021-10-18

## 2021-10-17 RX ORDER — ATORVASTATIN CALCIUM 10 MG/1
10 TABLET, FILM COATED ORAL NIGHTLY
Status: DISCONTINUED | OUTPATIENT
Start: 2021-10-17 | End: 2021-10-20 | Stop reason: HOSPADM

## 2021-10-17 RX ORDER — ISOSORBIDE MONONITRATE 60 MG/1
60 TABLET, EXTENDED RELEASE ORAL DAILY
Status: DISCONTINUED | OUTPATIENT
Start: 2021-10-18 | End: 2021-10-20 | Stop reason: HOSPADM

## 2021-10-17 RX ORDER — AMIODARONE HYDROCHLORIDE 200 MG/1
200 TABLET ORAL EVERY 12 HOURS SCHEDULED
Status: DISCONTINUED | OUTPATIENT
Start: 2021-10-17 | End: 2021-10-17

## 2021-10-17 RX ORDER — AMLODIPINE BESYLATE 5 MG/1
5 TABLET ORAL DAILY
Status: DISCONTINUED | OUTPATIENT
Start: 2021-10-18 | End: 2021-10-20 | Stop reason: HOSPADM

## 2021-10-17 RX ORDER — SODIUM CHLORIDE 0.9 % (FLUSH) 0.9 %
3-10 SYRINGE (ML) INJECTION AS NEEDED
Status: DISCONTINUED | OUTPATIENT
Start: 2021-10-17 | End: 2021-10-20 | Stop reason: HOSPADM

## 2021-10-17 RX ORDER — CHOLECALCIFEROL (VITAMIN D3) 125 MCG
5 CAPSULE ORAL NIGHTLY PRN
Status: DISCONTINUED | OUTPATIENT
Start: 2021-10-17 | End: 2021-10-20 | Stop reason: HOSPADM

## 2021-10-17 RX ORDER — ACETAMINOPHEN 500 MG
500 TABLET ORAL EVERY 6 HOURS PRN
Status: DISCONTINUED | OUTPATIENT
Start: 2021-10-17 | End: 2021-10-20 | Stop reason: HOSPADM

## 2021-10-17 RX ORDER — HEPARIN SODIUM 10000 [USP'U]/100ML
18 INJECTION, SOLUTION INTRAVENOUS
Status: DISCONTINUED | OUTPATIENT
Start: 2021-10-17 | End: 2021-10-20

## 2021-10-17 RX ORDER — NITROGLYCERIN 0.4 MG/1
0.4 TABLET SUBLINGUAL
Status: DISCONTINUED | OUTPATIENT
Start: 2021-10-17 | End: 2021-10-20 | Stop reason: HOSPADM

## 2021-10-17 RX ADMIN — ATORVASTATIN CALCIUM 10 MG: 10 TABLET, FILM COATED ORAL at 22:45

## 2021-10-17 RX ADMIN — Medication 3 ML: at 22:45

## 2021-10-17 RX ADMIN — IOPAMIDOL 100 ML: 755 INJECTION, SOLUTION INTRAVENOUS at 16:22

## 2021-10-17 RX ADMIN — METOPROLOL TARTRATE 12.5 MG: 25 TABLET, FILM COATED ORAL at 22:45

## 2021-10-17 RX ADMIN — HEPARIN SODIUM 18 UNITS/KG/HR: 10000 INJECTION, SOLUTION INTRAVENOUS at 16:49

## 2021-10-17 NOTE — ED PROVIDER NOTES
Subjective   Patient is a 89-year-old female who presents to the ER today with lower extremity swelling.  Family at bedside reports that she was discharged from the hospital on October 8 following ischemic colitis.  They reported a couple days after her discharge she began to develop lower extremity swelling.  Primary care was notified and Lasix 20 mg once a day was started.  She reports left leg improved but right leg continued to get worse, developing whelps redness.  Family at bedside reports Lasix was doubled to 40 mg twice a day yesterday.  Patient reports pain to the right lower extremity, she is able to ambulate states that she has been walking around her apartment intermittently using a cane.  She denies chest pain.  Has had some exertional shortness of breath that she reports has improved over the last few days.            Review of Systems   Constitutional: Negative for fatigue and fever.   HENT: Negative.         Patient baseline hard of hearing   Respiratory: Positive for shortness of breath.         Exertional shortness of breath   Cardiovascular: Positive for leg swelling. Negative for chest pain and palpitations.   Gastrointestinal: Negative.    Genitourinary: Negative.    Musculoskeletal: Negative for gait problem.   Neurological: Negative for syncope and weakness.   Psychiatric/Behavioral: Negative.        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 sure which 1  contracted at age 8   • History of transfusion    • [place] (hard of hearing)    • Hyperlipidemia    • Hypertension    • Past heart attack     X2 MILD LAST ONE OCT 2019   • PONV (postoperative nausea and vomiting)        Allergies   Allergen Reactions   • Codeine Nausea And Vomiting and Dizziness   • Lortab [Hydrocodone-Acetaminophen]  Nausea And Vomiting   • Nitrofurantoin Other (See Comments)   • Sulfa Antibiotics Rash       Past Surgical History:   Procedure Laterality Date   • ANTERIOR AND POSTERIOR VAGINAL REPAIR     • BACK SURGERY     • BLEPHAROPLASTY Bilateral 5/23/2019    Procedure: bilateral upper lid blepharoplasty;  Surgeon: Mauricio Pennington MD;  [place] [facility] [place] [facility]  Service: Ophthalmology   • BROW LIFT Bilateral 2/13/2020    Procedure: BILATERAL TEMPORAL DIRECT BROWLIFT;  Surgeon: Sreekanth Bird MD;  [place] [facility] [place] [facility] [place]  Service: Ophthalmology;  Laterality: Bilateral;   • CARDIAC CATHETERIZATION N/A 12/3/2019    Procedure: Left Heart Cath;  Surgeon: Puma Briseno MD;  Location: [facility] CATH INVASIVE LOCATION;  Service: Cardiovascular   • CARDIAC CATHETERIZATION N/A 12/3/2019    Procedure: Coronary angiography;  Surgeon: Puma Briseno MD;  Location: [facility] CATH INVASIVE LOCATION;  Service: Cardiovascular   • CARDIAC CATHETERIZATION N/A 12/3/2019    Procedure: Left ventriculography;  Surgeon: Puma Briseno MD;  Location: [facility] CATH INVASIVE LOCATION;  Service: Cardiovascular   • CATARACT EXTRACTION EXTRACAPSULAR W/ INTRAOCULAR LENS IMPLANTATION Bilateral    • CERVICAL SPINE ANTERIOR      with instrumentation   • CHEILECTOMY Left 8/28/2020    Procedure: CHEILECTOMY;  Surgeon: GAB Rees DPM;  Location: [facility] OR;  Service: Podiatry;  Laterality: Left;   • COLON SURGERY      for obstruction   • ALBERTO TUBE INSERTION Bilateral 5/23/2019    Procedure: ALBERTO TUBE;  Surgeon: Mauricio Pennington MD;  [place] [facility] [place] [facility] [place]  Service: Ophthalmology   • ECTROPION REPAIR Bilateral 5/23/2019    Procedure: bilateral lower lid ectropion repair, bilateral punctoplasty;  Surgeon: Mauricio Pennington MD;  [place] [facility] [place] [facility]  Service: Ophthalmology   • EYE SURGERY     • FOOT FUSION Left 12/30/2016    Procedure: LEFT HALLUX METATARSALPHALANGEAL ARTHRODESIS SILVER BUNIONECTOMY METATARSAL HEAD RESECTION 2-5 CALCANEAL  BONE GRAFT;  Surgeon: Monster Harper Jr., MD;  Location: [facility] OR;  Service:    • HYSTERECTOMY     • INGUINAL HERNIA REPAIR Bilateral    • METATARSAL OSTEOTOMY Left 2020    Procedure: Metatarsal head resection 5;  Surgeon: GAB Rees DPM;  Location: [facility] MAIN OR;  Service: Podiatry;  Laterality: Left;   • ROTATOR CUFF REPAIR Right    • SIGMOIDOSCOPY N/A 10/6/2021    Procedure: SIGMOIDOSCOPY WITH BIOPSY X1 AREA;  Surgeon: Uche Vaz MD;  Location: [facility] ENDOSCOPY;  Service: Gastroenterology;  Laterality: N/A;  ischemic colitis   • STOMACH SURGERY      for ulcer       Family History   Problem Relation Age of Onset   • No Known Problems Mother    • No Known Problems Father    • Malig Hyperthermia Neg Hx        Social History     Socioeconomic History   • Marital status:    Tobacco Use   • Smoking status: Former Smoker     Packs/day: 0.25     Years: 10.00     Pack years: 2.50     Types: Cigarettes     Quit date:      Years since quittin.8   • Smokeless tobacco: Never Used   Vaping Use   • Vaping Use: Never used   Substance and Sexual Activity   • Alcohol use: No   • Drug use: No   • Sexual activity: Defer           Objective   Physical Exam  Vitals reviewed.   Constitutional:       Appearance: She is not ill-appearing or toxic-appearing.   Cardiovascular:      Rate and Rhythm: Normal rate and regular rhythm.      Pulses: Normal pulses.           Dorsalis pedis pulses are 2+ on the right side and 2+ on the left side.        Posterior tibial pulses are 2+ on the right side and 2+ on the left side.      Heart sounds: Normal heart sounds.   Pulmonary:      Effort: Pulmonary effort is normal. No respiratory distress.      Breath sounds: Normal breath sounds. No wheezing.   Abdominal:      Palpations: Abdomen is soft.      Tenderness: There is no abdominal tenderness.   Musculoskeletal:         General: Tenderness present.      Right lower leg: Tenderness present. 4+ Pitting Edema  "present.      Left lower leg: Normal. No edema.      Right ankle: Swelling present. Tenderness present. Normal range of motion. Normal pulse.      Left ankle: Normal.      Right foot: Normal range of motion. Swelling and tenderness present. Normal pulse.      Left foot: Normal.   Skin:     General: Skin is warm.      Capillary Refill: Capillary refill takes 2 to 3 seconds.   Neurological:      General: No focal deficit present.      Mental Status: She is alert.         Procedures           ED Course  ED Course as of 10/17/21 1727   Sun Oct 17, 2021   1358 Vascular was consulted patient waiting to go for Doppler [KW]   1456 Awaiting vascular for testing [KW]   1535 Vascular Technician Key reported that the patient has a DVT that is acute in the entire right leg  [KW]   1704 Spoke with the radiologist who reports that the patient has extensive bilateral pulmonary emboli [KW]   1722 Dr. Livingston accepted patient for admission.   [KW]      ED Course User Index  [KW] Linda Hwang, APRN      /51   Pulse 64   Temp 97.6 °F (36.4 °C)   Resp 18   Ht 157.5 cm (62\")   Wt 54 kg (119 lb)   SpO2 96%   BMI 21.77 kg/m²   Labs Reviewed   COMPREHENSIVE METABOLIC PANEL - Abnormal; Notable for the following components:       Result Value    Glucose 115 (*)     eGFR Non African Amer 57 (*)     Anion Gap 16.0 (*)     All other components within normal limits    Narrative:     GFR Normal >60  Chronic Kidney Disease <60  Kidney Failure <15     D-DIMER, QUANTITATIVE - Abnormal; Notable for the following components:    D-Dimer, Quantitative 9.20 (*)     All other components within normal limits    Narrative:     Reference Range  --------------------------------------------------------------------     < 0.50   Negative Predictive Value  0.50-0.59   Indeterminate    >= 0.60   Probable VTE             A very low percentage of patients with DVT may yield D-Dimer results   below the cut-off of 0.50 mg/L FEU.  This is known to " be more   prevalent in patients with distal DVT.             Results of this test should always be interpreted in conjunction with   the patient's medical history, clinical presentation and other   findings.  Clinical diagnosis should not be based on the result of   INNOVANCE D-Dimer alone.   CBC WITH AUTO DIFFERENTIAL - Abnormal; Notable for the following components:    WBC 11.40 (*)     All other components within normal limits   MANUAL DIFFERENTIAL - Abnormal; Notable for the following components:    Lymphocyte % 17.0 (*)     Neutrophils Absolute 8.09 (*)     Monocytes Absolute 1.14 (*)     nRBC 1.0 (*)     All other components within normal limits   BNP (IN-HOUSE) - Normal    Narrative:     Among patients with dyspnea, NT-proBNP is highly sensitive for the detection of acute congestive heart failure. In addition NT-proBNP of <300 pg/ml effectively rules out acute congestive heart failure with 99% negative predictive value.    Results may be falsely decreased if patient taking Biotin.     PROTIME-INR - Normal   APTT - Normal   COVID PRE-OP / PRE-PROCEDURE SCREENING ORDER (NO ISOLATION)    Narrative:     The following orders were created for panel order COVID PRE-OP / PRE-PROCEDURE SCREENING ORDER (NO ISOLATION) - Swab, Nasopharynx.  Procedure                               Abnormality         Status                     ---------                               -----------         ------                     COVID-19,CEPHEID/KALA/BD...[[id number]]                                                   Please view results for these tests on the individual orders.   COVID-19,CEPHEID/KALA/BDMAX,COR/TIGRE/PAD/DARLING IN-HOUSE,NP SWAB IN TRANSPORT MEDIA 3-4 HR TAT, RT-PCR   RAINBOW DRAW    Narrative:     The following orders were created for panel order [facility] Draw.  Procedure                               Abnormality         Status                     ---------                               -----------         ------                      Green Top (Gel)[[id number]]                                  Final result               Lavender Top[[id number]]                                     Final result               Gold Top - SST[[id number]]                                   Final result               Light Blue Top[[id number]]                                   Final result                 Please view results for these tests on the individual orders.   SCAN SLIDE   GREEN TOP   LAVENDER TOP   GOLD TOP - SST   LIGHT BLUE TOP   CBC AND DIFFERENTIAL    Narrative:     The following orders were created for panel order CBC & Differential.  Procedure                               Abnormality         Status                     ---------                               -----------         ------                     CBC Auto Differential[[id number]]        Abnormal            Final result               Scan Slide[[id number]]                                       Final result                 Please view results for these tests on the individual orders.     Medications   sodium chloride 0.9 % flush 10 mL (has no administration in time range)   heparin 56448 units/250 mL (100 units/mL) in 0.45 % NaCl infusion (18 Units/kg/hr × 54 kg Intravenous New Bag 10/17/21 1649)   heparin bolus from bag 2,200 Units (has no administration in time range)   heparin bolus from bag 4,300 Units (has no administration in time range)   iopamidol (ISOVUE-370) 76 % injection 100 mL (100 mL Intravenous Given 10/17/21 1622)     XR Chest 1 View    Result Date: 10/17/2021  1.Mildly enlarged cardiac silhouette. 2.No definite acute pulmonary abnormality or significant change.  Electronically Signed By-Jackie Goyal MD On:10/17/2021 1:53 PM This report was finalized on [id number] by  Jackie Goyal MD.    CT Chest Pulmonary Embolism    Result Date: 10/17/2021  Bilateral pulmonary emboli, as described above.  I called this result to Linda SCHWARTZ at 5:00 PM on 10/17/2020.  Electronically  Signed By-Jackie Goyal MD On:10/17/2021 5:02 PM This report was finalized on [id number] by  Jackie Goyal MD.                                         MDM  Number of Diagnoses or Management Options  Acute deep vein thrombosis (DVT) of distal vein of right lower extremity (HCC)  Bilateral pulmonary embolism (HCC)  Leg edema, right  Right leg pain  Diagnosis management comments: Patient is a 89-year-old female she had her IV established and blood work was obtained.  She had a venous Doppler of the right lower leg which was reported as having clot throughout the entire right leg her dimer was over 9 and subsequent CT PE protocol showed bilateral extensive pulmonary emboli as reported by the radiologist.  The patient was discussed the pros and cons of heparin and it will be initiated at this time.  We also had an extensive discussion with the patient about her CODE STATUS and she wishes to be DNR at this time her daughter is at bedside and confirms this and is agreeable with this.    The patient was discussed with Dr. Curtis she remained stable throughout her emergency room stay       Amount and/or Complexity of Data Reviewed  Clinical lab tests: reviewed  Tests in the radiology section of CPT®: reviewed  Tests in the medicine section of CPT®: reviewed    Risk of Complications, Morbidity, and/or Mortality  Presenting problems: moderate  Diagnostic procedures: moderate  Management options: moderate    Patient Progress  Patient progress: stable      Final diagnoses:   Right leg pain   Acute deep vein thrombosis (DVT) of distal vein of right lower extremity (HCC)   Leg edema, right   Bilateral pulmonary embolism (HCC)       ED Disposition  ED Disposition     ED Disposition Condition Comment    Decision to Admit  Level of Care: Telemetry [5]   Admitting Physician: JACKY CURTIS [[id number]]   Attending Physician: JACKY CURTIS [[id number]]            No follow-up provider specified.       Medication List      No changes were made to  your prescriptions during this visit.          Linda Hwang, APRN  10/17/21 1712

## 2021-10-18 ENCOUNTER — READMISSION MANAGEMENT (OUTPATIENT)
Dept: CALL CENTER | Facility: HOSPITAL | Age: 86
End: 2021-10-18

## 2021-10-18 LAB
ANION GAP SERPL CALCULATED.3IONS-SCNC: 10 MMOL/L (ref 5–15)
APTT PPP: 53.2 SECONDS (ref 61–76.5)
APTT PPP: 91.2 SECONDS (ref 61–76.5)
BUN SERPL-MCNC: 12 MG/DL (ref 8–23)
BUN/CREAT SERPL: 19.4 (ref 7–25)
CALCIUM SPEC-SCNC: 7.9 MG/DL (ref 8.2–9.6)
CHLORIDE SERPL-SCNC: 105 MMOL/L (ref 98–107)
CO2 SERPL-SCNC: 23 MMOL/L (ref 22–29)
CREAT SERPL-MCNC: 0.62 MG/DL (ref 0.57–1)
DEPRECATED RDW RBC AUTO: 44.2 FL (ref 37–54)
DEPRECATED RDW RBC AUTO: 45.5 FL (ref 37–54)
EOSINOPHIL # BLD MANUAL: 0.1 10*3/MM3 (ref 0–0.4)
EOSINOPHIL NFR BLD MANUAL: 1 % (ref 0.3–6.2)
ERYTHROCYTE [DISTWIDTH] IN BLOOD BY AUTOMATED COUNT: 14.8 % (ref 12.3–15.4)
ERYTHROCYTE [DISTWIDTH] IN BLOOD BY AUTOMATED COUNT: 15.1 % (ref 12.3–15.4)
GFR SERPL CREATININE-BSD FRML MDRD: 89 ML/MIN/1.73
GIANT PLATELETS: ABNORMAL
GLUCOSE SERPL-MCNC: 107 MG/DL (ref 65–99)
HCT VFR BLD AUTO: 30.1 % (ref 34–46.6)
HCT VFR BLD AUTO: 31.4 % (ref 34–46.6)
HGB BLD-MCNC: 10.3 G/DL (ref 12–15.9)
HGB BLD-MCNC: 10.5 G/DL (ref 12–15.9)
LARGE PLATELETS: ABNORMAL
LYMPHOCYTES # BLD MANUAL: 1.34 10*3/MM3 (ref 0.7–3.1)
LYMPHOCYTES # BLD MANUAL: 1.9 10*3/MM3 (ref 0.7–3.1)
LYMPHOCYTES NFR BLD MANUAL: 1 % (ref 5–12)
LYMPHOCYTES NFR BLD MANUAL: 11 % (ref 19.6–45.3)
LYMPHOCYTES NFR BLD MANUAL: 2 % (ref 5–12)
LYMPHOCYTES NFR BLD MANUAL: 20 % (ref 19.6–45.3)
MCH RBC QN AUTO: 28.7 PG (ref 26.6–33)
MCH RBC QN AUTO: 29.8 PG (ref 26.6–33)
MCHC RBC AUTO-ENTMCNC: 33.4 G/DL (ref 31.5–35.7)
MCHC RBC AUTO-ENTMCNC: 34.3 G/DL (ref 31.5–35.7)
MCV RBC AUTO: 85.7 FL (ref 79–97)
MCV RBC AUTO: 87 FL (ref 79–97)
METAMYELOCYTES NFR BLD MANUAL: 1 % (ref 0–0)
MONOCYTES # BLD AUTO: 0.1 10*3/MM3 (ref 0.1–0.9)
MONOCYTES # BLD AUTO: 0.21 10*3/MM3 (ref 0.1–0.9)
NEUTROPHILS # BLD AUTO: 7.51 10*3/MM3 (ref 1.7–7)
NEUTROPHILS # BLD AUTO: 8.55 10*3/MM3 (ref 1.7–7)
NEUTROPHILS NFR BLD MANUAL: 74 % (ref 42.7–76)
NEUTROPHILS NFR BLD MANUAL: 78 % (ref 42.7–76)
NEUTS BAND NFR BLD MANUAL: 5 % (ref 0–5)
NEUTS BAND NFR BLD MANUAL: 5 % (ref 0–5)
PLAT MORPH BLD: NORMAL
PLATELET # BLD AUTO: 366 10*3/MM3 (ref 140–450)
PLATELET # BLD AUTO: 402 10*3/MM3 (ref 140–450)
PMV BLD AUTO: 6.7 FL (ref 6–12)
PMV BLD AUTO: 7.1 FL (ref 6–12)
POTASSIUM SERPL-SCNC: 3.4 MMOL/L (ref 3.5–5.2)
POTASSIUM SERPL-SCNC: 3.4 MMOL/L (ref 3.5–5.2)
QT INTERVAL: 520 MS
RBC # BLD AUTO: 3.45 10*6/MM3 (ref 3.77–5.28)
RBC # BLD AUTO: 3.66 10*6/MM3 (ref 3.77–5.28)
RBC MORPH BLD: NORMAL
RBC MORPH BLD: NORMAL
SCAN SLIDE: NORMAL
SCAN SLIDE: NORMAL
SMALL PLATELETS BLD QL SMEAR: ADEQUATE
SODIUM SERPL-SCNC: 138 MMOL/L (ref 136–145)
TOXIC GRANULATION: ABNORMAL
VARIANT LYMPHS NFR BLD MANUAL: 2 % (ref 0–5)
WBC # BLD AUTO: 10.3 10*3/MM3 (ref 3.4–10.8)
WBC # BLD AUTO: 9.5 10*3/MM3 (ref 3.4–10.8)
WBC MORPH BLD: NORMAL

## 2021-10-18 PROCEDURE — 93010 ELECTROCARDIOGRAM REPORT: CPT | Performed by: INTERNAL MEDICINE

## 2021-10-18 PROCEDURE — 84132 ASSAY OF SERUM POTASSIUM: CPT | Performed by: INTERNAL MEDICINE

## 2021-10-18 PROCEDURE — 25010000002 HEPARIN (PORCINE) 25000-0.45 UT/250ML-% SOLUTION: Performed by: INTERNAL MEDICINE

## 2021-10-18 PROCEDURE — 93005 ELECTROCARDIOGRAM TRACING: CPT | Performed by: INTERNAL MEDICINE

## 2021-10-18 PROCEDURE — 85025 COMPLETE CBC W/AUTO DIFF WBC: CPT | Performed by: INTERNAL MEDICINE

## 2021-10-18 PROCEDURE — 85007 BL SMEAR W/DIFF WBC COUNT: CPT | Performed by: INTERNAL MEDICINE

## 2021-10-18 PROCEDURE — 80048 BASIC METABOLIC PNL TOTAL CA: CPT | Performed by: INTERNAL MEDICINE

## 2021-10-18 PROCEDURE — 85730 THROMBOPLASTIN TIME PARTIAL: CPT | Performed by: INTERNAL MEDICINE

## 2021-10-18 RX ORDER — POTASSIUM CHLORIDE 7.45 MG/ML
10 INJECTION INTRAVENOUS
Status: DISCONTINUED | OUTPATIENT
Start: 2021-10-18 | End: 2021-10-20 | Stop reason: HOSPADM

## 2021-10-18 RX ORDER — POTASSIUM CHLORIDE 1.5 G/1.77G
40 POWDER, FOR SOLUTION ORAL AS NEEDED
Status: DISCONTINUED | OUTPATIENT
Start: 2021-10-18 | End: 2021-10-20 | Stop reason: HOSPADM

## 2021-10-18 RX ORDER — PANTOPRAZOLE SODIUM 40 MG/1
40 TABLET, DELAYED RELEASE ORAL
Status: DISCONTINUED | OUTPATIENT
Start: 2021-10-18 | End: 2021-10-20 | Stop reason: HOSPADM

## 2021-10-18 RX ORDER — POTASSIUM CHLORIDE 20 MEQ/1
40 TABLET, EXTENDED RELEASE ORAL AS NEEDED
Status: DISCONTINUED | OUTPATIENT
Start: 2021-10-18 | End: 2021-10-20 | Stop reason: HOSPADM

## 2021-10-18 RX ADMIN — Medication 3 ML: at 10:32

## 2021-10-18 RX ADMIN — PANTOPRAZOLE SODIUM 40 MG: 40 TABLET, DELAYED RELEASE ORAL at 14:52

## 2021-10-18 RX ADMIN — AMLODIPINE BESYLATE 5 MG: 5 TABLET ORAL at 10:31

## 2021-10-18 RX ADMIN — HEPARIN SODIUM 18 UNITS/KG/HR: 10000 INJECTION, SOLUTION INTRAVENOUS at 15:04

## 2021-10-18 RX ADMIN — METOPROLOL TARTRATE 12.5 MG: 25 TABLET, FILM COATED ORAL at 10:31

## 2021-10-18 RX ADMIN — ATORVASTATIN CALCIUM 10 MG: 10 TABLET, FILM COATED ORAL at 21:15

## 2021-10-18 RX ADMIN — METOPROLOL TARTRATE 12.5 MG: 25 TABLET, FILM COATED ORAL at 21:15

## 2021-10-18 RX ADMIN — ISOSORBIDE MONONITRATE 60 MG: 60 TABLET, EXTENDED RELEASE ORAL at 10:31

## 2021-10-18 RX ADMIN — Medication 3 ML: at 21:15

## 2021-10-18 NOTE — CASE MANAGEMENT/SOCIAL WORK
Discharge Planning Assessment   [facility]     Patient Name: Carrie Golden  MRN: [id number]  Today's Date: 10/18/2021    Admit Date: 10/17/2021     Discharge Needs Assessment     Row Name 10/18/21 1310       Living Environment    Lives With alone    Current Living Arrangements home/apartment/condo    Primary Care Provided by self    Provides Primary Care For no one    Family Caregiver if Needed other relative(s)    Family Caregiver Names Niece- Rita    Quality of Family Relationships supportive    Able to Return to Prior Arrangements yes       Resource/Environmental Concerns    Resource/Environmental Concerns none    Transportation Concerns car, none       Transition Planning    Patient/Family Anticipates Transition to home    Patient/Family Anticipated Services at Transition none    Transportation Anticipated family or friend will provide; car, drives self       Discharge Needs Assessment    Readmission Within the Last 30 Days no previous admission in last 30 days    Equipment Currently Used at Home none    Anticipated Changes Related to Illness none    Equipment Needed After Discharge none    Provided Post Acute Provider List? Refused               Discharge Plan     Row Name 10/18/21 1311       Plan    Plan DC Plan: Anticipate routine home, declined needs at this time.    Patient/Family in Agreement with Plan yes    Plan Comments CM met with patient at bedside to discuss dc planning. PCP and pharmacy confirmed. She reported no trouble affording medications. Reported she does not use any DME, is independent with her own activities like cooking, cleaning, driving, etc. Declined needs for any DME/HH services at this time. DC Barriers: Heparin gtt.                 Demographic Summary     Row Name 10/18/21 1310       General Information    Admission Type inpatient    Referral Source admission list    Reason for Consult discharge planning    Preferred Language English     Used During This Interaction no        Contact Information    Permission Granted to Share Info With                Functional Status     Row Name 10/18/21 1310       Functional Status    Usual Activity Tolerance good    Current Activity Tolerance moderate       Functional Status, IADL    Medications independent    Meal Preparation independent    Housekeeping independent    Laundry independent    Shopping independent              Case Management Readmission Assessment Note    Case Management Readmission Assessment (all recorded)     Readmission Interview     Row Name 10/18/21 1313             Readmission Indications    Is this hospitalization related to the prior hospital diagnosis? Yes  Patient reported this hospitalization is related to previous hospitalization.      What was the reason you were admitted? Patient had previous hospitalization from 10/2/21-10/9/21. Admission diagnosis for previous visit- right leg pain.      Row Name 10/18/21 1313             Follow up appointment    Do you have a PCP? Yes      Row Name 10/18/21 1313             Index discharge location/services    Where did you go upon discharge? Home                 LACE: 9    Met with patient in room wearing PPE: mask/goggles.      Maintained distance greater than six feet and spent less than 15 minutes in the room.      Gaby Ratliff RN     Office Phone: [phone]  Office Cell: [phone]

## 2021-10-18 NOTE — PLAN OF CARE
Pt admitted to [facility] at approximately 2100 on 10/17/2021. Admission completed by this nurse. Pt denies pain and SOA at this times. Continuous cardiac monitoring initiated; heparin gtt continued per MAR. VSS. Will continue to monitor.

## 2021-10-18 NOTE — OUTREACH NOTE
Medical Week 1 Survey      Responses   [facility] patient discharged from? [facility]   Does the patient have one of the following disease processes/diagnoses(primary or secondary)? Other   Week 1 attempt successful? No   Revoke Readmitted          Laura Morales RN

## 2021-10-18 NOTE — H&P
Patient Care Team:  Monster Myrick MD as PCP - General (Family Medicine)  ROBERTO CARLOS Morales MD as Consulting Physician (Cardiology)    Chief complaint RightLeg swelling    Subjective     Patient is a 89 y.o. female who presents with past medical history of CAD, GERD, [place], HTN, MI, frequent UTI, arthritis, and drooping bilateral eyelids. It is noted that she was recently discharged for    abd pain and rectal bleeding and she underwent a sigmoidoscopy that revealed severe ischemic colitis. Patient has swelling in leg and pain she called primary care and did not improve with diuretics so she was asked to go to ED. She is admitted with bilateral pulmonary emboli's and right leg dvt.     Review of Systems   Constitutional: Positive for activity change.   Respiratory: Negative for cough and shortness of breath.    Cardiovascular: Positive for leg swelling.          History  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 sure which 1  contracted at age 8   • History of transfusion    • [place] (hard of hearing)    • Hyperlipidemia    • Hypertension    • Past heart attack     X2 MILD LAST ONE OCT 2019   • PONV (postoperative nausea and vomiting)      Past Surgical History:   Procedure Laterality Date   • ANTERIOR AND POSTERIOR VAGINAL REPAIR     • BACK SURGERY     • BLEPHAROPLASTY Bilateral 5/23/2019    Procedure: bilateral upper lid blepharoplasty;  Surgeon: Mauricio Pennington MD;  Location: [facility] OR [facility];  Service: Ophthalmology   • BROW LIFT Bilateral 2/13/2020    Procedure: BILATERAL TEMPORAL DIRECT BROWLIFT;  Surgeon: Sreekanth Bird MD;  Location: [facility] OR [facility];  Service: Ophthalmology;  Laterality: Bilateral;   • CARDIAC CATHETERIZATION N/A 12/3/2019    Procedure: Left Heart Cath;  Surgeon: Puma Briseno,  MD;  Location: [facility] CATH INVASIVE LOCATION;  Service: Cardiovascular   • CARDIAC CATHETERIZATION N/A 12/3/2019    Procedure: Coronary angiography;  Surgeon: Puma Briseno MD;  Location: [facility] CATH INVASIVE LOCATION;  Service: Cardiovascular   • CARDIAC CATHETERIZATION N/A 12/3/2019    Procedure: Left ventriculography;  Surgeon: Puam Briseno MD;  Location: [facility] CATH INVASIVE LOCATION;  Service: Cardiovascular   • CATARACT EXTRACTION EXTRACAPSULAR W/ INTRAOCULAR LENS IMPLANTATION Bilateral    • CERVICAL SPINE ANTERIOR      with instrumentation   • CHEILECTOMY Left 8/28/2020    Procedure: CHEILECTOMY;  Surgeon: GAB Rees DPM;  Location: [facility] MAIN OR;  Service: Podiatry;  Laterality: Left;   • COLON SURGERY      for obstruction   • ALBERTO TUBE INSERTION Bilateral 5/23/2019    Procedure: ALBERTO TUBE;  Surgeon: Mauricio Pennington MD;  [place] [facility] [place] [facility]  Service: Ophthalmology   • ECTROPION REPAIR Bilateral 5/23/2019    Procedure: bilateral lower lid ectropion repair, bilateral punctoplasty;  Surgeon: Mauricio Pennington MD;  Location: [facility] OR [facility];  Service: Ophthalmology   • EYE SURGERY     • FOOT FUSION Left 12/30/2016    Procedure: LEFT HALLUX METATARSALPHALANGEAL ARTHRODESIS SILVER BUNIONECTOMY METATARSAL HEAD RESECTION 2-5 CALCANEAL BONE GRAFT;  Surgeon: Monster Harper Jr., MD;  Location: [facility] MAIN OR;  Service:    • HYSTERECTOMY     • INGUINAL HERNIA REPAIR Bilateral    • METATARSAL OSTEOTOMY Left 8/28/2020    Procedure: Metatarsal head resection 5;  Surgeon: GAB Rees DPM;  Location: [facility] MAIN OR;  Service: Podiatry;  Laterality: Left;   • ROTATOR CUFF REPAIR Right    • SIGMOIDOSCOPY N/A 10/6/2021    Procedure: SIGMOIDOSCOPY WITH BIOPSY X1 AREA;  Surgeon: Uche Vaz MD;  Location: [facility] ENDOSCOPY;  Service: Gastroenterology;  Laterality: N/A;  ischemic colitis   • STOMACH SURGERY      for ulcer     Family History   Problem Relation Age of Onset   • No Known Problems  Mother    • No Known Problems Father    • [place] Hyperthermia Neg Hx      Social History     Tobacco Use   • Smoking status: Former Smoker     Packs/day: 0.25     Years: 10.00     Pack years: 2.50     Types: Cigarettes     Quit date:      Years since quittin.8   • Smokeless tobacco: Never Used   Vaping Use   • Vaping Use: Never used   Substance Use Topics   • Alcohol use: No   • Drug use: No     Medications Prior to Admission   Medication Sig Dispense Refill Last Dose   • amLODIPine (NORVASC) 5 MG tablet Take 1 tablet by mouth Daily. 30 tablet 6    • aspirin 81 MG EC tablet Take 81 mg by mouth Every Other Day.   10/17/2021 at Unknown time   • furosemide (LASIX) 20 MG tablet Take 1 tablet by mouth Daily. 30 tablet 3    • isosorbide mononitrate (IMDUR) 60 MG 24 hr tablet Take 1 tablet by mouth once daily 90 tablet 0    • metoprolol tartrate (LOPRESSOR) 25 MG tablet Take 0.5 tablets by mouth Every 12 (Twelve) Hours. 30 tablet 1    • simvastatin (ZOCOR) 40 MG tablet Take 0.5 tablets by mouth Every Night. 90 tablet 3      Allergies:  Codeine, Lortab [hydrocodone-acetaminophen], Nitrofurantoin, and Sulfa antibiotics    Objective     Vital Signs  Temp:  [97.6 °F (36.4 °C)-98.3 °F (36.8 °C)] 98.2 °F (36.8 °C)  Heart Rate:  [55-93] 56  Resp:  [16-21] 20  BP: ()/(31-64) 115/46       Physical Exam  Vitals reviewed.   Constitutional:       Appearance: Normal appearance.   HENT:      Head: Normocephalic and atraumatic.      Right Ear: External ear normal.      Left Ear: External ear normal.      Nose: Nose normal.      Mouth/Throat:      Mouth: Mucous membranes are moist.   Eyes:      Conjunctiva/sclera: Conjunctivae normal.   Cardiovascular:      Rate and Rhythm: Normal rate and regular rhythm.      Pulses: Normal pulses.      Heart sounds: Normal heart sounds.   Pulmonary:      Effort: Pulmonary effort is normal.      Breath sounds: Normal breath sounds.   Abdominal:      General: Bowel sounds are normal.       Palpations: Abdomen is soft.   Musculoskeletal:         General: Swelling present.      Cervical back: Normal range of motion. No tenderness.      Right lower leg: Edema present.   Skin:     General: Skin is warm and dry.      Findings: No erythema or rash.   Neurological:      Mental Status: She is alert and oriented to person, place, and time.   Psychiatric:         Behavior: Behavior normal.          Results Review:     Imaging Results (Last 24 Hours)     Procedure Component Value Units Date/Time    CT Chest Pulmonary Embolism [[id number]] Collected: 10/17/21 1656     Updated: 10/17/21 1704    Narrative:         DATE OF EXAM:  10/17/2021 4:21 PM     PROCEDURE:  CT CHEST PULMONARY EMBOLISM-     INDICATIONS:   PE suspected, high prob, shortness of air; M79.604-Pain in right leg;  I82.4Z1-Acute embolism and thrombosis of unspecified deep veins of right  distal lower extremity; R60.0-Localized edema     COMPARISON:   AP chest x-ray 10/17/2021, CT chest PE protocol 04/27/2021.     TECHNIQUE:  Routine transaxial slices were obtained through chest after  administration of intravenous 100 ml of Isovue 370. Reconstructed  coronal and sagittal images were also obtained. Automated exposure  control and iterative reconstruction methods were used.      FINDINGS:  There are bilateral pulmonary emboli, including left upper lobe are and  interlobar pulmonary arteries extending into segmental and subsegmental  left pulmonary arteries and in the right upper, middle, and lower lobar,  segmental, and subsegmental pulmonary arteries. The main pulmonary  artery is nondilated. Heart size is mildly enlarged. There is no reflux  of contrast into the IVC. There is no pericardial or pleural effusion.  There is stable subsegmental atelectasis or scarring in the lingula and  lower lobes. The lungs are otherwise clear. No adenopathy is seen in the  chest. The thoracic esophagus is partially fluid-filled. Left renal  pelvic cysts are included.  No acute osseous abnormality is identified.       Impression:      Bilateral pulmonary emboli, as described above.     I called this result to Linda SCHWARTZ at 5:00 PM on 10/17/2020.     Electronically Signed By-Jackie Goyal MD On:10/17/2021 5:02 PM  This report was finalized on [id number] by  Jackie Goyal MD.    XR Chest 1 View [[id number]] Collected: 10/17/21 1352     Updated: 10/17/21 1355    Narrative:      DATE OF EXAM:  10/17/2021 1:34 PM     PROCEDURE:  XR CHEST 1 VW-     INDICATIONS:  leg swelling/ dyspnea     COMPARISON:  AP chest x-ray 10/02/2021, CT chest 04/27/2021.     TECHNIQUE:   Single radiographic AP view of the chest was obtained.     FINDINGS:  Cardiac silhouette remains mildly enlarged, but is exaggerated by  portable AP technique. The lungs are adequately expanded and clear.  Pulmonary vascularity appears stable. No large pleural effusion is seen.       Impression:      1.Mildly enlarged cardiac silhouette.  2.No definite acute pulmonary abnormality or significant change.     Electronically Signed By-Jackie Goyal MD On:10/17/2021 1:53 PM  This report was finalized on [id number] by  Jackie Goyal MD.           Lab Results (last 24 hours)     Procedure Component Value Units Date/Time    Basic Metabolic Panel [[id number]]  (Abnormal) Collected: 10/18/21 0815    Specimen: Blood Updated: 10/18/21 0906     Glucose 107 mg/dL      BUN 12 mg/dL      Creatinine 0.62 mg/dL      Sodium 138 mmol/L      Potassium 3.4 mmol/L      Comment: Slight hemolysis detected by analyzer. Results may be affected.        Chloride 105 mmol/L      CO2 23.0 mmol/L      Calcium 7.9 mg/dL      eGFR Non African Amer 89 mL/min/1.73      BUN/Creatinine Ratio 19.4     Anion Gap 10.0 mmol/L     Narrative:      GFR Normal >60  Chronic Kidney Disease <60  Kidney Failure <15      Potassium [[id number]]  (Abnormal) Collected: 10/18/21 0815    Specimen: Blood Updated: 10/18/21 0906     Potassium 3.4 mmol/L       Comment: Slight hemolysis detected by analyzer. Results may be affected.       Manual Differential [[id number]]  (Abnormal) Collected: 10/18/21 0828    Specimen: Blood Updated: 10/18/21 0901     Neutrophil % 74.0 %      Lymphocyte % 20.0 %      Monocyte % 1.0 %      Bands %  5.0 %      Neutrophils Absolute 7.51 10*3/mm3      Lymphocytes Absolute 1.90 10*3/mm3      Monocytes Absolute 0.10 10*3/mm3      RBC Morphology Normal     WBC Morphology Normal     Platelet Morphology Normal    CBC & Differential [[id number]]  (Abnormal) Collected: 10/18/21 0828    Specimen: Blood Updated: 10/18/21 0901    Narrative:      The following orders were created for panel order CBC & Differential.  Procedure                               Abnormality         Status                     ---------                               -----------         ------                     CBC Auto Differential[[id number]]        Abnormal            Final result               Scan Slide[[id number]]                                       Final result                 Please view results for these tests on the individual orders.    Scan Slide [[id number]] Collected: 10/18/21 0828    Specimen: Blood Updated: 10/18/21 0901     Scan Slide --     Comment: See Manual Differential Results       CBC Auto Differential [[id number]]  (Abnormal) Collected: 10/18/21 0828    Specimen: Blood Updated: 10/18/21 0901     WBC 9.50 10*3/mm3      RBC 3.45 10*6/mm3      Hemoglobin 10.3 g/dL      Hematocrit 30.1 %      MCV 87.0 fL      MCH 29.8 pg      MCHC 34.3 g/dL      RDW 15.1 %      RDW-SD 45.5 fl      MPV 6.7 fL      Platelets 366 10*3/mm3     Narrative:      The previously reported component NRBC is no longer being reported. Previous result was 0.1 /100 WBC (Reference Range: 0.0-0.2 /100 WBC) on 10/18/2021 at 0838 EDT.    aPTT [[id number]]  (Abnormal) Collected: 10/18/21 0828    Specimen: Blood Updated: 10/18/21 0847     PTT 91.2 seconds      Comment: Result checked       Manual  Differential [[id number]]  (Abnormal) Collected: 10/17/21 2304    Specimen: Blood Updated: 10/18/21 0049     Neutrophil % 78.0 %      Lymphocyte % 11.0 %      Monocyte % 2.0 %      Eosinophil % 1.0 %      Bands %  5.0 %      Metamyelocyte % 1.0 %      Atypical Lymphocyte % 2.0 %      Neutrophils Absolute 8.55 10*3/mm3      Lymphocytes Absolute 1.34 10*3/mm3      Monocytes Absolute 0.21 10*3/mm3      Eosinophils Absolute 0.10 10*3/mm3      RBC Morphology Normal     Toxic Granulation Slight/1+     Platelet Estimate Adequate     Large Platelets Slight/1+     Giant Platelets Slight/1+    CBC & Differential [[id number]]  (Abnormal) Collected: 10/17/21 2304    Specimen: Blood Updated: 10/18/21 0049    Narrative:      The following orders were created for panel order CBC & Differential.  Procedure                               Abnormality         Status                     ---------                               -----------         ------                     CBC Auto Differential[[id number]]        Abnormal            Final result               Scan Slide[[id number]]                                       Final result                 Please view results for these tests on the individual orders.    Scan Slide [[id number]] Collected: 10/17/21 2304    Specimen: Blood Updated: 10/18/21 0049     Scan Slide --     Comment: See Manual Differential Results       CBC Auto Differential [[id number]]  (Abnormal) Collected: 10/17/21 2304    Specimen: Blood Updated: 10/18/21 0049     WBC 10.30 10*3/mm3      RBC 3.66 10*6/mm3      Hemoglobin 10.5 g/dL      Hematocrit 31.4 %      MCV 85.7 fL      MCH 28.7 pg      MCHC 33.4 g/dL      RDW 14.8 %      RDW-SD 44.2 fl      MPV 7.1 fL      Platelets 402 10*3/mm3     aPTT [[id number]]  (Abnormal) Collected: 10/17/21 2304    Specimen: Blood from Arm, Left Updated: 10/17/21 2356     PTT 45.6 seconds     Basic Metabolic Panel [[id number]]  (Abnormal) Collected: 10/17/21 2304    Specimen: Blood Updated:  10/17/21 2353     Glucose 103 mg/dL      BUN 13 mg/dL      Creatinine 0.71 mg/dL      Sodium 138 mmol/L      Potassium 3.0 mmol/L      Chloride 103 mmol/L      CO2 23.0 mmol/L      Calcium 7.6 mg/dL      eGFR Non African Amer 76 mL/min/1.73      BUN/Creatinine Ratio 18.3     Anion Gap 12.0 mmol/L     Narrative:      GFR Normal >60  Chronic Kidney Disease <60  Kidney Failure <15      COVID PRE-OP / PRE-PROCEDURE SCREENING ORDER (NO ISOLATION) - Swab, Nasopharynx [[id number]]  (Normal) Collected: 10/17/21 1722    Specimen: Swab from Nasopharynx Updated: 10/17/21 1818    Narrative:      The following orders were created for panel order COVID PRE-OP / PRE-PROCEDURE SCREENING ORDER (NO ISOLATION) - Swab, Nasopharynx.  Procedure                               Abnormality         Status                     ---------                               -----------         ------                     COVID-19,CEPHEID/KALA/BD...[[id number]]  Normal              Final result                 Please view results for these tests on the individual orders.    COVID-19,CEPHEID/KALA/BDMAX,COR/TIGRE/PAD/DARLING IN-HOUSE(OR EMERGENT/ADD-ON),NP SWAB IN TRANSPORT MEDIA 3-4 HR TAT, RT-PCR - Swab, Nasopharynx [[id number]]  (Normal) Collected: 10/17/21 1722    Specimen: Swab from Nasopharynx Updated: 10/17/21 1818     COVID19 Not Detected    Narrative:      Fact sheet for providers: https://www.fda.gov/media/490786/download     Fact sheet for patients: https://www.fda.gov/media/116488/download  Fact sheet for providers: https://www.fda.gov/media/503591/download     Fact sheet for patients: https://www.fda.gov/media/851607/download    aPTT [[id number]]  (Normal) Collected: 10/17/21 1319    Specimen: Blood Updated: 10/17/21 1551     PTT 26.1 seconds     Protime-INR [[id number]]  (Normal) Collected: 10/17/21 1319    Specimen: Blood Updated: 10/17/21 1551     Protime 10.7 Seconds      INR 0.96    [place] Draw [[id number]] Collected: 10/17/21 1319    Specimen:  Blood Updated: 10/17/21 1430    Narrative:      The following orders were created for panel order [place] Draw.  Procedure                               Abnormality         Status                     ---------                               -----------         ------                     Green Top (Gel)[[id number]]                                  Final result               Lavender Top[[id number]]                                     Final result               Gold Top - SST[[id number]]                                   Final result               Light Blue Top[[id number]]                                   Final result                 Please view results for these tests on the individual orders.    Gold Top - SST [[id number]] Collected: 10/17/21 1319    Specimen: Blood Updated: 10/17/21 1430     Extra Tube Hold for add-ons.     Comment: Auto resulted.       Light Blue Top [[id number]] Collected: 10/17/21 1319    Specimen: Blood Updated: 10/17/21 1430     Extra Tube hold for add-on     Comment: Auto resulted       Green Top (Gel) [[id number]] Collected: 10/17/21 1319    Specimen: Blood Updated: 10/17/21 1430     Extra Tube Hold for add-ons.     Comment: Auto resulted.       Lavender Top [[id number]] Collected: 10/17/21 1319    Specimen: Blood Updated: 10/17/21 1430     Extra Tube hold for add-on     Comment: Auto resulted       D-dimer, Quantitative [[id number]]  (Abnormal) Collected: 10/17/21 1319    Specimen: Blood Updated: 10/17/21 1418     D-Dimer, Quantitative 9.20 mg/L (FEU)     Narrative:      Reference Range  --------------------------------------------------------------------     < 0.50   Negative Predictive Value  0.50-0.59   Indeterminate    >= 0.60   Probable VTE             A very low percentage of patients with DVT may yield D-Dimer results   below the cut-off of 0.50 mg/L FEU.  This is known to be more   prevalent in patients with distal DVT.             Results of this test should always be interpreted in  conjunction with   the patient's medical history, clinical presentation and other   findings.  Clinical diagnosis should not be based on the result of   [place] D-Dimer alone.    Manual Differential [[id number]]  (Abnormal) Collected: 10/17/21 1319    Specimen: Blood Updated: 10/17/21 1418     Neutrophil % 69.0 %      Lymphocyte % 17.0 %      Monocyte % 10.0 %      Eosinophil % 2.0 %      Bands %  2.0 %      Neutrophils Absolute 8.09 10*3/mm3      Lymphocytes Absolute 1.94 10*3/mm3      Monocytes Absolute 1.14 10*3/mm3      Eosinophils Absolute 0.23 10*3/mm3      nRBC 1.0 /100 WBC      RBC Morphology Normal     Toxic Granulation Slight/1+     Platelet Morphology Normal    CBC & Differential [[id number]]  (Abnormal) Collected: 10/17/21 1319    Specimen: Blood Updated: 10/17/21 1418    Narrative:      The following orders were created for panel order CBC & Differential.  Procedure                               Abnormality         Status                     ---------                               -----------         ------                     CBC Auto Differential[[id number]]        Abnormal            Final result               Scan Slide[[id number]]                                       Final result                 Please view results for these tests on the individual orders.    Scan Slide [[id number]] Collected: 10/17/21 1319    Specimen: Blood Updated: 10/17/21 1418     Scan Slide --     Comment: See Manual Differential Results       CBC Auto Differential [[id number]]  (Abnormal) Collected: 10/17/21 1319    Specimen: Blood Updated: 10/17/21 1418     WBC 11.40 10*3/mm3      RBC 4.23 10*6/mm3      Hemoglobin 12.1 g/dL      Hematocrit 36.0 %      MCV 85.1 fL      MCH 28.6 pg      MCHC 33.6 g/dL      RDW 14.9 %      RDW-SD 44.6 fl      MPV 6.9 fL      Platelets 443 10*3/mm3     BNP [[id number]]  (Normal) Collected: 10/17/21 1319    Specimen: Blood Updated: 10/17/21 1351     proBNP 1,142.0 pg/mL     Narrative:      Among  patients with dyspnea, NT-proBNP is highly sensitive for the detection of acute congestive heart failure. In addition NT-proBNP of <300 pg/ml effectively rules out acute congestive heart failure with 99% negative predictive value.    Results may be falsely decreased if patient taking Biotin.      Comprehensive Metabolic Panel [[id number]]  (Abnormal) Collected: 10/17/21 1319    Specimen: Blood Updated: 10/17/21 1346     Glucose 115 mg/dL      BUN 15 mg/dL      Creatinine 0.91 mg/dL      Sodium 141 mmol/L      Potassium 3.6 mmol/L      Chloride 101 mmol/L      CO2 24.0 mmol/L      Calcium 8.8 mg/dL      Total Protein 6.7 g/dL      Albumin 3.60 g/dL      ALT (SGPT) 21 U/L      AST (SGOT) 27 U/L      Alkaline Phosphatase 106 U/L      Total Bilirubin 0.9 mg/dL      eGFR Non African Amer 57 mL/min/1.73      Globulin 3.1 gm/dL      A/G Ratio 1.2 g/dL      BUN/Creatinine Ratio 16.5     Anion Gap 16.0 mmol/L     Narrative:      GFR Normal >60  Chronic Kidney Disease <60  Kidney Failure <15             I reviewed the patient's new clinical results.    Assessment/Plan     Bilateral pulmonary embolism  -heparin drip  -oxygen as needed  -will need to be switched to oral anticoagulation prior to discharge    DVT in right lower extremity  -heparin drip    Hypertension  -resume home medications    Diet: regular  DVT prophylaxis: heparin drip  GI prophylaxis: protonix  Code status: DNR      I discussed the patient's findings and my recommendations with patient and family    EFREN Evangelista  10/18/21  10:41 EDT

## 2021-10-19 LAB
ANION GAP SERPL CALCULATED.3IONS-SCNC: 11 MMOL/L (ref 5–15)
ANISOCYTOSIS BLD QL: NORMAL
APTT PPP: 113.3 SECONDS (ref 61–76.5)
APTT PPP: 55.8 SECONDS (ref 61–76.5)
APTT PPP: 85.8 SECONDS (ref 61–76.5)
BUN SERPL-MCNC: 17 MG/DL (ref 8–23)
BUN/CREAT SERPL: 24.6 (ref 7–25)
CALCIUM SPEC-SCNC: 7.7 MG/DL (ref 8.2–9.6)
CHLORIDE SERPL-SCNC: 104 MMOL/L (ref 98–107)
CO2 SERPL-SCNC: 24 MMOL/L (ref 22–29)
CREAT SERPL-MCNC: 0.69 MG/DL (ref 0.57–1)
DEPRECATED RDW RBC AUTO: 43.8 FL (ref 37–54)
EOSINOPHIL # BLD MANUAL: 0.09 10*3/MM3 (ref 0–0.4)
EOSINOPHIL NFR BLD MANUAL: 1 % (ref 0.3–6.2)
ERYTHROCYTE [DISTWIDTH] IN BLOOD BY AUTOMATED COUNT: 14.5 % (ref 12.3–15.4)
GFR SERPL CREATININE-BSD FRML MDRD: 79 ML/MIN/1.73
GIANT PLATELETS: NORMAL
GLUCOSE SERPL-MCNC: 93 MG/DL (ref 65–99)
HCT VFR BLD AUTO: 29.7 % (ref 34–46.6)
HGB BLD-MCNC: 9.8 G/DL (ref 12–15.9)
INR PPP: 1.03 (ref 0.93–1.1)
LARGE PLATELETS: NORMAL
LYMPHOCYTES # BLD MANUAL: 1.91 10*3/MM3 (ref 0.7–3.1)
LYMPHOCYTES NFR BLD MANUAL: 21 % (ref 19.6–45.3)
LYMPHOCYTES NFR BLD MANUAL: 8 % (ref 5–12)
MCH RBC QN AUTO: 28.1 PG (ref 26.6–33)
MCHC RBC AUTO-ENTMCNC: 33 G/DL (ref 31.5–35.7)
MCV RBC AUTO: 85.3 FL (ref 79–97)
MICROCYTES BLD QL: NORMAL
MONOCYTES # BLD AUTO: 0.7 10*3/MM3 (ref 0.1–0.9)
NEUTROPHILS # BLD AUTO: 6 10*3/MM3 (ref 1.7–7)
NEUTROPHILS NFR BLD MANUAL: 67 % (ref 42.7–76)
NEUTS BAND NFR BLD MANUAL: 2 % (ref 0–5)
PLATELET # BLD AUTO: 387 10*3/MM3 (ref 140–450)
PMV BLD AUTO: 6.4 FL (ref 6–12)
POTASSIUM SERPL-SCNC: 3.3 MMOL/L (ref 3.5–5.2)
POTASSIUM SERPL-SCNC: 4.6 MMOL/L (ref 3.5–5.2)
PROTHROMBIN TIME: 11.4 SECONDS (ref 9.6–11.7)
QT INTERVAL: 515 MS
RBC # BLD AUTO: 3.48 10*6/MM3 (ref 3.77–5.28)
SCAN SLIDE: NORMAL
SMALL PLATELETS BLD QL SMEAR: ADEQUATE
SODIUM SERPL-SCNC: 139 MMOL/L (ref 136–145)
TOXIC GRANULATION: NORMAL
VARIANT LYMPHS NFR BLD MANUAL: 1 % (ref 0–5)
WBC # BLD AUTO: 8.7 10*3/MM3 (ref 3.4–10.8)

## 2021-10-19 PROCEDURE — 25010000002 HEPARIN (PORCINE) 25000-0.45 UT/250ML-% SOLUTION: Performed by: INTERNAL MEDICINE

## 2021-10-19 PROCEDURE — 85025 COMPLETE CBC W/AUTO DIFF WBC: CPT | Performed by: INTERNAL MEDICINE

## 2021-10-19 PROCEDURE — 80048 BASIC METABOLIC PNL TOTAL CA: CPT | Performed by: INTERNAL MEDICINE

## 2021-10-19 PROCEDURE — 85730 THROMBOPLASTIN TIME PARTIAL: CPT | Performed by: INTERNAL MEDICINE

## 2021-10-19 PROCEDURE — 84132 ASSAY OF SERUM POTASSIUM: CPT | Performed by: INTERNAL MEDICINE

## 2021-10-19 PROCEDURE — 85007 BL SMEAR W/DIFF WBC COUNT: CPT | Performed by: INTERNAL MEDICINE

## 2021-10-19 PROCEDURE — 85610 PROTHROMBIN TIME: CPT | Performed by: NURSE PRACTITIONER

## 2021-10-19 RX ORDER — WARFARIN SODIUM 2.5 MG/1
2.5 TABLET ORAL
Status: DISCONTINUED | OUTPATIENT
Start: 2021-10-20 | End: 2021-10-20 | Stop reason: HOSPADM

## 2021-10-19 RX ORDER — WARFARIN SODIUM 5 MG/1
5 TABLET ORAL
Status: COMPLETED | OUTPATIENT
Start: 2021-10-19 | End: 2021-10-19

## 2021-10-19 RX ADMIN — POTASSIUM CHLORIDE 40 MEQ: 1500 TABLET, EXTENDED RELEASE ORAL at 04:32

## 2021-10-19 RX ADMIN — METOPROLOL TARTRATE 12.5 MG: 25 TABLET, FILM COATED ORAL at 09:24

## 2021-10-19 RX ADMIN — WARFARIN 5 MG: 5 TABLET ORAL at 18:39

## 2021-10-19 RX ADMIN — ISOSORBIDE MONONITRATE 60 MG: 60 TABLET, EXTENDED RELEASE ORAL at 09:24

## 2021-10-19 RX ADMIN — PANTOPRAZOLE SODIUM 40 MG: 40 TABLET, DELAYED RELEASE ORAL at 04:32

## 2021-10-19 RX ADMIN — AMLODIPINE BESYLATE 5 MG: 5 TABLET ORAL at 09:24

## 2021-10-19 RX ADMIN — HEPARIN SODIUM 19 UNITS/KG/HR: 10000 INJECTION, SOLUTION INTRAVENOUS at 18:56

## 2021-10-19 RX ADMIN — ATORVASTATIN CALCIUM 10 MG: 10 TABLET, FILM COATED ORAL at 21:06

## 2021-10-19 RX ADMIN — POTASSIUM CHLORIDE 40 MEQ: 1500 TABLET, EXTENDED RELEASE ORAL at 11:33

## 2021-10-19 RX ADMIN — Medication 3 ML: at 21:09

## 2021-10-19 RX ADMIN — Medication 3 ML: at 09:24

## 2021-10-19 NOTE — PROGRESS NOTES
"Pharmacy dosing service  Anticoagulant  Warfarin     Subjective:    Carrie Golden is a 89 y.o.female being initiated on warfarin for PE and DVT. Patient with new b/l PE and RLE DVT.     INR Goal: 2 - 3  Home medication?: No  Bridge Therapy Present?:  Yes,  Heparin drip (Protocol high-dose)  Interacting Medications Evaluation (New/Present/Discontinued): None  Additional Contributing Factors: Advanced age      Assessment/Plan:    New-start warfarin. INR 0.96 on 10/17. Patient 89 yo therefore will start conservatively with 5 mg x1 today then 2.5 mg daily. May require dose adjustment in future pending INR response. Continue heparin drip as bridge while INR <2.     Continue to monitor and adjust based on INR.         Date 10/19           INR ---           Dose 5 mg                Objective:  [Ht: 157.5 cm (62\"); Wt: 54.6 kg (120 lb 6.4 oz); BMI: Body mass index is 22.02 kg/m².]    Lab Results   Component Value Date    ALBUMIN 3.60 10/17/2021     Lab Results   Component Value Date    INR 0.96 10/17/2021    INR 1.05 12/03/2019    INR 0.95 12/01/2019    PROTIME 10.7 10/17/2021    PROTIME 10.9 12/03/2019    PROTIME 10.1 12/01/2019     Lab Results   Component Value Date    HGB 9.8 (L) 10/19/2021    HGB 10.3 (L) 10/18/2021    HGB 10.5 (L) 10/17/2021     Lab Results   Component Value Date    HCT 29.7 (L) 10/19/2021    HCT 30.1 (L) 10/18/2021    HCT 31.4 (L) 10/17/2021       Minerva Jackson, PharmD  10/19/21 10:42 EDT     "

## 2021-10-19 NOTE — CASE MANAGEMENT/SOCIAL WORK
Continued Stay Note  [facility]     Patient Name: Carrie Golden  MRN: [id number]  Today's Date: 10/19/2021    Admit Date: 10/17/2021     Discharge Plan     Row Name 10/19/21 1406       Plan    Plan Comments Received notification from NP that anticoagulation was needed. CM attempted to contact patient’s pharmacy, [facility] ([phone]), was on hold for over 20 minutes. CM contacted [facility] retail pharmacy to test claim cost of eliquis. Prescription would cost over $500 d/t no listed prescription coverage. NP notified. CM attempted to contact patient to discuss but no answer. CM met with patient and relative, Rita at bedside to discuss prescription coverage and eliquis being over $500. They reported that MD already discussed Coumadin and RN confirmed Coumadin had been ordered. Patient reported she would not take a medication that was over $500. CM advised that we will continue to follow for any other prescription needs at [place].              Met with patient in room wearing PPE: mask/goggles.      Maintained distance greater than six feet and spent less than 15 minutes in the room.      Gaby Ratliff RN     Office Phone: [phone]  Office Cell: [phone]

## 2021-10-19 NOTE — PROGRESS NOTES
LOS: 2 days   Patient Care Team:  Monster Myrick MD as PCP - General (Family Medicine)  ROBERTO CARLOS Morales MD as Consulting Physician (Cardiology)    Subjective         Review of Systems   Constitutional: Positive for activity change and fatigue.   Respiratory: Negative for cough and shortness of breath.            Objective     Vital Signs  Temp:  [97.9 °F (36.6 °C)-99 °F (37.2 °C)] 97.9 °F (36.6 °C)  Heart Rate:  [55-65] 64  Resp:  [16-26] 22  BP: ()/(36-50) 109/46      Physical Exam  Constitutional:       Appearance: Normal appearance.   HENT:      Head: Normocephalic and atraumatic.      Right Ear: External ear normal.      Left Ear: External ear normal.      Nose: Nose normal.      Mouth/Throat:      Mouth: Mucous membranes are moist.   Eyes:      Conjunctiva/sclera: Conjunctivae normal.   Cardiovascular:      Rate and Rhythm: Normal rate and regular rhythm.      Pulses: Normal pulses.      Heart sounds: Normal heart sounds.   Pulmonary:      Effort: Pulmonary effort is normal.      Breath sounds: Normal breath sounds.   Abdominal:      General: Bowel sounds are normal.      Palpations: Abdomen is soft.   Musculoskeletal:         General: Normal range of motion.      Cervical back: Normal range of motion.   Skin:     General: Skin is warm and dry.   Neurological:      Mental Status: She is alert and oriented to person, place, and time.   Psychiatric:         Behavior: Behavior normal.              Results Review:    Lab Results (last 24 hours)     Procedure Component Value Units Date/Time    Protime-INR [[id number]]  (Normal) Collected: 10/19/21 1021    Specimen: Blood Updated: 10/19/21 1121     Protime 11.4 Seconds      INR 1.03    aPTT [[id number]]  (Abnormal) Collected: 10/19/21 1021    Specimen: Blood Updated: 10/19/21 1121     PTT 55.8 seconds     aPTT [[id number]]  (Abnormal) Collected: 10/19/21 0219    Specimen: Blood Updated: 10/19/21 0307     .3 seconds     Manual Differential [[id number]]  Collected: 10/19/21 0128    Specimen: Blood Updated: 10/19/21 0213     Neutrophil % 67.0 %      Lymphocyte % 21.0 %      Monocyte % 8.0 %      Eosinophil % 1.0 %      Bands %  2.0 %      Atypical Lymphocyte % 1.0 %      Neutrophils Absolute 6.00 10*3/mm3      Lymphocytes Absolute 1.91 10*3/mm3      Monocytes Absolute 0.70 10*3/mm3      Eosinophils Absolute 0.09 10*3/mm3      Anisocytosis Slight/1+     Microcytes Slight/1+     Toxic Granulation Slight/1+     Platelet Estimate Adequate     Large Platelets Slight/1+     Giant Platelets Slight/1+    CBC & Differential [[id number]]  (Abnormal) Collected: 10/19/21 0128    Specimen: Blood Updated: 10/19/21 0213    Narrative:      The following orders were created for panel order CBC & Differential.  Procedure                               Abnormality         Status                     ---------                               -----------         ------                     CBC Auto Differential[[id number]]        Abnormal            Final result               Scan Slide[[id number]]                                       Final result                 Please view results for these tests on the individual orders.    Scan Slide [[id number]] Collected: 10/19/21 0128    Specimen: Blood Updated: 10/19/21 0213     Scan Slide --     Comment: See Manual Differential Results       CBC Auto Differential [[id number]]  (Abnormal) Collected: 10/19/21 0128    Specimen: Blood Updated: 10/19/21 0213     WBC 8.70 10*3/mm3      RBC 3.48 10*6/mm3      Hemoglobin 9.8 g/dL      Hematocrit 29.7 %      MCV 85.3 fL      MCH 28.1 pg      MCHC 33.0 g/dL      RDW 14.5 %      RDW-SD 43.8 fl      MPV 6.4 fL      Platelets 387 10*3/mm3     Narrative:      The previously reported component NRBC is no longer being reported. Previous result was 0.1 /100 WBC (Reference Range: 0.0-0.2 /100 WBC) on 10/19/2021 at [facility] [place] [facility].    Basic Metabolic Panel [[id number]]  (Abnormal) Collected: 10/19/21 0128    Specimen: Blood  Updated: 10/19/21 0158     Glucose 93 mg/dL      BUN 17 mg/dL      Creatinine 0.69 mg/dL      Sodium 139 mmol/L      Potassium 3.3 mmol/L      Chloride 104 mmol/L      CO2 24.0 mmol/L      Calcium 7.7 mg/dL      eGFR Non African Amer 79 mL/min/1.73      BUN/Creatinine Ratio 24.6     Anion Gap 11.0 mmol/L     Narrative:      GFR Normal >60  Chronic Kidney Disease <60  Kidney Failure <15      aPTT [[id number]]  (Abnormal) Collected: 10/18/21 1806    Specimen: Blood Updated: 10/18/21 1853     PTT 53.2 seconds            Imaging Results (Last 24 Hours)     ** No results found for the last 24 hours. **               I reviewed the patient's new clinical results.    Medication Review:   Scheduled Meds:amLODIPine, 5 mg, Oral, Daily  atorvastatin, 10 mg, Oral, Nightly  isosorbide mononitrate, 60 mg, Oral, Daily  metoprolol tartrate, 12.5 mg, Oral, Q12H  pantoprazole, 40 mg, Oral, Q AM  sodium chloride, 3 mL, Intravenous, Q12H  [START ON 10/20/2021] warfarin, 2.5 mg, Oral, Daily  warfarin, 5 mg, Oral, Once      Continuous Infusions:heparin, 18 Units/kg/hr, Last Rate: 17 Units/kg/hr (10/19/21 0415)  Pharmacy to dose warfarin,       PRN Meds:.•  acetaminophen  •  heparin  •  heparin  •  melatonin  •  nitroglycerin  •  ondansetron  •  Pharmacy to dose warfarin  •  potassium chloride **OR** potassium chloride **OR** potassium chloride  •  [COMPLETED] Insert peripheral IV **AND** sodium chloride  •  sodium chloride     Interval History:    Assessment/Plan     Bilateral pulmonary embolism  -heparin drip  -oxygen as needed  -will transition to warfarin pharmacy to dose  -unable to get afford eliquis or xarelto      DVT in right lower extremity  -heparin drip      Hypertension  -resume home medications     Diet: regular  DVT prophylaxis: heparin drip with transition to warfarin  GI prophylaxis: protonix  Code status: DNR    Plan for disposition: Home    EFREN Evangelista  10/19/21  11:31 EDT

## 2021-10-19 NOTE — PLAN OF CARE
Goal Outcome Evaluation:      Patient alert/oriented X4 able to make needs known. Patient has no complaints of pain this shift. Patient continues on Heparin gtt. Patient on oxygen 2L/NC. Patient continues on bedrest.

## 2021-10-19 NOTE — PLAN OF CARE
Goal Outcome Evaluation:   Patient is doing well, took a nap during the day. Is tolerating food well and is happy to have gotten up and walked to the bathroom.

## 2021-10-20 ENCOUNTER — READMISSION MANAGEMENT (OUTPATIENT)
Dept: CALL CENTER | Facility: HOSPITAL | Age: 86
End: 2021-10-20

## 2021-10-20 VITALS
BODY MASS INDEX: 22.31 KG/M2 | RESPIRATION RATE: 16 BRPM | HEART RATE: 58 BPM | SYSTOLIC BLOOD PRESSURE: 99 MMHG | WEIGHT: 121.25 LBS | HEIGHT: 62 IN | DIASTOLIC BLOOD PRESSURE: 57 MMHG | TEMPERATURE: 98 F | OXYGEN SATURATION: 98 %

## 2021-10-20 PROBLEM — I82.4Z1 ACUTE DEEP VEIN THROMBOSIS (DVT) OF DISTAL VEIN OF RIGHT LOWER EXTREMITY: Status: ACTIVE | Noted: 2021-10-20

## 2021-10-20 PROBLEM — D50.9 IRON DEFICIENCY ANEMIA: Status: ACTIVE | Noted: 2021-10-20

## 2021-10-20 LAB
ANION GAP SERPL CALCULATED.3IONS-SCNC: 12 MMOL/L (ref 5–15)
ANISOCYTOSIS BLD QL: NORMAL
APTT PPP: 57.5 SECONDS (ref 61–76.5)
APTT PPP: 62.2 SECONDS (ref 61–76.5)
BUN SERPL-MCNC: 15 MG/DL (ref 8–23)
BUN/CREAT SERPL: 21.1 (ref 7–25)
CALCIUM SPEC-SCNC: 8 MG/DL (ref 8.2–9.6)
CHLORIDE SERPL-SCNC: 107 MMOL/L (ref 98–107)
CO2 SERPL-SCNC: 21 MMOL/L (ref 22–29)
CREAT SERPL-MCNC: 0.71 MG/DL (ref 0.57–1)
DEPRECATED RDW RBC AUTO: 45.1 FL (ref 37–54)
EOSINOPHIL # BLD MANUAL: 0.15 10*3/MM3 (ref 0–0.4)
EOSINOPHIL NFR BLD MANUAL: 2 % (ref 0.3–6.2)
ERYTHROCYTE [DISTWIDTH] IN BLOOD BY AUTOMATED COUNT: 15 % (ref 12.3–15.4)
GFR SERPL CREATININE-BSD FRML MDRD: 76 ML/MIN/1.73
GLUCOSE SERPL-MCNC: 104 MG/DL (ref 65–99)
HCT VFR BLD AUTO: 28.6 % (ref 34–46.6)
HGB BLD-MCNC: 9.6 G/DL (ref 12–15.9)
HYPOCHROMIA BLD QL: NORMAL
INR PPP: 1.04 (ref 0.93–1.1)
LYMPHOCYTES # BLD MANUAL: 1.68 10*3/MM3 (ref 0.7–3.1)
LYMPHOCYTES NFR BLD MANUAL: 23 % (ref 19.6–45.3)
LYMPHOCYTES NFR BLD MANUAL: 6 % (ref 5–12)
MCH RBC QN AUTO: 28.6 PG (ref 26.6–33)
MCHC RBC AUTO-ENTMCNC: 33.5 G/DL (ref 31.5–35.7)
MCV RBC AUTO: 85.3 FL (ref 79–97)
MONOCYTES # BLD AUTO: 0.44 10*3/MM3 (ref 0.1–0.9)
NEUTROPHILS # BLD AUTO: 5.04 10*3/MM3 (ref 1.7–7)
NEUTROPHILS NFR BLD MANUAL: 68 % (ref 42.7–76)
NEUTS BAND NFR BLD MANUAL: 1 % (ref 0–5)
NEUTS VAC BLD QL SMEAR: NORMAL
PLAT MORPH BLD: NORMAL
PLATELET # BLD AUTO: 396 10*3/MM3 (ref 140–450)
PMV BLD AUTO: 6.8 FL (ref 6–12)
POTASSIUM SERPL-SCNC: 4.4 MMOL/L (ref 3.5–5.2)
PROTHROMBIN TIME: 11.5 SECONDS (ref 9.6–11.7)
RBC # BLD AUTO: 3.35 10*6/MM3 (ref 3.77–5.28)
SCAN SLIDE: NORMAL
SODIUM SERPL-SCNC: 140 MMOL/L (ref 136–145)
TOXIC GRANULATION: NORMAL
WBC # BLD AUTO: 7.3 10*3/MM3 (ref 3.4–10.8)

## 2021-10-20 PROCEDURE — 97161 PT EVAL LOW COMPLEX 20 MIN: CPT

## 2021-10-20 PROCEDURE — 85730 THROMBOPLASTIN TIME PARTIAL: CPT | Performed by: INTERNAL MEDICINE

## 2021-10-20 PROCEDURE — 85007 BL SMEAR W/DIFF WBC COUNT: CPT | Performed by: INTERNAL MEDICINE

## 2021-10-20 PROCEDURE — 25010000002 ENOXAPARIN PER 10 MG: Performed by: INTERNAL MEDICINE

## 2021-10-20 PROCEDURE — 85610 PROTHROMBIN TIME: CPT | Performed by: NURSE PRACTITIONER

## 2021-10-20 PROCEDURE — 85025 COMPLETE CBC W/AUTO DIFF WBC: CPT | Performed by: INTERNAL MEDICINE

## 2021-10-20 PROCEDURE — 80048 BASIC METABOLIC PNL TOTAL CA: CPT | Performed by: INTERNAL MEDICINE

## 2021-10-20 RX ORDER — PANTOPRAZOLE SODIUM 40 MG/1
40 TABLET, DELAYED RELEASE ORAL
Qty: 30 TABLET | Refills: 1 | Status: SHIPPED | OUTPATIENT
Start: 2021-10-21

## 2021-10-20 RX ORDER — ACETAMINOPHEN 500 MG
500 TABLET ORAL EVERY 6 HOURS PRN
Start: 2021-10-20 | End: 2022-03-22

## 2021-10-20 RX ORDER — FERROUS SULFATE TAB EC 324 MG (65 MG FE EQUIVALENT) 324 (65 FE) MG
325 TABLET DELAYED RESPONSE ORAL
Qty: 90 TABLET | Refills: 1 | Status: SHIPPED | OUTPATIENT
Start: 2021-10-20 | End: 2022-03-22

## 2021-10-20 RX ORDER — WARFARIN SODIUM 2.5 MG/1
TABLET ORAL
Qty: 90 TABLET | Refills: 1 | Status: SHIPPED | OUTPATIENT
Start: 2021-10-20 | End: 2022-05-05

## 2021-10-20 RX ADMIN — PANTOPRAZOLE SODIUM 40 MG: 40 TABLET, DELAYED RELEASE ORAL at 05:12

## 2021-10-20 RX ADMIN — ENOXAPARIN SODIUM 60 MG: 60 INJECTION SUBCUTANEOUS at 09:47

## 2021-10-20 RX ADMIN — METOPROLOL TARTRATE 12.5 MG: 25 TABLET, FILM COATED ORAL at 09:47

## 2021-10-20 RX ADMIN — Medication 3 ML: at 09:55

## 2021-10-20 NOTE — PLAN OF CARE
Goal Outcome Evaluation:  Plan of Care Reviewed With: patient      Progress: improving   Alert and oriented x 3. Able to verbalize needs and wants. Is hard of hearing, requires increase speech volume for adequate communication. Takes medication whole in applesauce and tolerates well. No c/o pain/discomfort/SOB noted. Does not require O2 therapy at this time. Remains continent of bowel and bladder. Received potassium replacement in AM, redraw should potassium level WNL. Continues to be on heparin drip at 17, redraw ordered for 0100. No s/s of bleeding/bruising noted. Currently in bed, awake. Call bell in reach.

## 2021-10-20 NOTE — PLAN OF CARE
Goal Outcome Evaluation:  Plan of Care Reviewed With: patient           Outcome Summary: 89 yo female admitted wtih RL swelling.  d/o B PE and RLE DVT.  Pt is very active and independent, lives alone.  Pt on room air, sats 95% . Pt demos ind with all mobility, no functional deficits.  Sats 89-92% wtih activity on room air.  No further therapy needs.

## 2021-10-20 NOTE — PLAN OF CARE
Goal Outcome Evaluation:   Patient is doing well today, has ambulated with PT around the floor. Has been off oxygen all day and is ready to leave.

## 2021-10-20 NOTE — PROGRESS NOTES
"Pharmacy dosing service  Anticoagulant  Warfarin     Subjective:    Carrie Golden is a 89 y.o.female being initiated on warfarin for PE and DVT. Patient with new b/l PE and RLE DVT.     INR Goal: 2 - 3  Home medication?: No  Bridge Therapy Present?:  Yes,  Enoxaparin 1 mg SQ Q12H  Interacting Medications Evaluation (New/Present/Discontinued): None  Additional Contributing Factors: Advanced age      Assessment/Plan:    New-start warfarin. Patient 89 yo therefore started conservatively with 5 mg x1 10/19, followed by 2.5 mg daily. May require dose adjustment in future pending INR response. Continue therapeutic Lovenox as bridge while INR <2.     Continue to monitor and adjust based on INR.         Date 10/19 10/20          INR 1.03 1.04          Dose 5 mg  2.5mg              Objective:  [Ht: 157.5 cm (62\"); Wt: 55 kg (121 lb 4.1 oz); BMI: Body mass index is 22.18 kg/m².]    Lab Results   Component Value Date    ALBUMIN 3.60 10/17/2021     Lab Results   Component Value Date    INR 1.04 10/20/2021    INR 1.03 10/19/2021    INR 0.96 10/17/2021    PROTIME 11.5 10/20/2021    PROTIME 11.4 10/19/2021    PROTIME 10.7 10/17/2021     Lab Results   Component Value Date    HGB 9.6 (L) 10/20/2021    HGB 9.8 (L) 10/19/2021    HGB 10.3 (L) 10/18/2021     Lab Results   Component Value Date    HCT 28.6 (L) 10/20/2021    HCT 29.7 (L) 10/19/2021    HCT 30.1 (L) 10/18/2021       Rosalba Das PharmD  10/20/21 10:43 EDT       "

## 2021-10-20 NOTE — CASE MANAGEMENT/SOCIAL WORK
Continued Stay Note  [facility]     Patient Name: Carrie Golden  MRN: [id number]  Today's Date: 10/20/2021    Admit Date: 10/17/2021     Discharge Plan     Row Name 10/20/21 1451       Plan    Plan Comments CM attempted to contact [facility] pharmacy, but unable to speak to pharmacist. CM contacted [facility] to test claim cost of lovenox bridge. Cost through meds to bed would be $119. Original cost would be around $160. CM updated RN who discussed with patient who was agreeable.    Final Discharge Disposition Code 01 - home or self-care    Final Note DC orders in at this time.              Expected Discharge Date and Time     Expected Discharge Date Expected Discharge Time    Oct 20, 2021           Phone communication or documentation only - no physical contact with patient or family.    Gaby Ratliff RN     Office Phone: [phone]  Office Cell: [phone]

## 2021-10-20 NOTE — DISCHARGE SUMMARY
Date of Discharge:  10/20/2021    Discharge Diagnosis:   **Pulmonary embolism (HCC) [I26.99]   Acute deep vein thrombosis (DVT) of distal vein of right lower extremity (HCC) [I82.4Z1]   Iron deficiency anemia [D50.9]   Right leg pain [M79.604]   Gastroesophageal reflux disease [K21.9]   Essential hypertension [I10]       Presenting Problem/History of Present Illness  Active Hospital Problems    Diagnosis  POA   • **Pulmonary embolism (HCC) [I26.99]  Yes   • Acute deep vein thrombosis (DVT) of distal vein of right lower extremity (HCC) [I82.4Z1]  Yes   • Iron deficiency anemia [D50.9]  Yes   • Right leg pain [M79.604]  Yes   • Gastroesophageal reflux disease [K21.9]  Yes   • Essential hypertension [I10]  Yes      Resolved Hospital Problems   No resolved problems to display.          Hospital Course  Patient is a 89 y.o. female with recent hospitalization for ischemic colitis presented with right leg pain.  She was found to have DVT and small bilateral PE's.  She did not require oxygen.  There was no evidence of right heart strain. She was initially treated with heparin drip, which was converted to Lovenox and warfarin at discharge.  Leg symptoms improved.  She maintains an active lifestyle and was eager to be discharged.  At the time of discharge she is tolerating a regular diet and ambulating independently.  She has been educated on self-administering Lovenox injections.  She has been educated about warfarin.  She will follow up in our office in 2 days for INR, then likely again 2 days later.  She will see Dr. Myirck in 1-2 weeks. I have added oral iron supplement for slight anemia and pantoprazole for GI protection while on anti-coagulation.  I expect anti-coagulation to be life-long.    DVT was provoked by recent hospitalization/immobilization.  Thrombophilic workup was not pursued as she will need life-long anti-coagulation and this was a provoked event.     Procedures Performed         Consults:   Consults      Date and Time Order Name Status Description    10/17/2021  5:03 PM Family Medicine Consult Completed     10/4/2021  8:12 AM Inpatient Gastroenterology Consult Completed     10/4/2021  4:15 AM Inpatient Cardiology Consult Completed           Pertinent Test Results:    Lab Results (most recent)     Procedure Component Value Units Date/Time    aPTT [[id number]]  (Normal) Collected: 10/20/21 0802    Specimen: Blood Updated: 10/20/21 0846     PTT 62.2 seconds     Manual Differential [[id number]] Collected: 10/20/21 0102    Specimen: Blood Updated: 10/20/21 0302     Neutrophil % 68.0 %      Lymphocyte % 23.0 %      Monocyte % 6.0 %      Eosinophil % 2.0 %      Bands %  1.0 %      Neutrophils Absolute 5.04 10*3/mm3      Lymphocytes Absolute 1.68 10*3/mm3      Monocytes Absolute 0.44 10*3/mm3      Eosinophils Absolute 0.15 10*3/mm3      Anisocytosis Slight/1+     Hypochromia Slight/1+     Toxic Granulation Slight/1+     Vacuolated Neutrophils Slight/1+     Platelet Morphology Normal    CBC & Differential [[id number]]  (Abnormal) Collected: 10/20/21 0102    Specimen: Blood Updated: 10/20/21 0302    Narrative:      The following orders were created for panel order CBC & Differential.  Procedure                               Abnormality         Status                     ---------                               -----------         ------                     CBC Auto Differential[[id number]]        Abnormal            Final result               Scan Slide[[id number]]                                       Final result                 Please view results for these tests on the individual orders.    Scan Slide [[id number]] Collected: 10/20/21 0102    Specimen: Blood Updated: 10/20/21 0302     Scan Slide --     Comment: See Manual Differential Results       CBC Auto Differential [[id number]]  (Abnormal) Collected: 10/20/21 0102    Specimen: Blood Updated: 10/20/21 0302     WBC 7.30 10*3/mm3      RBC 3.35 10*6/mm3      Hemoglobin 9.6 g/dL       Hematocrit 28.6 %      MCV 85.3 fL      MCH 28.6 pg      MCHC 33.5 g/dL      RDW 15.0 %      RDW-SD 45.1 fl      MPV 6.8 fL      Platelets 396 10*3/mm3     Narrative:      The previously reported component NRBC is no longer being reported. Previous result was 0.0 /100 WBC (Reference Range: 0.0-0.2 /100 WBC) on 10/20/2021 at 0135 EDT.    aPTT [[id number]]  (Abnormal) Collected: 10/20/21 0102    Specimen: Blood Updated: 10/20/21 0144     PTT 57.5 seconds     Protime-INR [[id number]]  (Normal) Collected: 10/20/21 0102    Specimen: Blood Updated: 10/20/21 0144     Protime 11.5 Seconds      INR 1.04    Basic Metabolic Panel [[id number]]  (Abnormal) Collected: 10/20/21 0102    Specimen: Blood Updated: 10/20/21 0133     Glucose 104 mg/dL      BUN 15 mg/dL      Creatinine 0.71 mg/dL      Sodium 140 mmol/L      Potassium 4.4 mmol/L      Chloride 107 mmol/L      CO2 21.0 mmol/L      Calcium 8.0 mg/dL      eGFR Non African Amer 76 mL/min/1.73      BUN/Creatinine Ratio 21.1     Anion Gap 12.0 mmol/L     Narrative:      GFR Normal >60  Chronic Kidney Disease <60  Kidney Failure <15      Potassium [[id number]]  (Normal) Collected: 10/19/21 2209    Specimen: Blood Updated: 10/19/21 2243     Potassium 4.6 mmol/L      Comment: Result checked        Protime-INR [[id number]]  (Normal) Collected: 10/19/21 1021    Specimen: Blood Updated: 10/19/21 1121     Protime 11.4 Seconds      INR 1.03    Manual Differential [[id number]] Collected: 10/19/21 0128    Specimen: Blood Updated: 10/19/21 0213     Neutrophil % 67.0 %      Lymphocyte % 21.0 %      Monocyte % 8.0 %      Eosinophil % 1.0 %      Bands %  2.0 %      Atypical Lymphocyte % 1.0 %      Neutrophils Absolute 6.00 10*3/mm3      Lymphocytes Absolute 1.91 10*3/mm3      Monocytes Absolute 0.70 10*3/mm3      Eosinophils Absolute 0.09 10*3/mm3      Anisocytosis Slight/1+     Microcytes Slight/1+     Toxic Granulation Slight/1+     Platelet Estimate Adequate     Large Platelets Slight/1+      Giant Platelets Slight/1+    CBC & Differential [[id number]]  (Abnormal) Collected: 10/19/21 0128    Specimen: Blood Updated: 10/19/21 0213    Narrative:      The following orders were created for panel order CBC & Differential.  Procedure                               Abnormality         Status                     ---------                               -----------         ------                     CBC Auto Differential[[id number]]        Abnormal            Final result               Scan Slide[[id number]]                                       Final result                 Please view results for these tests on the individual orders.    Scan Slide [[id number]] Collected: 10/19/21 0128    Specimen: Blood Updated: 10/19/21 0213     Scan Slide --     Comment: See Manual Differential Results       CBC Auto Differential [[id number]]  (Abnormal) Collected: 10/19/21 0128    Specimen: Blood Updated: 10/19/21 0213     WBC 8.70 10*3/mm3      RBC 3.48 10*6/mm3      Hemoglobin 9.8 g/dL      Hematocrit 29.7 %      MCV 85.3 fL      MCH 28.1 pg      MCHC 33.0 g/dL      RDW 14.5 %      RDW-SD 43.8 fl      MPV 6.4 fL      Platelets 387 10*3/mm3     Narrative:      The previously reported component NRBC is no longer being reported. Previous result was 0.1 /100 WBC (Reference Range: 0.0-0.2 /100 WBC) on 10/19/2021 at [facility] EDT.    Basic Metabolic Panel [[id number]]  (Abnormal) Collected: 10/19/21 0128    Specimen: Blood Updated: 10/19/21 0158     Glucose 93 mg/dL      BUN 17 mg/dL      Creatinine 0.69 mg/dL      Sodium 139 mmol/L      Potassium 3.3 mmol/L      Chloride 104 mmol/L      CO2 24.0 mmol/L      Calcium 7.7 mg/dL      eGFR Non African Amer 79 mL/min/1.73      BUN/Creatinine Ratio 24.6     Anion Gap 11.0 mmol/L     Narrative:      GFR Normal >60  Chronic Kidney Disease <60  Kidney Failure <15      Potassium [[id number]]  (Abnormal) Collected: 10/18/21 0815    Specimen: Blood Updated: 10/18/21 0906     Potassium 3.4 mmol/L       Comment: Slight hemolysis detected by analyzer. Results may be affected.       COVID PRE-OP / PRE-PROCEDURE SCREENING ORDER (NO ISOLATION) - Swab, Nasopharynx [[id number]]  (Normal) Collected: 10/17/21 1722    Specimen: Swab from Nasopharynx Updated: 10/17/21 1818    Narrative:      The following orders were created for panel order COVID PRE-OP / PRE-PROCEDURE SCREENING ORDER (NO ISOLATION) - Swab, Nasopharynx.  Procedure                               Abnormality         Status                     ---------                               -----------         ------                     COVID-19,CEPHEID/KALA/BD...[[id number]]  Normal              Final result                 Please view results for these tests on the individual orders.    COVID-19,CEPHEID/KALA/BDMAX,COR/TIGRE/PAD/DARLING IN-HOUSE(OR EMERGENT/ADD-ON),NP SWAB IN TRANSPORT MEDIA 3-4 HR TAT, RT-PCR - Swab, Nasopharynx [[id number]]  (Normal) Collected: 10/17/21 1722    Specimen: Swab from Nasopharynx Updated: 10/17/21 1818     COVID19 Not Detected    Narrative:      Fact sheet for providers: https://www.fda.gov/media/133708/download     Fact sheet for patients: https://www.fda.gov/media/083420/download  Fact sheet for providers: https://www.fda.gov/media/225311/download     Fact sheet for patients: https://www.fda.gov/media/934581/download    [facility] Draw [[id number]] Collected: 10/17/21 1319    Specimen: Blood Updated: 10/17/21 1430    Narrative:      The following orders were created for panel order [place] Draw.  Procedure                               Abnormality         Status                     ---------                               -----------         ------                     Green Top (Gel)[[id number]]                                  Final result               Lavender Top[[id number]]                                     Final result               Gold Top - SST[[id number]]                                   Final result               Light Blue Top[[id number]]                                    Final result                 Please view results for these tests on the individual orders.    Gold Top - SST [[id number]] Collected: 10/17/21 1319    Specimen: Blood Updated: 10/17/21 1430     Extra Tube Hold for add-ons.     Comment: Auto resulted.       Light Blue Top [[id number]] Collected: 10/17/21 1319    Specimen: Blood Updated: 10/17/21 1430     Extra Tube hold for add-on     Comment: Auto resulted       Green Top (Gel) [[id number]] Collected: 10/17/21 1319    Specimen: Blood Updated: 10/17/21 1430     Extra Tube Hold for add-ons.     Comment: Auto resulted.       Lavender Top [[id number]] Collected: 10/17/21 1319    Specimen: Blood Updated: 10/17/21 1430     Extra Tube hold for add-on     Comment: Auto resulted       D-dimer, Quantitative [[id number]]  (Abnormal) Collected: 10/17/21 1319    Specimen: Blood Updated: 10/17/21 1418     D-Dimer, Quantitative 9.20 mg/L (FEU)     Narrative:      Reference Range  --------------------------------------------------------------------     < 0.50   Negative Predictive Value  0.50-0.59   Indeterminate    >= 0.60   Probable VTE             A very low percentage of patients with DVT may yield D-Dimer results   below the cut-off of 0.50 mg/L FEU.  This is known to be more   prevalent in patients with distal DVT.             Results of this test should always be interpreted in conjunction with   the patient's medical history, clinical presentation and other   findings.  Clinical diagnosis should not be based on the result of   [place] D-Dimer alone.    BNP [[id number]]  (Normal) Collected: 10/17/21 1319    Specimen: Blood Updated: 10/17/21 1351     proBNP 1,142.0 pg/mL     Narrative:      Among patients with dyspnea, NT-proBNP is highly sensitive for the detection of acute congestive heart failure. In addition NT-proBNP of <300 pg/ml effectively rules out acute congestive heart failure with 99% negative predictive value.    Results may be  falsely decreased if patient taking Biotin.      Comprehensive Metabolic Panel [[id number]]  (Abnormal) Collected: 10/17/21 1319    Specimen: Blood Updated: 10/17/21 1346     Glucose 115 mg/dL      BUN 15 mg/dL      Creatinine 0.91 mg/dL      Sodium 141 mmol/L      Potassium 3.6 mmol/L      Chloride 101 mmol/L      CO2 24.0 mmol/L      Calcium 8.8 mg/dL      Total Protein 6.7 g/dL      Albumin 3.60 g/dL      ALT (SGPT) 21 U/L      AST (SGOT) 27 U/L      Alkaline Phosphatase 106 U/L      Total Bilirubin 0.9 mg/dL      eGFR Non African Amer 57 mL/min/1.73      Globulin 3.1 gm/dL      A/G Ratio 1.2 g/dL      BUN/Creatinine Ratio 16.5     Anion Gap 16.0 mmol/L     Narrative:      GFR Normal >60  Chronic Kidney Disease <60  Kidney Failure <15             Results for orders placed during the hospital encounter of 10/02/21    Adult Transthoracic Echo Complete W/ Cont if Necessary Per Protocol    Interpretation Summary  · Estimated left ventricular EF was in agreement with the calculated left ventricular EF. Left ventricular ejection fraction appears to be 56 - 60%. Left ventricular systolic function is normal.  · Left ventricular diastolic function is consistent with (grade II w/high LAP) pseudonormalization.  · Left atrial volume is mildly increased.  · Estimated right ventricular systolic pressure from tricuspid regurgitation is normal (<35 mmHg).       *    Condition on Discharge:  Stable    Vital Signs  Temp:  [98 °F (36.7 °C)-98.6 °F (37 °C)] 98 °F (36.7 °C)  Heart Rate:  [58-72] 58  Resp:  [16-27] 16  BP: ()/(38-57) 99/57    Physical Exam:     General Appearance:    Alert, cooperative, in no acute distress   Head:    Normocephalic, without obvious abnormality, atraumatic   Eyes:            Lids and lashes normal, conjunctivae and sclerae normal, no   icterus, no pallor, corneas clear, PERRLA   Ears:    Ears appear intact with no abnormalities noted   Throat:   No oral lesions, no thrush, oral mucosa moist    Neck:   No adenopathy, supple, trachea midline, no thyromegaly, no   carotid bruit, no JVD   Lungs:     Clear to auscultation,respirations regular, even and                  unlabored    Heart:    Regular rhythm and normal rate, normal S1 and S2, no            murmur, no gallop, no rub, no click   Chest Wall:    No abnormalities observed   Abdomen:     Normal bowel sounds, no masses, no organomegaly, soft        non-tender, non-distended, no guarding, no rebound                tenderness   Extremities:   Moves all extremities well, no edema, no cyanosis, no             redness   Pulses:   Pulses palpable and equal bilaterally   Skin:   No bleeding, bruising or rash   Lymph nodes:   No palpable adenopathy   Neurologic:   Cranial nerves 2 - 12 grossly intact, sensation intact, DTR       present and equal bilaterally       Discharge Disposition  Home or Self Care    Discharge Medications     Discharge Medications      New Medications      Instructions Start Date   enoxaparin 60 MG/0.6ML solution syringe  Commonly known as: LOVENOX   60 mg, Subcutaneous, Every 12 Hours      ferrous sulfate 325 (65 FE) MG tablet  Commonly known as: FerrouSul   325 mg, Oral, Daily With Breakfast      pantoprazole 40 MG EC tablet  Commonly known as: PROTONIX   40 mg, Oral, Every Early Morning   Start Date: October 21, 2021     warfarin 2.5 MG tablet  Commonly known as: COUMADIN   One po q day         Continue These Medications      Instructions Start Date   acetaminophen 500 MG tablet  Commonly known as: TYLENOL   500 mg, Oral, Every 6 Hours PRN      amLODIPine 5 MG tablet  Commonly known as: NORVASC   5 mg, Oral, Daily      furosemide 20 MG tablet  Commonly known as: LASIX   20 mg, Oral, Daily      isosorbide mononitrate 60 MG 24 hr tablet  Commonly known as: IMDUR   Take 1 tablet by mouth once daily      metoprolol tartrate 25 MG tablet  Commonly known as: LOPRESSOR   12.5 mg, Oral, Every 12 Hours Scheduled      simvastatin 40 MG  tablet  Commonly known as: ZOCOR   20 mg, Oral, Nightly         Stop These Medications    aspirin 81 MG EC tablet            Discharge Diet:   Diet Instructions    Regular              Activity at Discharge:   Activity Instructions    Get INR checked at [facility] - the urgent care located in the same building as Dr. Myrick's office. Will need to come Friday morning, walk-in, tell them she is there for a Coumadin check. She will likely have to come again over the weekend. She should expect to have it checked fairly frequently until we get the dose adjusted correctly.             Follow-up Appointments  Future Appointments   Date Time Provider Department Center   2/14/2022 11:00 AM Gavin Quiroz MD [facility]     Additional Instructions for the Follow-ups that You Need to Schedule     Discharge Follow-up with PCP   As directed       Currently Documented PCP:    Monster Myrick MD    PCP Phone Number:    [phone]     Follow Up Details: Schedule appointment to see Dr. Myrick in 1-2 weeks.         Discharge Follow-up with Specified Provider: Come to [facility] (urgent care in Dr. Myrick's building, entrance in back of building) Friday morning for lab only (to check INR for warfarin dosing).  Tell staff you are there for lab only.   As directed      To: Come to [facility] (urgent care in Dr. Myrick's building, entrance in back of building) Friday morning for lab only (to check INR for warfarin dosing).  Tell staff you are there for lab only.               Test Results Pending at Discharge       Ashley Livingston MD  10/20/21  14:05 EDT    Time: Discharge 35 min spent coordinating discharge with nursing, patient and PCP.

## 2021-10-20 NOTE — DISCHARGE INSTR - ACTIVITY
Get INR checked at [facility] - the urgent care located in the same building as Dr. Myrick's office. Will need to come Friday morning, walk-in, tell them she is there for a Coumadin check. She will likely have to come again over the weekend. She should expect to have it checked fairly frequently until we get the dose adjusted correctly.

## 2021-10-20 NOTE — THERAPY EVALUATION
Patient Name: Carrie Golden  : 1925    MRN: [id number]                              Today's Date: 10/20/2021       Admit Date: 10/17/2021    Visit Dx:     ICD-10-CM ICD-9-CM   1. Right leg pain  M79.604 729.5   2. Acute deep vein thrombosis (DVT) of distal vein of right lower extremity (HCC)  I82.4Z1 453.42   3. Leg edema, right  R60.0 782.3   4. Bilateral pulmonary embolism (HCC)  I26.99 415.19     Patient Active Problem List   Diagnosis   • Chest pain   • Dyslipidemia   • Essential hypertension   • Gastroesophageal reflux disease   • NSTEMI, initial episode of care ([facility])   • Coronary artery disease involving native heart with unstable angina pectoris ([facility])   • Amblyopia, left   • Blepharitis   • Conjunctival hemorrhage   • Dermatochalasis   • Dermatochalasis of left upper eyelid   • Dermatochalasis of right upper eyelid   • Ectropion   • Macular puckering of retina   • Epiretinal membrane (ERM) of right eye   • Lens replaced by other means   • Presence of intraocular lens   • Tear film insufficiency   • After-cataract with vision obscured   • Amblyopia   • Cervical radiculopathy   • Lumbar radiculopathy   • Metatarsalgia of left foot   • Localized, primary osteoarthritis   • Mixed hyperlipidemia   • Deformity of left foot   • Hallux rigidus, left foot   • Colitis   • Degeneration of lumbar intervertebral disc   • Osteoarthritis of knee   • Right leg pain   • Pulmonary embolism (HCC)   • Acute deep vein thrombosis (DVT) of distal vein of right lower extremity (HCC)   • Iron deficiency anemia     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 sure which 1  contracted at age 8   • History of transfusion    • [place] (hard of hearing)    • Hyperlipidemia    • Hypertension    • Past heart attack     X2 MILD  LAST ONE OCT 2019   • PONV (postoperative nausea and vomiting)      Past Surgical History:   Procedure Laterality Date   • ANTERIOR AND POSTERIOR VAGINAL REPAIR     • BACK SURGERY     • BLEPHAROPLASTY Bilateral 5/23/2019    Procedure: bilateral upper lid blepharoplasty;  Surgeon: Mauricio Pennington MD;  [place] [facility] [place] [facility];  Service: Ophthalmology   • BROW LIFT Bilateral 2/13/2020    Procedure: BILATERAL TEMPORAL DIRECT BROWLIFT;  Surgeon: Sreekanth Bird MD;  Location[place] [facility] [place] [facility] [place];  Service: Ophthalmology;  Laterality: Bilateral;   • CARDIAC CATHETERIZATION N/A 12/3/2019    Procedure: Left Heart Cath;  Surgeon: Puma Briseno MD;  Location: [facility] CATH INVASIVE LOCATION;  Service: Cardiovascular   • CARDIAC CATHETERIZATION N/A 12/3/2019    Procedure: Coronary angiography;  Surgeon: Puma Briseno MD;  Location: [facility] CATH INVASIVE LOCATION;  Service: Cardiovascular   • CARDIAC CATHETERIZATION N/A 12/3/2019    Procedure: Left ventriculography;  Surgeon: Puma Briseno MD;  Location: [facility] CATH INVASIVE LOCATION;  Service: Cardiovascular   • CATARACT EXTRACTION EXTRACAPSULAR W/ INTRAOCULAR LENS IMPLANTATION Bilateral    • CERVICAL SPINE ANTERIOR      with instrumentation   • CHEILECTOMY Left 8/28/2020    Procedure: CHEILECTOMY;  Surgeon: GAB Rees DPM;  Location: [facility] OR;  Service: Podiatry;  Laterality: Left;   • COLON SURGERY      for obstruction   • ALBERTO TUBE INSERTION Bilateral 5/23/2019    Procedure: ALBERTO TUBE;  Surgeon: Mauricio Pennington MD;  [place] [facility] [place] [facility] [place];  Service: Ophthalmology   • ECTROPION REPAIR Bilateral 5/23/2019    Procedure: bilateral lower lid ectropion repair, bilateral punctoplasty;  Surgeon: Mauricio Pennington MD;  [place] [facility];  Service: Ophthalmology   • EYE SURGERY     • FOOT FUSION Left 12/30/2016    Procedure: LEFT HALLUX METATARSALPHALANGEAL ARTHRODESIS SILVER BUNIONECTOMY METATARSAL HEAD RESECTION 2-5 CALCANEAL BONE GRAFT;  Surgeon:  Monster Harper Jr., MD;  Location: [facility] MAIN OR;  Service:    • HYSTERECTOMY     • INGUINAL HERNIA REPAIR Bilateral    • METATARSAL OSTEOTOMY Left 8/28/2020    Procedure: Metatarsal head resection 5;  Surgeon: GAB Rees DPM;  Location: [facility] MAIN OR;  Service: Podiatry;  Laterality: Left;   • ROTATOR CUFF REPAIR Right    • SIGMOIDOSCOPY N/A 10/6/2021    Procedure: SIGMOIDOSCOPY WITH BIOPSY X1 AREA;  Surgeon: Uche Vaz MD;  Location: [facility] ENDOSCOPY;  Service: Gastroenterology;  Laterality: N/A;  ischemic colitis   • STOMACH SURGERY      for ulcer      General Information     Row Name 10/20/21 1508          Physical Therapy Time and Intention    Document Type evaluation  -     Mode of Treatment physical therapy  -     Row Name 10/20/21 1508          General Information    Patient Profile Reviewed yes  -     Prior Level of Function independent:; work; driving  -     Existing Precautions/Restrictions no known precautions/restrictions  -     Barriers to Rehab none identified  -     Row Name 10/20/21 1508          Living Environment    Lives With alone  -     Row Name 10/20/21 1508          Cognition    Orientation Status (Cognition) oriented x 4  -     Row Name 10/20/21 1508          Safety Issues, Functional Mobility    Impairments Affecting Function (Mobility) endurance/activity tolerance  -           User Key  (r) = Recorded By, (t) = Taken By, (c) = Cosigned By    Initials Name Provider Type     Tiff Urena PT Physical Therapist               Mobility     Row Name 10/20/21 1508          Bed Mobility    Bed Mobility bed mobility (all) activities  -     All Activities, [place] (Bed Mobility) independent  -     Row Name 10/20/21 1508          Sit-Stand Transfer    Sit-Stand [place] (Transfers) independent  -     Row Name 10/20/21 1508          Gait/Stairs (Locomotion)    [place] Level (Gait) independent  -     Distance in Feet (Gait) 200'  -     Comment  (Gait/Stairs) normal gait pattern  -           User Key  (r) = Recorded By, (t) = Taken By, (c) = Cosigned By    Initials Name Provider Type     Tiff Urena PT Physical Therapist               Obj/Interventions     [facility] Name 10/20/21 1508          Range of Motion Comprehensive    General Range of Motion no range of motion deficits identified  -JH     Row Name 10/20/21 1508          Strength Comprehensive (MMT)    General Manual Muscle Testing (MMT) Assessment no strength deficits identified  -J[facility]     Row Name 10/20/21 1508          Balance    Balance Assessment sitting static balance; sitting dynamic balance; standing dynamic balance; standing static balance  -     Static Sitting Balance WNL  -     Dynamic Sitting Balance WNL  -     Static Standing Balance WNL  -     Dynamic Standing Balance WNL  -[facility] Name 10/20/21 1508          Sensory Assessment (Somatosensory)    Sensory Assessment (Somatosensory) sensation intact  -           User Key  (r) = Recorded By, (t) = Taken By, (c) = Cosigned By    Initials Name Provider Type     Tiff Urena PT Physical Therapist               Goals/Plan    No documentation.                Clinical Impression     [facility] Name 10/20/21 1505          Pain    Additional Documentation Pain Scale: Numbers Pre/Post-Treatment (Group)  -J[facility]     Row Name 10/20/21 1504          Pain Scale: Numbers Pre/Post-Treatment    Pretreatment Pain Rating 0/10 - no pain  -     Posttreatment Pain Rating 0/10 - no pain  -J[facility]     Row Name 10/20/21 1506          Plan of Care Review    Plan of Care Reviewed With patient  -     Outcome Summary 89 yo female admitted wtih RL swelling.  d/o B PE and RLE DVT.  Pt is very active and independent, lives alone.  Pt on room air, sats 95% . Pt demos ind with all mobility, no functional deficits.  Sats 89-92% wtih activity on room air.  No further therapy needs.  -     Row Name 10/20/21 1507          Therapy Assessment/Plan (PT)    Criteria for  Skilled Interventions Met (PT) no; no problems identified which require skilled intervention  -     Row Name 10/20/21 1508          Vital Signs    Pre SpO2 (%) 95  -     O2 Delivery Pre Treatment room air  -     Intra SpO2 (%) 89  -     O2 Delivery Intra Treatment room air  -     Post SpO2 (%) 94  -     O2 Delivery Post Treatment room air  -     Row Name 10/20/21 1508          Positioning and Restraints    Pre-Treatment Position in bed  -     Post Treatment Position chair  -     In Chair notified nsg; sitting; call light within reach  -           User Key  (r) = Recorded By, (t) = Taken By, (c) = Cosigned By    Initials Name Provider Type     Tiff Urena, PT Physical Therapist               Outcome Measures    No documentation.                                PT Recommendation and Plan     Plan of Care Reviewed With: patient  Outcome Summary: 89 yo female admitted wtih RL swelling.  d/o B PE and RLE DVT.  Pt is very active and independent, lives alone.  Pt on room air, sats 95% . Pt demos ind with all mobility, no functional deficits.  Sats 89-92% wtih activity on room air.  No further therapy needs.     Time Calculation:    PT Charges     Row Name 10/20/21 1511             Time Calculation    Start Time 1359  -      Stop Time 1415  -      Time Calculation (min) 16 min  -      PT Received On 10/20/21  -              Time Calculation- PT    Total Timed Code Minutes- PT 0 minute(s)  -            User Key  (r) = Recorded By, (t) = Taken By, (c) = Cosigned By    Initials Name Provider Type     Tiff Urena, NATHALIA Physical Therapist              Therapy Charges for Today     Code Description Service Date Service Provider Modifiers Qty    07282909294 HC PT EVAL LOW COMPLEXITY 2 10/20/2021 Tiff Urena, NATHALIA GP 1               Tiff Urena PT  10/20/2021

## 2021-10-21 NOTE — OUTREACH NOTE
Prep Survey      Responses   [facility] patient discharged from? [facility]   Is LACE score < 7 ? No   Emergency Room discharge w/ pulse ox? No   Eligibility Readm Mgmt   Discharge diagnosis Pulmonary embolism   Does the patient have one of the following disease processes/diagnoses(primary or secondary)? Other   Does the patient have Home health ordered? No   Is there a DME ordered? No   Comments regarding appointments Appt needed   Medication alerts for this patient Lovenox and Coumadin   Prep survey completed? Yes          Jane Foster RN

## 2021-10-22 ENCOUNTER — READMISSION MANAGEMENT (OUTPATIENT)
Dept: CALL CENTER | Facility: HOSPITAL | Age: 86
End: 2021-10-22

## 2021-10-22 LAB — QT INTERVAL: 311 MS

## 2021-10-22 NOTE — OUTREACH NOTE
Medical Week 1 Survey      Responses   [facility] patient discharged from? [facility]   Does the patient have one of the following disease processes/diagnoses(primary or secondary)? Other   Week 1 attempt successful? No   Unsuccessful attempts Attempt 2          Kim Charles LPN

## 2021-10-26 ENCOUNTER — READMISSION MANAGEMENT (OUTPATIENT)
Dept: CALL CENTER | Facility: HOSPITAL | Age: 86
End: 2021-10-26

## 2021-10-26 NOTE — OUTREACH NOTE
Medical Week 1 Survey      Responses   [facility] patient discharged from? [facility]   Does the patient have one of the following disease processes/diagnoses(primary or secondary)? Other   Week 1 attempt successful? No   Unsuccessful attempts Attempt 3          Kim Charles LPN

## 2021-11-02 ENCOUNTER — READMISSION MANAGEMENT (OUTPATIENT)
Dept: CALL CENTER | Facility: HOSPITAL | Age: 86
End: 2021-11-02

## 2021-11-02 NOTE — OUTREACH NOTE
Medical Week 2 Survey      Responses   [facility] patient discharged from? [facility]   Does the patient have one of the following disease processes/diagnoses(primary or secondary)? Other   Week 2 attempt successful? No   Unsuccessful attempts Attempt 1          Gavi Hull LPN

## 2021-11-04 ENCOUNTER — READMISSION MANAGEMENT (OUTPATIENT)
Dept: CALL CENTER | Facility: HOSPITAL | Age: 86
End: 2021-11-04

## 2021-11-04 NOTE — OUTREACH NOTE
Medical Week 2 Survey      Responses   [facility] [place] patient discharged from? [facility]   Does the patient have one of the following disease processes/diagnoses(primary or secondary)? Other   Week 2 attempt successful? Yes   Call start time 1517   Discharge diagnosis Pulmonary embolism   Call end time 1524   Meds reviewed with patient/caregiver? Yes   Is the patient having any side effects they believe may be caused by any medication additions or changes? No   Does the patient have all medications ordered at discharge? Yes   Is the patient taking all medications as directed (includes completed medication regime)? Yes   Medication comments Stopped Lovonex  Coumadin decresed to 2.5mg daily     Antibiotic started for colitis   Does the patient have a primary care provider?  Yes   Does the patient have an appointment with their PCP within 7 days of discharge? Yes   Has the patient kept scheduled appointments due by today? Yes   Comments PCP yesterday 11/03/2021   Will have blood drawn again next week to check INR   Has home health visited the patient within 72 hours of discharge? N/A   Psychosocial issues? No   Did the patient receive a copy of their discharge instructions? Yes   Nursing interventions Reviewed instructions with patient   What is the patient's perception of their health status since discharge? Improving   Is the patient/caregiver able to teach back the hierarchy of who to call/visit for symptoms/problems? 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   Week 2 Call Completed? Yes          Cristiane Purcell RN

## 2021-11-08 ENCOUNTER — APPOINTMENT (OUTPATIENT)
Dept: GENERAL RADIOLOGY | Facility: HOSPITAL | Age: 86
End: 2021-11-08

## 2021-11-08 ENCOUNTER — HOSPITAL ENCOUNTER (EMERGENCY)
Facility: HOSPITAL | Age: 86
Discharge: HOME | End: 2021-11-08
Admitting: EMERGENCY MEDICINE

## 2021-11-08 VITALS
RESPIRATION RATE: 16 BRPM | TEMPERATURE: 98 F | OXYGEN SATURATION: 98 % | HEART RATE: 73 BPM | DIASTOLIC BLOOD PRESSURE: 67 MMHG | SYSTOLIC BLOOD PRESSURE: 112 MMHG | WEIGHT: 122.5 LBS | HEIGHT: 62 IN | BODY MASS INDEX: 22.54 KG/M2

## 2021-11-08 DIAGNOSIS — M25.562 ACUTE PAIN OF LEFT KNEE: Primary | ICD-10-CM

## 2021-11-08 PROCEDURE — 99283 EMERGENCY DEPT VISIT LOW MDM: CPT

## 2021-11-08 PROCEDURE — 73562 X-RAY EXAM OF KNEE 3: CPT

## 2021-11-08 NOTE — DISCHARGE INSTRUCTIONS
Wear knee immobilizer as directed.  Elevate when sitting.  Apply ice every 2 hours while awake, on for 20 minutes.  Schedule follow-up with primary care for recheck.  Call Dr. Stack's office for further work-up.  Return to the ER for new or worsening symptoms.

## 2021-11-08 NOTE — ED NOTES
"Pt presents to the ER with left sided knee pain that started last Thursday while the pt was walking. The pt describes this pain as being located \"behind her kneecap\" and the pain gets worse on ambulation. Pt has a hx of knee pain. Pt reports they have received an epidural in the past for known nerve damage, but it has not brought any relief. Pt stated that \"I would like a shot of something, so that I can get back to work.\" No redness, swelling, or warmth noted on the left knee or left lower extremity.      Rossi Mendez, RN  11/08/21 8625    "

## 2021-11-08 NOTE — ED PROVIDER NOTES
Subjective   89-year-old female presents with a complaint of pain under her left patella while shopping last Thursday.  She denies twisting her knee nor known injury.  She reports that it has progressively gotten worse.  She denies calf tenderness.    1. Location: Behind left patella  2. Quality: Sharp  3. Severity: Moderate  4. Worsening factors: Weightbearing  5. Alleviating factors: Rest, elevation  6. Onset: Thursday  7. Radiation: Denies  8. Frequency: Intermittent  9. Co-morbidities: Past Medical History:  No date: Arthritis  No date: Coronary artery disease  No date: Drooping eyelid, bilateral  No date: Frequent UTI  No date: GERD (gastroesophageal reflux disease)  No date: Hearing loss      Comment:  bilateral hearing aides  No date: History of hepatitis      Comment:  pt not sure which 1  contracted at age 8  No date: History of transfusion  No date: [place] (hard of hearing)  No date: Hyperlipidemia  No date: Hypertension  No date: Past heart attack      Comment:  X2 MILD LAST ONE OCT 2019  No date: PONV (postoperative nausea and vomiting)  10. Source: Patient            Review of Systems   Musculoskeletal: Positive for arthralgias. Negative for gait problem and myalgias.   Skin: Negative for color change, pallor, rash and wound.   Neurological: Negative for weakness and numbness.   Hematological: Does not bruise/bleed easily.   All other systems reviewed and are negative.      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 sure which 1  contracted at age 8   • History of transfusion    • [place] (hard of hearing)    • Hyperlipidemia    • Hypertension    • Past heart attack     X2 MILD LAST ONE OCT 2019   • PONV (postoperative nausea and vomiting)        Allergies   Allergen Reactions   • Codeine Nausea And  Vomiting and Dizziness   • Lortab [Hydrocodone-Acetaminophen] Nausea And Vomiting   • Nitrofurantoin Other (See Comments)   • Sulfa Antibiotics Rash       Past Surgical History:   Procedure Laterality Date   • ANTERIOR AND POSTERIOR VAGINAL REPAIR     • BACK SURGERY     • BLEPHAROPLASTY Bilateral 5/23/2019    Procedure: bilateral upper lid blepharoplasty;  Surgeon: Mauricio Pennington MD;  Location: [facility] OR [facility];  Service: Ophthalmology   • BROW LIFT Bilateral 2/13/2020    Procedure: BILATERAL TEMPORAL DIRECT BROWLIFT;  Surgeon: Sreekanth Bird MD;  Location: [facility] OR [facility];  Service: Ophthalmology;  Laterality: Bilateral;   • CARDIAC CATHETERIZATION N/A 12/3/2019    Procedure: Left Heart Cath;  Surgeon: Puma Briseno MD;  Location: [facility] CATH INVASIVE LOCATION;  Service: Cardiovascular   • CARDIAC CATHETERIZATION N/A 12/3/2019    Procedure: Coronary angiography;  Surgeon: Puma Briseno MD;  Location: [facility] CATH INVASIVE LOCATION;  Service: Cardiovascular   • CARDIAC CATHETERIZATION N/A 12/3/2019    Procedure: Left ventriculography;  Surgeon: Puma Briseno MD;  Location: [facility] CATH INVASIVE LOCATION;  Service: Cardiovascular   • CATARACT EXTRACTION EXTRACAPSULAR W/ INTRAOCULAR LENS IMPLANTATION Bilateral    • CERVICAL SPINE ANTERIOR      with instrumentation   • CHEILECTOMY Left 8/28/2020    Procedure: CHEILECTOMY;  Surgeon: GAB Rees DPM;  Location: [facility] OR;  Service: Podiatry;  Laterality: Left;   • COLON SURGERY      for obstruction   • ALBERTO TUBE INSERTION Bilateral 5/23/2019    Procedure: ALBERTO TUBE;  Surgeon: Mauricio Pennington MD;  Location: [facility];  Service: Ophthalmology   • ECTROPION REPAIR Bilateral 5/23/2019    Procedure: bilateral lower lid ectropion repair, bilateral punctoplasty;  Surgeon: Mauricio Pennington MD;  Location: [facility];  Service: Ophthalmology   • EYE SURGERY     • FOOT FUSION Left 12/30/2016    Procedure: LEFT HALLUX METATARSALPHALANGEAL  ARTHRODESIS SILVER BUNIONECTOMY METATARSAL HEAD RESECTION 2-5 CALCANEAL BONE GRAFT;  Surgeon: Monster Harper Jr., MD;  Location: [facility] MAIN OR;  Service:    • HYSTERECTOMY     • INGUINAL HERNIA REPAIR Bilateral    • METATARSAL OSTEOTOMY Left 2020    Procedure: Metatarsal head resection 5;  Surgeon: GBA Rees DPM;  Location: [facility] MAIN OR;  Service: Podiatry;  Laterality: Left;   • ROTATOR CUFF REPAIR Right    • SIGMOIDOSCOPY N/A 10/6/2021    Procedure: SIGMOIDOSCOPY WITH BIOPSY X1 AREA;  Surgeon: Uche Vaz MD;  Location: [facility] ENDOSCOPY;  Service: Gastroenterology;  Laterality: N/A;  ischemic colitis   • STOMACH SURGERY      for ulcer       Family History   Problem Relation Age of Onset   • No Known Problems Mother    • No Known Problems Father    • [place] Hyperthermia Neg Hx        Social History     Socioeconomic History   • Marital status:    Tobacco Use   • Smoking status: Former Smoker     Packs/day: 0.25     Years: 10.00     Pack years: 2.50     Types: Cigarettes     Quit date:      Years since quittin.8   • Smokeless tobacco: Never Used   Vaping Use   • Vaping Use: Never used   Substance and Sexual Activity   • Alcohol use: No   • Drug use: No   • Sexual activity: Defer           Objective   Physical Exam  Vitals and nursing note reviewed.   Constitutional:       General: She is awake. She is not in acute distress.     Appearance: Normal appearance. She is well-developed and normal weight.   Cardiovascular:      Pulses: Normal pulses.           Dorsalis pedis pulses are 2+ on the right side and 2+ on the left side.        Posterior tibial pulses are 2+ on the right side and 2+ on the left side.   Musculoskeletal:         General: Tenderness present. No deformity.      Right knee: Normal.      Left knee: Swelling and effusion present. No ecchymosis, bony tenderness or crepitus. Decreased range of motion. Normal pulse.        Legs:       Comments: Patient reports pain under  her left patella.  There is a small supra patellar joint effusion noted in the medial aspect of the knee.  Motor function is decreased due to pain.  Sensation intact.  Cap refill less than 2 seconds.  Distal pulses strong and equal bilaterally 2+.   Skin:     General: Skin is warm and dry.      Capillary Refill: Capillary refill takes less than 2 seconds.      Coloration: Skin is not pale.   Neurological:      Mental Status: She is alert and oriented to person, place, and time.      Sensory: No sensory deficit.      Motor: No abnormal muscle tone.   Psychiatric:         Mood and Affect: Mood normal.         Behavior: Behavior normal. Behavior is cooperative.         Thought Content: Thought content normal.         Judgment: Judgment normal.         Procedures           ED Course  ED Course as of 11/08/21 1515   Mon Nov 08, 2021   1356 Awaiting x-ray results. [AL]      ED Course User Index  [AL] Jaimee Nixon, EFREN      XR Knee 3 View Left    Result Date: 11/8/2021  1.No acute osseous process identified. 2.Degenerative changes as described above.  Electronically Signed By-Wiley López MD On:11/8/2021 2:03 PM This report was finalized on [id number] by  Wiley López MD.    Medications - No data to display  Labs Reviewed - No data to display                                       MDM  Number of Diagnoses or Management Options  Acute pain of left knee  Diagnosis management comments: Chart Review: 10/17/2021 patient was seen for right calf pain and was diagnosed with DVT.  She was started on Coumadin.  Comorbidity: Past Medical History:  No date: Arthritis  No date: Coronary artery disease  No date: Drooping eyelid, bilateral  No date: Frequent UTI  No date: GERD (gastroesophageal reflux disease)  No date: Hearing loss      Comment:  bilateral hearing aides  No date: History of hepatitis      Comment:  pt not sure which 1  contracted at age 8  No date: History of transfusion  No date: [place] (hard of hearing)  No  date: Hyperlipidemia  No date: Hypertension  No date: Past heart attack      Comment:  X2 MILD LAST ONE OCT 2019  No date: PONV (postoperative nausea and vomiting)  Imaging: Was interpreted by physician and reviewed by myself: XR Knee 3 View Left   Final Result    1.No acute osseous process identified.    2.Degenerative changes as described above.         Electronically Signed By-Wiley López MD On:11/8/2021 2:03 PM    This report was finalized on [id number] by  Wiley López MD.    Patient undressed and placed in gown for exam.  Appropriate PPE worn during patient exam. 89-year-old female presents with a complaint of pain under her left patella while shopping last Thursday.  She denies twisting her knee nor known injury.  She reports that it has progressively gotten worse.  She denies calf tenderness. Patient reports pain under her left patella.  There is a small supra patellar joint effusion noted in the medial aspect of the knee.  Motor function is decreased due to pain.  Sensation intact.  Cap refill less than 2 seconds.  Distal pulses strong and equal bilaterally 2+.  Ice pack applied.  Patient had an x-ray obtained of the left knee with the above findings.  X-ray showed no acute findings.  Patient was placed in a knee immobilizer.  She was offered a walker but declined.  She is established with Dr. Stack with orthopedics.  She is encouraged to schedule follow-up with him for further work-up.  She is pink warm and dry no distress noted her vital signs are stable.    Disposition/Treatment: Discussed results with patient, verbalized understanding.  Discussed reasons to return to the ER, patient verbalized understanding.  Agreeable with plan of care.  Patient was stable upon discharge.       Part of this note may be an electronic transcription/translation of spoken language to printed text using the Dragon Dictation System.            Amount and/or Complexity of Data Reviewed  Tests in the radiology  section of CPT®: reviewed    Patient Progress  Patient progress: stable      Final diagnoses:   Acute pain of left knee       ED Disposition  ED Disposition     ED Disposition Condition Comment    Discharge Stable           Monster Myrick MD  [address] [place] [address] [phone]    Schedule an appointment as soon as possible for a visit       Monster Stack MD  [address] [place] [address] [phone]    Schedule an appointment as soon as possible for a visit       [facility] EMERGENCY DEPARTMENT  [address] [address] [phone]  Go to   If symptoms worsen         Medication List      No changes were made to your prescriptions during this visit.          Jaimee Nixon, APRN  11/08/21 1515

## 2021-11-10 ENCOUNTER — READMISSION MANAGEMENT (OUTPATIENT)
Dept: CALL CENTER | Facility: HOSPITAL | Age: 86
End: 2021-11-10

## 2021-11-10 NOTE — OUTREACH NOTE
Medical Week 3 Survey      Responses   [facility] patient discharged from? [facility]   Does the patient have one of the following disease processes/diagnoses(primary or secondary)? Other   Week 3 attempt successful? No   Unsuccessful attempts Attempt 1          Tala Mendez RN

## 2021-11-15 ENCOUNTER — READMISSION MANAGEMENT (OUTPATIENT)
Dept: CALL CENTER | Facility: HOSPITAL | Age: 86
End: 2021-11-15

## 2021-11-15 NOTE — OUTREACH NOTE
Medical Week 3 Survey      Responses   [facility] patient discharged from? [facility]   Does the patient have one of the following disease processes/diagnoses(primary or secondary)? Other   Week 3 attempt successful? No   Unsuccessful attempts Attempt 2          Kim Charles LPN

## 2021-12-14 ENCOUNTER — TELEPHONE (OUTPATIENT)
Dept: CARDIOLOGY | Facility: CLINIC | Age: 86
End: 2021-12-14

## 2021-12-14 NOTE — TELEPHONE ENCOUNTER
Patient called and needs a refill on her metoprolol tartrate 25 mg  And she uses [facility] pharmacy on [facility]

## 2021-12-20 RX ORDER — ISOSORBIDE MONONITRATE 60 MG/1
TABLET, EXTENDED RELEASE ORAL
Qty: 90 TABLET | Refills: 0 | Status: SHIPPED | OUTPATIENT
Start: 2021-12-20 | End: 2022-03-22 | Stop reason: DRUGHIGH

## 2022-01-04 PROBLEM — H35.371 EPIRETINAL MEMBRANE (ERM) OF RIGHT EYE: Status: RESOLVED | Noted: 2020-03-12 | Resolved: 2022-01-04

## 2022-01-04 PROBLEM — M20.22 HALLUX RIGIDUS, LEFT FOOT: Status: RESOLVED | Noted: 2020-08-18 | Resolved: 2022-01-04

## 2022-01-04 PROBLEM — Z96.1 LENS REPLACED BY OTHER MEANS: Status: RESOLVED | Noted: 2020-03-12 | Resolved: 2022-01-04

## 2022-01-04 PROBLEM — H02.834 DERMATOCHALASIS OF LEFT UPPER EYELID: Status: RESOLVED | Noted: 2020-03-12 | Resolved: 2022-01-04

## 2022-01-04 PROBLEM — H53.002 AMBLYOPIA, LEFT: Status: RESOLVED | Noted: 2020-03-12 | Resolved: 2022-01-04

## 2022-01-04 PROBLEM — H02.839 DERMATOCHALASIS: Status: RESOLVED | Noted: 2020-03-12 | Resolved: 2022-01-04

## 2022-01-04 PROBLEM — H02.831 DERMATOCHALASIS OF RIGHT UPPER EYELID: Status: RESOLVED | Noted: 2020-03-12 | Resolved: 2022-01-04

## 2022-01-04 PROBLEM — H02.109 ECTROPION: Status: RESOLVED | Noted: 2020-03-12 | Resolved: 2022-01-04

## 2022-01-04 PROBLEM — M21.962 DEFORMITY OF LEFT FOOT: Status: RESOLVED | Noted: 2020-08-18 | Resolved: 2022-01-04

## 2022-01-04 PROBLEM — H01.009 BLEPHARITIS: Status: RESOLVED | Noted: 2020-03-12 | Resolved: 2022-01-04

## 2022-01-04 PROBLEM — Z96.1 PRESENCE OF INTRAOCULAR LENS: Status: RESOLVED | Noted: 2020-03-12 | Resolved: 2022-01-04

## 2022-01-04 RX ORDER — AMLODIPINE BESYLATE 5 MG/1
5 TABLET ORAL DAILY
Qty: 30 TABLET | Refills: 1 | Status: SHIPPED | OUTPATIENT
Start: 2022-01-04 | End: 2022-03-02

## 2022-01-04 RX ORDER — WARFARIN SODIUM 2 MG/1
2 TABLET ORAL NIGHTLY
COMMUNITY
Start: 2021-12-15 | End: 2022-05-09 | Stop reason: HOSPADM

## 2022-01-20 ENCOUNTER — TELEPHONE (OUTPATIENT)
Dept: PODIATRY | Facility: CLINIC | Age: 87
End: 2022-01-20

## 2022-01-31 RX ORDER — FUROSEMIDE 20 MG/1
TABLET ORAL
Qty: 30 TABLET | Refills: 0 | Status: SHIPPED | OUTPATIENT
Start: 2022-01-31 | End: 2022-03-02

## 2022-02-03 ENCOUNTER — OFFICE (AMBULATORY)
Dept: URBAN - METROPOLITAN AREA CLINIC 64 | Facility: CLINIC | Age: 87
End: 2022-02-03

## 2022-02-03 VITALS
DIASTOLIC BLOOD PRESSURE: 88 MMHG | HEIGHT: 62 IN | SYSTOLIC BLOOD PRESSURE: 137 MMHG | WEIGHT: 127 LBS | HEART RATE: 57 BPM

## 2022-02-03 DIAGNOSIS — R10.32 LEFT LOWER QUADRANT PAIN: ICD-10-CM

## 2022-02-03 DIAGNOSIS — K21.9 GASTRO-ESOPHAGEAL REFLUX DISEASE WITHOUT ESOPHAGITIS: ICD-10-CM

## 2022-02-03 DIAGNOSIS — R13.10 DYSPHAGIA, UNSPECIFIED: ICD-10-CM

## 2022-02-03 PROCEDURE — 99214 OFFICE O/P EST MOD 30 MIN: CPT | Performed by: INTERNAL MEDICINE

## 2022-02-03 RX ORDER — SUCRALFATE 1 G/1
3 TABLET ORAL
Qty: 90 | Refills: 11 | Status: ACTIVE
Start: 2022-02-03

## 2022-02-16 ENCOUNTER — LAB (OUTPATIENT)
Dept: LAB | Facility: HOSPITAL | Age: 87
End: 2022-02-16

## 2022-02-16 PROCEDURE — C9803 HOPD COVID-19 SPEC COLLECT: HCPCS

## 2022-02-16 PROCEDURE — U0004 COV-19 TEST NON-CDC HGH THRU: HCPCS

## 2022-02-17 LAB — SARS-COV-2 ORF1AB RESP QL NAA+PROBE: NOT DETECTED

## 2022-02-18 ENCOUNTER — ANESTHESIA (OUTPATIENT)
Dept: GASTROENTEROLOGY | Facility: HOSPITAL | Age: 87
End: 2022-02-18

## 2022-02-18 ENCOUNTER — HOSPITAL ENCOUNTER (OUTPATIENT)
Facility: HOSPITAL | Age: 87
Setting detail: HOSPITAL OUTPATIENT SURGERY
Discharge: HOME | End: 2022-02-18
Attending: INTERNAL MEDICINE | Admitting: INTERNAL MEDICINE

## 2022-02-18 ENCOUNTER — ANESTHESIA EVENT (OUTPATIENT)
Dept: GASTROENTEROLOGY | Facility: HOSPITAL | Age: 87
End: 2022-02-18

## 2022-02-18 VITALS
WEIGHT: 122 LBS | RESPIRATION RATE: 16 BRPM | SYSTOLIC BLOOD PRESSURE: 135 MMHG | TEMPERATURE: 97.4 F | OXYGEN SATURATION: 95 % | HEIGHT: 62 IN | DIASTOLIC BLOOD PRESSURE: 53 MMHG | BODY MASS INDEX: 22.45 KG/M2 | HEART RATE: 65 BPM

## 2022-02-18 LAB
INR PPP: 1.06 (ref 2–3)
PROTHROMBIN TIME: 11.7 SECONDS (ref 19.4–28.5)

## 2022-02-18 PROCEDURE — C1726 CATH, BAL DIL, NON-VASCULAR: HCPCS | Performed by: INTERNAL MEDICINE

## 2022-02-18 PROCEDURE — 25010000002 PROPOFOL 10 MG/ML EMULSION

## 2022-02-18 PROCEDURE — 85610 PROTHROMBIN TIME: CPT | Performed by: INTERNAL MEDICINE

## 2022-02-18 RX ORDER — SODIUM CHLORIDE 9 MG/ML
100 INJECTION, SOLUTION INTRAVENOUS CONTINUOUS
Status: DISCONTINUED | OUTPATIENT
Start: 2022-02-18 | End: 2022-02-18 | Stop reason: HOSPADM

## 2022-02-18 RX ORDER — ACETAMINOPHEN 650 MG/1
650 SUPPOSITORY RECTAL ONCE AS NEEDED
Status: DISCONTINUED | OUTPATIENT
Start: 2022-02-18 | End: 2022-02-18 | Stop reason: HOSPADM

## 2022-02-18 RX ORDER — NALOXONE HCL 0.4 MG/ML
0.4 VIAL (ML) INJECTION AS NEEDED
Status: DISCONTINUED | OUTPATIENT
Start: 2022-02-18 | End: 2022-02-18 | Stop reason: HOSPADM

## 2022-02-18 RX ORDER — DEXAMETHASONE SODIUM PHOSPHATE 4 MG/ML
8 INJECTION, SOLUTION INTRA-ARTICULAR; INTRALESIONAL; INTRAMUSCULAR; INTRAVENOUS; SOFT TISSUE ONCE AS NEEDED
Status: DISCONTINUED | OUTPATIENT
Start: 2022-02-18 | End: 2022-02-18 | Stop reason: HOSPADM

## 2022-02-18 RX ORDER — DIPHENHYDRAMINE HYDROCHLORIDE 50 MG/ML
12.5 INJECTION INTRAMUSCULAR; INTRAVENOUS
Status: DISCONTINUED | OUTPATIENT
Start: 2022-02-18 | End: 2022-02-18 | Stop reason: HOSPADM

## 2022-02-18 RX ORDER — ONDANSETRON 2 MG/ML
4 INJECTION INTRAMUSCULAR; INTRAVENOUS ONCE AS NEEDED
Status: DISCONTINUED | OUTPATIENT
Start: 2022-02-18 | End: 2022-02-18 | Stop reason: HOSPADM

## 2022-02-18 RX ORDER — FLUMAZENIL 0.1 MG/ML
0.2 INJECTION INTRAVENOUS AS NEEDED
Status: DISCONTINUED | OUTPATIENT
Start: 2022-02-18 | End: 2022-02-18 | Stop reason: HOSPADM

## 2022-02-18 RX ORDER — PROMETHAZINE HYDROCHLORIDE 25 MG/1
25 TABLET ORAL ONCE AS NEEDED
Status: DISCONTINUED | OUTPATIENT
Start: 2022-02-18 | End: 2022-02-18 | Stop reason: HOSPADM

## 2022-02-18 RX ORDER — SODIUM CHLORIDE 9 MG/ML
INJECTION, SOLUTION INTRAVENOUS CONTINUOUS PRN
Status: DISCONTINUED | OUTPATIENT
Start: 2022-02-18 | End: 2022-02-18 | Stop reason: SURG

## 2022-02-18 RX ORDER — PROMETHAZINE HYDROCHLORIDE 25 MG/1
25 SUPPOSITORY RECTAL ONCE AS NEEDED
Status: DISCONTINUED | OUTPATIENT
Start: 2022-02-18 | End: 2022-02-18 | Stop reason: HOSPADM

## 2022-02-18 RX ORDER — ACETAMINOPHEN 325 MG/1
650 TABLET ORAL ONCE AS NEEDED
Status: DISCONTINUED | OUTPATIENT
Start: 2022-02-18 | End: 2022-02-18 | Stop reason: HOSPADM

## 2022-02-18 RX ADMIN — SODIUM CHLORIDE: 0.9 INJECTION, SOLUTION INTRAVENOUS at 13:12

## 2022-02-18 RX ADMIN — PROPOFOL 100 MCG/KG/MIN: 10 INJECTION, EMULSION INTRAVENOUS at 13:12

## 2022-02-18 NOTE — ANESTHESIA POSTPROCEDURE EVALUATION
Patient: Carrie Golden    Procedure Summary     Date: 02/18/22 Room / Location: [facility] ENDOSCOPY 1 / [facility] ENDOSCOPY    Anesthesia Start: 1311 Anesthesia Stop: 1322    Procedure: ESOPHAGOGASTRODUODENOSCOPY WITH DILATATION (BALLOON 15MM-18MM, UP TO 18MM) AND BOUGIE #48 (N/A ) Diagnosis:       GERD (gastroesophageal reflux disease)      Abdominal pain      (GERD (gastroesophageal reflux disease) [K21.9])      (Abdominal pain [R10.9])    Surgeons: Wiley Parks MD Provider: Kady Key MD    Anesthesia Type: MAC ASA Status: 4          Anesthesia Type: MAC    Vitals  Vitals Value Taken Time   /53 02/18/22 1351   Temp     Pulse 65 02/18/22 1351   Resp 16 02/18/22 1351   SpO2 95 % 02/18/22 1351           Post Anesthesia Care and Evaluation    Patient location during evaluation: PACU  Patient participation: complete - patient participated  Level of consciousness: awake  Pain management: adequate  Airway patency: patent  Anesthetic complications: No anesthetic complications  PONV Status: controlled  Cardiovascular status: acceptable  Respiratory status: acceptable  Hydration status: acceptable

## 2022-02-18 NOTE — DISCHARGE INSTRUCTIONS
A responsible adult should stay with you and you should rest quietly for the rest of the day.    Do not drink alcohol, drive, operate any heavy machinery or power tools or make any legal/important decisions for the next 24 hours.     Progress your diet as tolerated.  If you begin to experience severe pain, increased shortness of breath, racing heartbeat or a fever above 101 F, seek immediate medical attention.     Follow up with MD as instructed. Call office for results in 3 to 5 days if needed.    [phone]      Recommendations:  Call for any postop problems  If dysphagia persists I recommend video swallow and potential esophageal manometry as this may be neuromuscular in etiology.  Dental soft diet is also another option  Follow-up in the office me as needed we appreciate the referral thank you

## 2022-02-18 NOTE — OP NOTE
ESOPHAGOGASTRODUODENOSCOPY  Procedure Report    Patient Name:  Carrie Golden  YOB: 1925    Date of Surgery:  2/18/2022     Indications: Dysphagia    Pre-op Diagnosis:   GERD (gastroesophageal reflux disease) [K21.9]  Abdominal pain [R10.9]         Post-op Diagnosis:  Dysphagia status post balloon dilation of the GE junction to 18 mm and Cano dilation of the cricopharyngeus to 16 mm  History of antrectomy with Billroth I anastomosis with no gastric mucosal lesions      Procedure(s):  ESOPHAGOGASTRODUODENOSCOPY WITH DILATATION (BALLOON 15MM-18MM, UP TO 18MM) AND BOUGIE #48    Staff:  Surgeon(s):  Wiley Parks MD         Anesthesia: Monitored Anesthesia Care    Estimated Blood Loss: None  Implants:    Nothing was implanted during the procedure    Specimen:          None     Complications:  No immediate  Description of Procedure:  Informed consent was obtained from the patient.  They were placed in the left lateral decubitus position and IV conscious sedation was administered by anesthesia while being monitored continuously throughout the procedure.  The video Olympus endoscope was introduced into the esophagus was advanced quickly into the gastric remnant.  Her anatomy is consistent with partial gastrectomy and Billroth I anastomosis.  The gastroduodenal anastomosis is widely patent.  The duodenum and the proximal jejunum were evaluated and there is no evidence of any ischemic Vach inflammatory or neoplastic lesion.  There is no ulceration.  There is no friability of the anastomosis.  The gastric remnant was thoroughly inspected including retroflexion views and shows no gastric mucosal lesions.  The squamocolumnar lines at the GE junction there is no evidence of erosive esophagitis or definitive stricturing.  A Microvasive 15-16 0.5-18 mm balloon was positioned across the GE junction and into the lower one third of the esophagus was sequentially inflated to a full 18 mm were was held for  30 seconds and deflated and removed.  No significant mucosal trauma was identified.  The remaining esophagus normal the scope was removed and the cricopharyngeus was then dilated with passage of a 48 French Cano dilator which entered the distal esophagus and the gastric remnant but I did not pass it for its full length due to her altered surgical anatomy.  2nd look endoscopy showed no trauma.  The scope was removed she tolerated the procedure well returned to recovery good condition.    Impression:  Dysphagia status post esophageal dilation with otherwise normal esophagus and postsurgical anatomy as described    Recommendations:  Call for any postop problems  If dysphagia persists I recommend video swallow and potential esophageal manometry as this may be neuromuscular in etiology.  Dental soft diet is also another option  Follow-up in the office me as needed we appreciate the referral thank you      Wiley Parks MD     Date: 2/18/2022  Time: 13:21 EST

## 2022-02-18 NOTE — ANESTHESIA PREPROCEDURE EVALUATION
Anesthesia Evaluation     Patient summary reviewed and Nursing notes reviewed   history of anesthetic complications: PONV  NPO Solid Status: > 8 hours  NPO Liquid Status: > 8 hours           Airway   Mallampati: II  TM distance: >3 FB  Neck ROM: full  No difficulty expected  Dental - normal exam     Pulmonary - negative pulmonary ROS and normal exam    breath sounds clear to auscultation  Cardiovascular - normal exam  Exercise tolerance: unable to assess    ECG reviewed  Rhythm: regular  Rate: normal    (+) hypertension, past MI , CAD, angina, CHF Diastolic >=55%, hyperlipidemia,     ROS comment: Interpretation Summary    · Estimated left ventricular EF was in agreement with the calculated left ventricular EF. Left ventricular ejection fraction appears to be 56 - 60%. Left ventricular systolic function is normal.  · Left ventricular diastolic function is consistent with (grade II w/high LAP) pseudonormalization.  · Left atrial volume is mildly increased.  · Estimated right ventricular systolic pressure from tricuspid regurgitation is normal (<35 mmHg).       Cath 2019 : 1 V CAD  Med TX    Neuro/Psych- negative ROS  GI/Hepatic/Renal/Endo    (+)  GERD,      Musculoskeletal     Abdominal  - normal exam   Substance History - negative use     OB/GYN negative ob/gyn ROS         Other   arthritis,                        Anesthesia Plan    ASA 4     MAC     intravenous induction     Anesthetic plan, all risks, benefits, and alternatives have been provided, discussed and informed consent has been obtained with: patient.

## 2022-02-18 NOTE — H&P
" LOS: 0 days   Patient Care Team:  Monster Myrick MD as PCP - General (Family Medicine)  Maikel Morales MD as Consulting Physician (Cardiology)      Subjective     Interval History:     Subjective: Dysphagia for solids      ROS:   No chest pain, shortness of breath, or cough.  Positive for GERD, negative for vomiting or hematemesis         Medication Review:   No current facility-administered medications for this encounter.    Current Outpatient Medications:   •  acetaminophen (TYLENOL) 500 MG tablet, Take 1 tablet by mouth Every 6 (Six) Hours As Needed for Mild Pain ., Disp: , Rfl:   •  amLODIPine (NORVASC) 5 MG tablet, Take 1 tablet by mouth Daily., Disp: 30 tablet, Rfl: 1  •  furosemide (LASIX) 20 MG tablet, Take 1 tablet by mouth once daily, Disp: 30 tablet, Rfl: 0  •  isosorbide mononitrate (IMDUR) 60 MG 24 hr tablet, Take 1 tablet by mouth once daily, Disp: 90 tablet, Rfl: 0  •  metoprolol tartrate (LOPRESSOR) 25 MG tablet, Take 0.5 tablets by mouth Every 12 (Twelve) Hours., Disp: 30 tablet, Rfl: 1  •  pantoprazole (PROTONIX) 40 MG EC tablet, Take 1 tablet by mouth Every Morning., Disp: 30 tablet, Rfl: 1  •  simvastatin (ZOCOR) 40 MG tablet, Take 0.5 tablets by mouth Every Night., Disp: 90 tablet, Rfl: 3  •  warfarin (COUMADIN) 2 MG tablet, Pt was told to hold for 2 days before procedure-- will call [facility] to confirm, Disp: , Rfl:   •  ferrous sulfate 324 (65 Fe) MG tablet delayed-release EC tablet, Take 1 tablet by mouth Daily With Breakfast., Disp: 90 tablet, Rfl: 1  •  warfarin (COUMADIN) 2.5 MG tablet, Take one tablet by mouth daily or as directed by provider., Disp: 90 tablet, Rfl: 1      Objective     Vital Signs  Vitals:    02/09/22 1721   Weight: 55.3 kg (122 lb)   Height: 157.5 cm (62\")       Physical Exam:    General Appearance:    Awake and alert, in no acute distress   Head:    Normocephalic, without obvious abnormality   Eyes:          Conjunctivae normal, anicteric sclera   Throat:   No oral " lesions, no thrush, oral mucosa moist   Neck:   No adenopathy, supple, no JVD   CV/Lungs:     RRR, respirations regular, even and unlabored   Abdomen:     Soft, non-tender, no rebound or guarding, non-distended, no hepatosplenomegaly   Rectal:     Deferred   Extremities:   No edema, no cyanosis   Skin:   No bruising or rash, no jaundice        Results Review:    Lab Results (last 24 hours)     ** No results found for the last 24 hours. **          Imaging Results (Last 24 Hours)     ** No results found for the last 24 hours. **            Assessment/Plan   Proceed with scope and MAC anesthesia    Proceed with EGD and dilation    Wiley Parks MD  02/18/22  09:33 EST

## 2022-03-02 RX ORDER — FUROSEMIDE 20 MG/1
TABLET ORAL
Qty: 30 TABLET | Refills: 0 | Status: SHIPPED | OUTPATIENT
Start: 2022-03-02 | End: 2022-03-14

## 2022-03-02 RX ORDER — AMLODIPINE BESYLATE 5 MG/1
TABLET ORAL
Qty: 30 TABLET | Refills: 0 | Status: SHIPPED | OUTPATIENT
Start: 2022-03-02 | End: 2022-03-22

## 2022-03-14 RX ORDER — FUROSEMIDE 20 MG/1
TABLET ORAL
Qty: 30 TABLET | Refills: 0 | Status: SHIPPED | OUTPATIENT
Start: 2022-03-14 | End: 2022-03-22 | Stop reason: DRUGHIGH

## 2022-03-22 ENCOUNTER — OFFICE VISIT (OUTPATIENT)
Dept: CARDIOLOGY | Facility: CLINIC | Age: 87
End: 2022-03-22

## 2022-03-22 VITALS
DIASTOLIC BLOOD PRESSURE: 64 MMHG | HEIGHT: 62 IN | HEART RATE: 56 BPM | BODY MASS INDEX: 22.93 KG/M2 | WEIGHT: 124.6 LBS | RESPIRATION RATE: 18 BRPM | SYSTOLIC BLOOD PRESSURE: 128 MMHG

## 2022-03-22 DIAGNOSIS — I25.110 CORONARY ARTERY DISEASE INVOLVING NATIVE CORONARY ARTERY OF NATIVE HEART WITH UNSTABLE ANGINA PECTORIS: Primary | ICD-10-CM

## 2022-03-22 DIAGNOSIS — I21.4 NSTEMI, INITIAL EPISODE OF CARE: ICD-10-CM

## 2022-03-22 DIAGNOSIS — E78.5 DYSLIPIDEMIA: ICD-10-CM

## 2022-03-22 DIAGNOSIS — I10 ESSENTIAL HYPERTENSION: ICD-10-CM

## 2022-03-22 DIAGNOSIS — E78.2 MIXED HYPERLIPIDEMIA: ICD-10-CM

## 2022-03-22 PROCEDURE — 99214 OFFICE O/P EST MOD 30 MIN: CPT | Performed by: INTERNAL MEDICINE

## 2022-03-22 RX ORDER — FUROSEMIDE 20 MG/1
20 TABLET ORAL 2 TIMES DAILY
Qty: 180 TABLET | Refills: 1 | Status: SHIPPED | OUTPATIENT
Start: 2022-03-22 | End: 2022-05-05

## 2022-03-22 RX ORDER — DILTIAZEM HYDROCHLORIDE 120 MG/1
120 CAPSULE, EXTENDED RELEASE ORAL DAILY
Qty: 90 CAPSULE | Refills: 1 | Status: SHIPPED | OUTPATIENT
Start: 2022-03-22 | End: 2022-10-18

## 2022-03-22 RX ORDER — ISOSORBIDE MONONITRATE 30 MG/1
30 TABLET, EXTENDED RELEASE ORAL DAILY
Qty: 90 TABLET | Refills: 1 | Status: SHIPPED | OUTPATIENT
Start: 2022-03-22 | End: 2022-10-31

## 2022-03-22 NOTE — PROGRESS NOTES
"Cardiology Clinic Note  Gavin Quiroz MD, PhD    Subjective:     Encounter Date:03/22/2022      Patient ID: Carrie Golden is a 89 y.o. female.    Chief Complaint:  Chief Complaint   Patient presents with   • Hospital Follow Up Visit       HPI:  I the pleasure to see this 89-year-old female back with very high functioning blood pressure 120/64 with heart rates in the low 60s and regular.  She was diagnosed with new onset atrial fibrillation in the hospital in the setting of respiratory illness.  She also has history of DVT right lower extremity, CAD, history of NSTEMI, essential hypertension dyslipidemia with preserved EF.  She denies any anginal chest pain, shortness of breath much improved since her hospitalization.  She does have some lower extremity edema at the ankles which improves when she lays down and gets worse throughout the day.  She is taking Lasix 20 twice a day presently.  We did discuss that amlodipine which she is on for blood pressure can contribute to lower extremity edema we will try to change her to Cardizem which will also help as mild antiarrhythmic for paroxysmal atrial fibrillation.  She will continue on Coumadin at least for the short-term but she is amenable for.  No other complaints today      Historical data copied forward from previous encounters in EMR including the history, exam, and assessment/plan has been reviewed and is unchanged unless noted otherwise.    Cardiac medicines reviewed with risk, benefits, and necessity of each discussed.    Risk and benefit of cardiac testing reviewed including death heart attack stroke pain bleeding infection need for vascular /cardiovascular surgery were discussed and the patient     Objective:         /64 (BP Location: Left arm, Patient Position: Sitting)   Pulse 56   Resp 18   Ht 157.5 cm (62\")   Wt 56.5 kg (124 lb 9.6 oz)   BMI 22.79 kg/m²     Physical Exam  Regular rate and rhythm  No new murmurs  No heave no lift no rubs or " gallops  No clubbing cyanosis with trace to 1+ edema at the ankles only  No carotid bruits or JVD  Clear to auscultation  Assessment:          Plan:      New onset A. fib, sinus rhythm today 60s  CAD, angina free  Hypertension  Atherogenic dyslipidemia     New onset A. fib: Spontaneous conversion to sinus rhythm denoting paroxysmal atrial fibrillation.  This is likely secondary to underlying acute illness and hypotension.    Off amiodarone  Continue low-dose metoprolol twice daily  Change amlodipine to diltiazem 120 daily with peripheral edema  Decrease Imdur to 30 daily  Lasix 20 twice daily as needed for swelling  She has no shortness of breath is back to walking 3 to 5 miles per day and edema does resolve at night  Avoid dehydration    Preserved LV systolic function  Continue medical management and optimization of medical therapy and rate and rhythm control strategy  She is on anticoagulation with Coumadin which we will consider discontinuation at 6 months    Gavin Quiroz MD, PhD        The pleasure to be involved in this patient's cardiovascular care.  Please call with any questions or concerns  Gavin Quiroz MD, PhD    Most recent EKG as reviewed and interpreted by me:  Procedures     Most recent echo as reviewed and interpreted by me:  Results for orders placed during the hospital encounter of 10/02/21    Adult Transthoracic Echo Complete W/ Cont if Necessary Per Protocol    Interpretation Summary  · Estimated left ventricular EF was in agreement with the calculated left ventricular EF. Left ventricular ejection fraction appears to be 56 - 60%. Left ventricular systolic function is normal.  · Left ventricular diastolic function is consistent with (grade II w/high LAP) pseudonormalization.  · Left atrial volume is mildly increased.  · Estimated right ventricular systolic pressure from tricuspid regurgitation is normal (<35 mmHg).      Most recent stress test as reviewed and interpreted by me:      Most  recent cardiac catheterization as reviewed interpreted by me:  Results for orders placed during the hospital encounter of 12/01/19    Cardiac Catheterization/Vascular Study    Narrative  CARDIAC CATHETERIZATION REPORT      DATE OF PROCEDURE:  12/3/2019    INDICATION FOR PROCEDURE:    Non-ST elevation myocardial infarction  CAD  Angina pectoris    PROCEDURE PERFORMED:    Left heart catheterization  coronary angiography  left ventriculography    PROCEDURE COMMENTS:    After informed consent was obtained, the patient was prepped and draped in the usual sterile manner.  Mild to moderate sedation was administered.  Right femoral artery was accessed without difficulty and 6 Togolese arterial sheath was inserted.  Sheath was flushed with heparinized saline.    Using 6 Togolese Rebeca catheters, first left coronary artery and the right coronary was electively engaged and appropriate views were taken.  A 6 Togolese JR4 catheter was used to cross aortic valve and left heart catheterization was performed.  Left ventriculography was done in a review.    Patient tolerated the procedure well.    FINDINGS:    1. HEMODYNAMICS:    Aortic pressure: 145/72    LVEDP: 10 mmHg    Gradient across aortic valve on pullback: No gradient across aortic valve      2. LEFT VENTRICULOGRAPHY: 50 to 55% mild global hypokinesis      3. CORONARY ANGIOGRAPHY:    A: Left main coronary artery: Calcified and mild disease up to 10 to 20% in the ostium.  TIESHA-3 flow was present    B: Left anterior descending artery: Diffuse disease up to 30% in proximal mid LAD at the origin of D1 branch of LAD.  D1 branch of LAD has 60 to 70% ostial stenosis which is calcified.  This has not changed from cardiac catheterization done in 2017.  TIESHA-3 flow was present in both vessels    C: Left circumflex coronary artery: Diffuse 25 to 30% mid LCx stenosis    D: Right coronary artery: 20% proximal RCA stenosis.  It is large and dominant vessel.    SUMMARY:    Single-vessel  coronary artery disease  Intermediate lesion of ostial D1 branch of LAD.  This was not different from cardiac catheterization done in 2017  Preserved left ventricular function with mild global hypokinesis    RECOMMENDATIONS:    Recommend medical treatment    The following portions of the patient's history were reviewed and updated as appropriate: allergies, current medications, past family history, past medical history, past social history, past surgical history and problem list.      ROS:  14 point review of systems negative except as mentioned above    Current Outpatient Medications:   •  metoprolol tartrate (LOPRESSOR) 25 MG tablet, Take 0.5 tablets by mouth Every 12 (Twelve) Hours., Disp: 30 tablet, Rfl: 1  •  pantoprazole (PROTONIX) 40 MG EC tablet, Take 1 tablet by mouth Every Morning., Disp: 30 tablet, Rfl: 1  •  simvastatin (ZOCOR) 40 MG tablet, Take 0.5 tablets by mouth Every Night., Disp: 90 tablet, Rfl: 3  •  warfarin (COUMADIN) 2 MG tablet, Pt was told to hold for 2 days before procedure-- will call [facility] to confirm, Disp: , Rfl:   •  warfarin (COUMADIN) 2.5 MG tablet, Take one tablet by mouth daily or as directed by provider. (Patient taking differently: Take one tablet by mouth daily or as directed by provider.  Indications: DVT/PE (active thrombosis)), Disp: 90 tablet, Rfl: 1  •  dilTIAZem XR (DILACOR XR) 120 MG 24 hr capsule, Take 1 capsule by mouth Daily., Disp: 90 capsule, Rfl: 1  •  furosemide (LASIX) 20 MG tablet, Take 1 tablet by mouth 2 (Two) Times a Day., Disp: 180 tablet, Rfl: 1  •  isosorbide mononitrate (IMDUR) 30 MG 24 hr tablet, Take 1 tablet by mouth Daily., Disp: 90 tablet, Rfl: 1    Problem List:  Patient Active Problem List   Diagnosis   • Chest pain   • Dyslipidemia   • Essential hypertension   • Gastroesophageal reflux disease   • NSTEMI, initial episode of care (HCC)   • Coronary artery disease involving native heart with unstable angina pectoris (HCC)   • Tear film insufficiency   •  After-cataract with vision obscured   • Cervical radiculopathy   • Lumbar radiculopathy   • Metatarsalgia of left foot   • Localized, primary osteoarthritis   • Mixed hyperlipidemia   • Colitis   • Degeneration of lumbar intervertebral disc   • Osteoarthritis of knee   • Right leg pain   • Pulmonary embolism (HCC)   • Acute deep vein thrombosis (DVT) of distal vein of right lower extremity (HCC)   • Iron deficiency anemia     Past Medical History:  Past Medical History:   Diagnosis Date   • Amblyopia 10/31/2012   • Amblyopia, left 3/12/2020   • Arthritis    • Conjunctival hemorrhage 10/8/2014   • Coronary artery disease    • Deformity of left foot 8/18/2020    Added automatically from request for surgery [id number]   • Dermatochalasis of left upper eyelid 3/12/2020   • Dermatochalasis of right upper eyelid 3/12/2020   • Drooping eyelid, bilateral    • Ectropion 3/12/2020   • Epiretinal membrane (ERM) of right eye 3/12/2020   • Frequent UTI    • GERD (gastroesophageal reflux disease)    • Hallux rigidus, left foot 8/18/2020    Added automatically from request for surgery [id number]   • Hearing loss     bilateral hearing aides   • 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 sure which 1  contracted at age 8   • History of transfusion    • [place] (hard of hearing)    • Hyperlipidemia    • Hypertension    • Past heart attack     X2 MILD LAST ONE OCT 2019   • PONV (postoperative nausea and vomiting)    • Presence of intraocular lens 3/12/2020     Past Surgical History:  Past Surgical History:   Procedure Laterality Date   • ANTERIOR AND POSTERIOR VAGINAL REPAIR     • BACK SURGERY     • BLEPHAROPLASTY Bilateral 5/23/2019    Procedure: bilateral upper lid blepharoplasty;  Surgeon: Mauricio Pennington MD;  [place] [facility] [place] [facility] [place];  Service: Ophthalmology   • BROW LIFT Bilateral 2/13/2020    Procedure: BILATERAL TEMPORAL DIRECT BROWLIFT;  Surgeon: Sreekanth Bird MD;  Location: [facility] [place] [facility] [place];  Service: Ophthalmology;  Laterality: Bilateral;    • CARDIAC CATHETERIZATION N/A 12/3/2019    Procedure: Left Heart Cath;  Surgeon: Puma Briseno MD;  Location: [facility] CATH INVASIVE LOCATION;  Service: Cardiovascular   • CARDIAC CATHETERIZATION N/A 12/3/2019    Procedure: Coronary angiography;  Surgeon: Puma Briseno MD;  Location: [facility] CATH INVASIVE LOCATION;  Service: Cardiovascular   • CARDIAC CATHETERIZATION N/A 12/3/2019    Procedure: Left ventriculography;  Surgeon: Puma Briseno MD;  Location: [facility] CATH INVASIVE LOCATION;  Service: Cardiovascular   • CATARACT EXTRACTION EXTRACAPSULAR W/ INTRAOCULAR LENS IMPLANTATION Bilateral    • CERVICAL SPINE ANTERIOR      with instrumentation   • CHEILECTOMY Left 8/28/2020    Procedure: CHEILECTOMY;  Surgeon: GAB Rees DPM;  Location: [facility] MAIN OR;  Service: Podiatry;  Laterality: Left;   • COLON SURGERY      for obstruction   • ALBERTO TUBE INSERTION Bilateral 5/23/2019    Procedure: ALBERTO TUBE;  Surgeon: Mauricio Pennington MD;  [place] [facility] [place] [facility] [place]  Service: Ophthalmology   • ECTROPION REPAIR Bilateral 5/23/2019    Procedure: bilateral lower lid ectropion repair, bilateral punctoplasty;  Surgeon: Mauricio Pennington MD;  Location: [facility] [place] [facility] [place];  Service: Ophthalmology   • ENDOSCOPY N/A 2/18/2022    Procedure: ESOPHAGOGASTRODUODENOSCOPY WITH DILATATION (BALLOON 15MM-18MM, UP TO 18MM) AND BOUGIE #[id number];  Surgeon: Wiley Parks MD;  Location: [facility] ENDOSCOPY;  Service: Gastroenterology;  Laterality: N/A;  Post: DYSPHAGIA   • EYE SURGERY     • FOOT FUSION Left 12/30/2016    Procedure: LEFT HALLUX METATARSALPHALANGEAL ARTHRODESIS SILVER BUNIONECTOMY METATARSAL HEAD RESECTION 2-5 CALCANEAL BONE GRAFT;  Surgeon: Monster Harper Jr., MD;  Location: [facility] MAIN OR;  Service:    • HYSTERECTOMY     • INGUINAL HERNIA REPAIR Bilateral    • METATARSAL OSTEOTOMY Left 8/28/2020    Procedure: Metatarsal head resection 5;  Surgeon: GAB Rees DPM;  Location: [facility] MAIN OR;  Service: Podiatry;   Laterality: Left;   • ROTATOR CUFF REPAIR Right    • SIGMOIDOSCOPY N/A 10/6/2021    Procedure: SIGMOIDOSCOPY WITH BIOPSY X1 AREA;  Surgeon: Uche Vaz MD;  Location: [facility] ENDOSCOPY;  Service: Gastroenterology;  Laterality: N/A;  ischemic colitis   • STOMACH SURGERY      for ulcer     Social History:  Social History     Socioeconomic History   • Marital status:    Tobacco Use   • Smoking status: Former Smoker     Packs/day: 0.25     Years: 10.00     Pack years: 2.50     Types: Cigarettes     Quit date:      Years since quittin.2   • Smokeless tobacco: Never Used   Vaping Use   • Vaping Use: Never used   Substance and Sexual Activity   • Alcohol use: No   • Drug use: No   • Sexual activity: Defer     Allergies:  Allergies   Allergen Reactions   • Amoxicillin Itching   • Codeine Nausea And Vomiting and Dizziness   • Lortab [Hydrocodone-Acetaminophen] Nausea And Vomiting   • Nitrofurantoin Other (See Comments)   • Sulfa Antibiotics Rash     Immunizations:  Immunization History   Administered Date(s) Administered   • COVID-19 (MODERNA) 1st, 2nd, 3rd Dose Only 2021, 2021   • TD Preservative Free 2020            In-Office Procedure(s):  No orders to display        ASCVD RIsk Score::  The ASCVD Risk score (Hosea DC Jr., et al., 2013) failed to calculate for the following reasons:    The 2013 ASCVD risk score is only valid for ages 40 to 79    The patient has a prior MI or stroke diagnosis    Imaging:    Results for orders placed during the hospital encounter of 21    XR Knee 3 View Left    Narrative  DATE OF EXAM:  2021 1:51 PM    PROCEDURE:  XR KNEE 3 VW LEFT-    INDICATIONS:  left posterior patella pain    COMPARISON:  No Comparisons Available    TECHNIQUE:  Three radiologic views of the left knee were obtained.      FINDINGS:  No acute fracture is identified. No focal osseous abnormality is  identified. There is mild tricompartmental osteoarthritis. No  suprapatellar  joint effusion is identified. There is mild calcification  within the menisci, raising possibility of CPPD. There is a small  enthesophyte along the superior patella.    Impression  1.No acute osseous process identified.  2.Degenerative changes as described above.    Electronically Signed By-Wiley López MD On:11/8/2021 2:03 PM  This report was finalized on [id number] by  Wiley López MD.       Results for orders placed during the hospital encounter of 10/17/21    CT Chest Pulmonary Embolism    Narrative  DATE OF EXAM:  10/17/2021 4:21 PM    PROCEDURE:  CT CHEST PULMONARY EMBOLISM-    INDICATIONS:  PE suspected, high prob, shortness of air; M79.604-Pain in right leg;  I82.4Z1-Acute embolism and thrombosis of unspecified deep veins of right  distal lower extremity; R60.0-Localized edema    COMPARISON:  AP chest x-ray 10/17/2021, CT chest PE protocol 04/27/2021.    TECHNIQUE:  Routine transaxial slices were obtained through chest after  administration of intravenous 100 ml of Isovue 370. Reconstructed  coronal and sagittal images were also obtained. Automated exposure  control and iterative reconstruction methods were used.    FINDINGS:  There are bilateral pulmonary emboli, including left upper lobe are and  interlobar pulmonary arteries extending into segmental and subsegmental  left pulmonary arteries and in the right upper, middle, and lower lobar,  segmental, and subsegmental pulmonary arteries. The main pulmonary  artery is nondilated. Heart size is mildly enlarged. There is no reflux  of contrast into the IVC. There is no pericardial or pleural effusion.  There is stable subsegmental atelectasis or scarring in the lingula and  lower lobes. The lungs are otherwise clear. No adenopathy is seen in the  chest. The thoracic esophagus is partially fluid-filled. Left renal  pelvic cysts are included. No acute osseous abnormality is identified.    Impression  Bilateral pulmonary emboli, as described  above.    I called this result to Linda SCHWARTZ at 5:00 PM on 10/17/2020.    Electronically Signed By-Jackie Goyal MD On:10/17/2021 5:02 PM  This report was finalized on [id number] by  Jackie Goyal MD.      Results for orders placed during the hospital encounter of 10/17/21    CT Chest Pulmonary Embolism    Narrative  DATE OF EXAM:  10/17/2021 4:21 PM    PROCEDURE:  CT CHEST PULMONARY EMBOLISM-    INDICATIONS:  PE suspected, high prob, shortness of air; M79.604-Pain in right leg;  I82.4Z1-Acute embolism and thrombosis of unspecified deep veins of right  distal lower extremity; R60.0-Localized edema    COMPARISON:  AP chest x-ray 10/17/2021, CT chest PE protocol 04/27/2021.    TECHNIQUE:  Routine transaxial slices were obtained through chest after  administration of intravenous 100 ml of Isovue 370. Reconstructed  coronal and sagittal images were also obtained. Automated exposure  control and iterative reconstruction methods were used.    FINDINGS:  There are bilateral pulmonary emboli, including left upper lobe are and  interlobar pulmonary arteries extending into segmental and subsegmental  left pulmonary arteries and in the right upper, middle, and lower lobar,  segmental, and subsegmental pulmonary arteries. The main pulmonary  artery is nondilated. Heart size is mildly enlarged. There is no reflux  of contrast into the IVC. There is no pericardial or pleural effusion.  There is stable subsegmental atelectasis or scarring in the lingula and  lower lobes. The lungs are otherwise clear. No adenopathy is seen in the  chest. The thoracic esophagus is partially fluid-filled. Left renal  pelvic cysts are included. No acute osseous abnormality is identified.    Impression  Bilateral pulmonary emboli, as described above.    I called this result to Linda SCHWARTZ at 5:00 PM on 10/17/2020.    Electronically Signed By-Jackie Goyal MD On:10/17/2021 5:02 PM  This report was finalized on  [id number] by  Jackie Goyal MD.      Lab Review:   Admission on 02/18/2022, Discharged on 02/18/2022   Component Date Value   • Protime 02/18/2022 11.7 (A)   • INR 02/18/2022 1.06 (A)   Lab on 02/16/2022   Component Date Value   • COVID19 02/16/2022 Not Detected    No results displayed because visit has over 200 results.      No results displayed because visit has over 200 results.      Admission on 09/24/2021, Discharged on 09/24/2021   Component Date Value   • Color 09/24/2021 Yellow    • Clarity, UA 09/24/2021 Cloudy (A)   • Specific Gravity  09/24/2021 1.020    • pH, Urine 09/24/2021 5.0    • Leukocytes 09/24/2021 Large (3+) (A)   • Nitrite, UA 09/24/2021 Negative    • Protein, POC 09/24/2021 30 mg/dL (A)   • Glucose, UA 09/24/2021 Negative    • Ketones, UA 09/24/2021 Negative    • Urobilinogen, UA 09/24/2021 Normal    • Bilirubin 09/24/2021 Small (1+) (A)   • Blood, UA 09/24/2021 Large (A)   • Urine Culture 09/24/2021 >100,000 CFU/mL Escherichia coli (A)     Recent labs reviewed and interpreted for clinical significance and application            Level of Care:           Gavin Quiroz MD  03/22/22  .

## 2022-03-23 ENCOUNTER — HOSPITAL ENCOUNTER (OUTPATIENT)
Dept: CT IMAGING | Facility: HOSPITAL | Age: 87
Discharge: HOME | End: 2022-03-23
Admitting: ORTHOPAEDIC SURGERY

## 2022-03-23 DIAGNOSIS — M53.3 SI (SACROILIAC) JOINT DYSFUNCTION: ICD-10-CM

## 2022-03-23 PROCEDURE — 0 LIDOCAINE 1 % SOLUTION: Performed by: ORTHOPAEDIC SURGERY

## 2022-03-23 PROCEDURE — 25010000002 METHYLPREDNISOLONE PER 80 MG: Performed by: ORTHOPAEDIC SURGERY

## 2022-03-23 RX ORDER — BUPIVACAINE HYDROCHLORIDE 2.5 MG/ML
10 INJECTION, SOLUTION EPIDURAL; INFILTRATION; INTRACAUDAL ONCE
Status: COMPLETED | OUTPATIENT
Start: 2022-03-23 | End: 2022-03-23

## 2022-03-23 RX ORDER — LIDOCAINE HYDROCHLORIDE 10 MG/ML
20 INJECTION, SOLUTION INFILTRATION; PERINEURAL ONCE
Status: COMPLETED | OUTPATIENT
Start: 2022-03-23 | End: 2022-03-23

## 2022-03-23 RX ORDER — METHYLPREDNISOLONE ACETATE 80 MG/ML
80 INJECTION, SUSPENSION INTRA-ARTICULAR; INTRALESIONAL; INTRAMUSCULAR; SOFT TISSUE ONCE
Status: COMPLETED | OUTPATIENT
Start: 2022-03-23 | End: 2022-03-23

## 2022-03-23 RX ADMIN — BUPIVACAINE HYDROCHLORIDE 10 ML: 2.5 INJECTION, SOLUTION EPIDURAL; INFILTRATION; INTRACAUDAL; PERINEURAL at 10:26

## 2022-03-23 RX ADMIN — LIDOCAINE HYDROCHLORIDE 20 ML: 10 INJECTION, SOLUTION INFILTRATION; PERINEURAL at 10:26

## 2022-03-23 RX ADMIN — METHYLPREDNISOLONE ACETATE 80 MG: 80 INJECTION, SUSPENSION INTRA-ARTICULAR; INTRALESIONAL; INTRAMUSCULAR; SOFT TISSUE at 10:26

## 2022-03-24 ENCOUNTER — TELEPHONE (OUTPATIENT)
Dept: CARDIOLOGY | Facility: CLINIC | Age: 87
End: 2022-03-24

## 2022-04-20 RX ORDER — SIMVASTATIN 40 MG
20 TABLET ORAL NIGHTLY
Qty: 90 TABLET | Refills: 1 | Status: SHIPPED | OUTPATIENT
Start: 2022-04-20 | End: 2022-04-25 | Stop reason: SDUPTHER

## 2022-04-25 RX ORDER — SIMVASTATIN 40 MG
20 TABLET ORAL NIGHTLY
Qty: 45 TABLET | Refills: 0 | Status: SHIPPED | OUTPATIENT
Start: 2022-04-25

## 2022-05-05 ENCOUNTER — APPOINTMENT (OUTPATIENT)
Dept: CT IMAGING | Facility: HOSPITAL | Age: 87
End: 2022-05-05

## 2022-05-05 ENCOUNTER — HOSPITAL ENCOUNTER (OUTPATIENT)
Facility: HOSPITAL | Age: 87
Setting detail: OBSERVATION
Discharge: NURSING, REHAB OR LONG-TERM CARE | End: 2022-05-09
Attending: EMERGENCY MEDICINE | Admitting: INTERNAL MEDICINE

## 2022-05-05 ENCOUNTER — APPOINTMENT (OUTPATIENT)
Dept: GENERAL RADIOLOGY | Facility: HOSPITAL | Age: 87
End: 2022-05-05

## 2022-05-05 DIAGNOSIS — S32.592A CLOSED FRACTURE OF RAMUS OF LEFT PUBIS, INITIAL ENCOUNTER: ICD-10-CM
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
## 2022-05-05 DIAGNOSIS — S32.10XA CLOSED FRACTURE OF SACRUM, UNSPECIFIED FRACTURE MORPHOLOGY, INITIAL ENCOUNTER: ICD-10-CM

## 2022-05-05 LAB
ANION GAP SERPL CALCULATED.3IONS-SCNC: 13 MMOL/L (ref 5–15)
BASOPHILS # BLD AUTO: 0 10*3/MM3 (ref 0–0.2)
BASOPHILS NFR BLD AUTO: 0.2 % (ref 0–1.5)
BUN SERPL-MCNC: 19 MG/DL (ref 8–23)
BUN/CREAT SERPL: 24.1 (ref 7–25)
CALCIUM SPEC-SCNC: 8.8 MG/DL (ref 8.2–9.6)
CHLORIDE SERPL-SCNC: 102 MMOL/L (ref 98–107)
CO2 SERPL-SCNC: 26 MMOL/L (ref 22–29)
CREAT SERPL-MCNC: 0.79 MG/DL (ref 0.57–1)
DEPRECATED RDW RBC AUTO: 46.8 FL (ref 37–54)
EGFRCR SERPLBLD CKD-EPI 2021: 68.1 ML/MIN/1.73
EOSINOPHIL # BLD AUTO: 0.1 10*3/MM3 (ref 0–0.4)
EOSINOPHIL NFR BLD AUTO: 0.4 % (ref 0.3–6.2)
ERYTHROCYTE [DISTWIDTH] IN BLOOD BY AUTOMATED COUNT: 15.9 % (ref 12.3–15.4)
GLUCOSE SERPL-MCNC: 105 MG/DL (ref 65–99)
HCT VFR BLD AUTO: 36.3 % (ref 34–46.6)
HGB BLD-MCNC: 11.9 G/DL (ref 12–15.9)
INR PPP: 1.84 (ref 2–3)
LYMPHOCYTES # BLD AUTO: 1.2 10*3/MM3 (ref 0.7–3.1)
LYMPHOCYTES NFR BLD AUTO: 9.6 % (ref 19.6–45.3)
MCH RBC QN AUTO: 27.3 PG (ref 26.6–33)
MCHC RBC AUTO-ENTMCNC: 32.8 G/DL (ref 31.5–35.7)
MCV RBC AUTO: 83.3 FL (ref 79–97)
MONOCYTES # BLD AUTO: 0.8 10*3/MM3 (ref 0.1–0.9)
MONOCYTES NFR BLD AUTO: 6.4 % (ref 5–12)
NEUTROPHILS NFR BLD AUTO: 10.8 10*3/MM3 (ref 1.7–7)
NEUTROPHILS NFR BLD AUTO: 83.4 % (ref 42.7–76)
NRBC BLD AUTO-RTO: 0 /100 WBC (ref 0–0.2)
PLATELET # BLD AUTO: 241 10*3/MM3 (ref 140–450)
PMV BLD AUTO: 6.8 FL (ref 6–12)
POTASSIUM SERPL-SCNC: 3.7 MMOL/L (ref 3.5–5.2)
PROTHROMBIN TIME: 18.3 SECONDS (ref 19.4–28.5)
RBC # BLD AUTO: 4.36 10*6/MM3 (ref 3.77–5.28)
SARS-COV-2 RNA PNL SPEC NAA+PROBE: NOT DETECTED
SODIUM SERPL-SCNC: 141 MMOL/L (ref 136–145)
WBC NRBC COR # BLD: 12.9 10*3/MM3 (ref 3.4–10.8)

## 2022-05-05 PROCEDURE — 80048 BASIC METABOLIC PNL TOTAL CA: CPT | Performed by: NURSE PRACTITIONER

## 2022-05-05 PROCEDURE — 85025 COMPLETE CBC W/AUTO DIFF WBC: CPT | Performed by: NURSE PRACTITIONER

## 2022-05-05 PROCEDURE — 25010000002 ENOXAPARIN PER 10 MG: Performed by: FAMILY MEDICINE

## 2022-05-05 PROCEDURE — G0378 HOSPITAL OBSERVATION PER HR: HCPCS

## 2022-05-05 PROCEDURE — 99284 EMERGENCY DEPT VISIT MOD MDM: CPT

## 2022-05-05 PROCEDURE — 87635 SARS-COV-2 COVID-19 AMP PRB: CPT | Performed by: NURSE PRACTITIONER

## 2022-05-05 PROCEDURE — 96374 THER/PROPH/DIAG INJ IV PUSH: CPT

## 2022-05-05 PROCEDURE — C9803 HOPD COVID-19 SPEC COLLECT: HCPCS

## 2022-05-05 PROCEDURE — 73502 X-RAY EXAM HIP UNI 2-3 VIEWS: CPT

## 2022-05-05 PROCEDURE — 96372 THER/PROPH/DIAG INJ SC/IM: CPT

## 2022-05-05 PROCEDURE — 73700 CT LOWER EXTREMITY W/O DYE: CPT

## 2022-05-05 PROCEDURE — 85610 PROTHROMBIN TIME: CPT | Performed by: NURSE PRACTITIONER

## 2022-05-05 PROCEDURE — 25010000002 MORPHINE PER 10 MG: Performed by: FAMILY MEDICINE

## 2022-05-05 PROCEDURE — 0 MORPHINE SULFATE 4 MG/ML SOLUTION: Performed by: NURSE PRACTITIONER

## 2022-05-05 PROCEDURE — 96375 TX/PRO/DX INJ NEW DRUG ADDON: CPT

## 2022-05-05 PROCEDURE — 25010000002 ONDANSETRON PER 1 MG: Performed by: NURSE PRACTITIONER

## 2022-05-05 RX ORDER — DILTIAZEM HYDROCHLORIDE 120 MG/1
120 CAPSULE, COATED, EXTENDED RELEASE ORAL
Refills: 1 | Status: DISCONTINUED | OUTPATIENT
Start: 2022-05-06 | End: 2022-05-09 | Stop reason: HOSPADM

## 2022-05-05 RX ORDER — ONDANSETRON 2 MG/ML
4 INJECTION INTRAMUSCULAR; INTRAVENOUS EVERY 6 HOURS PRN
Status: DISCONTINUED | OUTPATIENT
Start: 2022-05-05 | End: 2022-05-09 | Stop reason: HOSPADM

## 2022-05-05 RX ORDER — FUROSEMIDE 20 MG/1
20 TABLET ORAL DAILY
Status: DISCONTINUED | OUTPATIENT
Start: 2022-05-06 | End: 2022-05-09 | Stop reason: HOSPADM

## 2022-05-05 RX ORDER — HYDROCODONE BITARTRATE AND ACETAMINOPHEN 5; 325 MG/1; MG/1
1 TABLET ORAL EVERY 6 HOURS PRN
Status: DISCONTINUED | OUTPATIENT
Start: 2022-05-05 | End: 2022-05-09 | Stop reason: HOSPADM

## 2022-05-05 RX ORDER — MORPHINE SULFATE 4 MG/ML
4 INJECTION, SOLUTION INTRAMUSCULAR; INTRAVENOUS ONCE
Status: COMPLETED | OUTPATIENT
Start: 2022-05-05 | End: 2022-05-05

## 2022-05-05 RX ORDER — ONDANSETRON 2 MG/ML
4 INJECTION INTRAMUSCULAR; INTRAVENOUS ONCE
Status: COMPLETED | OUTPATIENT
Start: 2022-05-05 | End: 2022-05-05

## 2022-05-05 RX ORDER — ENOXAPARIN SODIUM 100 MG/ML
60 INJECTION SUBCUTANEOUS EVERY 12 HOURS SCHEDULED
Status: DISCONTINUED | OUTPATIENT
Start: 2022-05-05 | End: 2022-05-05

## 2022-05-05 RX ORDER — MORPHINE SULFATE 2 MG/ML
2 INJECTION, SOLUTION INTRAMUSCULAR; INTRAVENOUS EVERY 4 HOURS PRN
Status: DISCONTINUED | OUTPATIENT
Start: 2022-05-05 | End: 2022-05-09 | Stop reason: HOSPADM

## 2022-05-05 RX ORDER — ENOXAPARIN SODIUM 100 MG/ML
50 INJECTION SUBCUTANEOUS EVERY 12 HOURS SCHEDULED
Status: DISCONTINUED | OUTPATIENT
Start: 2022-05-05 | End: 2022-05-09 | Stop reason: HOSPADM

## 2022-05-05 RX ORDER — FUROSEMIDE 20 MG/1
20 TABLET ORAL DAILY
COMMUNITY
End: 2022-05-09 | Stop reason: HOSPADM

## 2022-05-05 RX ORDER — PANTOPRAZOLE SODIUM 40 MG/1
40 TABLET, DELAYED RELEASE ORAL
Status: DISCONTINUED | OUTPATIENT
Start: 2022-05-06 | End: 2022-05-09 | Stop reason: HOSPADM

## 2022-05-05 RX ORDER — SODIUM CHLORIDE 0.9 % (FLUSH) 0.9 %
3-10 SYRINGE (ML) INJECTION AS NEEDED
Status: DISCONTINUED | OUTPATIENT
Start: 2022-05-05 | End: 2022-05-09 | Stop reason: HOSPADM

## 2022-05-05 RX ORDER — SODIUM CHLORIDE 0.9 % (FLUSH) 0.9 %
10 SYRINGE (ML) INJECTION AS NEEDED
Status: DISCONTINUED | OUTPATIENT
Start: 2022-05-05 | End: 2022-05-09 | Stop reason: HOSPADM

## 2022-05-05 RX ORDER — ACETAMINOPHEN 325 MG/1
650 TABLET ORAL EVERY 6 HOURS PRN
Status: DISCONTINUED | OUTPATIENT
Start: 2022-05-05 | End: 2022-05-09 | Stop reason: HOSPADM

## 2022-05-05 RX ORDER — ISOSORBIDE MONONITRATE 30 MG/1
30 TABLET, EXTENDED RELEASE ORAL DAILY
Status: DISCONTINUED | OUTPATIENT
Start: 2022-05-06 | End: 2022-05-09 | Stop reason: HOSPADM

## 2022-05-05 RX ORDER — FUROSEMIDE 40 MG/1
20 TABLET ORAL 2 TIMES DAILY
Status: DISCONTINUED | OUTPATIENT
Start: 2022-05-05 | End: 2022-05-05

## 2022-05-05 RX ORDER — SODIUM CHLORIDE 0.9 % (FLUSH) 0.9 %
3 SYRINGE (ML) INJECTION EVERY 12 HOURS SCHEDULED
Status: DISCONTINUED | OUTPATIENT
Start: 2022-05-05 | End: 2022-05-09 | Stop reason: HOSPADM

## 2022-05-05 RX ORDER — ATORVASTATIN CALCIUM 20 MG/1
20 TABLET, FILM COATED ORAL NIGHTLY
Refills: 0 | Status: DISCONTINUED | OUTPATIENT
Start: 2022-05-05 | End: 2022-05-09 | Stop reason: HOSPADM

## 2022-05-05 RX ADMIN — ATORVASTATIN CALCIUM 20 MG: 20 TABLET, FILM COATED ORAL at 22:56

## 2022-05-05 RX ADMIN — MORPHINE SULFATE 2 MG: 2 INJECTION, SOLUTION INTRAMUSCULAR; INTRAVENOUS at 22:49

## 2022-05-05 RX ADMIN — Medication 3 ML: at 22:48

## 2022-05-05 RX ADMIN — ENOXAPARIN SODIUM 50 MG: 60 INJECTION SUBCUTANEOUS at 22:57

## 2022-05-05 RX ADMIN — ONDANSETRON 4 MG: 2 INJECTION INTRAMUSCULAR; INTRAVENOUS at 16:14

## 2022-05-05 RX ADMIN — METOPROLOL TARTRATE 12.5 MG: 25 TABLET, FILM COATED ORAL at 22:56

## 2022-05-05 RX ADMIN — MORPHINE SULFATE 4 MG: 4 INJECTION INTRAVENOUS at 16:15

## 2022-05-05 NOTE — CASE MANAGEMENT/SOCIAL WORK
Discharge Planning Assessment  [facility]     Patient Name: Carrie Golden  MRN: [id number]  Today's Date: 5/5/2022    Admit Date: 5/5/2022     Discharge Needs Assessment     Row Name 05/05/22 1620       Living Environment    People in Home alone    Current Living Arrangements home    Primary Care Provided by self    Provides Primary Care For no one    Family Caregiver if Needed other relative(s)    Family Caregiver Names owen-Rita    Able to Return to Prior Arrangements yes       Resource/Environmental Concerns    Transportation Concerns none       Transition Planning    Patient/Family Anticipates Transition to inpatient rehabilitation facility  Owen at bedside open to rehab options if needed. Patient not ready to discuss.    Patient/Family Anticipated Services at Transition rehabilitation services    Transportation Anticipated family or friend will provide       Discharge Needs Assessment    Readmission Within the Last 30 Days no previous admission in last 30 days    Equipment Currently Used at Home cane, straight    Concerns to be Addressed discharge planning    Provided Post Acute Provider List? Yes    Post Acute Provider List Inpatient Rehab    Delivered To Support Person;Patient    Support Person Valery    Method of Delivery In person               Discharge Plan     Row Name 05/05/22 3660       Plan    Plan DC Plan: Rafael wants to return home, await ortho consult. Rehab.list given to owen at bedside.    Plan Comments Spoke with patient and owen Salinas at at bedside. Patient lives alone, works 2 dys/wk ,drives self, occationally uses a cane. Independent with ADL's. Establishe PCP and Pharmacy. Rehab list given to owen, await ortho. consult.                Demographic Summary     Row Name 05/05/22 1618       General Information    Admission Type inpatient    Arrived From emergency department    Referral Source emergency department    Reason for Consult discharge planning    Preferred Language English                Functional Status     Row Name 05/05/22 1620       Functional Status    Usual Activity Tolerance good    Current Activity Tolerance good       Functional Status, IADL    Medications independent    Meal Preparation independent    Housekeeping independent    Laundry independent    Shopping independent       Mental Status    General Appearance WDL WDL         Met with patient in room wearing PPE: mask, face shield/goggles, gloves, gown.      Maintained distance greater than six feet and spent less than 15 minutes in the room.        Chika Valles RN

## 2022-05-05 NOTE — ED NOTES
Pt brought in by EMS, lives at  Home. Had a fall today while at her insurance [employer] office. Pt denies hitting her head, pt states she hit her left hip when she fell and is c/o pain in the hip with movement. Pt unable to bear weight on left leg at this time. Pt is on warfarin. There is no obvious disformity to the leg. Family at bedside.

## 2022-05-05 NOTE — ED PROVIDER NOTES
Subjective   Patient is a 89-year-old white female presents today by EMS with reports of a fall.  She states she was walking into the building today when she missed a step, tripped and fell.  She states she landed on her left hip.  States she was unable to get up after the fall.  She denies striking her head or having LOC.  She complains of pain in the posterior aspect of the left hip is worse with movement better with rest.  She denies numbness tingling or weakness distal to the injury.  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  She denies any neck pain back pain chest pain abdominal pain or other complaint.          Review of Systems   Constitutional: Negative.    HENT: Negative.    Eyes: Negative.    Respiratory: Negative.    Cardiovascular: Negative.    Gastrointestinal: Negative.    Genitourinary: Negative.    Musculoskeletal:        Left hip/pelvic pain   Skin: Negative.    Neurological: Negative.        Past Medical History:   Diagnosis Date   • Amblyopia 10/31/2012   • Amblyopia, left 3/12/2020   • Arthritis    • Conjunctival hemorrhage 10/8/2014   • Coronary artery disease    • Deformity of left foot 8/18/2020    Added automatically from request for surgery [id number]   • Dermatochalasis of left upper eyelid 3/12/2020   • Dermatochalasis of right upper eyelid 3/12/2020   • Drooping eyelid, bilateral    • Ectropion 3/12/2020   • Epiretinal membrane (ERM) of right eye 3/12/2020   • Frequent UTI    • GERD (gastroesophageal reflux disease)    • Hallux rigidus, left foot 8/18/2020    Added automatically from request for surgery [id number]   • Hearing loss     bilateral hearing aides   • 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 sure which 1  contracted at age 8   • History of transfusion    • [place] (hard of hearing)    • Hyperlipidemia    • Hypertension    • Past heart attack     X2 MILD LAST ONE OCT 2019   • PONV (postoperative nausea and vomiting)    • Presence of intraocular lens 3/12/2020       Allergies    Allergen Reactions   • Amoxicillin Itching   • Codeine Nausea And Vomiting and Dizziness   • Lortab [Hydrocodone-Acetaminophen] Nausea And Vomiting   • Nitrofurantoin Other (See Comments)   • Sulfa Antibiotics Rash       Past Surgical History:   Procedure Laterality Date   • ANTERIOR AND POSTERIOR VAGINAL REPAIR     • BACK SURGERY     • BLEPHAROPLASTY Bilateral 5/23/2019    Procedure: bilateral upper lid blepharoplasty;  Surgeon: Mauricio Pennington MD;  Location: [facility];  Service: Ophthalmology   • BROW LIFT Bilateral 2/13/2020    Procedure: BILATERAL TEMPORAL DIRECT BROWLIFT;  Surgeon: Sreekanth Bird MD;  Location: [facility];  Service: Ophthalmology;  Laterality: Bilateral;   • CARDIAC CATHETERIZATION N/A 12/3/2019    Procedure: Left Heart Cath;  Surgeon: Puma Briseno MD;  Location: [facility] CATH INVASIVE LOCATION;  Service: Cardiovascular   • CARDIAC CATHETERIZATION N/A 12/3/2019    Procedure: Coronary angiography;  Surgeon: Puma Briseno MD;  Location: [facility] CATH INVASIVE LOCATION;  Service: Cardiovascular   • CARDIAC CATHETERIZATION N/A 12/3/2019    Procedure: Left ventriculography;  Surgeon: Puma Briseno MD;  Location: [facility] CATH INVASIVE LOCATION;  Service: Cardiovascular   • CATARACT EXTRACTION EXTRACAPSULAR W/ INTRAOCULAR LENS IMPLANTATION Bilateral    • CERVICAL SPINE ANTERIOR      with instrumentation   • CHEILECTOMY Left 8/28/2020    Procedure: CHEILECTOMY;  Surgeon: GAB Rees DPM;  Location: [facility] MAIN OR;  Service: Podiatry;  Laterality: Left;   • COLON SURGERY      for obstruction   • ALBERTO TUBE INSERTION Bilateral 5/23/2019    Procedure: ALBERTO TUBE;  Surgeon: Mauricio Pennington MD;  [place] [facility] [place] [facility] [place]  Service: Ophthalmology   • ECTROPION REPAIR Bilateral 5/23/2019    Procedure: bilateral lower lid ectropion repair, bilateral punctoplasty;  Surgeon: Mauricio Pennington MD;  [place] [facility] [place] [facility] [place]  Service: Ophthalmology   • ENDOSCOPY N/A 2/18/2022     Procedure: ESOPHAGOGASTRODUODENOSCOPY WITH DILATATION (BALLOON 15MM-18MM, UP TO 18MM) AND BOUGIE #48;  Surgeon: Wiley Parks MD;  [facility];  Service: Gastroenterology;  Laterality: N/A;  Post: DYSPHAGIA   • EYE SURGERY     • FOOT FUSION Left 2016    Procedure: LEFT HALLUX METATARSALPHALANGEAL ARTHRODESIS SILVER BUNIONECTOMY METATARSAL HEAD RESECTION 2-5 CALCANEAL BONE GRAFT;  Surgeon: Monster Harper Jr., MD;  Location: [facility] MAIN OR;  Service:    • HYSTERECTOMY     • INGUINAL HERNIA REPAIR Bilateral    • METATARSAL OSTEOTOMY Left 2020    Procedure: Metatarsal head resection 5;  Surgeon: GAB Rees DPM;  [facility];  Service: Podiatry;  Laterality: Left;   • ROTATOR CUFF REPAIR Right    • SIGMOIDOSCOPY N/A 10/6/2021    Procedure: SIGMOIDOSCOPY WITH BIOPSY X1 AREA;  Surgeon: Uche Vaz MD;  [facility];  Service: Gastroenterology;  Laterality: N/A;  ischemic colitis   • STOMACH SURGERY      for ulcer       Family History   Problem Relation Age of Onset   • No Known Problems Mother    • No Known Problems Father    • [place]ig Hyperthermia Neg Hx        Social History     Socioeconomic History   • Marital status:    Tobacco Use   • Smoking status: Former Smoker     Packs/day: 0.25     Years: 10.00     Pack years: 2.50     Types: Cigarettes     Quit date:      Years since quittin.3   • Smokeless tobacco: Never Used   Vaping Use   • Vaping Use: Never used   Substance and Sexual Activity   • Alcohol use: No   • Drug use: No   • Sexual activity: Defer           Objective   Physical Exam  Vital signs and triage nurse note reviewed.  Constitutional: Awake, alert; well-developed and well-nourished. No acute distress is noted.  HEENT: Normocephalic, atraumatic; pupils are PERRL with intact EOM; oropharynx is pink and moist without exudate or erythema.  No drooling or pooling of oral secretions.  Neck: Supple, full range of motion  without pain; no cervical lymphadenopathy. Normal phonation.  Cardiovascular: Regular rate and rhythm, normal S1-S2.  No murmur noted.  Pulmonary: Respiratory effort regular nonlabored, breath sounds clear to auscultation all fields.  Abdomen: Soft, nontender, nondistended with normoactive bowel sounds; no rebound or guarding.  Musculoskeletal: Independent range of motion of all extremities.  There is good range of motion of all extremities however it range of motion of the left hip elicits pain.  There is tenderness over the posterior aspect of the left hip and buttock.  There is no gross deformity.  No crepitus.  No erythema or ecchymosis.  Distal neurovascular motor intact.  Neuro: Alert oriented x3, speech is clear and appropriate, GCS 15.    Skin: Flesh tone, warm, dry, intact; no erythematous or petechial rash or lesion.      Procedures           ED Course      .edlabs  CT Lower Extremity Left Without Contrast    Result Date: 5/5/2022  There is a mildly displaced fractures of the left superior pubic ramus extending to the pubic symphysis. There is a nondisplaced fracture inferior pubic ramus. There is a nondisplaced fracture seen along the inferior margin of the left sacrum. There is diffuse osteopenia limiting evaluation. There is a hernia containing a portion of bowel in the inguinal location. This is not well evaluated. This is is at risk for obstruction and incarceration. 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  Electronically Signed By-Helen Lozada MD On:5/5/2022 3:26 PM This report was finalized on [id number] by  Helen Lozada MD.    XR Hip With or Without Pelvis 2 - 3 View Left    Result Date: 5/5/2022   1. No acute fracture or dislocation. 2. Bony demineralization. 3. Degenerative changes.  Electronically Signed By-Winston Scott MD On:5/5/2022 12:44 PM This report was finalized on [id number] by  Winston Scott MD.    Medications   sodium chloride 0.9 % flush 10 mL (has no administration in time range)    Morphine sulfate (PF) injection 4 mg (has no administration in time range)   ondansetron (ZOFRAN) injection 4 mg (has no administration in time range)                                                MDM  Number of Diagnoses or Management Options  Closed fracture of ramus of left pubis, initial encounter ([facility])  Closed fracture of sacrum, unspecified fracture morphology, initial encounter ([facility])  Fall, initial encounter  Diagnosis management comments: Comorbidities: DVT/PE on warfarin  Differentials: Fracture, contusion, dislocation;this list is not all inclusive and does not constitute the entirety of considered causes  Discussion with provider:  Radiology interpretation: X-rays reviewed by me and interpreted by radiologist: As above  Lab interpretation: Labs viewed by me significant for: Not warranted    Patient had x-rays obtained of the left hip and pelvis that showed no acute abnormality.  Attempt was made to ambulate the patient however she was assisted to standing position and cannot bear weight on the left hip or leg due to pain.  CT was ordered which revealed There is a mildly displaced fractures of the left superior pubic ramus extending to the pubic symphysis. There is a nondisplaced fracture inferior pubic ramus. There is a nondisplaced fracture seen along the inferior margin of the left sacrum. There is diffuse osteopenia limiting evaluation. There is a hernia containing a portion of bowel in the inguinal location. This is not well evaluated. This is is at risk for obstruction and incarceration. 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    On reexamination the patient is resting quietly and in no distress but does report some increase in her pain.  Initially she had declined pain medication but now requesting medication for her increasing pain.  She was given a dose of morphine.  She remains neurologically stable.    She will be admitted to the hospital for further management.  Labs ordered.  Ortho consult  order was placed.    Patient was discussed with Yaa Miranda, nurse practitioner on-call for Dr. Curtis.    Diagnosis and treatment plan discussed with patient.  Patient agreeable to plan.            Amount and/or Complexity of Data Reviewed  Tests in the radiology section of CPT®: reviewed and ordered    Patient Progress  Patient progress: stable      Final diagnoses:   Fall, initial encounter   Closed fracture of ramus of left pubis, initial encounter ([facility])   Closed fracture of sacrum, unspecified fracture morphology, initial encounter ([facility])       ED Disposition  ED Disposition     ED Disposition   Decision to Admit    Condition   --    Comment   Level of Care: Med/Surg [1]   Admitting Physician: JACKY CURTIS [[id number]]   Attending Physician: JACKY CURTIS [[id number]]               No follow-up provider specified.       Medication List      No changes were made to your prescriptions during this visit.          Mona Espinosa, EFREN  05/05/22 160

## 2022-05-05 NOTE — PROGRESS NOTES
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    INR Goal: 2 - 3  Home medication?:  Yes, warfarin 2 mg daily  Bridge Therapy Present?:  Yes,  Enoxaparin 50 mg SQ Q12H  Interacting Medications Evaluation (New/Present/Discontinued): none of clinical significance  Additional Contributing Factors: n/a      Assessment/Plan:    Pt w/subtherapeutic INR on admission. Ortho has been consulted, so will hold off on giving warfarin doses until ortho weighs in on possibility for procedures. Continue enoxaparin bridge in the meantime.     Continue to monitor and adjust based on INR.         Date 5/5           INR 1.84           Dose ---               Objective:  [Ht: 157.5 cm (62\"); Wt: 55.3 kg (122 lb); BMI: Body mass index is 22.31 kg/m².]    Lab Results   Component Value Date    ALBUMIN 3.60 10/17/2021     Lab Results   Component Value Date    INR 1.84 (L) 05/05/2022    INR 1.06 (L) 02/18/2022    INR 1.04 10/20/2021    PROTIME 18.3 (L) 05/05/2022    PROTIME 11.7 (L) 02/18/2022    PROTIME 11.5 10/20/2021     Lab Results   Component Value Date    HGB 11.9 (L) 05/05/2022    HGB 9.6 (L) 10/20/2021    HGB 9.8 (L) 10/19/2021     Lab Results   Component Value Date    HCT 36.3 05/05/2022    HCT 28.6 (L) 10/20/2021    HCT 29.7 (L) 10/19/2021       Lisset Robins, PharmROBERTO CARLOS  05/05/22 18:24 EDT     "

## 2022-05-06 ENCOUNTER — APPOINTMENT (OUTPATIENT)
Dept: GENERAL RADIOLOGY | Facility: HOSPITAL | Age: 87
End: 2022-05-06

## 2022-05-06 PROBLEM — S32.9XXA PELVIC FRACTURE (HCC): Status: ACTIVE | Noted: 2022-05-06

## 2022-05-06 PROBLEM — I48.0 PAF (PAROXYSMAL ATRIAL FIBRILLATION): Status: ACTIVE | Noted: 2022-05-06

## 2022-05-06 LAB
ANION GAP SERPL CALCULATED.3IONS-SCNC: 13 MMOL/L (ref 5–15)
BASOPHILS # BLD AUTO: 0 10*3/MM3 (ref 0–0.2)
BASOPHILS NFR BLD AUTO: 0.3 % (ref 0–1.5)
BUN SERPL-MCNC: 19 MG/DL (ref 8–23)
BUN/CREAT SERPL: 24.1 (ref 7–25)
CALCIUM SPEC-SCNC: 8.4 MG/DL (ref 8.2–9.6)
CHLORIDE SERPL-SCNC: 102 MMOL/L (ref 98–107)
CO2 SERPL-SCNC: 26 MMOL/L (ref 22–29)
CREAT SERPL-MCNC: 0.79 MG/DL (ref 0.57–1)
DEPRECATED RDW RBC AUTO: 46.4 FL (ref 37–54)
EGFRCR SERPLBLD CKD-EPI 2021: 68.1 ML/MIN/1.73
EOSINOPHIL # BLD AUTO: 0.1 10*3/MM3 (ref 0–0.4)
EOSINOPHIL NFR BLD AUTO: 1 % (ref 0.3–6.2)
ERYTHROCYTE [DISTWIDTH] IN BLOOD BY AUTOMATED COUNT: 15.8 % (ref 12.3–15.4)
GLUCOSE SERPL-MCNC: 102 MG/DL (ref 65–99)
HCT VFR BLD AUTO: 32.7 % (ref 34–46.6)
HGB BLD-MCNC: 10.7 G/DL (ref 12–15.9)
INR PPP: 1.72 (ref 2–3)
LYMPHOCYTES # BLD AUTO: 1.6 10*3/MM3 (ref 0.7–3.1)
LYMPHOCYTES NFR BLD AUTO: 21.9 % (ref 19.6–45.3)
MCH RBC QN AUTO: 27.4 PG (ref 26.6–33)
MCHC RBC AUTO-ENTMCNC: 32.7 G/DL (ref 31.5–35.7)
MCV RBC AUTO: 83.7 FL (ref 79–97)
MONOCYTES # BLD AUTO: 0.8 10*3/MM3 (ref 0.1–0.9)
MONOCYTES NFR BLD AUTO: 10.8 % (ref 5–12)
NEUTROPHILS NFR BLD AUTO: 4.9 10*3/MM3 (ref 1.7–7)
NEUTROPHILS NFR BLD AUTO: 66 % (ref 42.7–76)
NRBC BLD AUTO-RTO: 0.1 /100 WBC (ref 0–0.2)
PLATELET # BLD AUTO: 211 10*3/MM3 (ref 140–450)
PMV BLD AUTO: 7.1 FL (ref 6–12)
POTASSIUM SERPL-SCNC: 4.1 MMOL/L (ref 3.5–5.2)
PROTHROMBIN TIME: 17.2 SECONDS (ref 19.4–28.5)
RBC # BLD AUTO: 3.91 10*6/MM3 (ref 3.77–5.28)
SODIUM SERPL-SCNC: 141 MMOL/L (ref 136–145)
WBC NRBC COR # BLD: 7.4 10*3/MM3 (ref 3.4–10.8)

## 2022-05-06 PROCEDURE — 85025 COMPLETE CBC W/AUTO DIFF WBC: CPT | Performed by: FAMILY MEDICINE

## 2022-05-06 PROCEDURE — 80048 BASIC METABOLIC PNL TOTAL CA: CPT | Performed by: FAMILY MEDICINE

## 2022-05-06 PROCEDURE — 85610 PROTHROMBIN TIME: CPT | Performed by: FAMILY MEDICINE

## 2022-05-06 PROCEDURE — 97166 OT EVAL MOD COMPLEX 45 MIN: CPT

## 2022-05-06 PROCEDURE — 36415 COLL VENOUS BLD VENIPUNCTURE: CPT | Performed by: FAMILY MEDICINE

## 2022-05-06 PROCEDURE — G0378 HOSPITAL OBSERVATION PER HR: HCPCS

## 2022-05-06 PROCEDURE — 73030 X-RAY EXAM OF SHOULDER: CPT

## 2022-05-06 PROCEDURE — 25010000002 MORPHINE PER 10 MG: Performed by: FAMILY MEDICINE

## 2022-05-06 PROCEDURE — 96376 TX/PRO/DX INJ SAME DRUG ADON: CPT

## 2022-05-06 PROCEDURE — 96372 THER/PROPH/DIAG INJ SC/IM: CPT

## 2022-05-06 PROCEDURE — 97161 PT EVAL LOW COMPLEX 20 MIN: CPT

## 2022-05-06 PROCEDURE — 25010000002 ENOXAPARIN PER 10 MG: Performed by: FAMILY MEDICINE

## 2022-05-06 PROCEDURE — 99204 OFFICE O/P NEW MOD 45 MIN: CPT | Performed by: ORTHOPAEDIC SURGERY

## 2022-05-06 RX ORDER — WARFARIN SODIUM 2 MG/1
2 TABLET ORAL
Status: DISCONTINUED | OUTPATIENT
Start: 2022-05-07 | End: 2022-05-09 | Stop reason: HOSPADM

## 2022-05-06 RX ORDER — WARFARIN SODIUM 3 MG/1
3 TABLET ORAL
Status: COMPLETED | OUTPATIENT
Start: 2022-05-06 | End: 2022-05-06

## 2022-05-06 RX ORDER — NITROGLYCERIN 0.4 MG/1
0.4 TABLET SUBLINGUAL
Status: DISCONTINUED | OUTPATIENT
Start: 2022-05-06 | End: 2022-05-09 | Stop reason: HOSPADM

## 2022-05-06 RX ADMIN — ENOXAPARIN SODIUM 50 MG: 60 INJECTION SUBCUTANEOUS at 20:00

## 2022-05-06 RX ADMIN — FUROSEMIDE 20 MG: 20 TABLET ORAL at 09:41

## 2022-05-06 RX ADMIN — PANTOPRAZOLE SODIUM 40 MG: 40 TABLET, DELAYED RELEASE ORAL at 09:42

## 2022-05-06 RX ADMIN — Medication 3 ML: at 20:01

## 2022-05-06 RX ADMIN — HYDROCODONE BITARTRATE AND ACETAMINOPHEN 1 TABLET: 5; 325 TABLET ORAL at 19:57

## 2022-05-06 RX ADMIN — ATORVASTATIN CALCIUM 20 MG: 20 TABLET, FILM COATED ORAL at 20:00

## 2022-05-06 RX ADMIN — HYDROCODONE BITARTRATE AND ACETAMINOPHEN 1 TABLET: 5; 325 TABLET ORAL at 09:42

## 2022-05-06 RX ADMIN — ENOXAPARIN SODIUM 50 MG: 60 INJECTION SUBCUTANEOUS at 09:42

## 2022-05-06 RX ADMIN — DILTIAZEM HYDROCHLORIDE 120 MG: 120 CAPSULE, COATED, EXTENDED RELEASE ORAL at 09:42

## 2022-05-06 RX ADMIN — Medication 3 ML: at 09:43

## 2022-05-06 RX ADMIN — WARFARIN 3 MG: 3 TABLET ORAL at 17:38

## 2022-05-06 RX ADMIN — MORPHINE SULFATE 2 MG: 2 INJECTION, SOLUTION INTRAMUSCULAR; INTRAVENOUS at 23:37

## 2022-05-06 RX ADMIN — METOPROLOL TARTRATE 12.5 MG: 25 TABLET, FILM COATED ORAL at 20:00

## 2022-05-06 NOTE — DISCHARGE PLACEMENT REQUEST
"Norman Golden (89 y.o. Female)             Date of Birth   04/13/1925    Social Security Number       Address   [address] [place] [address] [place] [facility]    Home Phone   [phone]    MRN   [id number]       [facility]    Marital Status                               Admission Date   5/5/22    Admission Type   Emergency    Admitting Provider   Ashley Livingston MD    Attending Provider   Ashley Livingston MD    Department, Room/Bed   [facility] SURGICAL INPATIENT, 4106/1       Discharge Date       Discharge Disposition       Discharge Destination                               Attending Provider: Ashley Livingston MD    Allergies: Amoxicillin, Codeine, Lortab [Hydrocodone-acetaminophen], Nitrofurantoin, Sulfa Antibiotics    Isolation: None   Infection: None   Code Status: CPR   Advance Care Planning Activity    Ht: 157.5 cm (62\")   Wt: 55.3 kg (122 lb)    Admission Cmt: None   Principal Problem: Fall, initial encounter [W19.XXXA]                 Active Insurance as of 5/5/2022     Primary Coverage     Payor Plan Insurance Group Employer/Plan Group    ANTHEM MEDICARE REPLACEMENT ANTHEM MEDICARE ADVANTAGE [id number]     Payor Plan Address Payor Plan Phone Number Payor Plan Fax Number Effective Dates    [address] [phone]  1/1/2022 - None Entered    [facility] [id number]       Subscriber Name Subscriber Birth Date Member ID       NORMAN GOLDEN 4/13/1925 [id number]                 Emergency Contacts      (Rel.) Home Phone Work Phone Mobile Phone    Rita Bhatia (Relative) -- -- [phone]              "

## 2022-05-06 NOTE — PLAN OF CARE
Goal Outcome Evaluation:  Plan of Care Reviewed With: patient           Outcome Evaluation: 89 y/o female who fell landing on her L side found with L pubic rami and sacrum fx. At time of this eval, there were no xrays done to LUE. Patient reports pain to L shoulder with movement and L hip/buttock with wt bearing and certain movement. Patient normally lives alone and still works 2days/wk, drives and does not use a.d. PMH includes HTN, CAD, afib. Patient needed CGA for supine to sit towards L side with use of bed rail. Patient able to come to standing with CGa/SBA using rw from bed and chair. Patient tolerated ambulation for 8 ft with CG/SBA and verbal cues , using rw. Patient unable to fully wt bear on LLE while standing/gait due to pain. Patient would be safe to return home provided she will have assistance at home; patient may benefit from wc for longer distance mobility and recommend  PT for continued gait training.

## 2022-05-06 NOTE — PLAN OF CARE
Goal Outcome Evaluation:  Plan of Care Reviewed With: patient        Progress: no change  Outcome Evaluation: Pt is 89 y/o F who lives alone & is (I), works, drives. She fell when asked to enter unaccompanied a place of business from the back employee entrance due to construction at the front & fell down the last of several steps that did not have a rail. Pt has painful Lt shld & Lt pelvic Fx. Pt I/ADL is decreased enough for a short time that OT would recommend several days of acute IP rehab to allow her to safely adapt her skill sin transfer & LBD techniques. Pt is motivated and prepared to work hard to regain her independence.

## 2022-05-06 NOTE — CONSULTS
Pt sitting in chair with family member by her side. Family member said pt is hard of hearing, so  spoke up and intro'd self, and offered spiritual care. There were no needs at this time and they were thankful for checking on them.

## 2022-05-06 NOTE — CONSULTS
Orthopedic Consult      Patient: Carrie Golden    Date of Admission: 5/5/2022 12:10 PM    YOB: 1925    Medical Record Number: [id number]    Attending Physician: Ashley Livingston MD  Consulting Physician:   Chin Ley MD orthopedic surgery      Chief Complaints: Fall, initial encounter [W19.XXXA]  Closed fracture of ramus of left pubis, initial encounter ([facility]) [S32.592A]  Closed fracture of sacrum, unspecified fracture morphology, initial encounter ([facility]) [S32.10XA]      History of Present Illness: 89 y.o. female admitted to [facility] to services of Ashley Livingston MD with Fall, initial encounter [W19.XXXA]  Closed fracture of ramus of left pubis, initial encounter ([facility]) [S32.592A]  Closed fracture of sacrum, unspecified fracture morphology, initial encounter ([facility]) [S32.10XA].  was consulted for further evaluation and treatment. Onset of symptoms was acute in onset when she failed to notice a step while she was removing an  office.  She missed her step and fell awkwardly and landed on her left side.  She was initially concerned about a hip fracture but on further investigation it was found out to have a superior and inferior pubic ramus fracture with minimal displacement she did not have any clinical deformity as a result of the fall.  Symptoms are associated with pain and discomfort with left hip flexion.  Symptoms are aggravated by turning over in bed at night and flexion of the hip.   Symptoms improve with strict bedrest.     Allergies   Allergen Reactions    Amoxicillin Itching    Codeine Nausea And Vomiting and Dizziness    Lortab [Hydrocodone-Acetaminophen] Nausea And Vomiting    Nitrofurantoin Other (See Comments)    Sulfa Antibiotics Rash       Medications:   Home Medications:  Medications Prior to Admission   Medication Sig Dispense Refill Last Dose    dilTIAZem XR (DILACOR XR) 120 MG 24 hr capsule Take 1 capsule by mouth Daily. 90 capsule 1     furosemide (LASIX) 20 MG  tablet Take 20 mg by mouth Daily.       isosorbide mononitrate (IMDUR) 30 MG 24 hr tablet Take 1 tablet by mouth Daily. 90 tablet 1     metoprolol tartrate (LOPRESSOR) 25 MG tablet Take 0.5 tablets by mouth Every 12 (Twelve) Hours. 30 tablet 1     pantoprazole (PROTONIX) 40 MG EC tablet Take 1 tablet by mouth Every Morning. 30 tablet 1     simvastatin (ZOCOR) 40 MG tablet Take 0.5 tablets by mouth Every Night. Patient needs a lipid panel before any more refills 45 tablet 0     warfarin (COUMADIN) 2 MG tablet Take 2 mg by mouth Every Night. Pt was told to hold for 2 days before procedure-- will call [facility] to confirm          Current Medications:  Scheduled Meds:atorvastatin, 20 mg, Oral, Nightly  dilTIAZem CD, 120 mg, Oral, Q24H  enoxaparin, 50 mg, Subcutaneous, Q12H  furosemide, 20 mg, Oral, Daily  isosorbide mononitrate, 30 mg, Oral, Daily  metoprolol tartrate, 12.5 mg, Oral, Q12H  pantoprazole, 40 mg, Oral, QAM AC  sodium chloride, 3 mL, Intravenous, Q12H      Continuous Infusions:Pharmacy to dose warfarin,       PRN Meds:.  acetaminophen    HYDROcodone-acetaminophen    Morphine    nitroglycerin    ondansetron    Pharmacy to dose warfarin    [COMPLETED] Insert peripheral IV **AND** sodium chloride    sodium chloride       Past Medical History:   Diagnosis Date    Amblyopia 10/31/2012    Amblyopia, left 3/12/2020    Arthritis     Conjunctival hemorrhage 10/8/2014    Coronary artery disease     Deformity of left foot 8/18/2020    Added automatically from request for surgery [id number]    Dermatochalasis of left upper eyelid 3/12/2020    Dermatochalasis of right upper eyelid 3/12/2020    Drooping eyelid, bilateral     Ectropion 3/12/2020    Epiretinal membrane (ERM) of right eye 3/12/2020    Frequent UTI     GERD (gastroesophageal reflux disease)     Hallux rigidus, left foot 8/18/2020    Added automatically from request for surgery [id number]    Hearing loss     bilateral hearing aides    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  sure which 1  contracted at age 8    History of transfusion     [place] (hard of hearing)     Hyperlipidemia     Hypertension     Past heart attack     X2 MILD LAST ONE OCT 2019    PONV (postoperative nausea and vomiting)     Presence of intraocular lens 3/12/2020        Past Surgical History:   Procedure Laterality Date    ANTERIOR AND POSTERIOR VAGINAL REPAIR      BACK SURGERY      BLEPHAROPLASTY Bilateral 5/23/2019    Procedure: bilateral upper lid blepharoplasty;  Surgeon: Mauricio Pennington MD;  Location: [facility] OR [facility];  Service: Ophthalmology    BROW LIFT Bilateral 2/13/2020    Procedure: BILATERAL TEMPORAL DIRECT BROWLIFT;  Surgeon: Sreekanth Bird MD;  Location: [facility] OR [facility];  Service: Ophthalmology;  Laterality: Bilateral;    CARDIAC CATHETERIZATION N/A 12/3/2019    Procedure: Left Heart Cath;  Surgeon: Puma Briseno MD;  Location: [facility] CATH INVASIVE LOCATION;  Service: Cardiovascular    CARDIAC CATHETERIZATION N/A 12/3/2019    Procedure: Coronary angiography;  Surgeon: Puma Briseno MD;  Location: [facility] CATH INVASIVE LOCATION;  Service: Cardiovascular    CARDIAC CATHETERIZATION N/A 12/3/2019    Procedure: Left ventriculography;  Surgeon: Puma Briseno MD;  Location: [facility] CATH INVASIVE LOCATION;  Service: Cardiovascular    CATARACT EXTRACTION EXTRACAPSULAR W/ INTRAOCULAR LENS IMPLANTATION Bilateral     CERVICAL SPINE ANTERIOR      with instrumentation    CHEILECTOMY Left 8/28/2020    Procedure: CHEILECTOMY;  Surgeon: GAB Rees DPM;  Location: [facility] MAIN OR;  Service: Podiatry;  Laterality: Left;    COLON SURGERY      for obstruction    ALBERTO TUBE INSERTION Bilateral 5/23/2019    Procedure: ALBERTO TUBE;  Surgeon: Mauricio Pennington MD;  Location: [facility] OR [facility];  Service: Ophthalmology    ECTROPION REPAIR Bilateral 5/23/2019    Procedure: bilateral lower lid ectropion repair, bilateral punctoplasty;  Surgeon: Mauricio Pennington MD;  Location: [facility] OR [facility];  Service: Ophthalmology     ENDOSCOPY N/A 2022    Procedure: ESOPHAGOGASTRODUODENOSCOPY WITH DILATATION (BALLOON 15MM-18MM, UP TO 18MM) AND BOUGIE #48;  Surgeon: Wiley Parks MD;  Location: [facility] ENDOSCOPY;  Service: Gastroenterology;  Laterality: N/A;  Post: DYSPHAGIA    EYE SURGERY      FOOT FUSION Left 2016    Procedure: LEFT HALLUX METATARSALPHALANGEAL ARTHRODESIS SILVER BUNIONECTOMY METATARSAL HEAD RESECTION 2-5 CALCANEAL BONE GRAFT;  Surgeon: Monster Harper Jr., MD;  Location: [facility] MAIN OR;  Service:     HYSTERECTOMY      INGUINAL HERNIA REPAIR Bilateral     METATARSAL OSTEOTOMY Left 2020    Procedure: Metatarsal head resection 5;  Surgeon: GAB Rees DPM;  Location: [facility] MAIN OR;  Service: Podiatry;  Laterality: Left;    ROTATOR CUFF REPAIR Right     SIGMOIDOSCOPY N/A 10/6/2021    Procedure: SIGMOIDOSCOPY WITH BIOPSY X1 AREA;  Surgeon: Uche Vaz MD;  Location: [facility] ENDOSCOPY;  Service: Gastroenterology;  Laterality: N/A;  ischemic colitis    STOMACH SURGERY      for ulcer        Social History     Occupational History    Not on file   Tobacco Use    Smoking status: Former Smoker     Packs/day: 0.25     Years: 10.00     Pack years: 2.50     Types: Cigarettes     Quit date:      Years since quittin.3    Smokeless tobacco: Never Used   Vaping Use    Vaping Use: Never used   Substance and Sexual Activity    Alcohol use: No    Drug use: No    Sexual activity: Defer      Social History     Social History Narrative    Not on file        Family History   Problem Relation Age of Onset    No Known Problems Mother     No Known Problems Father     [place]ig Hyperthermia Neg Hx          Review of Systems:   HEENT: Patient denies any headaches, vision changes, change in hearing, or tinnitus.  Patient denies any rhinorrhea,epistaxis, sinus pain, mouth or dental problems, sore throat or hoarseness, or dysphagia  Pulmonary: Patient denies any cough, congestion, SOA, or wheezing  Cardiovascular: Patient  denies any chest pain, dyspnea, palpitations, weakness, intolerance of exercise, varicosities, swelling of extremities, known murmur  Gastrointestinal:  Patient denies nausea, vomiting, diarrhea, constipation, loss  of appetite, change in appetite, dysphagia, gas, heartburn, melena, change in bowel habits, use of laxatives or other drugs to alter the function of the gastrointestinal tract.  Genital/Urinary: Patient denies dysuria, change in color of urine, change in frequency of urination, pain with urgency, incontinence, retention, or nocturia.  Musculoskeletal: Patient denies increased warmth; redness; or swelling of joints; limitation of function; deformity; crepitation: pain in a joint or an extremity, the neck, or the back, especially with movement.  Neurological: Patient denies dizziness, tremor, ataxia, difficulty in speaking, change in speech, paresthesia, loss of sensation, seizures, syncope, changes in memory.  Endocrine system: Patient denies tremors, palpitations, intolerance of heat or cold, polyuria, polydipsia, polyphagia, diaphoresis, exophthalmos, or goiter.  Psychological: Patient denies thoughts/plans of harming self or other; depression,  insomnia, night terrors, nora, memory loss, disorientation.  Skin: Patient denies any bruising, rashes, discoloration, pruritus, wounds, ulcers, decubiti, changes in the hair or nails  Hematopoietic: Patient denies history of spontaneous or excessive bleeding, epistaxis, hematuria, melena, fatigue, enlarged or tender lymph nodes, pallor, history of anemia.    Physical Exam: 89 y.o. female  Vitals:    05/05/22 1936 05/05/22 2255 05/06/22 0016 05/06/22 0500   BP: 139/72 121/55 134/62 139/68   BP Location: Right arm  Right arm Right arm   Patient Position: Lying  Lying Lying   Pulse: 76 67 72 59   Resp: 14  16 14   Temp: 98.4 °F (36.9 °C)  98.1 °F (36.7 °C) 98.3 °F (36.8 °C)   TempSrc: Oral  Oral Oral   SpO2: 94% 95% 94% 99%   Weight:       Height:         General  Appearance:    Alert, cooperative, in no acute distress                      Head:  Normocephalic, without obvious abnormality, atraumatic   Eyes:          Lids and lashes normal, conjunctivae and sclerae normal, no icterus, no pallor, corneas clear, PERRLA   Ears:  Ears appear intact with no abnormalities noted   Throat: No oral lesions, no thrush, oral mucosa moist   Neck: No adenopathy, supple, trachea midline, no thyromegaly, no carotid bruit, no JVD   Back:   No kyphosis present, no scoliosis present, no skin lesions,    erythema or scars, no tenderness to percussion or                   palpation, range of motion normal   Lungs:   Clear to auscultation,respirations regular, even and                unlabored    Heart:  Regular rhythm and normal rate, normal S1 and S2, no         murmur, no gallop, no rub, no click   Chest Wall:  No abnormalities observed   Abdomen:   Normal bowel sounds, no masses, no organomegaly, soft     nontender, nondistended, no guarding, no rebound                tenderness   Rectal:    Deferred   Extremities: Tenderness noted at anterior and lateral aspect of the left pubic ramus superiorly and inferiorly.  There is no clinical deformity.  Hip flexion is associated with some pain but she is able to accomplish it..  Moves all extremities well, no       edema, no cyanosis, no redness   Pulses: Pulses palpable and equal bilaterally   Skin: No bleeding, bruising or rash   Lymph nodes: No palpable adenopathy   Neurologic: Cranial nerves 2 - 12 grossly intact, sensation intact, DTR     present and equal bilaterally           Diagnostic Tests:  Admission on 05/05/2022   Component Date Value Ref Range Status    Glucose 05/05/2022 105 (A) 65 - 99 mg/dL Final    BUN 05/05/2022 19  8 - 23 mg/dL Final    Creatinine 05/05/2022 0.79  0.57 - 1.00 mg/dL Final    Sodium 05/05/2022 141  136 - 145 mmol/L Final    Potassium 05/05/2022 3.7  3.5 - 5.2 mmol/L Final    Chloride 05/05/2022 102  98 - 107 mmol/L  Final    CO2 05/05/2022 26.0  22.0 - 29.0 mmol/L Final    Calcium 05/05/2022 8.8  8.2 - 9.6 mg/dL Final    BUN/Creatinine Ratio 05/05/2022 24.1  7.0 - 25.0 Final    Anion Gap 05/05/2022 13.0  5.0 - 15.0 mmol/L Final    eGFR 05/05/2022 68.1  >60.0 mL/min/1.73 Final    National Kidney Foundation and American Society of Nephrology (ASN) Task Force recommended calculation based on the Chronic Kidney Disease Epidemiology Collaboration (CKD-EPI) equation refit without adjustment for race.    Protime 05/05/2022 18.3 (A) 19.4 - 28.5 Seconds Final    INR 05/05/2022 1.84 (A) 2.00 - 3.00 Final    WBC 05/05/2022 12.90 (A) 3.40 - 10.80 10*3/mm3 Final    RBC 05/05/2022 4.36  3.77 - 5.28 10*6/mm3 Final    Hemoglobin 05/05/2022 11.9 (A) 12.0 - 15.9 g/dL Final    Hematocrit 05/05/2022 36.3  34.0 - 46.6 % Final    MCV 05/05/2022 83.3  79.0 - 97.0 fL Final    MCH 05/05/2022 27.3  26.6 - 33.0 pg Final    MCHC 05/05/2022 32.8  31.5 - 35.7 g/dL Final    RDW 05/05/2022 15.9 (A) 12.3 - 15.4 % Final    RDW-SD 05/05/2022 46.8  37.0 - 54.0 fl Final    MPV 05/05/2022 6.8  6.0 - 12.0 fL Final    Platelets 05/05/2022 241  140 - 450 10*3/mm3 Final    Neutrophil % 05/05/2022 83.4 (A) 42.7 - 76.0 % Final    Lymphocyte % 05/05/2022 9.6 (A) 19.6 - 45.3 % Final    Monocyte % 05/05/2022 6.4  5.0 - 12.0 % Final    Eosinophil % 05/05/2022 0.4  0.3 - 6.2 % Final    Basophil % 05/05/2022 0.2  0.0 - 1.5 % Final    Neutrophils, Absolute 05/05/2022 10.80 (A) 1.70 - 7.00 10*3/mm3 Final    Lymphocytes, Absolute 05/05/2022 1.20  0.70 - 3.10 10*3/mm3 Final    Monocytes, Absolute 05/05/2022 0.80  0.10 - 0.90 10*3/mm3 Final    Eosinophils, Absolute 05/05/2022 0.10  0.00 - 0.40 10*3/mm3 Final    Basophils, Absolute 05/05/2022 0.00  0.00 - 0.20 10*3/mm3 Final    nRBC 05/05/2022 0.0  0.0 - 0.2 /100 WBC Final    COVID19 05/05/2022 Not Detected  Not Detected - Ref. Range Final    Protime 05/06/2022 17.2 (A) 19.4 - 28.5 Seconds Final    INR 05/06/2022 1.72 (A) 2.00 -  3.00 Final    Glucose 05/06/2022 102 (A) 65 - 99 mg/dL Final    BUN 05/06/2022 19  8 - 23 mg/dL Final    Creatinine 05/06/2022 0.79  0.57 - 1.00 mg/dL Final    Sodium 05/06/2022 141  136 - 145 mmol/L Final    Potassium 05/06/2022 4.1  3.5 - 5.2 mmol/L Final    Chloride 05/06/2022 102  98 - 107 mmol/L Final    CO2 05/06/2022 26.0  22.0 - 29.0 mmol/L Final    Calcium 05/06/2022 8.4  8.2 - 9.6 mg/dL Final    BUN/Creatinine Ratio 05/06/2022 24.1  7.0 - 25.0 Final    Anion Gap 05/06/2022 13.0  5.0 - 15.0 mmol/L Final    eGFR 05/06/2022 68.1  >60.0 mL/min/1.73 Final    National Kidney Foundation and American Society of Nephrology (ASN) Task Force recommended calculation based on the Chronic Kidney Disease Epidemiology Collaboration (CKD-EPI) equation refit without adjustment for race.    WBC 05/06/2022 7.40  3.40 - 10.80 10*3/mm3 Final    RBC 05/06/2022 3.91  3.77 - 5.28 10*6/mm3 Final    Hemoglobin 05/06/2022 10.7 (A) 12.0 - 15.9 g/dL Final    Hematocrit 05/06/2022 32.7 (A) 34.0 - 46.6 % Final    MCV 05/06/2022 83.7  79.0 - 97.0 fL Final    MCH 05/06/2022 27.4  26.6 - 33.0 pg Final    MCHC 05/06/2022 32.7  31.5 - 35.7 g/dL Final    RDW 05/06/2022 15.8 (A) 12.3 - 15.4 % Final    RDW-SD 05/06/2022 46.4  37.0 - 54.0 fl Final    MPV 05/06/2022 7.1  6.0 - 12.0 fL Final    Platelets 05/06/2022 211  140 - 450 10*3/mm3 Final    Neutrophil % 05/06/2022 66.0  42.7 - 76.0 % Final    Lymphocyte % 05/06/2022 21.9  19.6 - 45.3 % Final    Monocyte % 05/06/2022 10.8  5.0 - 12.0 % Final    Eosinophil % 05/06/2022 1.0  0.3 - 6.2 % Final    Basophil % 05/06/2022 0.3  0.0 - 1.5 % Final    Neutrophils, Absolute 05/06/2022 4.90  1.70 - 7.00 10*3/mm3 Final    Lymphocytes, Absolute 05/06/2022 1.60  0.70 - 3.10 10*3/mm3 Final    Monocytes, Absolute 05/06/2022 0.80  0.10 - 0.90 10*3/mm3 Final    Eosinophils, Absolute 05/06/2022 0.10  0.00 - 0.40 10*3/mm3 Final    Basophils, Absolute 05/06/2022 0.00  0.00 - 0.20 10*3/mm3 Final    nRBC 05/06/2022  0.1  0.0 - 0.2 /100 WBC Final     No results found.    Assessment:  Patient Active Problem List   Diagnosis    Chest pain    Dyslipidemia    Essential hypertension    Gastroesophageal reflux disease    NSTEMI, initial episode of care ([facility])    Coronary artery disease involving native heart with unstable angina pectoris ([facility])    Tear film insufficiency    After-cataract with vision obscured    Cervical radiculopathy    Lumbar radiculopathy    Metatarsalgia of left foot    Localized, primary osteoarthritis    Mixed hyperlipidemia    Colitis    Degeneration of lumbar intervertebral disc    Osteoarthritis of knee    Right leg pain    Pulmonary embolism ([facility])    Acute deep vein thrombosis (DVT) of distal vein of right lower extremity ([facility])    Iron deficiency anemia    Fall, initial encounter   X-rays and CT scan are reviewed.  The patient demonstrates minimally displaced fracture of the superior and inferior pubic ramus on the left side.  The femoral neck and head do not demonstrate any acute injury.        Plan:  The patient voiced understanding of the risks, benefits, and alternative forms of treatment that were discussed and the patient consents to proceed with nonoperative management of the superior  And inferior ramus fracture..   Weightbearing as tolerated.  DVT prophylaxis with TIARA hose stockings and foot plexi pulses.  Falls precaution.  Tablet Norco 5/325 mg tab 1 p.o. every 8-12 as needed pain.  Chemical prophylaxis for DVT would be dependent on the hospitalist service based on the risk stratification for DVT.  Incentive spirometer.  Follow-up in the office in 4 weeks for reevaluation and repeat x-rays to evaluate the healing of the pubic ramus fracture.  Patient voices understanding of the nonoperative management.    Discharge Plan: today to home and home health      Date: 5/6/2022    Chin Ley MD      Time: Critical care 20 min     DICTATED UTILIZING DRAGON DICTATION

## 2022-05-06 NOTE — PLAN OF CARE
Goal Outcome Evaluation:               Up to the with 2 assist with gait belt and walker. Appetite fair. C/o left shoulder soreness and left hip pain only with movement.

## 2022-05-06 NOTE — NURSING NOTE
Pt's CT of Lower Left without contrast reviewed by this RN, results revealing hernia containing portion of bowel inguinal location not well evaluated, risk for obstruction.  Pt currently asymptomatic at this time, will continue to monitor.  On-call MD Dr. Amaya notified and advised of results, stated will look at results more in-depth in AM and go from there.  Will continue to monitor and observe for symptomatic signs and symptoms.

## 2022-05-06 NOTE — NURSING NOTE
Orthopedic consult called in for this patient as ordered, spoke with Dr. Ley on-call ortho MD as ordered, results of CT Lower Left, Hip X-Ray, Knee X-Ray all relayed to MD.  MD stated OK for patient to have regular diet, and no surgery at this point in time.  Will continue to monitor and observe.  MD to see patient in AM.

## 2022-05-06 NOTE — CASE MANAGEMENT/SOCIAL WORK
Continued Stay Note  RANDALL Natarajan     Patient Name: Carrie Golden  MRN: [id number]  Today's Date: 5/6/2022    Admit Date: 5/5/2022     Discharge Plan     Row Name 05/06/22 1403       Plan    Plan Referral to [facility], pending acceptance and precert.    Patient/Family in Agreement with Plan yes    Plan Comments Spoke with patient and nibonny Rita at bedside.  Patient gave me selections for  rehab. #1 [facility] #2 [facility], #3 [facility].    Referral sent to [facility] pending acceptance and precert.                 Reny Hayes RN   Met with patient in room wearing PPE: mask, face shield/goggles, gloves, gown.      Maintained distance greater than six feet and spent less than 15 minutes in the room.

## 2022-05-06 NOTE — THERAPY EVALUATION
Patient Name: Carrie Golden  : 1925    MRN: [id number]                              Today's Date: 2022       Admit Date: 2022    Visit Dx:     ICD-10-CM ICD-9-CM   1. Fall, initial encounter  W19.XXXA E888.9   2. Closed fracture of ramus of left pubis, initial encounter ([facility])  S32.592A 808.2   3. Closed fracture of sacrum, unspecified fracture morphology, initial encounter ([facility])  S32.10XA 805.6     Patient Active Problem List   Diagnosis   • Chest pain   • Dyslipidemia   • Hypertension   • Gastroesophageal reflux disease   • NSTEMI, initial episode of care ([facility])   • Coronary artery disease involving native heart with unstable angina pectoris ([facility])   • Tear film insufficiency   • After-cataract with vision obscured   • Cervical radiculopathy   • Lumbar radiculopathy   • Metatarsalgia of left foot   • Localized, primary osteoarthritis   • Mixed hyperlipidemia   • Colitis   • Degeneration of lumbar intervertebral disc   • Osteoarthritis of knee   • Right leg pain   • Pulmonary embolism ([facility])   • Acute deep vein thrombosis (DVT) of distal vein of right lower extremity ([facility])   • Iron deficiency anemia   • Fall, initial encounter   • Pelvic fracture ([facility])   • PAF (paroxysmal atrial fibrillation) ([facility])     Past Medical History:   Diagnosis Date   • Amblyopia 10/31/2012   • Amblyopia, left 3/12/2020   • Arthritis    • Conjunctival hemorrhage 10/8/2014   • Coronary artery disease    • Deformity of left foot 2020    Added automatically from request for surgery [id number]   • Dermatochalasis of left upper eyelid 3/12/2020   • Dermatochalasis of right upper eyelid 3/12/2020   • Drooping eyelid, bilateral    • Ectropion 3/12/2020   • Epiretinal membrane (ERM) of right eye 3/12/2020   • Frequent UTI    • GERD (gastroesophageal reflux disease)    • Hallux rigidus, left foot 2020    Added automatically from request for surgery [id number]   • Hearing loss     bilateral hearing aides   • History of hepatitis      pt not sure which 1  contracted at age 8   • History of transfusion    • [place] (hard of hearing)    • Hyperlipidemia    • Hypertension    • Past heart attack     X2 MILD LAST ONE OCT 2019   • PONV (postoperative nausea and vomiting)    • Presence of intraocular lens 3/12/2020     Past Surgical History:   Procedure Laterality Date   • ANTERIOR AND POSTERIOR VAGINAL REPAIR     • BACK SURGERY     • BLEPHAROPLASTY Bilateral 5/23/2019    Procedure: bilateral upper lid blepharoplasty;  Surgeon: Mauricio Pennington MD;  Location: [facility] OR [facility] [place];  Service: Ophthalmology   • BROW LIFT Bilateral 2/13/2020    Procedure: BILATERAL TEMPORAL DIRECT BROWLIFT;  Surgeon: Sreekanth Bird MD;  Location: [facility] OR [facility] [place];  Service: Ophthalmology;  Laterality: Bilateral;   • CARDIAC CATHETERIZATION N/A 12/3/2019    Procedure: Left Heart Cath;  Surgeon: Puma Briseno MD;  Location: [facility] CATH INVASIVE LOCATION;  Service: Cardiovascular   • CARDIAC CATHETERIZATION N/A 12/3/2019    Procedure: Coronary angiography;  Surgeon: Puma Briseno MD;  Location: [facility] CATH INVASIVE LOCATION;  Service: Cardiovascular   • CARDIAC CATHETERIZATION N/A 12/3/2019    Procedure: Left ventriculography;  Surgeon: Puma Briseno MD;  Location: [facility] CATH INVASIVE LOCATION;  Service: Cardiovascular   • CATARACT EXTRACTION EXTRACAPSULAR W/ INTRAOCULAR LENS IMPLANTATION Bilateral    • CERVICAL SPINE ANTERIOR      with instrumentation   • CHEILECTOMY Left 8/28/2020    Procedure: CHEILECTOMY;  Surgeon: GAB Rees DPM;  Location: [facility] MAIN OR;  Service: Podiatry;  Laterality: Left;   • COLON SURGERY      for obstruction   • ALBERTO TUBE INSERTION Bilateral 5/23/2019    Procedure: ALBERTO TUBE;  Surgeon: Mauricio Pennington MD;  Location: [facility] OR [facility] [place];  Service: Ophthalmology   • ECTROPION REPAIR Bilateral 5/23/2019    Procedure: bilateral lower lid ectropion repair, bilateral punctoplasty;  Surgeon: Mauricio Pennington MD;  Location: [facility] OR [facility] [place];   Service: Ophthalmology   • ENDOSCOPY N/A 2/18/2022    Procedure: ESOPHAGOGASTRODUODENOSCOPY WITH DILATATION (BALLOON 15MM-18MM, UP TO 18MM) AND BOUGIE #48;  Surgeon: Wiley Parks MD;  Location: [facility] ENDOSCOPY;  Service: Gastroenterology;  Laterality: N/A;  Post: DYSPHAGIA   • EYE SURGERY     • FOOT FUSION Left 12/30/2016    Procedure: LEFT HALLUX METATARSALPHALANGEAL ARTHRODESIS SILVER BUNIONECTOMY METATARSAL HEAD RESECTION 2-5 CALCANEAL BONE GRAFT;  Surgeon: Monster Harper Jr., MD;  Location: [facility] MAIN OR;  Service:    • HYSTERECTOMY     • INGUINAL HERNIA REPAIR Bilateral    • METATARSAL OSTEOTOMY Left 8/28/2020    Procedure: Metatarsal head resection 5;  Surgeon: GAB Rees DPM;  Location: [facility] MAIN OR;  Service: Podiatry;  Laterality: Left;   • ROTATOR CUFF REPAIR Right    • SIGMOIDOSCOPY N/A 10/6/2021    Procedure: SIGMOIDOSCOPY WITH BIOPSY X1 AREA;  Surgeon: Uche Vaz MD;  Location: [facility] ENDOSCOPY;  Service: Gastroenterology;  Laterality: N/A;  ischemic colitis   • STOMACH SURGERY      for ulcer      General Information     Row Name 05/06/22 1216          Physical Therapy Time and Intention    Document Type evaluation  -CR     Mode of Treatment physical therapy  -CR     Row Name 05/06/22 1216          General Information    Patient Profile Reviewed yes  -CR     Prior Level of Function independent:;all household mobility;community mobility;work;driving;ADL's  -CR     Barriers to Rehab hearing deficit  -CR     Row Name 05/06/22 1216          Living Environment    People in Home alone  -CR     Row Name 05/06/22 1216          Home Main Entrance    Number of Stairs, Main Entrance none;one  -CR     Row Name 05/06/22 1216          Stairs Within Home, Primary    Number of Stairs, Within Home, Primary none  -CR     Row Name 05/06/22 1216          Safety Issues, Functional Mobility    Impairments Affecting Function (Mobility) pain;endurance/activity tolerance  -CR           User Key  (r) =  Recorded By, (t) = Taken By, (c) = Cosigned By    Initials Name Provider Type    CR Reyes, Carmela, PT Physical Therapist               Mobility     Row Name 05/06/22 1220          Bed Mobility    Bed Mobility supine-sit  -CR     Supine-Sit Cascade (Bed Mobility) verbal cues;contact guard  -CR     Assistive Device (Bed Mobility) bed rails  -CR     Row Name 05/06/22 1220          Bed-Chair Transfer    Bed-Chair Cascade (Transfers) contact guard;verbal cues  -CR     Assistive Device (Bed-Chair Transfers) walker, front-wheeled  -CR     Row Name 05/06/22 1220          Sit-Stand Transfer    Sit-Stand Cascade (Transfers) verbal cues;contact guard  -CR     Assistive Device (Sit-Stand Transfers) walker, front-wheeled  -CR     Row Name 05/06/22 1220          Gait/Stairs (Locomotion)    Cascade Level (Gait) contact guard;verbal cues  -CR     Assistive Device (Gait) walker, front-wheeled  -CR     Distance in Feet (Gait) 8  -CR     Deviations/Abnormal Patterns (Gait) gait speed decreased;antalgic  -CR     Left Sided Gait Deviations weight shift ability decreased;heel strike decreased  -CR           User Key  (r) = Recorded By, (t) = Taken By, (c) = Cosigned By    Initials Name Provider Type    CR Reyes, Carmela, PT Physical Therapist               Obj/Interventions     Row Name 05/06/22 1225          Range of Motion Comprehensive    Comment, General Range of Motion AROM RLE wfl,min limit LLE  -CR     Row Name 05/06/22 1225          Strength Comprehensive (MMT)    Comment, General Manual Muscle Testing (MMT) Assessment RLE grosslyWFL. cannot perform MMT L hip due to pain however L ankle, quads WFL  -CR     Row Name 05/06/22 1225          Balance    Balance Assessment sitting static balance;standing static balance;standing dynamic balance  -CR     Static Sitting Balance independent  -CR     Position, Sitting Balance sitting edge of bed  -CR     Static Standing Balance standby assist  -CR     Dynamic Standing  Balance minimal assist;contact guard  -CR     Position/Device Used, Standing Balance walker, front-wheeled  -CR     Row Name 05/06/22 1225          Sensory Assessment (Somatosensory)    Sensory Assessment (Somatosensory) sensation intact  -CR           User Key  (r) = Recorded By, (t) = Taken By, (c) = Cosigned By    Initials Name Provider Type    CR Reyes, Carmela, PT Physical Therapist               Goals/Plan     Row Name 05/06/22 1302          Bed Mobility Goal 1 (PT)    Activity/Assistive Device (Bed Mobility Goal 1, PT) sit to supine/supine to sit  -CR     Kay Level/Cues Needed (Bed Mobility Goal 1, PT) modified independence  -CR     Time Frame (Bed Mobility Goal 1, PT) 1 week  -CR     Row Name 05/06/22 1302          Transfer Goal 1 (PT)    Activity/Assistive Device (Transfer Goal 1, PT) transfers, all;walker, rolling  -CR     Kay Level/Cues Needed (Transfer Goal 1, PT) supervision required  -CR     Time Frame (Transfer Goal 1, PT) 1 week  -CR     Row Name 05/06/22 1302          Gait Training Goal 1 (PT)    Activity/Assistive Device (Gait Training Goal 1, PT) gait (walking locomotion);assistive device use;increase endurance/gait distance;walker, rolling  -CR     Kay Level (Gait Training Goal 1, PT) standby assist  -CR     Distance (Gait Training Goal 1, PT) 40 ft x 2  -CR     Time Frame (Gait Training Goal 1, PT) 1 week  -CR     Row Name 05/06/22 1302          Patient Education Goal (PT)    Activity (Patient Education Goal, PT) HEP  -CR     Kay/Cues/Accuracy (Memory Goal 2, PT) demonstrates adequately  -CR     Time Frame (Patient Education Goal, PT) 1 week  -CR     Row Name 05/06/22 1302          Therapy Assessment/Plan (PT)    Planned Therapy Interventions (PT) bed mobility training;gait training;home exercise program;patient/family education;strengthening;ROM (range of motion);transfer training  -CR           User Key  (r) = Recorded By, (t) = Taken By, (c) = Cosigned By     Initials Name Provider Type    CR Reyes, Carmela, NATHALIA Physical Therapist               Clinical Impression     Row Name 05/06/22 1234          Pain    Pretreatment Pain Rating 0/10 - no pain  -CR     Posttreatment Pain Rating 7/10  -CR     Pain Location - Side/Orientation Left  -CR     Pain Location - shoulder;buttock;hip  -CR     Row Name 05/06/22 1234          Plan of Care Review    Plan of Care Reviewed With patient  -CR     Outcome Evaluation 89 y/o female who fell landing on her L side found with L pubic rami and sacrum fx. At time of this eval, there were no xrays done to LUE. Patient reports pain to L shoulder with movement and L hip/buttock with wt bearing and certain movement. Patient normally lives alone and still works 2days/wk, drives and does not use a.d. PMH includes HTN, CAD, afib. Patient needed CGA for supine to sit towards L side with use of bed rail. Patient able to come to standing with CGa/SBA using rw from bed and chair. Patient tolerated ambulation for 8 ft with CG/SBA and verbal cues , using rw. Patient unable to fully wt bear on LLE while standing/gait due to pain. Patient would be safe to return home provided she will have assistance at home; patient may benefit from wc for longer distance mobility and recommend  PT for continued gait training.  -CR     Row Name 05/06/22 1232          Therapy Assessment/Plan (PT)    Patient/Family Therapy Goals Statement (PT) home  -CR     Rehab Potential (PT) good, to achieve stated therapy goals  -CR     Criteria for Skilled Interventions Met (PT) yes;skilled treatment is necessary  -CR     Therapy Frequency (PT) 5 times/wk  -CR     Predicted Duration of Therapy Intervention (PT) dc  -CR           User Key  (r) = Recorded By, (t) = Taken By, (c) = Cosigned By    Initials Name Provider Type    CR Reyes, Carmela, NATHALIA Physical Therapist               Outcome Measures     Row Name 05/06/22 6864          How much help from another person do you currently  need...    Turning from your back to your side while in flat bed without using bedrails? 3  -CR     Moving from lying on back to sitting on the side of a flat bed without bedrails? 3  -CR     Moving to and from a bed to a chair (including a wheelchair)? 3  -CR     Standing up from a chair using your arms (e.g., wheelchair, bedside chair)? 3  -CR     Climbing 3-5 steps with a railing? 3  -CR     To walk in hospital room? 3  -CR     AM-PAC 6 Clicks Score (PT) 18  -CR     Highest level of mobility 6 --> Walked 10 steps or more  -CR     Row Name 05/06/22 1303 05/06/22 0935       Functional Assessment    Outcome Measure Options AM-PAC 6 Clicks Basic Mobility (PT)  - AM-PAC 6 Clicks Daily Activity (OT)  -          User Key  (r) = Recorded By, (t) = Taken By, (c) = Cosigned By    Initials Name Provider Type     Shannon Fontana OT Occupational Therapist    CR Reyes, Carmela, PT Physical Therapist                             Physical Therapy Education                 Title: PT OT SLP Therapies (In Progress)     Topic: Physical Therapy (In Progress)     Point: Mobility training (Done)     Learning Progress Summary           Patient Acceptance, E,D, VU by  at 5/6/2022 1306                   Point: Home exercise program (Not Started)     Learner Progress:  Not documented in this visit.          Point: Body mechanics (Done)     Learning Progress Summary           Patient Acceptance, E,D, VU by CR at 5/6/2022 1306                   Point: Precautions (Not Started)     Learner Progress:  Not documented in this visit.                      User Key     Initials Effective Dates Name Provider Type [facility] 06/16/21 -  Reyes, Carmela, PT Physical Therapist PT              PT Recommendation and Plan  Planned Therapy Interventions (PT): bed mobility training, gait training, home exercise program, patient/family education, strengthening, ROM (range of motion), transfer training  Plan of Care Reviewed With: patient  Outcome  Evaluation: 89 y/o female who fell landing on her L side found with L pubic rami and sacrum fx. At time of this eval, there were no xrays done to LUE. Patient reports pain to L shoulder with movement and L hip/buttock with wt bearing and certain movement. Patient normally lives alone and still works 2days/wk, drives and does not use a.d. PMH includes HTN, CAD, afib. Patient needed CGA for supine to sit towards L side with use of bed rail. Patient able to come to standing with CGa/SBA using rw from bed and chair. Patient tolerated ambulation for 8 ft with CG/SBA and verbal cues , using rw. Patient unable to fully wt bear on LLE while standing/gait due to pain. Patient would be safe to return home provided she will have assistance at home; patient may benefit from wc for longer distance mobility and recommend  PT for continued gait training.     Time Calculation:    PT Charges     Row Name 05/06/22 1307             Time Calculation    Start Time 0818  -CR      Stop Time 0848  -CR      Time Calculation (min) 30 min  -CR      PT Received On 05/06/22  -CR      PT - Next Appointment 05/07/22  -CR      PT Goal Re-Cert Due Date 05/20/22  -CR              Time Calculation- PT    Total Timed Code Minutes- PT 0 minute(s)  -CR            User Key  (r) = Recorded By, (t) = Taken By, (c) = Cosigned By    Initials Name Provider Type    CR Reyes, Carmela, PT Physical Therapist              Therapy Charges for Today     Code Description Service Date Service Provider Modifiers Qty    12281394936 HC PT EVAL LOW COMPLEXITY 4 5/6/2022 Reyes, Carmela, PT GP 1          PT G-Codes  Outcome Measure Options: AM-PAC 6 Clicks Basic Mobility (PT)  AM-PAC 6 Clicks Score (PT): 18  AM-PAC 6 Clicks Score (OT): 17    Carmela Reyes, PT  5/6/2022

## 2022-05-06 NOTE — PROGRESS NOTES
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    INR Goal: 2 - 3  Home medication?:  Yes, warfarin 2 mg daily  Bridge Therapy Present?:  Yes,  Enoxaparin 50 mg SQ Q12H  Interacting Medications Evaluation (New/Present/Discontinued): none of clinical significance  Additional Contributing Factors: n/a      Assessment/Plan:    INR slightly subtherapeutic on admission however, warfarin held until ortho evaluated for possible procedure. Patient bridged with therapeutic Lovenox in meantime per discussion with Dr. Miguel. Spoke with RN, Daniela, today who stated Dr. Ley saw patient and is not planning for surgical intervention. Therefore will resume warfarin today. Given INR decrease and suspected further decrease d/t held dose, will order 3 mg bolus instead of home 2 mg with plans to resume home regimen tomorrow. Continue Lovenox bridge until INR >2.0.      Continue to monitor and adjust based on INR.         Date 5/5 5/6          INR 1.84 1.72          Dose --- 3 mg              Objective:  [Ht: 157.5 cm (62\"); Wt: 55.3 kg (122 lb); BMI: Body mass index is 22.31 kg/m².]    Lab Results   Component Value Date    ALBUMIN 3.60 10/17/2021     Lab Results   Component Value Date    INR 1.72 (L) 05/06/2022    INR 1.84 (L) 05/05/2022    INR 1.06 (L) 02/18/2022    PROTIME 17.2 (L) 05/06/2022    PROTIME 18.3 (L) 05/05/2022    PROTIME 11.7 (L) 02/18/2022     Lab Results   Component Value Date    HGB 10.7 (L) 05/06/2022    HGB 11.9 (L) 05/05/2022    HGB 9.6 (L) 10/20/2021     Lab Results   Component Value Date    HCT 32.7 (L) 05/06/2022    HCT 36.3 05/05/2022    HCT 28.6 (L) 10/20/2021       Minerva Jackson, PharmD  05/06/22 09:43 EDT       "

## 2022-05-06 NOTE — PLAN OF CARE
89 yo female, new admit to unit from ER via home, admitting dx of hip fracture secondary to fall prior to admission to unit.  Pt admitted and oriented to room and unit, pt AAO x4 with decreased hearing loss on admission.  Pt reporting pain to left affected hip with movement, no pain with adequate rest.  Ortho consult called in as ordered, results relayed to MD, will see pt in AM, no surgical intervention planned at this time. PT and OT consults in place at this time.  Attending MD notified of abnormal CT of Lower Extremity, will see and evaluate in AM, pt remains asymptomatic.  Pt resting well in bed, no further acute issues, will continue to monitor and observe.    Goal Outcome Evaluation:  Plan of Care Reviewed With: patient        Progress: no change       Problem: Adult Inpatient Plan of Care  Goal: Plan of Care Review  Outcome: Ongoing, Progressing  Flowsheets (Taken 5/6/2022 0604)  Progress: no change  Plan of Care Reviewed With: patient     Problem: Fall Injury Risk  Goal: Absence of Fall and Fall-Related Injury  Outcome: Ongoing, Progressing     Problem: Skin Injury Risk Increased  Goal: Skin Health and Integrity  Outcome: Ongoing, Progressing     Problem: Pain (Hip Fracture Medical Management)  Goal: Acceptable Pain Level  Intervention: Manage Acute Orthopaedic-Related Pain  Recent Flowsheet Documentation  Taken 5/5/2022 2249 by Ryder Hanley RN  Pain Management Interventions: see MAR     Problem: Functional Ability Impaired (Hip Fracture Medical Management)  Goal: Optimal Functional Performance  Intervention: Promote Optimal Functional Status  Recent Flowsheet Documentation  Taken 5/5/2022 1901 by Ryder Hanley RN  Activity Management: bedrest     Problem: Embolism (Hip Fracture Medical Management)  Goal: Absence of Embolism  Intervention: Prevent or Manage Embolism Risk  Recent Flowsheet Documentation  Taken 5/5/2022 2249 by Ryder Hanley RN  VTE Prevention/Management: (see MAR) other  (see comments)  Taken 5/5/2022 1901 by Ryder Hanley RN  VTE Prevention/Management: (see MAR) other (see comments)     Problem: Cognitive Decline Risk (Hip Fracture Medical Management)  Goal: Baseline Cognitive Function Maintained  Intervention: Maximize Cognitive Function  Recent Flowsheet Documentation  Taken 5/5/2022 1901 by Ryder Hanley RN  Reorientation Measures: reorientation provided  Sleep/Rest Enhancement:   awakenings minimized   noise level reduced     Problem: Bowel Elimination Impaired (Hip Fracture Medical Management)  Goal: Effective Bowel Elimination  Intervention: Promote Effective Bowel Elimination  Recent Flowsheet Documentation  Taken 5/5/2022 1901 by Ryder Hanley RN  Bowel Elimination Promotion: adequate fluid intake promoted

## 2022-05-06 NOTE — THERAPY EVALUATION
Patient Name: Carrie Golden  : 1925    MRN: [id number]                              Today's Date: 2022       Admit Date: 2022    Visit Dx:     ICD-10-CM ICD-9-CM   1. Fall, initial encounter  W19.XXXA E888.9   2. Closed fracture of ramus of left pubis, initial encounter ([facility])  S32.592A 808.2   3. Closed fracture of sacrum, unspecified fracture morphology, initial encounter ([facility])  S32.10XA 805.6     Patient Active Problem List   Diagnosis   • Chest pain   • Dyslipidemia   • Essential hypertension   • Gastroesophageal reflux disease   • NSTEMI, initial episode of care ([facility])   • Coronary artery disease involving native heart with unstable angina pectoris ([facility])   • Tear film insufficiency   • After-cataract with vision obscured   • Cervical radiculopathy   • Lumbar radiculopathy   • Metatarsalgia of left foot   • Localized, primary osteoarthritis   • Mixed hyperlipidemia   • Colitis   • Degeneration of lumbar intervertebral disc   • Osteoarthritis of knee   • Right leg pain   • Pulmonary embolism ([facility])   • Acute deep vein thrombosis (DVT) of distal vein of right lower extremity ([facility])   • Iron deficiency anemia   • Fall, initial encounter     Past Medical History:   Diagnosis Date   • Amblyopia 10/31/2012   • Amblyopia, left 3/12/2020   • Arthritis    • Conjunctival hemorrhage 10/8/2014   • Coronary artery disease    • Deformity of left foot 2020    Added automatically from request for surgery [id number]   • Dermatochalasis of left upper eyelid 3/12/2020   • Dermatochalasis of right upper eyelid 3/12/2020   • Drooping eyelid, bilateral    • Ectropion 3/12/2020   • Epiretinal membrane (ERM) of right eye 3/12/2020   • Frequent UTI    • GERD (gastroesophageal reflux disease)    • Hallux rigidus, left foot 2020    Added automatically from request for surgery [id number]   • Hearing loss     bilateral hearing aides   • 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 sure which 1  contracted at age 8   • History of  transfusion    • [place] (hard of hearing)    • Hyperlipidemia    • Hypertension    • Past heart attack     X2 MILD LAST ONE OCT 2019   • PONV (postoperative nausea and vomiting)    • Presence of intraocular lens 3/12/2020     Past Surgical History:   Procedure Laterality Date   • ANTERIOR AND POSTERIOR VAGINAL REPAIR     • BACK SURGERY     • BLEPHAROPLASTY Bilateral 5/23/2019    Procedure: bilateral upper lid blepharoplasty;  Surgeon: Mauricio Pennington MD;  Location: [facility] OR [facility];  Service: Ophthalmology   • BROW LIFT Bilateral 2/13/2020    Procedure: BILATERAL TEMPORAL DIRECT BROWLIFT;  Surgeon: Sreekanth Bird MD;  Location: [facility];  Service: Ophthalmology;  Laterality: Bilateral;   • CARDIAC CATHETERIZATION N/A 12/3/2019    Procedure: Left Heart Cath;  Surgeon: Puma Briseno MD;  Location: [facility] CATH INVASIVE LOCATION;  Service: Cardiovascular   • CARDIAC CATHETERIZATION N/A 12/3/2019    Procedure: Coronary angiography;  Surgeon: Puma Briseno MD;  Location: [facility] CATH INVASIVE LOCATION;  Service: Cardiovascular   • CARDIAC CATHETERIZATION N/A 12/3/2019    Procedure: Left ventriculography;  Surgeon: Puma Briseno MD;  Location: [facility] CATH INVASIVE LOCATION;  Service: Cardiovascular   • CATARACT EXTRACTION EXTRACAPSULAR W/ INTRAOCULAR LENS IMPLANTATION Bilateral    • CERVICAL SPINE ANTERIOR      with instrumentation   • CHEILECTOMY Left 8/28/2020    Procedure: CHEILECTOMY;  Surgeon: GAB Rees DPM;  Location: [facility] MAIN OR;  Service: Podiatry;  Laterality: Left;   • COLON SURGERY      for obstruction   • ALBERTO TUBE INSERTION Bilateral 5/23/2019    Procedure: ALBERTO TUBE;  Surgeon: Mauricio Pennington MD;  Location: [facility];  Service: Ophthalmology   • ECTROPION REPAIR Bilateral 5/23/2019    Procedure: bilateral lower lid ectropion repair, bilateral punctoplasty;  Surgeon: Mauricio Pennington MD;  Location: [facility] OR [facility];  Service: Ophthalmology   • ENDOSCOPY N/A 2/18/2022     Procedure: ESOPHAGOGASTRODUODENOSCOPY WITH DILATATION (BALLOON 15MM-18MM, UP TO 18MM) AND BOUGIE #48;  Surgeon: Wiley Parks MD;  Location: [facility] ENDOSCOPY;  Service: Gastroenterology;  Laterality: N/A;  Post: DYSPHAGIA   • EYE SURGERY     • FOOT FUSION Left 12/30/2016    Procedure: LEFT HALLUX METATARSALPHALANGEAL ARTHRODESIS SILVER BUNIONECTOMY METATARSAL HEAD RESECTION 2-5 CALCANEAL BONE GRAFT;  Surgeon: Monster Harper Jr., MD;  Location: [facility] MAIN OR;  Service:    • HYSTERECTOMY     • INGUINAL HERNIA REPAIR Bilateral    • METATARSAL OSTEOTOMY Left 8/28/2020    Procedure: Metatarsal head resection 5;  Surgeon: GAB Rees DPM;  Location: [facility] MAIN OR;  Service: Podiatry;  Laterality: Left;   • ROTATOR CUFF REPAIR Right    • SIGMOIDOSCOPY N/A 10/6/2021    Procedure: SIGMOIDOSCOPY WITH BIOPSY X1 AREA;  Surgeon: Uche Vaz MD;  Location: [facility] ENDOSCOPY;  Service: Gastroenterology;  Laterality: N/A;  ischemic colitis   • STOMACH SURGERY      for ulcer      General Information     Row Name 05/06/22 0923          General Information    Prior Level of Function independent:;ADL's;driving;work;home management;community mobility  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  -     Existing Precautions/Restrictions fall  -     Barriers to Rehab none identified  -     Row Name 05/06/22 0923          Living Environment    People in Home alone  -     Row Name 05/06/22 0923          Home Main Entrance    Number of Stairs, Main Entrance none  -     Row Name 05/06/22 0923          Stairs Within Home, Primary    Number of Stairs, Within Home, Primary none  -     Row Name 05/06/22 0923          Cognition    Orientation Status (Cognition) oriented x 4  -     Row Name 05/06/22 0923          Safety Issues, Functional Mobility    Impairments Affecting Function (Mobility) endurance/activity tolerance;pain;range of motion (ROM)  -           User Key  (r) = Recorded By, (t) = Taken By, (c) =  Cosigned By    Initials Name Provider Type     Shannon Fontana, OT Occupational Therapist                 Mobility/ADL's     Row Name 05/06/22 0924          Bed Mobility    Comment, (Bed Mobility) up in chair already  -     Row Name 05/06/22 0924          Transfers    Transfers sit-stand transfer;stand-sit transfer  -     Sit-Stand [place] (Transfers) set up;standby assist;verbal cues  -     Stand-Sit [place] (Transfers) set up;standby assist;verbal cues;nonverbal cues (demo/gesture)  -     Row Name 05/06/22 0924          Sit-Stand Transfer    Assistive Device (Sit-Stand Transfers) walker, front-wheeled  -     Comment, (Sit-Stand Transfer) pt is not accustomed to using a walker & needed cues for technique.  -     Row Name 05/06/22 0924          Functional Mobility    Functional Mobility- Ind. Level contact guard assist  -     Functional Mobility- Device walker, front-wheeled  -     Functional Mobility-Distance (Feet) 15  -     Functional Mobility- Safety Issues balance decreased during turns;step length decreased;weight-shifting ability decreased  -     Functional Mobility- Comment Pt was painful and needed some cues for safe walker use and CGA when she bore weight on the Lt hip and Lt shld at times.  -     Row Name 05/06/22 0924          Activities of Daily Living    BADL Assessment/Intervention lower body dressing;toileting  -     Row Name 05/06/22 0924          Lower Body Dressing Assessment/Training    [place] Level (Lower Body Dressing) doff;don;socks;moderate assist (50% patient effort)  -     Row Name 05/06/22 0924          Toileting Assessment/Training    [place] Level (Toileting) toileting skills;contact guard assist  -     Comment, (Toileting) pt has very low toilet at home & will need to have higher starting point for come to stand for a while.  -           User Key  (r) = Recorded By, (t) = Taken By, (c) = Cosigned By    Initials Name Provider Type      Shannon Fontana OT Occupational Therapist               Obj/Interventions     Row Name 05/06/22 0927          Vision Assessment/Intervention    Visual Impairment/Limitations WFL  -     Row Name 05/06/22 0927          Range of Motion Comprehensive    Comment, General Range of Motion Lt shld flex AROM 75% due to pain  -     Row Name 05/06/22 0927          Strength Comprehensive (MMT)    Comment, General Manual Muscle Testing (MMT) Assessment Lt hip 3/5 2* pain & Lt shld 3+/5 in limited range due to pain.  -     Row Name 05/06/22 0927          Balance    Balance Assessment sitting static balance;sitting dynamic balance;standing static balance;standing dynamic balance  -     Static Sitting Balance independent  -     Dynamic Sitting Balance modified independence  -     Position, Sitting Balance unsupported;sitting in chair  -     Static Standing Balance set-up;supervision  -     Dynamic Standing Balance minimal assist;moderate assist  pt does not appear able to bend to floor at this time w/o mod (A).  -           User Key  (r) = Recorded By, (t) = Taken By, (c) = Cosigned By    Initials Name Provider Type     Shannno Fontana OT Occupational Therapist               Goals/Plan     Row Name 05/06/22 0934          Bed Mobility Goal 1 (OT)    Activity/Assistive Device (Bed Mobility Goal 1, OT) bed mobility activities, all  -     [place] Level/Cues Needed (Bed Mobility Goal 1, OT) modified independence  -     Time Frame (Bed Mobility Goal 1, OT) 2 weeks  -     Row Name 05/06/22 0934          Transfer Goal 1 (OT)    Activity/Assistive Device (Transfer Goal 1, OT) transfers, all;walker, rolling  -     [place] Level/Cues Needed (Transfer Goal 1, OT) modified independence  -     Time Frame (Transfer Goal 1, OT) 2 weeks  -     Row Name 05/06/22 0934          Bathing Goal 1 (OT)    Activity/Device (Bathing Goal 1, OT) bathing skills, all  -     [place] Level/Cues Needed (Bathing Goal  1, OT) modified independence  -     Time Frame (Bathing Goal 1, OT) 2 weeks  -     Row Name 05/06/22 0935          Therapy Assessment/Plan (OT)    Planned Therapy Interventions (OT) activity tolerance training;adaptive equipment training;BADL retraining;functional balance retraining;occupation/activity based interventions;patient/caregiver education/training;transfer/mobility retraining;ROM/therapeutic exercise  -           User Key  (r) = Recorded By, (t) = Taken By, (c) = Cosigned By    Initials Name Provider Type     Shannon Fontana, OT Occupational Therapist               Clinical Impression     Row Name 05/06/22 0928          Pain Assessment    Pretreatment Pain Rating 8/10  -     Posttreatment Pain Rating 8/10  -     Pain Location - Side/Orientation Left  -     Pain Location - shoulder;hip  -     Row Name 05/06/22 0928          Plan of Care Review    Plan of Care Reviewed With patient  -     Progress no change  -     Outcome Evaluation Pt is 89 y/o F who lives alone & is (I), works, drives. She fell when asked to enter unaccompanied a place of business from the back employee entrance due to construction at the front & fell down the last of several steps that did not have a rail. Pt has painful Lt shld & Lt pelvic Fx. Pt I/ADL is decreased enough for a short time that OT would recommend several days of acute IP rehab to allow her to safely adapt her skill sin transfer & LBD techniques. Pt is motivated and prepared to work hard to regain her independence.  -     Row Name 05/06/22 0928          Therapy Assessment/Plan (OT)    Rehab Potential (OT) good, to achieve stated therapy goals  -     Criteria for Skilled Therapeutic Interventions Met (OT) skilled treatment is necessary  -     Therapy Frequency (OT) 5 times/wk  -     Row Name 05/06/22 0935 05/06/22 0928       Therapy Plan Review/Discharge Plan (OT)    Equipment Needs Upon Discharge (OT) walker, rolling;raised toilet seat  - --     Anticipated Discharge Disposition (OT) -- inpatient rehabilitation facility  -    Row Name 05/06/22 0928          Vital Signs    O2 Delivery Pre Treatment room air  -     O2 Delivery Intra Treatment room air  -     O2 Delivery Post Treatment room air  -     Pre Patient Position Sitting  -     Intra Patient Position Standing  -     Post Patient Position Sitting  -     Row Name 05/06/22 0928          Positioning and Restraints    Pre-Treatment Position sitting in chair/recliner  -     Post Treatment Position chair  -     In Chair notified nsg;legs elevated;encouraged to call for assist;call light within reach  -           User Key  (r) = Recorded By, (t) = Taken By, (c) = Cosigned By    Initials Name Provider Type    Shannon Joyner OT Occupational Therapist               Outcome Measures     Row Name 05/06/22 0935          How much help from another is currently needed...    Putting on and taking off regular lower body clothing? 2  -MH     Bathing (including washing, rinsing, and drying) 2  -MH     Toileting (which includes using toilet bed pan or urinal) 3  -MH     Putting on and taking off regular upper body clothing 3  -MH     Taking care of personal grooming (such as brushing teeth) 3  -MH     Eating meals 4  -MH     AM-PAC 6 Clicks Score (OT) 17  -     Row Name 05/06/22 0935          Functional Assessment    Outcome Measure Options AM-PAC 6 Clicks Daily Activity (OT)  -           User Key  (r) = Recorded By, (t) = Taken By, (c) = Cosigned By    Initials Name Provider Type    Shannon Joyner OT Occupational Therapist                Occupational Therapy Education                 Title: PT OT SLP Therapies (In Progress)     Topic: Occupational Therapy (In Progress)     Point: ADL training (Done)     Description:   Instruct learner(s) on proper safety adaptation and remediation techniques during self care or transfers.   Instruct in proper use of assistive devices.               Learning Progress Summary           Patient Acceptance, E,TB, VU,NR by  at 5/6/2022 0936                   Point: Home exercise program (Not Started)     Description:   Instruct learner(s) on appropriate technique for monitoring, assisting and/or progressing therapeutic exercises/activities.              Learner Progress:  Not documented in this visit.          Point: Precautions (Done)     Description:   Instruct learner(s) on prescribed precautions during self-care and functional transfers.              Learning Progress Summary           Patient Acceptance, E,TB, VU,NR by  at 5/6/2022 0936                   Point: Body mechanics (Done)     Description:   Instruct learner(s) on proper positioning and spine alignment during self-care, functional mobility activities and/or exercises.              Learning Progress Summary           Patient Acceptance, E,TB, VU,NR by  at 5/6/2022 0936                               User Key     Initials Effective Dates Name Provider Type Discipline     06/16/21 -  Shannon Fontana OT Occupational Therapist OT              OT Recommendation and Plan  Planned Therapy Interventions (OT): activity tolerance training, adaptive equipment training, BADL retraining, functional balance retraining, occupation/activity based interventions, patient/caregiver education/training, transfer/mobility retraining, ROM/therapeutic exercise  Therapy Frequency (OT): 5 times/wk  Plan of Care Review  Plan of Care Reviewed With: patient  Progress: no change  Outcome Evaluation: Pt is 89 y/o F who lives alone & is (I), works, drives. She fell when asked to enter unaccompanied a place of business from the back employee entrance due to construction at the front & fell down the last of several steps that did not have a rail. Pt has painful Lt shld & Lt pelvic Fx. Pt I/ADL is decreased enough for a short time that OT would recommend several days of acute IP rehab to allow her to safely adapt her skill sin  transfer & LBD techniques. Pt is motivated and prepared to work hard to regain her independence.     Time Calculation:    Time Calculation- OT     Row Name 05/06/22 0936             Time Calculation- OT    OT Start Time 0904  -      OT Stop Time 0920  -      OT Time Calculation (min) 16 min  -      Total Timed Code Minutes- OT 0 minute(s)  -      OT Received On 05/06/22  -      OT - Next Appointment 05/09/22  -      OT Goal Re-Cert Due Date 05/20/22  -            User Key  (r) = Recorded By, (t) = Taken By, (c) = Cosigned By    Initials Name Provider Type     Shannon Fontana, OT Occupational Therapist              Therapy Charges for Today     Code Description Service Date Service Provider Modifiers Qty    [id number] HC OT EVAL MOD COMPLEXITY 3 5/6/2022 Shannon Fontana OT GO 1               Shannon Fontana OT  5/6/2022

## 2022-05-07 LAB
ANION GAP SERPL CALCULATED.3IONS-SCNC: 11 MMOL/L (ref 5–15)
BASOPHILS # BLD AUTO: 0 10*3/MM3 (ref 0–0.2)
BASOPHILS NFR BLD AUTO: 0.4 % (ref 0–1.5)
BUN SERPL-MCNC: 21 MG/DL (ref 8–23)
BUN/CREAT SERPL: 24.7 (ref 7–25)
CALCIUM SPEC-SCNC: 8.4 MG/DL (ref 8.2–9.6)
CHLORIDE SERPL-SCNC: 100 MMOL/L (ref 98–107)
CO2 SERPL-SCNC: 25 MMOL/L (ref 22–29)
CREAT SERPL-MCNC: 0.85 MG/DL (ref 0.57–1)
DEPRECATED RDW RBC AUTO: 47.3 FL (ref 37–54)
EGFRCR SERPLBLD CKD-EPI 2021: 62.4 ML/MIN/1.73
EOSINOPHIL # BLD AUTO: 0.2 10*3/MM3 (ref 0–0.4)
EOSINOPHIL NFR BLD AUTO: 2 % (ref 0.3–6.2)
ERYTHROCYTE [DISTWIDTH] IN BLOOD BY AUTOMATED COUNT: 15.9 % (ref 12.3–15.4)
GLUCOSE SERPL-MCNC: 102 MG/DL (ref 65–99)
HCT VFR BLD AUTO: 32.2 % (ref 34–46.6)
HGB BLD-MCNC: 10.4 G/DL (ref 12–15.9)
INR PPP: 1.47 (ref 2–3)
LYMPHOCYTES # BLD AUTO: 2 10*3/MM3 (ref 0.7–3.1)
LYMPHOCYTES NFR BLD AUTO: 24.7 % (ref 19.6–45.3)
MCH RBC QN AUTO: 27.2 PG (ref 26.6–33)
MCHC RBC AUTO-ENTMCNC: 32.2 G/DL (ref 31.5–35.7)
MCV RBC AUTO: 84.2 FL (ref 79–97)
MONOCYTES # BLD AUTO: 1 10*3/MM3 (ref 0.1–0.9)
MONOCYTES NFR BLD AUTO: 12.3 % (ref 5–12)
NEUTROPHILS NFR BLD AUTO: 4.9 10*3/MM3 (ref 1.7–7)
NEUTROPHILS NFR BLD AUTO: 60.6 % (ref 42.7–76)
NRBC BLD AUTO-RTO: 0 /100 WBC (ref 0–0.2)
PLATELET # BLD AUTO: 187 10*3/MM3 (ref 140–450)
PMV BLD AUTO: 7.4 FL (ref 6–12)
POTASSIUM SERPL-SCNC: 3.7 MMOL/L (ref 3.5–5.2)
PROTHROMBIN TIME: 14.8 SECONDS (ref 19.4–28.5)
RBC # BLD AUTO: 3.83 10*6/MM3 (ref 3.77–5.28)
SODIUM SERPL-SCNC: 136 MMOL/L (ref 136–145)
WBC NRBC COR # BLD: 8.1 10*3/MM3 (ref 3.4–10.8)

## 2022-05-07 PROCEDURE — 96376 TX/PRO/DX INJ SAME DRUG ADON: CPT

## 2022-05-07 PROCEDURE — G0378 HOSPITAL OBSERVATION PER HR: HCPCS

## 2022-05-07 PROCEDURE — 97530 THERAPEUTIC ACTIVITIES: CPT

## 2022-05-07 PROCEDURE — 25010000002 MORPHINE PER 10 MG: Performed by: FAMILY MEDICINE

## 2022-05-07 PROCEDURE — 36415 COLL VENOUS BLD VENIPUNCTURE: CPT | Performed by: FAMILY MEDICINE

## 2022-05-07 PROCEDURE — 80048 BASIC METABOLIC PNL TOTAL CA: CPT | Performed by: FAMILY MEDICINE

## 2022-05-07 PROCEDURE — 25010000002 ENOXAPARIN PER 10 MG: Performed by: FAMILY MEDICINE

## 2022-05-07 PROCEDURE — 96372 THER/PROPH/DIAG INJ SC/IM: CPT

## 2022-05-07 PROCEDURE — 97116 GAIT TRAINING THERAPY: CPT

## 2022-05-07 PROCEDURE — 85610 PROTHROMBIN TIME: CPT | Performed by: FAMILY MEDICINE

## 2022-05-07 PROCEDURE — 85025 COMPLETE CBC W/AUTO DIFF WBC: CPT | Performed by: FAMILY MEDICINE

## 2022-05-07 RX ORDER — WARFARIN SODIUM 1 MG/1
1 TABLET ORAL
Status: COMPLETED | OUTPATIENT
Start: 2022-05-07 | End: 2022-05-07

## 2022-05-07 RX ADMIN — Medication 3 ML: at 20:58

## 2022-05-07 RX ADMIN — WARFARIN 2 MG: 2 TABLET ORAL at 17:52

## 2022-05-07 RX ADMIN — HYDROCODONE BITARTRATE AND ACETAMINOPHEN 1 TABLET: 5; 325 TABLET ORAL at 05:32

## 2022-05-07 RX ADMIN — ENOXAPARIN SODIUM 50 MG: 60 INJECTION SUBCUTANEOUS at 20:59

## 2022-05-07 RX ADMIN — HYDROCODONE BITARTRATE AND ACETAMINOPHEN 1 TABLET: 5; 325 TABLET ORAL at 20:59

## 2022-05-07 RX ADMIN — METOPROLOL TARTRATE 12.5 MG: 25 TABLET, FILM COATED ORAL at 20:59

## 2022-05-07 RX ADMIN — ENOXAPARIN SODIUM 50 MG: 60 INJECTION SUBCUTANEOUS at 09:43

## 2022-05-07 RX ADMIN — DILTIAZEM HYDROCHLORIDE 120 MG: 120 CAPSULE, COATED, EXTENDED RELEASE ORAL at 09:43

## 2022-05-07 RX ADMIN — MORPHINE SULFATE 2 MG: 2 INJECTION, SOLUTION INTRAMUSCULAR; INTRAVENOUS at 17:51

## 2022-05-07 RX ADMIN — Medication 3 ML: at 09:48

## 2022-05-07 RX ADMIN — PANTOPRAZOLE SODIUM 40 MG: 40 TABLET, DELAYED RELEASE ORAL at 09:43

## 2022-05-07 RX ADMIN — ATORVASTATIN CALCIUM 20 MG: 20 TABLET, FILM COATED ORAL at 20:59

## 2022-05-07 RX ADMIN — WARFARIN 1 MG: 2 TABLET ORAL at 17:51

## 2022-05-07 NOTE — PROGRESS NOTES
LOS: 0 days   Patient Care Team:  Monster Myrick MD as PCP - General (Family Medicine)  Maikel Morales MD as Consulting Physician (Cardiology)    Subjective     Interval History: Pain improving    Patient Complaints: Some left hip pain with ambulating    History taken from: patient    Review of Systems   Constitutional: Positive for activity change. Negative for appetite change, fatigue and fever.   HENT: Negative for facial swelling.    Eyes: Negative for visual disturbance.   Respiratory: Negative for cough, shortness of breath, wheezing and stridor.    Cardiovascular: Negative for chest pain, palpitations and leg swelling.   Gastrointestinal: Negative for constipation, diarrhea, nausea and vomiting.   Endocrine: Negative for polyuria.   Genitourinary: Negative for dysuria.   Musculoskeletal: Positive for gait problem. Negative for arthralgias, back pain, joint swelling, myalgias and neck pain.   Skin: Negative for rash and wound.   Neurological: Negative for dizziness, tremors, syncope, weakness and headaches.   Psychiatric/Behavioral: Negative for confusion.           Objective     Vital Signs  Temp:  [98.2 °F (36.8 °C)-99.1 °F (37.3 °C)] 98.2 °F (36.8 °C)  Heart Rate:  [60-88] 73  Resp:  [15-19] 17  BP: (113-134)/(45-61) 114/57    Physical Exam:     General Appearance:    Alert, cooperative, in no acute distress,   Head:    Normocephalic, without obvious abnormality, atraumatic   Eyes:            Lids and lashes normal, conjunctivae and sclerae normal, no   icterus, no pallor, corneas clear, PERRLA   Ears:    Ears appear intact with no abnormalities noted   Throat:   No oral lesions, no thrush, oral mucosa moist   Neck:   No adenopathy, supple, trachea midline, no thyromegaly, no   carotid bruit, no JVD   Lungs:     Clear to auscultation,respirations regular, even and                  unlabored    Heart:    Regular rhythm and normal rate, normal S1 and S2, no            murmur, no gallop, no rub,  no click   Chest Wall:    No abnormalities observed   Abdomen:     Normal bowel sounds, no masses, no organomegaly, soft        Non-tender non-distended, no guarding,   Extremities:   Moves all extremities well, no edema, no cyanosis, no             Redness   Pulses:   Pulses palpable and equal bilaterally   Skin:   No bleeding, bruising or rash   Lymph nodes:   No palpable adenopathy   Neurologic:   Cranial nerves 2 - 12 grossly intact, sensation intact, DTR       present and equal bilaterally        Results Review:    Lab Results (last 24 hours)     Procedure Component Value Units Date/Time    Basic Metabolic Panel [[id number]]  (Abnormal) Collected: 05/07/22 0221    Specimen: Blood Updated: 05/07/22 0307     Glucose 102 mg/dL      BUN 21 mg/dL      Creatinine 0.85 mg/dL      Sodium 136 mmol/L      Potassium 3.7 mmol/L      Chloride 100 mmol/L      CO2 25.0 mmol/L      Calcium 8.4 mg/dL      BUN/Creatinine Ratio 24.7     Anion Gap 11.0 mmol/L      eGFR 62.4 mL/min/1.73      Comment: National Kidney Foundation and American Society of Nephrology (ASN) Task Force recommended calculation based on the Chronic Kidney Disease Epidemiology Collaboration (CKD-EPI) equation refit without adjustment for race.       Narrative:      GFR Normal >60  Chronic Kidney Disease <60  Kidney Failure <15      Protime-INR [[id number]]  (Abnormal) Collected: 05/07/22 0221    Specimen: Blood Updated: 05/07/22 0254     Protime 14.8 Seconds      INR 1.47    CBC & Differential [[id number]]  (Abnormal) Collected: 05/07/22 0221    Specimen: Blood Updated: 05/07/22 0247    Narrative:      The following orders were created for panel order CBC & Differential.  Procedure                               Abnormality         Status                     ---------                               -----------         ------                     CBC Auto Differential[[id number]]        Abnormal            Final result                 Please view results for these  tests on the individual orders.    CBC Auto Differential [[id number]]  (Abnormal) Collected: 05/07/22 0221    Specimen: Blood Updated: 05/07/22 0247     WBC 8.10 10*3/mm3      RBC 3.83 10*6/mm3      Hemoglobin 10.4 g/dL      Hematocrit 32.2 %      MCV 84.2 fL      MCH 27.2 pg      MCHC 32.2 g/dL      RDW 15.9 %      RDW-SD 47.3 fl      MPV 7.4 fL      Platelets 187 10*3/mm3      Neutrophil % 60.6 %      Lymphocyte % 24.7 %      Monocyte % 12.3 %      Eosinophil % 2.0 %      Basophil % 0.4 %      Neutrophils, Absolute 4.90 10*3/mm3      Lymphocytes, Absolute 2.00 10*3/mm3      Monocytes, Absolute 1.00 10*3/mm3      Eosinophils, Absolute 0.20 10*3/mm3      Basophils, Absolute 0.00 10*3/mm3      nRBC 0.0 /100 WBC            Imaging Results (Last 24 Hours)     Procedure Component Value Units Date/Time    XR Shoulder 2+ View Left [[id number]] Collected: 05/06/22 1207     Updated: 05/06/22 1210    Narrative:      DATE OF EXAM:  5/6/2022 11:40 AM     PROCEDURE:  XR SHOULDER 2+ VW LEFT-     INDICATIONS:  left shoulder pain; W19.XXXA-Unspecified fall, initial encounter;  S32.592A-Other specified fracture of left pubis, initial encounter for  closed fracture; S32.10XA-Unspecified fracture of sacrum, initial  encounter for closed fracture     COMPARISON:  No Comparisons Available     TECHNIQUE:   A minimum of two radiologic views of the left shoulder were obtained.        FINDINGS:  No acute left shoulder fracture or dislocation is seen. Advanced  osteopenic changes are present. There is mild glenohumeral joint space  narrowing without spurring. No significant acromioclavicular  arthropathy. No chronic clavicular or coracoclavicular separation is  identified. The imaged left lung appears clear. Heart size appears  mildly enlarged.        Impression:         1. Mild degenerative narrowing of the left glenohumeral joint space.  2. No acute left shoulder findings.  3. Osteopenia.     Electronically Signed By-Pari Olivera MD  On:5/6/2022 12:08 PM  This report was finalized on [id number] by  Pari Olivera MD.               I reviewed the patient's new clinical results.    Medication Review:   Scheduled Meds:atorvastatin, 20 mg, Oral, Nightly  dilTIAZem CD, 120 mg, Oral, Q24H  enoxaparin, 50 mg, Subcutaneous, Q12H  furosemide, 20 mg, Oral, Daily  isosorbide mononitrate, 30 mg, Oral, Daily  metoprolol tartrate, 12.5 mg, Oral, Q12H  pantoprazole, 40 mg, Oral, QAM AC  sodium chloride, 3 mL, Intravenous, Q12H  warfarin, 2 mg, Oral, Daily      Continuous Infusions:Pharmacy to dose warfarin,       PRN Meds:.•  acetaminophen  •  HYDROcodone-acetaminophen  •  Morphine  •  nitroglycerin  •  ondansetron  •  Pharmacy to dose warfarin  •  [COMPLETED] Insert peripheral IV **AND** sodium chloride  •  sodium chloride     Assessment/Plan       Fall, initial encounter    Dyslipidemia    Hypertension    Coronary artery disease involving native heart with unstable angina pectoris (HCC)    Mixed hyperlipidemia    Pelvic fracture (HCC)    PAF (paroxysmal atrial fibrillation) (HCC)  H/O PE  Sacral fracture    All medical conditions are stable.  Adjusting warfarin to maintain therapeutic INR with Lovenox bridge until therapeutic.    Plan for disposition:Ready for transfer to rehab    Ashley Livingston MD  05/07/22  10:58 EDT

## 2022-05-07 NOTE — PLAN OF CARE
Goal Outcome Evaluation:    Patient is A/O and making needs known. Treating pain per MAR. She's been up to the chair today.

## 2022-05-07 NOTE — THERAPY TREATMENT NOTE
Subjective: Pt agreeable to therapeutic plan of care.    Objective:     Bed mobility - N/A or Not attempted.  Transfers - CGA and with rolling walker  Ambulation - 50 feet CGA and with rolling walker    Pain: no pain at rest incr with movement and wt bearing  Education: Provided education on importance of mobility and skilled verbal / tactile cueing throughout intervention.     Assessment: Carrie Golden presents with functional mobility impairments which indicate the need for skilled intervention. Tolerating session today without incident. Pt's HR 79. Presents with antalgic gait off L due to pain. Unable to take on challenges. 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  Will continue to follow and progress as tolerated.     Plan/Recommendations:   High Intensity Therapy recommended post-acute care. This is recommended as therapy feels the patient would require 5-6 days per week, 2-3 hours per day. At this time, inpatient rehabilitation (acute rehab) would be the first choice and SNF would be second.. Pt requires no DME at discharge.     Pt desires Inpatient Rehabilitation placement at discharge. Pt cooperative; agreeable to therapeutic recommendations and plan of care.         Basic Mobility 6-click:  Rollin = Total, A lot = 2, A little = 3; 4 = None  Supine>Sit:   1 = Total, A lot = 2, A little = 3; 4 = None   Sit>Stand with arms:  1 = Total, A lot = 2, A little = 3; 4 = None  Bed>Chair:   1 = Total, A lot = 2, A little = 3; 4 = None  Ambulate in room:  1 = Total, A lot = 2, A little = 3; 4 = None  3-5 Steps with railin = Total, A lot = 2, A little = 3; 4 = None  Score: 18      Post-Tx Position: Up in Chair and Call light and personal items within reach  PPE: gloves, surgical mask, eyewear protection

## 2022-05-07 NOTE — PROGRESS NOTES
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    INR Goal: 2 - 3  Home medication?:  Yes, warfarin 2 mg daily  Bridge Therapy Present?:  Yes,  Enoxaparin 50 mg SQ Q12H  Interacting Medications Evaluation (New/Present/Discontinued): none of clinical significance  Additional Contributing Factors: n/a      Assessment/Plan:    INR slightly subtherapeutic on admission however, warfarin held until ortho evaluated for possible procedure. Patient bridged with therapeutic Lovenox in meantime per discussion with Dr. Miguel. Ortho not planing intervention, therefore warfarin restarted on 5/7 with small bolus dose. INR further decreased today. Will order another 3 mg bolus dose with plans to continue home regimen tomorrow. Continue Lovenox bridge until INR >2.0.      Continue to monitor and adjust based on INR.         Date 5/5 5/6 5/7         INR 1.84 1.72 1.47         Dose --- 3 mg 3 mg             Objective:  [Ht: 157.5 cm (62\"); Wt: 58 kg (127 lb 12.8 oz); BMI: Body mass index is 23.37 kg/m².]    Lab Results   Component Value Date    ALBUMIN 3.60 10/17/2021     Lab Results   Component Value Date    INR 1.47 (L) 05/07/2022    INR 1.72 (L) 05/06/2022    INR 1.84 (L) 05/05/2022    PROTIME 14.8 (L) 05/07/2022    PROTIME 17.2 (L) 05/06/2022    PROTIME 18.3 (L) 05/05/2022     Lab Results   Component Value Date    HGB 10.4 (L) 05/07/2022    HGB 10.7 (L) 05/06/2022    HGB 11.9 (L) 05/05/2022     Lab Results   Component Value Date    HCT 32.2 (L) 05/07/2022    HCT 32.7 (L) 05/06/2022    HCT 36.3 05/05/2022       Minerva Jackson, PharmD  05/07/22 11:40 EDT         "

## 2022-05-07 NOTE — CASE MANAGEMENT/SOCIAL WORK
Continued Stay Note  RANDALL Natarajan     Patient Name: Carrie Golden  MRN: [id number]  Today's Date: 5/7/2022    Admit Date: 5/5/2022     Discharge Plan     Row Name 05/07/22 1006       Plan    Plan Dc plan: [facility]- pending precert and bed; precert to start on Monday 5/9    Plan Comments CM received vm from Riat inquiring of d/c plans.  Per cherry at [facility], therapy notes are needed over the weekend to start precert on Monday.  Rita updated on phone.              Phone communication or documentation only - no physical contact with patient or family.        Magdalena Fried RN

## 2022-05-07 NOTE — PLAN OF CARE
Goal Outcome Evaluation:            C/o buttock/pelvic through the shift, med given with some relief. Patient's able to make her needs known. Call light within reach.

## 2022-05-07 NOTE — PLAN OF CARE
Assessment: Carrie Golden presents with functional mobility impairments which indicate the need for skilled intervention. Tolerating session today without incident. Pt's HR 79. Presents with antalgic gait off L due to pain. Unable to take on challenges. 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  Will continue to follow and progress as tolerated.

## 2022-05-08 LAB
INR PPP: 1.47 (ref 2–3)
PROTHROMBIN TIME: 14.8 SECONDS (ref 19.4–28.5)

## 2022-05-08 PROCEDURE — G0378 HOSPITAL OBSERVATION PER HR: HCPCS

## 2022-05-08 PROCEDURE — 36415 COLL VENOUS BLD VENIPUNCTURE: CPT | Performed by: FAMILY MEDICINE

## 2022-05-08 PROCEDURE — 96372 THER/PROPH/DIAG INJ SC/IM: CPT

## 2022-05-08 PROCEDURE — 25010000002 MORPHINE PER 10 MG: Performed by: FAMILY MEDICINE

## 2022-05-08 PROCEDURE — 96376 TX/PRO/DX INJ SAME DRUG ADON: CPT

## 2022-05-08 PROCEDURE — 25010000002 ENOXAPARIN PER 10 MG: Performed by: FAMILY MEDICINE

## 2022-05-08 PROCEDURE — 85610 PROTHROMBIN TIME: CPT | Performed by: FAMILY MEDICINE

## 2022-05-08 RX ORDER — CETIRIZINE HYDROCHLORIDE 10 MG/1
10 TABLET ORAL DAILY PRN
Status: DISCONTINUED | OUTPATIENT
Start: 2022-05-08 | End: 2022-05-09 | Stop reason: HOSPADM

## 2022-05-08 RX ORDER — WARFARIN SODIUM 2 MG/1
2 TABLET ORAL
Status: COMPLETED | OUTPATIENT
Start: 2022-05-08 | End: 2022-05-08

## 2022-05-08 RX ADMIN — WARFARIN 2 MG: 2 TABLET ORAL at 17:36

## 2022-05-08 RX ADMIN — MORPHINE SULFATE 2 MG: 2 INJECTION, SOLUTION INTRAMUSCULAR; INTRAVENOUS at 08:40

## 2022-05-08 RX ADMIN — METOPROLOL TARTRATE 12.5 MG: 25 TABLET, FILM COATED ORAL at 21:50

## 2022-05-08 RX ADMIN — METOPROLOL TARTRATE 12.5 MG: 25 TABLET, FILM COATED ORAL at 08:40

## 2022-05-08 RX ADMIN — DILTIAZEM HYDROCHLORIDE 120 MG: 120 CAPSULE, COATED, EXTENDED RELEASE ORAL at 08:41

## 2022-05-08 RX ADMIN — ATORVASTATIN CALCIUM 20 MG: 20 TABLET, FILM COATED ORAL at 21:50

## 2022-05-08 RX ADMIN — ENOXAPARIN SODIUM 50 MG: 60 INJECTION SUBCUTANEOUS at 08:40

## 2022-05-08 RX ADMIN — HYDROCODONE BITARTRATE AND ACETAMINOPHEN 1 TABLET: 5; 325 TABLET ORAL at 12:26

## 2022-05-08 RX ADMIN — PANTOPRAZOLE SODIUM 40 MG: 40 TABLET, DELAYED RELEASE ORAL at 08:41

## 2022-05-08 RX ADMIN — MORPHINE SULFATE 2 MG: 2 INJECTION, SOLUTION INTRAMUSCULAR; INTRAVENOUS at 21:53

## 2022-05-08 RX ADMIN — FUROSEMIDE 20 MG: 20 TABLET ORAL at 08:40

## 2022-05-08 RX ADMIN — Medication 3 ML: at 21:51

## 2022-05-08 RX ADMIN — ISOSORBIDE MONONITRATE 30 MG: 30 TABLET, EXTENDED RELEASE ORAL at 08:40

## 2022-05-08 RX ADMIN — Medication 3 ML: at 08:40

## 2022-05-08 RX ADMIN — ENOXAPARIN SODIUM 50 MG: 60 INJECTION SUBCUTANEOUS at 21:50

## 2022-05-08 NOTE — PROGRESS NOTES
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    INR Goal: 2 - 3  Home medication?:  Yes, warfarin 2 mg daily  Bridge Therapy Present?:  Yes,  Enoxaparin 50 mg SQ Q12H  Interacting Medications Evaluation (New/Present/Discontinued): none of clinical significance  Additional Contributing Factors: n/a      Assessment/Plan:    INR slightly subtherapeutic on admission however, warfarin held until ortho evaluated for possible procedure. Patient bridged with therapeutic Lovenox in meantime per discussion with Dr. Miguel. Ortho not planing intervention, therefore warfarin restarted on 5/6. Patient has received small bolus doses x2 days and INR unchanged. Therefore, will bolus with larger 4 mg dose today (double home dose). Continue Lovenox bridge until INR >2.0.      Continue to monitor and adjust based on INR.         Date 5/5 5/6 5/7 5/8        INR 1.84 1.72 1.47 1.47        Dose --- 3 mg 3 mg 4 mg            Objective:  [Ht: 157.5 cm (62\"); Wt: 58 kg (127 lb 12.8 oz); BMI: Body mass index is 23.37 kg/m².]    Lab Results   Component Value Date    ALBUMIN 3.60 10/17/2021     Lab Results   Component Value Date    INR 1.47 (L) 05/08/2022    INR 1.47 (L) 05/07/2022    INR 1.72 (L) 05/06/2022    PROTIME 14.8 (L) 05/08/2022    PROTIME 14.8 (L) 05/07/2022    PROTIME 17.2 (L) 05/06/2022     Lab Results   Component Value Date    HGB 10.4 (L) 05/07/2022    HGB 10.7 (L) 05/06/2022    HGB 11.9 (L) 05/05/2022     Lab Results   Component Value Date    HCT 32.2 (L) 05/07/2022    HCT 32.7 (L) 05/06/2022    HCT 36.3 05/05/2022       Minerva Jackson, PharmD  05/08/22 12:13 EDT     "

## 2022-05-08 NOTE — PROGRESS NOTES
LOS: 0 days   Patient Care Team:  Monster Myrick MD as PCP - General (Family Medicine)  Maikel Morales MD as Consulting Physician (Cardiology)    Subjective     Interval History: Continued improvement in left hip pain    Patient Complaints: Left buttock and hip pain    History taken from: patient    Review of Systems   Constitutional: Positive for activity change. Negative for appetite change, diaphoresis, fatigue and fever.   HENT: Negative for ear discharge.    Eyes: Negative for visual disturbance.   Respiratory: Negative for cough and shortness of breath.    Cardiovascular: Negative for chest pain, palpitations and leg swelling.   Gastrointestinal: Negative for abdominal pain, diarrhea, nausea and vomiting.   Endocrine: Negative for polyuria.   Genitourinary: Negative for dysuria.   Musculoskeletal: Positive for arthralgias, gait problem and myalgias. Negative for back pain, joint swelling, neck pain and neck stiffness.   Skin: Negative for rash and wound.   Neurological: Negative for dizziness, tremors, seizures, weakness and headaches.   Psychiatric/Behavioral: Negative for confusion.           Objective     Vital Signs  Temp:  [98.2 °F (36.8 °C)-99 °F (37.2 °C)] 98.2 °F (36.8 °C)  Heart Rate:  [66-99] 89  Resp:  [16-17] 16  BP: (119-132)/(56-65) 132/64    Physical Exam:     General Appearance:    Alert, cooperative, in no acute distress,   Head:    Normocephalic, without obvious abnormality, atraumatic   Eyes:            Lids and lashes normal, conjunctivae and sclerae normal, no   icterus, no pallor, corneas clear, PERRLA   Ears:    Ears appear intact with no abnormalities noted   Throat:   No oral lesions, no thrush, oral mucosa moist   Neck:   No adenopathy, supple, trachea midline, no thyromegaly, no   carotid bruit, no JVD   Lungs:     Clear to auscultation,respirations regular, even and                  unlabored    Heart:    Regular rhythm and normal rate, normal S1 and S2, no             murmur, no gallop, no rub, no click   Chest Wall:    No abnormalities observed   Abdomen:     Normal bowel sounds, no masses, no organomegaly, soft        Non-tender non-distended, no guarding,   Extremities:   Moves all extremities well, no edema, no cyanosis, no             Redness   Pulses:   Pulses palpable and equal bilaterally   Skin:   No bleeding, bruising or rash   Lymph nodes:   No palpable adenopathy   Neurologic:   Cranial nerves 2 - 12 grossly intact, sensation intact, DTR       present and equal bilaterally        Results Review:    Lab Results (last 24 hours)     Procedure Component Value Units Date/Time    Protime-INR [[id number]]  (Abnormal) Collected: 05/08/22 0258    Specimen: Blood Updated: 05/08/22 0325     Protime 14.8 Seconds      INR 1.47           Imaging Results (Last 24 Hours)     ** No results found for the last 24 hours. **               I reviewed the patient's new clinical results.    Medication Review:   Scheduled Meds:atorvastatin, 20 mg, Oral, Nightly  dilTIAZem CD, 120 mg, Oral, Q24H  enoxaparin, 50 mg, Subcutaneous, Q12H  furosemide, 20 mg, Oral, Daily  isosorbide mononitrate, 30 mg, Oral, Daily  metoprolol tartrate, 12.5 mg, Oral, Q12H  pantoprazole, 40 mg, Oral, QAM AC  sodium chloride, 3 mL, Intravenous, Q12H  warfarin, 2 mg, Oral, Daily      Continuous Infusions:Pharmacy to dose warfarin,       PRN Meds:.•  acetaminophen  •  HYDROcodone-acetaminophen  •  Morphine  •  nitroglycerin  •  ondansetron  •  Pharmacy to dose warfarin  •  [COMPLETED] Insert peripheral IV **AND** sodium chloride  •  sodium chloride     Assessment/Plan       Fall, initial encounter    Dyslipidemia    Hypertension    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  Pharmacy is managing warfarin dosing.   Continue Lovenox bridging    Plan for disposition: Awaiting pre-CERT for skilled rehab    Ashley Livingston MD  05/08/22  11:26 EDT

## 2022-05-08 NOTE — DISCHARGE PLACEMENT REQUEST
"Norman Golden (89 y.o. Female)             Date of Birth   04/13/1925    Social Security Number       Address   [address] [place] [address] [place] [facility]    Home Phone   [phone]    MRN   [id number]       [facility]    Marital Status                               Admission Date   5/5/22    Admission Type   Emergency    Admitting Provider   Ashley Livingston MD    Attending Provider   Ashley Livingston MD    Department, Room/Bed   [facility] SURGICAL INPATIENT, 4106/1       Discharge Date       Discharge Disposition       Discharge Destination                               Attending Provider: Ashley Livingston MD    Allergies: Amoxicillin, Codeine, Lortab [Hydrocodone-acetaminophen], Nitrofurantoin, Sulfa Antibiotics    Isolation: None   Infection: None   Code Status: CPR   Advance Care Planning Activity    Ht: 157.5 cm (62\")   Wt: 58 kg (127 lb 12.8 oz)    Admission Cmt: None   Principal Problem: Fall, initial encounter [W19.XXXA]                 Active Insurance as of 5/5/2022     Primary Coverage     Payor Plan Insurance Group Employer/Plan Group    ANTHEM MEDICARE REPLACEMENT ANTHEM MEDICARE ADVANTAGE [id number]     Payor Plan Address Payor Plan Phone Number Payor Plan Fax Number Effective Dates    [address] [phone]  1/1/2022 - None Entered    [facility] [id number]       Subscriber Name Subscriber Birth Date Member ID       NORMAN GOLDEN 4/13/1925 [id number]                 Emergency Contacts      (Rel.) Home Phone Work Phone Mobile Phone    Rita Bhatia (Relative) -- -- [phone]               History & Physical      Key Van APRN at 05/06/22 0845     Attestation signed by Vera Miguel MD at 05/06/22 4634    I have reviewed this documentation and agree.                  Patient Care Team:  Monster Myrick MD as PCP - General (Family Medicine)  Maikel Morales MD as Consulting Physician (Cardiology)    Chief complaint  Pain in the left buttock "     Subjective     Patient is a 89 y.o. female presents with fall to the ER 5/5. She  Tripped and fell while walking into see her . The symptoms started after the fall. Ms Golden is completely independent , cares for herself and drives.  She takes coumadin for hx DVT and  afib .  She has HTN , CAD, HLD. She immediately had pain in the let hip after that fall. ER  Found CT showing mildly displaced fractures of the left superior pubic ramus extending to the pubic symphysis.  Incidentally a hernia containing portion of the bowel in the inguinal location seen not well visualized was seen on this CT of the LL extremity.    She was admitted for pain control and ortho evaluation.   Review of Systems   Constitutional: Positive for activity change. Negative for appetite change, fatigue and fever.   HENT: Negative for trouble swallowing.    Eyes: Negative for visual disturbance.   Respiratory: Negative for cough and shortness of breath.    Cardiovascular: Negative for chest pain, palpitations and leg swelling.   Gastrointestinal: Negative for abdominal pain, constipation, diarrhea, nausea and vomiting.   Genitourinary: Negative for difficulty urinating.   Musculoskeletal: Positive for arthralgias (left hip/ buttock pain - left shoulder pain ) and gait problem.   Skin: Negative for wound.   Neurological: Positive for weakness. Negative for dizziness and headaches.   Psychiatric/Behavioral: Negative for confusion.          History  Past Medical History:   Diagnosis Date   • Amblyopia 10/31/2012   • Amblyopia, left 3/12/2020   • Arthritis    • Conjunctival hemorrhage 10/8/2014   • Coronary artery disease    • Deformity of left foot 8/18/2020    Added automatically from request for surgery [id number]   • Dermatochalasis of left upper eyelid 3/12/2020   • Dermatochalasis of right upper eyelid 3/12/2020   • Drooping eyelid, bilateral    • Ectropion 3/12/2020   • Epiretinal membrane (ERM) of right eye 3/12/2020   •  Frequent UTI    • GERD (gastroesophageal reflux disease)    • Hallux rigidus, left foot 8/18/2020    Added automatically from request for surgery [id number]   • Hearing loss     bilateral hearing aides   • 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 sure which 1  contracted at age 8   • History of transfusion    • [place] (hard of hearing)    • Hyperlipidemia    • Hypertension    • Past heart attack     X2 MILD LAST ONE OCT 2019   • PONV (postoperative nausea and vomiting)    • Presence of intraocular lens 3/12/2020     Past Surgical History:   Procedure Laterality Date   • ANTERIOR AND POSTERIOR VAGINAL REPAIR     • BACK SURGERY     • BLEPHAROPLASTY Bilateral 5/23/2019    Procedure: bilateral upper lid blepharoplasty;  Surgeon: Mauricio Pennington MD;  Location: [facility] OR [facility];  Service: Ophthalmology   • BROW LIFT Bilateral 2/13/2020    Procedure: BILATERAL TEMPORAL DIRECT BROWLIFT;  Surgeon: Sreekanth Bird MD;  Location: [facility] OR [facility];  Service: Ophthalmology;  Laterality: Bilateral;   • CARDIAC CATHETERIZATION N/A 12/3/2019    Procedure: Left Heart Cath;  Surgeon: Puma Briseno MD;  Location: [facility] CATH INVASIVE LOCATION;  Service: Cardiovascular   • CARDIAC CATHETERIZATION N/A 12/3/2019    Procedure: Coronary angiography;  Surgeon: Puma Briseno MD;  Location: [facility] CATH INVASIVE LOCATION;  Service: Cardiovascular   • CARDIAC CATHETERIZATION N/A 12/3/2019    Procedure: Left ventriculography;  Surgeon: Puma Briseno MD;  Location: [facility] CATH INVASIVE LOCATION;  Service: Cardiovascular   • CATARACT EXTRACTION EXTRACAPSULAR W/ INTRAOCULAR LENS IMPLANTATION Bilateral    • CERVICAL SPINE ANTERIOR      with instrumentation   • CHEILECTOMY Left 8/28/2020    Procedure: CHEILECTOMY;  Surgeon: GAB Rees DPM;  Location: [facility] OR;  Service: Podiatry;  Laterality: Left;   • COLON SURGERY      for obstruction   • ALBERTO TUBE INSERTION Bilateral 5/23/2019    Procedure: ALBERTO TUBE;  Surgeon: Mauricio Pennington MD;   Location: [facility] OR [facility];  Service: Ophthalmology   • ECTROPION REPAIR Bilateral 2019    Procedure: bilateral lower lid ectropion repair, bilateral punctoplasty;  Surgeon: Mauricio Pennington MD;  Location: [facility] OR [facility];  Service: Ophthalmology   • ENDOSCOPY N/A 2022    Procedure: ESOPHAGOGASTRODUODENOSCOPY WITH DILATATION (BALLOON 15MM-18MM, UP TO 18MM) AND BOUGIE #48;  Surgeon: Wiley Parks MD;  Location: [facility] ENDOSCOPY;  Service: Gastroenterology;  Laterality: N/A;  Post: DYSPHAGIA   • EYE SURGERY     • FOOT FUSION Left 2016    Procedure: LEFT HALLUX METATARSALPHALANGEAL ARTHRODESIS SILVER BUNIONECTOMY METATARSAL HEAD RESECTION 2-5 CALCANEAL BONE GRAFT;  Surgeon: Monster Harper Jr., MD;  Location: [facility] MAIN OR;  Service:    • HYSTERECTOMY     • INGUINAL HERNIA REPAIR Bilateral    • METATARSAL OSTEOTOMY Left 2020    Procedure: Metatarsal head resection 5;  Surgeon: GAB Rees DPM;  Location: [facility] MAIN OR;  Service: Podiatry;  Laterality: Left;   • ROTATOR CUFF REPAIR Right    • SIGMOIDOSCOPY N/A 10/6/2021    Procedure: SIGMOIDOSCOPY WITH BIOPSY X1 AREA;  Surgeon: Uche Vaz MD;  Location: [facility] ENDOSCOPY;  Service: Gastroenterology;  Laterality: N/A;  ischemic colitis   • STOMACH SURGERY      for ulcer     Family History   Problem Relation Age of Onset   • No Known Problems Mother    • No Known Problems Father    • [place] Hyperthermia Neg Hx      Social History     Tobacco Use   • Smoking status: Former Smoker     Packs/day: 0.25     Years: 10.00     Pack years: 2.50     Types: Cigarettes     Quit date:      Years since quittin.3   • Smokeless tobacco: Never Used   Vaping Use   • Vaping Use: Never used   Substance Use Topics   • Alcohol use: No   • Drug use: No     Medications Prior to Admission   Medication Sig Dispense Refill Last Dose   • dilTIAZem XR (DILACOR XR) 120 MG 24 hr capsule Take 1 capsule by mouth Daily. 90 capsule 1    • furosemide (LASIX) 20  MG tablet Take 20 mg by mouth Daily.      • isosorbide mononitrate (IMDUR) 30 MG 24 hr tablet Take 1 tablet by mouth Daily. 90 tablet 1    • metoprolol tartrate (LOPRESSOR) 25 MG tablet Take 0.5 tablets by mouth Every 12 (Twelve) Hours. 30 tablet 1    • pantoprazole (PROTONIX) 40 MG EC tablet Take 1 tablet by mouth Every Morning. 30 tablet 1    • simvastatin (ZOCOR) 40 MG tablet Take 0.5 tablets by mouth Every Night. Patient needs a lipid panel before any more refills 45 tablet 0    • warfarin (COUMADIN) 2 MG tablet Take 2 mg by mouth Every Night. Pt was told to hold for 2 days before procedure-- will call [facility] to confirm        Allergies:  Amoxicillin, Codeine, Lortab [hydrocodone-acetaminophen], Nitrofurantoin, and Sulfa antibiotics    Objective     Vital Signs  Temp:  [97.3 °F (36.3 °C)-98.4 °F (36.9 °C)] 97.3 °F (36.3 °C)  Heart Rate:  [59-78] 67  Resp:  [14-17] 17  BP: (109-154)/(54-72) 109/54     Physical Exam:      General Appearance:    Alert, cooperative, in no acute distress   Head:    Normocephalic, without obvious abnormality, atraumatic   Eyes:            Lids and lashes normal, conjunctivae and sclerae normal, no   icterus, no pallor, corneas clear, PERRLA   Ears:    Ears appear intact with no abnormalities noted   Throat:   No oral lesions, no thrush, oral mucosa moist   Neck:   No adenopathy, supple, trachea midline, no thyromegaly, no   carotid bruit, no JVD   Lungs:     Clear to auscultation,respirations regular, even and                  unlabored    Heart:    Regular rhythm and normal rate, normal S1 and S2, no            murmur, no gallop, no rub, no click   Chest Wall:    No abnormalities observed   Abdomen:     Normal bowel sounds, no masses, no organomegaly, soft        non-tender, non-distended, no guarding, no rebound                tenderness   Extremities:   Unable to stand due to pain in the left hip/ buttock- Left shoulder  With pain with all ROM - TTP the posterior shoulder and upper  arm  no edema, no cyanosis, no             redness   Pulses:   Pulses palpable and equal bilaterally   Skin:   No bleeding, bruising or rash   Lymph nodes:   No palpable adenopathy   Neurologic:   Cranial nerves 2 - 12 grossly intact, sensation intact, DTR       present and equal bilaterally       Results Review:     Imaging Results (Last 24 Hours)     Procedure Component Value Units Date/Time    CT Lower Extremity Left Without Contrast [[id number]] Collected: 05/05/22 1521     Updated: 05/05/22 1528    Narrative:         DATE OF EXAM:   5/5/2022 2:34 PM     PROCEDURE:   CT LOWER EXTREMITY LEFT WO CONTRAST-     INDICATIONS:   persistent left hip pain s/p fall     COMPARISON:  Hip and pelvis radiograph 05/05/2022, CT of the abdomen and pelvis dated  10/02/2021     TECHNIQUE:   Contiguous axial images obtained to the hip. Coronal and sagittal  reconstructions were performed.     FINDINGS:   Osteopenia limits evaluation. There is irregularity along the anterior  lateral cortex of the inferior left sacrum consistent with nondisplaced  sacral fracture. There is a minimally displaced fracture at the superior  pubic ramus extending towards the pubic symphysis. There is a  nondisplaced fracture inferior pubic ramus. There is mild joint space  narrowing of the hip. There are no aggressive osseous lesions.     There is a hernia along the lower pelvis containing a portion of bowel.  This is not well delineated. It is small in size.        Impression:      There is a mildly displaced fractures of the left superior pubic ramus  extending to the pubic symphysis. There is a nondisplaced fracture  inferior pubic ramus. There is a nondisplaced fracture seen along the  inferior margin of the left sacrum.  There is diffuse osteopenia limiting evaluation.  There is a hernia containing a portion of bowel in the inguinal  location. This is not well evaluated. This is is at risk for obstruction  and incarceration.  There is mild arthritis  of the hip.     Electronically Signed By-Helen Lozada MD On:5/5/2022 3:26 PM  This report was finalized on [id number] by  Helen Lozada MD.    XR Hip With or Without Pelvis 2 - 3 View Left [[id number]] Collected: 05/05/22 1242     Updated: 05/05/22 1251    Narrative:      DATE OF EXAM:  5/5/2022 12:40 PM     PROCEDURE:  XR HIP W OR WO PELVIS 2-3 VIEW LEFT-     INDICATIONS:  Trauma, left hip pain.     COMPARISON:  No Comparisons Available     TECHNIQUE:   Two views of the left hip and one view of the pelvis were obtained.        FINDINGS:  There is no acute fracture or dislocation. The bony structures are  demineralized. There are mild osteoarthritic changes of the hip joints.  There are mild to moderate osteoarthritic changes of the sacroiliac  joints, right greater than left side. There are degenerative changes in  the lower lumbar spine with posterior surgical fixation hardware in  place. There are vascular calcifications in the pelvis.        Impression:         1. No acute fracture or dislocation.  2. Bony demineralization.  3. Degenerative changes.     Electronically Signed By-Winston Scott MD On:5/5/2022 12:44 PM  This report was finalized on [id number] by  Winston Scott MD.           Lab Results (last 24 hours)     Procedure Component Value Units Date/Time    Basic Metabolic Panel [[id number]]  (Abnormal) Collected: 05/06/22 0113    Specimen: Blood Updated: 05/06/22 0226     Glucose 102 mg/dL      BUN 19 mg/dL      Creatinine 0.79 mg/dL      Sodium 141 mmol/L      Potassium 4.1 mmol/L      Chloride 102 mmol/L      CO2 26.0 mmol/L      Calcium 8.4 mg/dL      BUN/Creatinine Ratio 24.1     Anion Gap 13.0 mmol/L      eGFR 68.1 mL/min/1.73      Comment: National Kidney Foundation and American Society of Nephrology (ASN) Task Force recommended calculation based on the Chronic Kidney Disease Epidemiology Collaboration (CKD-EPI) equation refit without adjustment for race.       Narrative:      GFR Normal >60  Chronic  Kidney Disease <60  Kidney Failure <15      Protime-INR [[id number]]  (Abnormal) Collected: 05/06/22 0113    Specimen: Blood Updated: 05/06/22 0207     Protime 17.2 Seconds      INR 1.72    CBC & Differential [[id number]]  (Abnormal) Collected: 05/06/22 0113    Specimen: Blood Updated: 05/06/22 0200    Narrative:      The following orders were created for panel order CBC & Differential.  Procedure                               Abnormality         Status                     ---------                               -----------         ------                     CBC Auto Differential[[id number]]        Abnormal            Final result                 Please view results for these tests on the individual orders.    CBC Auto Differential [[id number]]  (Abnormal) Collected: 05/06/22 0113    Specimen: Blood Updated: 05/06/22 0200     WBC 7.40 10*3/mm3      RBC 3.91 10*6/mm3      Hemoglobin 10.7 g/dL      Hematocrit 32.7 %      MCV 83.7 fL      MCH 27.4 pg      MCHC 32.7 g/dL      RDW 15.8 %      RDW-SD 46.4 fl      MPV 7.1 fL      Platelets 211 10*3/mm3      Neutrophil % 66.0 %      Lymphocyte % 21.9 %      Monocyte % 10.8 %      Eosinophil % 1.0 %      Basophil % 0.3 %      Neutrophils, Absolute 4.90 10*3/mm3      Lymphocytes, Absolute 1.60 10*3/mm3      Monocytes, Absolute 0.80 10*3/mm3      Eosinophils, Absolute 0.10 10*3/mm3      Basophils, Absolute 0.00 10*3/mm3      nRBC 0.1 /100 WBC     COVID PRE-OP / PRE-PROCEDURE SCREENING ORDER (NO ISOLATION) - Swab, Nasopharynx [[id number]]  (Normal) Collected: 05/05/22 1714    Specimen: Swab from Nasopharynx Updated: 05/05/22 1736    Narrative:      The following orders were created for panel order COVID PRE-OP / PRE-PROCEDURE SCREENING ORDER (NO ISOLATION) - Swab, Nasopharynx.  Procedure                               Abnormality         Status                     ---------                               -----------         ------                      COVID-19,CEPHEID/KALA,CO...[[id number]]  Normal              Final result                 Please view results for these tests on the individual orders.    COVID-19,CEPHEID/KALA,COR/TIGRE/PAD/DARLING IN-HOUSE(OR EMERGENT/ADD-ON),NP SWAB IN TRANSPORT MEDIA 3-4 HR TAT, RT-PCR - Swab, Nasopharynx [[id number]]  (Normal) Collected: 05/05/22 1714    Specimen: Swab from Nasopharynx Updated: 05/05/22 1736     COVID19 Not Detected    Narrative:      Fact sheet for providers: https://www.fda.gov/media/208442/download     Fact sheet for patients: https://www.fda.gov/media/877254/download  Fact sheet for providers: https://www.fda.gov/media/090885/download    Fact sheet for patients: https://www.fda.gov/media/285832/download    Test performed by PCR.    Basic Metabolic Panel [[id number]]  (Abnormal) Collected: 05/05/22 1614    Specimen: Blood Updated: 05/05/22 1640     Glucose 105 mg/dL      BUN 19 mg/dL      Creatinine 0.79 mg/dL      Sodium 141 mmol/L      Potassium 3.7 mmol/L      Chloride 102 mmol/L      CO2 26.0 mmol/L      Calcium 8.8 mg/dL      BUN/Creatinine Ratio 24.1     Anion Gap 13.0 mmol/L      eGFR 68.1 mL/min/1.73      Comment: National Kidney Foundation and American Society of Nephrology (ASN) Task Force recommended calculation based on the Chronic Kidney Disease Epidemiology Collaboration (CKD-EPI) equation refit without adjustment for race.       Narrative:      GFR Normal >60  Chronic Kidney Disease <60  Kidney Failure <15      Protime-INR [[id number]]  (Abnormal) Collected: 05/05/22 1614    Specimen: Blood Updated: 05/05/22 1633     Protime 18.3 Seconds      INR 1.84    CBC & Differential [[id number]]  (Abnormal) Collected: 05/05/22 1614    Specimen: Blood Updated: 05/05/22 1623    Narrative:      The following orders were created for panel order CBC & Differential.  Procedure                               Abnormality         Status                     ---------                               -----------         ------                      CBC Auto Differential[[id number]]        Abnormal            Final result                 Please view results for these tests on the individual orders.    CBC Auto Differential [[id number]]  (Abnormal) Collected: 05/05/22 1614    Specimen: Blood Updated: 05/05/22 1623     WBC 12.90 10*3/mm3      RBC 4.36 10*6/mm3      Hemoglobin 11.9 g/dL      Hematocrit 36.3 %      MCV 83.3 fL      MCH 27.3 pg      MCHC 32.8 g/dL      RDW 15.9 %      RDW-SD 46.8 fl      MPV 6.8 fL      Platelets 241 10*3/mm3      Neutrophil % 83.4 %      Lymphocyte % 9.6 %      Monocyte % 6.4 %      Eosinophil % 0.4 %      Basophil % 0.2 %      Neutrophils, Absolute 10.80 10*3/mm3      Lymphocytes, Absolute 1.20 10*3/mm3      Monocytes, Absolute 0.80 10*3/mm3      Eosinophils, Absolute 0.10 10*3/mm3      Basophils, Absolute 0.00 10*3/mm3      nRBC 0.0 /100 WBC            I reviewed the patient's new clinical results.    Assessment/Plan       Fall, initial encounter     Pelvic fractures- pain control. PT/ OT  CAD- continue imdur and  Lopressor.  PAF- AC with Coumadin. Pharmacy dosing here. Echo 10/2021 EF 56-60%  . Rate stable with cardizem and lopressor.    HLD- on statin   HTN with well preserved renal function- continue home meds.   Left shoulder aliyah n- will xray the left shoulder     on PPI  For stress ulcer prophylaxis and coumadin for AC.      OT has evaluated the patient and rec inpt Rehab. Case management consult in.    I discussed the patients findings and my recommendations with patient.     Key Van, EFREN  05/06/22  11:15 EDT        Electronically signed by Vera Miguel MD at 05/06/22 1648          Physician Progress Notes (last 24 hours)      Ashley Livingston MD at 05/08/22 1126               LOS: 0 days   Patient Care Team:  Monster Myrick MD as PCP - General (Family Medicine)  Maikel Morales MD as Consulting Physician (Cardiology)    Subjective     Interval History: Continued improvement in left hip  pain    Patient Complaints: Left buttock and hip pain    History taken from: patient    Review of Systems   Constitutional: Positive for activity change. Negative for appetite change, diaphoresis, fatigue and fever.   HENT: Negative for ear discharge.    Eyes: Negative for visual disturbance.   Respiratory: Negative for cough and shortness of breath.    Cardiovascular: Negative for chest pain, palpitations and leg swelling.   Gastrointestinal: Negative for abdominal pain, diarrhea, nausea and vomiting.   Endocrine: Negative for polyuria.   Genitourinary: Negative for dysuria.   Musculoskeletal: Positive for arthralgias, gait problem and myalgias. Negative for back pain, joint swelling, neck pain and neck stiffness.   Skin: Negative for rash and wound.   Neurological: Negative for dizziness, tremors, seizures, weakness and headaches.   Psychiatric/Behavioral: Negative for confusion.           Objective     Vital Signs  Temp:  [98.2 °F (36.8 °C)-99 °F (37.2 °C)] 98.2 °F (36.8 °C)  Heart Rate:  [66-99] 89  Resp:  [16-17] 16  BP: (119-132)/(56-65) 132/64    Physical Exam:     General Appearance:    Alert, cooperative, in no acute distress,   Head:    Normocephalic, without obvious abnormality, atraumatic   Eyes:            Lids and lashes normal, conjunctivae and sclerae normal, no   icterus, no pallor, corneas clear, PERRLA   Ears:    Ears appear intact with no abnormalities noted   Throat:   No oral lesions, no thrush, oral mucosa moist   Neck:   No adenopathy, supple, trachea midline, no thyromegaly, no   carotid bruit, no JVD   Lungs:     Clear to auscultation,respirations regular, even and                  unlabored    Heart:    Regular rhythm and normal rate, normal S1 and S2, no            murmur, no gallop, no rub, no click   Chest Wall:    No abnormalities observed   Abdomen:     Normal bowel sounds, no masses, no organomegaly, soft        Non-tender non-distended, no guarding,   Extremities:   Moves all  extremities well, no edema, no cyanosis, no             Redness   Pulses:   Pulses palpable and equal bilaterally   Skin:   No bleeding, bruising or rash   Lymph nodes:   No palpable adenopathy   Neurologic:   Cranial nerves 2 - 12 grossly intact, sensation intact, DTR       present and equal bilaterally        Results Review:    Lab Results (last 24 hours)     Procedure Component Value Units Date/Time    Protime-INR [[id number]]  (Abnormal) Collected: 05/08/22 0258    Specimen: Blood Updated: 05/08/22 0325     Protime 14.8 Seconds      INR 1.47           Imaging Results (Last 24 Hours)     ** No results found for the last 24 hours. **               I reviewed the patient's new clinical results.    Medication Review:   Scheduled Meds:atorvastatin, 20 mg, Oral, Nightly  dilTIAZem CD, 120 mg, Oral, Q24H  enoxaparin, 50 mg, Subcutaneous, Q12H  furosemide, 20 mg, Oral, Daily  isosorbide mononitrate, 30 mg, Oral, Daily  metoprolol tartrate, 12.5 mg, Oral, Q12H  pantoprazole, 40 mg, Oral, QAM AC  sodium chloride, 3 mL, Intravenous, Q12H  warfarin, 2 mg, Oral, Daily      Continuous Infusions:Pharmacy to dose warfarin,       PRN Meds:.•  acetaminophen  •  HYDROcodone-acetaminophen  •  Morphine  •  nitroglycerin  •  ondansetron  •  Pharmacy to dose warfarin  •  [COMPLETED] Insert peripheral IV **AND** sodium chloride  •  sodium chloride     Assessment/Plan       Fall, initial encounter    Dyslipidemia    Hypertension    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  Pharmacy is managing warfarin dosing.  Continue Lovenox bridging    Plan for disposition: Awaiting pre-CERT for skilled rehab    Ashley Livingston MD  05/08/22  11:26 EDT          Electronically signed by Ashley Livingston MD at 05/08/22 6969          Physical Therapy Notes  (last 48 hours)      Shanae Garnett, PTA at 22 1155  Version 1 of 1       Subjective: Pt agreeable to therapeutic plan of care.    Objective:     Bed mobility - N/A or Not attempted.  Transfers - CGA and with rolling walker  Ambulation - 50 feet CGA and with rolling walker    Pain: no pain at rest incr with movement and wt bearing  Education: Provided education on importance of mobility and skilled verbal / tactile cueing throughout intervention.     Assessment: Carrie Golden presents with functional mobility impairments which indicate the need for skilled intervention. Tolerating session today without incident. Pt's HR 79. Presents with antalgic gait off L due to pain. Unable to take on challenges. 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  Will continue to follow and progress as tolerated.     Plan/Recommendations:   High Intensity Therapy recommended post-acute care. This is recommended as therapy feels the patient would require 5-6 days per week, 2-3 hours per day. At this time, inpatient rehabilitation (acute rehab) would be the first choice and SNF would be second.. Pt requires no DME at discharge.     Pt desires Inpatient Rehabilitation placement at discharge. Pt cooperative; agreeable to therapeutic recommendations and plan of care.         Basic Mobility 6-click:  Rollin = Total, A lot = 2, A little = 3; 4 = None  Supine>Sit:   1 = Total, A lot = 2, A little = 3; 4 = None   Sit>Stand with arms:  1 = Total, A lot = 2, A little = 3; 4 = None  Bed>Chair:   1 = Total, A lot = 2, A little = 3; 4 = None  Ambulate in room:  1 = Total, A lot = 2, A little = 3; 4 = None  3-5 Steps with railin = Total, A lot = 2, A little = 3; 4 = None  Score: 18      Post-Tx Position: Up in Chair and Call light and personal items within reach  PPE: gloves, surgical mask, eyewear protection    Electronically signed by Tamir  Shanae SAMPSON PTA at 05/07/22 1605     Shanae Garnett PTA at 05/07/22 1150  Version 1 of 1        Assessment: Carrie Golden presents with functional mobility impairments which indicate the need for skilled intervention. Tolerating session today without incident. Pt's HR 79. Presents with antalgic gait off L due to pain. Unable to take on challenges. 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  Will continue to follow and progress as tolerated.             Electronically signed by Shanae Garnett PTA at 05/07/22 1605       Occupational Therapy Notes (last 48 hours)  Notes from 05/06/22 1658 through 05/08/22 1658   No notes exist for this encounter.

## 2022-05-08 NOTE — CASE MANAGEMENT/SOCIAL WORK
Continued Stay Note  [facility]     Patient Name: Carrie Golden  MRN: [id number]  Today's Date: 5/8/2022    Admit Date: 5/5/2022     Discharge Plan     Row Name 05/08/22 1659       Plan    Plan DC plan: [facility]- pending acceptance and precert.    Plan Comments CM received notification from [facility] that felt that the patient was too advanced for acute rehab; ANDREW newman felt the same; referral sent to Rosalina at [facility]- pending acceptance and precert.    CM updated Rita via phone. Rita stated that pt was able to walk yesterday but is having increased pain and immobility today.  Assured her that cm would follow when therapy sees tomorrow they could resend referrals if appropriate.              Phone communication or documentation only - no physical contact with patient or family.      Magdalena Fried RN

## 2022-05-08 NOTE — PLAN OF CARE
Goal Outcome Evaluation:    Patient is able to make needs known. She's been up to the chair today. Treating pain per MAR. Awaiting rehab.

## 2022-05-09 VITALS
HEART RATE: 66 BPM | HEIGHT: 62 IN | BODY MASS INDEX: 24.84 KG/M2 | TEMPERATURE: 98.1 F | SYSTOLIC BLOOD PRESSURE: 103 MMHG | WEIGHT: 135 LBS | OXYGEN SATURATION: 95 % | DIASTOLIC BLOOD PRESSURE: 60 MMHG | RESPIRATION RATE: 17 BRPM

## 2022-05-09 LAB
INR PPP: 1.65 (ref 2–3)
PROTHROMBIN TIME: 16.5 SECONDS (ref 19.4–28.5)

## 2022-05-09 PROCEDURE — 85610 PROTHROMBIN TIME: CPT | Performed by: FAMILY MEDICINE

## 2022-05-09 PROCEDURE — G0378 HOSPITAL OBSERVATION PER HR: HCPCS

## 2022-05-09 PROCEDURE — 97530 THERAPEUTIC ACTIVITIES: CPT

## 2022-05-09 PROCEDURE — 25010000002 ENOXAPARIN PER 10 MG: Performed by: FAMILY MEDICINE

## 2022-05-09 PROCEDURE — 97535 SELF CARE MNGMENT TRAINING: CPT

## 2022-05-09 PROCEDURE — 96372 THER/PROPH/DIAG INJ SC/IM: CPT

## 2022-05-09 RX ORDER — CETIRIZINE HYDROCHLORIDE 10 MG/1
10 TABLET ORAL DAILY PRN
Start: 2022-05-09

## 2022-05-09 RX ORDER — WARFARIN SODIUM 2 MG/1
2 TABLET ORAL NIGHTLY
Start: 2022-05-09

## 2022-05-09 RX ORDER — HYDROCODONE BITARTRATE AND ACETAMINOPHEN 5; 325 MG/1; MG/1
1 TABLET ORAL EVERY 6 HOURS PRN
Qty: 20 TABLET | Refills: 0 | Status: SHIPPED | OUTPATIENT
Start: 2022-05-09 | End: 2022-05-09 | Stop reason: SDUPTHER

## 2022-05-09 RX ORDER — ENOXAPARIN SODIUM 100 MG/ML
50 INJECTION SUBCUTANEOUS EVERY 12 HOURS SCHEDULED
Start: 2022-05-09 | End: 2022-05-09 | Stop reason: HOSPADM

## 2022-05-09 RX ORDER — FUROSEMIDE 20 MG/1
20 TABLET ORAL 2 TIMES DAILY
Qty: 180 TABLET | Refills: 1 | Status: SHIPPED | OUTPATIENT
Start: 2022-05-09

## 2022-05-09 RX ORDER — HYDROCODONE BITARTRATE AND ACETAMINOPHEN 5; 325 MG/1; MG/1
1 TABLET ORAL EVERY 6 HOURS PRN
Qty: 20 TABLET | Refills: 0 | Status: SHIPPED | OUTPATIENT
Start: 2022-05-09 | End: 2022-05-12

## 2022-05-09 RX ADMIN — FUROSEMIDE 20 MG: 20 TABLET ORAL at 08:56

## 2022-05-09 RX ADMIN — ISOSORBIDE MONONITRATE 30 MG: 30 TABLET, EXTENDED RELEASE ORAL at 08:56

## 2022-05-09 RX ADMIN — HYDROCODONE BITARTRATE AND ACETAMINOPHEN 1 TABLET: 5; 325 TABLET ORAL at 15:50

## 2022-05-09 RX ADMIN — METOPROLOL TARTRATE 12.5 MG: 25 TABLET, FILM COATED ORAL at 08:56

## 2022-05-09 RX ADMIN — HYDROCODONE BITARTRATE AND ACETAMINOPHEN 1 TABLET: 5; 325 TABLET ORAL at 08:56

## 2022-05-09 RX ADMIN — CETIRIZINE HYDROCHLORIDE 10 MG: 10 TABLET, FILM COATED ORAL at 15:50

## 2022-05-09 RX ADMIN — PANTOPRAZOLE SODIUM 40 MG: 40 TABLET, DELAYED RELEASE ORAL at 08:56

## 2022-05-09 RX ADMIN — HYDROCODONE BITARTRATE AND ACETAMINOPHEN 1 TABLET: 5; 325 TABLET ORAL at 01:51

## 2022-05-09 RX ADMIN — Medication 3 ML: at 08:56

## 2022-05-09 RX ADMIN — ENOXAPARIN SODIUM 50 MG: 60 INJECTION SUBCUTANEOUS at 08:56

## 2022-05-09 RX ADMIN — DILTIAZEM HYDROCHLORIDE 120 MG: 120 CAPSULE, COATED, EXTENDED RELEASE ORAL at 08:56

## 2022-05-09 NOTE — PLAN OF CARE
Carrie Golden presents with ADL impairments below baseline abilities which indicate the need for continued skilled intervention while inpatient. Pt up in chair and eager to participate in therapy. She is limited by pain, particularly with sit<>stand transfers and WB into LLE. Pt requires Min A for transfers and toileting, and Max A for LB dressing tasks. She compensates for pain by attempting to bear weight into just toes of LLE. Pt stands at sink requiring BUE support to complete oral hygiene, standing approximately 8 minutes for task. Tolerating session today without incident. Will continue to follow and progress as tolerated.     High Intensity Therapy recommended post-acute care. This is recommended as therapy feels the patient would require 5-6 days per week, 2-3 hours per day. At this time, inpatient rehabilitation (acute rehab) would be the first choice and SNF would be second.

## 2022-05-09 NOTE — PLAN OF CARE
Goal Outcome Evaluation:  Plan of Care Reviewed With: patient           Outcome Evaluation: Pt c/o sacral pain with pain meds somewhat effective. Pt required assistance at times with repositioning throughout shift. Call light and personal items in reach. Will continue to monitor.

## 2022-05-09 NOTE — PROGRESS NOTES
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    INR Goal: 2 - 3  Home medication?:  Yes, warfarin 2 mg daily  Bridge Therapy Present?:  Yes,  Enoxaparin 50 mg SQ Q12H  Interacting Medications Evaluation (New/Present/Discontinued): none of clinical significance  Additional Contributing Factors: n/a      Assessment/Plan:    INR slightly subtherapeutic on admission however, warfarin held until ortho evaluated for possible procedure. Patient bridged with therapeutic Lovenox in meantime per discussion with Dr. Miguel. Ortho not planing intervention, therefore warfarin restarted on 5/6. INR now beginning to respond after increased doses from home dose the past 3 days. As pt has only received 3 doses of warfarin and all were increased from home dose, expect that INR will continue to rise. Will resume home dose today and monitor for response.     Continue to monitor and adjust based on INR.         Date 5/5 5/6 5/7 5/8 5/9       INR 1.84 1.72 1.47 1.47 1.65       Dose --- 3 mg 3 mg 4 mg 2 mg           Objective:  [Ht: 157.5 cm (62\"); Wt: 61.2 kg (135 lb); BMI: Body mass index is 24.69 kg/m².]    Lab Results   Component Value Date    ALBUMIN 3.60 10/17/2021     Lab Results   Component Value Date    INR 1.65 (L) 05/09/2022    INR 1.47 (L) 05/08/2022    INR 1.47 (L) 05/07/2022    PROTIME 16.5 (L) 05/09/2022    PROTIME 14.8 (L) 05/08/2022    PROTIME 14.8 (L) 05/07/2022     Lab Results   Component Value Date    HGB 10.4 (L) 05/07/2022    HGB 10.7 (L) 05/06/2022    HGB 11.9 (L) 05/05/2022     Lab Results   Component Value Date    HCT 32.2 (L) 05/07/2022    HCT 32.7 (L) 05/06/2022    HCT 36.3 05/05/2022       Rosalba Das, Carolee  05/09/22 11:04 EDT       "

## 2022-05-09 NOTE — CASE MANAGEMENT/SOCIAL WORK
Continued Stay Note   [facility]     Patient Name: Carrie Golden  MRN: [id number]  Today's Date: 5/9/2022    Admit Date: 5/5/2022     Discharge Plan     Row Name 05/09/22 1312       Plan    Plan [facility] precert approved.  PASRR per facility.  Facility can take patient after 4pm today.    Plan Comments Precert approved for [facility]. Bed ready today after 4pm.  MD, RN and family notified.                    Reny Hayes RN   Phone communication or documentation only - no physical contact with patient or family.

## 2022-05-09 NOTE — PROGRESS NOTES
LOS: 0 days   Patient Care Team:  Monster Myrick MD as PCP - General (Family Medicine)  Maikel Morales MD as Consulting Physician (Cardiology)    Subjective         Review of Systems   Constitutional: Positive for activity change. Negative for appetite change and fatigue.   HENT: Negative.    Respiratory: Negative.    Cardiovascular: Negative for leg swelling.   Gastrointestinal: Negative for constipation, diarrhea, nausea and vomiting.   Genitourinary: Negative.    Musculoskeletal: Positive for arthralgias.   Neurological: Positive for weakness.   Psychiatric/Behavioral: Negative.            Objective     Vital Signs  Temp:  [97.7 °F (36.5 °C)-98.5 °F (36.9 °C)] 97.7 °F (36.5 °C)  Heart Rate:  [63-89] 68  Resp:  [15-23] 15  BP: (121-134)/(61-63) 121/63      Physical Exam  Vitals reviewed.   Constitutional:       Appearance: She is not ill-appearing.   HENT:      Head: Normocephalic and atraumatic.      Right Ear: External ear normal.      Left Ear: External ear normal.      Nose: Nose normal.      Mouth/Throat:      Mouth: Mucous membranes are moist.   Eyes:      General:         Right eye: No discharge.         Left eye: No discharge.   Cardiovascular:      Rate and Rhythm: Normal rate and regular rhythm.      Pulses: Normal pulses.      Heart sounds: Normal heart sounds.   Pulmonary:      Effort: Pulmonary effort is normal.      Breath sounds: Normal breath sounds.   Abdominal:      General: Bowel sounds are normal.      Palpations: Abdomen is soft.   Musculoskeletal:         General: Normal range of motion.      Cervical back: Normal range of motion.   Skin:     General: Skin is warm and dry.      Coloration: Skin is pale.   Neurological:      Mental Status: She is alert and oriented to person, place, and time.              Results Review:    Lab Results (last 24 hours)     Procedure Component Value Units Date/Time    Protime-INR [[id number]]  (Abnormal) Collected: 05/09/22 0201    Specimen: Blood  Updated: 05/09/22 0253     Protime 16.5 Seconds      INR 1.65           Imaging Results (Last 24 Hours)     ** No results found for the last 24 hours. **               I reviewed the patient's new clinical results.    Medication Review:   Scheduled Meds:atorvastatin, 20 mg, Oral, Nightly  dilTIAZem CD, 120 mg, Oral, Q24H  enoxaparin, 50 mg, Subcutaneous, Q12H  furosemide, 20 mg, Oral, Daily  isosorbide mononitrate, 30 mg, Oral, Daily  metoprolol tartrate, 12.5 mg, Oral, Q12H  pantoprazole, 40 mg, Oral, QAM AC  sodium chloride, 3 mL, Intravenous, Q12H  warfarin, 2 mg, Oral, Daily      Continuous Infusions:Pharmacy to dose warfarin,       PRN Meds:.•  acetaminophen  •  cetirizine  •  HYDROcodone-acetaminophen  •  Morphine  •  nitroglycerin  •  ondansetron  •  Pharmacy to dose warfarin  •  [COMPLETED] Insert peripheral IV **AND** sodium chloride  •  sodium chloride     Interval History:    Assessment/Plan       Fall, initial encounter    Dyslipidemia    Hypertension    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  Pharmacy is managing warfarin dosing with lovenox bridge       Plan for disposition: inpt rehab    Yaa Miranda, EFREN  05/09/22  11:19 EDT

## 2022-05-09 NOTE — THERAPY TREATMENT NOTE
Subjective: Pt agreeable to therapeutic plan of care.  Cognition: oriented to Person, Place, Time and Situation    Objective:     Bed Mobility: N/A or Not attempted.  Functional Transfers: Min-A  Functional Ambulation: Min-A    Toileting: Min-A  ADL Position: supported sitting  ADL Comments: incontinent of bladder upon standing. Requires Max A for LB dressing due to pain.    Grooming: CGA  ADL Position: standing at sink to complete oral hygiene tasks  ADL Comments: Requires BUE for WB/offloading from LLE this date.     Pain: 7 VAS  Education: Provided education on importance of mobility and skilled verbal / tactile cueing throughout intervention.     Assessment: Carrie Golden presents with ADL impairments below baseline abilities which indicate the need for continued skilled intervention while inpatient. Pt up in chair and eager to participate in therapy. She is limited by pain, particularly with sit<>stand transfers and WB into LLE. Pt requires Min A for transfers and toileting, and Max A for LB dressing tasks. She compensates for pain by attempting to bear weight into just toes of LLE. Pt stands at sink requiring BUE support to complete oral hygiene, standing approximately 8 minutes for task. Tolerating session today without incident. Will continue to follow and progress as tolerated.     Plan/Recommendations:   Pt would benefit from Inpatient Rehabilitation placement at discharge from facility.   Pt desires Inpatient Rehabilitation placement at discharge. Pt cooperative; agreeable to therapeutic recommendations and plan of care.     Post-Tx Position: Up in Chair, Alarms activated and Call light and personal items within reach  PPE: gloves, surgical mask, eyewear protection

## 2022-05-09 NOTE — DISCHARGE SUMMARY
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          Hospital Course    Patient is a 89 y.o. female presented to the ED on  5/5 after a mechanical fall at home. She is completely independent , cares for herself and drives.  She takes coumadin for hx DVT and  afib.  Other history include HTN , CAD, HLD. She presented with left hip pain and was found to have   displaced fractures of the left superior pubic ramus extending to the pubic symphysis. Incidentally a hernia containing portion of the bowel in the inguinal. Ct abdomen obtained with the following, Small left inguinal hernia containing small bowel without obstruction. She remains asymptomatic with no issues. Orthopedic surgery did not find that surgical intervention was necessary.  She has been hemodynamically stable, agreeable to discharge, and rehab.  She will need close monitoring of INR.     Procedures Performed      CT ABDOMEN PELVIS W CONTRAST-     Date of Exam: 10/28/2019 1:14 PM     Indication: Abdominal pain, unspecified.  Patient's a 89-year-old female  with mid abdominal pain today's preinjection creatinine was 0.7 patient  was injected 100 cc of Isovue-370. Patient gives history of prior colon  and gastric surgery.     Comparison: None available.     Technique: Contiguous axial CT images were obtained from the lung bases  to the superior iliac crests  without contrast.  Following uneventful  administration of 100 intravenous and oral contrast, contiguous axial  images obtained from lung bases to the pubic symphysis. Sagittal and  coronal reconstructions were performed.  Automated exposure control and  iterative reconstruction methods were used.     FINDINGS:  Lower Thorax: No acute process identified. There is mild hyperaeration  there is no free air within the abdomen or pelvis.     Liver: Normal size and density. No focal lesions identified.     Gallbladder: The gallbladder has been removed. The biliary system  appears unremarkable. Pancreas: No focal masses.  No pancreatic duct  dilation.     Spleen: Spleen is normal size. Focal areas of decreased density within  the spleen are seen felt to represent either small hemangiomas or  possibly small cyst..     Adrenal glands: Unremarkable.      Kidneys: The kidneys appear normal in size. No evidence of  nephrolithiasis. No evidence of hydronephrosis. No suspicious focal  lesions identified. There is mild cortical thinning to both kidneys  without change of hydronephrosis or stone parapelvic cyst are felt to be  present particularly in the left kidney.     Retroperitoneum/vascular: No retroperitoneal adenopathy identified. No  aneurysmal dilatation of the vascular structures.     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 The exiting loop  from the stomach demonstrates mild dilatation there is also a second  mildly dilated loop of small bowel in the left quadrant of the abdomen  changes most likely represent focal ileus there is no obstructive change  demonstrated. A small left inguinal hernia is incidentally noted  containing small bowel however this does not appear to represent  obstruction the entering and exiting loops are nondilated. Moderate  amount of stool in the proximal two thirds of the colon which may  represent an element of constipation.     Bladder: The bladder appears unremarkable.      Pelvic Organs: No acute process identified. Changes of hysterectomy are  present.     Osseous structures: No aggressive focal lytic or sclerotic osseous  lesions. No acute osseous abnormality. Changes of prior vertebral plasty  at the L2 level are seen along with posterior fixation at the L4-5  level.     IMPRESSION:  1. Mild focal changes of ileus on the left with loops of mildly dilated  small bowel  2. Small left inguinal hernia containing small bowel without obstruction  3 changes of prior gastric bypass  4. Lung bases are free of active disease  5 no evidence of free air, free fluid or inflammatory changes in the  mesenteric or retroperitoneal fat planes    DATE OF EXAM:   5/5/2022 2:34 PM     PROCEDURE:   CT LOWER EXTREMITY LEFT WO CONTRAST-     INDICATIONS:   persistent left hip pain s/p fall     COMPARISON:  Hip and pelvis radiograph 05/05/2022, CT of the abdomen and pelvis dated  10/02/2021     TECHNIQUE:   Contiguous axial images obtained to the hip. Coronal and sagittal  reconstructions were performed.     FINDINGS:   Osteopenia limits evaluation. There is irregularity along the anterior  lateral cortex of the inferior left sacrum consistent with nondisplaced  sacral fracture. There is a minimally displaced fracture at the superior  pubic ramus extending towards the pubic symphysis. There is a  nondisplaced fracture inferior pubic ramus. There is mild joint space  narrowing of the hip. There are no aggressive osseous lesions.     There is a hernia along the lower pelvis containing a portion of bowel.  This is not well delineated. It is small in size.      IMPRESSION:  There is a mildly displaced fractures of the left superior pubic ramus  extending to the pubic symphysis. There is a nondisplaced fracture  inferior pubic ramus. There is a nondisplaced fracture seen along the  inferior margin of the left sacrum.  There is diffuse osteopenia limiting evaluation.  There is a hernia containing a portion of  bowel in the inguinal  location. This is not well evaluated. This is is at risk for obstruction  and incarceration.  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  Consults:   Consults     Date and Time Order Name Status Description    5/5/2022  3:53 PM Ortho (on-call MD unless specified)      5/5/2022  3:49 PM Family Medicine Consult            Pertinent Test Results:    Lab Results (most recent)     Procedure Component Value Units Date/Time    Protime-INR [[id number]]  (Abnormal) Collected: 05/09/22 0201    Specimen: Blood Updated: 05/09/22 0253     Protime 16.5 Seconds      INR 1.65    Protime-INR [[id number]]  (Abnormal) Collected: 05/08/22 0258    Specimen: Blood Updated: 05/08/22 0325     Protime 14.8 Seconds      INR 1.47    Basic Metabolic Panel [[id number]]  (Abnormal) Collected: 05/07/22 0221    Specimen: Blood Updated: 05/07/22 0307     Glucose 102 mg/dL      BUN 21 mg/dL      Creatinine 0.85 mg/dL      Sodium 136 mmol/L      Potassium 3.7 mmol/L      Chloride 100 mmol/L      CO2 25.0 mmol/L      Calcium 8.4 mg/dL      BUN/Creatinine Ratio 24.7     Anion Gap 11.0 mmol/L      eGFR 62.4 mL/min/1.73      Comment: National Kidney Foundation and American Society of Nephrology (ASN) Task Force recommended calculation based on the Chronic Kidney Disease Epidemiology Collaboration (CKD-EPI) equation refit without adjustment for race.       Narrative:      GFR Normal >60  Chronic Kidney Disease <60  Kidney Failure <15      CBC & Differential [[id number]]  (Abnormal) Collected: 05/07/22 0221    Specimen: Blood Updated: 05/07/22 0247    Narrative:      The following orders were created for panel order CBC & Differential.  Procedure                               Abnormality         Status                     ---------                               -----------         ------                     CBC Auto Differential[[id number]]        Abnormal            Final result                 Please view results for these tests on the  individual orders.    CBC Auto Differential [[id number]]  (Abnormal) Collected: 05/07/22 0221    Specimen: Blood Updated: 05/07/22 0247     WBC 8.10 10*3/mm3      RBC 3.83 10*6/mm3      Hemoglobin 10.4 g/dL      Hematocrit 32.2 %      MCV 84.2 fL      MCH 27.2 pg      MCHC 32.2 g/dL      RDW 15.9 %      RDW-SD 47.3 fl      MPV 7.4 fL      Platelets 187 10*3/mm3      Neutrophil % 60.6 %      Lymphocyte % 24.7 %      Monocyte % 12.3 %      Eosinophil % 2.0 %      Basophil % 0.4 %      Neutrophils, Absolute 4.90 10*3/mm3      Lymphocytes, Absolute 2.00 10*3/mm3      Monocytes, Absolute 1.00 10*3/mm3      Eosinophils, Absolute 0.20 10*3/mm3      Basophils, Absolute 0.00 10*3/mm3      nRBC 0.0 /100 WBC     Basic Metabolic Panel [[id number]]  (Abnormal) Collected: 05/06/22 0113    Specimen: Blood Updated: 05/06/22 0226     Glucose 102 mg/dL      BUN 19 mg/dL      Creatinine 0.79 mg/dL      Sodium 141 mmol/L      Potassium 4.1 mmol/L      Chloride 102 mmol/L      CO2 26.0 mmol/L      Calcium 8.4 mg/dL      BUN/Creatinine Ratio 24.1     Anion Gap 13.0 mmol/L      eGFR 68.1 mL/min/1.73      Comment: National Kidney Foundation and American Society of Nephrology (ASN) Task Force recommended calculation based on the Chronic Kidney Disease Epidemiology Collaboration (CKD-EPI) equation refit without adjustment for race.       Narrative:      GFR Normal >60  Chronic Kidney Disease <60  Kidney Failure <15      CBC & Differential [[id number]]  (Abnormal) Collected: 05/06/22 0113    Specimen: Blood Updated: 05/06/22 0200    Narrative:      The following orders were created for panel order CBC & Differential.  Procedure                               Abnormality         Status                     ---------                               -----------         ------                     CBC Auto Differential[[id number]]        Abnormal            Final result                 Please view results for these tests on the individual orders.    CBC  Auto Differential [[id number]]  (Abnormal) Collected: 05/06/22 0113    Specimen: Blood Updated: 05/06/22 0200     WBC 7.40 10*3/mm3      RBC 3.91 10*6/mm3      Hemoglobin 10.7 g/dL      Hematocrit 32.7 %      MCV 83.7 fL      MCH 27.4 pg      MCHC 32.7 g/dL      RDW 15.8 %      RDW-SD 46.4 fl      MPV 7.1 fL      Platelets 211 10*3/mm3      Neutrophil % 66.0 %      Lymphocyte % 21.9 %      Monocyte % 10.8 %      Eosinophil % 1.0 %      Basophil % 0.3 %      Neutrophils, Absolute 4.90 10*3/mm3      Lymphocytes, Absolute 1.60 10*3/mm3      Monocytes, Absolute 0.80 10*3/mm3      Eosinophils, Absolute 0.10 10*3/mm3      Basophils, Absolute 0.00 10*3/mm3      nRBC 0.1 /100 WBC     COVID PRE-OP / PRE-PROCEDURE SCREENING ORDER (NO ISOLATION) - Swab, Nasopharynx [[id number]]  (Normal) Collected: 05/05/22 1714    Specimen: Swab from Nasopharynx Updated: 05/05/22 1736    Narrative:      The following orders were created for panel order COVID PRE-OP / PRE-PROCEDURE SCREENING ORDER (NO ISOLATION) - Swab, Nasopharynx.  Procedure                               Abnormality         Status                     ---------                               -----------         ------                     COVID-19,CEPHEID/KALA,CO...[[id number]]  Normal              Final result                 Please view results for these tests on the individual orders.    COVID-19,CEPHEID/KALA,COR/TIGRE/PAD/DARLING IN-HOUSE(OR EMERGENT/ADD-ON),NP SWAB IN TRANSPORT MEDIA 3-4 HR TAT, RT-PCR - Swab, Nasopharynx [[id number]]  (Normal) Collected: 05/05/22 1714    Specimen: Swab from Nasopharynx Updated: 05/05/22 1736     COVID19 Not Detected    Narrative:      Fact sheet for providers: https://www.fda.gov/media/976450/download     Fact sheet for patients: https://www.fda.gov/media/858513/download  Fact sheet for providers: https://www.fda.gov/media/571537/download    Fact sheet for patients: https://www.fda.gov/media/959834/download    Test performed by PCR.            Results for orders placed during the hospital encounter of 10/02/21    Adult Transthoracic Echo Complete W/ Cont if Necessary Per Protocol    Interpretation Summary  · Estimated left ventricular EF was in agreement with the calculated left ventricular EF. Left ventricular ejection fraction appears to be 56 - 60%. Left ventricular systolic function is normal.  · Left ventricular diastolic function is consistent with (grade II w/high LAP) pseudonormalization.  · Left atrial volume is mildly increased.  · Estimated right ventricular systolic pressure from tricuspid regurgitation is normal (<35 mmHg).       Condition on Discharge:  Stable    Vital Signs  Temp:  [97.7 °F (36.5 °C)-98.5 °F (36.9 °C)] 98.1 °F (36.7 °C)  Heart Rate:  [63-88] 66  Resp:  [15-23] 17  BP: (103-121)/(60-63) 103/60      Physical Exam  Vitals reviewed.   Constitutional:       Appearance: She is not ill-appearing.   HENT:      Head: Normocephalic and atraumatic.      Right Ear: External ear normal.      Left Ear: External ear normal.      Nose: Nose normal.      Mouth/Throat:      Mouth: Mucous membranes are moist.   Eyes:      General:         Right eye: No discharge.         Left eye: No discharge.   Cardiovascular:      Rate and Rhythm: Normal rate and regular rhythm.      Pulses: Normal pulses.      Heart sounds: Normal heart sounds.   Pulmonary:      Effort: Pulmonary effort is normal.      Breath sounds: Normal breath sounds.   Abdominal:      General: Bowel sounds are normal.      Palpations: Abdomen is soft.   Musculoskeletal:         General: Tenderness present. Normal range of motion.      Cervical back: Normal range of motion.   Skin:     General: Skin is warm and dry.      Coloration: Skin is pale.   Neurological:      Mental Status: She is alert and oriented to person, place, and time.   Psychiatric:         Behavior: Behavior normal.              Discharge Disposition  Rehab Facility or Unit (DC - External)    Discharge  Medications     Discharge Medications      New Medications      Instructions Start Date   cetirizine 10 MG tablet  Commonly known as: zyrTEC   10 mg, Oral, Daily PRN      HYDROcodone-acetaminophen 5-325 MG per tablet  Commonly known as: NORCO   1 tablet, Oral, Every 6 Hours PRN      PHARMACY TO DOSE WARFARIN  Replaces: warfarin 2 MG tablet   Does not apply, Continuous PRN      warfarin 2 MG tablet  Commonly known as: COUMADIN   2 mg, Oral, Nightly         Changes to Medications      Instructions Start Date   furosemide 20 MG tablet  Commonly known as: LASIX  What changed: Another medication with the same name was removed. Continue taking this medication, and follow the directions you see here.   20 mg, Oral, 2 Times Daily         Continue These Medications      Instructions Start Date   dilTIAZem  MG 24 hr capsule  Commonly known as: DILACOR XR   120 mg, Oral, Daily      isosorbide mononitrate 30 MG 24 hr tablet  Commonly known as: IMDUR   30 mg, Oral, Daily      metoprolol tartrate 25 MG tablet  Commonly known as: LOPRESSOR   12.5 mg, Oral, Every 12 Hours Scheduled      pantoprazole 40 MG EC tablet  Commonly known as: PROTONIX   40 mg, Oral, Every Early Morning      simvastatin 40 MG tablet  Commonly known as: ZOCOR   20 mg, Oral, Nightly, Patient needs a lipid panel before any more refills         Stop These Medications    warfarin 2 MG tablet  Commonly known as: COUMADIN  Replaced by: PHARMACY TO DOSE WARFARIN            Discharge Diet:   Diet Instructions     Diet: Regular      Discharge Diet: Regular          Activity at Discharge:   Activity Instructions     Activity as Tolerated            Follow-up Appointments  Future Appointments   Date Time Provider Department Center   6/8/2022 10:00 AM Chin Ley MD [facility]   6/23/2022 11:00 AM Balbina Parker APRN [facility]     Additional Instructions for the Follow-ups that You Need to Schedule     Discharge Follow-up with PCP   As directed        Currently Documented PCP:    Monster Myrick MD    PCP Phone Number:    [phone]     Follow Up Details: after rehab               Test Results Pending at Discharge       Ashley Livingston MD  05/09/22  16:25 EDT    Time: Discharge 25 min

## 2022-05-10 NOTE — CASE MANAGEMENT/SOCIAL WORK
Case Management Discharge Note           Provided Post Acute Provider List?: Yes  Post Acute Provider List: Inpatient Rehab  Delivered To: Support Person, Patient  Support Person: Valery  Method of Delivery: In person    Selected Continued Care - Discharged on 5/9/2022 Admission date: 5/5/2022 - Discharge disposition: Rehab Facility or Unit (DC - External)    Destination Coordination complete.    Service Provider Selected Services Address Phone Fax Patient Preferred    [facility] [address], [place] [address] [phone] --                    Transportation Services  Private: Car    Final Discharge Disposition Code: 03 - skilled nursing facility (SNF) ([facility])

## 2022-06-23 ENCOUNTER — OFFICE VISIT (OUTPATIENT)
Dept: CARDIOLOGY | Facility: CLINIC | Age: 87
End: 2022-06-23

## 2022-06-23 VITALS
WEIGHT: 120 LBS | DIASTOLIC BLOOD PRESSURE: 64 MMHG | OXYGEN SATURATION: 98 % | BODY MASS INDEX: 22.08 KG/M2 | HEART RATE: 52 BPM | HEIGHT: 62 IN | SYSTOLIC BLOOD PRESSURE: 120 MMHG

## 2022-06-23 DIAGNOSIS — E78.5 DYSLIPIDEMIA: ICD-10-CM

## 2022-06-23 DIAGNOSIS — I82.4Z1 ACUTE DEEP VEIN THROMBOSIS (DVT) OF DISTAL VEIN OF RIGHT LOWER EXTREMITY: ICD-10-CM

## 2022-06-23 DIAGNOSIS — I48.0 PAF (PAROXYSMAL ATRIAL FIBRILLATION): Primary | ICD-10-CM

## 2022-06-23 DIAGNOSIS — I10 HYPERTENSION, UNSPECIFIED TYPE: ICD-10-CM

## 2022-06-23 DIAGNOSIS — I25.110 CORONARY ARTERY DISEASE INVOLVING NATIVE CORONARY ARTERY OF NATIVE HEART WITH UNSTABLE ANGINA PECTORIS: ICD-10-CM

## 2022-06-23 PROCEDURE — 99213 OFFICE O/P EST LOW 20 MIN: CPT | Performed by: NURSE PRACTITIONER

## 2022-06-23 PROCEDURE — 93000 ELECTROCARDIOGRAM COMPLETE: CPT | Performed by: NURSE PRACTITIONER

## 2022-06-25 ENCOUNTER — APPOINTMENT (OUTPATIENT)
Dept: CT IMAGING | Facility: HOSPITAL | Age: 87
End: 2022-06-25

## 2022-06-25 ENCOUNTER — HOSPITAL ENCOUNTER (EMERGENCY)
Facility: HOSPITAL | Age: 87
Discharge: HOME | End: 2022-06-25
Attending: EMERGENCY MEDICINE | Admitting: EMERGENCY MEDICINE

## 2022-06-25 VITALS
RESPIRATION RATE: 18 BRPM | BODY MASS INDEX: 23.89 KG/M2 | DIASTOLIC BLOOD PRESSURE: 61 MMHG | SYSTOLIC BLOOD PRESSURE: 121 MMHG | HEART RATE: 64 BPM | OXYGEN SATURATION: 99 % | WEIGHT: 121.69 LBS | HEIGHT: 60 IN | TEMPERATURE: 98 F

## 2022-06-25 DIAGNOSIS — S32.9XXD CLOSED NONDISPLACED FRACTURE OF PELVIS WITH ROUTINE HEALING, UNSPECIFIED PART OF PELVIS, SUBSEQUENT ENCOUNTER: Primary | ICD-10-CM

## 2022-06-25 DIAGNOSIS — S32.050A CLOSED COMPRESSION FRACTURE OF L5 VERTEBRA, INITIAL ENCOUNTER: ICD-10-CM

## 2022-06-25 DIAGNOSIS — M54.50 LOW BACK PAIN, UNSPECIFIED BACK PAIN LATERALITY, UNSPECIFIED CHRONICITY, UNSPECIFIED WHETHER SCIATICA PRESENT: ICD-10-CM

## 2022-06-25 LAB
INR PPP: 3.42 (ref 2–3)
PROTHROMBIN TIME: 32.9 SECONDS (ref 19.4–28.5)

## 2022-06-25 PROCEDURE — 99282 EMERGENCY DEPT VISIT SF MDM: CPT

## 2022-06-25 PROCEDURE — 72192 CT PELVIS W/O DYE: CPT

## 2022-06-25 PROCEDURE — 72131 CT LUMBAR SPINE W/O DYE: CPT

## 2022-06-25 PROCEDURE — 85610 PROTHROMBIN TIME: CPT | Performed by: NURSE PRACTITIONER

## 2022-06-25 PROCEDURE — 36415 COLL VENOUS BLD VENIPUNCTURE: CPT

## 2022-06-25 RX ORDER — HYDROCODONE BITARTRATE AND ACETAMINOPHEN 5; 325 MG/1; MG/1
1 TABLET ORAL EVERY 6 HOURS PRN
Qty: 12 TABLET | Refills: 0 | Status: SHIPPED | OUTPATIENT
Start: 2022-06-25

## 2022-06-25 NOTE — DISCHARGE INSTRUCTIONS
Follow-up with your spine specialist or PCP.  Medications as directed follow-up pharmacy precautions and warnings regarding any prescriptions.  Return for any new or worsening symptoms    Medications as directed for pain, inflammation, muscle relaxation; light massage and range of motion exercises and improve the discomfort; back exercises 2-3 times daily; no heavy lifting, repeated bending or stooping for 3-5 days, gradually increase activity as tolerated by pain; return for numbness to the inner thigh, genitals or rectum, loss of control of your bowels or bladder, inability to urinate or worsening pain or symptoms. Follow up with primary care physician if no improvement in 2-3 days

## 2022-06-25 NOTE — ED PROVIDER NOTES
Subjective   Chief complaint: Back pain      Context: Patient is a 89-year-old female who comes in with her daughter with complaints of low back pain.  She states she is status post pelvic fracture a couple months ago and went to rehab and was recently discharged home.  She states she is recently run out of her pain medication and noticed an increase in her low back pain.  She followed up with her specialist to set her up for an outpatient CT which she has not had done yet.  She denies any bowel or bladder incontinence saddle anesthesia or weakness.  She states the pain is worse and reproducible with movement.  Denies any numbness or tingling. Has been ambulatory with walker    Location: low back   Duration: chronic, worse since out of norco  Timing: waxes and wanes  Quality/Severity: moderate to severe  Modifying factors: worse with rom   Associated symptoms:        Additional hx provided by: daughter    PCP:                  Review of Systems   Constitutional: Negative for fever.   HENT: Negative.    Eyes: Negative for visual disturbance.   Respiratory: Negative for shortness of breath.    Cardiovascular: Negative for chest pain.   Gastrointestinal: Negative.    Genitourinary: Negative.    Musculoskeletal: Positive for back pain.   Skin: Negative.    Allergic/Immunologic: Negative for immunocompromised state.   Neurological: Negative for syncope.   Hematological: Bruises/bleeds easily.   Psychiatric/Behavioral: Negative for confusion.       Past Medical History:   Diagnosis Date   • Amblyopia 10/31/2012   • Amblyopia, left 3/12/2020   • Arthritis    • Conjunctival hemorrhage 10/8/2014   • Coronary artery disease    • Deformity of left foot 8/18/2020    Added automatically from request for surgery [id number]   • Dermatochalasis of left upper eyelid 3/12/2020   • Dermatochalasis of right upper eyelid 3/12/2020   • Drooping eyelid, bilateral    • Ectropion 3/12/2020   • Epiretinal membrane (ERM) of right eye 3/12/2020    • Frequent UTI    • GERD (gastroesophageal reflux disease)    • Hallux rigidus, left foot 8/18/2020    Added automatically from request for surgery [id number]   • Hearing loss     bilateral hearing aides   • 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 sure which 1  contracted at age 8   • History of transfusion    • [place] (hard of hearing)    • Hyperlipidemia    • Hypertension    • Past heart attack     X2 MILD LAST ONE OCT 2019   • PONV (postoperative nausea and vomiting)    • Presence of intraocular lens 3/12/2020       Allergies   Allergen Reactions   • Amoxicillin Itching   • Codeine Nausea And Vomiting and Dizziness   • Lortab [Hydrocodone-Acetaminophen] Nausea And Vomiting   • Nitrofurantoin Other (See Comments)   • Sulfa Antibiotics Rash       Past Surgical History:   Procedure Laterality Date   • ANTERIOR AND POSTERIOR VAGINAL REPAIR     • BACK SURGERY     • BLEPHAROPLASTY Bilateral 5/23/2019    Procedure: bilateral upper lid blepharoplasty;  Surgeon: Mauricio Pennington MD;  Location: [facility] OR [facility];  Service: Ophthalmology   • BROW LIFT Bilateral 2/13/2020    Procedure: BILATERAL TEMPORAL DIRECT BROWLIFT;  Surgeon: Sreekanth Bird MD;  Location: [facility] OR [facility];  Service: Ophthalmology;  Laterality: Bilateral;   • CARDIAC CATHETERIZATION N/A 12/3/2019    Procedure: Left Heart Cath;  Surgeon: Puma Briseno MD;  Location: [facility] CATH INVASIVE LOCATION;  Service: Cardiovascular   • CARDIAC CATHETERIZATION N/A 12/3/2019    Procedure: Coronary angiography;  Surgeon: Puma Briseno MD;  Location: [facility] CATH INVASIVE LOCATION;  Service: Cardiovascular   • CARDIAC CATHETERIZATION N/A 12/3/2019    Procedure: Left ventriculography;  Surgeon: Puma Briseno MD;  Location: [facility] CATH INVASIVE LOCATION;  Service: Cardiovascular   • CATARACT EXTRACTION EXTRACAPSULAR W/ INTRAOCULAR LENS IMPLANTATION Bilateral    • CERVICAL SPINE ANTERIOR      with instrumentation   • CHEILECTOMY Left 8/28/2020    Procedure: CHEILECTOMY;  Surgeon:  GAB Rees DPM;  Location: [facility] MAIN OR;  Service: Podiatry;  Laterality: Left;   • COLON SURGERY      for obstruction   • ALBERTO TUBE INSERTION Bilateral 2019    Procedure: ALBERTO TUBE;  Surgeon: Mauricio Pennington MD;  Location: [facility] OR [facility] [place];  Service: Ophthalmology   • ECTROPION REPAIR Bilateral 2019    Procedure: bilateral lower lid ectropion repair, bilateral punctoplasty;  Surgeon: Mauricio Pennington MD;  Location: [facility] OR [facility] [place] [facility];  Service: Ophthalmology   • ENDOSCOPY N/A 2022    Procedure: ESOPHAGOGASTRODUODENOSCOPY WITH DILATATION (BALLOON 15MM-18MM, UP TO 18MM) AND BOUGIE #48;  Surgeon: Wiley Parks MD;  Location: [facility] ENDOSCOPY;  Service: Gastroenterology;  Laterality: N/A;  Post: DYSPHAGIA   • EYE SURGERY     • FOOT FUSION Left 2016    Procedure: LEFT HALLUX METATARSALPHALANGEAL ARTHRODESIS SILVER BUNIONECTOMY METATARSAL HEAD RESECTION 2-5 CALCANEAL BONE GRAFT;  Surgeon: Monster Harper Jr., MD;  Location: [facility] MAIN OR;  Service:    • HYSTERECTOMY     • INGUINAL HERNIA REPAIR Bilateral    • METATARSAL OSTEOTOMY Left 2020    Procedure: Metatarsal head resection 5;  Surgeon: GAB Rees DPM;  Location: [facility] MAIN OR;  Service: Podiatry;  Laterality: Left;   • ROTATOR CUFF REPAIR Right    • SIGMOIDOSCOPY N/A 10/6/2021    Procedure: SIGMOIDOSCOPY WITH BIOPSY X1 AREA;  Surgeon: Uche Vaz MD;  Location: [facility] ENDOSCOPY;  Service: Gastroenterology;  Laterality: N/A;  ischemic colitis   • STOMACH SURGERY      for ulcer       Family History   Problem Relation Age of Onset   • No Known Problems Mother    • No Known Problems Father    • Malig Hyperthermia Neg Hx        Social History     Socioeconomic History   • Marital status:    Tobacco Use   • Smoking status: Former Smoker     Packs/day: 0.25     Years: 10.00     Pack years: 2.50     Types: Cigarettes     Quit date:      Years since quittin.5   • Smokeless tobacco: Never Used    Vaping Use   • Vaping Use: Never used   Substance and Sexual Activity   • Alcohol use: No   • Drug use: No   • Sexual activity: Defer           Objective   Physical Exam     Vital signs and traige nurse note reviewed.  Constitutional:  Awake, alert; well developed and well nourished.  No acute distress, nontoxic in appearance.  Daughter at bedside.  Thin  HEENT:  Normocephalic, atraumatic; pupils are PERRL with intact EOM; oropharynx is pink and moist without edema or erythema.  Neck: Supple, full range of motion without pain;   Cardiovascular: Regular rate and rhythm, normal S1-S2. .  Pulmonary: Respiratory effort regular, nonlabored; breath sounds CTA without wheezing or rhonchi.  Abdomen: Soft, nontender, nondistended with normoactive bowel sounds; no rebound or guarding.    Musculoskeletal: Independent range of motion of all extremities, no palpable tenderness or edema.   Back:  Spine is midline and without midline tenderness on palpation of cervical, thoracic; midline tenderness over the lumbar spine without any bony step-off or crepitus; paraspinal musculature is nontender and without soft tissue swelling, overlying ecchymosis or erythema. ROM elicits pain,  Distal muscle strength is 5/5.  Neuro: Alert oriented x3, speech is clear and appropriate.   negative Babinski BLE, negative straight leg raise BLE, strong and equal dorsiflexion of bilateral great toes L4, L5, S1 motor sensory intact.        Procedures           ED Course  ED Course as of 06/27/22 2030   Sat Jun 25, 2022   1327 Waiting on CTs to be resulted [JW]      ED Course User Index  [JW] Laure Hwang APRN      Labs Reviewed   PROTIME-INR - Abnormal; Notable for the following components:       Result Value    Protime 32.9 (*)     INR 3.42 (*)     All other components within normal limits     Medications - No data to display  No radiology results for the last day  Prior to Admission medications    Medication Sig Start Date End Date Taking?  "Authorizing Provider   cetirizine (zyrTEC) 10 MG tablet Take 1 tablet by mouth Daily As Needed for Allergies. 5/9/22   Yaa Miranda APRN   dilTIAZem XR (DILACOR XR) 120 MG 24 hr capsule Take 1 capsule by mouth Daily. 3/22/22   Gavin Quiroz MD   furosemide (LASIX) 20 MG tablet Take 1 tablet by mouth 2 (Two) Times a Day. 5/9/22   Yaa Miranda APRN   HYDROcodone-acetaminophen (NORCO) 5-325 MG per tablet Take 1 tablet by mouth Every 6 (Six) Hours As Needed for Moderate Pain . 6/25/22   Laure Hwang APRN   isosorbide mononitrate (IMDUR) 30 MG 24 hr tablet Take 1 tablet by mouth Daily. 3/22/22   Gavin Quiroz MD   metoprolol tartrate (LOPRESSOR) 25 MG tablet Take 0.5 tablets by mouth Every 12 (Twelve) Hours. 12/14/21   Gavin Quiroz MD   pantoprazole (PROTONIX) 40 MG EC tablet Take 1 tablet by mouth Every Morning. 10/21/21   Ashley Livingston MD   simvastatin (ZOCOR) 40 MG tablet Take 0.5 tablets by mouth Every Night. Patient needs a lipid panel before any more refills 4/25/22   Gavin Quiroz MD   warfarin (COUMADIN) 2 MG tablet Take 1 tablet by mouth Every Night. Indications: history of DVT/PE 5/9/22   Ashley Livingston MD   Warfarin Sodium (PHARMACY TO DOSE WARFARIN) Continuous As Needed (continue lovenox bridge until INR between 2-3 for afib). Indications: Atrial Fibrillation 5/9/22   Yaa Miranda APRN                                          MDM  Number of Diagnoses or Management Options  Closed compression fracture of L5 vertebra, initial encounter ([facility])  Closed nondisplaced fracture of pelvis with routine healing, unspecified part of pelvis, subsequent encounter  Diagnosis management comments: /61   Pulse 64   Temp 98 °F (36.7 °C) (Oral)   Resp 18   Ht 152.4 cm (60\")   Wt 55.2 kg (121 lb 11.1 oz)   SpO2 99%   BMI 23.77 kg/m²      Chart review: 5/5-5/9/22: • **Fall, initial encounter (W19.XXXA)   Yes  • Pelvic fracture (HCC) (S32.9XXA)   Yes  • PAF " (paroxysmal atrial fibrillation) (HCC) (I48.0)   Yes  • Mixed hyperlipidemia (E78.2)   Yes  • Dyslipidemia (E78.5)   Yes  • Coronary artery disease involving native heart with unstable angina pectoris (HCC) (I25.110)   Yes  • Hypertension (I10)       Comorbidities:  has a past medical history of Amblyopia (10/31/2012), Amblyopia, left (3/12/2020), Arthritis, Conjunctival hemorrhage (10/8/2014), Coronary artery disease, Deformity of left foot (8/18/2020), Dermatochalasis of left upper eyelid (3/12/2020), Dermatochalasis of right upper eyelid (3/12/2020), Drooping eyelid, bilateral, Ectropion (3/12/2020), Epiretinal membrane (ERM) of right eye (3/12/2020), Frequent UTI, GERD (gastroesophageal reflux disease), Hallux rigidus, left foot (8/18/2020), Hearing loss, History of hepatitis, History of transfusion, [place] (hard of hearing), Hyperlipidemia, Hypertension, Past heart attack, PONV (postoperative nausea and vomiting), and Presence of intraocular lens (3/12/2020).  Differentials:  Fracture strain  not all inclusive of differentials considered  Radiology interpretation: CTs viewed by me and interpreted by radiology, healing pelvic fractures; questionable new lumbar   Lab interpretation:  Labs viewed by me significant for, INR 3.4    Appropriate PPE worn during exam.    i discussed findings with patient who voices understanding of discharge instructions, signs and symptoms requiring return to ED; discharged improved and in stable condition with follow up for re-evaluation.  This document is intended for medical expert use only. Reading of this document by patients and/or patient's family without participating medical staff guidance may result in misinterpretation and unintended morbidity.  Any interpretation of such data is the responsibility of the patient and/or family member responsible for the patient in concert with their primary or specialist providers, not to be left for sources of online searches such as [employer],  E96 or similar queries. Relying on these approaches to knowledge may result in misinterpretation, misguided goals of care and even death should patients or family members try recommendations outside of the realm of professional medical care in a supervised inpatient environment.         Final diagnoses:   Closed nondisplaced fracture of pelvis with routine healing, unspecified part of pelvis, subsequent encounter   Closed compression fracture of L5 vertebra, initial encounter ([facility])   Low back pain, unspecified back pain laterality, unspecified chronicity, unspecified whether sciatica present       ED Disposition  ED Disposition     ED Disposition   Discharge    Condition   Stable    Comment   --             Monster Myrick MD  [address] [facility] [place] [address] [phone]      or your spine specialist         Medication List      New Prescriptions    HYDROcodone-acetaminophen 5-325 MG per tablet  Commonly known as: NORCO  Take 1 tablet by mouth Every 6 (Six) Hours As Needed for Moderate Pain .           Where to Get Your Medications      These medications were sent to [facility] [address] [facility] [place] - [address] - [phone]  - [phone] FX  [address] [place] [address]    Phone: [phone]   · HYDROcodone-acetaminophen 5-325 MG per tablet          Laure Hwang, APRN  06/27/22 2030

## 2022-07-06 ENCOUNTER — HOSPITAL ENCOUNTER (OUTPATIENT)
Dept: CARDIOLOGY | Facility: HOSPITAL | Age: 87
Discharge: HOME | End: 2022-07-06

## 2022-07-06 ENCOUNTER — LAB (OUTPATIENT)
Dept: LAB | Facility: HOSPITAL | Age: 87
End: 2022-07-06

## 2022-07-06 ENCOUNTER — TRANSCRIBE ORDERS (OUTPATIENT)
Dept: ADMINISTRATIVE | Facility: HOSPITAL | Age: 87
End: 2022-07-06

## 2022-07-06 ENCOUNTER — TELEPHONE (OUTPATIENT)
Dept: CARDIOLOGY | Facility: CLINIC | Age: 87
End: 2022-07-06

## 2022-07-06 DIAGNOSIS — Z01.818 PRE-OP TESTING: ICD-10-CM

## 2022-07-06 DIAGNOSIS — Z01.818 PRE-OP TESTING: Primary | ICD-10-CM

## 2022-07-06 LAB
ANION GAP SERPL CALCULATED.3IONS-SCNC: 13 MMOL/L (ref 5–15)
BASOPHILS # BLD AUTO: 0.03 10*3/MM3 (ref 0–0.2)
BASOPHILS NFR BLD AUTO: 0.5 % (ref 0–1.5)
BUN SERPL-MCNC: 19 MG/DL (ref 8–23)
BUN/CREAT SERPL: 25.7 (ref 7–25)
CALCIUM SPEC-SCNC: 9.1 MG/DL (ref 8.2–9.6)
CHLORIDE SERPL-SCNC: 104 MMOL/L (ref 98–107)
CO2 SERPL-SCNC: 24 MMOL/L (ref 22–29)
CREAT SERPL-MCNC: 0.74 MG/DL (ref 0.57–1)
DEPRECATED RDW RBC AUTO: 43.7 FL (ref 37–54)
EGFRCR SERPLBLD CKD-EPI 2021: 73.7 ML/MIN/1.73
EOSINOPHIL # BLD AUTO: 0.18 10*3/MM3 (ref 0–0.4)
EOSINOPHIL NFR BLD AUTO: 2.9 % (ref 0.3–6.2)
ERYTHROCYTE [DISTWIDTH] IN BLOOD BY AUTOMATED COUNT: 13.6 % (ref 12.3–15.4)
GLUCOSE SERPL-MCNC: 93 MG/DL (ref 65–99)
HCT VFR BLD AUTO: 36.9 % (ref 34–46.6)
HGB BLD-MCNC: 11.4 G/DL (ref 12–15.9)
IMM GRANULOCYTES # BLD AUTO: 0.01 10*3/MM3 (ref 0–0.05)
IMM GRANULOCYTES NFR BLD AUTO: 0.2 % (ref 0–0.5)
LYMPHOCYTES # BLD AUTO: 2.16 10*3/MM3 (ref 0.7–3.1)
LYMPHOCYTES NFR BLD AUTO: 35.3 % (ref 19.6–45.3)
MCH RBC QN AUTO: 27.4 PG (ref 26.6–33)
MCHC RBC AUTO-ENTMCNC: 30.9 G/DL (ref 31.5–35.7)
MCV RBC AUTO: 88.7 FL (ref 79–97)
MONOCYTES # BLD AUTO: 0.63 10*3/MM3 (ref 0.1–0.9)
MONOCYTES NFR BLD AUTO: 10.3 % (ref 5–12)
NEUTROPHILS NFR BLD AUTO: 3.11 10*3/MM3 (ref 1.7–7)
NEUTROPHILS NFR BLD AUTO: 50.8 % (ref 42.7–76)
NRBC BLD AUTO-RTO: 0 /100 WBC (ref 0–0.2)
PLATELET # BLD AUTO: 289 10*3/MM3 (ref 140–450)
PMV BLD AUTO: 9.2 FL (ref 6–12)
POTASSIUM SERPL-SCNC: 4.4 MMOL/L (ref 3.5–5.2)
RBC # BLD AUTO: 4.16 10*6/MM3 (ref 3.77–5.28)
SODIUM SERPL-SCNC: 141 MMOL/L (ref 136–145)
WBC NRBC COR # BLD: 6.12 10*3/MM3 (ref 3.4–10.8)

## 2022-07-06 PROCEDURE — 36415 COLL VENOUS BLD VENIPUNCTURE: CPT

## 2022-07-06 PROCEDURE — 80048 BASIC METABOLIC PNL TOTAL CA: CPT

## 2022-07-06 PROCEDURE — 85025 COMPLETE CBC W/AUTO DIFF WBC: CPT

## 2022-07-06 PROCEDURE — 93005 ELECTROCARDIOGRAM TRACING: CPT | Performed by: ORTHOPAEDIC SURGERY

## 2022-07-06 PROCEDURE — 93010 ELECTROCARDIOGRAM REPORT: CPT | Performed by: INTERNAL MEDICINE

## 2022-07-06 NOTE — TELEPHONE ENCOUNTER
[facility] received a fax that requires your attention. The document has been indexed to the patient’s chart for your review.      Reason for sending: CARDIAC CLEARANCE REQUEST    Documents Description: EXT MED RECS    Name of Sender: HANNAH CID     Date Indexed: 07.06.22

## 2022-07-07 NOTE — PROGRESS NOTES
Cardiology Office Follow Up Visit      Primary Care Provider:  Monster Myrick MD    Reason for f/u:     3 month follow up      Subjective     CC:    3 month follow up, back pain    History of Present Illness       Carrie Golden is a 89 y.o. female who is a patient of Dr. Quiroz. Pmh includes paroxysmal atrial fibrillation during hospital a stay with respiratory illness, DVT RLE (on coumadin), CAD with h/o NSTEMI s/p LH 2019 which showed non obstructive CAD with diagonal of LAD with 60-70% ostial stenosis which was calcified and unchanged since prior cath in 2017, HTN, HLD, preserved LVEF.     Ms. Golden follows up for routine 3 month follow up. She denies chest pain or SOA. She does complain of significant back pain/pelvis pain and is supposed to follow up with ortho/spine. She suffered a pelvic fracture a couple of months ago and was at rehab then discharged home.  She is sinus bradycardia today.     ASSESSMENT/PLAN:      Diagnoses and all orders for this visit:    1. PAF (paroxysmal atrial fibrillation) ([facility]) (Primary)  Comments:  CHADS Vasc at least 3- on coumadin  on diltiazem 120, metoprolol 25 BID    2. Coronary artery disease involving native coronary artery of native heart with unstable angina pectoris ([facility])  Comments:  [facility] 2019: non obst dz. D1 of LAD showed 60 to 70% ostial stenosis which is calcified  ECHO 10/21 LVEF 60%, grade 2 DD  imdur, BB, statin    3. Hypertension, unspecified type  Comments:  controlled on diltiazem, lasix 20, imdur    4. Dyslipidemia  Comments:  statin therapy    5. Acute deep vein thrombosis (DVT) of distal vein of right lower extremity ([facility])  Comments:  on coumadin      MEDICAL DECISION MAKING:  Ms. Golden presents today for routine 3 month follow up. She is in sinus rhythm today. She is without angina or shortness of breath. She is taking her coumadin for anticoagulation. She has diastolic dysfunction but her volume status is stable today. She needs to follow up with ortho  regarding prior pelvic fracture.  She is stable from cardiac standpoint.  I have made no medication changes today.      She should follow up in 6 months or sooner should any new issues arise          Past Medical History:   Diagnosis Date   • Amblyopia 10/31/2012   • Amblyopia, left 3/12/2020   • Arthritis    • Conjunctival hemorrhage 10/8/2014   • Coronary artery disease    • Deformity of left foot 8/18/2020    Added automatically from request for surgery [id number]   • Dermatochalasis of left upper eyelid 3/12/2020   • Dermatochalasis of right upper eyelid 3/12/2020   • Drooping eyelid, bilateral    • Ectropion 3/12/2020   • Epiretinal membrane (ERM) of right eye 3/12/2020   • Frequent UTI    • GERD (gastroesophageal reflux disease)    • Hallux rigidus, left foot 8/18/2020    Added automatically from request for surgery [id number]   • Hearing loss     bilateral hearing aides   • 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 sure which 1  contracted at age 8   • History of transfusion    • [place] (hard of hearing)    • Hyperlipidemia    • Hypertension    • Past heart attack     X2 MILD LAST ONE OCT 2019   • PONV (postoperative nausea and vomiting)    • Presence of intraocular lens 3/12/2020       Past Surgical History:   Procedure Laterality Date   • ANTERIOR AND POSTERIOR VAGINAL REPAIR     • BACK SURGERY     • BLEPHAROPLASTY Bilateral 5/23/2019    Procedure: bilateral upper lid blepharoplasty;  Surgeon: Mauricio Pennington MD;  [place] [facility];  Service: Ophthalmology   • BROW LIFT Bilateral 2/13/2020    Procedure: BILATERAL TEMPORAL DIRECT BROWLIFT;  Surgeon: Sreekanth Bird MD;  Location: [facility] OR [facility];  Service: Ophthalmology;  Laterality: Bilateral;   • CARDIAC CATHETERIZATION N/A 12/3/2019    Procedure: Left Heart Cath;  Surgeon: Puma Briseno MD;  Location: [facility] CATH INVASIVE LOCATION;  Service: Cardiovascular   • CARDIAC CATHETERIZATION N/A 12/3/2019    Procedure: Coronary angiography;  Surgeon: Puma Briseno MD;   Location: [facility] CATH INVASIVE LOCATION;  Service: Cardiovascular   • CARDIAC CATHETERIZATION N/A 12/3/2019    Procedure: Left ventriculography;  Surgeon: Puma Briseno MD;  Location: [facility] CATH INVASIVE LOCATION;  Service: Cardiovascular   • CATARACT EXTRACTION EXTRACAPSULAR W/ INTRAOCULAR LENS IMPLANTATION Bilateral    • CERVICAL SPINE ANTERIOR      with instrumentation   • CHEILECTOMY Left 8/28/2020    Procedure: CHEILECTOMY;  Surgeon: GAB Rees DPM;  Location: [facility] MAIN OR;  Service: Podiatry;  Laterality: Left;   • COLON SURGERY      for obstruction   • ALBERTO TUBE INSERTION Bilateral 5/23/2019    Procedure: ALBERTO TUBE;  Surgeon: Mauricio Pennington MD;  [facility];  Service: Ophthalmology   • ECTROPION REPAIR Bilateral 5/23/2019    Procedure: bilateral lower lid ectropion repair, bilateral punctoplasty;  Surgeon: Mauricio Pennington MD;  [facility];  Service: Ophthalmology   • ENDOSCOPY N/A 2/18/2022    Procedure: ESOPHAGOGASTRODUODENOSCOPY WITH DILATATION (BALLOON 15MM-18MM, UP TO 18MM) AND BOUGIE #48;  Surgeon: Wiley Parks MD;  Location: [facility] ENDOSCOPY;  Service: Gastroenterology;  Laterality: N/A;  Post: DYSPHAGIA   • EYE SURGERY     • FOOT FUSION Left 12/30/2016    Procedure: LEFT HALLUX METATARSALPHALANGEAL ARTHRODESIS SILVER BUNIONECTOMY METATARSAL HEAD RESECTION 2-5 CALCANEAL BONE GRAFT;  Surgeon: Monster Harper Jr., MD;  Location: [facility] MAIN OR;  Service:    • HYSTERECTOMY     • INGUINAL HERNIA REPAIR Bilateral    • METATARSAL OSTEOTOMY Left 8/28/2020    Procedure: Metatarsal head resection 5;  Surgeon: GAB Rees DPM;  Location: [facility] MAIN OR;  Service: Podiatry;  Laterality: Left;   • ROTATOR CUFF REPAIR Right    • SIGMOIDOSCOPY N/A 10/6/2021    Procedure: SIGMOIDOSCOPY WITH BIOPSY X1 AREA;  Surgeon: Uche Vaz MD;  Location: [facility] ENDOSCOPY;  Service: Gastroenterology;  Laterality: N/A;  ischemic colitis   • STOMACH SURGERY      for  ulcer         Current Outpatient Medications:   •  dilTIAZem XR (DILACOR XR) 120 MG 24 hr capsule, Take 1 capsule by mouth Daily., Disp: 90 capsule, Rfl: 1  •  furosemide (LASIX) 20 MG tablet, Take 1 tablet by mouth 2 (Two) Times a Day., Disp: 180 tablet, Rfl: 1  •  isosorbide mononitrate (IMDUR) 30 MG 24 hr tablet, Take 1 tablet by mouth Daily., Disp: 90 tablet, Rfl: 1  •  metoprolol tartrate (LOPRESSOR) 25 MG tablet, Take 0.5 tablets by mouth Every 12 (Twelve) Hours., Disp: 30 tablet, Rfl: 1  •  warfarin (COUMADIN) 2 MG tablet, Take 1 tablet by mouth Every Night. Indications: history of DVT/PE, Disp: , Rfl:   •  Warfarin Sodium (PHARMACY TO DOSE WARFARIN), Continuous As Needed (continue lovenox bridge until INR between 2-3 for afib). Indications: Atrial Fibrillation, Disp: , Rfl:   •  cetirizine (zyrTEC) 10 MG tablet, Take 1 tablet by mouth Daily As Needed for Allergies., Disp: , Rfl:   •  HYDROcodone-acetaminophen (NORCO) 5-325 MG per tablet, Take 1 tablet by mouth Every 6 (Six) Hours As Needed for Moderate Pain ., Disp: 12 tablet, Rfl: 0  •  pantoprazole (PROTONIX) 40 MG EC tablet, Take 1 tablet by mouth Every Morning., Disp: 30 tablet, Rfl: 1  •  simvastatin (ZOCOR) 40 MG tablet, Take 0.5 tablets by mouth Every Night. Patient needs a lipid panel before any more refills, Disp: 45 tablet, Rfl: 0    Social History     Socioeconomic History   • Marital status:    Tobacco Use   • Smoking status: Former Smoker     Packs/day: 0.25     Years: 10.00     Pack years: 2.50     Types: Cigarettes     Quit date:      Years since quittin.5   • Smokeless tobacco: Never Used   Vaping Use   • Vaping Use: Never used   Substance and Sexual Activity   • Alcohol use: No   • Drug use: No   • Sexual activity: Defer       Family History   Problem Relation Age of Onset   • No Known Problems Mother    • No Known Problems Father    • Malig Hyperthermia Neg Hx        The following portions of the patient's history were  "reviewed and updated as appropriate: allergies, current medications, past family history, past medical history, past social history, past surgical history and problem list.    Review of Systems   Constitutional: Negative for chills, diaphoresis and malaise/fatigue.   Cardiovascular: Negative for chest pain, dyspnea on exertion, irregular heartbeat, leg swelling, near-syncope, orthopnea, palpitations, paroxysmal nocturnal dyspnea and syncope.   Respiratory: Negative for cough, shortness of breath, sleep disturbances due to breathing and sputum production.    Musculoskeletal: Positive for arthritis, back pain, joint pain, myalgias and stiffness.   Gastrointestinal: Negative for change in bowel habit.   Genitourinary: Negative for urgency.   Neurological: Negative for dizziness and headaches.   Psychiatric/Behavioral: Negative for altered mental status.     /64   Pulse 52   Ht 157.5 cm (62.01\")   Wt 54.4 kg (120 lb)   SpO2 98%   BMI 21.94 kg/m² .  Objective     Constitutional:       Appearance: Not in distress.   Neck:      Vascular: JVD normal.   Pulmonary:      Effort: Pulmonary effort is normal.      Breath sounds: Normal breath sounds.   Cardiovascular:      Normal rate. Regular rhythm.   Pulses:     Intact distal pulses.   Edema:     Peripheral edema absent.   Abdominal:      General: Bowel sounds are normal.      Palpations: Abdomen is soft.   Musculoskeletal: Normal range of motion. Skin:     General: Skin is warm and dry.   Neurological:      General: No focal deficit present.      Mental Status: Oriented to person, place and time.             ECG 12 Lead    Date/Time: 6/23/2022 11:23 AM  Performed by: Balbina Parker APRN  Authorized by: Balbina Parker APRN   Comparison: not compared with previous ECG   Rhythm: sinus bradycardia  Rate: bradycardic  BPM: 52  Q waves: V2 and V1                EKG ordered by and reviewed by me in office                        "

## 2022-07-08 LAB — QT INTERVAL: 451 MS

## 2022-10-18 RX ORDER — DILTIAZEM HYDROCHLORIDE 120 MG/1
CAPSULE, EXTENDED RELEASE ORAL
Qty: 90 CAPSULE | Refills: 0 | Status: SHIPPED | OUTPATIENT
Start: 2022-10-18 | End: 2023-01-16

## 2022-10-21 PROCEDURE — 87086 URINE CULTURE/COLONY COUNT: CPT | Performed by: FAMILY MEDICINE

## 2022-10-21 PROCEDURE — 87186 SC STD MICRODIL/AGAR DIL: CPT | Performed by: FAMILY MEDICINE

## 2022-10-21 PROCEDURE — 87088 URINE BACTERIA CULTURE: CPT | Performed by: FAMILY MEDICINE

## 2022-10-31 RX ORDER — ISOSORBIDE MONONITRATE 30 MG/1
TABLET, EXTENDED RELEASE ORAL
Qty: 90 TABLET | Refills: 0 | Status: SHIPPED | OUTPATIENT
Start: 2022-10-31 | End: 2023-01-25

## 2022-11-10 ENCOUNTER — OFFICE VISIT (OUTPATIENT)
Dept: CARDIOLOGY | Facility: CLINIC | Age: 87
End: 2022-11-10

## 2022-11-10 VITALS
HEIGHT: 60 IN | OXYGEN SATURATION: 97 % | WEIGHT: 119 LBS | BODY MASS INDEX: 23.36 KG/M2 | RESPIRATION RATE: 18 BRPM | DIASTOLIC BLOOD PRESSURE: 64 MMHG | SYSTOLIC BLOOD PRESSURE: 119 MMHG | HEART RATE: 59 BPM

## 2022-11-10 DIAGNOSIS — I82.4Z1 ACUTE DEEP VEIN THROMBOSIS (DVT) OF DISTAL VEIN OF RIGHT LOWER EXTREMITY: ICD-10-CM

## 2022-11-10 DIAGNOSIS — I10 HYPERTENSION, UNSPECIFIED TYPE: ICD-10-CM

## 2022-11-10 DIAGNOSIS — I25.10 CORONARY ARTERY DISEASE INVOLVING NATIVE CORONARY ARTERY OF NATIVE HEART WITHOUT ANGINA PECTORIS: ICD-10-CM

## 2022-11-10 DIAGNOSIS — Z01.810 PRE-OPERATIVE CARDIOVASCULAR EXAMINATION: Primary | ICD-10-CM

## 2022-11-10 DIAGNOSIS — I48.0 PAF (PAROXYSMAL ATRIAL FIBRILLATION): ICD-10-CM

## 2022-11-10 DIAGNOSIS — E78.5 DYSLIPIDEMIA: ICD-10-CM

## 2022-11-10 DIAGNOSIS — I27.82 CHRONIC PULMONARY EMBOLISM, UNSPECIFIED PULMONARY EMBOLISM TYPE, UNSPECIFIED WHETHER ACUTE COR PULMONALE PRESENT: ICD-10-CM

## 2022-11-10 PROBLEM — I25.110 CORONARY ARTERY DISEASE INVOLVING NATIVE HEART WITH UNSTABLE ANGINA PECTORIS (HCC): Status: RESOLVED | Noted: 2019-12-01 | Resolved: 2022-11-10

## 2022-11-10 PROCEDURE — 99214 OFFICE O/P EST MOD 30 MIN: CPT | Performed by: NURSE PRACTITIONER

## 2022-11-10 PROCEDURE — 93000 ELECTROCARDIOGRAM COMPLETE: CPT | Performed by: NURSE PRACTITIONER

## 2022-11-10 NOTE — ASSESSMENT & PLAN NOTE
[facility] 2019: non obst dz. D1 of LAD showed 60 to 70% ostial stenosis which is calcified  ECHO 10/21 LVEF 60%, grade 2 DD  imdur, BB, statin

## 2022-11-10 NOTE — PROGRESS NOTES
Cardiology Office Follow Up Visit      Primary Care Provider:  Monster Myrick MD    Reason for f/u:     Per operative cardiac risk assessment    Subjective     CC:    Pre operative cardiac risk assessment    History of Present Illness     Carrie Golden is a 89 y.o. female who is a patient of Dr. Quiroz. Pmh includes paroxysmal atrial fibrillation during hospital a stay with respiratory illness, DVT RLE and PE diagnosed 10/2021 (on coumadin), CAD with h/o NSTEMI s/p [facility] 2019 which showed non obstructive CAD with diagonal of LAD with 60-70% ostial stenosis which was calcified and unchanged since prior cath in 2017, HTN, HLD, preserved LVEF.      Ms. Golden follows up today for pre operative risk assessment prior to occular surgery. She is free from angina. She denies chest pain or shortness of breath. She has pafib but is in SR today. She is on warfarin managed through coumadin clinic. She has h/o PE and DVT and will need lovenox as a bridge while holding coumadin.         ASSESSMENT/PLAN:      Diagnoses and all orders for this visit:    1. Pre-operative cardiovascular examination (Primary)  Comments:  for occular surgery    2. Coronary artery disease involving native coronary artery of native heart without angina pectoris  Assessment & Plan:  [facility] 2019: non obst dz. D1 of LAD showed 60 to 70% ostial stenosis which is calcified  ECHO 10/21 LVEF 60%, grade 2 DD  imdur, BB, statin      3. PAF (paroxysmal atrial fibrillation) ([facility])  Comments:  CHADS Vasc at least 3- on coumadin  on diltiazem 120, metoprolol 25 BID    4. Dyslipidemia    5. Hypertension, unspecified type    6. Chronic pulmonary embolism, unspecified pulmonary embolism type, unspecified whether acute cor pulmonale present ([facility])  Comments:  10/2021; on coumadin    7. Acute deep vein thrombosis (DVT) of distal vein of right lower extremity ([facility])  Comments:  10/2021; on coumadin followed by coumadin clinic      MEDICAL DECISION MAKING:  Ms. Golden presents for  per operative risk assessment prior to occular surgery. She is free from angina. She is in SR. She is on a/c for h/o DVT/PE and pAfib. She should bridge with lovenox while holding coumadin prior surgery. She is at an acceptable risk for a non vascular/ non cardiac surgery. We will see her back at her next scheduled appointment. I will be in touch with anticoagulation clinic prior to surgery. Otherwise, she should continue current medication regimen.           Past Medical History:   Diagnosis Date   • Amblyopia 10/31/2012   • Amblyopia, left 3/12/2020   • Arthritis    • Conjunctival hemorrhage 10/8/2014   • Coronary artery disease    • Deformity of left foot 8/18/2020    Added automatically from request for surgery [id number]   • Dermatochalasis of left upper eyelid 3/12/2020   • Dermatochalasis of right upper eyelid 3/12/2020   • Drooping eyelid, bilateral    • Ectropion 3/12/2020   • Epiretinal membrane (ERM) of right eye 3/12/2020   • Frequent UTI    • GERD (gastroesophageal reflux disease)    • Hallux rigidus, left foot 8/18/2020    Added automatically from request for surgery [id number]   • Hearing loss     bilateral hearing aides   • 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 sure which 1  contracted at age 8   • History of transfusion    • [place] (hard of hearing)    • Hyperlipidemia    • Hypertension    • Past heart attack     X2 MILD LAST ONE OCT 2019   • PONV (postoperative nausea and vomiting)    • Presence of intraocular lens 3/12/2020       Past Surgical History:   Procedure Laterality Date   • ANTERIOR AND POSTERIOR VAGINAL REPAIR     • BACK SURGERY     • BLEPHAROPLASTY Bilateral 5/23/2019    Procedure: bilateral upper lid blepharoplasty;  Surgeon: Mauricio Pennington MD;  Location: [facility] OR [facility];  Service: Ophthalmology   • BROW LIFT Bilateral 2/13/2020    Procedure: BILATERAL TEMPORAL DIRECT BROWLIFT;  Surgeon: Sreekanth Bird MD;  Location: [facility] OR [facility];  Service: Ophthalmology;  Laterality: Bilateral;   •  CARDIAC CATHETERIZATION N/A 12/3/2019    Procedure: Left Heart Cath;  Surgeon: Puma Briseno MD;  Location: [facility] CATH INVASIVE LOCATION;  Service: Cardiovascular   • CARDIAC CATHETERIZATION N/A 12/3/2019    Procedure: Coronary angiography;  Surgeon: Puma Briseno MD;  Location: [facility] CATH INVASIVE LOCATION;  Service: Cardiovascular   • CARDIAC CATHETERIZATION N/A 12/3/2019    Procedure: Left ventriculography;  Surgeon: Puma Briseno MD;  Location: [facility] CATH INVASIVE LOCATION;  Service: Cardiovascular   • CATARACT EXTRACTION EXTRACAPSULAR W/ INTRAOCULAR LENS IMPLANTATION Bilateral    • CERVICAL SPINE ANTERIOR      with instrumentation   • CHEILECTOMY Left 8/28/2020    Procedure: CHEILECTOMY;  Surgeon: GAB Rees DPM;  Location: [facility] MAIN OR;  Service: Podiatry;  Laterality: Left;   • COLON SURGERY      for obstruction   • ALBERTO TUBE INSERTION Bilateral 5/23/2019    Procedure: ALBERTO TUBE;  Surgeon: Mauricio Pennington MD;  [place] [facility] [place] [facility] [place]  Service: Ophthalmology   • ECTROPION REPAIR Bilateral 5/23/2019    Procedure: bilateral lower lid ectropion repair, bilateral punctoplasty;  Surgeon: Mauricio Pennington MD;  [place] [facility] [place] [facility] [place]  Service: Ophthalmology   • ENDOSCOPY N/A 2/18/2022    Procedure: ESOPHAGOGASTRODUODENOSCOPY WITH DILATATION (BALLOON 15MM-18MM, UP TO 18MM) AND BOUGIE #[id number];  Surgeon: Wiley Parks MD;  Location: [facility] ENDOSCOPY;  Service: Gastroenterology;  Laterality: N/A;  Post: DYSPHAGIA   • EYE SURGERY     • FOOT FUSION Left 12/30/2016    Procedure: LEFT HALLUX METATARSALPHALANGEAL ARTHRODESIS SILVER BUNIONECTOMY METATARSAL HEAD RESECTION 2-5 CALCANEAL BONE GRAFT;  Surgeon: Monster Harper Jr., MD;  Location: [facility] MAIN OR;  Service:    • HYSTERECTOMY     • INGUINAL HERNIA REPAIR Bilateral    • METATARSAL OSTEOTOMY Left 8/28/2020    Procedure: Metatarsal head resection 5;  Surgeon: GAB Rees DPM;  Location: [facility] MAIN OR;  Service: Podiatry;  Laterality:  Left;   • ROTATOR CUFF REPAIR Right    • SIGMOIDOSCOPY N/A 10/6/2021    Procedure: SIGMOIDOSCOPY WITH BIOPSY X1 AREA;  Surgeon: Uche Vaz MD;  Location: [facility] ENDOSCOPY;  Service: Gastroenterology;  Laterality: N/A;  ischemic colitis   • STOMACH SURGERY      for ulcer         Current Outpatient Medications:   •  warfarin (COUMADIN) 2 MG tablet, Take 1 tablet by mouth Every Night. Indications: history of DVT/PE (Patient taking differently: Take 1 tablet by mouth Every Night. Mon wed fri pt takes 3mg  tues and thurs taking 2mg  Indications: history of DVT/PE), Disp: , Rfl:   •  cetirizine (zyrTEC) 10 MG tablet, Take 1 tablet by mouth Daily As Needed for Allergies., Disp: , Rfl:   •  Dilt- MG 24 hr capsule, Take 1 capsule by mouth once daily, Disp: 90 capsule, Rfl: 0  •  furosemide (LASIX) 20 MG tablet, Take 1 tablet by mouth 2 (Two) Times a Day., Disp: 180 tablet, Rfl: 1  •  HYDROcodone-acetaminophen (NORCO) 5-325 MG per tablet, Take 1 tablet by mouth Every 6 (Six) Hours As Needed for Moderate Pain ., Disp: 12 tablet, Rfl: 0  •  isosorbide mononitrate (IMDUR) 30 MG 24 hr tablet, Take 1 tablet by mouth once daily, Disp: 90 tablet, Rfl: 0  •  metoprolol tartrate (LOPRESSOR) 25 MG tablet, Take 0.5 tablets by mouth Every 12 (Twelve) Hours., Disp: 30 tablet, Rfl: 1  •  pantoprazole (PROTONIX) 40 MG EC tablet, Take 1 tablet by mouth Every Morning., Disp: 30 tablet, Rfl: 1  •  simvastatin (ZOCOR) 40 MG tablet, Take 0.5 tablets by mouth Every Night. Patient needs a lipid panel before any more refills, Disp: 45 tablet, Rfl: 0  •  Warfarin Sodium (PHARMACY TO DOSE WARFARIN), Continuous As Needed (continue lovenox bridge until INR between 2-3 for afib). Indications: Atrial Fibrillation, Disp: , Rfl:     Social History     Socioeconomic History   • Marital status:    Tobacco Use   • Smoking status: Former     Packs/day: 0.25     Years: 10.00     Pack years: 2.50     Types: Cigarettes     Quit date: 1990      "Years since quittin.8   • Smokeless tobacco: Never   Vaping Use   • Vaping Use: Never used   Substance and Sexual Activity   • Alcohol use: No   • Drug use: No   • Sexual activity: Defer       Family History   Problem Relation Age of Onset   • No Known Problems Mother    • No Known Problems Father    • Malig Hyperthermia Neg Hx        The following portions of the patient's history were reviewed and updated as appropriate: allergies, current medications, past family history, past medical history, past social history, past surgical history and problem list.    Review of Systems   Constitutional: Negative for chills, diaphoresis and malaise/fatigue.   Cardiovascular: Negative for chest pain, dyspnea on exertion, irregular heartbeat, leg swelling, near-syncope, orthopnea, palpitations, paroxysmal nocturnal dyspnea and syncope.   Respiratory: Negative for cough, shortness of breath, sleep disturbances due to breathing and sputum production.    Gastrointestinal: Negative for change in bowel habit.   Genitourinary: Negative for urgency.   Neurological: Negative for dizziness and headaches.   Psychiatric/Behavioral: Negative for altered mental status.     /64 (BP Location: Left arm, Patient Position: Sitting, Cuff Size: Large Adult)   Pulse 59   Resp 18   Ht 152.4 cm (60\")   Wt 54 kg (119 lb)   SpO2 97%   BMI 23.24 kg/m² .  Objective     Constitutional:       Appearance: Not in distress.   Neck:      Vascular: JVD normal.   Pulmonary:      Effort: Pulmonary effort is normal.      Breath sounds: Normal breath sounds.   Cardiovascular:      Normal rate. Regular rhythm.   Pulses:     Intact distal pulses.   Edema:     Peripheral edema absent.   Abdominal:      General: Bowel sounds are normal.      Palpations: Abdomen is soft.   Musculoskeletal: Normal range of motion. Skin:     General: Skin is warm and dry.   Neurological:      General: No focal deficit present.      Mental Status: Oriented to person, place " and time.             ECG 12 Lead    Date/Time: 11/10/2022 10:04 AM  Performed by: Balbina Parker APRN  Authorized by: Balbina Parker APRN   Comparison: compared with previous ECG from 7/6/2022  Similar to previous ECG  Rhythm: sinus bradycardia  Rate: bradycardic  BPM: 59  ST Segments: ST segments normal              EKG ordered by and reviewed by me in office

## 2022-11-28 ENCOUNTER — TELEPHONE (OUTPATIENT)
Dept: CARDIOLOGY | Facility: CLINIC | Age: 87
End: 2022-11-28

## 2022-11-28 NOTE — TELEPHONE ENCOUNTER
Caller: Carrie Golden    Relationship to patient: Self    Best call back number: [phone]    Patient is needing: PT SEEN KHALIDA SHELDON ON 11.10.22 . PT REPORTS KHALIDA GAVE HER CLEARANCE FOR AN UPCOMMING SURGERY ON 12.9.22 AND PERMISSION TO STAY OFF HER WARFARIN FOR A PERIOD BEFORE. PT REPORTS THAT KHALIDA WAS GOING TO SEND ANOTHER MEDICATION IN WARFARIN'S PLACE DURING THIS TIME.

## 2022-12-01 ENCOUNTER — TELEPHONE (OUTPATIENT)
Dept: PHARMACY | Facility: HOSPITAL | Age: 87
End: 2022-12-01

## 2022-12-01 RX ORDER — ENOXAPARIN SODIUM 100 MG/ML
50 INJECTION SUBCUTANEOUS
Qty: 3.5 ML | Refills: 0 | Status: SHIPPED | OUTPATIENT
Start: 2022-12-06

## 2022-12-01 NOTE — TELEPHONE ENCOUNTER
Patient called back and I instructed patient to start hold 12/4 which is 5 days prior to her procedure on 12/9. On 12/6, educated patient to start enoxaparin 50 mg SQ daily and to continue until INR > 2 post procedure.   Also, instructed patient to call Dr. Myrick's office to have her INR checked on 12/8 the day prior to procedure.  Sent prescription for enoxaparin to [facility] on [address].

## 2022-12-01 NOTE — TELEPHONE ENCOUNTER
Received message from EFREN Parker's MA in regards to patient's upcoming procedure on 12/9. Per cardiac clearance form uploaded on 11/7, patient is to hold warfarin for 5 days prior to procedure for an INR of <2.0. Balbina wants patient to bridge with enoxaparin while holding and until INR >2.0. INR and warfarin dosing managed by PCP. Per Uma at PCP's office, would like enoxaparin to be ordered by [facility] and patient needs to call to schedule appointment to have INR checked on 12/8. Attempted to contact patient to discuss but patient did not answer. Left message requesting call back. Of note, enoxaparin dosing should be 50 mg (~1 mg/kg) SQ q24h (CrCl = 17 mL/min).

## 2023-01-16 RX ORDER — DILTIAZEM HYDROCHLORIDE 120 MG/1
CAPSULE, EXTENDED RELEASE ORAL
Qty: 90 CAPSULE | Refills: 0 | Status: SHIPPED | OUTPATIENT
Start: 2023-01-16

## 2023-01-25 RX ORDER — ISOSORBIDE MONONITRATE 30 MG/1
TABLET, EXTENDED RELEASE ORAL
Qty: 90 TABLET | Refills: 0 | Status: SHIPPED | OUTPATIENT
Start: 2023-01-25

## 2023-03-12 NOTE — H&P
Patient Care Team:  Monster Myrick MD as PCP - General (Family Medicine)  Maikel Morales MD as Consulting Physician (Cardiology)    Chief complaint  Pain in the left buttock     Subjective     Patient is a 89 y.o. female presents with fall to the ER 5/5. She  Tripped and fell while walking into see her . The symptoms started after the fall. Ms Golden is completely independent , cares for herself and drives.  She takes coumadin for hx DVT and  afib .  She has HTN , CAD, HLD. She immediately had pain in the let hip after that fall. ER  Found CT showing mildly displaced fractures of the left superior pubic ramus extending to the pubic symphysis.  Incidentally a hernia containing portion of the bowel in the inguinal location seen not well visualized was seen on this CT of the LL extremity.    She was admitted for pain control and ortho evaluation.   Review of Systems   Constitutional: Positive for activity change. Negative for appetite change, fatigue and fever.   HENT: Negative for trouble swallowing.    Eyes: Negative for visual disturbance.   Respiratory: Negative for cough and shortness of breath.    Cardiovascular: Negative for chest pain, palpitations and leg swelling.   Gastrointestinal: Negative for abdominal pain, constipation, diarrhea, nausea and vomiting.   Genitourinary: Negative for difficulty urinating.   Musculoskeletal: Positive for arthralgias (left hip/ buttock pain - left shoulder pain ) and gait problem.   Skin: Negative for wound.   Neurological: Positive for weakness. Negative for dizziness and headaches.   Psychiatric/Behavioral: Negative for confusion.          History  Past Medical History:   Diagnosis Date   • Amblyopia 10/31/2012   • Amblyopia, left 3/12/2020   • Arthritis    • Conjunctival hemorrhage 10/8/2014   • Coronary artery disease    • Deformity of left foot 8/18/2020    Added automatically from request for surgery [id number]   • Dermatochalasis of left upper  eyelid 3/12/2020   • Dermatochalasis of right upper eyelid 3/12/2020   • Drooping eyelid, bilateral    • Ectropion 3/12/2020   • Epiretinal membrane (ERM) of right eye 3/12/2020   • Frequent UTI    • GERD (gastroesophageal reflux disease)    • Hallux rigidus, left foot 8/18/2020    Added automatically from request for surgery [id number]   • Hearing loss     bilateral hearing aides   • 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 sure which 1  contracted at age 8   • History of transfusion    • [place] (hard of hearing)    • Hyperlipidemia    • Hypertension    • Past heart attack     X2 MILD LAST ONE OCT 2019   • PONV (postoperative nausea and vomiting)    • Presence of intraocular lens 3/12/2020     Past Surgical History:   Procedure Laterality Date   • ANTERIOR AND POSTERIOR VAGINAL REPAIR     • BACK SURGERY     • BLEPHAROPLASTY Bilateral 5/23/2019    Procedure: bilateral upper lid blepharoplasty;  Surgeon: Mauricio Pennington MD;  Location: [facility] OR [facility];  Service: Ophthalmology   • BROW LIFT Bilateral 2/13/2020    Procedure: BILATERAL TEMPORAL DIRECT BROWLIFT;  Surgeon: Sreekanth Bird MD;  Location: [facility] OR [facility];  Service: Ophthalmology;  Laterality: Bilateral;   • CARDIAC CATHETERIZATION N/A 12/3/2019    Procedure: Left Heart Cath;  Surgeon: Puma Briseno MD;  Location: [facility] CATH INVASIVE LOCATION;  Service: Cardiovascular   • CARDIAC CATHETERIZATION N/A 12/3/2019    Procedure: Coronary angiography;  Surgeon: Puma Briesno MD;  Location: [facility] CATH INVASIVE LOCATION;  Service: Cardiovascular   • CARDIAC CATHETERIZATION N/A 12/3/2019    Procedure: Left ventriculography;  Surgeon: Puma Briseno MD;  Location: [facility] CATH INVASIVE LOCATION;  Service: Cardiovascular   • CATARACT EXTRACTION EXTRACAPSULAR W/ INTRAOCULAR LENS IMPLANTATION Bilateral    • CERVICAL SPINE ANTERIOR      with instrumentation   • CHEILECTOMY Left 8/28/2020    Procedure: CHEILECTOMY;  Surgeon: GAB Rees DPM;  Location: [facility] MAIN OR;  Service:  Podiatry;  Laterality: Left;   • COLON SURGERY      for obstruction   • ALBERTO TUBE INSERTION Bilateral 2019    Procedure: ALBERTO TUBE;  Surgeon: Mauricio Pennington MD;  Location: [facility] OR [facility] [place];  Service: Ophthalmology   • ECTROPION REPAIR Bilateral 2019    Procedure: bilateral lower lid ectropion repair, bilateral punctoplasty;  Surgeon: Mauricio Pennington MD;  Location: [facility] OR [facility] [place];  Service: Ophthalmology   • ENDOSCOPY N/A 2022    Procedure: ESOPHAGOGASTRODUODENOSCOPY WITH DILATATION (BALLOON 15MM-18MM, UP TO 18MM) AND BOUGIE #48;  Surgeon: Wiley Parks MD;  Location: [facility] ENDOSCOPY;  Service: Gastroenterology;  Laterality: N/A;  Post: DYSPHAGIA   • EYE SURGERY     • FOOT FUSION Left 2016    Procedure: LEFT HALLUX METATARSALPHALANGEAL ARTHRODESIS SILVER BUNIONECTOMY METATARSAL HEAD RESECTION 2-5 CALCANEAL BONE GRAFT;  Surgeon: Monster Harper Jr., MD;  Location: [facility] MAIN OR;  Service:    • HYSTERECTOMY     • INGUINAL HERNIA REPAIR Bilateral    • METATARSAL OSTEOTOMY Left 2020    Procedure: Metatarsal head resection 5;  Surgeon: GAB Rees DPM;  Location: [facility] MAIN OR;  Service: Podiatry;  Laterality: Left;   • ROTATOR CUFF REPAIR Right    • SIGMOIDOSCOPY N/A 10/6/2021    Procedure: SIGMOIDOSCOPY WITH BIOPSY X1 AREA;  Surgeon: Uche Vaz MD;  Location: [facility] ENDOSCOPY;  Service: Gastroenterology;  Laterality: N/A;  ischemic colitis   • STOMACH SURGERY      for ulcer     Family History   Problem Relation Age of Onset   • No Known Problems Mother    • No Known Problems Father    • Malig Hyperthermia Neg Hx      Social History     Tobacco Use   • Smoking status: Former Smoker     Packs/day: 0.25     Years: 10.00     Pack years: 2.50     Types: Cigarettes     Quit date:      Years since quittin.3   • Smokeless tobacco: Never Used   Vaping Use   • Vaping Use: Never used   Substance Use Topics   • Alcohol use: No   • Drug use: No     Medications Prior  to Admission   Medication Sig Dispense Refill Last Dose   • dilTIAZem XR (DILACOR XR) 120 MG 24 hr capsule Take 1 capsule by mouth Daily. 90 capsule 1    • furosemide (LASIX) 20 MG tablet Take 20 mg by mouth Daily.      • isosorbide mononitrate (IMDUR) 30 MG 24 hr tablet Take 1 tablet by mouth Daily. 90 tablet 1    • metoprolol tartrate (LOPRESSOR) 25 MG tablet Take 0.5 tablets by mouth Every 12 (Twelve) Hours. 30 tablet 1    • pantoprazole (PROTONIX) 40 MG EC tablet Take 1 tablet by mouth Every Morning. 30 tablet 1    • simvastatin (ZOCOR) 40 MG tablet Take 0.5 tablets by mouth Every Night. Patient needs a lipid panel before any more refills 45 tablet 0    • warfarin (COUMADIN) 2 MG tablet Take 2 mg by mouth Every Night. Pt was told to hold for 2 days before procedure-- will call [facility] to confirm        Allergies:  Amoxicillin, Codeine, Lortab [hydrocodone-acetaminophen], Nitrofurantoin, and Sulfa antibiotics    Objective     Vital Signs  Temp:  [97.3 °F (36.3 °C)-98.4 °F (36.9 °C)] 97.3 °F (36.3 °C)  Heart Rate:  [59-78] 67  Resp:  [14-17] 17  BP: (109-154)/(54-72) 109/54     Physical Exam:      General Appearance:    Alert, cooperative, in no acute distress   Head:    Normocephalic, without obvious abnormality, atraumatic   Eyes:            Lids and lashes normal, conjunctivae and sclerae normal, no   icterus, no pallor, corneas clear, PERRLA   Ears:    Ears appear intact with no abnormalities noted   Throat:   No oral lesions, no thrush, oral mucosa moist   Neck:   No adenopathy, supple, trachea midline, no thyromegaly, no   carotid bruit, no JVD   Lungs:     Clear to auscultation,respirations regular, even and                  unlabored    Heart:    Regular rhythm and normal rate, normal S1 and S2, no            murmur, no gallop, no rub, no click   Chest Wall:    No abnormalities observed   Abdomen:     Normal bowel sounds, no masses, no organomegaly, soft        non-tender, non-distended, no guarding, no  rebound                tenderness   Extremities:   Unable to stand due to pain in the left hip/ buttock- Left shoulder  With pain with all ROM - TTP the posterior shoulder and upper arm  no edema, no cyanosis, no             redness   Pulses:   Pulses palpable and equal bilaterally   Skin:   No bleeding, bruising or rash   Lymph nodes:   No palpable adenopathy   Neurologic:   Cranial nerves 2 - 12 grossly intact, sensation intact, DTR       present and equal bilaterally       Results Review:     Imaging Results (Last 24 Hours)     Procedure Component Value Units Date/Time    CT Lower Extremity Left Without Contrast [[id number]] Collected: 05/05/22 1521     Updated: 05/05/22 1528    Narrative:         DATE OF EXAM:   5/5/2022 2:34 PM     PROCEDURE:   CT LOWER EXTREMITY LEFT WO CONTRAST-     INDICATIONS:   persistent left hip pain s/p fall     COMPARISON:  Hip and pelvis radiograph 05/05/2022, CT of the abdomen and pelvis dated  10/02/2021     TECHNIQUE:   Contiguous axial images obtained to the hip. Coronal and sagittal  reconstructions were performed.     FINDINGS:   Osteopenia limits evaluation. There is irregularity along the anterior  lateral cortex of the inferior left sacrum consistent with nondisplaced  sacral fracture. There is a minimally displaced fracture at the superior  pubic ramus extending towards the pubic symphysis. There is a  nondisplaced fracture inferior pubic ramus. There is mild joint space  narrowing of the hip. There are no aggressive osseous lesions.     There is a hernia along the lower pelvis containing a portion of bowel.  This is not well delineated. It is small in size.        Impression:      There is a mildly displaced fractures of the left superior pubic ramus  extending to the pubic symphysis. There is a nondisplaced fracture  inferior pubic ramus. There is a nondisplaced fracture seen along the  inferior margin of the left sacrum.  There is diffuse osteopenia limiting  evaluation.  There is a hernia containing a portion of bowel in the inguinal  location. This is not well evaluated. This is is at risk for obstruction  and incarceration.  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     Electronically Signed By-Helen Lozada MD On:5/5/2022 3:26 PM  This report was finalized on [id number] by  Helen Lozada MD.    XR Hip With or Without Pelvis 2 - 3 View Left [[id number]] Collected: 05/05/22 1242     Updated: 05/05/22 1251    Narrative:      DATE OF EXAM:  5/5/2022 12:40 PM     PROCEDURE:  XR HIP W OR WO PELVIS 2-3 VIEW LEFT-     INDICATIONS:  Trauma, left hip pain.     COMPARISON:  No Comparisons Available     TECHNIQUE:   Two views of the left hip and one view of the pelvis were obtained.        FINDINGS:  There is no acute fracture or dislocation. The bony structures are  demineralized. There are mild osteoarthritic changes of the hip joints.  There are mild to moderate osteoarthritic changes of the sacroiliac  joints, right greater than left side. There are degenerative changes in  the lower lumbar spine with posterior surgical fixation hardware in  place. There are vascular calcifications in the pelvis.        Impression:         1. No acute fracture or dislocation.  2. Bony demineralization.  3. Degenerative changes.     Electronically Signed By-Winston Scott MD On:5/5/2022 12:44 PM  This report was finalized on [id number] by  Winston Scott MD.           Lab Results (last 24 hours)     Procedure Component Value Units Date/Time    Basic Metabolic Panel [[id number]]  (Abnormal) Collected: 05/06/22 0113    Specimen: Blood Updated: 05/06/22 0226     Glucose 102 mg/dL      BUN 19 mg/dL      Creatinine 0.79 mg/dL      Sodium 141 mmol/L      Potassium 4.1 mmol/L      Chloride 102 mmol/L      CO2 26.0 mmol/L      Calcium 8.4 mg/dL      BUN/Creatinine Ratio 24.1     Anion Gap 13.0 mmol/L      eGFR 68.1 mL/min/1.73      Comment: National Kidney Foundation and American Society of Nephrology (ASN)  Task Force recommended calculation based on the Chronic Kidney Disease Epidemiology Collaboration (CKD-EPI) equation refit without adjustment for race.       Narrative:      GFR Normal >60  Chronic Kidney Disease <60  Kidney Failure <15      Protime-INR [[id number]]  (Abnormal) Collected: 05/06/22 0113    Specimen: Blood Updated: 05/06/22 0207     Protime 17.2 Seconds      INR 1.72    CBC & Differential [[id number]]  (Abnormal) Collected: 05/06/22 0113    Specimen: Blood Updated: 05/06/22 0200    Narrative:      The following orders were created for panel order CBC & Differential.  Procedure                               Abnormality         Status                     ---------                               -----------         ------                     CBC Auto Differential[[id number]]        Abnormal            Final result                 Please view results for these tests on the individual orders.    CBC Auto Differential [[id number]]  (Abnormal) Collected: 05/06/22 0113    Specimen: Blood Updated: 05/06/22 0200     WBC 7.40 10*3/mm3      RBC 3.91 10*6/mm3      Hemoglobin 10.7 g/dL      Hematocrit 32.7 %      MCV 83.7 fL      MCH 27.4 pg      MCHC 32.7 g/dL      RDW 15.8 %      RDW-SD 46.4 fl      MPV 7.1 fL      Platelets 211 10*3/mm3      Neutrophil % 66.0 %      Lymphocyte % 21.9 %      Monocyte % 10.8 %      Eosinophil % 1.0 %      Basophil % 0.3 %      Neutrophils, Absolute 4.90 10*3/mm3      Lymphocytes, Absolute 1.60 10*3/mm3      Monocytes, Absolute 0.80 10*3/mm3      Eosinophils, Absolute 0.10 10*3/mm3      Basophils, Absolute 0.00 10*3/mm3      nRBC 0.1 /100 WBC     COVID PRE-OP / PRE-PROCEDURE SCREENING ORDER (NO ISOLATION) - Swab, Nasopharynx [[id number]]  (Normal) Collected: 05/05/22 1714    Specimen: Swab from Nasopharynx Updated: 05/05/22 1736    Narrative:      The following orders were created for panel order COVID PRE-OP / PRE-PROCEDURE SCREENING ORDER (NO ISOLATION) - Swab, Nasopharynx.  Procedure                                Abnormality         Status                     ---------                               -----------         ------                     COVID-19,CEPHEID/KALA,CO...[[id number]]  Normal              Final result                 Please view results for these tests on the individual orders.    COVID-19,CEPHEID/KALA,COR/TIGRE/PAD/DARLING IN-HOUSE(OR EMERGENT/ADD-ON),NP SWAB IN TRANSPORT MEDIA 3-4 HR TAT, RT-PCR - Swab, Nasopharynx [[id number]]  (Normal) Collected: 05/05/22 1714    Specimen: Swab from Nasopharynx Updated: 05/05/22 1736     COVID19 Not Detected    Narrative:      Fact sheet for providers: https://www.fda.gov/media/291802/download     Fact sheet for patients: https://www.fda.gov/media/812606/download  Fact sheet for providers: https://www.fda.gov/media/536223/download    Fact sheet for patients: https://www.fda.gov/media/018429/download    Test performed by PCR.    Basic Metabolic Panel [[id number]]  (Abnormal) Collected: 05/05/22 1614    Specimen: Blood Updated: 05/05/22 1640     Glucose 105 mg/dL      BUN 19 mg/dL      Creatinine 0.79 mg/dL      Sodium 141 mmol/L      Potassium 3.7 mmol/L      Chloride 102 mmol/L      CO2 26.0 mmol/L      Calcium 8.8 mg/dL      BUN/Creatinine Ratio 24.1     Anion Gap 13.0 mmol/L      eGFR 68.1 mL/min/1.73      Comment: National Kidney Foundation and American Society of Nephrology (ASN) Task Force recommended calculation based on the Chronic Kidney Disease Epidemiology Collaboration (CKD-EPI) equation refit without adjustment for race.       Narrative:      GFR Normal >60  Chronic Kidney Disease <60  Kidney Failure <15      Protime-INR [[id number]]  (Abnormal) Collected: 05/05/22 1614    Specimen: Blood Updated: 05/05/22 1633     Protime 18.3 Seconds      INR 1.84    CBC & Differential [[id number]]  (Abnormal) Collected: 05/05/22 1614    Specimen: Blood Updated: 05/05/22 1623    Narrative:      The following orders were created for panel order CBC &  Differential.  Procedure                               Abnormality         Status                     ---------                               -----------         ------                     CBC Auto Differential[[id number]]        Abnormal            Final result                 Please view results for these tests on the individual orders.    CBC Auto Differential [[id number]]  (Abnormal) Collected: 05/05/22 1614    Specimen: Blood Updated: 05/05/22 1623     WBC 12.90 10*3/mm3      RBC 4.36 10*6/mm3      Hemoglobin 11.9 g/dL      Hematocrit 36.3 %      MCV 83.3 fL      MCH 27.3 pg      MCHC 32.8 g/dL      RDW 15.9 %      RDW-SD 46.8 fl      MPV 6.8 fL      Platelets 241 10*3/mm3      Neutrophil % 83.4 %      Lymphocyte % 9.6 %      Monocyte % 6.4 %      Eosinophil % 0.4 %      Basophil % 0.2 %      Neutrophils, Absolute 10.80 10*3/mm3      Lymphocytes, Absolute 1.20 10*3/mm3      Monocytes, Absolute 0.80 10*3/mm3      Eosinophils, Absolute 0.10 10*3/mm3      Basophils, Absolute 0.00 10*3/mm3      nRBC 0.0 /100 WBC            I reviewed the patient's new clinical results.    Assessment/Plan       Fall, initial encounter     Pelvic fractures- pain control. PT/ OT  CAD- continue imdur and  Lopressor.  PAF- AC with Coumadin. Pharmacy dosing here. Echo 10/2021 EF 56-60%  . Rate stable with cardizem and lopressor.    HLD- on statin   HTN with well preserved renal function- continue home meds.   Left shoulder aliyah n- will xray the left shoulder     on PPI  For stress ulcer prophylaxis and coumadin for AC.      OT has evaluated the patient and rec inpt Rehab. Case management consult in.    I discussed the patients findings and my recommendations with patient.     Key Van, EFREN  05/06/22  11:15 EDT       No

## 2023-03-22 ENCOUNTER — OFFICE VISIT (OUTPATIENT)
Dept: PODIATRY | Facility: CLINIC | Age: 88
End: 2023-03-22
Payer: MEDICARE

## 2023-03-22 VITALS — RESPIRATION RATE: 20 BRPM | WEIGHT: 119 LBS | BODY MASS INDEX: 23.36 KG/M2 | HEIGHT: 60 IN

## 2023-03-22 DIAGNOSIS — G89.29 CHRONIC FOOT PAIN, LEFT: Primary | ICD-10-CM

## 2023-03-22 DIAGNOSIS — M21.962 FOOT DEFORMITY, ACQUIRED, LEFT: ICD-10-CM

## 2023-03-22 DIAGNOSIS — M79.672 CHRONIC FOOT PAIN, LEFT: Primary | ICD-10-CM

## 2023-03-22 DIAGNOSIS — L85.2 PUNCTATE KERATOSIS: ICD-10-CM

## 2023-03-22 PROCEDURE — 99213 OFFICE O/P EST LOW 20 MIN: CPT | Performed by: PODIATRIST

## 2023-03-22 PROCEDURE — 1160F RVW MEDS BY RX/DR IN RCRD: CPT | Performed by: PODIATRIST

## 2023-03-22 PROCEDURE — 1159F MED LIST DOCD IN RCRD: CPT | Performed by: PODIATRIST

## 2023-03-22 NOTE — PROGRESS NOTES
"03/22/2023  Foot and Ankle Surgery - Established Patient/Follow-up  Provider: Dr. Owen Rees DPM  Location: [facility]    Subjective:  Carrie Golden is a 89 y.o. female.     Chief Complaint   Patient presents with   • Left Foot - Follow-up, Pain   • Follow-up     Brad mae   date unknown       HPI: The patient is a 89-year-old female who presents to the clinic for pain involving the plantar side of her left foot. She was last seen in 2020.    She reports pain on the plantar aspect of her fifth toe. She notes her pain is exacerbated with ambulation. The patient states her pain is alleviated with wearing shoes and inserts. She notes her pain is exacerbated when she is in shoes. She reports she has been ambulating recreationally around her neighborhood. She believes the screws are coming out of her great toe as well. She denies receiving pedicures; however, she tries to keep her callus trimmed off. The patient states by the end of the day, she feels as if she is \"walking on the bone.\" She reports she has inserts at home; however, she was unable to wear them as they exacerbated her pain. She states she works.  She denies any significant pain involving her great toe.  She does have bony prominence involving the medial aspect of the interphalangeal joint region.     Allergies   Allergen Reactions   • Amoxicillin Itching   • Codeine Nausea And Vomiting and Dizziness   • Lortab [Hydrocodone-Acetaminophen] Nausea And Vomiting   • Nitrofurantoin Other (See Comments)   • Sulfa Antibiotics Rash       Current Outpatient Medications on File Prior to Visit   Medication Sig Dispense Refill   • cetirizine (zyrTEC) 10 MG tablet Take 1 tablet by mouth Daily As Needed for Allergies.     • Dilt- MG 24 hr capsule Take 1 capsule by mouth once daily 90 capsule 0   • Enoxaparin Sodium (LOVENOX) 60 MG/0.6ML solution prefilled syringe syringe Inject 0.5 mL under the skin into the appropriate area as directed Daily. Hold " "warfarin starting on 12/4, and start injections on 12/6 when INR < 2 and continue until INR > 2 post procedure. 3.5 mL 0   • furosemide (LASIX) 20 MG tablet Take 1 tablet by mouth 2 (Two) Times a Day. 180 tablet 1   • HYDROcodone-acetaminophen (NORCO) 5-325 MG per tablet Take 1 tablet by mouth Every 6 (Six) Hours As Needed for Moderate Pain . 12 tablet 0   • isosorbide mononitrate (IMDUR) 30 MG 24 hr tablet Take 1 tablet by mouth once daily 90 tablet 0   • metoprolol tartrate (LOPRESSOR) 25 MG tablet Take 0.5 tablets by mouth Every 12 (Twelve) Hours. 30 tablet 1   • pantoprazole (PROTONIX) 40 MG EC tablet Take 1 tablet by mouth Every Morning. 30 tablet 1   • simvastatin (ZOCOR) 40 MG tablet Take 0.5 tablets by mouth Every Night. Patient needs a lipid panel before any more refills 45 tablet 0   • warfarin (COUMADIN) 2 MG tablet Take 1 tablet by mouth Every Night. Indications: history of DVT/PE (Patient taking differently: Take 1 tablet by mouth Every Night. Mon wed fri pt takes 3mg   tues and thurs taking 2mg  Indications: history of DVT/PE)     • Warfarin Sodium (PHARMACY TO DOSE WARFARIN) Continuous As Needed (continue lovenox bridge until INR between 2-3 for afib). Indications: Atrial Fibrillation       No current facility-administered medications on file prior to visit.       Objective   Resp 20   Ht 152.4 cm (60\")   Wt 54 kg (119 lb)   BMI 23.24 kg/m²     Podiatry Exam       General Appearance:  well nourished; well hydrated; no acute distress  Mental Status Exam        Judgement and Insight:  Intact       Orientation:  Oriented to time, place, and person       Memory:  Intact for recent and remote events  Cardiovascular (Left)       Dorsalis Pedis Pulse (Lt):   2/4       Posterior Tibialis Pulse (Lt):   2/4       Capillary Filling Time (Lt):  1-3 Seconds       Edema (Lt):  No Edema       Varicosities (Lt):   positive       Telangiectasias (Lt):  positive  Dermatological Exam       Temperature:  warm to " warm       Hair Growth:  sparse to absent       Skin Elasticity:  normal skin elasticity  Skin: Hyperkeratotic lesion noted to the plantar lateral aspect of the fifth metatarsal head region.  After evaluation, lesion is consistent with a punctate keratosis.  No signs of underlying open wound or infection.  Neurological Exam (Left)       Paresthesia (Lt):  negative       Patella DTRs (Lt):  symmetric       Achilles DTRs (Lt):  symmetric       Tinel over Tarsal Tunnel(Lt):  negative       Intermedial Dorsal Cutaneous Nerve (Lt):  negative  Monofilament Test (Lt):   intact        MusculoSkeletal Exam (Left)       Gait and Stance (Lt): Postop shoe assisted       ROM (Lt): Improved range of motion to the first metatarsal phalangeal joint.       Muscle Strength (Lt):  symmetrical 5/5       Subluxed Digits (Lt):  Rectus alignment of the hallux       Inflammed Joints (Lt):  no inflammation of joints       Post tibial tendon (Lt):  no soreness noted       Peroneal Tendon (Lt):  no soreness noted  Additional Musculoskelatal Findings  No overt progressive deformity as compared to previous assessment.  She does have rigidity involving the foot secondary to osseous abnormalities as well as soft tissue contracture.    Assessment & Plan   Diagnoses and all orders for this visit:    1. Chronic foot pain, left (Primary)  -     XR Foot 3+ View Left    2. Punctate keratosis    3. Foot deformity, acquired, left    Patient returns office for continued discomfort involving her left foot.  After evaluation, she does complain of pain involving the plantar lateral aspect of the forefoot consistent with a punctate keratosis.  I did discuss the diagnosis and treatment options with the patient at length.  The lesion was trimmed down today and I do feel that she will notice improvement.  I have recommended that she wear appropriate shoes and inserts.  We did discuss power step inserts with metatarsal pad for offloading.  I have also asked that  she try to keep the callus formation down with moisturizing and a pumice stone.  Patient is to follow-up with me in 4 weeks for reevaluation.  If she continues to have issues with this lesion, may need to consider Cantharone application.  Patient understands and agrees.  Greater than 20 minutes spent before, during, and after evaluation for patient care.    Orders Placed This Encounter   Procedures   • XR Foot 3+ View Left     Order Specific Question:   Reason for Exam:     Answer:   pain bottom of foot near 5th toe and pain at great toe, painful hardware  prev sx   room 15  wb     Order Specific Question:   Does this patient have a diabetic monitoring/medication delivering device on?     Answer:   No     Order Specific Question:   Release to patient     Answer:   Routine Release        Note is dictated utilizing voice recognition software. Unfortunately this leads to occasional typographical errors. I apologize in advance if the situation occurs. If questions occur please do not hesitate to call our office.    Transcribed from ambient dictation for GAB Rees DPM by Poncho Griffin.  03/22/23   11:43 EDT    Patient or patient representative verbalized consent to the visit recording.  I have personally performed the services described in this document as transcribed by the above individual, and it is both accurate and complete.

## 2023-04-17 RX ORDER — DILTIAZEM HYDROCHLORIDE 120 MG/1
120 CAPSULE, EXTENDED RELEASE ORAL DAILY
Qty: 90 CAPSULE | Refills: 0 | Status: SHIPPED | OUTPATIENT
Start: 2023-04-17

## 2023-04-24 RX ORDER — ISOSORBIDE MONONITRATE 30 MG/1
TABLET, EXTENDED RELEASE ORAL
Qty: 90 TABLET | Refills: 0 | Status: SHIPPED | OUTPATIENT
Start: 2023-04-24

## 2023-05-19 ENCOUNTER — OFFICE VISIT (OUTPATIENT)
Dept: CARDIOLOGY | Facility: CLINIC | Age: 88
End: 2023-05-19
Payer: MEDICARE

## 2023-05-19 VITALS
BODY MASS INDEX: 23.56 KG/M2 | WEIGHT: 120 LBS | DIASTOLIC BLOOD PRESSURE: 67 MMHG | SYSTOLIC BLOOD PRESSURE: 100 MMHG | OXYGEN SATURATION: 97 % | HEIGHT: 60 IN | HEART RATE: 75 BPM

## 2023-05-19 DIAGNOSIS — Z09 FOLLOW-UP EXAM, 3-6 MONTHS SINCE PREVIOUS EXAM: Primary | ICD-10-CM

## 2023-05-22 RX ORDER — SIMVASTATIN 40 MG
TABLET ORAL
Qty: 45 TABLET | Refills: 0 | Status: SHIPPED | OUTPATIENT
Start: 2023-05-22

## 2023-05-23 NOTE — PROGRESS NOTES
Cardiology Office Follow Up Visit      Primary Care Provider:  Monster Myrick MD    Reason for f/u:     6 month follow up    Subjective     CC:    Routine 6 month follow up    History of Present Illness     Carrie Golden is a 89 y.o. female who is a patient of Dr. Quiroz. Pmh includes paroxysmal atrial fibrillation during hospital a stay with respiratory illness, DVT RLE and PE diagnosed 10/2021 (on coumadin), CAD with h/o NSTEMI s/p [facility] 2019 which showed non obstructive CAD with diagonal of LAD with 60-70% ostial stenosis which was calcified and unchanged since prior cath in 2017, HTN, HLD, preserved LVEF.     She presents for routine 6 month follow up. She is doing well. She has no acute complaints. She denies chest pain or shortness of breath. She has no exertional symptoms. She has some hearing issues but overall reports she is doing well. Her vital signs are stable. No acute complaints.       ASSESSMENT/PLAN:      Diagnoses and all orders for this visit:    1. Follow-up exam, 3-6 months since previous exam (Primary)    2. Coronary artery disease involving native coronary artery of native heart without angina pectoris  Assessment & Plan:  [facility] 2019: non obst dz. D1 of LAD showed 60 to 70% ostial stenosis which is calcified  ECHO 10/21 LVEF 60%, grade 2 DD  imdur, BB, statin        3. PAF (paroxysmal atrial fibrillation) ([facility])  Comments:  CHADS Vasc at least 3- on coumadin  on diltiazem 120, metoprolol 25 BID     4. Dyslipidemia     5. Hypertension, unspecified type     6. Chronic pulmonary embolism, unspecified pulmonary embolism type, unspecified whether acute cor pulmonale present ([facility])  Comments:  10/2021; on coumadin     7. Acute deep vein thrombosis (DVT) of distal vein of right lower extremity ([facility])  Comments:  10/2021; on coumadin followed by coumadin clinic       MEDICAL DECISION MAKING:  Ms. Golden presents for routine follow up. She is doing well. She has no acute complaints. She is free from angina. She  is on a/c for h/o DVT/PE and pAfib. We will continue current medical therapy. She is rate controlled afib. Tolerating medications well. We will see her back in 6 mo or sooner should new issues arise.           Past Medical History:   Diagnosis Date   • Amblyopia 10/31/2012   • Amblyopia, left 3/12/2020   • Arthritis    • Conjunctival hemorrhage 10/8/2014   • Coronary artery disease    • Deformity of left foot 8/18/2020    Added automatically from request for surgery [id number]   • Dermatochalasis of left upper eyelid 3/12/2020   • Dermatochalasis of right upper eyelid 3/12/2020   • Drooping eyelid, bilateral    • Ectropion 3/12/2020   • Epiretinal membrane (ERM) of right eye 3/12/2020   • Frequent UTI    • GERD (gastroesophageal reflux disease)    • Hallux rigidus, left foot 8/18/2020    Added automatically from request for surgery [id number]   • Hearing loss     bilateral hearing aides   • 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 sure which 1  contracted at age 8   • History of transfusion    • [place] (hard of hearing)    • Hyperlipidemia    • Hypertension    • Past heart attack     X2 MILD LAST ONE OCT 2019   • PONV (postoperative nausea and vomiting)    • Presence of intraocular lens 3/12/2020       Past Surgical History:   Procedure Laterality Date   • ANTERIOR AND POSTERIOR VAGINAL REPAIR     • BACK SURGERY     • BLEPHAROPLASTY Bilateral 5/23/2019    Procedure: bilateral upper lid blepharoplasty;  Surgeon: Mauricio Pennington MD;  Location: [facility] OR [facility];  Service: Ophthalmology   • BROW LIFT Bilateral 2/13/2020    Procedure: BILATERAL TEMPORAL DIRECT BROWLIFT;  Surgeon: Sreekanth Bird MD;  Location: [facility] OR [facility];  Service: Ophthalmology;  Laterality: Bilateral;   • CARDIAC CATHETERIZATION N/A 12/3/2019    Procedure: Left Heart Cath;  Surgeon: Puma Briseno MD;  Location: [facility] CATH INVASIVE LOCATION;  Service: Cardiovascular   • CARDIAC CATHETERIZATION N/A 12/3/2019    Procedure: Coronary angiography;  Surgeon: Finn  Puma DE GUZMAN MD;  Location: [facility] CATH INVASIVE LOCATION;  Service: Cardiovascular   • CARDIAC CATHETERIZATION N/A 12/3/2019    Procedure: Left ventriculography;  Surgeon: Puma Briseno MD;  Location: [facility] CATH INVASIVE LOCATION;  Service: Cardiovascular   • CATARACT EXTRACTION EXTRACAPSULAR W/ INTRAOCULAR LENS IMPLANTATION Bilateral    • CERVICAL SPINE ANTERIOR      with instrumentation   • CHEILECTOMY Left 8/28/2020    Procedure: CHEILECTOMY;  Surgeon: GAB Rees DPM;  Location: [facility] MAIN OR;  Service: Podiatry;  Laterality: Left;   • COLON SURGERY      for obstruction   • ALBERTO TUBE INSERTION Bilateral 5/23/2019    Procedure: ALBERTO TUBE;  Surgeon: Mauricio Pennington MD;  Location: [facility] OR [facility];  Service: Ophthalmology   • ECTROPION REPAIR Bilateral 5/23/2019    Procedure: bilateral lower lid ectropion repair, bilateral punctoplasty;  Surgeon: Mauricio Pennington MD;  [facility];  Service: Ophthalmology   • ENDOSCOPY N/A 2/18/2022    Procedure: ESOPHAGOGASTRODUODENOSCOPY WITH DILATATION (BALLOON 15MM-18MM, UP TO 18MM) AND BOUGIE #[id number];  Surgeon: Wiley Parks MD;  Location: [facility] ENDOSCOPY;  Service: Gastroenterology;  Laterality: N/A;  Post: DYSPHAGIA   • EYE SURGERY     • FOOT FUSION Left 12/30/2016    Procedure: LEFT HALLUX METATARSALPHALANGEAL ARTHRODESIS SILVER BUNIONECTOMY METATARSAL HEAD RESECTION 2-5 CALCANEAL BONE GRAFT;  Surgeon: Monster Harper Jr., MD;  Location: [facility] MAIN OR;  Service:    • HYSTERECTOMY     • INGUINAL HERNIA REPAIR Bilateral    • METATARSAL OSTEOTOMY Left 8/28/2020    Procedure: Metatarsal head resection 5;  Surgeon: GAB Rees DPM;  Location: [facility] MAIN OR;  Service: Podiatry;  Laterality: Left;   • ROTATOR CUFF REPAIR Right    • SIGMOIDOSCOPY N/A 10/6/2021    Procedure: SIGMOIDOSCOPY WITH BIOPSY X1 AREA;  Surgeon: Uche Vaz MD;  Location: [facility]OSCOPY;  Service: Gastroenterology;  Laterality: N/A;  ischemic colitis   • STOMACH  SURGERY      for ulcer         Current Outpatient Medications:   •  Dilt- MG 24 hr capsule, Take 1 capsule by mouth once daily, Disp: 90 capsule, Rfl: 0  •  furosemide (LASIX) 20 MG tablet, Take 1 tablet by mouth 2 (Two) Times a Day., Disp: 180 tablet, Rfl: 1  •  HYDROcodone-acetaminophen (NORCO) 5-325 MG per tablet, Take 1 tablet by mouth Every 6 (Six) Hours As Needed for Moderate Pain ., Disp: 12 tablet, Rfl: 0  •  isosorbide mononitrate (IMDUR) 30 MG 24 hr tablet, Take 1 tablet by mouth once daily, Disp: 90 tablet, Rfl: 0  •  metoprolol tartrate (LOPRESSOR) 25 MG tablet, Take 1 tablet by mouth 2 (Two) Times a Day. Take half a tablet two times a day, Disp: , Rfl:   •  warfarin (COUMADIN) 2 MG tablet, Take 1 tablet by mouth Every Night. Indications: history of DVT/PE (Patient taking differently: Take 1 tablet by mouth Every Night.  pt takes 3mg  tu and thurs taking 2mg  Indications: history of DVT/PE), Disp: , Rfl:   •  Warfarin Sodium (PHARMACY TO DOSE WARFARIN), Continuous As Needed (continue lovenox bridge until INR between 2-3 for afib). Indications: Atrial Fibrillation, Disp: , Rfl:   •  simvastatin (ZOCOR) 40 MG tablet, TAKE 1/2 (ONE-HALF) TABLET BY MOUTH ONCE DAILY AT NIGHT, Disp: 45 tablet, Rfl: 0    Social History     Socioeconomic History   • Marital status:    Tobacco Use   • Smoking status: Former     Packs/day: 0.25     Years: 10.00     Pack years: 2.50     Types: Cigarettes     Quit date:      Years since quittin.4   • Smokeless tobacco: Never   Vaping Use   • Vaping Use: Never used   Substance and Sexual Activity   • Alcohol use: No   • Drug use: No   • Sexual activity: Defer       Family History   Problem Relation Age of Onset   • No Known Problems Mother    • No Known Problems Father    • Malig Hyperthermia Neg Hx        The following portions of the patient's history were reviewed and updated as appropriate: allergies, current medications, past family history,  "past medical history, past social history, past surgical history and problem list.    Review of Systems   Constitutional: Negative for chills, diaphoresis and malaise/fatigue.   HENT: Positive for hearing loss.    Cardiovascular: Negative for chest pain, dyspnea on exertion, irregular heartbeat, leg swelling, near-syncope, orthopnea, palpitations, paroxysmal nocturnal dyspnea and syncope.   Respiratory: Negative for cough, shortness of breath, sleep disturbances due to breathing and sputum production.    Gastrointestinal: Negative for change in bowel habit.   Genitourinary: Negative for urgency.   Neurological: Negative for dizziness and headaches.   Psychiatric/Behavioral: Negative for altered mental status.       Pertinent items are noted in HPI, all other systems reviewed and negative    /67 (BP Location: Left arm, Patient Position: Sitting, Cuff Size: Small Adult)   Pulse 75   Ht 152.4 cm (60\")   Wt 54.4 kg (120 lb)   SpO2 97%   BMI 23.44 kg/m² .  Objective     Constitutional:       Appearance: Not in distress.   Neck:      Vascular: JVD normal.   Pulmonary:      Effort: Pulmonary effort is normal.      Breath sounds: Normal breath sounds.   Cardiovascular:      Normal rate. Irregular rhythm.   Pulses:     Intact distal pulses.   Edema:     Peripheral edema absent.   Abdominal:      General: Bowel sounds are normal.      Palpations: Abdomen is soft.   Musculoskeletal: Normal range of motion. Skin:     General: Skin is warm and dry.   Neurological:      General: No focal deficit present.      Mental Status: Oriented to person, place and time.             ECG 12 Lead    Date/Time: 5/19/2023 10:29 AM  Performed by: Balbina Parker APRN  Authorized by: Balbina Parker APRN   Comparison: not compared with previous ECG   Previous ECG: no previous ECG available  Rhythm: atrial fibrillation  Rate: normal  BPM: 75              EKG ordered by and reviewed by me in office                   "

## 2023-07-20 ENCOUNTER — OFFICE VISIT (OUTPATIENT)
Dept: CARDIOLOGY | Facility: CLINIC | Age: 88
End: 2023-07-20
Payer: MEDICARE

## 2023-07-20 VITALS
RESPIRATION RATE: 18 BRPM | BODY MASS INDEX: 23.56 KG/M2 | DIASTOLIC BLOOD PRESSURE: 62 MMHG | WEIGHT: 120 LBS | OXYGEN SATURATION: 97 % | HEIGHT: 60 IN | SYSTOLIC BLOOD PRESSURE: 123 MMHG | HEART RATE: 56 BPM

## 2023-07-20 DIAGNOSIS — I25.10 CORONARY ARTERY DISEASE INVOLVING NATIVE CORONARY ARTERY OF NATIVE HEART WITHOUT ANGINA PECTORIS: Primary | ICD-10-CM

## 2023-07-20 DIAGNOSIS — E78.2 MIXED HYPERLIPIDEMIA: ICD-10-CM

## 2023-07-20 DIAGNOSIS — I10 HYPERTENSION, UNSPECIFIED TYPE: ICD-10-CM

## 2023-07-20 RX ORDER — AMIODARONE HYDROCHLORIDE 200 MG/1
TABLET ORAL
COMMUNITY
End: 2023-07-20

## 2023-07-20 RX ORDER — PANTOPRAZOLE SODIUM 40 MG/1
TABLET, DELAYED RELEASE ORAL
COMMUNITY
End: 2023-07-20

## 2023-08-03 ENCOUNTER — OFFICE VISIT (OUTPATIENT)
Dept: CARDIOLOGY | Facility: CLINIC | Age: 88
End: 2023-08-03
Payer: MEDICARE

## 2023-08-03 VITALS
SYSTOLIC BLOOD PRESSURE: 128 MMHG | BODY MASS INDEX: 23.95 KG/M2 | OXYGEN SATURATION: 97 % | HEIGHT: 60 IN | RESPIRATION RATE: 18 BRPM | WEIGHT: 122 LBS | DIASTOLIC BLOOD PRESSURE: 70 MMHG | HEART RATE: 63 BPM

## 2023-08-03 DIAGNOSIS — I25.10 CORONARY ARTERY DISEASE INVOLVING NATIVE CORONARY ARTERY OF NATIVE HEART WITHOUT ANGINA PECTORIS: Primary | ICD-10-CM

## 2023-08-03 DIAGNOSIS — E78.2 MIXED HYPERLIPIDEMIA: ICD-10-CM

## 2023-08-03 DIAGNOSIS — I10 HYPERTENSION, UNSPECIFIED TYPE: ICD-10-CM

## 2023-08-03 PROCEDURE — 99213 OFFICE O/P EST LOW 20 MIN: CPT | Performed by: NURSE PRACTITIONER

## 2023-08-04 PROCEDURE — 93000 ELECTROCARDIOGRAM COMPLETE: CPT | Performed by: NURSE PRACTITIONER

## 2023-09-22 PROCEDURE — 87086 URINE CULTURE/COLONY COUNT: CPT | Performed by: FAMILY MEDICINE

## 2023-09-22 PROCEDURE — 87186 SC STD MICRODIL/AGAR DIL: CPT | Performed by: FAMILY MEDICINE

## 2023-09-22 PROCEDURE — 87077 CULTURE AEROBIC IDENTIFY: CPT | Performed by: FAMILY MEDICINE

## 2023-10-09 RX ORDER — ISOSORBIDE MONONITRATE 30 MG/1
TABLET, EXTENDED RELEASE ORAL
Qty: 90 TABLET | Refills: 1 | Status: SHIPPED | OUTPATIENT
Start: 2023-10-09

## 2024-01-31 ENCOUNTER — OFFICE VISIT (OUTPATIENT)
Dept: CARDIOLOGY | Facility: CLINIC | Age: 89
End: 2024-01-31
Payer: MEDICARE

## 2024-01-31 VITALS
WEIGHT: 119 LBS | BODY MASS INDEX: 23.36 KG/M2 | HEIGHT: 60 IN | RESPIRATION RATE: 18 BRPM | HEART RATE: 128 BPM | DIASTOLIC BLOOD PRESSURE: 91 MMHG | SYSTOLIC BLOOD PRESSURE: 134 MMHG

## 2024-01-31 DIAGNOSIS — I48.0 PAROXYSMAL ATRIAL FIBRILLATION: Primary | ICD-10-CM

## 2024-01-31 PROCEDURE — 93000 ELECTROCARDIOGRAM COMPLETE: CPT | Performed by: INTERNAL MEDICINE

## 2024-01-31 PROCEDURE — 99214 OFFICE O/P EST MOD 30 MIN: CPT | Performed by: INTERNAL MEDICINE

## 2024-01-31 RX ORDER — METOPROLOL SUCCINATE 25 MG/1
25 TABLET, EXTENDED RELEASE ORAL 2 TIMES DAILY
Qty: 60 TABLET | Refills: 11 | Status: SHIPPED | OUTPATIENT
Start: 2024-01-31

## 2024-01-31 RX ORDER — AMIODARONE HYDROCHLORIDE 200 MG/1
200 TABLET ORAL 2 TIMES DAILY
Qty: 60 TABLET | Refills: 5 | Status: SHIPPED | OUTPATIENT
Start: 2024-01-31

## 2024-01-31 RX ORDER — MOMETASONE FUROATE 1 MG/G
CREAM TOPICAL
COMMUNITY
Start: 2024-01-26

## 2024-01-31 NOTE — PROGRESS NOTES
Cardiology Clinic Note  Gavin Quiroz MD, PhD    Subjective:     Encounter Date:01/31/2024      Patient ID: Carrie Golden is a 89 y.o. female.    Chief Complaint:  Chief Complaint   Patient presents with    Follow-up       HPI:      I the pleasure to see this 89-year-old female who is very high functioning.  Prior to last clinic she was diagnosed with new onset atrial fibrillation in the hospital in the setting of respiratory illness.  She also has history of DVT right lower extremity, CAD, history of NSTEMI, essential hypertension dyslipidemia with preserved EF.  She denies any anginal chest pain, shortness of breath much improved since her hospitalization.  She presents back today for follow-up with A-fib RVR 120s to 130s.  We discussed medicine changes with restarting amiodarone, continuation of beta-blocker, continuation of her Coumadin and ultimately back to clinic in 1 week for repeat EKG and if not improved would recommend DC cardioversion.  She is agreeable to this course of action, no unexplained syncope no unstable angina no heart failure signs or symptoms today      Review of systems otherwise negative x 14 point review of systems except as mentioned above  Historical data copied forward from previous encounters in EMR including the history, exam, and assessment/plan has been reviewed and is unchanged unless noted otherwise.     Cardiac medicines reviewed with risk, benefits, and necessity of each discussed.     Risk and benefit of cardiac testing reviewed including death heart attack stroke pain bleeding infection need for vascular /cardiovascular surgery were discussed and the patient      Objective:         Vitals reviewed below     Physical Exam  Tachycardic and irregular, no gallop heave or lift  No new murmurs  No clubbing cyanosis with trace to 1+ edema at the ankles only stable  No carotid bruits or JVD  Clear to auscultation    Assessment:      Independently manage medical conditions as  "follows     Paroxysmal A. fib, recurrent recurrent paroxysmal atrial fibrillation    CAD, angina free  Hypertension  Atherogenic dyslipidemia     Recurrent atrial fibrillation, restart amiodarone, metoprolol\  Recheck EKG 1 week for any return of sinus rhythm, if not would proceed with elective DC cardioversion  Continue Imdur 30 daily  Off Cardizem  Lasix 20 twice daily as needed for swelling  Avoid dehydration     Preserved LV systolic function  Continue medical management and optimization of medical therapy and rate and rhythm control strategy  Continue Coumadin  Back to cardiology in 1 week for repeat EKG, consider cardioversion if remains in atrial fibrillation with continuation of amiodarone    Further recommendation follow findings and clinical course    Gavin Quiroz MD, PhD    Objective:         /91 (BP Location: Left arm, Patient Position: Sitting)   Pulse (!) 128   Resp 18   Ht 152.4 cm (60\")   Wt 54 kg (119 lb)   BMI 23.24 kg/m²           The pleasure to be involved in this patient's cardiovascular care.  Please call with any questions or concerns  Gavin Quiroz MD, PhD    Most recent EKG as reviewed and interpreted by me:    ECG 12 Lead    Date/Time: 1/31/2024 2:33 PM  Performed by: Gavin Quiroz MD    Authorized by: Gavin Quiroz MD  Comparison: not compared with previous ECG   Previous ECG: no previous ECG available  Rhythm: atrial fibrillation  Rate: tachycardic    Clinical impression: abnormal EKG  Comments: Possible old anterior MI         Most recent echo as reviewed and interpreted by me:  Results for orders placed during the hospital encounter of 10/02/21    Adult Transthoracic Echo Complete W/ Cont if Necessary Per Protocol    Interpretation Summary  · Estimated left ventricular EF was in agreement with the calculated left ventricular EF. Left ventricular ejection fraction appears to be 56 - 60%. Left ventricular systolic function is normal.  · Left " ventricular diastolic function is consistent with (grade II w/high LAP) pseudonormalization.  · Left atrial volume is mildly increased.  · Estimated right ventricular systolic pressure from tricuspid regurgitation is normal (<35 mmHg).      Most recent stress test as reviewed and interpreted by me:      Most recent cardiac catheterization as reviewed interpreted by me:  Results for orders placed during the hospital encounter of 12/01/19    Cardiac Catheterization/Vascular Study    Narrative  CARDIAC CATHETERIZATION REPORT      DATE OF PROCEDURE:  12/3/2019    INDICATION FOR PROCEDURE:    Non-ST elevation myocardial infarction  CAD  Angina pectoris    PROCEDURE PERFORMED:    Left heart catheterization  coronary angiography  left ventriculography    PROCEDURE COMMENTS:    After informed consent was obtained, the patient was prepped and draped in the usual sterile manner.  Mild to moderate sedation was administered.  Right femoral artery was accessed without difficulty and 6 Pakistani arterial sheath was inserted.  Sheath was flushed with heparinized saline.    Using 6 Pakistani Rebeca catheters, first left coronary artery and the right coronary was electively engaged and appropriate views were taken.  A 6 Pakistani JR4 catheter was used to cross aortic valve and left heart catheterization was performed.  Left ventriculography was done in a review.    Patient tolerated the procedure well.    FINDINGS:    1. HEMODYNAMICS:    Aortic pressure: 145/72    LVEDP: 10 mmHg    Gradient across aortic valve on pullback: No gradient across aortic valve      2. LEFT VENTRICULOGRAPHY: 50 to 55% mild global hypokinesis      3. CORONARY ANGIOGRAPHY:    A: Left main coronary artery: Calcified and mild disease up to 10 to 20% in the ostium.  TIESHA-3 flow was present    B: Left anterior descending artery: Diffuse disease up to 30% in proximal mid LAD at the origin of D1 branch of LAD.  D1 branch of LAD has 60 to 70% ostial stenosis which is  calcified.  This has not changed from cardiac catheterization done in 2017.  TIESHA-3 flow was present in both vessels    C: Left circumflex coronary artery: Diffuse 25 to 30% mid LCx stenosis    D: Right coronary artery: 20% proximal RCA stenosis.  It is large and dominant vessel.    SUMMARY:    Single-vessel coronary artery disease  Intermediate lesion of ostial D1 branch of LAD.  This was not different from cardiac catheterization done in 2017  Preserved left ventricular function with mild global hypokinesis    RECOMMENDATIONS:    Recommend medical treatment    The following portions of the patient's history were reviewed and updated as appropriate: allergies, current medications, past family history, past medical history, past social history, past surgical history, and problem list.      ROS:  14 point review of systems negative except as mentioned above    Current Outpatient Medications:     furosemide (LASIX) 20 MG tablet, Take 1 tablet by mouth 2 (Two) Times a Day., Disp: 180 tablet, Rfl: 1    isosorbide mononitrate (IMDUR) 30 MG 24 hr tablet, Take 1 tablet by mouth once daily, Disp: 90 tablet, Rfl: 1    metoprolol tartrate (LOPRESSOR) 25 MG tablet, Take 0.5 tablets by mouth 2 (Two) Times a Day. Take half a tablet two times a day, Disp: , Rfl:     mometasone (ELOCON) 0.1 % cream, APPLY TO CHEST AND BACK ONCE A DAY TO TWICE A DAY AS NEEDED FOR ITCH, Disp: , Rfl:     warfarin (COUMADIN) 2 MG tablet, Take 1 tablet by mouth Every Night. Indications: history of DVT/PE (Patient taking differently: Take 1 tablet by mouth Every Night. 1.5mg MWF 3mg AOD  Indications: history of DVT/PE), Disp: , Rfl:     Problem List:  Patient Active Problem List   Diagnosis    Chest pain    Dyslipidemia    Hypertension    Gastroesophageal reflux disease    NSTEMI, initial episode of care    Tear film insufficiency    After-cataract with vision obscured    Cervical radiculopathy    Lumbar radiculopathy    Metatarsalgia of left foot     Localized, primary osteoarthritis    Mixed hyperlipidemia    Colitis    Degeneration of lumbar intervertebral disc    Osteoarthritis of knee    Right leg pain    Pulmonary embolism    Acute deep vein thrombosis (DVT) of distal vein of right lower extremity    Iron deficiency anemia    Fall, initial encounter    Pelvic fracture    PAF (paroxysmal atrial fibrillation)    Coronary artery disease involving native coronary artery of native heart without angina pectoris     Past Medical History:  Past Medical History:   Diagnosis Date    Amblyopia 10/31/2012    Amblyopia, left 03/12/2020    Arthritis     Conjunctival hemorrhage 10/08/2014    Coronary artery disease     Deep vein thrombosis     Deformity of left foot 08/18/2020    Added automatically from request for surgery [id number]    Dermatochalasis of left upper eyelid 03/12/2020    Dermatochalasis of right upper eyelid 03/12/2020    Drooping eyelid, bilateral     Ectropion 03/12/2020    Epiretinal membrane (ERM) of right eye 03/12/2020    Frequent UTI     GERD (gastroesophageal reflux disease)     Hallux rigidus, left foot 08/18/2020    Added automatically from request for surgery [id number]    Hearing loss     bilateral hearing aides    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 sure which 1  contracted at age 8    History of transfusion     [place] (hard of hearing)     Hyperlipidemia     Hypertension     Myocardial infarction     Past heart attack     X2 MILD LAST ONE OCT 2019    PONV (postoperative nausea and vomiting)     Presence of intraocular lens 03/12/2020     Past Surgical History:  Past Surgical History:   Procedure Laterality Date    ANTERIOR AND POSTERIOR VAGINAL REPAIR      BACK SURGERY      BLEPHAROPLASTY Bilateral 5/23/2019    Procedure: bilateral upper lid blepharoplasty;  Surgeon: Mauricio Pennington MD;  [place] [facility] [place] [facility] [place]  Service: Ophthalmology    BROW LIFT Bilateral 2/13/2020    Procedure: BILATERAL TEMPORAL DIRECT BROWLIFT;  Surgeon: Sreekanth Bird,  MD;  [facility];  Service: Ophthalmology;  Laterality: Bilateral;    CARDIAC CATHETERIZATION N/A 12/3/2019    Procedure: Left Heart Cath;  Surgeon: Puma Briseno MD;  Location: [facility] CATH INVASIVE LOCATION;  Service: Cardiovascular    CARDIAC CATHETERIZATION N/A 12/3/2019    Procedure: Coronary angiography;  Surgeon: Puma Briseno MD;  Location: [facility] CATH INVASIVE LOCATION;  Service: Cardiovascular    CARDIAC CATHETERIZATION N/A 12/3/2019    Procedure: Left ventriculography;  Surgeon: Puma Briseno MD;  Location: [facility] CATH INVASIVE LOCATION;  Service: Cardiovascular    CATARACT EXTRACTION EXTRACAPSULAR W/ INTRAOCULAR LENS IMPLANTATION Bilateral     CERVICAL SPINE ANTERIOR      with instrumentation    CHEILECTOMY Left 8/28/2020    Procedure: CHEILECTOMY;  Surgeon: GAB Rees DPM;  Location: [facility] MAIN OR;  Service: Podiatry;  Laterality: Left;    COLON SURGERY      for obstruction    ALBERTO TUBE INSERTION Bilateral 5/23/2019    Procedure: ALBERTO TUBE;  Surgeon: Mauricio Pennington MD;  Location: [facility] OR [place];  Service: Ophthalmology    ECTROPION REPAIR Bilateral 5/23/2019    Procedure: bilateral lower lid ectropion repair, bilateral punctoplasty;  Surgeon: Mauricio Pennington MD;  Location: [facility];  Service: Ophthalmology    ENDOSCOPY N/A 2/18/2022    Procedure: ESOPHAGOGASTRODUODENOSCOPY WITH DILATATION (BALLOON 15MM-18MM, UP TO 18MM) AND BOUGIE #[id number];  Surgeon: Wiley Parks MD;  Location: [facility] ENDOSCOPY;  Service: Gastroenterology;  Laterality: N/A;  Post: DYSPHAGIA    EYE SURGERY      FOOT FUSION Left 12/30/2016    Procedure: LEFT HALLUX METATARSALPHALANGEAL ARTHRODESIS SILVER BUNIONECTOMY METATARSAL HEAD RESECTION 2-5 CALCANEAL BONE GRAFT;  Surgeon: Monster Harper Jr., MD;  Location: [facility] MAIN OR;  Service:     HYSTERECTOMY      INGUINAL HERNIA REPAIR Bilateral     METATARSAL OSTEOTOMY Left 8/28/2020    Procedure: Metatarsal head resection 5;  Surgeon: GAB Rees  CELESTE Weaver;  Location: [facility] MAIN OR;  Service: Podiatry;  Laterality: Left;    ROTATOR CUFF REPAIR Right     SIGMOIDOSCOPY N/A 10/6/2021    Procedure: SIGMOIDOSCOPY WITH BIOPSY X1 AREA;  Surgeon: Uche Vaz MD;  Location: [facility] ENDOSCOPY;  Service: Gastroenterology;  Laterality: N/A;  ischemic colitis    STOMACH SURGERY      for ulcer     Social History:  Social History     Socioeconomic History    Marital status:    Tobacco Use    Smoking status: Former     Packs/day: 0.25     Years: 10.00     Additional pack years: 0.00     Total pack years: 2.50     Types: Cigarettes     Quit date: 1990     Years since quittin.1    Smokeless tobacco: Never   Vaping Use    Vaping Use: Never used   Substance and Sexual Activity    Alcohol use: No    Drug use: No    Sexual activity: Defer     Allergies:  Allergies   Allergen Reactions    Amoxicillin Itching    Codeine Nausea And Vomiting and Dizziness    Lortab [Hydrocodone-Acetaminophen] Nausea And Vomiting    Nitrofurantoin Hives    Sulfa Antibiotics Rash     Immunizations:  Immunization History   Administered Date(s) Administered    COVID-19 (MODERNA) 1st,2nd,3rd Dose Monovalent 2021, 2021    TD Preservative Free (Tenivac) 2020            In-Office Procedure(s):  No orders to display        ASCVD RIsk Score::  The ASCVD Risk score (Erica DK, et al., 2019) failed to calculate for the following reasons:    The 2019 ASCVD risk score is only valid for ages 40 to 79    The patient has a prior MI or stroke diagnosis    Imaging:    Results for orders placed in visit on 23    XR Foot 3+ View Left    Narrative  XR FOOT 3+ VW LEFT    Date of Exam: 3/22/2023 8:53 AM EDT    Indication: pain bottom of foot near 5th toe and pain at great toe, painful hardware  prev sx   room 15  wb.    Comparison: Bilateral foot radiograph 6 2020.    Findings:  Osteoplasty changes of the distal first metatarsal proximal aspect of the proximal phalanx of  the great toe are noted with orthopedic fixation devices traversing the first MTP joint. There is degenerative spurring at the medial margin of the first MTP  joint.    Osteopenic changes are present. The second toe appears partially subluxed medially at the first MTP joint, similar to prior. Hammertoe configuration of the second through fourth digits. The fifth digit is displaced in a dorsal and medial direction based  on the  oblique view.    There is moderate narrowing of the first interphalangeal joint with marginal spurring. Chronic appearing deformity of the second through fifth metatarsal heads, unchanged from 2020.    No acute fracture or patient is seen. No convincing plain film evidence of osteomyelitis.    Impression  1. Osteoplasty changes of the first MTP joint. Hardware appears intact. Satisfactory joint alignment. There is progressive degenerative spurring at medial margin of the joint. The latter  2. Chronic appearing medial subluxation of the second digit at the PIP joint. Chronic appearing displacement of the fifth toe and a dorsal and medial direction  3. Chronic appearing deformity of the second through fifth metatarsal heads, similar to the 2020 examination.  4. Advanced osteopenia.  5. No acute osseous abnormalities are identified.    Electronically Signed: Pari Olivera  3/22/2023 3:23 PM EDT  Workstation ID: [id number]       Results for orders placed during the hospital encounter of 07/17/23    CT Head Without Contrast    Narrative  CT HEAD WO CONTRAST    Date of Exam: 7/17/2023 10:00 AM EDT    Indication: Mental status change, unknown cause.    Comparison: None available.    Technique: Axial CT images were obtained of the head without contrast administration.  Coronal reconstructions were performed.  Automated exposure control and iterative reconstruction methods were used.      FINDINGS:  Foci of periventricular and subcortical white matter hypoattenuation are consistent with chronic,  microvascular ischemic change. There is age-related loss of brain parenchymal volume with prominence of sulcation and ventriculomegaly. No significant mass  effect, midline shift, intracranial hemorrhage, or hydrocephalus is identified. No extra-axial fluid collection is identified.   The calvarium and overlying soft tissues are unremarkable. The paranasal sinuses and bilateral mastoid air cells are  adequately aerated. Bilateral lens prostheses are noted. The orbital structures are otherwise unremarkable.    Impression  1.No acute intracranial abnormality is identified.  2.Findings compatible with chronic microvascular ischemic change and diffuse cortical atrophy.    Electronically Signed: Dylan Lisa  7/17/2023 10:13 AM EDT  Workstation ID: [id number]      Results for orders placed during the hospital encounter of 07/17/23    CT Head Without Contrast    Narrative  CT HEAD WO CONTRAST    Date of Exam: 7/17/2023 10:00 AM EDT    Indication: Mental status change, unknown cause.    Comparison: None available.    Technique: Axial CT images were obtained of the head without contrast administration.  Coronal reconstructions were performed.  Automated exposure control and iterative reconstruction methods were used.      FINDINGS:  Foci of periventricular and subcortical white matter hypoattenuation are consistent with chronic, microvascular ischemic change. There is age-related loss of brain parenchymal volume with prominence of sulcation and ventriculomegaly. No significant mass  effect, midline shift, intracranial hemorrhage, or hydrocephalus is identified. No extra-axial fluid collection is identified.   The calvarium and overlying soft tissues are unremarkable. The paranasal sinuses and bilateral mastoid air cells are  adequately aerated. Bilateral lens prostheses are noted. The orbital structures are otherwise unremarkable.    Impression  1.No acute intracranial abnormality is identified.  2.Findings compatible with  chronic microvascular ischemic change and diffuse cortical atrophy.    Electronically Signed: Dylan Lisa  7/17/2023 10:13 AM EDT  Workstation ID: [id number]      Lab Review:   Admission on 09/22/2023, Discharged on 09/22/2023   Component Date Value    Color 09/22/2023 Yellow     Clarity, UA 09/22/2023 Cloudy (A)     Specific Gravity  09/22/2023 1.010     pH, Urine 09/22/2023 5.0     Leukocytes 09/22/2023 Small (1+) (A)     Nitrite, UA 09/22/2023 Positive (A)     Protein, POC 09/22/2023 Negative     Glucose, UA 09/22/2023 Negative     Ketones, UA 09/22/2023 Negative     Urobilinogen, UA 09/22/2023 0.2 E.U./dL     Bilirubin 09/22/2023 Negative     Blood, UA 09/22/2023 Small (A)     Lot Number 09/22/2023 [id number]     Expiration Date 09/22/2023 08/31/2024     Urine Culture 09/22/2023 >100,000 CFU/mL Escherichia coli (A)      Recent labs reviewed and interpreted for clinical significance and application            Level of Care:           Gavin Quiroz MD  01/31/24  .

## 2024-02-09 ENCOUNTER — TELEPHONE (OUTPATIENT)
Dept: CARDIOLOGY | Facility: CLINIC | Age: 89
End: 2024-02-09
Payer: MEDICARE

## 2024-02-09 DIAGNOSIS — I48.0 PAROXYSMAL ATRIAL FIBRILLATION: Primary | ICD-10-CM

## 2024-02-09 NOTE — TELEPHONE ENCOUNTER
Caller: Chantel Carrie SAMPSON    Relationship: Self    Best call back number: [phone]    What is the best time to reach you: ANYTIME     Who are you requesting to speak with (clinical staff, provider,  specific staff member): DR THAYER    What was the call regarding: PT STATES SHE WAS IN ON 2.7.24 FOR AN EKG, SHE WAS TOLD DR THAYER WILL BE CALLING HER BACK TO GO OVER RESULTS. SHE HAS NOT HEARD ANYTHING AND IS REQUESTING TO SPEAK WITH HIM. ALSO SHE WANTS TO SET UP HER NEXT FU APPT. NO NOTES IN LAST OFFICE VISIT WITH DIRECTIN OF WHEN TO SCHEDULE THAT FOR. PLEASE ADVISE AND CALL PT BACK TO SCHEDULE APPT.     Is it okay if the provider responds through MyChart: CALL BACK

## 2024-02-09 NOTE — TELEPHONE ENCOUNTER
Dr major reviewed EKG and still in afib. Rates are better. She will need cardioversion scheduled. Spoke with patients niece and the patient.     Izabela please call her niece for scheduling the cardioversion.

## 2024-02-16 RX ORDER — WARFARIN SODIUM 3 MG/1
1.5 TABLET ORAL
COMMUNITY

## 2024-02-16 RX ORDER — WARFARIN SODIUM 3 MG/1
3 TABLET ORAL 2 TIMES WEEKLY
COMMUNITY

## 2024-02-19 ENCOUNTER — ANESTHESIA EVENT (OUTPATIENT)
Dept: CARDIOLOGY | Facility: HOSPITAL | Age: 89
End: 2024-02-19
Payer: MEDICARE

## 2024-02-19 ENCOUNTER — ANESTHESIA (OUTPATIENT)
Dept: CARDIOLOGY | Facility: HOSPITAL | Age: 89
End: 2024-02-19
Payer: MEDICARE

## 2024-02-19 ENCOUNTER — HOSPITAL ENCOUNTER (OUTPATIENT)
Dept: CARDIOLOGY | Facility: HOSPITAL | Age: 89
Discharge: HOME | End: 2024-02-19
Payer: MEDICARE

## 2024-02-19 VITALS
RESPIRATION RATE: 14 BRPM | HEIGHT: 61 IN | HEART RATE: 45 BPM | DIASTOLIC BLOOD PRESSURE: 70 MMHG | SYSTOLIC BLOOD PRESSURE: 119 MMHG | WEIGHT: 116.62 LBS | TEMPERATURE: 97.6 F | OXYGEN SATURATION: 99 % | BODY MASS INDEX: 22.02 KG/M2

## 2024-02-19 DIAGNOSIS — I48.0 PAROXYSMAL ATRIAL FIBRILLATION: ICD-10-CM

## 2024-02-19 LAB
INR PPP: 5.56 (ref 2–3)
PROTHROMBIN TIME: 53.4 SECONDS (ref 19.4–28.5)

## 2024-02-19 PROCEDURE — 25810000003 SODIUM CHLORIDE 0.9 % SOLUTION: Performed by: NURSE ANESTHETIST, CERTIFIED REGISTERED

## 2024-02-19 PROCEDURE — 93005 ELECTROCARDIOGRAM TRACING: CPT | Performed by: STUDENT IN AN ORGANIZED HEALTH CARE EDUCATION/TRAINING PROGRAM

## 2024-02-19 PROCEDURE — 92960 CARDIOVERSION ELECTRIC EXT: CPT | Performed by: STUDENT IN AN ORGANIZED HEALTH CARE EDUCATION/TRAINING PROGRAM

## 2024-02-19 PROCEDURE — 85610 PROTHROMBIN TIME: CPT | Performed by: STUDENT IN AN ORGANIZED HEALTH CARE EDUCATION/TRAINING PROGRAM

## 2024-02-19 PROCEDURE — 92960 CARDIOVERSION ELECTRIC EXT: CPT

## 2024-02-19 PROCEDURE — 25010000002 PROPOFOL 200 MG/20ML EMULSION: Performed by: NURSE ANESTHETIST, CERTIFIED REGISTERED

## 2024-02-19 PROCEDURE — 93010 ELECTROCARDIOGRAM REPORT: CPT | Performed by: STUDENT IN AN ORGANIZED HEALTH CARE EDUCATION/TRAINING PROGRAM

## 2024-02-19 RX ORDER — AMIODARONE HYDROCHLORIDE 200 MG/1
200 TABLET ORAL DAILY
COMMUNITY

## 2024-02-19 RX ORDER — SODIUM CHLORIDE 9 MG/ML
INJECTION, SOLUTION INTRAVENOUS CONTINUOUS PRN
Status: DISCONTINUED | OUTPATIENT
Start: 2024-02-19 | End: 2024-02-19 | Stop reason: SURG

## 2024-02-19 RX ORDER — LIDOCAINE HYDROCHLORIDE 20 MG/ML
INJECTION, SOLUTION EPIDURAL; INFILTRATION; INTRACAUDAL; PERINEURAL AS NEEDED
Status: DISCONTINUED | OUTPATIENT
Start: 2024-02-19 | End: 2024-02-19 | Stop reason: SURG

## 2024-02-19 RX ORDER — PROPOFOL 10 MG/ML
INJECTION, EMULSION INTRAVENOUS AS NEEDED
Status: DISCONTINUED | OUTPATIENT
Start: 2024-02-19 | End: 2024-02-19 | Stop reason: SURG

## 2024-02-19 RX ADMIN — PROPOFOL 30 MG: 10 INJECTION, EMULSION INTRAVENOUS at 12:59

## 2024-02-19 RX ADMIN — PROPOFOL 50 MG: 10 INJECTION, EMULSION INTRAVENOUS at 12:58

## 2024-02-19 RX ADMIN — SODIUM CHLORIDE: 9 INJECTION, SOLUTION INTRAVENOUS at 12:56

## 2024-02-19 RX ADMIN — LIDOCAINE HYDROCHLORIDE 60 MG: 20 INJECTION, SOLUTION EPIDURAL; INFILTRATION; INTRACAUDAL; PERINEURAL at 12:58

## 2024-02-19 NOTE — ANESTHESIA POSTPROCEDURE EVALUATION
Patient: Carrie Golden    Procedure Summary       Date: 02/19/24 Room / Location: [facility]    Anesthesia Start: 1254 Anesthesia Stop: 1303    Procedure: CARDIOVERSION EXTERNAL IN CARDIOLOGY DEPARTMENT Diagnosis:       Paroxysmal atrial fibrillation      (afib)    Scheduled Providers: Dee Angela MD; Vianey Correa CRNA Provider: Romulo Marquez MD    Anesthesia Type: general ASA Status: 3            Anesthesia Type: general    Vitals  Vitals Value Taken Time   /70 02/19/24 1401   Temp     Pulse 45 02/19/24 1409   Resp 14 02/19/24 1305   SpO2 99 % 02/19/24 1409   Vitals shown include unfiled device data.        Post Anesthesia Care and Evaluation    Patient location during evaluation: PACU  Patient participation: complete - patient participated  Level of consciousness: awake  Pain score: 3 (See nurse's notes for pain score)  Pain management: adequate    Airway patency: patent  Anesthetic complications: No anesthetic complications  PONV Status: none  Cardiovascular status: acceptable  Respiratory status: acceptable and spontaneous ventilation  Hydration status: acceptable    Comments: Patient seen and examined postoperatively; vital signs stable; SpO2 greater than or equal to 90%; cardiopulmonary status stable; nausea/vomiting adequately controlled; pain adequately controlled; no apparent anesthesia complications; patient discharged from anesthesia care when discharge criteria were met

## 2024-02-19 NOTE — DISCHARGE INSTRUCTIONS
CV DC Instructions    The medication, which was used to put the patient to sleep, will be acting in your body for the next twenty-four (24) hours, so you might feel a little sleepy; this feeling will slowly wear off.  Because the medicine is still in your system for the next twenty-four (24) hours, the adult patient SHOULD NOT:    Drive a car, operate machinery, or power tools  Drink any alcoholic drinks (not even beer)  Make any important decisions such as to sign important papers      We strongly suggest that a responsible adult be with the patient the rest of the day.    3) Skin irritation where the pads were placed may occur. Use over the counter medications like hydrocortisone cream or neosporin as needed.

## 2024-02-19 NOTE — DISCHARGE INSTR - APPOINTMENTS
Follow up with Dr. Quiroz in 4 weeks. Follow up with physician managing warfarin. INR on this visit was supra-therapeutic (5.5)

## 2024-02-19 NOTE — ANESTHESIA PREPROCEDURE EVALUATION
Anesthesia Evaluation     Patient summary reviewed and Nursing notes reviewed   NPO Solid Status: > 8 hours  NPO Liquid Status: > 8 hours           Airway   Mallampati: II  TM distance: >3 FB  Neck ROM: full  No difficulty expected  Dental    (+) partials    Pulmonary - normal exam   (+) pulmonary embolism,  Cardiovascular   Exercise tolerance: good (4-7 METS)    ECG reviewed  PT is on anticoagulation therapy  Rhythm: irregular  Rate: normal    (+) hypertension well controlled, past MI  >12 months, CAD, dysrhythmias Atrial Fib, DVT resolved, hyperlipidemia      Neuro/Psych  (+) numbness  GI/Hepatic/Renal/Endo    (+) GERD well controlled    Musculoskeletal     (+) arthralgias, back pain  Abdominal  - normal exam   Substance History      OB/GYN          Other   arthritis,     ROS/Med Hx Other: [facility]    10/21  · Estimated left ventricular EF was in agreement with the calculated left ventricular EF. Left ventricular ejection fraction appears to be 56 - 60%. Left ventricular systolic function is normal.  · Left ventricular diastolic function is consistent with (grade II w/high LAP) pseudonormalization.  · Left atrial volume is mildly increased.  · Estimated right ventricular systolic pressure from tricuspid regurgitation is normal (<35 mmHg)                Anesthesia Plan    ASA 3     general     intravenous induction     Anesthetic plan, risks, benefits, and alternatives have been provided, discussed and informed consent has been obtained with: patient.    Plan discussed with CRNA and CAA.    CODE STATUS:

## 2024-02-21 LAB
QT INTERVAL: 553 MS
QTC INTERVAL: 476 MS

## 2024-03-20 ENCOUNTER — OFFICE VISIT (OUTPATIENT)
Dept: CARDIOLOGY | Facility: CLINIC | Age: 89
End: 2024-03-20
Payer: MEDICARE

## 2024-03-20 VITALS
RESPIRATION RATE: 18 BRPM | HEIGHT: 60 IN | WEIGHT: 113 LBS | BODY MASS INDEX: 22.19 KG/M2 | SYSTOLIC BLOOD PRESSURE: 156 MMHG | DIASTOLIC BLOOD PRESSURE: 78 MMHG | HEART RATE: 44 BPM

## 2024-03-20 DIAGNOSIS — R00.1 BRADYCARDIA, DRUG INDUCED: Primary | ICD-10-CM

## 2024-03-20 DIAGNOSIS — T50.905A BRADYCARDIA, DRUG INDUCED: Primary | ICD-10-CM

## 2024-03-20 DIAGNOSIS — I48.0 PAROXYSMAL ATRIAL FIBRILLATION: ICD-10-CM

## 2024-03-20 RX ORDER — METOPROLOL SUCCINATE 25 MG/1
25 TABLET, EXTENDED RELEASE ORAL DAILY
Qty: 90 TABLET | Refills: 1 | Status: ON HOLD | OUTPATIENT
Start: 2024-03-20

## 2024-03-20 RX ORDER — AMIODARONE HYDROCHLORIDE 100 MG/1
100 TABLET ORAL DAILY
Qty: 30 TABLET | Refills: 3 | Status: SHIPPED | OUTPATIENT
Start: 2024-03-20 | End: 2024-03-25

## 2024-03-20 NOTE — PROGRESS NOTES
Cardiology Clinic Note  Gavin Quiroz MD, PhD    Subjective:     Encounter Date:03/20/2024      Patient ID: Carrie Golden is a 89 y.o. female.    Chief Complaint:  Chief Complaint   Patient presents with    Follow-up       HPI:      I the pleasure to see this 89-year-old female who is very high functioning.  Prior to last clinic she was diagnosed with new onset atrial fibrillation in the hospital in the setting of respiratory illness.  She also has history of DVT right lower extremity, CAD, history of NSTEMI, essential hypertension dyslipidemia with preserved EF.  She denies any anginal chest pain, shortness of breath much improved since her hospitalization.  She presents back today for follow-up with sinus bradycardia on current pharmacotherapy after cardioversion for A-fib RVR now on amiodarone and metoprolol.  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     Cardiac medicines reviewed with risk, benefits, and necessity of each discussed.     Risk and benefit of cardiac testing reviewed including death heart attack stroke pain bleeding infection need for vascular /cardiovascular surgery were discussed and the patient      Objective:         Vitals reviewed below     Physical Exam  bradycardic irregular, sinus bradycardia, no gallop heave or lift  No new murmurs  No clubbing cyanosis with trace to 1+ edema at the ankles only stable  No carotid bruits or JVD  Clear to  "auscultation     Assessment:      Independently manage medical conditions as follows     Paroxysmal A. fib, recurrent recurrent paroxysmal atrial fibrillation  Status post DC cardioversion  Sinus bradycardia medication induced  Amiodarone will be decreased to 100 mg daily with sinus bradycardia in the 40s  Decrease metoprolol to succinate to 25 daily from 50, she can also skip 1 day secondary to low heart rates  Off Cardizem at this point, will consider in the future if blood pressures remain elevated  Placed 30-day monitor to evaluate heart rates at home, watch out for any pauses or indication for tachybradycardia syndrome or sick sinus syndrome that would require device placement    Blood pressure up today, advised her to check blood pressure twice a day at home and call these results to clinic over the next couple weeks for further titration of afterload     CAD, angina free, secondary prevention, long-acting nitrates on board  No orthostasis observed    Hypertension  Atherogenic dyslipidemia     Off Cardizem  Lasix 20 twice daily as needed for swelling  Avoid dehydration     Preserved LV systolic function  Continue medical management and optimization of medical therapy and rate and rhythm control strategy  Continue Coumadin  Back to cardiology in 1 week for repeat EKG, consider cardioversion if remains in atrial fibrillation with continuation of amiodarone     Further recommendation follow findings and clinical course    Multiple CV comorbidities reviewed individually and managed independently as above    Gavin Quiroz MD, PhD    Follow-up 6 months    Objective:         /78 (BP Location: Right arm, Patient Position: Sitting)   Pulse (!) 44   Resp 18   Ht 152.4 cm (60\")   Wt 51.3 kg (113 lb)   BMI 22.07 kg/m²     Physical Exam    Assessment:         There are no diagnoses linked to this encounter.       Plan:              The pleasure to be involved in this patient's cardiovascular care.  Please call " with any questions or concerns  Gavin Quiroz MD, PhD    Most recent EKG as reviewed and interpreted by me:    ECG 12 Lead    Date/Time: 3/20/2024 1:38 PM  Performed by: Gavin Quiroz MD    Authorized by: Gavin Quiroz MD  Comparison: not compared with previous ECG   Previous ECG: no previous ECG available  Rhythm: sinus bradycardia  Rate: bradycardic  QRS axis: normal    Clinical impression: non-specific ECG           Most recent echo as reviewed and interpreted by me:  Results for orders placed during the hospital encounter of 10/02/21    Adult Transthoracic Echo Complete W/ Cont if Necessary Per Protocol    Interpretation Summary  · Estimated left ventricular EF was in agreement with the calculated left ventricular EF. Left ventricular ejection fraction appears to be 56 - 60%. Left ventricular systolic function is normal.  · Left ventricular diastolic function is consistent with (grade II w/high LAP) pseudonormalization.  · Left atrial volume is mildly increased.  · Estimated right ventricular systolic pressure from tricuspid regurgitation is normal (<35 mmHg).      Most recent stress test as reviewed and interpreted by me:      Most recent cardiac catheterization as reviewed interpreted by me:  Results for orders placed during the hospital encounter of 12/01/19    Cardiac Catheterization/Vascular Study    Narrative  CARDIAC CATHETERIZATION REPORT      DATE OF PROCEDURE:  12/3/2019    INDICATION FOR PROCEDURE:    Non-ST elevation myocardial infarction  CAD  Angina pectoris    PROCEDURE PERFORMED:    Left heart catheterization  coronary angiography  left ventriculography    PROCEDURE COMMENTS:    After informed consent was obtained, the patient was prepped and draped in the usual sterile manner.  Mild to moderate sedation was administered.  Right femoral artery was accessed without difficulty and 6 [place] arterial sheath was inserted.  Sheath was flushed with heparinized saline.    Using 6  Kosovan Rebeca catheters, first left coronary artery and the right coronary was electively engaged and appropriate views were taken.  A 6 Kosovan JR4 catheter was used to cross aortic valve and left heart catheterization was performed.  Left ventriculography was done in a review.    Patient tolerated the procedure well.    FINDINGS:    1. HEMODYNAMICS:    Aortic pressure: 145/72    LVEDP: 10 mmHg    Gradient across aortic valve on pullback: No gradient across aortic valve      2. LEFT VENTRICULOGRAPHY: 50 to 55% mild global hypokinesis      3. CORONARY ANGIOGRAPHY:    A: Left main coronary artery: Calcified and mild disease up to 10 to 20% in the ostium.  TIESHA-3 flow was present    B: Left anterior descending artery: Diffuse disease up to 30% in proximal mid LAD at the origin of D1 branch of LAD.  D1 branch of LAD has 60 to 70% ostial stenosis which is calcified.  This has not changed from cardiac catheterization done in 2017.  TIESHA-3 flow was present in both vessels    C: Left circumflex coronary artery: Diffuse 25 to 30% mid LCx stenosis    D: Right coronary artery: 20% proximal RCA stenosis.  It is large and dominant vessel.    SUMMARY:    Single-vessel coronary artery disease  Intermediate lesion of ostial D1 branch of LAD.  This was not different from cardiac catheterization done in 2017  Preserved left ventricular function with mild global hypokinesis    RECOMMENDATIONS:    Recommend medical treatment    The following portions of the patient's history were reviewed and updated as appropriate: allergies, current medications, past family history, past medical history, past social history, past surgical history, and problem list.      ROS:  14 point review of systems negative except as mentioned above    Current Outpatient Medications:     amiodarone (PACERONE) 200 MG tablet, Take 1 tablet by mouth Daily., Disp: , Rfl:     furosemide (LASIX) 20 MG tablet, Take 1 tablet by mouth 2 (Two) Times a Day., Disp: 180  tablet, Rfl: 1    isosorbide mononitrate (IMDUR) 30 MG 24 hr tablet, Take 1 tablet by mouth once daily, Disp: 90 tablet, Rfl: 1    metoprolol succinate XL (TOPROL-XL) 25 MG 24 hr tablet, Take 1 tablet by mouth 2 (Two) Times a Day., Disp: 60 tablet, Rfl: 11    warfarin (COUMADIN) 3 MG tablet, Take 1 tablet by mouth 2 (Two) Times a Week. Friday and Saturday, Disp: , Rfl:     warfarin (COUMADIN) 3 MG tablet, Take 0.5 tablets by mouth Daily. Monday - Thursday dose, Disp: , Rfl:     Problem List:  Patient Active Problem List   Diagnosis    Chest pain    Dyslipidemia    Hypertension    Gastroesophageal reflux disease    NSTEMI, initial episode of care    Tear film insufficiency    After-cataract with vision obscured    Cervical radiculopathy    Lumbar radiculopathy    Metatarsalgia of left foot    Localized, primary osteoarthritis    Mixed hyperlipidemia    Colitis    Degeneration of lumbar intervertebral disc    Osteoarthritis of knee    Right leg pain    Pulmonary embolism    Acute deep vein thrombosis (DVT) of distal vein of right lower extremity    Iron deficiency anemia    Fall, initial encounter    Pelvic fracture    PAF (paroxysmal atrial fibrillation)    Coronary artery disease involving native coronary artery of native heart without angina pectoris     Past Medical History:  Past Medical History:   Diagnosis Date    Amblyopia 10/31/2012    Amblyopia, left 03/12/2020    Arthritis     Conjunctival hemorrhage 10/08/2014    Coronary artery disease     Deep vein thrombosis     Deformity of left foot 08/18/2020    Added automatically from request for surgery [id number]    Dermatochalasis of left upper eyelid 03/12/2020    Dermatochalasis of right upper eyelid 03/12/2020    Drooping eyelid, bilateral     Ectropion 03/12/2020    Epiretinal membrane (ERM) of right eye 03/12/2020    Frequent UTI     GERD (gastroesophageal reflux disease)     Hallux rigidus, left foot 08/18/2020    Added automatically from request for surgery  [id number]    Hearing loss     bilateral hearing aides    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 sure which 1  contracted at age 8    History of transfusion     [place] (hard of hearing)     Hyperlipidemia     Hypertension     Myocardial infarction     Past heart attack     X2 MILD LAST ONE OCT 2019    PONV (postoperative nausea and vomiting)     Presence of intraocular lens 03/12/2020     Past Surgical History:  Past Surgical History:   Procedure Laterality Date    ANTERIOR AND POSTERIOR VAGINAL REPAIR      BACK SURGERY      BLEPHAROPLASTY Bilateral 5/23/2019    Procedure: bilateral upper lid blepharoplasty;  Surgeon: Mauricio Pennington MD;  [place] [facility] [place] [facility];  Service: Ophthalmology    BROW LIFT Bilateral 2/13/2020    Procedure: BILATERAL TEMPORAL DIRECT BROWLIFT;  Surgeon: Sreekanth Bird MD;  Location: [facility] [place] [facility] [place];  Service: Ophthalmology;  Laterality: Bilateral;    CARDIAC CATHETERIZATION N/A 12/3/2019    Procedure: Left Heart Cath;  Surgeon: Puma Briseno MD;  Location: [facility] CATH INVASIVE LOCATION;  Service: Cardiovascular    CARDIAC CATHETERIZATION N/A 12/3/2019    Procedure: Coronary angiography;  Surgeon: Puma Briseno MD;  Location: [facility] CATH INVASIVE LOCATION;  Service: Cardiovascular    CARDIAC CATHETERIZATION N/A 12/3/2019    Procedure: Left ventriculography;  Surgeon: Puma Briseno MD;  Location: [facility] CATH INVASIVE LOCATION;  Service: Cardiovascular    CATARACT EXTRACTION EXTRACAPSULAR W/ INTRAOCULAR LENS IMPLANTATION Bilateral     CERVICAL SPINE ANTERIOR      with instrumentation    CHEILECTOMY Left 8/28/2020    Procedure: CHEILECTOMY;  Surgeon: GAB Rees DPM;  Location: [facility] MAIN OR;  Service: Podiatry;  Laterality: Left;    COLON SURGERY      for obstruction    ALBERTO TUBE INSERTION Bilateral 5/23/2019    Procedure: ALBERTO TUBE;  Surgeon: Mauricio Pennington MD;  [place] [facility] [place] [facility];  Service: Ophthalmology    ECTROPION REPAIR Bilateral 5/23/2019    Procedure: bilateral lower  lid ectropion repair, bilateral punctoplasty;  Surgeon: Mauricio Pennington MD;  Location:  [facility];  Service: Ophthalmology    ENDOSCOPY N/A 2022    Procedure: ESOPHAGOGASTRODUODENOSCOPY WITH DILATATION (BALLOON 15MM-18MM, UP TO 18MM) AND BOUGIE #48;  Surgeon: Wiley Parks MD;  Location: [facility] ENDOSCOPY;  Service: Gastroenterology;  Laterality: N/A;  Post: DYSPHAGIA    EYE SURGERY      FOOT FUSION Left 2016    Procedure: LEFT HALLUX METATARSALPHALANGEAL ARTHRODESIS SILVER BUNIONECTOMY METATARSAL HEAD RESECTION 2-5 CALCANEAL BONE GRAFT;  Surgeon: Monster Harper Jr., MD;  Location: [facility] MAIN OR;  Service:     HYSTERECTOMY      INGUINAL HERNIA REPAIR Bilateral     METATARSAL OSTEOTOMY Left 2020    Procedure: Metatarsal head resection 5;  Surgeon: GAB Rees DPM;  Location: [facility] MAIN OR;  Service: Podiatry;  Laterality: Left;    ROTATOR CUFF REPAIR Right     SIGMOIDOSCOPY N/A 10/6/2021    Procedure: SIGMOIDOSCOPY WITH BIOPSY X1 AREA;  Surgeon: Uche Vaz MD;  Location: [facility] ENDOSCOPY;  Service: Gastroenterology;  Laterality: N/A;  ischemic colitis    STOMACH SURGERY      for ulcer     Social History:  Social History     Socioeconomic History    Marital status:    Tobacco Use    Smoking status: Former     Current packs/day: 0.00     Average packs/day: 0.3 packs/day for 10.0 years (2.5 ttl pk-yrs)     Types: Cigarettes     Start date: 1980     Quit date: 1990     Years since quittin.2    Smokeless tobacco: Never   Vaping Use    Vaping status: Never Used   Substance and Sexual Activity    Alcohol use: No    Drug use: No    Sexual activity: Defer     Allergies:  Allergies   Allergen Reactions    Amoxicillin Itching    Codeine Nausea And Vomiting and Dizziness    Lortab [Hydrocodone-Acetaminophen] Nausea And Vomiting    Nitrofurantoin Hives    Sulfa Antibiotics Rash     Immunizations:  Immunization History   Administered Date(s) Administered    COVID-19  (MODERNA) 1st,2nd,3rd Dose Monovalent 02/01/2021, 03/06/2021    TD Preservative Free (Tenivac) 03/14/2020            In-Office Procedure(s):  No orders to display        ASCVD RIsk Score::  The ASCVD Risk score (Erica ANTON, et al., 2019) failed to calculate for the following reasons:    The 2019 ASCVD risk score is only valid for ages 40 to 79    The patient has a prior MI or stroke diagnosis    Imaging:    Results for orders placed in visit on 03/22/23    XR Foot 3+ View Left    Narrative  XR FOOT 3+ VW LEFT    Date of Exam: 3/22/2023 8:53 AM EDT    Indication: pain bottom of foot near 5th toe and pain at great toe, painful hardware  prev sx   room 15  wb.    Comparison: Bilateral foot radiograph 6 7/20/2020.    Findings:  Osteoplasty changes of the distal first metatarsal proximal aspect of the proximal phalanx of the great toe are noted with orthopedic fixation devices traversing the first MTP joint. There is degenerative spurring at the medial margin of the first MTP  joint.    Osteopenic changes are present. The second toe appears partially subluxed medially at the first MTP joint, similar to prior. Hammertoe configuration of the second through fourth digits. The fifth digit is displaced in a dorsal and medial direction based  on the  oblique view.    There is moderate narrowing of the first interphalangeal joint with marginal spurring. Chronic appearing deformity of the second through fifth metatarsal heads, unchanged from 2020.    No acute fracture or patient is seen. No convincing plain film evidence of osteomyelitis.    Impression  1. Osteoplasty changes of the first MTP joint. Hardware appears intact. Satisfactory joint alignment. There is progressive degenerative spurring at medial margin of the joint. The latter  2. Chronic appearing medial subluxation of the second digit at the PIP joint. Chronic appearing displacement of the fifth toe and a dorsal and medial direction  3. Chronic appearing deformity of  the second through fifth metatarsal heads, similar to the 2020 examination.  4. Advanced osteopenia.  5. No acute osseous abnormalities are identified.    Electronically Signed: Pari Olivera  3/22/2023 3:23 PM EDT  Workstation ID: [id number]       Results for orders placed during the hospital encounter of 07/17/23    CT Head Without Contrast    Narrative  CT HEAD WO CONTRAST    Date of Exam: 7/17/2023 10:00 AM EDT    Indication: Mental status change, unknown cause.    Comparison: None available.    Technique: Axial CT images were obtained of the head without contrast administration.  Coronal reconstructions were performed.  Automated exposure control and iterative reconstruction methods were used.      FINDINGS:  Foci of periventricular and subcortical white matter hypoattenuation are consistent with chronic, microvascular ischemic change. There is age-related loss of brain parenchymal volume with prominence of sulcation and ventriculomegaly. No significant mass  effect, midline shift, intracranial hemorrhage, or hydrocephalus is identified. No extra-axial fluid collection is identified.   The calvarium and overlying soft tissues are unremarkable. The paranasal sinuses and bilateral mastoid air cells are  adequately aerated. Bilateral lens prostheses are noted. The orbital structures are otherwise unremarkable.    Impression  1.No acute intracranial abnormality is identified.  2.Findings compatible with chronic microvascular ischemic change and diffuse cortical atrophy.    Electronically Signed: Dylan Lisa  7/17/2023 10:13 AM EDT  Workstation ID: [id number]      Results for orders placed during the hospital encounter of 07/17/23    CT Head Without Contrast    Narrative  CT HEAD WO CONTRAST    Date of Exam: 7/17/2023 10:00 AM EDT    Indication: Mental status change, unknown cause.    Comparison: None available.    Technique: Axial CT images were obtained of the head without contrast administration.  Coronal  reconstructions were performed.  Automated exposure control and iterative reconstruction methods were used.      FINDINGS:  Foci of periventricular and subcortical white matter hypoattenuation are consistent with chronic, microvascular ischemic change. There is age-related loss of brain parenchymal volume with prominence of sulcation and ventriculomegaly. No significant mass  effect, midline shift, intracranial hemorrhage, or hydrocephalus is identified. No extra-axial fluid collection is identified.   The calvarium and overlying soft tissues are unremarkable. The paranasal sinuses and bilateral mastoid air cells are  adequately aerated. Bilateral lens prostheses are noted. The orbital structures are otherwise unremarkable.    Impression  1.No acute intracranial abnormality is identified.  2.Findings compatible with chronic microvascular ischemic change and diffuse cortical atrophy.    Electronically Signed: Dylan Lisa  7/17/2023 10:13 AM EDT  Workstation ID: [id number]      Lab Review:   Hospital Outpatient Visit on 02/19/2024   Component Date Value    Protime 02/19/2024 53.4 (H)     INR 02/19/2024 5.56 (C)     QT Interval 02/19/2024 553     QTC Interval 02/19/2024 476    Admission on 09/22/2023, Discharged on 09/22/2023   Component Date Value    Color 09/22/2023 Yellow     Clarity, UA 09/22/2023 Cloudy (A)     Specific Gravity  09/22/2023 1.010     pH, Urine 09/22/2023 5.0     Leukocytes 09/22/2023 Small (1+) (A)     Nitrite, UA 09/22/2023 Positive (A)     Protein, POC 09/22/2023 Negative     Glucose, UA 09/22/2023 Negative     Ketones, UA 09/22/2023 Negative     Urobilinogen, UA 09/22/2023 0.2 E.U./dL     Bilirubin 09/22/2023 Negative     Blood, UA 09/22/2023 Small (A)     Lot Number 09/22/2023 [id number]     Expiration Date 09/22/2023 08/31/2024     Urine Culture 09/22/2023 >100,000 CFU/mL Escherichia coli (A)      Recent labs reviewed and interpreted for clinical significance and application            Level of  Care:           Gavin Quiroz MD  03/20/24  .

## 2024-03-23 ENCOUNTER — APPOINTMENT (OUTPATIENT)
Dept: CT IMAGING | Facility: HOSPITAL | Age: 89
End: 2024-03-23
Payer: MEDICARE

## 2024-03-23 ENCOUNTER — HOSPITAL ENCOUNTER (EMERGENCY)
Facility: HOSPITAL | Age: 89
Discharge: HOME | End: 2024-03-23
Attending: EMERGENCY MEDICINE
Payer: MEDICARE

## 2024-03-23 VITALS
SYSTOLIC BLOOD PRESSURE: 144 MMHG | HEIGHT: 60 IN | BODY MASS INDEX: 22.42 KG/M2 | WEIGHT: 114.2 LBS | RESPIRATION RATE: 17 BRPM | TEMPERATURE: 97.6 F | HEART RATE: 64 BPM | DIASTOLIC BLOOD PRESSURE: 61 MMHG | OXYGEN SATURATION: 97 %
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
## 2024-03-23 DIAGNOSIS — M54.42 ACUTE LEFT-SIDED LOW BACK PAIN WITH LEFT-SIDED SCIATICA: ICD-10-CM

## 2024-03-23 DIAGNOSIS — M25.552 LEFT HIP PAIN: ICD-10-CM

## 2024-03-23 LAB
INR PPP: 3 (ref 2–3)
PROTHROMBIN TIME: 30.1 SECONDS (ref 19.4–28.5)

## 2024-03-23 PROCEDURE — 85610 PROTHROMBIN TIME: CPT | Performed by: PHYSICIAN ASSISTANT

## 2024-03-23 PROCEDURE — 72131 CT LUMBAR SPINE W/O DYE: CPT

## 2024-03-23 PROCEDURE — 72192 CT PELVIS W/O DYE: CPT

## 2024-03-23 PROCEDURE — 36415 COLL VENOUS BLD VENIPUNCTURE: CPT

## 2024-03-23 PROCEDURE — 99284 EMERGENCY DEPT VISIT MOD MDM: CPT

## 2024-03-23 NOTE — DISCHARGE INSTRUCTIONS
Use Salonpas lidocaine patches over-the-counter for pain    You can use this with Tylenol and Motrin at home    Follow-up with primary care for recheck in 3 days if not improving or return to the ER if worse

## 2024-03-23 NOTE — ED PROVIDER NOTES
Subjective   History of Present Illness  Patient is a 89-year-old female presents to the ED with complaints of continued low back and left hip pain after mechanical fall 2 days ago.  States she tripped and fell landing on her buttocks.  States she has had continued pain ever since.  She states the pain is worse when trying to get up from a seated position but does get better after walking a few feet.  She states has been taking Tylenol.  She denies any saddle anesthesia or new bladder or bowel incontinence.  No new numbness or weakness of the legs.  She states the pain radiates down to about mid thigh at times.  No bruising or swelling that she has noted.  No head injury or loss of consciousness at the time of the incident.  She also denies any current lightheadedness or dizziness or any prior to the incident.  No abdominal pain or urinary complaints.  States she typically ambulates with her cane which she did upon arrival to the ED.        Review of Systems   Constitutional: Negative.    Respiratory: Negative.     Cardiovascular: Negative.    Gastrointestinal:  Negative for abdominal pain, nausea and vomiting.   Genitourinary:  Negative for difficulty urinating, dysuria, flank pain, frequency and hematuria.   Musculoskeletal:  Positive for arthralgias and back pain. Negative for gait problem, neck pain and neck stiffness.   Skin: Negative.    Neurological:  Negative for weakness and numbness.       Past Medical History:   Diagnosis Date    Amblyopia 10/31/2012    Amblyopia, left 03/12/2020    Arthritis     Conjunctival hemorrhage 10/08/2014    Coronary artery disease     Deep vein thrombosis     Deformity of left foot 08/18/2020    Added automatically from request for surgery [id number]    Dermatochalasis of left upper eyelid 03/12/2020    Dermatochalasis of right upper eyelid 03/12/2020    Drooping eyelid, bilateral     Ectropion 03/12/2020    Epiretinal membrane (ERM) of right eye 03/12/2020    Frequent UTI     GERD  (gastroesophageal reflux disease)     Hallux rigidus, left foot 08/18/2020    Added automatically from request for surgery [id number]    Hearing loss     bilateral hearing aides    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 sure which 1  contracted at age 8    History of transfusion     [place] (hard of hearing)     Hyperlipidemia     Hypertension     Myocardial infarction     Past heart attack     X2 MILD LAST ONE OCT 2019    PONV (postoperative nausea and vomiting)     Presence of intraocular lens 03/12/2020       Allergies   Allergen Reactions    Amoxicillin Itching    Codeine Nausea And Vomiting and Dizziness    Lortab [Hydrocodone-Acetaminophen] Nausea And Vomiting    Nitrofurantoin Hives    Sulfa Antibiotics Rash       Past Surgical History:   Procedure Laterality Date    ANTERIOR AND POSTERIOR VAGINAL REPAIR      BACK SURGERY      BLEPHAROPLASTY Bilateral 5/23/2019    Procedure: bilateral upper lid blepharoplasty;  Surgeon: Mauricio Pennington MD;  [place] [facility] [place] [facility] [place]  Service: Ophthalmology    BROW LIFT Bilateral 2/13/2020    Procedure: BILATERAL TEMPORAL DIRECT BROWLIFT;  Surgeon: Sreekanth Bird MD;  [place] [facility] [place] [facility] [place]  Service: Ophthalmology;  Laterality: Bilateral;    CARDIAC CATHETERIZATION N/A 12/3/2019    Procedure: Left Heart Cath;  Surgeon: Puma Briseno MD;  Location: [facility] CATH INVASIVE LOCATION;  Service: Cardiovascular    CARDIAC CATHETERIZATION N/A 12/3/2019    Procedure: Coronary angiography;  Surgeon: Puma Briseno MD;  Location: [facility] CATH INVASIVE LOCATION;  Service: Cardiovascular    CARDIAC CATHETERIZATION N/A 12/3/2019    Procedure: Left ventriculography;  Surgeon: Puma Briseno MD;  Location: [facility] CATH INVASIVE LOCATION;  Service: Cardiovascular    CATARACT EXTRACTION EXTRACAPSULAR W/ INTRAOCULAR LENS IMPLANTATION Bilateral     CERVICAL SPINE ANTERIOR      with instrumentation    CHEILECTOMY Left 8/28/2020    Procedure: CHEILECTOMY;  Surgeon: GAB Rees DPM;  Location: [facility]  MAIN OR;  Service: Podiatry;  Laterality: Left;    COLON SURGERY      for obstruction    ALBERTO TUBE INSERTION Bilateral 2019    Procedure: ALBERTO TUBE;  Surgeon: Mauricio Pennington MD;  Location: [facility] [place] [facility] [place];  Service: Ophthalmology    ECTROPION REPAIR Bilateral 2019    Procedure: bilateral lower lid ectropion repair, bilateral punctoplasty;  Surgeon: Mauricio Pennington MD;  Location: [facility];  Service: Ophthalmology    ENDOSCOPY N/A 2022    Procedure: ESOPHAGOGASTRODUODENOSCOPY WITH DILATATION (BALLOON 15MM-18MM, UP TO 18MM) AND BOUGIE #48;  Surgeon: Wiley Parks MD;  Location: [facility] ENDOSCOPY;  Service: Gastroenterology;  Laterality: N/A;  Post: DYSPHAGIA    EYE SURGERY      FOOT FUSION Left 2016    Procedure: LEFT HALLUX METATARSALPHALANGEAL ARTHRODESIS SILVER BUNIONECTOMY METATARSAL HEAD RESECTION 2-5 CALCANEAL BONE GRAFT;  Surgeon: Monster Harper Jr., MD;  Location: [facility] MAIN OR;  Service:     HYSTERECTOMY      INGUINAL HERNIA REPAIR Bilateral     METATARSAL OSTEOTOMY Left 2020    Procedure: Metatarsal head resection 5;  Surgeon: GAB Rees DPM;  Location: [facility] MAIN OR;  Service: Podiatry;  Laterality: Left;    ROTATOR CUFF REPAIR Right     SIGMOIDOSCOPY N/A 10/6/2021    Procedure: SIGMOIDOSCOPY WITH BIOPSY X1 AREA;  Surgeon: Uche Vaz MD;  Location: [facility] ENDOSCOPY;  Service: Gastroenterology;  Laterality: N/A;  ischemic colitis    STOMACH SURGERY      for ulcer       Family History   Problem Relation Age of Onset    No Known Problems Mother     No Known Problems Father     Malig Hyperthermia Neg Hx        Social History     Socioeconomic History    Marital status:    Tobacco Use    Smoking status: Former     Current packs/day: 0.00     Average packs/day: 0.3 packs/day for 10.0 years (2.5 ttl pk-yrs)     Types: Cigarettes     Start date: 1980     Quit date: 1990     Years since quittin.2    Smokeless tobacco: Never    Vaping Use    Vaping status: Never Used   Substance and Sexual Activity    Alcohol use: No    Drug use: No    Sexual activity: Defer           Objective   Physical Exam  Vitals and nursing note reviewed.   Constitutional:       General: She is not in acute distress.     Appearance: Normal appearance. She is well-developed. She is not ill-appearing, toxic-appearing or diaphoretic.   HENT:      Head: Normocephalic and atraumatic.      Mouth/Throat:      Mouth: Mucous membranes are moist.      Pharynx: Oropharynx is clear.   Eyes:      General: No scleral icterus.     Extraocular Movements: Extraocular movements intact.      Pupils: Pupils are equal, round, and reactive to light.   Cardiovascular:      Rate and Rhythm: Normal rate and regular rhythm.      Pulses: Normal pulses.      Heart sounds: No murmur heard.     No friction rub. No gallop.   Pulmonary:      Effort: Pulmonary effort is normal. No respiratory distress.      Breath sounds: Normal breath sounds. No stridor. No wheezing, rhonchi or rales.   Chest:      Chest wall: No tenderness.   Abdominal:      General: Bowel sounds are normal. There is no distension. There are no signs of injury.      Palpations: Abdomen is soft.      Tenderness: There is no abdominal tenderness. There is no right CVA tenderness, left CVA tenderness, guarding or rebound.   Musculoskeletal:      Comments: Back:  Cervical, thoracic, lumbar spine are midline with lower lumbar midline tenderness without step-offs.  Spinal musculature soft, tender in lower lumbar paraspinal musculature on the left., no palpable mass spasm, no overlying erythema, no ecchymosis. Range of motion is present but decreased secondary to pain     Left hip : the patient has full range of motion of the hip including flexion and extension and abduction and adduction, along with internal and external rotation with slight pain noted throughout. The patient is neurovascularly intact distally with 2+ peripheral  "pulses. The patient has no pain over the greater trochanteric area or hip bursa.  She does have some pain along the anterior aspect of the hip.  Groin is nontender there is two-point femoral pulses   Skin:     General: Skin is warm.      Capillary Refill: Capillary refill takes less than 2 seconds.      Coloration: Skin is not cyanotic, jaundiced or pale.      Findings: No rash.   Neurological:      General: No focal deficit present.      Mental Status: She is alert and oriented to person, place, and time.   Psychiatric:         Mood and Affect: Mood normal.         Behavior: Behavior normal.         Procedures           ED Course  ED Course as of 03/24/24 1105   Sat Mar 23, 2024   1541 Patient care transferred to [facility] Lopez pending CT results and disposition [AA]   1659 Patient CT of the pelvis and lumbar spine is within normal limits as interpreted by myself as well as the radiologist I spoke with the patient who is agreeable to going home she will follow-up with primary care for any further problems return if worse [KW]      ED Course User Index  [AA] Benitez Salcedo PA  [KW] Linda Hwang, APRN    /61 (BP Location: Left arm, Patient Position: Sitting)   Pulse 64   Temp 97.6 °F (36.4 °C) (Oral)   Resp 17   Ht 152.4 cm (60\")   Wt 51.8 kg (114 lb 3.2 oz)   SpO2 97%   BMI 22.30 kg/m²   Medications - No data to display  Labs Reviewed   PROTIME-INR - Abnormal; Notable for the following components:       Result Value    Protime 30.1 (*)     All other components within normal limits     CT Pelvis Without Contrast   Final Result   No acute fracture or dislocation.               Electronically Signed: Omari Schmidt MD     3/23/2024 3:42 PM EDT     Workstation ID: [id number]      CT Lumbar Spine Without Contrast   Final Result   No acute fracture or dislocation.               Electronically Signed: Omari Schmidt MD     3/23/2024 3:42 PM EDT     Workstation ID: [id number]                                  "                Medical Decision Making  Differentials: Fracture contusion sprain strain     ;this list is not all inclusive and does not constitute the entirety of considered causes  Labs: as above   Radiology:  CT Pelvis Without Contrast   Final Result    No acute fracture or dislocation.      Electronically Signed: Omari Schmidt MD      3/23/2024 3:42 PM EDT      Workstation ID: [id number]     CT Lumbar Spine Without Contrast   Final Result    No acute fracture or dislocation.       Electronically Signed: Omari Schmidt MD      3/23/2024 3:42 PM EDT      Workstation ID: [id number]     Disposition/Treatment:  Appropriate PPE was worn during exam and throughout all encounters with the patient.  On the ED patient was afebrile and appeared nontoxic presented to the ED with reports of continued low back and left-sided hip pain after a fall 2 days ago.  Patient ambulates with her cane.  INR therapeutic on warfarin as above.  Patient care was transferred to [facility] Lopez ending CT results and disposition hopeful for discharge if CTs are normal.    Please see ED course for further disposition CTs were within normal limits patient was discharged home.    This document is intended for medical expert use only. Reading of this document by patients and/or patient's family without participating medical staff guidance may result in misinterpretation and unintended morbidity.  Any interpretation of such data is the responsibility of the patient and/or family member responsible for the patient in concert with their primary or specialist providers, not to be left for sources of online searches such as [employer], [employer] or similar queries. Relying on these approaches to knowledge may result in misinterpretation, misguided goals of care and even death should patients or family members try recommendations outside of the realm of professional medical care in a supervised inpatient environment.       Problems Addressed:  Acute left-sided low back  pain with left-sided sciatica: complicated acute illness or injury  Fall, initial encounter: complicated acute illness or injury  Left hip pain: complicated acute illness or injury    Amount and/or Complexity of Data Reviewed  Radiology: ordered. Decision-making details documented in ED Course.        Final diagnoses:   Fall, initial encounter   Acute left-sided low back pain with left-sided sciatica   Left hip pain       ED Disposition  ED Disposition       ED Disposition   Discharge    Condition   Stable    Comment   --               Monster Myrick MD  [address] [facility] [place] [facility]  [phone]    In 3 days  If symptoms worsen, As needed         Medication List      No changes were made to your prescriptions during this visit.            Benitez Salcedo PA  03/24/24 5941

## 2024-03-25 ENCOUNTER — APPOINTMENT (OUTPATIENT)
Dept: CT IMAGING | Facility: HOSPITAL | Age: 89
End: 2024-03-25
Payer: MEDICARE

## 2024-03-25 ENCOUNTER — TELEPHONE (OUTPATIENT)
Dept: CARDIOLOGY | Facility: CLINIC | Age: 89
End: 2024-03-25
Payer: MEDICARE

## 2024-03-25 ENCOUNTER — HOSPITAL ENCOUNTER (OUTPATIENT)
Facility: HOSPITAL | Age: 89
Setting detail: OBSERVATION
Discharge: HOME | End: 2024-03-27
Attending: EMERGENCY MEDICINE | Admitting: INTERNAL MEDICINE
Payer: MEDICARE

## 2024-03-25 ENCOUNTER — APPOINTMENT (OUTPATIENT)
Dept: GENERAL RADIOLOGY | Facility: HOSPITAL | Age: 89
End: 2024-03-25
Payer: MEDICARE

## 2024-03-25 DIAGNOSIS — R00.1 SINUS BRADYCARDIA: Primary | ICD-10-CM

## 2024-03-25 DIAGNOSIS — R55 NEAR SYNCOPE: ICD-10-CM

## 2024-03-25 LAB
ALBUMIN SERPL-MCNC: 4.2 G/DL (ref 3.5–5.2)
ALBUMIN/GLOB SERPL: 1.5 G/DL
ALP SERPL-CCNC: 101 U/L (ref 39–117)
ALT SERPL W P-5'-P-CCNC: 14 U/L (ref 1–33)
ANION GAP SERPL CALCULATED.3IONS-SCNC: 11 MMOL/L (ref 5–15)
APTT PPP: 39 SECONDS (ref 61–76.5)
AST SERPL-CCNC: 19 U/L (ref 1–32)
BACTERIA UR QL AUTO: NORMAL /HPF
BASOPHILS # BLD AUTO: 0.04 10*3/MM3 (ref 0–0.2)
BASOPHILS NFR BLD AUTO: 0.5 % (ref 0–1.5)
BILIRUB SERPL-MCNC: 0.8 MG/DL (ref 0–1.2)
BILIRUB UR QL STRIP: NEGATIVE
BUN SERPL-MCNC: 24 MG/DL (ref 8–23)
BUN/CREAT SERPL: 24.2 (ref 7–25)
CALCIUM SPEC-SCNC: 9.4 MG/DL (ref 8.2–9.6)
CHLORIDE SERPL-SCNC: 104 MMOL/L (ref 98–107)
CLARITY UR: CLEAR
CO2 SERPL-SCNC: 30 MMOL/L (ref 22–29)
COLOR UR: YELLOW
CREAT SERPL-MCNC: 0.99 MG/DL (ref 0.57–1)
DEPRECATED RDW RBC AUTO: 51.1 FL (ref 37–54)
EGFRCR SERPLBLD CKD-EPI 2021: 51.6 ML/MIN/1.73
EOSINOPHIL # BLD AUTO: 0.12 10*3/MM3 (ref 0–0.4)
EOSINOPHIL NFR BLD AUTO: 1.5 % (ref 0.3–6.2)
ERYTHROCYTE [DISTWIDTH] IN BLOOD BY AUTOMATED COUNT: 15.2 % (ref 12.3–15.4)
GLOBULIN UR ELPH-MCNC: 2.8 GM/DL
GLUCOSE SERPL-MCNC: 118 MG/DL (ref 65–99)
GLUCOSE UR STRIP-MCNC: NEGATIVE MG/DL
HCT VFR BLD AUTO: 44.1 % (ref 34–46.6)
HGB BLD-MCNC: 13.7 G/DL (ref 12–15.9)
HGB UR QL STRIP.AUTO: ABNORMAL
HOLD SPECIMEN: NORMAL
HOLD SPECIMEN: NORMAL
HYALINE CASTS UR QL AUTO: NORMAL /LPF
IMM GRANULOCYTES # BLD AUTO: 0.05 10*3/MM3 (ref 0–0.05)
IMM GRANULOCYTES NFR BLD AUTO: 0.6 % (ref 0–0.5)
INR PPP: 2.76 (ref 2–3)
KETONES UR QL STRIP: NEGATIVE
LEUKOCYTE ESTERASE UR QL STRIP.AUTO: NEGATIVE
LYMPHOCYTES # BLD AUTO: 1.4 10*3/MM3 (ref 0.7–3.1)
LYMPHOCYTES NFR BLD AUTO: 17.4 % (ref 19.6–45.3)
MCH RBC QN AUTO: 28.4 PG (ref 26.6–33)
MCHC RBC AUTO-ENTMCNC: 31.1 G/DL (ref 31.5–35.7)
MCV RBC AUTO: 91.5 FL (ref 79–97)
MONOCYTES # BLD AUTO: 0.63 10*3/MM3 (ref 0.1–0.9)
MONOCYTES NFR BLD AUTO: 7.8 % (ref 5–12)
NEUTROPHILS NFR BLD AUTO: 5.8 10*3/MM3 (ref 1.7–7)
NEUTROPHILS NFR BLD AUTO: 72.2 % (ref 42.7–76)
NITRITE UR QL STRIP: NEGATIVE
NRBC BLD AUTO-RTO: 0 /100 WBC (ref 0–0.2)
PH UR STRIP.AUTO: 6 [PH] (ref 5–8)
PLATELET # BLD AUTO: 283 10*3/MM3 (ref 140–450)
PMV BLD AUTO: 9 FL (ref 6–12)
POTASSIUM SERPL-SCNC: 3.7 MMOL/L (ref 3.5–5.2)
PROT SERPL-MCNC: 7 G/DL (ref 6–8.5)
PROT UR QL STRIP: NEGATIVE
PROTHROMBIN TIME: 27.9 SECONDS (ref 19.4–28.5)
RBC # BLD AUTO: 4.82 10*6/MM3 (ref 3.77–5.28)
RBC # UR STRIP: NORMAL /HPF
REF LAB TEST METHOD: NORMAL
SODIUM SERPL-SCNC: 145 MMOL/L (ref 136–145)
SP GR UR STRIP: 1.01 (ref 1–1.03)
SQUAMOUS #/AREA URNS HPF: NORMAL /HPF
T4 FREE SERPL-MCNC: 1.78 NG/DL (ref 0.93–1.7)
TROPONIN T SERPL HS-MCNC: 22 NG/L
TSH SERPL DL<=0.05 MIU/L-ACNC: 4.02 UIU/ML (ref 0.27–4.2)
UROBILINOGEN UR QL STRIP: ABNORMAL
WBC # UR STRIP: NORMAL /HPF
WBC NRBC COR # BLD AUTO: 8.04 10*3/MM3 (ref 3.4–10.8)
WHOLE BLOOD HOLD COAG: NORMAL
WHOLE BLOOD HOLD SPECIMEN: NORMAL

## 2024-03-25 PROCEDURE — 85730 THROMBOPLASTIN TIME PARTIAL: CPT | Performed by: EMERGENCY MEDICINE

## 2024-03-25 PROCEDURE — 84443 ASSAY THYROID STIM HORMONE: CPT | Performed by: INTERNAL MEDICINE

## 2024-03-25 PROCEDURE — 93005 ELECTROCARDIOGRAM TRACING: CPT

## 2024-03-25 PROCEDURE — 70450 CT HEAD/BRAIN W/O DYE: CPT

## 2024-03-25 PROCEDURE — G0378 HOSPITAL OBSERVATION PER HR: HCPCS

## 2024-03-25 PROCEDURE — 99291 CRITICAL CARE FIRST HOUR: CPT

## 2024-03-25 PROCEDURE — 84439 ASSAY OF FREE THYROXINE: CPT | Performed by: INTERNAL MEDICINE

## 2024-03-25 PROCEDURE — 71045 X-RAY EXAM CHEST 1 VIEW: CPT

## 2024-03-25 PROCEDURE — 85025 COMPLETE CBC W/AUTO DIFF WBC: CPT | Performed by: EMERGENCY MEDICINE

## 2024-03-25 PROCEDURE — 80053 COMPREHEN METABOLIC PANEL: CPT | Performed by: EMERGENCY MEDICINE

## 2024-03-25 PROCEDURE — 85610 PROTHROMBIN TIME: CPT | Performed by: EMERGENCY MEDICINE

## 2024-03-25 PROCEDURE — 93005 ELECTROCARDIOGRAM TRACING: CPT | Performed by: EMERGENCY MEDICINE

## 2024-03-25 PROCEDURE — 84484 ASSAY OF TROPONIN QUANT: CPT | Performed by: EMERGENCY MEDICINE

## 2024-03-25 PROCEDURE — 99285 EMERGENCY DEPT VISIT HI MDM: CPT

## 2024-03-25 PROCEDURE — 81001 URINALYSIS AUTO W/SCOPE: CPT | Performed by: EMERGENCY MEDICINE

## 2024-03-25 RX ORDER — ISOSORBIDE MONONITRATE 30 MG/1
30 TABLET, EXTENDED RELEASE ORAL DAILY
Status: DISCONTINUED | OUTPATIENT
Start: 2024-03-26 | End: 2024-03-27 | Stop reason: HOSPADM

## 2024-03-25 RX ORDER — SODIUM CHLORIDE 0.9 % (FLUSH) 0.9 %
10 SYRINGE (ML) INJECTION EVERY 12 HOURS SCHEDULED
Status: DISCONTINUED | OUTPATIENT
Start: 2024-03-25 | End: 2024-03-27 | Stop reason: HOSPADM

## 2024-03-25 RX ORDER — SODIUM CHLORIDE 0.9 % (FLUSH) 0.9 %
10 SYRINGE (ML) INJECTION AS NEEDED
Status: DISCONTINUED | OUTPATIENT
Start: 2024-03-25 | End: 2024-03-27 | Stop reason: HOSPADM

## 2024-03-25 RX ORDER — ACETAMINOPHEN 325 MG/1
650 TABLET ORAL EVERY 4 HOURS PRN
Status: DISCONTINUED | OUTPATIENT
Start: 2024-03-25 | End: 2024-03-27 | Stop reason: HOSPADM

## 2024-03-25 RX ORDER — SODIUM CHLORIDE 9 MG/ML
40 INJECTION, SOLUTION INTRAVENOUS AS NEEDED
Status: DISCONTINUED | OUTPATIENT
Start: 2024-03-25 | End: 2024-03-27 | Stop reason: HOSPADM

## 2024-03-25 RX ORDER — WARFARIN SODIUM 3 MG/1
3 TABLET ORAL 3 TIMES WEEKLY
Status: DISCONTINUED | OUTPATIENT
Start: 2024-03-25 | End: 2024-03-27 | Stop reason: HOSPADM

## 2024-03-25 RX ORDER — AMIODARONE HYDROCHLORIDE 200 MG/1
100 TABLET ORAL 2 TIMES DAILY
COMMUNITY
End: 2024-03-27 | Stop reason: HOSPADM

## 2024-03-25 RX ORDER — AMIODARONE HYDROCHLORIDE 200 MG/1
100 TABLET ORAL 2 TIMES DAILY
Status: DISCONTINUED | OUTPATIENT
Start: 2024-03-25 | End: 2024-03-26

## 2024-03-25 RX ORDER — AMIODARONE HYDROCHLORIDE 200 MG/1
100 TABLET ORAL DAILY
Qty: 15 TABLET | Refills: 0 | Status: SHIPPED | OUTPATIENT
Start: 2024-03-25 | End: 2024-03-25

## 2024-03-25 RX ORDER — ONDANSETRON 2 MG/ML
4 INJECTION INTRAMUSCULAR; INTRAVENOUS EVERY 6 HOURS PRN
Status: DISCONTINUED | OUTPATIENT
Start: 2024-03-25 | End: 2024-03-27 | Stop reason: HOSPADM

## 2024-03-25 RX ORDER — NITROGLYCERIN 0.4 MG/1
0.4 TABLET SUBLINGUAL
Status: DISCONTINUED | OUTPATIENT
Start: 2024-03-25 | End: 2024-03-27 | Stop reason: HOSPADM

## 2024-03-25 RX ORDER — WARFARIN SODIUM 2 MG/1
2 TABLET ORAL
Status: DISCONTINUED | OUTPATIENT
Start: 2024-03-26 | End: 2024-03-27 | Stop reason: HOSPADM

## 2024-03-25 RX ORDER — FUROSEMIDE 20 MG/1
20 TABLET ORAL 2 TIMES DAILY
Status: DISCONTINUED | OUTPATIENT
Start: 2024-03-25 | End: 2024-03-27 | Stop reason: HOSPADM

## 2024-03-25 RX ADMIN — Medication 10 ML: at 19:52

## 2024-03-25 RX ADMIN — FUROSEMIDE 20 MG: 20 TABLET ORAL at 19:50

## 2024-03-25 RX ADMIN — ACETAMINOPHEN 650 MG: 325 TABLET, FILM COATED ORAL at 19:51

## 2024-03-25 RX ADMIN — ACETAMINOPHEN 650 MG: 325 TABLET, FILM COATED ORAL at 23:58

## 2024-03-25 RX ADMIN — WARFARIN SODIUM 3 MG: 3 TABLET ORAL at 19:51

## 2024-03-25 NOTE — TELEPHONE ENCOUNTER
Caller: Carrie Golden    Relationship: Self    Best call back number: [phone]    Which medication are you concerned about: AMIODARONE(PACERONE) 100 MG MEDICATION    Who prescribed you this medication: DR. THAYER    When did you start taking this medication: NEW PRESCRIPTION AND HAS NOT STARTED IT.    What are your concerns: PATIENT STATED THAT SHE WENT TO  AMIODARONE 100 MG MEDICATION AND IT WAS $150 AND PATIENT STATED THAT SHE CAN NOT AFFORD THE COST OF THE MEDICATION. PATIENT WOULD LIKE TO KNOW IF THERE IS AN ALTERNATIVE MEDICATION THAT IS MORE AFFORDABLE.     How long have you had these concerns: 03.23.24

## 2024-03-25 NOTE — TELEPHONE ENCOUNTER
Spoke with patient. Sent in 200mg tablets and she will cut them in half. If it is still too much she will call us back.

## 2024-03-25 NOTE — ED PROVIDER NOTES
Subjective   History of Present Illness  Chief complaint: General weakness    89-year-old female presents with general weakness.  Patient states her heart rate has been running low, in the 40s.  She is currently wearing a heart rate monitor from her cardiologist.  She states today she has been feeling very weak.  She had some near syncopal episodes.  She denies any chest pain or shortness of breath.  She never actually lost consciousness.  She denies any focal numbness, weakness, tingling.  She does report a headache.    History provided by:  Patient and relative      Review of Systems   Constitutional:  Negative for fever.   HENT:  Negative for congestion.    Respiratory:  Negative for cough and shortness of breath.    Cardiovascular:  Negative for chest pain.   Gastrointestinal:  Negative for abdominal pain and vomiting.   Genitourinary:  Negative for dysuria.   Musculoskeletal:  Negative for back pain.   Neurological:  Positive for weakness, light-headedness and headaches.   Psychiatric/Behavioral:  Negative for confusion.        Past Medical History:   Diagnosis Date    Amblyopia 10/31/2012    Amblyopia, left 03/12/2020    Arthritis     Conjunctival hemorrhage 10/08/2014    Coronary artery disease     Deep vein thrombosis     Deformity of left foot 08/18/2020    Added automatically from request for surgery [id number]    Dermatochalasis of left upper eyelid 03/12/2020    Dermatochalasis of right upper eyelid 03/12/2020    Drooping eyelid, bilateral     Ectropion 03/12/2020    Epiretinal membrane (ERM) of right eye 03/12/2020    Frequent UTI     GERD (gastroesophageal reflux disease)     Hallux rigidus, left foot 08/18/2020    Added automatically from request for surgery [id number]    Hearing loss     bilateral hearing aides    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 sure which 1  contracted at age 8    History of transfusion     [place] (hard of hearing)     Hyperlipidemia     Hypertension     Myocardial infarction     Past heart  attack     X2 MILD LAST ONE OCT 2019    PONV (postoperative nausea and vomiting)     Presence of intraocular lens 03/12/2020       Allergies   Allergen Reactions    Amoxicillin Itching    Codeine Nausea And Vomiting and Dizziness    Lortab [Hydrocodone-Acetaminophen] Nausea And Vomiting    Nitrofurantoin Hives    Sulfa Antibiotics Rash       Past Surgical History:   Procedure Laterality Date    ANTERIOR AND POSTERIOR VAGINAL REPAIR      BACK SURGERY      BLEPHAROPLASTY Bilateral 5/23/2019    Procedure: bilateral upper lid blepharoplasty;  Surgeon: Mauricio Pennington MD;  [place] [facility] [place] [facility]  Service: Ophthalmology    BROW LIFT Bilateral 2/13/2020    Procedure: BILATERAL TEMPORAL DIRECT BROWLIFT;  Surgeon: Sreekanth Bird MD;  [place] [facility] [place] [facility] [place]  Service: Ophthalmology;  Laterality: Bilateral;    CARDIAC CATHETERIZATION N/A 12/3/2019    Procedure: Left Heart Cath;  Surgeon: Puma Briseno MD;  Location: [facility] CATH INVASIVE LOCATION;  Service: Cardiovascular    CARDIAC CATHETERIZATION N/A 12/3/2019    Procedure: Coronary angiography;  Surgeon: Puma Briseno MD;  Location: [facility] CATH INVASIVE LOCATION;  Service: Cardiovascular    CARDIAC CATHETERIZATION N/A 12/3/2019    Procedure: Left ventriculography;  Surgeon: Puma Briseno MD;  Location: [facility] CATH INVASIVE LOCATION;  Service: Cardiovascular    CATARACT EXTRACTION EXTRACAPSULAR W/ INTRAOCULAR LENS IMPLANTATION Bilateral     CERVICAL SPINE ANTERIOR      with instrumentation    CHEILECTOMY Left 8/28/2020    Procedure: CHEILECTOMY;  Surgeon: GAB Rees DPM;  Location: [facility] MAIN OR;  Service: Podiatry;  Laterality: Left;    COLON SURGERY      for obstruction    ALBERTO TUBE INSERTION Bilateral 5/23/2019    Procedure: ALBERTO TUBE;  Surgeon: Mauricio Pennington MD;  [place] [facility] [place] [facility]  Service: Ophthalmology    ECTROPION REPAIR Bilateral 5/23/2019    Procedure: bilateral lower lid ectropion repair, bilateral punctoplasty;  Surgeon:  "Mauricio Pennington MD;  Location:  [facility];  Service: Ophthalmology    ENDOSCOPY N/A 2022    Procedure: ESOPHAGOGASTRODUODENOSCOPY WITH DILATATION (BALLOON 15MM-18MM, UP TO 18MM) AND BOUGIE #48;  Surgeon: Wiley Parks MD;  Location: [facility] ENDOSCOPY;  Service: Gastroenterology;  Laterality: N/A;  Post: DYSPHAGIA    EYE SURGERY      FOOT FUSION Left 2016    Procedure: LEFT HALLUX METATARSALPHALANGEAL ARTHRODESIS SILVER BUNIONECTOMY METATARSAL HEAD RESECTION 2-5 CALCANEAL BONE GRAFT;  Surgeon: Monster Harper Jr., MD;  Location: [facility] MAIN OR;  Service:     HYSTERECTOMY      INGUINAL HERNIA REPAIR Bilateral     METATARSAL OSTEOTOMY Left 2020    Procedure: Metatarsal head resection 5;  Surgeon: GAB Rees DPM;  Location: [facility] MAIN OR;  Service: Podiatry;  Laterality: Left;    ROTATOR CUFF REPAIR Right     SIGMOIDOSCOPY N/A 10/6/2021    Procedure: SIGMOIDOSCOPY WITH BIOPSY X1 AREA;  Surgeon: Uche Vaz MD;  Location: [facility] ENDOSCOPY;  Service: Gastroenterology;  Laterality: N/A;  ischemic colitis    STOMACH SURGERY      for ulcer       Family History   Problem Relation Age of Onset    No Known Problems Mother     No Known Problems Father     Malig Hyperthermia Neg Hx        Social History     Socioeconomic History    Marital status:    Tobacco Use    Smoking status: Former     Current packs/day: 0.00     Average packs/day: 0.3 packs/day for 10.0 years (2.5 ttl pk-yrs)     Types: Cigarettes     Start date: 1980     Quit date: 1990     Years since quittin.2    Smokeless tobacco: Never   Vaping Use    Vaping status: Never Used   Substance and Sexual Activity    Alcohol use: No    Drug use: No    Sexual activity: Defer       /62   Pulse (!) 46   Temp 97.5 °F (36.4 °C)   Resp 17   Ht 157.5 cm (62\")   Wt 51.1 kg (112 lb 9.6 oz)   SpO2 95%   BMI 20.59 kg/m²       Objective   Physical Exam  Vitals and nursing note reviewed.   Constitutional:       " Appearance: Normal appearance.   HENT:      Head: Normocephalic and atraumatic.      Mouth/Throat:      Mouth: Mucous membranes are moist.   Cardiovascular:      Rate and Rhythm: Regular rhythm. Bradycardia present.      Heart sounds: Normal heart sounds.   Pulmonary:      Effort: Pulmonary effort is normal. No respiratory distress.      Breath sounds: Normal breath sounds.   Abdominal:      Palpations: Abdomen is soft.      Tenderness: There is no abdominal tenderness.   Skin:     General: Skin is warm and dry.   Neurological:      Mental Status: She is alert and oriented to person, place, and time.      Comments: General weakness with no focal motor or sensory deficit appreciated         Procedures           ED Course      My interpretation of EKG shows sinus bradycardia, rate of 49, LVH, no ST elevation                           Results for orders placed or performed during the hospital encounter of 03/25/24   Comprehensive Metabolic Panel    Specimen: Blood   Result Value Ref Range    Glucose 118 (H) 65 - 99 mg/dL    BUN 24 (H) 8 - 23 mg/dL    Creatinine 0.99 0.57 - 1.00 mg/dL    Sodium 145 136 - 145 mmol/L    Potassium 3.7 3.5 - 5.2 mmol/L    Chloride 104 98 - 107 mmol/L    CO2 30.0 (H) 22.0 - 29.0 mmol/L    Calcium 9.4 8.2 - 9.6 mg/dL    Total Protein 7.0 6.0 - 8.5 g/dL    Albumin 4.2 3.5 - 5.2 g/dL    ALT (SGPT) 14 1 - 33 U/L    AST (SGOT) 19 1 - 32 U/L    Alkaline Phosphatase 101 39 - 117 U/L    Total Bilirubin 0.8 0.0 - 1.2 mg/dL    Globulin 2.8 gm/dL    A/G Ratio 1.5 g/dL    BUN/Creatinine Ratio 24.2 7.0 - 25.0    Anion Gap 11.0 5.0 - 15.0 mmol/L    eGFR 51.6 (L) >60.0 mL/min/1.73   Protime-INR    Specimen: Blood   Result Value Ref Range    Protime 27.9 19.4 - 28.5 Seconds    INR 2.76 2.00 - 3.00   aPTT    Specimen: Blood   Result Value Ref Range    PTT 39.0 (L) 61.0 - 76.5 seconds   Urinalysis With Microscopic If Indicated (No Culture) - Urine, Clean Catch    Specimen: Urine, Clean Catch   Result Value  Ref Range    Color, UA Yellow Yellow, Straw    Appearance, UA Clear Clear    pH, UA 6.0 5.0 - 8.0    Specific Gravity, UA 1.007 1.005 - 1.030    Glucose, UA Negative Negative    Ketones, UA Negative Negative    Bilirubin, UA Negative Negative    Blood, UA Trace (A) Negative    Protein, UA Negative Negative    Leuk Esterase, UA Negative Negative    Nitrite, UA Negative Negative    Urobilinogen, UA 0.2 E.U./dL 0.2 - 1.0 E.U./dL   Single High Sensitivity Troponin T    Specimen: Blood   Result Value Ref Range    HS Troponin T 22 (H) <14 ng/L   CBC Auto Differential    Specimen: Blood   Result Value Ref Range    WBC 8.04 3.40 - 10.80 10*3/mm3    RBC 4.82 3.77 - 5.28 10*6/mm3    Hemoglobin 13.7 12.0 - 15.9 g/dL    Hematocrit 44.1 34.0 - 46.6 %    MCV 91.5 79.0 - 97.0 fL    MCH 28.4 26.6 - 33.0 pg    MCHC 31.1 (L) 31.5 - 35.7 g/dL    RDW 15.2 12.3 - 15.4 %    RDW-SD 51.1 37.0 - 54.0 fl    MPV 9.0 6.0 - 12.0 fL    Platelets 283 140 - 450 10*3/mm3    Neutrophil % 72.2 42.7 - 76.0 %    Lymphocyte % 17.4 (L) 19.6 - 45.3 %    Monocyte % 7.8 5.0 - 12.0 %    Eosinophil % 1.5 0.3 - 6.2 %    Basophil % 0.5 0.0 - 1.5 %    Immature Grans % 0.6 (H) 0.0 - 0.5 %    Neutrophils, Absolute 5.80 1.70 - 7.00 10*3/mm3    Lymphocytes, Absolute 1.40 0.70 - 3.10 10*3/mm3    Monocytes, Absolute 0.63 0.10 - 0.90 10*3/mm3    Eosinophils, Absolute 0.12 0.00 - 0.40 10*3/mm3    Basophils, Absolute 0.04 0.00 - 0.20 10*3/mm3    Immature Grans, Absolute 0.05 0.00 - 0.05 10*3/mm3    nRBC 0.0 0.0 - 0.2 /100 WBC   Urinalysis, Microscopic Only - Urine, Clean Catch    Specimen: Urine, Clean Catch   Result Value Ref Range    RBC, UA 0-2 None Seen, 0-2 /HPF    WBC, UA 0-2 None Seen, 0-2 /HPF    Bacteria, UA None Seen None Seen /HPF    Squamous Epithelial Cells, UA 0-2 None Seen, 0-2 /HPF    Hyaline Casts, UA None Seen None Seen /LPF    Methodology Automated Microscopy    ECG 12 Lead Bradycardia   Result Value Ref Range    QT Interval 515 ms    QTC Interval 467  ms   Green Top (Gel)   Result Value Ref Range    Extra Tube Hold for add-ons.    Lavender Top   Result Value Ref Range    Extra Tube hold for add-on    Gold Top - SST   Result Value Ref Range    Extra Tube Hold for add-ons.    Light Blue Top   Result Value Ref Range    Extra Tube Hold for add-ons.      CT Head Without Contrast    Result Date: 3/25/2024  Impression: No acute intracranial process identified. Electronically Signed: Christ Miranda MD  3/25/2024 3:37 PM EDT  Workstation ID: [id number]    XR Chest 1 View    Result Date: 3/25/2024  Impression: No acute cardiopulmonary abnormality. Electronically Signed: Jesse Watson MD  3/25/2024 3:06 PM EDT  Workstation ID: [id number]               Medical Decision Making  Amount and/or Complexity of Data Reviewed  Labs: ordered.  Radiology: ordered.  ECG/medicine tests: ordered.    Risk  Prescription drug management.      Patient had the above evaluation.  Results were discussed with the patient.  CT head shows no acute intracranial abnormality.  My interpretation of chest x-ray shows no infiltrate or effusion.  EKG shows sinus bradycardia with no acute ischemia.  Troponin is unremarkable at 22.  White blood cell count is normal.  CMP is unremarkable.  Patient has remained hemodynamically stable in the emergency room.  I discussed with the on-call primary doctor and the patient will be admitted for further evaluation and management.      Final diagnoses:   Sinus bradycardia   Near syncope       ED Disposition  ED Disposition       ED Disposition   Decision to Admit    Condition   --    Comment   Level of Care: Telemetry [5]   Admitting Physician: JACKY CURTIS [[id number]]   Attending Physician: JACKY CURTIS [[id number]]                 No follow-up provider specified.       Medication List      No changes were made to your prescriptions during this visit.            Jason Martins MD  03/25/24 [id number]

## 2024-03-26 LAB
INR PPP: 2.7 (ref 2–3)
PROTHROMBIN TIME: 27.3 SECONDS (ref 19.4–28.5)
QT INTERVAL: 515 MS
QTC INTERVAL: 467 MS

## 2024-03-26 PROCEDURE — 97161 PT EVAL LOW COMPLEX 20 MIN: CPT

## 2024-03-26 PROCEDURE — G0378 HOSPITAL OBSERVATION PER HR: HCPCS

## 2024-03-26 PROCEDURE — 99214 OFFICE O/P EST MOD 30 MIN: CPT | Performed by: INTERNAL MEDICINE

## 2024-03-26 PROCEDURE — 85610 PROTHROMBIN TIME: CPT | Performed by: INTERNAL MEDICINE

## 2024-03-26 RX ADMIN — WARFARIN SODIUM 2 MG: 2 TABLET ORAL at 17:05

## 2024-03-26 RX ADMIN — FUROSEMIDE 20 MG: 20 TABLET ORAL at 20:15

## 2024-03-26 RX ADMIN — Medication 10 ML: at 07:31

## 2024-03-26 RX ADMIN — Medication 10 ML: at 21:00

## 2024-03-26 NOTE — PLAN OF CARE
Problem: Adult Inpatient Plan of Care  Goal: Plan of Care Review  Outcome: Ongoing, Progressing  Goal: Patient-Specific Goal (Individualized)  Outcome: Ongoing, Progressing  Goal: Absence of Hospital-Acquired Illness or Injury  Outcome: Ongoing, Progressing  Intervention: Identify and Manage Fall Risk  Goal: Optimal Comfort and Wellbeing  Outcome: Ongoing, Progressing  Goal: Readiness for Transition of Care  Outcome: Ongoing, Progressing     Problem: Hypertension Comorbidity  Goal: Blood Pressure in Desired Range  Outcome: Ongoing, Progressing     Problem: Fall Injury Risk  Goal: Absence of Fall and Fall-Related Injury  Outcome: Ongoing, Progressing  Intervention: Promote Injury-Free Environment   Goal Outcome Evaluation:      Pt heart rate has been in the 40s while resting, in the 60s while moving. No chest pain or dizziness.

## 2024-03-26 NOTE — THERAPY EVALUATION
Patient Name: Carrie Golden  : 1925    MRN: [id number]                              Today's Date: 3/26/2024       Admit Date: 3/25/2024    Visit Dx:     ICD-10-CM ICD-9-CM   1. Sinus bradycardia  R00.1 427.89   2. Near syncope  R55 780.2     Patient Active Problem List   Diagnosis    Chest pain    Dyslipidemia    Hypertension    Gastroesophageal reflux disease    NSTEMI, initial episode of care    Tear film insufficiency    After-cataract with vision obscured    Cervical radiculopathy    Lumbar radiculopathy    Metatarsalgia of left foot    Localized, primary osteoarthritis    Mixed hyperlipidemia    Colitis    Degeneration of lumbar intervertebral disc    Osteoarthritis of knee    Right leg pain    Pulmonary embolism    Acute deep vein thrombosis (DVT) of distal vein of right lower extremity    Iron deficiency anemia    Fall, initial encounter    Pelvic fracture    PAF (paroxysmal atrial fibrillation)    Coronary artery disease involving native coronary artery of native heart without angina pectoris    Bradycardia     Past Medical History:   Diagnosis Date    Amblyopia 10/31/2012    Amblyopia, left 2020    Arthritis     Conjunctival hemorrhage 10/08/2014    Coronary artery disease     Deep vein thrombosis     Deformity of left foot 2020    Added automatically from request for surgery [id number]    Dermatochalasis of left upper eyelid 2020    Dermatochalasis of right upper eyelid 2020    Drooping eyelid, bilateral     Ectropion 2020    Epiretinal membrane (ERM) of right eye 2020    Frequent UTI     GERD (gastroesophageal reflux disease)     Hallux rigidus, left foot 2020    Added automatically from request for surgery [id number]    Hearing loss     bilateral hearing aides    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 sure which 1  contracted at age 8    History of transfusion     [place]     Hyperlipidemia     Hypertension     Myocardial infarction     Past heart attack      X2 MILD LAST ONE OCT 2019    PONV (postoperative nausea and vomiting)     Presence of intraocular lens 03/12/2020     Past Surgical History:   Procedure Laterality Date    ANTERIOR AND POSTERIOR VAGINAL REPAIR      BACK SURGERY      BLEPHAROPLASTY Bilateral 5/23/2019    Procedure: bilateral upper lid blepharoplasty;  Surgeon: Mauricio Pennington MD;  [place] [facility] [place] [facility] [place]  Service: [place]hthalmology    BROW LIFT Bilateral 2/13/2020    Procedure: BILATERAL TEMPORAL DIRECT BROWLIFT;  Surgeon: Sreekanth Bird MD;  [place] [facility] [place] [facility] [place]  Service: Ophthalmology;  Laterality: Bilateral;    CARDIAC CATHETERIZATION N/A 12/3/2019    Procedure: Left Heart Cath;  Surgeon: Puma Briseno MD;  Location: [facility] CATH INVASIVE LOCATION;  Service: Cardiovascular    CARDIAC CATHETERIZATION N/A 12/3/2019    Procedure: Coronary angiography;  Surgeon: Puma Briseno MD;  Location: [facility] CATH INVASIVE LOCATION;  Service: Cardiovascular    CARDIAC CATHETERIZATION N/A 12/3/2019    Procedure: Left ventriculography;  Surgeon: Puma Briseno MD;  Location: [facility] CATH INVASIVE LOCATION;  Service: Cardiovascular    CATARACT EXTRACTION EXTRACAPSULAR W/ INTRAOCULAR LENS IMPLANTATION Bilateral     CERVICAL SPINE ANTERIOR      with instrumentation    CHEILECTOMY Left 8/28/2020    Procedure: CHEILECTOMY;  Surgeon: GAB Rees DPM;  Location: [facility] MAIN OR;  Service: Podiatry;  Laterality: Left;    COLON SURGERY      for obstruction    ALBERTO TUBE INSERTION Bilateral 5/23/2019    Procedure: ALBERTO TUBE;  Surgeon: Mauricio Pennington MD;  [place] [facility] [place] [facility] [place]  Service: Ophthalmology    ECTROPION REPAIR Bilateral 5/23/2019    Procedure: bilateral lower lid ectropion repair, bilateral punctoplasty;  Surgeon: Mauricio Pennington MD;  [place] [facility] [place] [facility] [place]  Service: Ophthalmology    ENDOSCOPY N/A 2/18/2022    Procedure: ESOPHAGOGASTRODUODENOSCOPY WITH DILATATION (BALLOON 15MM-18MM, UP TO 18MM) AND BOUGIE #48;  Surgeon: Charly  Wiley Upton MD;  Location: [facility] ENDOSCOPY;  Service: Gastroenterology;  Laterality: N/A;  Post: DYSPHAGIA    EYE SURGERY      FOOT FUSION Left 12/30/2016    Procedure: LEFT HALLUX METATARSALPHALANGEAL ARTHRODESIS SILVER BUNIONECTOMY METATARSAL HEAD RESECTION 2-5 CALCANEAL BONE GRAFT;  Surgeon: Monster Harper Jr., MD;  Location: [facility] MAIN OR;  Service:     HYSTERECTOMY      INGUINAL HERNIA REPAIR Bilateral     METATARSAL OSTEOTOMY Left 8/28/2020    Procedure: Metatarsal head resection 5;  Surgeon: GAB Rees DPM;  Location: [facility] MAIN OR;  Service: Podiatry;  Laterality: Left;    ROTATOR CUFF REPAIR Right     SIGMOIDOSCOPY N/A 10/6/2021    Procedure: SIGMOIDOSCOPY WITH BIOPSY X1 AREA;  Surgeon: Uche Vaz MD;  Location: [facility] ENDOSCOPY;  Service: Gastroenterology;  Laterality: N/A;  ischemic colitis    STOMACH SURGERY      for ulcer      General Information       Row Name 03/26/24 1429          Home Main Entrance    Number of Stairs, Main Entrance one  -       Row Name 03/26/24 1429          Stairs Within Home, Primary    Stairs, Within Home, Primary none at her house, but 2 flights at work.  -       Row Name 03/26/24 1429          Cognition    Orientation Status (Cognition) oriented x 4  -       Row Name 03/26/24 1429          Safety Issues, Functional Mobility    Comment, Safety Issues/Impairments (Mobility) no safety concerns noted.  -               User Key  (r) = Recorded By, (t) = Taken By, (c) = Cosigned By      Initials Name Provider Type     Christi Jo PT Physical Therapist                   Mobility       Row Name 03/26/24 1430          Bed Mobility    Bed Mobility bed mobility (all) activities  -     All Activities, [place] (Bed Mobility) independent  -       Row Name 03/26/24 1430          Sit-Stand Transfer    Sit-Stand [place] (Transfers) independent  -     Comment, (Sit-Stand Transfer) no AD  -       Row Name 03/26/24 1430          Gait/Stairs  (Locomotion)    [place] Level (Gait) supervision  -     Assistive Device (Gait) cane, straight  -AH     Patient was able to Ambulate yes  -     Distance in Feet (Gait) 200  -     [place] Level (Stairs) contact guard  -     Assistive Device (Stairs) cane, straight  -AH     Handrail Location (Stairs) left side (descending);left side (ascending)  -     Number of Steps (Stairs) full flight.  -     Ascending Technique (Stairs) step-to-step  -     Descending Technique (Stairs) step-to-step  -     Comment, (Gait/Stairs) slow, steady pace on stairs. No safety concerns. PT carries the cellular device linked to cardiac monitor to maintain connection during activity.  -               User Key  (r) = Recorded By, (t) = Taken By, (c) = Cosigned By      Initials Name Provider Type    Christi Lou PT Physical Therapist                   Obj/Interventions       [facility] Name 03/26/24 [facility]          Range of Motion Comprehensive    General Range of Motion no range of motion deficits identified  -AH       Row Name 03/26/24 [facility]          Strength Comprehensive (MMT)    General Manual Muscle Testing (MMT) Assessment no strength deficits identified  -[facility] Name 03/26/24 1432          Balance    Balance Assessment sitting static balance;sitting dynamic balance;standing static balance;standing dynamic balance  -     Static Sitting Balance independent  -     Dynamic Sitting Balance independent  -     Position, Sitting Balance unsupported;sitting edge of bed  -     Static Standing Balance standby assist  -     Dynamic Standing Balance standby assist  -     Position/Device Used, Standing Balance cane, straight  -[facility] Name 03/26/24 1432          Sensory Assessment (Somatosensory)    Sensory Assessment (Somatosensory) sensation intact  -               User Key  (r) = Recorded By, (t) = Taken By, (c) = Cosigned By      Initials Name Provider Type    Christi Lou PT Physical  Therapist                   Goals/Plan    No documentation.                  Clinical Impression       Row Name 03/26/24 1433          Pain    Pretreatment Pain Rating 0/10 - no pain  -     Posttreatment Pain Rating 0/10 - no pain  -       Row Name 03/26/24 1433          Plan of Care Review    Plan of Care Reviewed With patient  -     Outcome Evaluation Pt is a 89 y.o. female who presented to [facility] on 3/25[facility]24 with weakness, bradycardia and near syncope. She had a recent cardioversion. Also recently had covid19. CXR and CT head negative.  Also of note, previous cardiac catheterization showed nonobstructive coronary artery disease with 60 to 70% diagonal branch stenosis which was heavily calcified.  It was elected to treat medically.  She is also known to have hypertension, dyslipidemia and historically preserved LV function. She is independent and active at baseline. No AD use typically, but pt brought her STC with her due to fatigue on the day of ED visit. She works 2 days a week and is able to climb a flight of stairs while working without difficulty. She presents for PT eval near her reported baseline. Pt ambulates 200' with STC and ascend/descends a flight of stairs (CGA only for pt safety/precautionary - no physical assist needed). Pt denies any pain or concerns despite the intensity of activity. HR at rest was 46 bpm, increased to 56 bpm with gait and stairs. Pt has no acute care PT needs and no follow up therapy needs. No DME needed. PT will sign off and complete order.  -       Row Name 03/26/24 1431          Therapy Assessment/Plan (PT)    Patient/Family Therapy Goals Statement (PT) get better and go home.  -     Criteria for Skilled Interventions Met (PT) no;no problems identified which require skilled intervention  -     Therapy Frequency (PT) evaluation only  -       Row Name 03/26/24 1431          Vital Signs    Pretreatment Heart Rate (beats/min) 46  -     Intratreatment Heart Rate  (beats/min) 56  -AH     Posttreatment Heart Rate (beats/min) 56  -AH     Pre SpO2 (%) 99  -AH     O2 Delivery Pre Treatment room air  -AH     Intra SpO2 (%) 97  -AH     O2 Delivery Intra Treatment room air  -AH       Row Name 03/26/24 1433          Positioning and Restraints    Post Treatment Position bed  -AH     In Bed notified nsg;call light within reach;sitting EOB  -               User Key  (r) = Recorded By, (t) = Taken By, (c) = Cosigned By      Initials Name Provider Type    Christi Lou PT Physical Therapist                   Outcome Measures       Row Name 03/26/24 1441 03/26/24 0800       How much help from another person do you currently need...    Turning from your back to your side while in flat bed without using bedrails? 4  -AH 4  -AB    Moving from lying on back to sitting on the side of a flat bed without bedrails? 4  -AH 4  -AB    Moving to and from a bed to a chair (including a wheelchair)? 4  - 3  -AB    Standing up from a chair using your arms (e.g., wheelchair, bedside chair)? 4  - 3  -AB    Climbing 3-5 steps with a railing? -- 3  -AB    To walk in hospital room? 4  - 3  -AB    AM-PAC 6 Clicks Score (PT) -- 20  -AB    Highest Level of Mobility Goal -- 6 --> Walk 10 steps or more  -AB      Row Name 03/26/24 1441          Functional Assessment    Outcome Measure Options AM-PAC 6 Clicks Basic Mobility (PT)  -               User Key  (r) = Recorded By, (t) = Taken By, (c) = Cosigned By      Initials Name Provider Type    Rosalina Browning, RN Registered Nurse    Christi Lou PT Physical Therapist                                 Physical Therapy Education       Title: PT OT SLP Therapies (In Progress)       Topic: Physical Therapy (In Progress)       Point: Mobility training (Not Started)       Learner Progress:  Not documented in this visit.              Point: Home exercise program (Not Started)       Learner Progress:  Not documented in this visit.              Point: Body  mechanics (Not Started)       Learner Progress:  Not documented in this visit.              Point: Precautions (Done)       Learning Progress Summary             Patient LINK Mejia VU by  at 3/26/2024 [id number]                                         User Key       Initials Effective Dates Name Provider Type Discipline     12/04/23 -  Christi Jo, PT Physical Therapist PT                  PT Recommendation and Plan     Plan of Care Reviewed With: patient  Outcome Evaluation: Pt is a 89 y.o. female who presented to [facility] on 3/25/24 with weakness, bradycardia and near syncope. She had a recent cardioversion. Also recently had covid19. CXR and CT head negative.  Also of note, previous cardiac catheterization showed nonobstructive coronary artery disease with 60 to 70% diagonal branch stenosis which was heavily calcified.  It was elected to treat medically.  She is also known to have hypertension, dyslipidemia and historically preserved LV function. She is independent and active at baseline. No AD use typically, but pt brought her STC with her due to fatigue on the day of ED visit. She works 2 days a week and is able to climb a flight of stairs while working without difficulty. She presents for PT eval near her reported baseline. Pt ambulates 200' with STC and ascend/descends a flight of stairs (CGA only for pt safety/precautionary - no physical assist needed). Pt denies any pain or concerns despite the intensity of activity. HR at rest was 46 bpm, increased to 56 bpm with gait and stairs. Pt has no acute care PT needs and no follow up therapy needs. No DME needed. PT will sign off and complete order.     Time Calculation:         PT Charges       Row Name 03/26/24 0154             Time Calculation    Start Time 1107  -      Stop Time 1124  -      Time Calculation (min) 17 min  -      PT Non-Billable Time (min) 0 min  -      PT Received On 03/26/24  -         Time Calculation- PT    Total Timed Code Minutes- PT  0 minute(s)  -                User Key  (r) = Recorded By, (t) = Taken By, (c) = Cosigned By      Initials Name Provider Type     Christi Jo, PT Physical Therapist                  Therapy Charges for Today       Code Description Service Date Service Provider Modifiers Qty    [id number] HC PT EVAL LOW COMPLEXITY 4 3/26/2024 Christi Jo, PT GP 1            PT G-Codes  Outcome Measure Options: AM-PAC 6 Clicks Basic Mobility (PT)  AM-PAC 6 Clicks Score (PT): 20  PT Discharge Summary  Anticipated Discharge Disposition (PT): home    Christi Jo PT  3/26/2024

## 2024-03-26 NOTE — CONSULTS
Referring Provider: Ashley Livingston MD    Reason for Consultation:      Sinus bradycardia  Generalized weakness      Patient Care Team:  Monster Myrick MD as PCP - General (Family Medicine)  Maikel Morales MD as Consulting Physician (Cardiology)      SUBJECTIVE     Chief Complaint: Generalized weakness    History of present illness:  Carrie Golden is a 89 y.o. female with a history of near syncope/bradycardia who presented to [facility] with complaint of generalized weakness.     Patient is known to have a history of paroxysmal atrial fibrillation, DVT, coronary artery disease with previous non-ST segment elevation MI.  The patient had previous cardiac catheterization showing nonobstructive coronary artery disease with 60 to 70% diagonal branch stenosis which was heavily calcified.  It was elected to treat medically.  She is also known to have hypertension, dyslipidemia and historically preserved LV function.  Patient was onLow-dose beta-blockers at home including metoprolol XL 25 mg once daily.  It has been held since admission.  In addition the patient is on amiodarone 100 mg twice daily which is also been held.    The patient is anticoagulated with warfarin.  Her INR is 2.7.  High-sensitivity troponin is 22.  BUN and creatinine 24 and 0.99 hemoglobin is 13.7 CT of her head on admission showed no acute intracranial process identified EKG on admission showed sinus bradycardia with heart rate of 49 QTc 467 ms    At the time of my interview the patient's heart rate was in the upper 50s when she was talking with me or when she was up walking however at rest her heart rates in the mid to upper 40s.  Her blood pressure stable.    The patient reports when she walked today she felt much better than she did yesterday.  She reports she does not feel as weak and tired.  She was able to climb a flight of stairs without symptoms.          Review of systems:    Constitutional: Complains of weakness, denies  fatigue, fever, rigors, chills   Eyes: No vision changes, eye pain   ENT/oropharynx: No difficulty swallowing, sore throat, epistaxis, changes in hearing   Cardiovascular: No chest pain, chest tightness, palpitations, paroxysmal nocturnal dyspnea, orthopnea, diaphoresis, dizziness / syncopal episode   Respiratory: No shortness of breath, dyspnea on exertion, cough, wheezing, hemoptysis   Gastrointestinal: No abdominal pain, nausea, vomiting, diarrhea, bloody stools   Genitourinary: No hematuria, dysuria   Neurological: No headache, tremors, numbness, one-sided weakness    Musculoskeletal: No cramps, myalgias, joint pain, joint swelling   Integument: No rash, edema        Personal History:      Past Medical History:   Diagnosis Date    Amblyopia 10/31/2012    Amblyopia, left 03/12/2020    Arthritis     Conjunctival hemorrhage 10/08/2014    Coronary artery disease     Deep vein thrombosis     Deformity of left foot 08/18/2020    Added automatically from request for surgery [id number]    Dermatochalasis of left upper eyelid 03/12/2020    Dermatochalasis of right upper eyelid 03/12/2020    Drooping eyelid, bilateral     Ectropion 03/12/2020    Epiretinal membrane (ERM) of right eye 03/12/2020    Frequent UTI     GERD (gastroesophageal reflux disease)     Hallux rigidus, left foot 08/18/2020    Added automatically from request for surgery [id number]    Hearing loss     bilateral hearing aides    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 sure which 1  contracted at age 8    History of transfusion     [place] (hard of hearing)     Hyperlipidemia     Hypertension     Myocardial infarction     Past heart attack     X2 MILD LAST ONE OCT 2019    PONV (postoperative nausea and vomiting)     Presence of intraocular lens 03/12/2020       Past Surgical History:   Procedure Laterality Date    ANTERIOR AND POSTERIOR VAGINAL REPAIR      BACK SURGERY      BLEPHAROPLASTY Bilateral 5/23/2019    Procedure: bilateral upper lid blepharoplasty;  Surgeon:  Mauricio Pennington MD;  Location: [facility];  Service: Ophthalmology    BROW LIFT Bilateral 2/13/2020    Procedure: BILATERAL TEMPORAL DIRECT BROWLIFT;  Surgeon: Sreekanth Bird MD;  Location: [facility] OR [facility];  Service: Ophthalmology;  Laterality: Bilateral;    CARDIAC CATHETERIZATION N/A 12/3/2019    Procedure: Left Heart Cath;  Surgeon: Puma Briseno MD;  Location: [facility] CATH INVASIVE LOCATION;  Service: Cardiovascular    CARDIAC CATHETERIZATION N/A 12/3/2019    Procedure: Coronary angiography;  Surgeon: Puma Briseno MD;  Location: [facility] CATH INVASIVE LOCATION;  Service: Cardiovascular    CARDIAC CATHETERIZATION N/A 12/3/2019    Procedure: Left ventriculography;  Surgeon: Puma Briseno MD;  Location: [facility] CATH INVASIVE LOCATION;  Service: Cardiovascular    CATARACT EXTRACTION EXTRACAPSULAR W/ INTRAOCULAR LENS IMPLANTATION Bilateral     CERVICAL SPINE ANTERIOR      with instrumentation    CHEILECTOMY Left 8/28/2020    Procedure: CHEILECTOMY;  Surgeon: GAB Rees DPM;  Location: [facility] MAIN OR;  Service: Podiatry;  Laterality: Left;    COLON SURGERY      for obstruction    ALBERTO TUBE INSERTION Bilateral 5/23/2019    Procedure: ALBERTO TUBE;  Surgeon: Mauricio Pennington MD;  Location: [facility] OR [facility];  Service: Ophthalmology    ECTROPION REPAIR Bilateral 5/23/2019    Procedure: bilateral lower lid ectropion repair, bilateral punctoplasty;  Surgeon: Mauricio Pennington MD;  Location: [facility] OR [facility];  Service: Ophthalmology    ENDOSCOPY N/A 2/18/2022    Procedure: ESOPHAGOGASTRODUODENOSCOPY WITH DILATATION (BALLOON 15MM-18MM, UP TO 18MM) AND BOUGIE #48;  Surgeon: Wiley Parks MD;  Location: [facility] ENDOSCOPY;  Service: Gastroenterology;  Laterality: N/A;  Post: DYSPHAGIA    EYE SURGERY      FOOT FUSION Left 12/30/2016    Procedure: LEFT HALLUX METATARSALPHALANGEAL ARTHRODESIS SILVER BUNIONECTOMY METATARSAL HEAD RESECTION 2-5 CALCANEAL BONE GRAFT;  Surgeon: Monster Harper Jr., MD;  Location:   [facility];  Service:     HYSTERECTOMY      INGUINAL HERNIA REPAIR Bilateral     METATARSAL OSTEOTOMY Left 2020    Procedure: Metatarsal head resection 5;  Surgeon: GAB Rees DPM;  Location: [facility] MAIN OR;  Service: Podiatry;  Laterality: Left;    ROTATOR CUFF REPAIR Right     SIGMOIDOSCOPY N/A 10/6/2021    Procedure: SIGMOIDOSCOPY WITH BIOPSY X1 AREA;  Surgeon: Uche Vaz MD;  Location: [facility] ENDOSCOPY;  Service: Gastroenterology;  Laterality: N/A;  ischemic colitis    STOMACH SURGERY      for ulcer       Family History   Problem Relation Age of Onset    No Known Problems Mother     No Known Problems Father     Malig Hyperthermia Neg Hx        Social History     Tobacco Use    Smoking status: Former     Current packs/day: 0.00     Average packs/day: 0.3 packs/day for 10.0 years (2.5 ttl pk-yrs)     Types: Cigarettes     Start date: 1980     Quit date: 1990     Years since quittin.2    Smokeless tobacco: Never   Vaping Use    Vaping status: Never Used   Substance Use Topics    Alcohol use: No    Drug use: No        Home meds:  Prior to Admission medications    Medication Sig Start Date End Date Taking? Authorizing Provider   amiodarone (PACERONE) 200 MG tablet Take 0.5 tablets by mouth 2 (Two) Times a Day.   Yes Melania Tomlin MD   furosemide (LASIX) 20 MG tablet Take 1 tablet by mouth 2 (Two) Times a Day. 22  Yes Yaa Miranda APRN   isosorbide mononitrate (IMDUR) 30 MG 24 hr tablet Take 1 tablet by mouth once daily 10/9/23  Yes Gavin Quiroz MD   metoprolol succinate XL (TOPROL-XL) 25 MG 24 hr tablet Take 1 tablet by mouth Daily. 3/20/24  Yes Gavin Quiroz MD   warfarin (COUMADIN) 2 MG tablet Take 1 tablet by mouth 4 (Four) Times a Week. Tuesday, Thursday, Saturday,    Yes Melania Tomlin MD   warfarin (COUMADIN) 3 MG tablet Take 1 tablet by mouth 3 (Three) Times a Week. Monday, Wednesday, Friday    Melania Tomlin MD  "      Allergies:     Amoxicillin, Codeine, Lortab [hydrocodone-acetaminophen], Nitrofurantoin, and Sulfa antibiotics    Scheduled Meds:furosemide, 20 mg, Oral, BID  isosorbide mononitrate, 30 mg, Oral, Daily  sodium chloride, 10 mL, Intravenous, Q12H  warfarin, 2 mg, Oral, Once per day on Sunday Tuesday Thursday Saturday  warfarin, 3 mg, Oral, Once per day on Monday Wednesday Friday      Continuous Infusions:   PRN Meds:  acetaminophen    nitroglycerin    ondansetron    sodium chloride    [COMPLETED] Insert Peripheral IV **AND** sodium chloride    sodium chloride    sodium chloride      OBJECTIVE    Vital Signs  Vitals:    03/26/24 0335 03/26/24 0726 03/26/24 0730 03/26/24 0732   BP: 120/61 139/72  139/72   BP Location: Left arm Left arm  Right arm   Patient Position: Lying Lying  Lying   Pulse: (!) 47 (!) 49 (!) 47 (!) 47   Resp: 15 16  15   Temp: 97.4 °F (36.3 °C) 97.8 °F (36.6 °C)  98.1 °F (36.7 °C)   TempSrc: Oral Oral  Oral   SpO2: 96% 98%  96%   Weight: 52.7 kg (116 lb 2.9 oz)      Height:           Flowsheet Rows      Flowsheet Row First Filed Value   Admission Height 157.5 cm (62\") Documented at 03/25/2024 1347   Admission Weight 51.1 kg (112 lb 9.6 oz) Documented at 03/25/2024 1347              Intake/Output Summary (Last 24 hours) at 3/26/2024 0943  Last data filed at 3/25/2024 1936  Gross per 24 hour   Intake 240 ml   Output --   Net 240 ml        Telemetry: Sinus bradycardia    Physical Exam:  The patient is alert, oriented and in no distress.  Vital signs as noted above.  Head and neck revealed no carotid bruits or jugular venous distention.    Lungs clear.  No wheezing.  Breath sounds are normal bilaterally.  Heart: Normal first and second heart sounds. No murmur.  No precordial rub is present.  No gallop is present.  Abdomen: Soft and nontender.  No organomegaly is present.  Extremities with good peripheral pulses without any pedal edema.  Skin: Warm and dry.  Musculoskeletal system is grossly " normal.  CNS grossly normal.       Results Review:  I have personally reviewed the results from the time of this admission to 3/26/2024 09:43 EDT and agree with these findings:  [x]  Laboratory  []  Microbiology  []  Radiology  [x]  EKG/Telemetry   [x]  Cardiology/Vascular   []  Pathology  []  Old records  []  Other:    Most notable findings include:     Lab Results (last 24 hours)       Procedure Component Value Units Date/Time    Protime-INR [[id number]]  (Normal) Collected: 03/26/24 0359    Specimen: Blood from Arm, Left Updated: 03/26/24 0449     Protime 27.3 Seconds      INR 2.70    TSH [[id number]]  (Normal) Collected: 03/25/24 1412    Specimen: Blood Updated: 03/25/24 2109     TSH 4.020 uIU/mL     T4, Free [[id number]]  (Abnormal) Collected: 03/25/24 1412    Specimen: Blood Updated: 03/25/24 2109     Free T4 1.78 ng/dL     Narrative:      Results may be falsely increased if patient taking Biotin.      [facility] Draw [[id number]] Collected: 03/25/24 1412    Specimen: Blood Updated: 03/25/24 1515    Narrative:      The following orders were created for panel order [facility] Draw.  Procedure                               Abnormality         Status                     ---------                               -----------         ------                     Green Top (Gel)[[id number]]                                  Final result               Lavender Top[[id number]]                                     Final result               Gold Top - [facility][[id number]]                                   Final result               Light Blue Top[[id number]]                                   Final result                 Please view results for these tests on the individual orders.    Green Top (Gel) [[id number]] Collected: 03/25/24 1412    Specimen: Blood Updated: 03/25/24 1515     Extra Tube Hold for add-ons.     Comment: Auto resulted.       Lavender Top [[id number]] Collected: 03/25/24 1412    Specimen: Blood Updated: 03/25/24 1515     Extra  Tube hold for add-on     Comment: Auto resulted       Gold Top - [facility] [[id number]] Collected: 03/25/24 1412    Specimen: Blood Updated: 03/25/24 1515     Extra Tube Hold for add-ons.     Comment: Auto resulted.       Light Blue Top [[id number]] Collected: 03/25/24 1412    Specimen: Blood Updated: 03/25/24 1515     Extra Tube Hold for add-ons.     Comment: Auto resulted       Urinalysis With Microscopic If Indicated (No Culture) - Urine, Clean Catch [[id number]]  (Abnormal) Collected: 03/25/24 1441    Specimen: Urine, Clean Catch Updated: 03/25/24 1452     Color, UA Yellow     Appearance, UA Clear     pH, UA 6.0     Specific Gravity, UA 1.007     Glucose, UA Negative     Ketones, UA Negative     Bilirubin, UA Negative     Blood, UA Trace     Protein, UA Negative     Leuk Esterase, UA Negative     Nitrite, UA Negative     Urobilinogen, UA 0.2 E.U./dL    Urinalysis, Microscopic Only - Urine, Clean Catch [[id number]] Collected: 03/25/24 1441    Specimen: Urine, Clean Catch Updated: 03/25/24 1452     RBC, UA 0-2 /HPF      WBC, UA 0-2 /HPF      Bacteria, UA None Seen /HPF      Squamous Epithelial Cells, UA 0-2 /HPF      Hyaline Casts, UA None Seen /LPF      Methodology Automated Microscopy    aPTT [[id number]]  (Abnormal) Collected: 03/25/24 1412    Specimen: Blood Updated: 03/25/24 1447     PTT 39.0 seconds     Protime-INR [[id number]]  (Normal) Collected: 03/25/24 1412    Specimen: Blood Updated: 03/25/24 1447     Protime 27.9 Seconds      INR 2.76    Single High Sensitivity Troponin T [[id number]]  (Abnormal) Collected: 03/25/24 1412    Specimen: Blood Updated: 03/25/24 1445     HS Troponin T 22 ng/L     Narrative:      High Sensitive Troponin T Reference Range:  <14.0 ng/L- Negative Female for AMI  <22.0 ng/L- Negative Male for AMI  >=14 - Abnormal Female indicating possible myocardial injury.  >=22 - Abnormal Male indicating possible myocardial injury.   Clinicians would have to utilize clinical acumen, EKG, Troponin, and  serial changes to determine if it is an Acute Myocardial Infarction or myocardial injury due to an underlying chronic condition.         Comprehensive Metabolic Panel [[id number]]  (Abnormal) Collected: 03/25/24 1412    Specimen: Blood Updated: 03/25/24 1442     Glucose 118 mg/dL      BUN 24 mg/dL      Creatinine 0.99 mg/dL      Sodium 145 mmol/L      Potassium 3.7 mmol/L      Chloride 104 mmol/L      CO2 30.0 mmol/L      Calcium 9.4 mg/dL      Total Protein 7.0 g/dL      Albumin 4.2 g/dL      ALT (SGPT) 14 U/L      AST (SGOT) 19 U/L      Alkaline Phosphatase 101 U/L      Total Bilirubin 0.8 mg/dL      Globulin 2.8 gm/dL      A/G Ratio 1.5 g/dL      BUN/Creatinine Ratio 24.2     Anion Gap 11.0 mmol/L      eGFR 51.6 mL/min/1.73     Narrative:      GFR Normal >60  Chronic Kidney Disease <60  Kidney Failure <15    The GFR formula is only valid for adults with stable renal function between ages 18 and 70.    CBC & Differential [[id number]]  (Abnormal) Collected: 03/25/24 1412    Specimen: Blood Updated: 03/25/24 1437    Narrative:      The following orders were created for panel order CBC & Differential.  Procedure                               Abnormality         Status                     ---------                               -----------         ------                     CBC Auto Differential[[id number]]        Abnormal            Final result                 Please view results for these tests on the individual orders.    CBC Auto Differential [[id number]]  (Abnormal) Collected: 03/25/24 1412    Specimen: Blood Updated: 03/25/24 1437     WBC 8.04 10*3/mm3      RBC 4.82 10*6/mm3      Hemoglobin 13.7 g/dL      Hematocrit 44.1 %      MCV 91.5 fL      MCH 28.4 pg      MCHC 31.1 g/dL      RDW 15.2 %      RDW-SD 51.1 fl      MPV 9.0 fL      Platelets 283 10*3/mm3      Neutrophil % 72.2 %      Lymphocyte % 17.4 %      Monocyte % 7.8 %      Eosinophil % 1.5 %      Basophil % 0.5 %      Immature Grans % 0.6 %      Neutrophils,  Absolute 5.80 10*3/mm3      Lymphocytes, Absolute 1.40 10*3/mm3      Monocytes, Absolute 0.63 10*3/mm3      Eosinophils, Absolute 0.12 10*3/mm3      Basophils, Absolute 0.04 10*3/mm3      Immature Grans, Absolute 0.05 10*3/mm3      nRBC 0.0 /100 WBC             Imaging Results (Last 24 Hours)       Procedure Component Value Units Date/Time    CT Head Without Contrast [[id number]] Collected: 03/25/24 1533     Updated: 03/25/24 1539    Narrative:      CT HEAD WO CONTRAST    Date of Exam: 3/25/2024 3:21 PM EDT    Indication: general weakness, headache.    Comparison: 7/17/2023    Technique: Axial CT images were obtained of the head without contrast administration.  Coronal reconstructions were performed.  Automated exposure control and iterative reconstruction methods were used.      Findings:  There is no evidence of acute territorial infarction.    There is no acute intracranial hemorrhage. There are no extra-axial collections.    Ventricles and CSF spaces are symmetric. No mass effect nor hydrocephalus.    Brain parenchyma appears similar.     Paranasal sinuses and left mastoid air cells are well aerated. There is partial opacification of the right mastoid air cells. Findings are similar to prior examination.     Osseous structures and orbits appear intact.      Impression:      Impression:  No acute intracranial process identified.      Electronically Signed: Christ Miranda MD    3/25/2024 3:37 PM EDT    Workstation ID: [id number]    XR Chest 1 View [[id number]] Collected: 03/25/24 1505     Updated: 03/25/24 1508    Narrative:      XR CHEST 1 VW    Date of Exam: 3/25/2024 2:53 PM EDT    Indication: general weakness    Comparison: 10/17/2021    Findings:  Electronic device overlies the upper mediastinum. Unremarkable cardiomediastinal silhouette. There are chronic lung changes with no focal airspace consolidation, pleural effusion, or pneumothorax. No acute osseous abnormality.      Impression:      Impression:  No acute  cardiopulmonary abnormality.      Electronically Signed: Jesse Watson MD    3/25/2024 3:06 PM EDT    Workstation ID: [id number]            LAB RESULTS (LAST 7 DAYS)    CBC  Results from last 7 days   Lab Units 03/25/24  1412   WBC 10*3/mm3 8.04   RBC 10*6/mm3 4.82   HEMOGLOBIN g/dL 13.7   HEMATOCRIT % 44.1   MCV fL 91.5   PLATELETS 10*3/mm3 283       BMP  Results from last 7 days   Lab Units 03/25/24  1412   SODIUM mmol/L 145   POTASSIUM mmol/L 3.7   CHLORIDE mmol/L 104   CO2 mmol/L 30.0*   BUN mg/dL 24*   CREATININE mg/dL 0.99   GLUCOSE mg/dL 118*       CMP   Results from last 7 days   Lab Units 03/25/24  1412   SODIUM mmol/L 145   POTASSIUM mmol/L 3.7   CHLORIDE mmol/L 104   CO2 mmol/L 30.0*   BUN mg/dL 24*   CREATININE mg/dL 0.99   GLUCOSE mg/dL 118*   ALBUMIN g/dL 4.2   BILIRUBIN mg/dL 0.8   ALK PHOS U/L 101   AST (SGOT) U/L 19   ALT (SGPT) U/L 14       BNP        TROPONIN  Results from last 7 days   Lab Units 03/25/24  1412   HSTROP T ng/L 22*       CoAg  Results from last 7 days   Lab Units 03/26/24  0359 03/25/24  1412 03/23/24  1459   INR  2.70 2.76 3.00   APTT seconds  --  39.0*  --        Creatinine Clearance  Estimated Creatinine Clearance: 26.4 mL/min (by C-G formula based on SCr of 0.99 mg/dL).    ABG          Radiology  CT Head Without Contrast    Result Date: 3/25/2024  Impression: No acute intracranial process identified. Electronically Signed: Christ Miranda MD  3/25/2024 3:37 PM EDT  Workstation ID: [id number]    XR Chest 1 View    Result Date: 3/25/2024  Impression: No acute cardiopulmonary abnormality. Electronically Signed: Jesse Watson MD  3/25/2024 3:06 PM EDT  Workstation ID: [id number]       EKG  I personally viewed and interpreted the patient's EKG/Telemetry data:  ECG 12 Lead Bradycardia   Final Result   HEART RATE= 49  bpm   RR Interval= 1216  ms   MD Interval= 170  ms   P Horizontal Axis= -68  deg   P Front Axis= 37  deg   QRSD Interval= 98  ms   QT Interval= 515  ms   QTcB= 467  ms   QRS Axis=  -31  deg   T Wave Axis= 1  deg   - ABNORMAL ECG -   Sinus bradycardia   Left ventricular hypertrophy   Anterior Q waves, possibly due to LVH   Electronically Signed By: Jason Martins (Joseluis) 26-Mar-2024 06:55:55   Date and Time of Study: 2024-03-25 13:54:22                  Echocardiogram:    Results for orders placed during the hospital encounter of 10/02/21    Adult Transthoracic Echo Complete W/ Cont if Necessary Per Protocol    Interpretation Summary  · Estimated left ventricular EF was in agreement with the calculated left ventricular EF. Left ventricular ejection fraction appears to be 56 - 60%. Left ventricular systolic function is normal.  · Left ventricular diastolic function is consistent with (grade II w/high LAP) pseudonormalization.  · Left atrial volume is mildly increased.  · Estimated right ventricular systolic pressure from tricuspid regurgitation is normal (<35 mmHg).        Stress Test:        Cardiac Catheterization:  Results for orders placed during the hospital encounter of 12/01/19    Cardiac Catheterization/Vascular Study    Narrative  CARDIAC CATHETERIZATION REPORT      DATE OF PROCEDURE:  12/3/2019    INDICATION FOR PROCEDURE:    Non-ST elevation myocardial infarction  CAD  Angina pectoris    PROCEDURE PERFORMED:    Left heart catheterization  coronary angiography  left ventriculography    PROCEDURE COMMENTS:    After informed consent was obtained, the patient was prepped and draped in the usual sterile manner.  Mild to moderate sedation was administered.  Right femoral artery was accessed without difficulty and 6 Vietnamese arterial sheath was inserted.  Sheath was flushed with heparinized saline.    Using 6 Vietnamese Rebeca catheters, first left coronary artery and the right coronary was electively engaged and appropriate views were taken.  A 6 Vietnamese JR4 catheter was used to cross aortic valve and left heart catheterization was performed.  Left ventriculography was done in a review.    Patient  tolerated the procedure well.    FINDINGS:    1. HEMODYNAMICS:    Aortic pressure: 145/72    LVEDP: 10 mmHg    Gradient across aortic valve on pullback: No gradient across aortic valve      2. LEFT VENTRICULOGRAPHY: 50 to 55% mild global hypokinesis      3. CORONARY ANGIOGRAPHY:    A: Left main coronary artery: Calcified and mild disease up to 10 to 20% in the ostium.  TIESHA-3 flow was present    B: Left anterior descending artery: Diffuse disease up to 30% in proximal mid LAD at the origin of D1 branch of LAD.  D1 branch of LAD has 60 to 70% ostial stenosis which is calcified.  This has not changed from cardiac catheterization done in 2017.  TIESHA-3 flow was present in both vessels    C: Left circumflex coronary artery: Diffuse 25 to 30% mid LCx stenosis    D: Right coronary artery: 20% proximal RCA stenosis.  It is large and dominant vessel.    SUMMARY:    Single-vessel coronary artery disease  Intermediate lesion of ostial D1 branch of LAD.  This was not different from cardiac catheterization done in 2017  Preserved left ventricular function with mild global hypokinesis    RECOMMENDATIONS:    Recommend medical treatment        Other:      ASSESSMENT & PLAN:    Principal Problem:    Bradycardia    Sinus bradycardia  Symptomatic with weakness and fatigue  Amiodarone 100 mg daily and Toprol-XL 25 mg daily have been held  Heart rate is coming up but remains bradycardic at rest  Blood pressure stable    Paroxysmal atrial fibrillation  Currently sinus mechanism  Patient is anticoagulated with warfarin  INR 2.7    Coronary artery disease  Medically treated  No signs or symptoms to suggest unstable angina      Continue to hold negative chronotropic agents and amiodarone  Will ask EP to evaluate  Hopefully patient's heart rate will continue to improve  If recurrent atrial fibrillation or persistent bradycardia will need to consider pacemaker placement  Additional recommendations per Dr. Richie Tobar,  APRN  03/26/24  09:43 EDT

## 2024-03-26 NOTE — CASE MANAGEMENT/SOCIAL WORK
Discharge Planning Assessment   [place]     Patient Name: Carrie Golden  MRN: [id number]  Today's Date: 3/26/2024    Admit Date: 3/25/2024    Plan: Anticipate routine home alone. PT/OT recommendations pending.   Discharge Needs Assessment       Row Name 03/26/24 0914       Living Environment    People in Home alone    Current Living Arrangements apartment    Potentially Unsafe Housing Conditions none    In the past 12 months has the electric, gas, oil, or water company threatened to shut off services in your home? No    Primary Care Provided by self    Provides Primary Care For no one    Family Caregiver if Needed other relative(s)    Family Caregiver Names owen, Rita    Quality of Family Relationships helpful    Able to Return to Prior Arrangements yes       Resource/Environmental Concerns    Resource/Environmental Concerns none    Transportation Concerns none       Transportation Needs    In the past 12 months, has lack of transportation kept you from medical appointments or from getting medications? no    In the past 12 months, has lack of transportation kept you from meetings, work, or from getting things needed for daily living? No       Food Insecurity    Within the past 12 months, you worried that your food would run out before you got the money to buy more. Never true    Within the past 12 months, the food you bought just didn't last and you didn't have money to get more. Never true       Transition Planning    Patient/Family Anticipates Transition to home    Patient/Family Anticipated Services at Transition none    Transportation Anticipated family or friend will provide       Discharge Needs Assessment    Readmission Within the Last 30 Days no previous admission in last 30 days    Equipment Currently Used at Home cane, straight;bp cuff    Concerns to be Addressed denies needs/concerns at this time    Anticipated Changes Related to Illness none    Equipment Needed After Discharge none                    Discharge Plan       Row Name 03/26/24 0916       Plan    Plan Anticipate routine home alone. PT/OT recommendations pending.    Patient/Family in Agreement with Plan yes    Plan Comments CM met with patient at bedside. Patient lives alone, is independent with ADLs and drives. Patient reports working part-time, niece helps when needed. PCP and pharmacy verified-denies any difficulty affording meds at this time. Patient denies any d/c needs at this time and niece will transport at d/c. ARENAS letter reviewed with patient, consent obtained and copy left at bedside. Barrier to D/C: PT/OT pending, cardiology consult.                      Expected Discharge Date and Time       Expected Discharge Date Expected Discharge Time    Mar 27, 2024            Demographic Summary       Row Name 03/26/24 0913       General Information    Admission Type observation    Arrived From emergency department    Referral Source admission list    Reason for Consult discharge planning    Preferred Language English       Contact Information    Permission Granted to Share Info With                    Functional Status       Row Name 03/26/24 0913       Functional Status    Usual Activity Tolerance moderate    Current Activity Tolerance moderate       Functional Status, IADL    Medications independent    Meal Preparation independent    Housekeeping independent    Laundry independent    Shopping independent       Mental Status    General Appearance WDL WDL       Mental Status Summary    Recent Changes in Mental Status/Cognitive Functioning no changes                       Patient Forms       Row Name 03/26/24 0918       Patient Forms    Important Message from Medicare (IMM) --  ARENAS 3/26/24    Patient Observation Letter Delivered  3/26/24    Delivered to Patient    Method of delivery In person                  Met with patient in room.  Maintained distance greater than six feet and spent less than 15 minutes in the room.     Angelina  ELIZABETH JallohN, [place] [address]    Office: [phone]  Fax: [phone]

## 2024-03-26 NOTE — PLAN OF CARE
Goal Outcome Evaluation:  Plan of Care Reviewed With: patient        Progress: no change  Outcome Evaluation: Rested well during the shift. Remains bradycardic with HR in 40's. Cardiology to see today. Tylenol given for c/o HA. Up to BR with stand-by assist. Will continue to monitor.

## 2024-03-26 NOTE — H&P
Patient Care Team:  Monster Myrick MD as PCP - General (Family Medicine)  Maikel Morales MD as Consulting Physician (Cardiology)    Chief complaint generalized weakness    Subjective     Patient is a 89 y.o. female with PAF and chronic CAD who presents with generalized weakness x 2-3 days, worse today.  She denies chest pain, SOA, palpitations. She has recently be working with Dr. Quiroz after cardioversion with med adjustments for bradycardia.  Heart rate today has been in 40's on 12.5 mg metoprolol (decreased 3/20 from 25 mg).      Review of Systems   Constitutional:  Positive for activity change and fatigue. Negative for appetite change, chills, diaphoresis, fever and unexpected weight change.   HENT:  Negative for congestion and facial swelling.    Eyes:  Negative for visual disturbance.   Respiratory:  Negative for cough, chest tightness and shortness of breath.    Cardiovascular:  Negative for chest pain, palpitations and leg swelling.   Gastrointestinal:  Negative for abdominal distention, constipation, diarrhea, nausea and vomiting.   Genitourinary:  Negative for dysuria.   Musculoskeletal:  Negative for arthralgias and back pain.   Neurological:  Positive for weakness.   Psychiatric/Behavioral:  Negative for confusion.           History  Past Medical History:   Diagnosis Date    Amblyopia 10/31/2012    Amblyopia, left 03/12/2020    Arthritis     Conjunctival hemorrhage 10/08/2014    Coronary artery disease     Deep vein thrombosis     Deformity of left foot 08/18/2020    Added automatically from request for surgery [id number]    Dermatochalasis of left upper eyelid 03/12/2020    Dermatochalasis of right upper eyelid 03/12/2020    Drooping eyelid, bilateral     Ectropion 03/12/2020    Epiretinal membrane (ERM) of right eye 03/12/2020    Frequent UTI     GERD (gastroesophageal reflux disease)     Hallux rigidus, left foot 08/18/2020    Added automatically from request for surgery [id number]    Hearing  Script eprescribed to pharmacy   Patient will schedule follow up in 1 month    loss     bilateral hearing aides    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 sure which 1  contracted at age 8    History of transfusion     [place] (hard of hearing)     Hyperlipidemia     Hypertension     Myocardial infarction     Past heart attack     X2 MILD LAST ONE OCT 2019    PONV (postoperative nausea and vomiting)     Presence of intraocular lens 03/12/2020     Past Surgical History:   Procedure Laterality Date    ANTERIOR AND POSTERIOR VAGINAL REPAIR      BACK SURGERY      BLEPHAROPLASTY Bilateral 5/23/2019    Procedure: bilateral upper lid blepharoplasty;  Surgeon: Mauricio Pennington MD;  Location: [facility] OR [facility];  Service: Ophthalmology    BROW LIFT Bilateral 2/13/2020    Procedure: BILATERAL TEMPORAL DIRECT BROWLIFT;  Surgeon: Sreekanth Bird MD;  Location: [facility] OR [facility];  Service: Ophthalmology;  Laterality: Bilateral;    CARDIAC CATHETERIZATION N/A 12/3/2019    Procedure: Left Heart Cath;  Surgeon: Puma Briseno MD;  Location: [facility] CATH INVASIVE LOCATION;  Service: Cardiovascular    CARDIAC CATHETERIZATION N/A 12/3/2019    Procedure: Coronary angiography;  Surgeon: Puma Briseno MD;  Location: [facility] CATH INVASIVE LOCATION;  Service: Cardiovascular    CARDIAC CATHETERIZATION N/A 12/3/2019    Procedure: Left ventriculography;  Surgeon: Puma Briseno MD;  Location: [facility] CATH INVASIVE LOCATION;  Service: Cardiovascular    CATARACT EXTRACTION EXTRACAPSULAR W/ INTRAOCULAR LENS IMPLANTATION Bilateral     CERVICAL SPINE ANTERIOR      with instrumentation    CHEILECTOMY Left 8/28/2020    Procedure: CHEILECTOMY;  Surgeon: GAB Rees DPM;  Location: [facility] MAIN OR;  Service: Podiatry;  Laterality: Left;    COLON SURGERY      for obstruction    ALBERTO TUBE INSERTION Bilateral 5/23/2019    Procedure: ALBERTO TUBE;  Surgeon: Mauricio Pennington MD;  Location: [facility] OR [facility];  Service: Ophthalmology    ECTROPION REPAIR Bilateral 5/23/2019    Procedure: bilateral lower lid ectropion repair, bilateral  punctoplasty;  Surgeon: Mauricio Pennington MD;  Location:  [facility];  Service: Ophthalmology    ENDOSCOPY N/A 2022    Procedure: ESOPHAGOGASTRODUODENOSCOPY WITH DILATATION (BALLOON 15MM-18MM, UP TO 18MM) AND BOUGIE #48;  Surgeon: Wiley Parks MD;  Location: [facility] ENDOSCOPY;  Service: Gastroenterology;  Laterality: N/A;  Post: DYSPHAGIA    EYE SURGERY      FOOT FUSION Left 2016    Procedure: LEFT HALLUX METATARSALPHALANGEAL ARTHRODESIS SILVER BUNIONECTOMY METATARSAL HEAD RESECTION 2-5 CALCANEAL BONE GRAFT;  Surgeon: Monster Harper Jr., MD;  Location: [facility] MAIN OR;  Service:     HYSTERECTOMY      INGUINAL HERNIA REPAIR Bilateral     METATARSAL OSTEOTOMY Left 2020    Procedure: Metatarsal head resection 5;  Surgeon: GAB Rees DPM;  Location: [facility] MAIN OR;  Service: Podiatry;  Laterality: Left;    ROTATOR CUFF REPAIR Right     SIGMOIDOSCOPY N/A 10/6/2021    Procedure: SIGMOIDOSCOPY WITH BIOPSY X1 AREA;  Surgeon: Uche Vaz MD;  Location: [facility] ENDOSCOPY;  Service: Gastroenterology;  Laterality: N/A;  ischemic colitis    STOMACH SURGERY      for ulcer     Family History   Problem Relation Age of Onset    No Known Problems Mother     No Known Problems Father     Malig Hyperthermia Neg Hx      Social History     Tobacco Use    Smoking status: Former     Current packs/day: 0.00     Average packs/day: 0.3 packs/day for 10.0 years (2.5 ttl pk-yrs)     Types: Cigarettes     Start date: 1980     Quit date: 1990     Years since quittin.2    Smokeless tobacco: Never   Vaping Use    Vaping status: Never Used   Substance Use Topics    Alcohol use: No    Drug use: No     Medications Prior to Admission   Medication Sig Dispense Refill Last Dose    amiodarone (PACERONE) 200 MG tablet Take 0.5 tablets by mouth 2 (Two) Times a Day.   3/25/2024    furosemide (LASIX) 20 MG tablet Take 1 tablet by mouth 2 (Two) Times a Day. 180 tablet 1 3/25/2024    isosorbide mononitrate  (IMDUR) 30 MG 24 hr tablet Take 1 tablet by mouth once daily 90 tablet 1 3/25/2024    metoprolol succinate XL (TOPROL-XL) 25 MG 24 hr tablet Take 1 tablet by mouth Daily. 90 tablet 1     warfarin (COUMADIN) 2 MG tablet Take 1 tablet by mouth 4 (Four) Times a Week. Tuesday, Thursday, Saturday, Bruce   3/24/2024    warfarin (COUMADIN) 3 MG tablet Take 1 tablet by mouth 3 (Three) Times a Week. Monday, Wednesday, Friday   3/22/2024     Allergies:  Amoxicillin, Codeine, Lortab [hydrocodone-acetaminophen], Nitrofurantoin, and Sulfa antibiotics    Objective     Vital Signs  Temp:  [97.4 °F (36.3 °C)-97.5 °F (36.4 °C)] 97.4 °F (36.3 °C)  Heart Rate:  [46-92] 49  Resp:  [11-17] 11  BP: (121-164)/(62-79) 151/71     Physical Exam:      General Appearance:    Alert, cooperative, in no acute distress, younger than stated age   Head:    Normocephalic, without obvious abnormality, atraumatic   Eyes:            Lids and lashes normal, conjunctivae and sclerae normal, no   icterus, no pallor, corneas clear, PERRLA   Ears:    Ears appear intact with no abnormalities noted   Throat:   No oral lesions, no thrush, oral mucosa moist   Neck:   No adenopathy, supple, trachea midline, no thyromegaly, no   carotid bruit, no JVD   Lungs:     Clear to auscultation,respirations regular, even and                  unlabored    Heart:    Bradycardic   Chest Wall:    No abnormalities observed   Abdomen:     Normal bowel sounds, no masses, no organomegaly, soft        non-tender, non-distended, no guarding, no rebound                tenderness   Extremities:   Moves all extremities well, no edema, no cyanosis, no             redness   Pulses:   Pulses palpable and equal bilaterally   Skin:   No bleeding, bruising or rash   Lymph nodes:   No palpable adenopathy   Neurologic:   Cranial nerves 2 - 12 grossly intact, sensation intact, DTR       present and equal bilaterally       Results Review:     Imaging Results (Last 24 Hours)       Procedure  Component Value Units Date/Time    CT Head Without Contrast [[id number]] Collected: 03/25/24 1533     Updated: 03/25/24 1539    Narrative:      CT HEAD WO CONTRAST    Date of Exam: 3/25/2024 3:21 PM EDT    Indication: general weakness, headache.    Comparison: 7/17/2023    Technique: Axial CT images were obtained of the head without contrast administration.  Coronal reconstructions were performed.  Automated exposure control and iterative reconstruction methods were used.      Findings:  There is no evidence of acute territorial infarction.    There is no acute intracranial hemorrhage. There are no extra-axial collections.    Ventricles and CSF spaces are symmetric. No mass effect nor hydrocephalus.    Brain parenchyma appears similar.     Paranasal sinuses and left mastoid air cells are well aerated. There is partial opacification of the right mastoid air cells. Findings are similar to prior examination.     Osseous structures and orbits appear intact.      Impression:      Impression:  No acute intracranial process identified.      Electronically Signed: Christ Miranda MD    3/25/2024 3:37 PM EDT    Workstation ID: [id number]    XR Chest 1 View [[id number]] Collected: 03/25/24 1505     Updated: 03/25/24 1508    Narrative:      XR CHEST 1 VW    Date of Exam: 3/25/2024 2:53 PM EDT    Indication: general weakness    Comparison: 10/17/2021    Findings:  Electronic device overlies the upper mediastinum. Unremarkable cardiomediastinal silhouette. There are chronic lung changes with no focal airspace consolidation, pleural effusion, or pneumothorax. No acute osseous abnormality.      Impression:      Impression:  No acute cardiopulmonary abnormality.      Electronically Signed: Jesse Watson MD    3/25/2024 3:06 PM EDT    Workstation ID: [id number]             Lab Results (last 24 hours)       Procedure Component Value Units Date/Time    [place] Draw [[id number]] Collected: 03/25/24 1412    Specimen: Blood Updated: 03/25/24 1516     Narrative:      The following orders were created for panel order [place] Draw.  Procedure                               Abnormality         Status                     ---------                               -----------         ------                     Green Top (Gel)[[id number]]                                  Final result               Lavender Top[[id number]]                                     Final result               Gold Top - SST[[id number]]                                   Final result               Light Blue Top[[id number]]                                   Final result                 Please view results for these tests on the individual orders.    Green Top (Gel) [[id number]] Collected: 03/25/24 1412    Specimen: Blood Updated: 03/25/24 1515     Extra Tube Hold for add-ons.     Comment: Auto resulted.       Lavender Top [[id number]] Collected: 03/25/24 1412    Specimen: Blood Updated: 03/25/24 1515     Extra Tube hold for add-on     Comment: Auto resulted       Gold Top - SST [[id number]] Collected: 03/25/24 1412    Specimen: Blood Updated: 03/25/24 1515     Extra Tube Hold for add-ons.     Comment: Auto resulted.       Light Blue Top [[id number]] Collected: 03/25/24 1412    Specimen: Blood Updated: 03/25/24 1515     Extra Tube Hold for add-ons.     Comment: Auto resulted       Urinalysis With Microscopic If Indicated (No Culture) - Urine, Clean Catch [[id number]]  (Abnormal) Collected: 03/25/24 1441    Specimen: Urine, Clean Catch Updated: 03/25/24 1452     Color, UA Yellow     Appearance, UA Clear     pH, UA 6.0     Specific Gravity, UA 1.007     Glucose, UA Negative     Ketones, UA Negative     Bilirubin, UA Negative     Blood, UA Trace     Protein, UA Negative     Leuk Esterase, UA Negative     Nitrite, UA Negative     Urobilinogen, UA 0.2 E.U./dL    Urinalysis, Microscopic Only - Urine, Clean Catch [[id number]] Collected: 03/25/24 1441    Specimen: Urine, Clean Catch Updated: 03/25/24 1452     RBC,  UA 0-2 /HPF      WBC, UA 0-2 /HPF      Bacteria, UA None Seen /HPF      Squamous Epithelial Cells, UA 0-2 /HPF      Hyaline Casts, UA None Seen /LPF      Methodology Automated Microscopy    aPTT [[id number]]  (Abnormal) Collected: 03/25/24 1412    Specimen: Blood Updated: 03/25/24 1447     PTT 39.0 seconds     Protime-INR [[id number]]  (Normal) Collected: 03/25/24 1412    Specimen: Blood Updated: 03/25/24 1447     Protime 27.9 Seconds      INR 2.76    Single High Sensitivity Troponin T [[id number]]  (Abnormal) Collected: 03/25/24 1412    Specimen: Blood Updated: 03/25/24 1445     HS Troponin T 22 ng/L     Narrative:      High Sensitive Troponin T Reference Range:  <14.0 ng/L- Negative Female for AMI  <22.0 ng/L- Negative Male for AMI  >=14 - Abnormal Female indicating possible myocardial injury.  >=22 - Abnormal Male indicating possible myocardial injury.   Clinicians would have to utilize clinical acumen, EKG, Troponin, and serial changes to determine if it is an Acute Myocardial Infarction or myocardial injury due to an underlying chronic condition.         Comprehensive Metabolic Panel [[id number]]  (Abnormal) Collected: 03/25/24 1412    Specimen: Blood Updated: 03/25/24 1442     Glucose 118 mg/dL      BUN 24 mg/dL      Creatinine 0.99 mg/dL      Sodium 145 mmol/L      Potassium 3.7 mmol/L      Chloride 104 mmol/L      CO2 30.0 mmol/L      Calcium 9.4 mg/dL      Total Protein 7.0 g/dL      Albumin 4.2 g/dL      ALT (SGPT) 14 U/L      AST (SGOT) 19 U/L      Alkaline Phosphatase 101 U/L      Total Bilirubin 0.8 mg/dL      Globulin 2.8 gm/dL      A/G Ratio 1.5 g/dL      BUN/Creatinine Ratio 24.2     Anion Gap 11.0 mmol/L      eGFR 51.6 mL/min/1.73     Narrative:      GFR Normal >60  Chronic Kidney Disease <60  Kidney Failure <15    The GFR formula is only valid for adults with stable renal function between ages 18 and 70.    CBC & Differential [[id number]]  (Abnormal) Collected: 03/25/24 1412    Specimen: Blood Updated:  03/25/24 1437    Narrative:      The following orders were created for panel order CBC & Differential.  Procedure                               Abnormality         Status                     ---------                               -----------         ------                     CBC Auto Differential[[id number]]        Abnormal            Final result                 Please view results for these tests on the individual orders.    CBC Auto Differential [[id number]]  (Abnormal) Collected: 03/25/24 1412    Specimen: Blood Updated: 03/25/24 1437     WBC 8.04 10*3/mm3      RBC 4.82 10*6/mm3      Hemoglobin 13.7 g/dL      Hematocrit 44.1 %      MCV 91.5 fL      MCH 28.4 pg      MCHC 31.1 g/dL      RDW 15.2 %      RDW-SD 51.1 fl      MPV 9.0 fL      Platelets 283 10*3/mm3      Neutrophil % 72.2 %      Lymphocyte % 17.4 %      Monocyte % 7.8 %      Eosinophil % 1.5 %      Basophil % 0.5 %      Immature Grans % 0.6 %      Neutrophils, Absolute 5.80 10*3/mm3      Lymphocytes, Absolute 1.40 10*3/mm3      Monocytes, Absolute 0.63 10*3/mm3      Eosinophils, Absolute 0.12 10*3/mm3      Basophils, Absolute 0.04 10*3/mm3      Immature Grans, Absolute 0.05 10*3/mm3      nRBC 0.0 /100 WBC              I reviewed the patient's new clinical results.    Assessment & Plan       Bradycardia  - hold beta blocker; check TFT's; consult Dr. Richie LOOMIS - currently in sinus rhythm on amiodarone; INR is therapeutic    Chronic CAD - continue isosorbide; no anginal symptoms      I discussed the patient's findings and my recommendations with patient.     Ashley Livingston MD  03/25/24  20:13 EDT

## 2024-03-26 NOTE — PLAN OF CARE
Goal Outcome Evaluation:  Plan of Care Reviewed With: patient           Outcome Evaluation: Pt is a 89 y.o. female who presented to [facility] on 3/25/24 with weakness, bradycardia and near syncope. She had a recent cardioversion. Also recently had covid19. CXR and CT head negative.  Also of note, previous cardiac catheterization showed nonobstructive coronary artery disease with 60 to 70% diagonal branch stenosis which was heavily calcified.  It was elected to treat medically.  She is also known to have hypertension, dyslipidemia and historically preserved LV function. She is independent and active at baseline. No AD use typically, but pt brought her STC with her due to fatigue on the day of ED visit. She works 2 days a week and is able to climb a flight of stairs while working without difficulty. She presents for PT eval near her reported baseline. Pt ambulates 200' with STC and ascend/descends a flight of stairs (CGA only for pt safety/precautionary - no physical assist needed). Pt denies any pain or concerns despite the intensity of activity. HR at rest was 46 bpm, increased to 56 bpm with gait and stairs. Pt has no acute care PT needs and no follow up therapy needs. No DME needed. PT will sign off and complete order.      Anticipated Discharge Disposition (PT): home

## 2024-03-26 NOTE — CONSULTS
[facility] [place] [facility]   CONSULT NOTE    Referring Provider: Ashley Livingston MD    Reason for Consultation: bradycardia      Patient Care Team:  Monster Myrick MD as PCP - General (Family Medicine)  Maikel Morales MD as Consulting Physician (Cardiology)      Chief complaint: weakness    History of present illness:  Carrie Golden is a 89 y.o. female with past medical history of  paroxysmal atrial fibrillation, DVT, CAD with prior NSTEMI, HTN, and hyperlipidemia who presented to the ED with generalized weakness.  Patient reports having extreme weakness causing her to be unable to work. She has had her metoprolol recently decreased to 25mg daily. Upon admittance to the ED patient was found to be bradycardic with a heart rate in the upper 40s. Patient denies shortness of breath, chest pain, dizziness, or lightheadedness. Her primary cardiologist is Dr. Quiroz.    Patient is anticoagulated with warfarin. Troponin 22, TSH 4.020, Free T4 1.78, all other labs unremarkable.   CXR and CT head show no acute abnormalities.  EKG reveals bradycardia rate 49, Qtc 467    Review of Systems   Constitutional: Positive for malaise/fatigue.   Cardiovascular:  Negative for chest pain, leg swelling and palpitations.   Gastrointestinal:  Negative for nausea and vomiting.   Neurological:  Positive for weakness.   All other systems reviewed and are negative.      History  Past Medical History:   Diagnosis Date    Amblyopia 10/31/2012    Amblyopia, left 03/12/2020    Arthritis     Conjunctival hemorrhage 10/08/2014    Coronary artery disease     Deep vein thrombosis     Deformity of left foot 08/18/2020    Added automatically from request for surgery [id number]    Dermatochalasis of left upper eyelid 03/12/2020    Dermatochalasis of right upper eyelid 03/12/2020    Drooping eyelid, bilateral     Ectropion 03/12/2020    Epiretinal membrane (ERM) of right eye 03/12/2020    Frequent UTI     GERD (gastroesophageal reflux  disease)     Hallux rigidus, left foot 08/18/2020    Added automatically from request for surgery [id number]    Hearing loss     bilateral hearing aides    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 sure which 1  contracted at age 8    History of transfusion     [place] (hard of hearing)     Hyperlipidemia     Hypertension     Myocardial infarction     Past heart attack     X2 MILD LAST ONE OCT 2019    PONV (postoperative nausea and vomiting)     Presence of intraocular lens 03/12/2020       Past Surgical History:   Procedure Laterality Date    ANTERIOR AND POSTERIOR VAGINAL REPAIR      BACK SURGERY      BLEPHAROPLASTY Bilateral 5/23/2019    Procedure: bilateral upper lid blepharoplasty;  Surgeon: Mauricio Pennington MD;  Location: [facility] [place];  Service: Ophthalmology    BROW LIFT Bilateral 2/13/2020    Procedure: BILATERAL TEMPORAL DIRECT BROWLIFT;  Surgeon: Sreekanth Bird MD;  Location: [facility] [place];  Service: Ophthalmology;  Laterality: Bilateral;    CARDIAC CATHETERIZATION N/A 12/3/2019    Procedure: Left Heart Cath;  Surgeon: Puma Briseno MD;  Location: [facility] CATH INVASIVE LOCATION;  Service: Cardiovascular    CARDIAC CATHETERIZATION N/A 12/3/2019    Procedure: Coronary angiography;  Surgeon: Puma Briseno MD;  Location: [facility] CATH INVASIVE LOCATION;  Service: Cardiovascular    CARDIAC CATHETERIZATION N/A 12/3/2019    Procedure: Left ventriculography;  Surgeon: Puma Briseno MD;  Location: [facility] CATH INVASIVE LOCATION;  Service: Cardiovascular    CATARACT EXTRACTION EXTRACAPSULAR W/ INTRAOCULAR LENS IMPLANTATION Bilateral     CERVICAL SPINE ANTERIOR      with instrumentation    CHEILECTOMY Left 8/28/2020    Procedure: CHEILECTOMY;  Surgeon: GAB Rees DPM;  Location: [facility] MAIN OR;  Service: Podiatry;  Laterality: Left;    COLON SURGERY      for obstruction    ALBERTO TUBE INSERTION Bilateral 5/23/2019    Procedure: ALBERTO TUBE;  Surgeon: Mauricio Pennington MD;  Location: [facility] [place];  Service:  Ophthalmology    ECTROPION REPAIR Bilateral 2019    Procedure: bilateral lower lid ectropion repair, bilateral punctoplasty;  Surgeon: Mauricio Pennington MD;  Location:  [facility];  Service: Ophthalmology    ENDOSCOPY N/A 2022    Procedure: ESOPHAGOGASTRODUODENOSCOPY WITH DILATATION (BALLOON 15MM-18MM, UP TO 18MM) AND BOUGIE #48;  Surgeon: Wiley Parks MD;  Location: [facility] ENDOSCOPY;  Service: Gastroenterology;  Laterality: N/A;  Post: DYSPHAGIA    EYE SURGERY      FOOT FUSION Left 2016    Procedure: LEFT HALLUX METATARSALPHALANGEAL ARTHRODESIS SILVER BUNIONECTOMY METATARSAL HEAD RESECTION 2-5 CALCANEAL BONE GRAFT;  Surgeon: Monster Harper Jr., MD;  Location: [facility] MAIN OR;  Service:     HYSTERECTOMY      INGUINAL HERNIA REPAIR Bilateral     METATARSAL OSTEOTOMY Left 2020    Procedure: Metatarsal head resection 5;  Surgeon: GAB Rees DPM;  Location: [facility] MAIN OR;  Service: Podiatry;  Laterality: Left;    ROTATOR CUFF REPAIR Right     SIGMOIDOSCOPY N/A 10/6/2021    Procedure: SIGMOIDOSCOPY WITH BIOPSY X1 AREA;  Surgeon: Uche Vaz MD;  Location: [facility] ENDOSCOPY;  Service: Gastroenterology;  Laterality: N/A;  ischemic colitis    STOMACH SURGERY      for ulcer       Family History   Problem Relation Age of Onset    No Known Problems Mother     No Known Problems Father     Malig Hyperthermia Neg Hx        Social History     Tobacco Use    Smoking status: Former     Current packs/day: 0.00     Average packs/day: 0.3 packs/day for 10.0 years (2.5 ttl pk-yrs)     Types: Cigarettes     Start date: 1980     Quit date: 1990     Years since quittin.2    Smokeless tobacco: Never   Vaping Use    Vaping status: Never Used   Substance Use Topics    Alcohol use: No    Drug use: No        Medications Prior to Admission   Medication Sig Dispense Refill Last Dose    amiodarone (PACERONE) 200 MG tablet Take 0.5 tablets by mouth 2 (Two) Times a Day.   3/25/2024     "furosemide (LASIX) 20 MG tablet Take 1 tablet by mouth 2 (Two) Times a Day. 180 tablet 1 3/25/2024    isosorbide mononitrate (IMDUR) 30 MG 24 hr tablet Take 1 tablet by mouth once daily 90 tablet 1 3/25/2024    metoprolol succinate XL (TOPROL-XL) 25 MG 24 hr tablet Take 1 tablet by mouth Daily. 90 tablet 1     warfarin (COUMADIN) 2 MG tablet Take 1 tablet by mouth 4 (Four) Times a Week. Tuesday, Thursday, Saturday, Bruce   3/24/2024    warfarin (COUMADIN) 3 MG tablet Take 1 tablet by mouth 3 (Three) Times a Week. Monday, Wednesday, Friday   3/22/2024         Amoxicillin, Codeine, Lortab [hydrocodone-acetaminophen], Nitrofurantoin, and Sulfa antibiotics    Scheduled Meds:  furosemide, 20 mg, Oral, BID  isosorbide mononitrate, 30 mg, Oral, Daily  sodium chloride, 10 mL, Intravenous, Q12H  warfarin, 2 mg, Oral, Once per day on Sunday Tuesday Thursday Saturday  warfarin, 3 mg, Oral, Once per day on Monday Wednesday Friday        Continuous Infusions:       PRN Meds:    acetaminophen    nitroglycerin    ondansetron    sodium chloride    [COMPLETED] Insert Peripheral IV **AND** sodium chloride    sodium chloride    sodium chloride      VITAL SIGNS  Vitals:    03/26/24 0726 03/26/24 0730 03/26/24 0732 03/26/24 1151   BP: 139/72  139/72 155/69   BP Location: Left arm  Right arm Right arm   Patient Position: Lying  Lying Sitting   Pulse: (!) 49 (!) 47 (!) 47 58   Resp: 16  15 17   Temp: 97.8 °F (36.6 °C)  98.1 °F (36.7 °C) 97.4 °F (36.3 °C)   TempSrc: Oral  Oral Oral   SpO2: 98%  96% 97%   Weight:       Height:           Flowsheet Rows      Flowsheet Row First Filed Value   Admission Height 157.5 cm (62\") Documented at 03/25/2024 1347   Admission Weight 51.1 kg (112 lb 9.6 oz) Documented at 03/25/2024 1347             TELEMETRY: sinus bradycardia, rate upper 40's    Physical Exam:  Vitals reviewed.   Constitutional:       Appearance: Normal weight and not in distress.   Eyes:      Pupils: Pupils are equal, round, and " reactive to light.   Pulmonary:      Effort: Pulmonary effort is normal.      Breath sounds: Normal breath sounds.   Cardiovascular:      Bradycardia present. Regular rhythm. Normal S1. Normal S2.       Murmurs: There is no murmur.   Pulses:     Intact distal pulses.   Edema:     Peripheral edema absent.   Abdominal:      General: Bowel sounds are normal.   Musculoskeletal: Normal range of motion.      Cervical back: Normal range of motion. Skin:     General: Skin is warm and dry.   Neurological:      Mental Status: Alert and oriented to person, place and time.   Psychiatric:         Behavior: Behavior is cooperative.            LAB RESULTS (LAST 7 DAYS)    CMP   Results from last 7 days   Lab Units 03/25/24  1412   SODIUM mmol/L 145   POTASSIUM mmol/L 3.7   CHLORIDE mmol/L 104   CO2 mmol/L 30.0*   BUN mg/dL 24*   CREATININE mg/dL 0.99   GLUCOSE mg/dL 118*   ALBUMIN g/dL 4.2   BILIRUBIN mg/dL 0.8   ALK PHOS U/L 101   AST (SGOT) U/L 19   ALT (SGPT) U/L 14       BMP  Results from last 7 days   Lab Units 03/25/24  1412   SODIUM mmol/L 145   POTASSIUM mmol/L 3.7   CHLORIDE mmol/L 104   CO2 mmol/L 30.0*   BUN mg/dL 24*   CREATININE mg/dL 0.99   GLUCOSE mg/dL 118*       CBC  Results from last 7 days   Lab Units 03/25/24  1412   WBC 10*3/mm3 8.04   RBC 10*6/mm3 4.82   HEMOGLOBIN g/dL 13.7   HEMATOCRIT % 44.1   MCV fL 91.5   PLATELETS 10*3/mm3 283       ProBNP        TROPONIN  Results from last 7 days   Lab Units 03/25/24  1412   HSTROP T ng/L 22*       Creatinine Clearance  Estimated Creatinine Clearance: 26.4 mL/min (by C-G formula based on SCr of 0.99 mg/dL).      Radiology  CT Head Without Contrast    Result Date: 3/25/2024  Impression: No acute intracranial process identified. Electronically Signed: Christ Miranda MD  3/25/2024 3:37 PM EDT  Workstation ID: [id number]    XR Chest 1 View    Result Date: 3/25/2024  Impression: No acute cardiopulmonary abnormality. Electronically Signed: Jesse Watson MD  3/25/2024 3:06 PM EDT   Workstation ID: [id number]     EKG    I personally viewed and interpreted the patient's EKG/Telemetry data:  ECG 12 Lead Bradycardia   Final Result   HEART RATE= 49  bpm   RR Interval= 1216  ms   WI Interval= 170  ms   P Horizontal Axis= -68  deg   P Front Axis= 37  deg   QRSD Interval= 98  ms   QT Interval= 515  ms   QTcB= 467  ms   QRS Axis= -31  deg   T Wave Axis= 1  deg   - ABNORMAL ECG -   Sinus bradycardia   Left ventricular hypertrophy   Anterior Q waves, possibly due to LVH   Electronically Signed By: Jason Martins (Joseluis) 26-Mar-2024 06:55:55   Date and Time of Study: 2024-03-25 13:54:22          ECHOCARDIOGRAM:  Results for orders placed during the hospital encounter of 10/02/21    Adult Transthoracic Echo Complete W/ Cont if Necessary Per Protocol    Interpretation Summary  · Estimated left ventricular EF was in agreement with the calculated left ventricular EF. Left ventricular ejection fraction appears to be 56 - 60%. Left ventricular systolic function is normal.  · Left ventricular diastolic function is consistent with (grade II w/high LAP) pseudonormalization.  · Left atrial volume is mildly increased.  · Estimated right ventricular systolic pressure from tricuspid regurgitation is normal (<35 mmHg).      STRESS MYOVIEW:      CARDIAC CATHETERIZATION:  Results for orders placed during the hospital encounter of 12/01/19    Cardiac Catheterization/Vascular Study    Narrative  CARDIAC CATHETERIZATION REPORT      DATE OF PROCEDURE:  12/3/2019    INDICATION FOR PROCEDURE:    Non-ST elevation myocardial infarction  CAD  Angina pectoris    PROCEDURE PERFORMED:    Left heart catheterization  coronary angiography  left ventriculography    PROCEDURE COMMENTS:    After informed consent was obtained, the patient was prepped and draped in the usual sterile manner.  Mild to moderate sedation was administered.  Right femoral artery was accessed without difficulty and 6 Bulgarian arterial sheath was inserted.  Sheath was  flushed with heparinized saline.    Using 6 [place] Rebeca catheters, first left coronary artery and the right coronary was electively engaged and appropriate views were taken.  A 6 [place]ian JR4 catheter was used to cross aortic valve and left heart catheterization was performed.  Left ventriculography was done in a review.    Patient tolerated the procedure well.    FINDINGS:    1. HEMODYNAMICS:    Aortic pressure: 145/72    LVEDP: 10 mmHg    Gradient across aortic valve on pullback: No gradient across aortic valve      2. LEFT VENTRICULOGRAPHY: 50 to 55% mild global hypokinesis      3. CORONARY ANGIOGRAPHY:    A: Left main coronary artery: Calcified and mild disease up to 10 to 20% in the ostium.  TIESHA-3 flow was present    B: Left anterior descending artery: Diffuse disease up to 30% in proximal mid LAD at the origin of D1 branch of LAD.  D1 branch of LAD has 60 to 70% ostial stenosis which is calcified.  This has not changed from cardiac catheterization done in 2017.  TIESHA-3 flow was present in both vessels    C: Left circumflex coronary artery: Diffuse 25 to 30% mid LCx stenosis    D: Right coronary artery: 20% proximal RCA stenosis.  It is large and dominant vessel.    SUMMARY:    Single-vessel coronary artery disease  Intermediate lesion of ostial D1 branch of LAD.  This was not different from cardiac catheterization done in 2017  Preserved left ventricular function with mild global hypokinesis    RECOMMENDATIONS:    Recommend medical treatment      OTHER:         Assessment & Plan       Bradycardia      PLAN    Bradycardia  -symptomatic with weakness and fatigue  -Hold beta blockers and other rate reducing agents  -HR in upper 40's at rest    Paroxysmal atrial fibrillation  -currently sinus rhythm  -cardioversion on 2/19/24  -warfarin for anticoagulation    Discussed potential need for pacemaker. Patient agreeable to procedure, but expressed concerns ability to return to work as a  several times a  week.    I discussed the patients findings and my recommendations with patient and nurse.  Further recommendations per Dr. Garcia.    Addendum    Mrs. Golden was seen and examined today, I agree with the note outlined by nurse practitioner Alivia Gardner and now that the following.  Patient presented to the hospital with a near syncopal episode, she was found to be in sinus bradycardia heart rate in the upper 40s.  Previously in February 2024, she had cardioversion done due to atrial fibrillation and was placed on amiodarone 100 mg twice a day and Toprol 25 mg once a day.  These medications were discontinued and now her heart rate is in the mid 50s.  Patient is no longer having any symptoms.  I think that at this time we could probably hold off on pacemaker.  If however she has recurrence of A-fib then we might not have a choice but to put a pacemaker to be able to control A-fib and avoid bradycardia at the same time.

## 2024-03-27 ENCOUNTER — APPOINTMENT (OUTPATIENT)
Dept: RESPIRATORY THERAPY | Facility: HOSPITAL | Age: 89
End: 2024-03-27
Payer: MEDICARE

## 2024-03-27 VITALS
DIASTOLIC BLOOD PRESSURE: 69 MMHG | SYSTOLIC BLOOD PRESSURE: 106 MMHG | HEART RATE: 59 BPM | RESPIRATION RATE: 17 BRPM | TEMPERATURE: 97.5 F | OXYGEN SATURATION: 96 % | WEIGHT: 117.06 LBS | HEIGHT: 62 IN | BODY MASS INDEX: 21.54 KG/M2

## 2024-03-27 LAB
INR PPP: 3.3 (ref 2–3)
PROTHROMBIN TIME: 32.9 SECONDS (ref 19.4–28.5)

## 2024-03-27 PROCEDURE — 85610 PROTHROMBIN TIME: CPT | Performed by: INTERNAL MEDICINE

## 2024-03-27 PROCEDURE — G0378 HOSPITAL OBSERVATION PER HR: HCPCS

## 2024-03-27 PROCEDURE — 99214 OFFICE O/P EST MOD 30 MIN: CPT | Performed by: NURSE PRACTITIONER

## 2024-03-27 RX ADMIN — ISOSORBIDE MONONITRATE 30 MG: 30 TABLET, EXTENDED RELEASE ORAL at 09:18

## 2024-03-27 RX ADMIN — Medication 10 ML: at 09:18

## 2024-03-27 RX ADMIN — FUROSEMIDE 20 MG: 20 TABLET ORAL at 09:18

## 2024-03-27 NOTE — CASE MANAGEMENT/SOCIAL WORK
Case Management Discharge Note      Final Note: home with family         Selected Continued Care - Discharged on 3/27/2024 Admission date: 3/25/2024 - Discharge disposition: Home or Self Care            Transportation Services  Private: Car    Final Discharge Disposition Code: 01 - home or self-care

## 2024-03-27 NOTE — PROGRESS NOTES
"CARDIOLOGY FOLLOW-UP PROGRESS NOTE      Reason for follow-up:    Sinus bradycardia  Paroxysmal atrial fibrillation     Attending: Ashley Livingston MD      Subjective .     Denies chest pain or dyspnea     ROS  Pertinent items are noted in HPI, all other systems reviewed and negative  Allergies: Amoxicillin, Codeine, Lortab [hydrocodone-acetaminophen], Nitrofurantoin, and Sulfa antibiotics    Scheduled Meds:furosemide, 20 mg, Oral, BID  isosorbide mononitrate, 30 mg, Oral, Daily  sodium chloride, 10 mL, Intravenous, Q12H  warfarin, 2 mg, Oral, Once per day on Sunday Tuesday Thursday Saturday  warfarin, 3 mg, Oral, Once per day on Monday Wednesday Friday        Continuous Infusions:     PRN Meds:.  acetaminophen    nitroglycerin    ondansetron    sodium chloride    [COMPLETED] Insert Peripheral IV **AND** sodium chloride    sodium chloride    sodium chloride    Objective     VITAL SIGNS  Patient Vitals for the past 24 hrs:   BP Temp Temp src Pulse Resp SpO2 Weight   03/27/24 0918 152/69 -- -- 54 -- -- --   03/27/24 0354 160/73 97.5 °F (36.4 °C) Oral 52 16 97 % 53.1 kg (117 lb 1 oz)   03/26/24 2345 107/62 97.7 °F (36.5 °C) Oral (!) 47 18 96 % --   03/26/24 2000 145/65 97.9 °F (36.6 °C) Oral 51 15 95 % --   03/26/24 1620 135/64 97.7 °F (36.5 °C) Oral (!) 46 18 98 % --   03/26/24 1151 155/69 97.4 °F (36.3 °C) Oral 58 17 97 % --        Flowsheet Rows      Flowsheet Row First Filed Value   Admission Height 157.5 cm (62\") Documented at 03/25/2024 1347   Admission Weight 51.1 kg (112 lb 9.6 oz) Documented at 03/25/2024 1347            Body mass index is 21.41 kg/m².      Intake/Output Summary (Last 24 hours) at 3/27/2024 0947  Last data filed at 3/27/2024 0700  Gross per 24 hour   Intake 1820 ml   Output --   Net 1820 ml        TELEMETRY:     Sinus bradycardia/sinus rhythm    Physical Exam:  Vitals reviewed.   Constitutional:       General: Not in acute distress.     Appearance: Normal appearance. Well-developed.   Eyes:      " "Pupils: Pupils are equal, round, and reactive to light.   HENT:      Head: Normocephalic and atraumatic.   Neck:      Vascular: No JVD.   Pulmonary:      Effort: Pulmonary effort is normal.      Breath sounds: Normal breath sounds.   Cardiovascular:      Normal rate. Regular rhythm.   Pulses:     Intact distal pulses.   Edema:     Peripheral edema absent.   Abdominal:      General: There is no distension.      Palpations: Abdomen is soft.      Tenderness: There is no abdominal tenderness.   Musculoskeletal: Normal range of motion.      Cervical back: Normal range of motion and neck supple. Skin:     General: Skin is warm and dry.   Neurological:      Mental Status: Alert and oriented to person, place, and time.            Results Review:   I reviewed the patient's new clinical results.    CBC    Results from last 7 days   Lab Units 03/25/24  1412   WBC 10*3/mm3 8.04   HEMOGLOBIN g/dL 13.7   PLATELETS 10*3/mm3 283     BMP   Results from last 7 days   Lab Units 03/25/24  1412   SODIUM mmol/L 145   POTASSIUM mmol/L 3.7   CHLORIDE mmol/L 104   CO2 mmol/L 30.0*   BUN mg/dL 24*   CREATININE mg/dL 0.99   GLUCOSE mg/dL 118*     Cr Clearance Estimated Creatinine Clearance: 26.6 mL/min (by C-G formula based on SCr of 0.99 mg/dL).  Coag   Results from last 7 days   Lab Units 03/27/24  0401 03/26/24  0359 03/25/24  1412 03/23/24  1459   INR  3.30* 2.70 2.76 3.00   APTT seconds  --   --  39.0*  --      HbA1C   Lab Results   Component Value Date    HGBA1C 5.8 (H) 12/02/2019     Blood Glucose No results found for: \"POCGLU\"  Infection     CMP   Results from last 7 days   Lab Units 03/25/24  1412   SODIUM mmol/L 145   POTASSIUM mmol/L 3.7   CHLORIDE mmol/L 104   CO2 mmol/L 30.0*   BUN mg/dL 24*   CREATININE mg/dL 0.99   GLUCOSE mg/dL 118*   ALBUMIN g/dL 4.2   BILIRUBIN mg/dL 0.8   ALK PHOS U/L 101   AST (SGOT) U/L 19   ALT (SGPT) U/L 14     ABG      UA    Results from last 7 days   Lab Units 03/25/24  1441   NITRITE UA  Negative " "  WBC UA /HPF 0-2   BACTERIA UA /HPF None Seen   MARYJANE LUGO UA /HPF 0-2     DOREEN  No results found for: \"POCMETH\", \"POCAMPHET\", \"POCBARBITUR\", \"POCBENZO\", \"POCCOCAINE\", \"POCOPIATES\", \"POCOXYCODO\", \"POCPHENCYC\", \"POCPROPOXY\", \"POCTHC\", \"POCTRICYC\"  Lysis Labs   Results from last 7 days   Lab Units 03/27/24  0401 03/26/24  0359 03/25/24  1412 03/23/24  1459   INR  3.30* 2.70 2.76 3.00   APTT seconds  --   --  39.0*  --    HEMOGLOBIN g/dL  --   --  13.7  --    PLATELETS 10*3/mm3  --   --  283  --    CREATININE mg/dL  --   --  0.99  --      Radiology(recent) CT Head Without Contrast    Result Date: 3/25/2024  Impression: No acute intracranial process identified. Electronically Signed: Christ Miranda MD  3/25/2024 3:37 PM EDT  Workstation ID: [id number]    XR Chest 1 View    Result Date: 3/25/2024  Impression: No acute cardiopulmonary abnormality. Electronically Signed: Jesse Watson MD  3/25/2024 3:06 PM EDT  Workstation ID: [id number]     Imaging Results (Last 24 Hours)       ** No results found for the last 24 hours. **            Results from last 7 days   Lab Units 03/25/24  1412   HSTROP T ng/L 22*       EKG                 I personally viewed and interpreted the patient's EKG/Telemetry data:        ECHOCARDIOGRAM:     Results for orders placed during the hospital encounter of 10/02/21    Adult Transthoracic Echo Complete W/ Cont if Necessary Per Protocol    Interpretation Summary  · Estimated left ventricular EF was in agreement with the calculated left ventricular EF. Left ventricular ejection fraction appears to be 56 - 60%. Left ventricular systolic function is normal.  · Left ventricular diastolic function is consistent with (grade II w/high LAP) pseudonormalization.  · Left atrial volume is mildly increased.  · Estimated right ventricular systolic pressure from tricuspid regurgitation is normal (<35 mmHg).        STRESS MYOVIEW:      CARDIAC CATHETERIZATION:      OTHER:         Assessment & Plan            " Bradycardia        ASSESSMENT:    Sinus bradycardia  Symptomatic with weakness and fatigue  Amiodarone 100 mg daily and Toprol-XL 25 mg daily have been held  Heart rate is coming up but remains bradycardic at rest  Blood pressure stable     Paroxysmal atrial fibrillation  Currently sinus mechanism  Patient is anticoagulated with warfarin  INR 2.7     Coronary artery disease  Medically treated  No signs or symptoms to suggest unstable angina        Continue to hold negative chronotropic agents and amiodarone  Heart rate has continued to improve  Currently sinus bradycardia in the upper 50s.  When up walking heart rate was in the 70s.  If recurrent atrial fibrillation or persistent bradycardia will need to consider pacemaker placement  Will order M cot monitor to be placed at discharge  Likely okay for discharge home later today  Additional recommendations per Dr. Quiroz                            Electronically signed by EFREN Membreno, 03/27/24, 9:47 AM EDT.          EFREN Membreno  03/27/24  09:47 EDT

## 2024-03-27 NOTE — DISCHARGE SUMMARY
Date of Discharge:  3/27/2024    Discharge Diagnosis:     Bradycardia    Presenting Problem/History of Present Illness  Active Hospital Problems    Diagnosis  POA    **Bradycardia [R00.1]  Yes      Resolved Hospital Problems   No resolved problems to display.          Hospital Course    Carrie Golden is a 89-year-old female who presented to [facility] ED on 3/25/2024 and discharged on 3/27/2024.  Patient has a history of atrial fibrillation on chronic coronary artery disease who presented with generalized weakness.  Patient does follow with cardiology, Dr. Quiroz and recently had a cardioversion and medication adjustment for bradycardia.  When she presented to the ED her heart rate was in the 40s.  Patient's beta-blocker and amiodarone was discontinued.  Her heart rate did improve.  Patient was seen by EP and no interventions were deemed appropriate other than medication adjustments at this time.  Patient is hemodynamically stable and agrees with above plan.  She will follow-up with her PCP and cardiology outpatient.  Patient will also discharge with M cot that is currently in place.    Procedures Performed         Consults:   Consults       Date and Time Order Name Status Description    3/26/2024  9:17 AM Inpatient Cardiac Electrophysiology Consult      3/25/2024  5:56 PM Inpatient Cardiology Consult Completed     3/25/2024  4:14 PM Family Medicine Consult              Pertinent Test Results:    Lab Results (most recent)       Procedure Component Value Units Date/Time    Protime-INR [[id number]]  (Abnormal) Collected: 03/27/24 0401    Specimen: Blood from Arm, Right Updated: 03/27/24 0448     Protime 32.9 Seconds      INR 3.30    Protime-INR [[id number]]  (Normal) Collected: 03/26/24 0359    Specimen: Blood from Arm, Left Updated: 03/26/24 0449     Protime 27.3 Seconds      INR 2.70    TSH [[id number]]  (Normal) Collected: 03/25/24 1412    Specimen: Blood Updated: 03/25/24 2109     TSH 4.020 uIU/mL     T4,  Free [[id number]]  (Abnormal) Collected: 03/25/24 1412    Specimen: Blood Updated: 03/25/24 2109     Free T4 1.78 ng/dL     Narrative:      Results may be falsely increased if patient taking Biotin.      [place] Draw [[id number]] Collected: 03/25/24 1412    Specimen: Blood Updated: 03/25/24 1515    Narrative:      The following orders were created for panel order [place] Draw.  Procedure                               Abnormality         Status                     ---------                               -----------         ------                     Green Top (Gel)[[id number]]                                  Final result               Lavender Top[[id number]]                                     Final result               Gold Top - SST[[id number]]                                   Final result               Light Blue Top[[id number]]                                   Final result                 Please view results for these tests on the individual orders.    Green Top (Gel) [[id number]] Collected: 03/25/24 1412    Specimen: Blood Updated: 03/25/24 1515     Extra Tube Hold for add-ons.     Comment: Auto resulted.       Lavender Top [[id number]] Collected: 03/25/24 1412    Specimen: Blood Updated: 03/25/24 1515     Extra Tube hold for add-on     Comment: Auto resulted       Gold Top - SST [[id number]] Collected: 03/25/24 1412    Specimen: Blood Updated: 03/25/24 1515     Extra Tube Hold for add-ons.     Comment: Auto resulted.       Light Blue Top [[id number]] Collected: 03/25/24 1412    Specimen: Blood Updated: 03/25/24 1515     Extra Tube Hold for add-ons.     Comment: Auto resulted       Urinalysis With Microscopic If Indicated (No Culture) - Urine, Clean Catch [[id number]]  (Abnormal) Collected: 03/25/24 1441    Specimen: Urine, Clean Catch Updated: 03/25/24 1452     Color, UA Yellow     Appearance, UA Clear     pH, UA 6.0     Specific Gravity, UA 1.007     Glucose, UA Negative     Ketones, UA Negative     Bilirubin, UA  Negative     Blood, UA Trace     Protein, UA Negative     Leuk Esterase, UA Negative     Nitrite, UA Negative     Urobilinogen, UA 0.2 E.U./dL    Urinalysis, Microscopic Only - Urine, Clean Catch [[id number]] Collected: 03/25/24 1441    Specimen: Urine, Clean Catch Updated: 03/25/24 1452     RBC, UA 0-2 /HPF      WBC, UA 0-2 /HPF      Bacteria, UA None Seen /HPF      Squamous Epithelial Cells, UA 0-2 /HPF      Hyaline Casts, UA None Seen /LPF      Methodology Automated Microscopy    aPTT [[id number]]  (Abnormal) Collected: 03/25/24 1412    Specimen: Blood Updated: 03/25/24 1447     PTT 39.0 seconds     Single High Sensitivity Troponin T [[id number]]  (Abnormal) Collected: 03/25/24 1412    Specimen: Blood Updated: 03/25/24 1445     HS Troponin T 22 ng/L     Narrative:      High Sensitive Troponin T Reference Range:  <14.0 ng/L- Negative Female for AMI  <22.0 ng/L- Negative Male for AMI  >=14 - Abnormal Female indicating possible myocardial injury.  >=22 - Abnormal Male indicating possible myocardial injury.   Clinicians would have to utilize clinical acumen, EKG, Troponin, and serial changes to determine if it is an Acute Myocardial Infarction or myocardial injury due to an underlying chronic condition.         Comprehensive Metabolic Panel [[id number]]  (Abnormal) Collected: 03/25/24 1412    Specimen: Blood Updated: 03/25/24 1442     Glucose 118 mg/dL      BUN 24 mg/dL      Creatinine 0.99 mg/dL      Sodium 145 mmol/L      Potassium 3.7 mmol/L      Chloride 104 mmol/L      CO2 30.0 mmol/L      Calcium 9.4 mg/dL      Total Protein 7.0 g/dL      Albumin 4.2 g/dL      ALT (SGPT) 14 U/L      AST (SGOT) 19 U/L      Alkaline Phosphatase 101 U/L      Total Bilirubin 0.8 mg/dL      Globulin 2.8 gm/dL      A/G Ratio 1.5 g/dL      BUN/Creatinine Ratio 24.2     Anion Gap 11.0 mmol/L      eGFR 51.6 mL/min/1.73     Narrative:      GFR Normal >60  Chronic Kidney Disease <60  Kidney Failure <15    The GFR formula is only valid for  adults with stable renal function between ages 18 and 70.    CBC & Differential [[id number]]  (Abnormal) Collected: 03/25/24 1412    Specimen: Blood Updated: 03/25/24 1437    Narrative:      The following orders were created for panel order CBC & Differential.  Procedure                               Abnormality         Status                     ---------                               -----------         ------                     CBC Auto Differential[[id number]]        Abnormal            Final result                 Please view results for these tests on the individual orders.    CBC Auto Differential [[id number]]  (Abnormal) Collected: 03/25/24 1412    Specimen: Blood Updated: 03/25/24 1437     WBC 8.04 10*3/mm3      RBC 4.82 10*6/mm3      Hemoglobin 13.7 g/dL      Hematocrit 44.1 %      MCV 91.5 fL      MCH 28.4 pg      MCHC 31.1 g/dL      RDW 15.2 %      RDW-SD 51.1 fl      MPV 9.0 fL      Platelets 283 10*3/mm3      Neutrophil % 72.2 %      Lymphocyte % 17.4 %      Monocyte % 7.8 %      Eosinophil % 1.5 %      Basophil % 0.5 %      Immature Grans % 0.6 %      Neutrophils, Absolute 5.80 10*3/mm3      Lymphocytes, Absolute 1.40 10*3/mm3      Monocytes, Absolute 0.63 10*3/mm3      Eosinophils, Absolute 0.12 10*3/mm3      Basophils, Absolute 0.04 10*3/mm3      Immature Grans, Absolute 0.05 10*3/mm3      nRBC 0.0 /100 WBC              Results for orders placed during the hospital encounter of 10/02/21    Adult Transthoracic Echo Complete W/ Cont if Necessary Per Protocol    Interpretation Summary  · Estimated left ventricular EF was in agreement with the calculated left ventricular EF. Left ventricular ejection fraction appears to be 56 - 60%. Left ventricular systolic function is normal.  · Left ventricular diastolic function is consistent with (grade II w/high LAP) pseudonormalization.  · Left atrial volume is mildly increased.  · Estimated right ventricular systolic pressure from tricuspid regurgitation is normal  (<35 mmHg).          Condition on Discharge: Stable    Vital Signs  Temp:  [97.5 °F (36.4 °C)-97.9 °F (36.6 °C)] 97.5 °F (36.4 °C)  Heart Rate:  [46-59] 59  Resp:  [15-18] 17  BP: (106-160)/(62-73) 106/69      Physical Exam  Vitals reviewed.   HENT:      Head: Normocephalic.      Right Ear: External ear normal.      Left Ear: External ear normal.      Nose: Nose normal.      Mouth/Throat:      Mouth: Mucous membranes are moist.   Eyes:      General:         Right eye: No discharge.         Left eye: No discharge.   Cardiovascular:      Rate and Rhythm: Normal rate.      Pulses: Normal pulses.      Heart sounds: Normal heart sounds.   Pulmonary:      Effort: Pulmonary effort is normal.      Breath sounds: Normal breath sounds.   Abdominal:      General: Bowel sounds are normal.   Skin:     General: Skin is warm and dry.   Neurological:      Mental Status: She is alert and oriented to person, place, and time.   Psychiatric:         Mood and Affect: Mood normal.         Behavior: Behavior normal.              Discharge Disposition  Home or Self Care    Discharge Medications     Discharge Medications        Continue These Medications        Instructions Start Date   furosemide 20 MG tablet  Commonly known as: LASIX   20 mg, Oral, 2 Times Daily      isosorbide mononitrate 30 MG 24 hr tablet  Commonly known as: IMDUR   Take 1 tablet by mouth once daily      warfarin 3 MG tablet  Commonly known as: COUMADIN   3 mg, Oral, 3 Times Weekly, Monday, Wednesday, Friday       warfarin 2 MG tablet  Commonly known as: COUMADIN   2 mg, Oral, 4 Times Weekly, Tuesday, Thursday, Saturday, Sunday              Stop These Medications      amiodarone 200 MG tablet  Commonly known as: PACERONE     metoprolol succinate XL 25 MG 24 hr tablet  Commonly known as: TOPROL-XL              Discharge Diet:   Diet Instructions       Diet: Regular/House Diet; Regular (IDDSI 7); Thin (IDDSI 0)      Discharge Diet: Regular/House Diet    Texture: Regular  (IDDSI 7)    Fluid Consistency: Thin (IDDSI 0)            Activity at Discharge:   Activity Instructions       Activity as Tolerated              Follow-up Appointments  Future Appointments   Date Time Provider Department Center   9/17/2024  1:45 PM Gavin Quiroz MD [facility]     Additional Instructions for the Follow-ups that You Need to Schedule       Discharge Follow-up with PCP   As directed       Currently Documented PCP:    Monster Myrick MD    PCP Phone Number:    [phone]     Follow Up Details: 1-2 weeks        Discharge Follow-up with Specified Provider: Dr Quiroz; 2 Weeks   As directed      To: Dr Quiroz   Follow Up: 2 Weeks                Test Results Pending at Discharge       EFREN Chirinos  03/27/24  12:45 EDT    Time: Discharge 29 min

## 2024-03-27 NOTE — PROGRESS NOTES
LOS: 1 day   Patient Care Team:  Monster Myrick MD as PCP - General (Family Medicine)  Maikel Morales MD as Consulting Physician (Cardiology)    Subjective     Interval History: Improving    Patient Complaints: No complaints, feels back to baseline    History taken from: patient    Review of Systems   Constitutional:  Negative for activity change, diaphoresis, fatigue and fever.   HENT:  Negative for facial swelling.    Eyes:  Negative for visual disturbance.   Respiratory:  Negative for cough.    Endocrine: Negative for polyuria.   Genitourinary:  Negative for dysuria.   Neurological:  Negative for dizziness, tremors, weakness and light-headedness.   Psychiatric/Behavioral:  Negative for confusion.            Objective     Vital Signs  Temp:  [97.4 °F (36.3 °C)-98.1 °F (36.7 °C)] 97.7 °F (36.5 °C)  Heart Rate:  [43-58] 51  Resp:  [15-18] 18  BP: (120-155)/(61-72) 145/65    Physical Exam:     General Appearance:    Alert, cooperative, in no acute distress,   Head:    Normocephalic, without obvious abnormality, atraumatic   Eyes:            Lids and lashes normal, conjunctivae and sclerae normal, no   icterus, no pallor, corneas clear, PERRLA   Ears:    Ears appear intact with no abnormalities noted   Throat:   No oral lesions, no thrush, oral mucosa moist   Neck:   No adenopathy, supple, trachea midline, no thyromegaly, no   carotid bruit, no JVD   Lungs:     Clear to auscultation,respirations regular, even and                  unlabored    Heart:    Regular rhythm and normal rate, normal S1 and S2, no            murmur, no gallop, no rub, no click   Chest Wall:    No abnormalities observed   Abdomen:     Normal bowel sounds, no masses, no organomegaly, soft        Non-tender non-distended, no guarding,   Extremities:   Moves all extremities well, no edema, no cyanosis, no             Redness   Pulses:   Pulses palpable and equal bilaterally   Skin:   No bleeding, bruising or rash   Lymph nodes:   No  palpable adenopathy   Neurologic:   Cranial nerves 2 - 12 grossly intact, sensation intact, DTR       present and equal bilaterally        Results Review:    Lab Results (last 24 hours)       Procedure Component Value Units Date/Time    Protime-INR [[id number]]  (Normal) Collected: 03/26/24 0359    Specimen: Blood from Arm, Left Updated: 03/26/24 0449     Protime 27.3 Seconds      INR 2.70    TSH [[id number]]  (Normal) Collected: 03/25/24 1412    Specimen: Blood Updated: 03/25/24 2109     TSH 4.020 uIU/mL     T4, Free [[id number]]  (Abnormal) Collected: 03/25/24 1412    Specimen: Blood Updated: 03/25/24 2109     Free T4 1.78 ng/dL     Narrative:      Results may be falsely increased if patient taking Biotin.               Imaging Results (Last 24 Hours)       ** No results found for the last 24 hours. **                 I reviewed the patient's new clinical results.    Medication Review:   Scheduled Meds:furosemide, 20 mg, Oral, BID  isosorbide mononitrate, 30 mg, Oral, Daily  sodium chloride, 10 mL, Intravenous, Q12H  warfarin, 2 mg, Oral, Once per day on Sunday Tuesday Thursday Saturday  warfarin, 3 mg, Oral, Once per day on Monday Wednesday Friday      Continuous Infusions:   PRN Meds:.  acetaminophen    nitroglycerin    ondansetron    sodium chloride    [COMPLETED] Insert Peripheral IV **AND** sodium chloride    sodium chloride    sodium chloride     Assessment & Plan       Bradycardia  - improving off of amiodarone and beta-blocker.  Observe.  Await cardiology input.        Plan for disposition:Home tomorrow if no pacemaker planned.    Ashley Livingston MD  03/26/24  20:03 EDT

## 2024-03-27 NOTE — PLAN OF CARE
Problem: Adult Inpatient Plan of Care  Goal: Absence of Hospital-Acquired Illness or Injury  Intervention: Identify and Manage Fall Risk  Description: Perform standard risk assessment on admission using a validated tool or comprehensive approach appropriate to the patient; reassess fall risk frequently, with change in status or transfer to another level of care.  Communicate fall injury risk to interprofessional healthcare team.  Determine need for increased observation, equipment and environmental modification, such as low bed, signage and supportive, nonskid footwear.  Adjust safety measures to individual developmental age, stage and identified risk factors.  Reinforce the importance of safety and physical activity with patient and family.  Perform regular intentional rounding to assess need for position change, pain assessment and personal needs, including assistance with toileting.  Recent Flowsheet Documentation  Taken 3/27/2024 0200 by Pete Walker LPN  Safety Promotion/Fall Prevention:   safety round/check completed   room organization consistent   nonskid shoes/slippers when out of bed   muscle strengthening facilitated   mobility aid in reach   lighting adjusted   clutter free environment maintained   assistive device/personal items within reach   activity supervised  Taken 3/27/2024 0000 by Pete Walker LPN  Safety Promotion/Fall Prevention:   safety round/check completed   room organization consistent   muscle strengthening facilitated   nonskid shoes/slippers when out of bed   mobility aid in reach   lighting adjusted   clutter free environment maintained   assistive device/personal items within reach   activity supervised  Taken 3/26/2024 2200 by Pete Walker LPN  Safety Promotion/Fall Prevention:   safety round/check completed   room organization consistent   nonskid shoes/slippers when out of bed   muscle strengthening facilitated   mobility aid in reach   lighting adjusted    fall prevention program maintained   clutter free environment maintained   assistive device/personal items within reach   activity supervised  Taken 3/26/2024 2002 by Pete Walker LPN  Safety Promotion/Fall Prevention:   safety round/check completed   room organization consistent   nonskid shoes/slippers when out of bed   muscle strengthening facilitated   mobility aid in reach   lighting adjusted   clutter free environment maintained   assistive device/personal items within reach   activity supervised  Intervention: Prevent Skin Injury  Description: Perform a screening for skin injury risk, such as pressure or moisture associated skin damage on admission and at regular intervals throughout hospital stay.  Keep all areas of skin (especially folds) clean and dry.  Maintain adequate skin hydration.  Relieve and redistribute pressure and protect bony prominences; implement measures based on patient-specific risk factors.  Match turning and repositioning schedule to clinical condition.  Encourage weight shift frequently; assist with reposition if unable to complete independently.  Float heels off bed; avoid pressure on the Achilles tendon.  Keep skin free from extended contact with medical devices.  Encourage functional activity and mobility, as early as tolerated.  Use aids (e.g., slide boards, mechanical lift) during transfer.  Recent Flowsheet Documentation  Taken 3/27/2024 0200 by Pete Walker LPN  Body Position: position changed independently  Taken 3/27/2024 0000 by Pete Walker LPN  Body Position: position changed independently  Taken 3/26/2024 2200 by Pete Walker LPN  Body Position:   weight shifting   position changed independently  Taken 3/26/2024 2002 by Pete Walker LPN  Body Position:   weight shifting   position changed independently  Intervention: Prevent and Manage VTE (Venous Thromboembolism) Risk  Description: Assess for VTE (venous thromboembolism)  risk.  Encourage and assist with early ambulation.  Initiate and maintain compression or other therapy, as indicated, based on identified risk in accordance with organizational protocol and provider order.  Encourage both active and passive leg exercises while in bed, if unable to ambulate.  Recent Flowsheet Documentation  Taken 3/27/2024 0200 by Pete Walker LPN  Activity Management:   activity encouraged   ambulated to bathroom  Taken 3/27/2024 0000 by Pete Walker LPN  Activity Management:   activity encouraged   unable to complete activity (see comments)   ambulated to bathroom  Taken 3/26/2024 2200 by Pete Walker LPN  Activity Management:   activity encouraged   ambulated to bathroom  Taken 3/26/2024 2002 by Pete Walker LPN  Activity Management:   activity encouraged   ambulated to bathroom   up in chair  Intervention: Prevent Infection  Description: Maintain skin and mucous membrane integrity; promote hand, oral and pulmonary hygiene.  Optimize fluid balance, nutrition, sleep and glycemic control to maximize infection resistance.  Identify potential sources of infection early to prevent or mitigate progression of infection (e.g., wound, lines, devices).  Evaluate ongoing need for invasive devices; remove promptly when no longer indicated.  Recent Flowsheet Documentation  Taken 3/27/2024 0200 by Pete Walker LPN  Infection Prevention:   visitors restricted/screened   single patient room provided   rest/sleep promoted   personal protective equipment utilized   hand hygiene promoted  Taken 3/27/2024 0000 by Pete Walker LPN  Infection Prevention:   visitors restricted/screened   single patient room provided   rest/sleep promoted   personal protective equipment utilized   hand hygiene promoted  Taken 3/26/2024 2200 by Pete Walker LPN  Infection Prevention:   visitors restricted/screened   single patient room provided   rest/sleep promoted   personal  protective equipment utilized   hand hygiene promoted  Taken 3/26/2024 2002 by Pete Walker LPN  Infection Prevention:   single patient room provided   visitors restricted/screened   rest/sleep promoted   personal protective equipment utilized   hand hygiene promoted  Goal: Optimal Comfort and Wellbeing  Intervention: Monitor Pain and Promote Comfort  Description: Assess pain level, treatment efficacy and patient response at regular intervals using a consistent pain scale.  Consider the presence and impact of preexisting chronic pain.  Encourage patient and caregiver involvement in pain assessment, interventions and safety measures.  Recent Flowsheet Documentation  Taken 3/27/2024 0000 by Pete Walker LPN  Pain Management Interventions:   see MAR   quiet environment facilitated   pain management plan reviewed with patient/caregiver  Taken 3/26/2024 2002 by Pete Walker LPN  Pain Management Interventions:   see MAR   quiet environment facilitated   position adjusted   pain management plan reviewed with patient/caregiver   medication offered but refused     Problem: Hypertension Comorbidity  Goal: Blood Pressure in Desired Range  Intervention: Maintain Blood Pressure Management  Description: Evaluate adherence to home antihypertensive regimen (e.g., exercise and activity, diet modification, medication).  Provide scheduled antihypertensive medication; consider administration time and effects (e.g., avoid giving diuretic prior to bedtime).  Monitor response to antihypertensive medication therapy (e.g., blood pressure, electrolyte levels, medication effects).  Minimize risk of orthostatic hypotension; encourage caution with position changes, particularly if elderly.  Recent Flowsheet Documentation  Taken 3/27/2024 0200 by Pete Walker LPN  Medication Review/Management: medications reviewed  Taken 3/27/2024 0000 by Pete Walker LPN  Medication Review/Management: medications  reviewed  Taken 3/26/2024 2200 by Pete Walker LPN  Medication Review/Management: medications reviewed  Taken 3/26/2024 2002 by Pete Walker LPN  Medication Review/Management: medications reviewed     Problem: Fall Injury Risk  Goal: Absence of Fall and Fall-Related Injury  Intervention: Identify and Manage Contributors  Description: Develop a fall prevention plan with the patient and caregiver/family.  Provide reorientation, appropriate sensory stimulation and routines with changes in mental status to decrease risk of fall.  Promote use of personal vision and auditory aids.  Assess assistance level required for safe and effective self-care; provide support as needed, such as toileting, mobilization. For age 65 and older, implement timed toileting with assistance.  Encourage physical activity, such as performance of mobility and self-care at highest level of patient ability, multicomponent exercise program and provision of appropriate assistive devices.  If fall occurs, assess the severity of injury; implement fall injury protocol. Determine the cause and revise fall injury prevention plan.  Regularly review medication contribution to fall risk; adjust medication administration times to minimize risk of falling.  Consider risk related to polypharmacy and age.  Balance adequate pain management with potential for oversedation.  Recent Flowsheet Documentation  Taken 3/27/2024 0200 by Pete Walker LPN  Medication Review/Management: medications reviewed  Taken 3/27/2024 0000 by Pete Walker LPN  Medication Review/Management: medications reviewed  Taken 3/26/2024 2200 by Pete Walker LPN  Medication Review/Management: medications reviewed  Taken 3/26/2024 2002 by Pete Walker LPN  Medication Review/Management: medications reviewed  Intervention: Promote Injury-Free Environment  Description: Provide a safe, barrier-free environment that encourages independent activity.  Keep  care area uncluttered and well-lighted.  Determine need for increased observation or monitoring.  Avoid use of devices that minimize mobility, such as restraints or indwelling urinary catheter.  Recent Flowsheet Documentation  Taken 3/27/2024 0200 by Pete Walker LPN  Safety Promotion/Fall Prevention:   safety round/check completed   room organization consistent   nonskid shoes/slippers when out of bed   muscle strengthening facilitated   mobility aid in reach   lighting adjusted   clutter free environment maintained   assistive device/personal items within reach   activity supervised  Taken 3/27/2024 0000 by Pete Walker LPN  Safety Promotion/Fall Prevention:   safety round/check completed   room organization consistent   muscle strengthening facilitated   nonskid shoes/slippers when out of bed   mobility aid in reach   lighting adjusted   clutter free environment maintained   assistive device/personal items within reach   activity supervised  Taken 3/26/2024 2200 by Pete Walker LPN  Safety Promotion/Fall Prevention:   safety round/check completed   room organization consistent   nonskid shoes/slippers when out of bed   muscle strengthening facilitated   mobility aid in reach   lighting adjusted   fall prevention program maintained   clutter free environment maintained   assistive device/personal items within reach   activity supervised  Taken 3/26/2024 2002 by Pete Walker LPN  Safety Promotion/Fall Prevention:   safety round/check completed   room organization consistent   nonskid shoes/slippers when out of bed   muscle strengthening facilitated   mobility aid in reach   lighting adjusted   clutter free environment maintained   assistive device/personal items within reach   activity supervised   Goal Outcome Evaluation:progressing toward goal plans to disharge in am. Pm plans is on hold, possible in future if condition cont to be problem. Will monitor

## 2024-03-27 NOTE — CASE MANAGEMENT/SOCIAL WORK
Continued Stay Note  RANDALL Natarajan     Patient Name: Carrie Golden  MRN: [id number]  Today's Date: 3/27/2024    Admit Date: 3/25/2024    Plan: Routine home with no services   Discharge Plan       Row Name 03/27/24 1116       Plan    Plan Routine home with no services    Plan Comments PT recommend home with no services. Pt lives alone and IADLs. Family will provide transportation at d/c. Should d/c later today per Cardiology.               Expected Discharge Date and Time       Expected Discharge Date Expected Discharge Time    Mar 27, 2024         Sherron Alicia RN    phone [phone]  fax [phone]

## 2024-03-27 NOTE — PLAN OF CARE
Goal Outcome Evaluation:              Outcome Evaluation: Patient came in with holter monitor and will continue to keep on when discharging. Patient discharging today.

## 2024-03-28 ENCOUNTER — TELEPHONE (OUTPATIENT)
Dept: CARDIOLOGY | Facility: CLINIC | Age: 89
End: 2024-03-28
Payer: MEDICARE

## 2024-03-28 NOTE — TELEPHONE ENCOUNTER
Caller: Carrie Golden    Relationship to patient: Self    Best call back number:[phone]    Chief complaint: BRADYCHARDIA    Type of visit: 2 WEEK HOSP FOLLOW UP    Requested date: 2 WEEKS, AFTERNOON APPT     declines yes

## 2024-04-01 RX ORDER — ISOSORBIDE MONONITRATE 30 MG/1
TABLET, EXTENDED RELEASE ORAL
Qty: 90 TABLET | Refills: 0 | Status: SHIPPED | OUTPATIENT
Start: 2024-04-01

## 2024-04-04 ENCOUNTER — HOSPITAL ENCOUNTER (INPATIENT)
Facility: HOSPITAL | Age: 89
LOS: 1 days | Discharge: HOME | End: 2024-04-05
Attending: EMERGENCY MEDICINE | Admitting: INTERNAL MEDICINE
Payer: MEDICARE

## 2024-04-04 ENCOUNTER — TELEPHONE (OUTPATIENT)
Dept: CARDIOLOGY | Facility: CLINIC | Age: 89
End: 2024-04-04
Payer: MEDICARE

## 2024-04-04 DIAGNOSIS — I48.92 ATRIAL FLUTTER WITH RAPID VENTRICULAR RESPONSE: Primary | ICD-10-CM

## 2024-04-04 PROBLEM — I48.91 FLUTTER-FIBRILLATION: Status: ACTIVE | Noted: 2024-04-04

## 2024-04-04 LAB
ANION GAP SERPL CALCULATED.3IONS-SCNC: 16 MMOL/L (ref 5–15)
APTT PPP: 53.5 SECONDS (ref 61–76.5)
BASOPHILS # BLD AUTO: 0.06 10*3/MM3 (ref 0–0.2)
BASOPHILS NFR BLD AUTO: 0.7 % (ref 0–1.5)
BUN SERPL-MCNC: 35 MG/DL (ref 8–23)
BUN/CREAT SERPL: 32.4 (ref 7–25)
CALCIUM SPEC-SCNC: 9.5 MG/DL (ref 8.2–9.6)
CHLORIDE SERPL-SCNC: 102 MMOL/L (ref 98–107)
CO2 SERPL-SCNC: 23 MMOL/L (ref 22–29)
CREAT SERPL-MCNC: 1.08 MG/DL (ref 0.57–1)
DEPRECATED RDW RBC AUTO: 51.1 FL (ref 37–54)
EGFRCR SERPLBLD CKD-EPI 2021: 46.5 ML/MIN/1.73
EOSINOPHIL # BLD AUTO: 0.08 10*3/MM3 (ref 0–0.4)
EOSINOPHIL NFR BLD AUTO: 0.9 % (ref 0.3–6.2)
ERYTHROCYTE [DISTWIDTH] IN BLOOD BY AUTOMATED COUNT: 15.6 % (ref 12.3–15.4)
GLUCOSE SERPL-MCNC: 136 MG/DL (ref 65–99)
HCT VFR BLD AUTO: 42.8 % (ref 34–46.6)
HGB BLD-MCNC: 13.5 G/DL (ref 12–15.9)
HOLD SPECIMEN: NORMAL
IMM GRANULOCYTES # BLD AUTO: 0.09 10*3/MM3 (ref 0–0.05)
IMM GRANULOCYTES NFR BLD AUTO: 1 % (ref 0–0.5)
INR PPP: 4.54 (ref 0.93–1.1)
LYMPHOCYTES # BLD AUTO: 1.17 10*3/MM3 (ref 0.7–3.1)
LYMPHOCYTES NFR BLD AUTO: 13.3 % (ref 19.6–45.3)
MCH RBC QN AUTO: 28.4 PG (ref 26.6–33)
MCHC RBC AUTO-ENTMCNC: 31.5 G/DL (ref 31.5–35.7)
MCV RBC AUTO: 89.9 FL (ref 79–97)
MONOCYTES # BLD AUTO: 0.8 10*3/MM3 (ref 0.1–0.9)
MONOCYTES NFR BLD AUTO: 9.1 % (ref 5–12)
NEUTROPHILS NFR BLD AUTO: 6.61 10*3/MM3 (ref 1.7–7)
NEUTROPHILS NFR BLD AUTO: 75 % (ref 42.7–76)
NRBC BLD AUTO-RTO: 0 /100 WBC (ref 0–0.2)
PLATELET # BLD AUTO: 289 10*3/MM3 (ref 140–450)
PMV BLD AUTO: 9.2 FL (ref 6–12)
POTASSIUM SERPL-SCNC: 3.7 MMOL/L (ref 3.5–5.2)
PROTHROMBIN TIME: 44.2 SECONDS (ref 9.6–11.7)
RBC # BLD AUTO: 4.76 10*6/MM3 (ref 3.77–5.28)
SODIUM SERPL-SCNC: 141 MMOL/L (ref 136–145)
TROPONIN T SERPL HS-MCNC: 33 NG/L
WBC NRBC COR # BLD AUTO: 8.81 10*3/MM3 (ref 3.4–10.8)

## 2024-04-04 PROCEDURE — 93005 ELECTROCARDIOGRAM TRACING: CPT | Performed by: EMERGENCY MEDICINE

## 2024-04-04 PROCEDURE — 84484 ASSAY OF TROPONIN QUANT: CPT | Performed by: EMERGENCY MEDICINE

## 2024-04-04 PROCEDURE — 99285 EMERGENCY DEPT VISIT HI MDM: CPT

## 2024-04-04 PROCEDURE — 80048 BASIC METABOLIC PNL TOTAL CA: CPT | Performed by: EMERGENCY MEDICINE

## 2024-04-04 PROCEDURE — 85730 THROMBOPLASTIN TIME PARTIAL: CPT | Performed by: EMERGENCY MEDICINE

## 2024-04-04 PROCEDURE — 93005 ELECTROCARDIOGRAM TRACING: CPT

## 2024-04-04 PROCEDURE — 85025 COMPLETE CBC W/AUTO DIFF WBC: CPT | Performed by: EMERGENCY MEDICINE

## 2024-04-04 PROCEDURE — 85610 PROTHROMBIN TIME: CPT | Performed by: EMERGENCY MEDICINE

## 2024-04-04 RX ORDER — BISACODYL 10 MG
10 SUPPOSITORY, RECTAL RECTAL DAILY PRN
Status: DISCONTINUED | OUTPATIENT
Start: 2024-04-04 | End: 2024-04-05 | Stop reason: HOSPADM

## 2024-04-04 RX ORDER — AMOXICILLIN 250 MG
2 CAPSULE ORAL 2 TIMES DAILY PRN
Status: DISCONTINUED | OUTPATIENT
Start: 2024-04-04 | End: 2024-04-05 | Stop reason: HOSPADM

## 2024-04-04 RX ORDER — POLYETHYLENE GLYCOL 3350 17 G/17G
17 POWDER, FOR SOLUTION ORAL DAILY PRN
Status: DISCONTINUED | OUTPATIENT
Start: 2024-04-04 | End: 2024-04-05 | Stop reason: HOSPADM

## 2024-04-04 RX ORDER — DILTIAZEM HCL/D5W 125 MG/125
5-15 PLASTIC BAG, INJECTION (ML) INTRAVENOUS CONTINUOUS
Status: DISCONTINUED | OUTPATIENT
Start: 2024-04-04 | End: 2024-04-05

## 2024-04-04 RX ORDER — NITROGLYCERIN 0.4 MG/1
0.4 TABLET SUBLINGUAL
Status: DISCONTINUED | OUTPATIENT
Start: 2024-04-04 | End: 2024-04-05 | Stop reason: HOSPADM

## 2024-04-04 RX ORDER — SODIUM CHLORIDE 0.9 % (FLUSH) 0.9 %
10 SYRINGE (ML) INJECTION AS NEEDED
Status: DISCONTINUED | OUTPATIENT
Start: 2024-04-04 | End: 2024-04-05 | Stop reason: HOSPADM

## 2024-04-04 RX ORDER — SODIUM CHLORIDE 9 MG/ML
40 INJECTION, SOLUTION INTRAVENOUS AS NEEDED
Status: DISCONTINUED | OUTPATIENT
Start: 2024-04-04 | End: 2024-04-05 | Stop reason: HOSPADM

## 2024-04-04 RX ORDER — SODIUM CHLORIDE 0.9 % (FLUSH) 0.9 %
10 SYRINGE (ML) INJECTION EVERY 12 HOURS SCHEDULED
Status: DISCONTINUED | OUTPATIENT
Start: 2024-04-04 | End: 2024-04-05 | Stop reason: HOSPADM

## 2024-04-04 RX ORDER — BISACODYL 5 MG/1
5 TABLET, DELAYED RELEASE ORAL DAILY PRN
Status: DISCONTINUED | OUTPATIENT
Start: 2024-04-04 | End: 2024-04-05 | Stop reason: HOSPADM

## 2024-04-04 RX ADMIN — Medication 10 ML: at 22:23

## 2024-04-04 RX ADMIN — Medication 5 MG/HR: at 16:44

## 2024-04-04 NOTE — ED PROVIDER NOTES
Subjective   History of Present Illness  Chief complaint: Rapid heart rate    89-year-old female presents with a rapid heart rate.  Patient is currently wearing a Holter monitor and she was called today and told to come to the emergency room because her heart rate was elevated and she was in atrial flutter.  Patient states she has felt a little bit of fluttering but otherwise had no symptoms.  She denies any chest pain or shortness of breath.  She has had no dizziness or lightheadedness.  She was recently in the hospital for general weakness and bradycardia.  She was taken off her amiodarone and beta-blocker at that time.    History provided by:  Patient      Review of Systems   Constitutional:  Negative for fever.   HENT:  Negative for congestion.    Respiratory:  Negative for cough and shortness of breath.    Cardiovascular:  Positive for palpitations. Negative for chest pain.   Gastrointestinal:  Negative for abdominal pain and vomiting.   Musculoskeletal:  Negative for back pain.   Neurological:  Negative for headaches.   Psychiatric/Behavioral:  Negative for confusion.        Past Medical History:   Diagnosis Date    Amblyopia 10/31/2012    Amblyopia, left 03/12/2020    Arthritis     Conjunctival hemorrhage 10/08/2014    Coronary artery disease     Deep vein thrombosis     Deformity of left foot 08/18/2020    Added automatically from request for surgery [id number]    Dermatochalasis of left upper eyelid 03/12/2020    Dermatochalasis of right upper eyelid 03/12/2020    Drooping eyelid, bilateral     Ectropion 03/12/2020    Epiretinal membrane (ERM) of right eye 03/12/2020    Frequent UTI     GERD (gastroesophageal reflux disease)     Hallux rigidus, left foot 08/18/2020    Added automatically from request for surgery [id number]    Hearing loss     bilateral hearing aides    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 sure which 1  contracted at age 8    History of transfusion     [place] (hard of hearing)     Hyperlipidemia      Hypertension     Myocardial infarction     Past heart attack     X2 MILD LAST ONE OCT 2019    PONV (postoperative nausea and vomiting)     Presence of intraocular lens 03/12/2020       Allergies   Allergen Reactions    Amoxicillin Itching    Codeine Nausea And Vomiting and Dizziness    Lortab [Hydrocodone-Acetaminophen] Nausea And Vomiting    Nitrofurantoin Hives    Sulfa Antibiotics Rash       Past Surgical History:   Procedure Laterality Date    ANTERIOR AND POSTERIOR VAGINAL REPAIR      BACK SURGERY      BLEPHAROPLASTY Bilateral 5/23/2019    Procedure: bilateral upper lid blepharoplasty;  Surgeon: Mauricio Pennington MD;  [place] [facility] [place] [facility] [place]  Service: Ophthalmology    BROW LIFT Bilateral 2/13/2020    Procedure: BILATERAL TEMPORAL DIRECT BROWLIFT;  Surgeon: Sreekanth Bird MD;  [place] [facility] [place] [facility] [place]  Service: Ophthalmology;  Laterality: Bilateral;    CARDIAC CATHETERIZATION N/A 12/3/2019    Procedure: Left Heart Cath;  Surgeon: Puma Briseno MD;  Location: [facility] CATH INVASIVE LOCATION;  Service: Cardiovascular    CARDIAC CATHETERIZATION N/A 12/3/2019    Procedure: Coronary angiography;  Surgeon: Puma Briseno MD;  Location: [facility] CATH INVASIVE LOCATION;  Service: Cardiovascular    CARDIAC CATHETERIZATION N/A 12/3/2019    Procedure: Left ventriculography;  Surgeon: Puma Briseno MD;  Location: [facility] CATH INVASIVE LOCATION;  Service: Cardiovascular    CATARACT EXTRACTION EXTRACAPSULAR W/ INTRAOCULAR LENS IMPLANTATION Bilateral     CERVICAL SPINE ANTERIOR      with instrumentation    CHEILECTOMY Left 8/28/2020    Procedure: CHEILECTOMY;  Surgeon: GAB Rees DPM;  Location: [facility] MAIN OR;  Service: Podiatry;  Laterality: Left;    COLON SURGERY      for obstruction    ALBERTO TUBE INSERTION Bilateral 5/23/2019    Procedure: ALBERTO TUBE;  Surgeon: Mauricio Pennington MD;  [place] [facility] [place] [facility] [place]  Service: Ophthalmology    ECTROPION REPAIR Bilateral 5/23/2019    Procedure: bilateral lower lid  "ectropion repair, bilateral punctoplasty;  Surgeon: Mauricio Pennington MD;  Location:  [facility];  Service: Ophthalmology    ENDOSCOPY N/A 2022    Procedure: ESOPHAGOGASTRODUODENOSCOPY WITH DILATATION (BALLOON 15MM-18MM, UP TO 18MM) AND BOUGIE #48;  Surgeon: Wiley Parks MD;  Location: [facility] ENDOSCOPY;  Service: Gastroenterology;  Laterality: N/A;  Post: DYSPHAGIA    EYE SURGERY      FOOT FUSION Left 2016    Procedure: LEFT HALLUX METATARSALPHALANGEAL ARTHRODESIS SILVER BUNIONECTOMY METATARSAL HEAD RESECTION 2-5 CALCANEAL BONE GRAFT;  Surgeon: Monster Harper Jr., MD;  Location: [facility] MAIN OR;  Service:     HYSTERECTOMY      INGUINAL HERNIA REPAIR Bilateral     METATARSAL OSTEOTOMY Left 2020    Procedure: Metatarsal head resection 5;  Surgeon: GAB Rees DPM;  Location: [facility] MAIN OR;  Service: Podiatry;  Laterality: Left;    ROTATOR CUFF REPAIR Right     SIGMOIDOSCOPY N/A 10/6/2021    Procedure: SIGMOIDOSCOPY WITH BIOPSY X1 AREA;  Surgeon: Uche Vaz MD;  Location: [facility] ENDOSCOPY;  Service: Gastroenterology;  Laterality: N/A;  ischemic colitis    STOMACH SURGERY      for ulcer       Family History   Problem Relation Age of Onset    No Known Problems Mother     No Known Problems Father     Malig Hyperthermia Neg Hx        Social History     Socioeconomic History    Marital status:    Tobacco Use    Smoking status: Former     Current packs/day: 0.00     Average packs/day: 0.3 packs/day for 10.0 years (2.5 ttl pk-yrs)     Types: Cigarettes     Start date: 1980     Quit date: 1990     Years since quittin.2    Smokeless tobacco: Never   Vaping Use    Vaping status: Never Used   Substance and Sexual Activity    Alcohol use: No    Drug use: No    Sexual activity: Defer       /69   Pulse 117   Temp 97 °F (36.1 °C) (Oral)   Resp 20   Ht 157.5 cm (62\")   Wt 49.9 kg (110 lb)   SpO2 94%   BMI 20.12 kg/m²       Objective   Physical Exam  Vitals and " nursing note reviewed.   Constitutional:       Appearance: Normal appearance.   HENT:      Head: Normocephalic and atraumatic.      Mouth/Throat:      Mouth: Mucous membranes are moist.   Cardiovascular:      Rate and Rhythm: Tachycardia present. Rhythm irregular.      Heart sounds: Normal heart sounds.   Pulmonary:      Effort: Pulmonary effort is normal. No respiratory distress.      Breath sounds: Normal breath sounds.   Abdominal:      Palpations: Abdomen is soft.      Tenderness: There is no abdominal tenderness.   Skin:     General: Skin is warm and dry.   Neurological:      General: No focal deficit present.      Mental Status: She is alert and oriented to person, place, and time.         Procedures           ED Course      My interpretation of EKG shows atrial flutter, rate of 123, no ST elevation                           Results for orders placed or performed during the hospital encounter of 04/04/24   Basic Metabolic Panel    Specimen: Blood   Result Value Ref Range    Glucose 136 (H) 65 - 99 mg/dL    BUN 35 (H) 8 - 23 mg/dL    Creatinine 1.08 (H) 0.57 - 1.00 mg/dL    Sodium 141 136 - 145 mmol/L    Potassium 3.7 3.5 - 5.2 mmol/L    Chloride 102 98 - 107 mmol/L    CO2 23.0 22.0 - 29.0 mmol/L    Calcium 9.5 8.2 - 9.6 mg/dL    BUN/Creatinine Ratio 32.4 (H) 7.0 - 25.0    Anion Gap 16.0 (H) 5.0 - 15.0 mmol/L    eGFR 46.5 (L) >60.0 mL/min/1.73   Protime-INR    Specimen: Blood   Result Value Ref Range    Protime 44.2 (H) 9.6 - 11.7 Seconds    INR 4.54 (C) 0.93 - 1.10   aPTT    Specimen: Blood   Result Value Ref Range    PTT 53.5 (L) 61.0 - 76.5 seconds   Single High Sensitivity Troponin T    Specimen: Blood   Result Value Ref Range    HS Troponin T 33 (H) <14 ng/L   CBC Auto Differential    Specimen: Blood   Result Value Ref Range    WBC 8.81 3.40 - 10.80 10*3/mm3    RBC 4.76 3.77 - 5.28 10*6/mm3    Hemoglobin 13.5 12.0 - 15.9 g/dL    Hematocrit 42.8 34.0 - 46.6 %    MCV 89.9 79.0 - 97.0 fL    MCH 28.4 26.6 -  33.0 pg    MCHC 31.5 31.5 - 35.7 g/dL    RDW 15.6 (H) 12.3 - 15.4 %    RDW-SD 51.1 37.0 - 54.0 fl    MPV 9.2 6.0 - 12.0 fL    Platelets 289 140 - 450 10*3/mm3    Neutrophil % 75.0 42.7 - 76.0 %    Lymphocyte % 13.3 (L) 19.6 - 45.3 %    Monocyte % 9.1 5.0 - 12.0 %    Eosinophil % 0.9 0.3 - 6.2 %    Basophil % 0.7 0.0 - 1.5 %    Immature Grans % 1.0 (H) 0.0 - 0.5 %    Neutrophils, Absolute 6.61 1.70 - 7.00 10*3/mm3    Lymphocytes, Absolute 1.17 0.70 - 3.10 10*3/mm3    Monocytes, Absolute 0.80 0.10 - 0.90 10*3/mm3    Eosinophils, Absolute 0.08 0.00 - 0.40 10*3/mm3    Basophils, Absolute 0.06 0.00 - 0.20 10*3/mm3    Immature Grans, Absolute 0.09 (H) 0.00 - 0.05 10*3/mm3    nRBC 0.0 0.0 - 0.2 /100 WBC   ECG 12 Lead Tachycardia   Result Value Ref Range    QT Interval 322 ms    QTC Interval 464 ms   Gold Top - SST   Result Value Ref Range    Extra Tube Hold for add-ons.                  Medical Decision Making  Amount and/or Complexity of Data Reviewed  Labs: ordered.  ECG/medicine tests: ordered.    Risk  Prescription drug management.      Patient had the above evaluation.  Results were discussed with the patient.  EKG is showing atrial flutter with RVR.  Patient was started on Cardizem.  Heart rate has improved.  White blood cell count is normal.  BMP is unremarkable.  Troponin is borderline elevated at 33.  I discussed with the on-call primary doctor and the patient will be admitted for further evaluation and management.      Final diagnoses:   Atrial flutter with rapid ventricular response       ED Disposition  ED Disposition       ED Disposition   Decision to Admit    Condition   --    Comment   Level of Care: Telemetry [5]   Admitting Physician: JACKY CURTIS [[id number]]   Attending Physician: JACKY CURTIS [[id number]]                 No follow-up provider specified.       Medication List      No changes were made to your prescriptions during this visit.            Jason Martins MD  04/04/24 [id number]

## 2024-04-04 NOTE — ED NOTES
Nursing report ED to floor  Carrie Golden  89 y.o.  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         Admitting doctor:   Ashley Livingston MD    Admitting diagnosis:   The encounter diagnosis was Atrial flutter with rapid ventricular response.    Code status:   Current Code Status       Date Active Code Status Order ID Comments User Context       Prior            Allergies:   Amoxicillin, Codeine, Lortab [hydrocodone-acetaminophen], Nitrofurantoin, and Sulfa antibiotics    Isolation:  No active isolations     Fall Risk:  Fall Risk Assessment was completed, and patient is at moderate risk for falls.   Predictive Model Details         23 (Low) Factor Value    Calculated 4/4/2024 19:02 Age 89    Risk of Fall Model Musculoskeletal Assessment WDL     Active Peripheral IV Present     Respiratory Rate 20     Skin Assessment WDL     Financial Class Other     Magnesium not on file     Drug Use No     Tobacco Use Quit     Tommy Scale not on file     Peripheral Vascular Assessment WDL     Creatinine 1.08 mg/dL     Gastrointestinal Assessment WDL     Number of Distinct Medication Classes administered 1     Albumin not on file     Diastolic BP 89     Chloride 102 mmol/L     Cardiac Assessment WDL     Total Bilirubin not on file     Days after Admission 0.114     Calcium 9.5 mg/dL     Potassium 3.7 mmol/L     ALT not on file         Weight:       04/04/24  1615   Weight: 49.9 kg (110 lb)       Intake and Output  No intake or output data in the 24 hours ending 04/04/24 1902    Diet:   Dietary Orders (From admission, onward)       Start     Ordered    04/04/24 1735  Diet: Cardiac; Healthy Heart (2-3 Na+); Fluid Consistency: Thin (IDDSI 0)  Diet Effective Now        References:    Diet Order Crosswalk   Question Answer Comment    Diets: Cardiac    Cardiac Diet: Healthy Heart (2-3 Na+)    Fluid Consistency: Thin (IDDSI 0)        04/04/24 1734                     Most recent vitals:   Vitals:    04/04/24 1840 04/04/24 1845 04/04/24 1850 04/04/24 1855   BP:  122/89     BP Location:       Patient Position:       Pulse: 115 109 108 107   Resp:       Temp:       TempSrc:       SpO2: 93% 94% 94% 94%   Weight:       Height:           Active LDAs/IV Access:   Lines, Drains & Airways       Active LDAs       Name Placement date Placement time Site Days    Peripheral IV 04/04/24 1640 Right Antecubital 04/04/24  1640  Antecubital  less than 1                    Skin Condition:   Skin Assessments (last day)       Date/Time Skin WDL    04/04/24 16:50:01 WDL             Labs (abnormal labs have a star):   Labs Reviewed   BASIC METABOLIC PANEL - Abnormal; Notable for the following components:       Result Value    Glucose 136 (*)     BUN 35 (*)     Creatinine 1.08 (*)     BUN/Creatinine Ratio 32.4 (*)     Anion Gap 16.0 (*)     eGFR 46.5 (*)     All other components within normal limits    Narrative:     GFR Normal >60  Chronic Kidney Disease <60  Kidney Failure <15    The GFR formula is only valid for adults with stable renal function between ages 18 and 70.   PROTIME-INR - Abnormal; Notable for the following components:    Protime 44.2 (*)     INR 4.54 (*)     All other components within normal limits   APTT - Abnormal; Notable for the following components:    PTT 53.5 (*)     All other components within normal limits   SINGLE HS TROPONIN T - Abnormal; Notable for the following components:    HS Troponin T 33 (*)     All other components within normal limits    Narrative:     High Sensitive Troponin T Reference Range:  <14.0 ng/L- Negative Female for AMI  <22.0 ng/L- Negative Male for AMI  >=14 - Abnormal Female indicating possible myocardial injury.  >=22 - Abnormal Male indicating possible myocardial injury.   Clinicians would have to utilize clinical  acumen, EKG, Troponin, and serial changes to determine if it is an Acute Myocardial Infarction or myocardial injury due to an underlying chronic condition.        CBC WITH AUTO DIFFERENTIAL - Abnormal; Notable for the following components:    RDW 15.6 (*)     Lymphocyte % 13.3 (*)     Immature Grans % 1.0 (*)     Immature Grans, Absolute 0.09 (*)     All other components within normal limits   CBC AND DIFFERENTIAL    Narrative:     The following orders were created for panel order CBC & Differential.  Procedure                               Abnormality         Status                     ---------                               -----------         ------                     CBC Auto Differential[[id number]]        Abnormal            Final result                 Please view results for these tests on the individual orders.   EXTRA TUBES    Narrative:     The following orders were created for panel order Extra Tubes.  Procedure                               Abnormality         Status                     ---------                               -----------         ------                     Gold Top - SST[[id number]]                                   Final result                 Please view results for these tests on the individual orders.   GOLD TOP - SST       LOC: Person, Place, Time, and Situation    Telemetry:  Telemetry    Cardiac Monitoring Ordered: yes    EKG:   ECG 12 Lead Tachycardia   Preliminary Result   HEART RATE= 123  bpm   RR Interval= 482  ms   MD Interval=   ms   P Horizontal Axis=   deg   P Front Axis=   deg   QRSD Interval= 84  ms   QT Interval= 322  ms   QTcB= 464  ms   QRS Axis= -25  deg   T Wave Axis= 26  deg   - ABNORMAL ECG -   Atrial flutter with varied AV block,   Consider anterior infarct   When compared with ECG of 25-Mar-2024 13:54:22,   Significant change in rhythm: previously sinus   Significant axis, voltage or hypertrophy change   Electronically Signed By:    Date and Time of Study:  2024 16:26:51          Medications Given in the ED:   Medications   sodium chloride 0.9 % flush 10 mL (has no administration in time range)   dilTIAZem (CARDIZEM) 125 mg in 125 mL D5W infusion (5 mg/hr Intravenous New Bag 24 9224)   dilTIAZem (CARDIZEM) bolus from bag 1 mg/mL solution 10 mg (10 mg Intravenous Bolus from Bag 24 1225)       Imaging results:  No radiology results for the last day    Social issues:   Social History     Socioeconomic History    Marital status:    Tobacco Use    Smoking status: Former     Current packs/day: 0.00     Average packs/day: 0.3 packs/day for 10.0 years (2.5 ttl pk-yrs)     Types: Cigarettes     Start date: 1980     Quit date: 1990     Years since quittin.2    Smokeless tobacco: Never   Vaping Use    Vaping status: Never Used   Substance and Sexual Activity    Alcohol use: No    Drug use: No    Sexual activity: Defer       NIH Stroke Scale:  Interval: (not recorded)  1a. Level of Consciousness: (not recorded)  1b. LOC Questions: (not recorded)  1c. LOC Commands: (not recorded)  2. Best Gaze: (not recorded)  3. Visual: (not recorded)  4. Facial Palsy: (not recorded)  5a. Motor Arm, Left: (not recorded)  5b. Motor Arm, Right: (not recorded)  6a. Motor Leg, Left: (not recorded)  6b. Motor Leg, Right: (not recorded)  7. Limb Ataxia: (not recorded)  8. Sensory: (not recorded)  9. Best Language: (not recorded)  10. Dysarthria: (not recorded)  11. Extinction and Inattention (formerly Neglect): (not recorded)    Total (NIH Stroke Scale): (not recorded)     Additional notable assessment information:     Nursing report ED to floor:  OPCV    Mary Melendez LPN   24 19:02 EDT

## 2024-04-04 NOTE — TELEPHONE ENCOUNTER
Called and spoke to patient, she states she has always had a flutter and is not concerned but she will go to the ER if it persists.

## 2024-04-05 ENCOUNTER — PREP FOR SURGERY (OUTPATIENT)
Dept: OTHER | Facility: HOSPITAL | Age: 89
End: 2024-04-05
Payer: MEDICARE

## 2024-04-05 VITALS
SYSTOLIC BLOOD PRESSURE: 137 MMHG | TEMPERATURE: 97.7 F | HEART RATE: 79 BPM | DIASTOLIC BLOOD PRESSURE: 68 MMHG | WEIGHT: 110 LBS | HEIGHT: 62 IN | BODY MASS INDEX: 20.24 KG/M2 | OXYGEN SATURATION: 98 % | RESPIRATION RATE: 16 BRPM

## 2024-04-05 DIAGNOSIS — I49.5 SICK SINUS SYNDROME: Primary | ICD-10-CM

## 2024-04-05 PROBLEM — I48.92 ATRIAL FLUTTER WITH RAPID VENTRICULAR RESPONSE: Status: ACTIVE | Noted: 2024-04-05

## 2024-04-05 LAB
ANION GAP SERPL CALCULATED.3IONS-SCNC: 10 MMOL/L (ref 5–15)
BUN SERPL-MCNC: 30 MG/DL (ref 8–23)
BUN/CREAT SERPL: 35.3 (ref 7–25)
CALCIUM SPEC-SCNC: 8.8 MG/DL (ref 8.2–9.6)
CHLORIDE SERPL-SCNC: 106 MMOL/L (ref 98–107)
CO2 SERPL-SCNC: 26 MMOL/L (ref 22–29)
CREAT SERPL-MCNC: 0.85 MG/DL (ref 0.57–1)
DEPRECATED RDW RBC AUTO: 51.2 FL (ref 37–54)
EGFRCR SERPLBLD CKD-EPI 2021: 62 ML/MIN/1.73
ERYTHROCYTE [DISTWIDTH] IN BLOOD BY AUTOMATED COUNT: 15.6 % (ref 12.3–15.4)
GLUCOSE SERPL-MCNC: 104 MG/DL (ref 65–99)
HCT VFR BLD AUTO: 38.4 % (ref 34–46.6)
HGB BLD-MCNC: 11.9 G/DL (ref 12–15.9)
MCH RBC QN AUTO: 28.2 PG (ref 26.6–33)
MCHC RBC AUTO-ENTMCNC: 31 G/DL (ref 31.5–35.7)
MCV RBC AUTO: 91 FL (ref 79–97)
PLATELET # BLD AUTO: 250 10*3/MM3 (ref 140–450)
PMV BLD AUTO: 8.9 FL (ref 6–12)
POTASSIUM SERPL-SCNC: 3.4 MMOL/L (ref 3.5–5.2)
QT INTERVAL: 322 MS
QTC INTERVAL: 464 MS
RBC # BLD AUTO: 4.22 10*6/MM3 (ref 3.77–5.28)
SODIUM SERPL-SCNC: 142 MMOL/L (ref 136–145)
WBC NRBC COR # BLD AUTO: 6.87 10*3/MM3 (ref 3.4–10.8)

## 2024-04-05 PROCEDURE — 85027 COMPLETE CBC AUTOMATED: CPT | Performed by: FAMILY MEDICINE

## 2024-04-05 PROCEDURE — 80048 BASIC METABOLIC PNL TOTAL CA: CPT | Performed by: FAMILY MEDICINE

## 2024-04-05 PROCEDURE — 99222 1ST HOSP IP/OBS MODERATE 55: CPT | Performed by: STUDENT IN AN ORGANIZED HEALTH CARE EDUCATION/TRAINING PROGRAM

## 2024-04-05 PROCEDURE — 99221 1ST HOSP IP/OBS SF/LOW 40: CPT | Performed by: INTERNAL MEDICINE

## 2024-04-05 RX ORDER — ISOSORBIDE MONONITRATE 30 MG/1
30 TABLET, EXTENDED RELEASE ORAL DAILY
Status: DISCONTINUED | OUTPATIENT
Start: 2024-04-05 | End: 2024-04-05

## 2024-04-05 RX ORDER — FUROSEMIDE 20 MG/1
20 TABLET ORAL 2 TIMES DAILY
Status: DISCONTINUED | OUTPATIENT
Start: 2024-04-05 | End: 2024-04-05 | Stop reason: HOSPADM

## 2024-04-05 RX ORDER — POTASSIUM CHLORIDE 20 MEQ/1
20 TABLET, EXTENDED RELEASE ORAL ONCE
Status: COMPLETED | OUTPATIENT
Start: 2024-04-05 | End: 2024-04-05

## 2024-04-05 RX ADMIN — FUROSEMIDE 20 MG: 20 TABLET ORAL at 12:04

## 2024-04-05 RX ADMIN — Medication 10 ML: at 09:35

## 2024-04-05 RX ADMIN — POTASSIUM CHLORIDE 20 MEQ: 1500 TABLET, EXTENDED RELEASE ORAL at 12:04

## 2024-04-05 NOTE — H&P (VIEW-ONLY)
Referring Provider: Ashley Livingston MD     Reason for Consultation: bradycardia        Patient Care Team:  Monster Myrick MD as PCP - General (Family Medicine)  Maikel Morales MD as Consulting Physician (Cardiology)        Chief complaint: weakness     History of present illness:  Carrie Golden is a 89 y.o. female with past medical history of  paroxysmal atrial fibrillation, DVT, CAD with prior NSTEMI, HTN, and hyperlipidemia who presented to the ED with generalized weakness.  Patient reports having extreme weakness causing her to be unable to work. She has had her metoprolol recently decreased to 25mg daily. Upon admittance to the ED patient was found to be bradycardic with a heart rate in the upper 40s. Patient denies shortness of breath, chest pain, dizziness, or lightheadedness. Her primary cardiologist is Dr. Quiroz.     Patient is anticoagulated with warfarin. Troponin 22, TSH 4.020, Free T4 1.78, all other labs unremarkable.   CXR and CT head show no acute abnormalities.  EKG reveals bradycardia rate 49, Qtc 467     Review of Systems   Constitutional: Positive for malaise/fatigue.   Cardiovascular:  Negative for chest pain, leg swelling and palpitations.   Gastrointestinal:  Negative for nausea and vomiting.   Neurological:  Positive for weakness.   All other systems reviewed and are negative.        History  Medical History        Past Medical History:   Diagnosis Date    Amblyopia 10/31/2012    Amblyopia, left 03/12/2020    Arthritis      Conjunctival hemorrhage 10/08/2014    Coronary artery disease      Deep vein thrombosis      Deformity of left foot 08/18/2020     Added automatically from request for surgery [id number]    Dermatochalasis of left upper eyelid 03/12/2020    Dermatochalasis of right upper eyelid 03/12/2020    Drooping eyelid, bilateral      Ectropion 03/12/2020    Epiretinal membrane (ERM) of right eye 03/12/2020    Frequent UTI      GERD (gastroesophageal reflux disease)      Hallux  rigidus, left foot 08/18/2020     Added automatically from request for surgery [id number]    Hearing loss       bilateral hearing aides    History of hepatitis       pt not sure which 1  contracted at age 8    History of transfusion      [place] (hard of hearing)      Hyperlipidemia      Hypertension      Myocardial infarction      Past heart attack       X2 MILD LAST ONE OCT 2019    PONV (postoperative nausea and vomiting)      Presence of intraocular lens 03/12/2020            Surgical History         Past Surgical History:   Procedure Laterality Date    ANTERIOR AND POSTERIOR VAGINAL REPAIR        BACK SURGERY        BLEPHAROPLASTY Bilateral 5/23/2019     Procedure: bilateral upper lid blepharoplasty;  Surgeon: Mauricio Pennington MD;  Location: [facility] OR [facility];  Service: Ophthalmology    BROW LIFT Bilateral 2/13/2020     Procedure: BILATERAL TEMPORAL DIRECT BROWLIFT;  Surgeon: Sreekanth Bird MD;  Location: [facility] OR [facility];  Service: Ophthalmology;  Laterality: Bilateral;    CARDIAC CATHETERIZATION N/A 12/3/2019     Procedure: Left Heart Cath;  Surgeon: Puma Briseno MD;  Location: [facility] CATH INVASIVE LOCATION;  Service: Cardiovascular    CARDIAC CATHETERIZATION N/A 12/3/2019     Procedure: Coronary angiography;  Surgeon: Puma Briseno MD;  Location: [facility] CATH INVASIVE LOCATION;  Service: Cardiovascular    CARDIAC CATHETERIZATION N/A 12/3/2019     Procedure: Left ventriculography;  Surgeon: Puma Briseno MD;  Location: [facility] CATH INVASIVE LOCATION;  Service: Cardiovascular    CATARACT EXTRACTION EXTRACAPSULAR W/ INTRAOCULAR LENS IMPLANTATION Bilateral      CERVICAL SPINE ANTERIOR         with instrumentation    CHEILECTOMY Left 8/28/2020     Procedure: CHEILECTOMY;  Surgeon: GAB Rees DPM;  Location: [facility] MAIN OR;  Service: Podiatry;  Laterality: Left;    COLON SURGERY         for obstruction    ALBERTO TUBE INSERTION Bilateral 5/23/2019     Procedure: ALBERTO TUBE;  Surgeon: Mauricio Pennington MD;   [place] [facility] [place] [facility]  Service: Ophthalmology    ECTROPION REPAIR Bilateral 2019     Procedure: bilateral lower lid ectropion repair, bilateral punctoplasty;  Surgeon: Mauricio Pennington MD;  [facility];  Service: Ophthalmology    ENDOSCOPY N/A 2022     Procedure: ESOPHAGOGASTRODUODENOSCOPY WITH DILATATION (BALLOON 15MM-18MM, UP TO 18MM) AND BOUGIE #48;  Surgeon: Wiley Parks MD;  Location: [facility] ENDOSCOPY;  Service: Gastroenterology;  Laterality: N/A;  Post: DYSPHAGIA    EYE SURGERY        FOOT FUSION Left 2016     Procedure: LEFT HALLUX METATARSALPHALANGEAL ARTHRODESIS SILVER BUNIONECTOMY METATARSAL HEAD RESECTION 2-5 CALCANEAL BONE GRAFT;  Surgeon: Monster Harper Jr., MD;  Location: [facility] MAIN OR;  Service:     HYSTERECTOMY        INGUINAL HERNIA REPAIR Bilateral      METATARSAL OSTEOTOMY Left 2020     Procedure: Metatarsal head resection 5;  Surgeon: GAB Rees DPM;  Location: [facility] MAIN OR;  Service: Podiatry;  Laterality: Left;    ROTATOR CUFF REPAIR Right      SIGMOIDOSCOPY N/A 10/6/2021     Procedure: SIGMOIDOSCOPY WITH BIOPSY X1 AREA;  Surgeon: Uche Vaz MD;  Location: [facility] ENDOSCOPY;  Service: Gastroenterology;  Laterality: N/A;  ischemic colitis    STOMACH SURGERY         for ulcer                  Family History   Problem Relation Age of Onset    No Known Problems Mother      No Known Problems Father      Malig Hyperthermia Neg Hx           Social History   Social History            Tobacco Use    Smoking status: Former       Current packs/day: 0.00       Average packs/day: 0.3 packs/day for 10.0 years (2.5 ttl pk-yrs)       Types: Cigarettes       Start date: 1980       Quit date: 1990       Years since quittin.2    Smokeless tobacco: Never   Vaping Use    Vaping status: Never Used   Substance Use Topics    Alcohol use: No    Drug use: No            Prescriptions Prior to Admission           Medications Prior to Admission  "  Medication Sig Dispense Refill Last Dose    amiodarone (PACERONE) 200 MG tablet Take 0.5 tablets by mouth 2 (Two) Times a Day.     3/25/2024    furosemide (LASIX) 20 MG tablet Take 1 tablet by mouth 2 (Two) Times a Day. 180 tablet 1 3/25/2024    isosorbide mononitrate (IMDUR) 30 MG 24 hr tablet Take 1 tablet by mouth once daily 90 tablet 1 3/25/2024    metoprolol succinate XL (TOPROL-XL) 25 MG 24 hr tablet Take 1 tablet by mouth Daily. 90 tablet 1      warfarin (COUMADIN) 2 MG tablet Take 1 tablet by mouth 4 (Four) Times a Week. Tuesday, Thursday, Saturday, Bruce     3/24/2024    warfarin (COUMADIN) 3 MG tablet Take 1 tablet by mouth 3 (Three) Times a Week. Monday, Wednesday, Friday     3/22/2024             Amoxicillin, Codeine, Lortab [hydrocodone-acetaminophen], Nitrofurantoin, and Sulfa antibiotics     Scheduled Meds:    Scheduled Medication   furosemide, 20 mg, Oral, BID  isosorbide mononitrate, 30 mg, Oral, Daily  sodium chloride, 10 mL, Intravenous, Q12H  warfarin, 2 mg, Oral, Once per day on Sunday Tuesday Thursday Saturday  warfarin, 3 mg, Oral, Once per day on Monday Wednesday Friday            Continuous Infusions:  Infusion Medications            PRN Meds:    PRN Medication     acetaminophen    nitroglycerin    ondansetron    sodium chloride    [COMPLETED] Insert Peripheral IV **AND** sodium chloride    sodium chloride    sodium chloride           VITAL SIGNS  Vitals          Vitals:     03/26/24 0726 03/26/24 0730 03/26/24 0732 03/26/24 1151   BP: 139/72   139/72 155/69   BP Location: Left arm   Right arm Right arm   Patient Position: Lying   Lying Sitting   Pulse: (!) 49 (!) 47 (!) 47 58   Resp: 16   15 17   Temp: 97.8 °F (36.6 °C)   98.1 °F (36.7 °C) 97.4 °F (36.3 °C)   TempSrc: Oral   Oral Oral   SpO2: 98%   96% 97%   Weight:           Height:                    Flowsheet Rows       Flowsheet Row First Filed Value   Admission Height 157.5 cm (62\") Documented at 03/25/2024 1347   Admission Weight " 51.1 kg (112 lb 9.6 oz) Documented at 03/25/2024 1347                 TELEMETRY: sinus bradycardia, rate upper 40's     Physical Exam:  Vitals reviewed.   Constitutional:       Appearance: Normal weight and not in distress.   Eyes:      Pupils: Pupils are equal, round, and reactive to light.   Pulmonary:      Effort: Pulmonary effort is normal.      Breath sounds: Normal breath sounds.   Cardiovascular:      Bradycardia present. Regular rhythm. Normal S1. Normal S2.       Murmurs: There is no murmur.   Pulses:     Intact distal pulses.   Edema:     Peripheral edema absent.   Abdominal:      General: Bowel sounds are normal.   Musculoskeletal: Normal range of motion.      Cervical back: Normal range of motion. Skin:     General: Skin is warm and dry.   Neurological:      Mental Status: Alert and oriented to person, place and time.   Psychiatric:         Behavior: Behavior is cooperative.               LAB RESULTS (LAST 7 DAYS)     CMP        Results from last 7 days   Lab Units 03/25/24  1412   SODIUM mmol/L 145   POTASSIUM mmol/L 3.7   CHLORIDE mmol/L 104   CO2 mmol/L 30.0*   BUN mg/dL 24*   CREATININE mg/dL 0.99   GLUCOSE mg/dL 118*   ALBUMIN g/dL 4.2   BILIRUBIN mg/dL 0.8   ALK PHOS U/L 101   AST (SGOT) U/L 19   ALT (SGPT) U/L 14         BMP       Results from last 7 days   Lab Units 03/25/24  1412   SODIUM mmol/L 145   POTASSIUM mmol/L 3.7   CHLORIDE mmol/L 104   CO2 mmol/L 30.0*   BUN mg/dL 24*   CREATININE mg/dL 0.99   GLUCOSE mg/dL 118*         CBC       Results from last 7 days   Lab Units 03/25/24  1412   WBC 10*3/mm3 8.04   RBC 10*6/mm3 4.82   HEMOGLOBIN g/dL 13.7   HEMATOCRIT % 44.1   MCV fL 91.5   PLATELETS 10*3/mm3 283         ProBNP         TROPONIN       Results from last 7 days   Lab Units 03/25/24  1412   HSTROP T ng/L 22*         Creatinine Clearance  Estimated Creatinine Clearance: 26.4 mL/min (by C-G formula based on SCr of 0.99 mg/dL).        Radiology  CT Head Without Contrast     Result Date:  3/25/2024  Impression: No acute intracranial process identified. Electronically Signed: Christ Miranda MD  3/25/2024 3:37 PM EDT  Workstation ID: [id number]     XR Chest 1 View     Result Date: 3/25/2024  Impression: No acute cardiopulmonary abnormality. Electronically Signed: Jesse Watson MD  3/25/2024 3:06 PM EDT  Workstation ID: [id number]      EKG     I personally viewed and interpreted the patient's EKG/Telemetry data:  ECG 12 Lead Bradycardia   Final Result   HEART RATE= 49  bpm   RR Interval= 1216  ms   MI Interval= 170  ms   P Horizontal Axis= -68  deg   P Front Axis= 37  deg   QRSD Interval= 98  ms   QT Interval= 515  ms   QTcB= 467  ms   QRS Axis= -31  deg   T Wave Axis= 1  deg   - ABNORMAL ECG -   Sinus bradycardia   Left ventricular hypertrophy   Anterior Q waves, possibly due to LVH   Electronically Signed By: Jason Martins ([facility]) 26-Mar-2024 06:55:55   Date and Time of Study: 2024-03-25 13:54:22             ECHOCARDIOGRAM:  Results for orders placed during the hospital encounter of 10/02/21     Adult Transthoracic Echo Complete W/ Cont if Necessary Per Protocol     Interpretation Summary  · Estimated left ventricular EF was in agreement with the calculated left ventricular EF. Left ventricular ejection fraction appears to be 56 - 60%. Left ventricular systolic function is normal.  · Left ventricular diastolic function is consistent with (grade II w/high LAP) pseudonormalization.  · Left atrial volume is mildly increased.  · Estimated right ventricular systolic pressure from tricuspid regurgitation is normal (<35 mmHg).        STRESS MYOVIEW:        CARDIAC CATHETERIZATION:  Results for orders placed during the hospital encounter of 12/01/19     Cardiac Catheterization/Vascular Study     Narrative  CARDIAC CATHETERIZATION REPORT        DATE OF PROCEDURE:  12/3/2019     INDICATION FOR PROCEDURE:     Non-ST elevation myocardial infarction  CAD  Angina pectoris     PROCEDURE PERFORMED:     Left heart  catheterization  coronary angiography  left ventriculography     PROCEDURE COMMENTS:     After informed consent was obtained, the patient was prepped and draped in the usual sterile manner.  Mild to moderate sedation was administered.  Right femoral artery was accessed without difficulty and 6 [place] arterial sheath was inserted.  Sheath was flushed with heparinized saline.     Using 6 [place] Rebeca catheters, first left coronary artery and the right coronary was electively engaged and appropriate views were taken.  A 6 [place] JR4 catheter was used to cross aortic valve and left heart catheterization was performed.  Left ventriculography was done in a review.     Patient tolerated the procedure well.     FINDINGS:     1. HEMODYNAMICS:     Aortic pressure: 145/72     LVEDP: 10 mmHg     Gradient across aortic valve on pullback: No gradient across aortic valve        2. LEFT VENTRICULOGRAPHY: 50 to 55% mild global hypokinesis        3. CORONARY ANGIOGRAPHY:     A: Left main coronary artery: Calcified and mild disease up to 10 to 20% in the ostium.  TIESHA-3 flow was present     B: Left anterior descending artery: Diffuse disease up to 30% in proximal mid LAD at the origin of D1 branch of LAD.  D1 branch of LAD has 60 to 70% ostial stenosis which is calcified.  This has not changed from cardiac catheterization done in 2017.  TIESHA-3 flow was present in both vessels     C: Left circumflex coronary artery: Diffuse 25 to 30% mid LCx stenosis     D: Right coronary artery: 20% proximal RCA stenosis.  It is large and dominant vessel.     SUMMARY:     Single-vessel coronary artery disease  Intermediate lesion of ostial D1 branch of LAD.  This was not different from cardiac catheterization done in 2017  Preserved left ventricular function with mild global hypokinesis     RECOMMENDATIONS:     Recommend medical treatment        OTHER:               Assessment & Plan    Bradycardia        PLAN     Bradycardia  -symptomatic with  weakness and fatigue  -Hold beta blockers and other rate reducing agents  -HR in upper 40's at rest     Paroxysmal atrial fibrillation  -currently sinus rhythm  -cardioversion on 2/19/24  -warfarin for anticoagulation     Discussed potential need for pacemaker. Patient agreeable to procedure, but expressed concerns ability to return to work as a  several times a week.     I discussed the patients findings and my recommendations with patient and nurse.  Further recommendations per Dr. Garcia.     Addendum     Mrs. Golden was seen and examined today, I agree with the note outlined by nurse practitioner Alivia Gardner and now that the following.  Patient presented to the hospital with a near syncopal episode, she was found to be in sinus bradycardia heart rate in the upper 40s.  Previously in February 2024, she had cardioversion done due to atrial fibrillation and was placed on amiodarone 100 mg twice a day and Toprol 25 mg once a day.  These medications were discontinued and now her heart rate is in the mid 50s.  Patient is no longer having any symptoms.  I think that at this time we could probably hold off on pacemaker.  If however she has recurrence of A-fib then we might not have a choice but to put a pacemaker to be able to control A-fib and avoid bradycardia at the same time.     Addendum    Ms. Golden was discharged from the hospital with an event monitor which revealed A-fib/atrial flutter with rapid ventricular rates in the 140s.  She had been on amiodarone 100 mg once a day along with Toprol-XL 25 mg once a day.  She had converted to sinus rhythm upon arrival.  At this time we will be very difficult to control her arrhythmia with medicines alone due to forementioned episodes of bradycardia.  We will schedule her for pacemaker implantation for sick sinus syndrome and for better management of her arrhythmia.  Patient is agreeable.

## 2024-04-05 NOTE — CONSULTS
Referring Provider: Ashley Livingston MD     Reason for Consultation: bradycardia        Patient Care Team:  Monster Myrick MD as PCP - General (Family Medicine)  Maikel Morales MD as Consulting Physician (Cardiology)        Chief complaint: weakness     History of present illness:  Carrie Golden is a 89 y.o. female with past medical history of  paroxysmal atrial fibrillation, DVT, CAD with prior NSTEMI, HTN, and hyperlipidemia who presented to the ED with generalized weakness.  Patient reports having extreme weakness causing her to be unable to work. She has had her metoprolol recently decreased to 25mg daily. Upon admittance to the ED patient was found to be bradycardic with a heart rate in the upper 40s. Patient denies shortness of breath, chest pain, dizziness, or lightheadedness. Her primary cardiologist is Dr. Quiroz.     Patient is anticoagulated with warfarin. Troponin 22, TSH 4.020, Free T4 1.78, all other labs unremarkable.   CXR and CT head show no acute abnormalities.  EKG reveals bradycardia rate 49, Qtc 467     Review of Systems   Constitutional: Positive for malaise/fatigue.   Cardiovascular:  Negative for chest pain, leg swelling and palpitations.   Gastrointestinal:  Negative for nausea and vomiting.   Neurological:  Positive for weakness.   All other systems reviewed and are negative.        History  Medical History        Past Medical History:   Diagnosis Date    Amblyopia 10/31/2012    Amblyopia, left 03/12/2020    Arthritis      Conjunctival hemorrhage 10/08/2014    Coronary artery disease      Deep vein thrombosis      Deformity of left foot 08/18/2020     Added automatically from request for surgery [id number]    Dermatochalasis of left upper eyelid 03/12/2020    Dermatochalasis of right upper eyelid 03/12/2020    Drooping eyelid, bilateral      Ectropion 03/12/2020    Epiretinal membrane (ERM) of right eye 03/12/2020    Frequent UTI      GERD (gastroesophageal reflux disease)      Hallux  rigidus, left foot 08/18/2020     Added automatically from request for surgery [id number]    Hearing loss       bilateral hearing aides    History of hepatitis       pt not sure which 1  contracted at age 8    History of transfusion      [place] (hard of hearing)      Hyperlipidemia      Hypertension      Myocardial infarction      Past heart attack       X2 MILD LAST ONE OCT 2019    PONV (postoperative nausea and vomiting)      Presence of intraocular lens 03/12/2020            Surgical History         Past Surgical History:   Procedure Laterality Date    ANTERIOR AND POSTERIOR VAGINAL REPAIR        BACK SURGERY        BLEPHAROPLASTY Bilateral 5/23/2019     Procedure: bilateral upper lid blepharoplasty;  Surgeon: Mauricio Pennington MD;  Location: [facility] OR [facility];  Service: Ophthalmology    BROW LIFT Bilateral 2/13/2020     Procedure: BILATERAL TEMPORAL DIRECT BROWLIFT;  Surgeon: Sreekanth Bird MD;  Location: [facility] OR [facility];  Service: Ophthalmology;  Laterality: Bilateral;    CARDIAC CATHETERIZATION N/A 12/3/2019     Procedure: Left Heart Cath;  Surgeon: Puma Briseno MD;  Location: [facility] CATH INVASIVE LOCATION;  Service: Cardiovascular    CARDIAC CATHETERIZATION N/A 12/3/2019     Procedure: Coronary angiography;  Surgeon: Puma Briseno MD;  Location: [facility] CATH INVASIVE LOCATION;  Service: Cardiovascular    CARDIAC CATHETERIZATION N/A 12/3/2019     Procedure: Left ventriculography;  Surgeon: Puma Briseno MD;  Location: [facility] CATH INVASIVE LOCATION;  Service: Cardiovascular    CATARACT EXTRACTION EXTRACAPSULAR W/ INTRAOCULAR LENS IMPLANTATION Bilateral      CERVICAL SPINE ANTERIOR         with instrumentation    CHEILECTOMY Left 8/28/2020     Procedure: CHEILECTOMY;  Surgeon: GAB Rees DPM;  Location: [facility] MAIN OR;  Service: Podiatry;  Laterality: Left;    COLON SURGERY         for obstruction    ALBERTO TUBE INSERTION Bilateral 5/23/2019     Procedure: ALBERTO TUBE;  Surgeon: Mauricio Pennington MD;   Location: [facility] OR [facility];  Service: Ophthalmology    ECTROPION REPAIR Bilateral 2019     Procedure: bilateral lower lid ectropion repair, bilateral punctoplasty;  Surgeon: Mauricio Pennington MD;  [facility];  Service: Ophthalmology    ENDOSCOPY N/A 2022     Procedure: ESOPHAGOGASTRODUODENOSCOPY WITH DILATATION (BALLOON 15MM-18MM, UP TO 18MM) AND BOUGIE #48;  Surgeon: Wiley Parks MD;  Location: [facility] ENDOSCOPY;  Service: Gastroenterology;  Laterality: N/A;  Post: DYSPHAGIA    EYE SURGERY        FOOT FUSION Left 2016     Procedure: LEFT HALLUX METATARSALPHALANGEAL ARTHRODESIS SILVER BUNIONECTOMY METATARSAL HEAD RESECTION 2-5 CALCANEAL BONE GRAFT;  Surgeon: Monster Harper Jr., MD;  Location: [facility] MAIN OR;  Service:     HYSTERECTOMY        INGUINAL HERNIA REPAIR Bilateral      METATARSAL OSTEOTOMY Left 2020     Procedure: Metatarsal head resection 5;  Surgeon: GAB Rees DPM;  Location: [facility] MAIN OR;  Service: Podiatry;  Laterality: Left;    ROTATOR CUFF REPAIR Right      SIGMOIDOSCOPY N/A 10/6/2021     Procedure: SIGMOIDOSCOPY WITH BIOPSY X1 AREA;  Surgeon: Uche Vaz MD;  Location: [facility] ENDOSCOPY;  Service: Gastroenterology;  Laterality: N/A;  ischemic colitis    STOMACH SURGERY         for ulcer                  Family History   Problem Relation Age of Onset    No Known Problems Mother      No Known Problems Father      Malig Hyperthermia Neg Hx           Social History   Social History            Tobacco Use    Smoking status: Former       Current packs/day: 0.00       Average packs/day: 0.3 packs/day for 10.0 years (2.5 ttl pk-yrs)       Types: Cigarettes       Start date: 1980       Quit date: 1990       Years since quittin.2    Smokeless tobacco: Never   Vaping Use    Vaping status: Never Used   Substance Use Topics    Alcohol use: No    Drug use: No            Prescriptions Prior to Admission           Medications Prior to Admission  "  Medication Sig Dispense Refill Last Dose    amiodarone (PACERONE) 200 MG tablet Take 0.5 tablets by mouth 2 (Two) Times a Day.     3/25/2024    furosemide (LASIX) 20 MG tablet Take 1 tablet by mouth 2 (Two) Times a Day. 180 tablet 1 3/25/2024    isosorbide mononitrate (IMDUR) 30 MG 24 hr tablet Take 1 tablet by mouth once daily 90 tablet 1 3/25/2024    metoprolol succinate XL (TOPROL-XL) 25 MG 24 hr tablet Take 1 tablet by mouth Daily. 90 tablet 1      warfarin (COUMADIN) 2 MG tablet Take 1 tablet by mouth 4 (Four) Times a Week. Tuesday, Thursday, Saturday, Bruce     3/24/2024    warfarin (COUMADIN) 3 MG tablet Take 1 tablet by mouth 3 (Three) Times a Week. Monday, Wednesday, Friday     3/22/2024             Amoxicillin, Codeine, Lortab [hydrocodone-acetaminophen], Nitrofurantoin, and Sulfa antibiotics     Scheduled Meds:    Scheduled Medication   furosemide, 20 mg, Oral, BID  isosorbide mononitrate, 30 mg, Oral, Daily  sodium chloride, 10 mL, Intravenous, Q12H  warfarin, 2 mg, Oral, Once per day on Sunday Tuesday Thursday Saturday  warfarin, 3 mg, Oral, Once per day on Monday Wednesday Friday            Continuous Infusions:  Infusion Medications            PRN Meds:    PRN Medication     acetaminophen    nitroglycerin    ondansetron    sodium chloride    [COMPLETED] Insert Peripheral IV **AND** sodium chloride    sodium chloride    sodium chloride           VITAL SIGNS  Vitals          Vitals:     03/26/24 0726 03/26/24 0730 03/26/24 0732 03/26/24 1151   BP: 139/72   139/72 155/69   BP Location: Left arm   Right arm Right arm   Patient Position: Lying   Lying Sitting   Pulse: (!) 49 (!) 47 (!) 47 58   Resp: 16   15 17   Temp: 97.8 °F (36.6 °C)   98.1 °F (36.7 °C) 97.4 °F (36.3 °C)   TempSrc: Oral   Oral Oral   SpO2: 98%   96% 97%   Weight:           Height:                    Flowsheet Rows       Flowsheet Row First Filed Value   Admission Height 157.5 cm (62\") Documented at 03/25/2024 1347   Admission Weight " 51.1 kg (112 lb 9.6 oz) Documented at 03/25/2024 1347                 TELEMETRY: sinus bradycardia, rate upper 40's     Physical Exam:  Vitals reviewed.   Constitutional:       Appearance: Normal weight and not in distress.   Eyes:      Pupils: Pupils are equal, round, and reactive to light.   Pulmonary:      Effort: Pulmonary effort is normal.      Breath sounds: Normal breath sounds.   Cardiovascular:      Bradycardia present. Regular rhythm. Normal S1. Normal S2.       Murmurs: There is no murmur.   Pulses:     Intact distal pulses.   Edema:     Peripheral edema absent.   Abdominal:      General: Bowel sounds are normal.   Musculoskeletal: Normal range of motion.      Cervical back: Normal range of motion. Skin:     General: Skin is warm and dry.   Neurological:      Mental Status: Alert and oriented to person, place and time.   Psychiatric:         Behavior: Behavior is cooperative.               LAB RESULTS (LAST 7 DAYS)     CMP        Results from last 7 days   Lab Units 03/25/24  1412   SODIUM mmol/L 145   POTASSIUM mmol/L 3.7   CHLORIDE mmol/L 104   CO2 mmol/L 30.0*   BUN mg/dL 24*   CREATININE mg/dL 0.99   GLUCOSE mg/dL 118*   ALBUMIN g/dL 4.2   BILIRUBIN mg/dL 0.8   ALK PHOS U/L 101   AST (SGOT) U/L 19   ALT (SGPT) U/L 14         BMP       Results from last 7 days   Lab Units 03/25/24  1412   SODIUM mmol/L 145   POTASSIUM mmol/L 3.7   CHLORIDE mmol/L 104   CO2 mmol/L 30.0*   BUN mg/dL 24*   CREATININE mg/dL 0.99   GLUCOSE mg/dL 118*         CBC       Results from last 7 days   Lab Units 03/25/24  1412   WBC 10*3/mm3 8.04   RBC 10*6/mm3 4.82   HEMOGLOBIN g/dL 13.7   HEMATOCRIT % 44.1   MCV fL 91.5   PLATELETS 10*3/mm3 283         ProBNP         TROPONIN       Results from last 7 days   Lab Units 03/25/24  1412   HSTROP T ng/L 22*         Creatinine Clearance  Estimated Creatinine Clearance: 26.4 mL/min (by C-G formula based on SCr of 0.99 mg/dL).        Radiology  CT Head Without Contrast     Result Date:  3/25/2024  Impression: No acute intracranial process identified. Electronically Signed: Christ Miranda MD  3/25/2024 3:37 PM EDT  Workstation ID: [id number]     XR Chest 1 View     Result Date: 3/25/2024  Impression: No acute cardiopulmonary abnormality. Electronically Signed: Jesse Watson MD  3/25/2024 3:06 PM EDT  Workstation ID: [id number]      EKG     I personally viewed and interpreted the patient's EKG/Telemetry data:  ECG 12 Lead Bradycardia   Final Result   HEART RATE= 49  bpm   RR Interval= 1216  ms   UT Interval= 170  ms   P Horizontal Axis= -68  deg   P Front Axis= 37  deg   QRSD Interval= 98  ms   QT Interval= 515  ms   QTcB= 467  ms   QRS Axis= -31  deg   T Wave Axis= 1  deg   - ABNORMAL ECG -   Sinus bradycardia   Left ventricular hypertrophy   Anterior Q waves, possibly due to LVH   Electronically Signed By: Jason Martins ([facility]) 26-Mar-2024 06:55:55   Date and Time of Study: 2024-03-25 13:54:22             ECHOCARDIOGRAM:  Results for orders placed during the hospital encounter of 10/02/21     Adult Transthoracic Echo Complete W/ Cont if Necessary Per Protocol     Interpretation Summary  · Estimated left ventricular EF was in agreement with the calculated left ventricular EF. Left ventricular ejection fraction appears to be 56 - 60%. Left ventricular systolic function is normal.  · Left ventricular diastolic function is consistent with (grade II w/high LAP) pseudonormalization.  · Left atrial volume is mildly increased.  · Estimated right ventricular systolic pressure from tricuspid regurgitation is normal (<35 mmHg).        STRESS MYOVIEW:        CARDIAC CATHETERIZATION:  Results for orders placed during the hospital encounter of 12/01/19     Cardiac Catheterization/Vascular Study     Narrative  CARDIAC CATHETERIZATION REPORT        DATE OF PROCEDURE:  12/3/2019     INDICATION FOR PROCEDURE:     Non-ST elevation myocardial infarction  CAD  Angina pectoris     PROCEDURE PERFORMED:     Left heart  catheterization  coronary angiography  left ventriculography     PROCEDURE COMMENTS:     After informed consent was obtained, the patient was prepped and draped in the usual sterile manner.  Mild to moderate sedation was administered.  Right femoral artery was accessed without difficulty and 6 Guamanian arterial sheath was inserted.  Sheath was flushed with heparinized saline.     Using 6 [place]ian Rebeca catheters, first left coronary artery and the right coronary was electively engaged and appropriate views were taken.  A 6 Guamanian JR4 catheter was used to cross aortic valve and left heart catheterization was performed.  Left ventriculography was done in a review.     Patient tolerated the procedure well.     FINDINGS:     1. HEMODYNAMICS:     Aortic pressure: 145/72     LVEDP: 10 mmHg     Gradient across aortic valve on pullback: No gradient across aortic valve        2. LEFT VENTRICULOGRAPHY: 50 to 55% mild global hypokinesis        3. CORONARY ANGIOGRAPHY:     A: Left main coronary artery: Calcified and mild disease up to 10 to 20% in the ostium.  TIESHA-3 flow was present     B: Left anterior descending artery: Diffuse disease up to 30% in proximal mid LAD at the origin of D1 branch of LAD.  D1 branch of LAD has 60 to 70% ostial stenosis which is calcified.  This has not changed from cardiac catheterization done in 2017.  TIESHA-3 flow was present in both vessels     C: Left circumflex coronary artery: Diffuse 25 to 30% mid LCx stenosis     D: Right coronary artery: 20% proximal RCA stenosis.  It is large and dominant vessel.     SUMMARY:     Single-vessel coronary artery disease  Intermediate lesion of ostial D1 branch of LAD.  This was not different from cardiac catheterization done in 2017  Preserved left ventricular function with mild global hypokinesis     RECOMMENDATIONS:     Recommend medical treatment        OTHER:               Assessment & Plan    Bradycardia        PLAN     Bradycardia  -symptomatic with  weakness and fatigue  -Hold beta blockers and other rate reducing agents  -HR in upper 40's at rest     Paroxysmal atrial fibrillation  -currently sinus rhythm  -cardioversion on 2/19/24  -warfarin for anticoagulation     Discussed potential need for pacemaker. Patient agreeable to procedure, but expressed concerns ability to return to work as a  several times a week.     I discussed the patients findings and my recommendations with patient and nurse.  Further recommendations per Dr. Garcia.     Addendum     Mrs. Golden was seen and examined today, I agree with the note outlined by nurse practitioner Alivia Gardner and now that the following.  Patient presented to the hospital with a near syncopal episode, she was found to be in sinus bradycardia heart rate in the upper 40s.  Previously in February 2024, she had cardioversion done due to atrial fibrillation and was placed on amiodarone 100 mg twice a day and Toprol 25 mg once a day.  These medications were discontinued and now her heart rate is in the mid 50s.  Patient is no longer having any symptoms.  I think that at this time we could probably hold off on pacemaker.  If however she has recurrence of A-fib then we might not have a choice but to put a pacemaker to be able to control A-fib and avoid bradycardia at the same time.     Addendum    Ms. Golden was discharged from the hospital with an event monitor which revealed A-fib/atrial flutter with rapid ventricular rates in the 140s.  She had been on amiodarone 100 mg once a day along with Toprol-XL 25 mg once a day.  She had converted to sinus rhythm upon arrival.  At this time we will be very difficult to control her arrhythmia with medicines alone due to forementioned episodes of bradycardia.  We will schedule her for pacemaker implantation for sick sinus syndrome and for better management of her arrhythmia.  Patient is agreeable.

## 2024-04-05 NOTE — CASE MANAGEMENT/SOCIAL WORK
Discharge Planning Assessment   [place]     Patient Name: Carrie Golden  MRN: [id number]  Today's Date: 4/5/2024    Admit Date: 4/4/2024    Plan: Routine home   Discharge Needs Assessment       Row Name 04/05/24 1621       Living Environment    People in Home alone    Current Living Arrangements home    Potentially Unsafe Housing Conditions none    In the past 12 months has the electric, gas, oil, or water company threatened to shut off services in your home? No    Primary Care Provided by self    Provides Primary Care For no one    Family Caregiver if Needed other relative(s)    Family Caregiver Names owen Salinas    Quality of Family Relationships helpful;involved;supportive    Able to Return to Prior Arrangements yes       Resource/Environmental Concerns    Resource/Environmental Concerns none    Transportation Concerns none       Transportation Needs    In the past 12 months, has lack of transportation kept you from medical appointments or from getting medications? no    In the past 12 months, has lack of transportation kept you from meetings, work, or from getting things needed for daily living? No       Food Insecurity    Within the past 12 months, you worried that your food would run out before you got the money to buy more. Never true    Within the past 12 months, the food you bought just didn't last and you didn't have money to get more. Never true       Transition Planning    Patient/Family Anticipates Transition to home    Patient/Family Anticipated Services at Transition none    Transportation Anticipated car, drives self;family or friend will provide       Discharge Needs Assessment    Readmission Within the Last 30 Days no previous admission in last 30 days    Equipment Currently Used at Home none    Concerns to be Addressed denies needs/concerns at this time    Anticipated Changes Related to Illness none    Equipment Needed After Discharge none                   Discharge Plan       Row Name 04/05/24  1621       Plan    Plan Routine home    Plan Comments CM met with patient at the bedside. Confirmed PCP, insurance, and pharmacy. Patient denies any difficulty affording medications. Patient is not current with any HHC/OPPT/OT services. Patient lives at home alone, is IADLS, and drives. Niece will provide DC transport. DC orders noted, patient will return next tuesday for planned procedure with Dr. Garcia.                  Continued Care and Services - Admitted Since 4/4/2024    No active coordination exists for this encounter.       Expected Discharge Date and Time       Expected Discharge Date Expected Discharge Time    Apr 5, 2024            Demographic Summary       Row Name 04/05/24 1620       General Information    Admission Type inpatient    Arrived From emergency department    Required Notices Provided Important Message from Medicare    Referral Source admission list    Reason for Consult discharge planning    Preferred Language English       Contact Information    Permission Granted to Share Info With     Contact Information Obtained for                    Functional Status       Row Name 04/05/24 1620       Functional Status    Usual Activity Tolerance good    Current Activity Tolerance good       Functional Status, IADL    Medications independent    Meal Preparation independent    Housekeeping independent    Laundry independent    Shopping independent       Mental Status    General Appearance WDL WDL       Mental Status Summary    Recent Changes in Mental Status/Cognitive Functioning no changes           Moisés Nugent RN     Cell number [phone]  Office number [phone]

## 2024-04-05 NOTE — CONSULTS
Referring Provider: Ashley Livingston MD    Reason for Consultation:      Sick sinus syndrome  Atrial flutter with RVR  Periods of sinus bradycardia  Coronary artery disease      Patient Care Team:  Monster Myrick MD as PCP - General (Family Medicine)  Maikel Morales MD as Consulting Physician (Cardiology)      SUBJECTIVE     Chief Complaint: Patient denies chest pain or dyspnea    History of present illness:  Carrie Golden is a 89 y.o. female with a history of sick sinus syndrome who presented to [facility] with complaint of atrial flutter with RVR.    Patient is known to have a history of paroxysmal atrial fibrillation, DVT, coronary artery disease with previous non-ST segment elevation MI.  The patient had previous cardiac catheterization showing nonobstructive coronary artery disease with 60 to 70% diagonal branch stenosis which was heavily calcified.  It was elected to treat medically.  She is also known to have hypertension, dyslipidemia and historically preserved LV function.  Patient was recently admitted to [facility] with complaints of weakness and fatigue.  She was found to be bradycardic.  She was on Low-dose beta-blockers at home including metoprolol XL 25 mg once daily.  And amiodarone 100 mg daily.  These were discontinued and the patient's heart rate improved.  She was discharged home with an M cot monitor in place.    Yesterday we received notification from [employer] that the patient was in atrial flutter with RVR.  Her heart rate was in the 140s.  The patient was contacted and advised to come into the hospital.    Patient states she felt fluttering and knew her heart was out of rhythm but otherwise was not having symptoms.    In the emergency room the patient's initial troponin was 33 BUN and creatinine 35 and 1.0 INR 4.5 hemoglobin 13.5Initial EKG showed atrial flutter with RVR rates in the 120s  Review of systems:    Constitutional: No weakness, fatigue, fever, rigors,  chills   Eyes: No vision changes, eye pain   ENT/oropharynx: No difficulty swallowing, sore throat, epistaxis, changes in hearing   Cardiovascular: No chest pain, chest tightness, palpitations, paroxysmal nocturnal dyspnea, orthopnea, diaphoresis, dizziness / syncopal episode   Respiratory: No shortness of breath, dyspnea on exertion, cough, wheezing, hemoptysis   Gastrointestinal: No abdominal pain, nausea, vomiting, diarrhea, bloody stools   Genitourinary: No hematuria, dysuria   Neurological: No headache, tremors, numbness, one-sided weakness    Musculoskeletal: No cramps, myalgias, joint pain, joint swelling   Integument: No rash, edema        Personal History:      Past Medical History:   Diagnosis Date    Amblyopia 10/31/2012    Amblyopia, left 03/12/2020    Arthritis     Conjunctival hemorrhage 10/08/2014    Coronary artery disease     Deep vein thrombosis     Deformity of left foot 08/18/2020    Added automatically from request for surgery [id number]    Dermatochalasis of left upper eyelid 03/12/2020    Dermatochalasis of right upper eyelid 03/12/2020    Drooping eyelid, bilateral     Ectropion 03/12/2020    Epiretinal membrane (ERM) of right eye 03/12/2020    Frequent UTI     GERD (gastroesophageal reflux disease)     Hallux rigidus, left foot 08/18/2020    Added automatically from request for surgery [id number]    Hearing loss     bilateral hearing aides    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 sure which 1  contracted at age 8    History of transfusion     [place] (hard of hearing)     Hyperlipidemia     Hypertension     Myocardial infarction     Past heart attack     X2 MILD LAST ONE OCT 2019    PONV (postoperative nausea and vomiting)     Presence of intraocular lens 03/12/2020       Past Surgical History:   Procedure Laterality Date    ANTERIOR AND POSTERIOR VAGINAL REPAIR      BACK SURGERY      BLEPHAROPLASTY Bilateral 5/23/2019    Procedure: bilateral upper lid blepharoplasty;  Surgeon: Mauricio Pennington MD;   [facility];  Service: Ophthalmology    BROW LIFT Bilateral 2/13/2020    Procedure: BILATERAL TEMPORAL DIRECT BROWLIFT;  Surgeon: Sreekanth Bird MD;  Location: [facility] OR [facility];  Service: Ophthalmology;  Laterality: Bilateral;    CARDIAC CATHETERIZATION N/A 12/3/2019    Procedure: Left Heart Cath;  Surgeon: Puma Briseno MD;  Location: [facility] CATH INVASIVE LOCATION;  Service: Cardiovascular    CARDIAC CATHETERIZATION N/A 12/3/2019    Procedure: Coronary angiography;  Surgeon: Puma Briseno MD;  Location: [facility] CATH INVASIVE LOCATION;  Service: Cardiovascular    CARDIAC CATHETERIZATION N/A 12/3/2019    Procedure: Left ventriculography;  Surgeon: Puma Briseno MD;  Location: [facility] CATH INVASIVE LOCATION;  Service: Cardiovascular    CATARACT EXTRACTION EXTRACAPSULAR W/ INTRAOCULAR LENS IMPLANTATION Bilateral     CERVICAL SPINE ANTERIOR      with instrumentation    CHEILECTOMY Left 8/28/2020    Procedure: CHEILECTOMY;  Surgeon: GAB Rees DPM;  Location: [facility] MAIN OR;  Service: Podiatry;  Laterality: Left;    COLON SURGERY      for obstruction    ALBERTO TUBE INSERTION Bilateral 5/23/2019    Procedure: ALBERTO TUBE;  Surgeon: Mauricio Pennington MD;  Location: [facility] OR [facility];  Service: Ophthalmology    ECTROPION REPAIR Bilateral 5/23/2019    Procedure: bilateral lower lid ectropion repair, bilateral punctoplasty;  Surgeon: Mauricio Pennington MD;  Location: [facility] OR [facility];  Service: Ophthalmology    ENDOSCOPY N/A 2/18/2022    Procedure: ESOPHAGOGASTRODUODENOSCOPY WITH DILATATION (BALLOON 15MM-18MM, UP TO 18MM) AND BOUGIE #[id number];  Surgeon: Wiley Parks MD;  Location: [facility] ENDOSCOPY;  Service: Gastroenterology;  Laterality: N/A;  Post: DYSPHAGIA    EYE SURGERY      FOOT FUSION Left 12/30/2016    Procedure: LEFT HALLUX METATARSALPHALANGEAL ARTHRODESIS SILVER BUNIONECTOMY METATARSAL HEAD RESECTION 2-5 CALCANEAL BONE GRAFT;  Surgeon: Monster Harper Jr., MD;  Location: [facility] MAIN OR;   Service:     HYSTERECTOMY      INGUINAL HERNIA REPAIR Bilateral     METATARSAL OSTEOTOMY Left 2020    Procedure: Metatarsal head resection 5;  Surgeon: GAB Rees DPM;  Location: [facility] MAIN OR;  Service: Podiatry;  Laterality: Left;    ROTATOR CUFF REPAIR Right     SIGMOIDOSCOPY N/A 10/6/2021    Procedure: SIGMOIDOSCOPY WITH BIOPSY X1 AREA;  Surgeon: Uche Vaz MD;  Location: [facility] ENDOSCOPY;  Service: Gastroenterology;  Laterality: N/A;  ischemic colitis    STOMACH SURGERY      for ulcer       Family History   Problem Relation Age of Onset    No Known Problems Mother     No Known Problems Father     Malig Hyperthermia Neg Hx        Social History     Tobacco Use    Smoking status: Former     Current packs/day: 0.00     Average packs/day: 0.3 packs/day for 10.0 years (2.5 ttl pk-yrs)     Types: Cigarettes     Start date: 1980     Quit date: 1990     Years since quittin.2    Smokeless tobacco: Never   Vaping Use    Vaping status: Never Used   Substance Use Topics    Alcohol use: No    Drug use: No        Home meds:  Prior to Admission medications    Medication Sig Start Date End Date Taking? Authorizing Provider   furosemide (LASIX) 20 MG tablet Take 1 tablet by mouth 2 (Two) Times a Day. 22  Yes Yaa Miranda APRN   isosorbide mononitrate (IMDUR) 30 MG 24 hr tablet Take 1 tablet by mouth once daily 24  Yes Gavin Quiroz MD   warfarin (COUMADIN) 2 MG tablet Take 1.5 tablets by mouth 3 (Three) Times a Week. Monday, Wednesday, Friday   Yes Melania Tomlin MD   warfarin (COUMADIN) 2 MG tablet Take 1 tablet by mouth 4 (Four) Times a Week. Tuesday, Thursday, Saturday,     Melania Tomlin MD       Allergies:     Amoxicillin, Codeine, Lortab [hydrocodone-acetaminophen], Nitrofurantoin, and Sulfa antibiotics    Scheduled Meds:furosemide, 20 mg, Oral, BID  sodium chloride, 10 mL, Intravenous, Q12H      Continuous Infusions:   PRN Meds:  senna-docusate  "sodium **AND** polyethylene glycol **AND** bisacodyl **AND** bisacodyl    Calcium Replacement - Follow Nurse / BPA Driven Protocol    Magnesium Standard Dose Replacement - Follow Nurse / BPA Driven Protocol    nitroglycerin    Phosphorus Replacement - Follow Nurse / BPA Driven Protocol    Potassium Replacement - Follow Nurse / BPA Driven Protocol    [COMPLETED] Insert Peripheral IV **AND** sodium chloride    sodium chloride    sodium chloride      OBJECTIVE    Vital Signs  Vitals:    04/05/24 0000 04/05/24 0400 04/05/24 0739 04/05/24 1202   BP: 125/69 112/68 137/56 137/68   BP Location: Left arm Left arm Left arm Left arm   Patient Position: Lying Lying Lying Lying   Pulse: 87 66 74 79   Resp: 18 19 17 16   Temp: 97.6 °F (36.4 °C) 97.3 °F (36.3 °C) 98.1 °F (36.7 °C) 97.7 °F (36.5 °C)   TempSrc: Oral Oral Oral Oral   SpO2: 93% 98% 98% 98%   Weight:       Height:           Flowsheet Rows      Flowsheet Row First Filed Value   Admission Height 157.5 cm (62\") Documented at 04/04/2024 1615   Admission Weight 49.9 kg (110 lb) Documented at 04/04/2024 1615              Intake/Output Summary (Last 24 hours) at 4/5/2024 1449  Last data filed at 4/5/2024 0800  Gross per 24 hour   Intake 200 ml   Output --   Net 200 ml        Telemetry: Sinus rhythm    Physical Exam:  The patient is alert, oriented and in no distress.  Vital signs as noted above.  Head and neck revealed no carotid bruits or jugular venous distention.  No thyromegaly or lymphadenopathy is present  Lungs clear.  No wheezing.  Breath sounds are normal bilaterally.  Heart: Normal first and second heart sounds. No murmur.  No precordial rub is present.  No gallop is present.  Abdomen: Soft and nontender.  No organomegaly is present.  Extremities with good peripheral pulses without any pedal edema.  Skin: Warm and dry.  Musculoskeletal system is grossly normal.  CNS grossly normal.       Results Review:  I have personally reviewed the results from the time of this " admission to 4/5/2024 14:49 EDT and agree with these findings:  []  Laboratory  []  Microbiology  []  Radiology  []  EKG/Telemetry   []  Cardiology/Vascular   []  Pathology  []  Old records  []  Other:    Most notable findings include:     Lab Results (last 24 hours)       Procedure Component Value Units Date/Time    Basic Metabolic Panel [[id number]]  (Abnormal) Collected: 04/05/24 0351    Specimen: Blood Updated: 04/05/24 0433     Glucose 104 mg/dL      BUN 30 mg/dL      Creatinine 0.85 mg/dL      Sodium 142 mmol/L      Potassium 3.4 mmol/L      Chloride 106 mmol/L      CO2 26.0 mmol/L      Calcium 8.8 mg/dL      BUN/Creatinine Ratio 35.3     Anion Gap 10.0 mmol/L      eGFR 62.0 mL/min/1.73     Narrative:      GFR Normal >60  Chronic Kidney Disease <60  Kidney Failure <15    The GFR formula is only valid for adults with stable renal function between ages 18 and 70.    CBC (No Diff) [[id number]]  (Abnormal) Collected: 04/05/24 0351    Specimen: Blood Updated: 04/05/24 0412     WBC 6.87 10*3/mm3      RBC 4.22 10*6/mm3      Hemoglobin 11.9 g/dL      Hematocrit 38.4 %      MCV 91.0 fL      MCH 28.2 pg      MCHC 31.0 g/dL      RDW 15.6 %      RDW-SD 51.2 fl      MPV 8.9 fL      Platelets 250 10*3/mm3     Extra Tubes [[id number]] Collected: 04/04/24 1641    Specimen: Blood, Venous Line Updated: 04/04/24 1745    Narrative:      The following orders were created for panel order Extra Tubes.  Procedure                               Abnormality         Status                     ---------                               -----------         ------                     Gold Top - SST[[id number]]                                   Final result                 Please view results for these tests on the individual orders.    Gold Top - SST [[id number]] Collected: 04/04/24 1641    Specimen: Blood Updated: 04/04/24 1745     Extra Tube Hold for add-ons.     Comment: Auto resulted.       Basic Metabolic Panel [[id number]]  (Abnormal)  Collected: 04/04/24 1641    Specimen: Blood Updated: 04/04/24 1710     Glucose 136 mg/dL      BUN 35 mg/dL      Creatinine 1.08 mg/dL      Sodium 141 mmol/L      Potassium 3.7 mmol/L      Chloride 102 mmol/L      CO2 23.0 mmol/L      Calcium 9.5 mg/dL      BUN/Creatinine Ratio 32.4     Anion Gap 16.0 mmol/L      eGFR 46.5 mL/min/1.73     Narrative:      GFR Normal >60  Chronic Kidney Disease <60  Kidney Failure <15    The GFR formula is only valid for adults with stable renal function between ages 18 and 70.    Single High Sensitivity Troponin T [[id number]]  (Abnormal) Collected: 04/04/24 1641    Specimen: Blood Updated: 04/04/24 1710     HS Troponin T 33 ng/L     Narrative:      High Sensitive Troponin T Reference Range:  <14.0 ng/L- Negative Female for AMI  <22.0 ng/L- Negative Male for AMI  >=14 - Abnormal Female indicating possible myocardial injury.  >=22 - Abnormal Male indicating possible myocardial injury.   Clinicians would have to utilize clinical acumen, EKG, Troponin, and serial changes to determine if it is an Acute Myocardial Infarction or myocardial injury due to an underlying chronic condition.         aPTT [[id number]]  (Abnormal) Collected: 04/04/24 1641    Specimen: Blood Updated: 04/04/24 1703     PTT 53.5 seconds     Protime-INR [[id number]]  (Abnormal) Collected: 04/04/24 1641    Specimen: Blood Updated: 04/04/24 1703     Protime 44.2 Seconds      INR 4.54    CBC & Differential [[id number]]  (Abnormal) Collected: 04/04/24 1641    Specimen: Blood Updated: 04/04/24 1647    Narrative:      The following orders were created for panel order CBC & Differential.  Procedure                               Abnormality         Status                     ---------                               -----------         ------                     CBC Auto Differential[[id number]]        Abnormal            Final result                 Please view results for these tests on the individual orders.    CBC Auto  Differential [[id number]]  (Abnormal) Collected: 04/04/24 1641    Specimen: Blood Updated: 04/04/24 1647     WBC 8.81 10*3/mm3      RBC 4.76 10*6/mm3      Hemoglobin 13.5 g/dL      Hematocrit 42.8 %      MCV 89.9 fL      MCH 28.4 pg      MCHC 31.5 g/dL      RDW 15.6 %      RDW-SD 51.1 fl      MPV 9.2 fL      Platelets 289 10*3/mm3      Neutrophil % 75.0 %      Lymphocyte % 13.3 %      Monocyte % 9.1 %      Eosinophil % 0.9 %      Basophil % 0.7 %      Immature Grans % 1.0 %      Neutrophils, Absolute 6.61 10*3/mm3      Lymphocytes, Absolute 1.17 10*3/mm3      Monocytes, Absolute 0.80 10*3/mm3      Eosinophils, Absolute 0.08 10*3/mm3      Basophils, Absolute 0.06 10*3/mm3      Immature Grans, Absolute 0.09 10*3/mm3      nRBC 0.0 /100 WBC             Imaging Results (Last 24 Hours)       ** No results found for the last 24 hours. **            LAB RESULTS (LAST 7 DAYS)    CBC  Results from last 7 days   Lab Units 04/05/24  0351 04/04/24  1641   WBC 10*3/mm3 6.87 8.81   RBC 10*6/mm3 4.22 4.76   HEMOGLOBIN g/dL 11.9* 13.5   HEMATOCRIT % 38.4 42.8   MCV fL 91.0 89.9   PLATELETS 10*3/mm3 250 289       BMP  Results from last 7 days   Lab Units 04/05/24  0351 04/04/24  1641   SODIUM mmol/L 142 141   POTASSIUM mmol/L 3.4* 3.7   CHLORIDE mmol/L 106 102   CO2 mmol/L 26.0 23.0   BUN mg/dL 30* 35*   CREATININE mg/dL 0.85 1.08*   GLUCOSE mg/dL 104* 136*       CMP   Results from last 7 days   Lab Units 04/05/24  0351 04/04/24  1641   SODIUM mmol/L 142 141   POTASSIUM mmol/L 3.4* 3.7   CHLORIDE mmol/L 106 102   CO2 mmol/L 26.0 23.0   BUN mg/dL 30* 35*   CREATININE mg/dL 0.85 1.08*   GLUCOSE mg/dL 104* 136*       BNP        TROPONIN  Results from last 7 days   Lab Units 04/04/24  1641   HSTROP T ng/L 33*       CoAg  Results from last 7 days   Lab Units 04/04/24  1641   INR  4.54*   APTT seconds 53.5*       Creatinine Clearance  Estimated Creatinine Clearance: 29.1 mL/min (by C-G formula based on SCr of 0.85 mg/dL).    ABG           Radiology  No radiology results for the last day      EKG  I personally viewed and interpreted the patient's EKG/Telemetry data:  ECG 12 Lead Tachycardia   Final Result   HEART RATE= 123  bpm   RR Interval= 482  ms   OK Interval=   ms   P Horizontal Axis=   deg   P Front Axis=   deg   QRSD Interval= 84  ms   QT Interval= 322  ms   QTcB= 464  ms   QRS Axis= -25  deg   T Wave Axis= 26  deg   - ABNORMAL ECG -   Atrial flutter with varied AV block,   Consider anterior infarct   When compared with ECG of 25-Mar-2024 13:54:22,   Significant change in rhythm: previously sinus   Significant axis, voltage or hypertrophy change   Electronically Signed By: Jason Martins (Joseluis) 05-Apr-2024 05:53:05   Date and Time of Study: 2024-04-04 16:26:51            Echocardiogram:    Results for orders placed during the hospital encounter of 10/02/21    Adult Transthoracic Echo Complete W/ Cont if Necessary Per Protocol    Interpretation Summary  · Estimated left ventricular EF was in agreement with the calculated left ventricular EF. Left ventricular ejection fraction appears to be 56 - 60%. Left ventricular systolic function is normal.  · Left ventricular diastolic function is consistent with (grade II w/high LAP) pseudonormalization.  · Left atrial volume is mildly increased.  · Estimated right ventricular systolic pressure from tricuspid regurgitation is normal (<35 mmHg).        Stress Test:        Cardiac Catheterization:  Results for orders placed during the hospital encounter of 12/01/19    Cardiac Catheterization/Vascular Study    Narrative  CARDIAC CATHETERIZATION REPORT      DATE OF PROCEDURE:  12/3/2019    INDICATION FOR PROCEDURE:    Non-ST elevation myocardial infarction  CAD  Angina pectoris    PROCEDURE PERFORMED:    Left heart catheterization  coronary angiography  left ventriculography    PROCEDURE COMMENTS:    After informed consent was obtained, the patient was prepped and draped in the usual sterile manner.  Mild  to moderate sedation was administered.  Right femoral artery was accessed without difficulty and 6 Indian arterial sheath was inserted.  Sheath was flushed with heparinized saline.    Using 6 Indian Rebeca catheters, first left coronary artery and the right coronary was electively engaged and appropriate views were taken.  A 6 Indian JR4 catheter was used to cross aortic valve and left heart catheterization was performed.  Left ventriculography was done in a review.    Patient tolerated the procedure well.    FINDINGS:    1. HEMODYNAMICS:    Aortic pressure: 145/72    LVEDP: 10 mmHg    Gradient across aortic valve on pullback: No gradient across aortic valve      2. LEFT VENTRICULOGRAPHY: 50 to 55% mild global hypokinesis      3. CORONARY ANGIOGRAPHY:    A: Left main coronary artery: Calcified and mild disease up to 10 to 20% in the ostium.  TIESHA-3 flow was present    B: Left anterior descending artery: Diffuse disease up to 30% in proximal mid LAD at the origin of D1 branch of LAD.  D1 branch of LAD has 60 to 70% ostial stenosis which is calcified.  This has not changed from cardiac catheterization done in 2017.  TIESHA-3 flow was present in both vessels    C: Left circumflex coronary artery: Diffuse 25 to 30% mid LCx stenosis    D: Right coronary artery: 20% proximal RCA stenosis.  It is large and dominant vessel.    SUMMARY:    Single-vessel coronary artery disease  Intermediate lesion of ostial D1 branch of LAD.  This was not different from cardiac catheterization done in 2017  Preserved left ventricular function with mild global hypokinesis    RECOMMENDATIONS:    Recommend medical treatment        Other:      ASSESSMENT & PLAN:    Principal Problem:    Atrial flutter with rapid ventricular response  Active Problems:    Flutter-fibrillation        EFREN Membreno  04/05/24  14:49 EDT    Addendum  Concerns for tachybradycardia syndrome with underlying atrial flutter/fibrillation  Evaluated by cardiac  electrophysiology team, plan for outpatient pacemaker implantation  Will discharge off AV david blocking agents  INR is supratherapeutic today, can stop Coumadin today, plan for pacemaker implantation with Dr. Garcia on Tuesday.  Outpatient follow-up with primary cardiologist Dr. Quiroz on discharge    Patient seen and examined and findings are verified.  All data is reviewed by me personally.  Assessment and plan formulated by advanced practitioner was done after discussion with attending.  I spent more than 50% of time in taking care of the patient.    Dee Angela MD

## 2024-04-05 NOTE — NURSING NOTE
Pt discharged home today with instructions to return Tuesday April 9th for pacemaker placement. PIV removed. Discharge education completed with patient and family at bedside, all questions answered. No new meds, order to stop warfarin for now. All belongings sent with patient.

## 2024-04-05 NOTE — H&P
Patient Care Team:  Monster Myrick MD as PCP - General (Family Medicine)  Maikel Morales MD as Consulting Physician (Cardiology)    Chief complaint tachycardia    Subjective     Patient is a 89 y.o. female who presented to the ER with tachycardia. Patient is currently wearing a holter monitor and she was caller earlier today and told to go to the ER due to being in aflutter with rapid rate. She has had some palpitations but denies any chest pain, shortness of breath, dizziness, lightheadedness, syncope,. She reports that she had recent hospital admission for general weakness and bradycardia and was taken off her amiodarone and beta blocker.   In the ER today EKG showed aflutter with RVR. She was started on cardizem gtt with improvement of heart rate. BMP and cbc unremarkable, troponin 33.       Onset of symptoms was unknown.      Chart Review:  3/27/24 Discharge Summary  Hospital Course     Carrie Golden is a 89-year-old female who presented to [facility] on 3/25/2024 and discharged on 3/27/2024.  Patient has a history of atrial fibrillation on chronic coronary artery disease who presented with generalized weakness.  Patient does follow with cardiology, Dr. Quiroz and recently had a cardioversion and medication adjustment for bradycardia.  When she presented to the ED her heart rate was in the 40s.  Patient's beta-blocker and amiodarone was discontinued.  Her heart rate did improve.  Patient was seen by EP and no interventions were deemed appropriate other than medication adjustments at this time.  Patient is hemodynamically stable and agrees with above plan.  She will follow-up with her PCP and cardiology outpatient.  Patient will also discharge with M cot that is currently in place.      Review of Systems   Constitutional: Negative.    HENT: Negative.     Eyes: Negative.    Respiratory: Negative.     Cardiovascular:  Positive for palpitations.   Gastrointestinal: Negative.    Endocrine:  Negative.    Genitourinary: Negative.    Musculoskeletal: Negative.    Skin: Negative.    Neurological: Negative.    Psychiatric/Behavioral: Negative.            History  Past Medical History:   Diagnosis Date    Amblyopia 10/31/2012    Amblyopia, left 03/12/2020    Arthritis     Conjunctival hemorrhage 10/08/2014    Coronary artery disease     Deep vein thrombosis     Deformity of left foot 08/18/2020    Added automatically from request for surgery [id number]    Dermatochalasis of left upper eyelid 03/12/2020    Dermatochalasis of right upper eyelid 03/12/2020    Drooping eyelid, bilateral     Ectropion 03/12/2020    Epiretinal membrane (ERM) of right eye 03/12/2020    Frequent UTI     GERD (gastroesophageal reflux disease)     Hallux rigidus, left foot 08/18/2020    Added automatically from request for surgery [id number]    Hearing loss     bilateral hearing aides    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 sure which 1  contracted at age 8    History of transfusion     [place] (hard of hearing)     Hyperlipidemia     Hypertension     Myocardial infarction     Past heart attack     X2 MILD LAST ONE OCT 2019    PONV (postoperative nausea and vomiting)     Presence of intraocular lens 03/12/2020     Past Surgical History:   Procedure Laterality Date    ANTERIOR AND POSTERIOR VAGINAL REPAIR      BACK SURGERY      BLEPHAROPLASTY Bilateral 5/23/2019    Procedure: bilateral upper lid blepharoplasty;  Surgeon: Mauricio Pennington MD;  Location: [facility] OR [facility];  Service: Ophthalmology    BROW LIFT Bilateral 2/13/2020    Procedure: BILATERAL TEMPORAL DIRECT BROWLIFT;  Surgeon: Sreekanth Bird MD;  Location: [facility] OR [facility];  Service: Ophthalmology;  Laterality: Bilateral;    CARDIAC CATHETERIZATION N/A 12/3/2019    Procedure: Left Heart Cath;  Surgeon: Puma Briseno MD;  Location: [facility] INVASIVE LOCATION;  Service: Cardiovascular    CARDIAC CATHETERIZATION N/A 12/3/2019    Procedure: Coronary angiography;  Surgeon: Puma Briseno MD;   Location: [facility] CATH INVASIVE LOCATION;  Service: Cardiovascular    CARDIAC CATHETERIZATION N/A 12/3/2019    Procedure: Left ventriculography;  Surgeon: Puma Briseno MD;  Location: [facility] CATH INVASIVE LOCATION;  Service: Cardiovascular    CATARACT EXTRACTION EXTRACAPSULAR W/ INTRAOCULAR LENS IMPLANTATION Bilateral     CERVICAL SPINE ANTERIOR      with instrumentation    CHEILECTOMY Left 8/28/2020    Procedure: CHEILECTOMY;  Surgeon: GAB Rees DPM;  Location: [facility] MAIN OR;  Service: Podiatry;  Laterality: Left;    COLON SURGERY      for obstruction    ALBERTO TUBE INSERTION Bilateral 5/23/2019    Procedure: ALBERTO TUBE;  Surgeon: Mauricio Pennington MD;  Location: [facility];  Service: Ophthalmology    ECTROPION REPAIR Bilateral 5/23/2019    Procedure: bilateral lower lid ectropion repair, bilateral punctoplasty;  Surgeon: Mauricio Pennington MD;  Location: [facility];  Service: Ophthalmology    ENDOSCOPY N/A 2/18/2022    Procedure: ESOPHAGOGASTRODUODENOSCOPY WITH DILATATION (BALLOON 15MM-18MM, UP TO 18MM) AND BOUGIE #[id number];  Surgeon: Wiley Parks MD;  Location: [facility] ENDOSCOPY;  Service: Gastroenterology;  Laterality: N/A;  Post: DYSPHAGIA    EYE SURGERY      FOOT FUSION Left 12/30/2016    Procedure: LEFT HALLUX METATARSALPHALANGEAL ARTHRODESIS SILVER BUNIONECTOMY METATARSAL HEAD RESECTION 2-5 CALCANEAL BONE GRAFT;  Surgeon: Monster Harper Jr., MD;  Location: [facility] MAIN OR;  Service:     HYSTERECTOMY      INGUINAL HERNIA REPAIR Bilateral     METATARSAL OSTEOTOMY Left 8/28/2020    Procedure: Metatarsal head resection 5;  Surgeon: GAB Rees DPM;  Location: [facility] MAIN OR;  Service: Podiatry;  Laterality: Left;    ROTATOR CUFF REPAIR Right     SIGMOIDOSCOPY N/A 10/6/2021    Procedure: SIGMOIDOSCOPY WITH BIOPSY X1 AREA;  Surgeon: Uche Vaz MD;  Location: [facility] ENDOSCOPY;  Service: Gastroenterology;  Laterality: N/A;  ischemic colitis    STOMACH SURGERY      for ulcer      Family History   Problem Relation Age of Onset    No Known Problems Mother     No Known Problems Father     Malig Hyperthermia Neg Hx      Social History     Tobacco Use    Smoking status: Former     Current packs/day: 0.00     Average packs/day: 0.3 packs/day for 10.0 years (2.5 ttl pk-yrs)     Types: Cigarettes     Start date: 1980     Quit date: 1990     Years since quittin.2    Smokeless tobacco: Never   Vaping Use    Vaping status: Never Used   Substance Use Topics    Alcohol use: No    Drug use: No     Medications Prior to Admission   Medication Sig Dispense Refill Last Dose    furosemide (LASIX) 20 MG tablet Take 1 tablet by mouth 2 (Two) Times a Day. 180 tablet 1 2024    isosorbide mononitrate (IMDUR) 30 MG 24 hr tablet Take 1 tablet by mouth once daily 90 tablet 0 2024    warfarin (COUMADIN) 2 MG tablet Take 1.5 tablets by mouth 3 (Three) Times a Week. Monday, Wednesday, Friday   4/3/2024    warfarin (COUMADIN) 2 MG tablet Take 1 tablet by mouth 4 (Four) Times a Week. Tuesday, Thursday, Saturday, 2024     Allergies:  Amoxicillin, Codeine, Lortab [hydrocodone-acetaminophen], Nitrofurantoin, and Sulfa antibiotics    Objective     Vital Signs  Temp:  [97 °F (36.1 °C)-97.9 °F (36.6 °C)] 97.6 °F (36.4 °C)  Heart Rate:  [] 87  Resp:  [14-20] 18  BP: ()/(54-89) 125/69     Physical Exam:      General Appearance:    Alert, cooperative, in no acute distress   Head:    Normocephalic, without obvious abnormality, atraumatic   Eyes:            Lids and lashes normal, conjunctivae and sclerae normal, no   icterus, no pallor, corneas clear, PERRLA   Ears:    Ears appear intact with no abnormalities noted   Throat:   No oral lesions, no thrush, oral mucosa moist   Neck:   No adenopathy, supple, trachea midline, no thyromegaly, no   carotid bruit, no JVD   Lungs:     Clear to auscultation,respirations regular, even and                  unlabored    Heart:    Regular rhythm  and normal rate, normal S1 and S2, no            murmur, no gallop, no rub, no click   Chest Wall:    No abnormalities observed   Abdomen:     Normal bowel sounds, no masses, no organomegaly, soft        non-tender, non-distended, no guarding, no rebound                tenderness   Extremities:   Moves all extremities well, no edema, no cyanosis, no             redness   Pulses:   Pulses palpable and equal bilaterally   Skin:   No bleeding, bruising or rash   Lymph nodes:   No palpable adenopathy   Neurologic:   No focal deficits noted       Results Review:     Imaging Results (Last 24 Hours)       ** No results found for the last 24 hours. **             Lab Results (last 24 hours)       Procedure Component Value Units Date/Time    Extra Tubes [[id number]] Collected: 04/04/24 1641    Specimen: Blood, Venous Line Updated: 04/04/24 1745    Narrative:      The following orders were created for panel order Extra Tubes.  Procedure                               Abnormality         Status                     ---------                               -----------         ------                     Gold Top - SST[[id number]]                                   Final result                 Please view results for these tests on the individual orders.    Gold Top - SST [[id number]] Collected: 04/04/24 1641    Specimen: Blood Updated: 04/04/24 1745     Extra Tube Hold for add-ons.     Comment: Auto resulted.       Basic Metabolic Panel [[id number]]  (Abnormal) Collected: 04/04/24 1641    Specimen: Blood Updated: 04/04/24 1710     Glucose 136 mg/dL      BUN 35 mg/dL      Creatinine 1.08 mg/dL      Sodium 141 mmol/L      Potassium 3.7 mmol/L      Chloride 102 mmol/L      CO2 23.0 mmol/L      Calcium 9.5 mg/dL      BUN/Creatinine Ratio 32.4     Anion Gap 16.0 mmol/L      eGFR 46.5 mL/min/1.73     Narrative:      GFR Normal >60  Chronic Kidney Disease <60  Kidney Failure <15    The GFR formula is only valid for adults with stable renal  function between ages 18 and 70.    Single High Sensitivity Troponin T [[id number]]  (Abnormal) Collected: 04/04/24 1641    Specimen: Blood Updated: 04/04/24 1710     HS Troponin T 33 ng/L     Narrative:      High Sensitive Troponin T Reference Range:  <14.0 ng/L- Negative Female for AMI  <22.0 ng/L- Negative Male for AMI  >=14 - Abnormal Female indicating possible myocardial injury.  >=22 - Abnormal Male indicating possible myocardial injury.   Clinicians would have to utilize clinical acumen, EKG, Troponin, and serial changes to determine if it is an Acute Myocardial Infarction or myocardial injury due to an underlying chronic condition.         aPTT [[id number]]  (Abnormal) Collected: 04/04/24 1641    Specimen: Blood Updated: 04/04/24 1703     PTT 53.5 seconds     Protime-INR [[id number]]  (Abnormal) Collected: 04/04/24 1641    Specimen: Blood Updated: 04/04/24 1703     Protime 44.2 Seconds      INR 4.54    CBC & Differential [[id number]]  (Abnormal) Collected: 04/04/24 1641    Specimen: Blood Updated: 04/04/24 1647    Narrative:      The following orders were created for panel order CBC & Differential.  Procedure                               Abnormality         Status                     ---------                               -----------         ------                     CBC Auto Differential[[id number]]        Abnormal            Final result                 Please view results for these tests on the individual orders.    CBC Auto Differential [[id number]]  (Abnormal) Collected: 04/04/24 1641    Specimen: Blood Updated: 04/04/24 1647     WBC 8.81 10*3/mm3      RBC 4.76 10*6/mm3      Hemoglobin 13.5 g/dL      Hematocrit 42.8 %      MCV 89.9 fL      MCH 28.4 pg      MCHC 31.5 g/dL      RDW 15.6 %      RDW-SD 51.1 fl      MPV 9.2 fL      Platelets 289 10*3/mm3      Neutrophil % 75.0 %      Lymphocyte % 13.3 %      Monocyte % 9.1 %      Eosinophil % 0.9 %      Basophil % 0.7 %      Immature Grans % 1.0 %       Neutrophils, Absolute 6.61 10*3/mm3      Lymphocytes, Absolute 1.17 10*3/mm3      Monocytes, Absolute 0.80 10*3/mm3      Eosinophils, Absolute 0.08 10*3/mm3      Basophils, Absolute 0.06 10*3/mm3      Immature Grans, Absolute 0.09 10*3/mm3      nRBC 0.0 /100 WBC              I reviewed the patient's new clinical results.    Assessment & Plan       Atrial flutter with rapid ventricular response    Flutter-fibrillation  -initial EKG aflutter with RVR  -cardizem gtt with improvement  -cardiology consulted      DVT prophylaxis- SCD's  GI prophylaxis- ppi    I discussed the patient's findings and my recommendations with patient.     Jenae Salcedo, APRN  04/05/24  00:28 EDT

## 2024-04-05 NOTE — DISCHARGE SUMMARY
Date of Discharge:  4/5/2024    Discharge Diagnosis:   **Atrial flutter with rapid ventricular response [I48.92]   Flutter-fibrillation [I48.91, I48.92]   Gastroesophageal reflux disease [K21.9]   Hypertension [I10]       Presenting Problem/History of Present Illness  Active Hospital Problems    Diagnosis  POA    **Atrial flutter with rapid ventricular response [I48.92]  Yes    Flutter-fibrillation [I48.91, I48.92]  Yes    Gastroesophageal reflux disease [K21.9]  Yes    Hypertension [I10]  Yes      Resolved Hospital Problems   No resolved problems to display.          Hospital Course  Patient is a 89 y.o. female with history of atrial fibs and recent hospitalization for bradycardia with dizziness who presented because of rapid ventricular rate noted on MCOT.  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  She denied any chest pain, palpitations, shortness of breath or other problems.  Her mobile telemetry showed atrial fib/flutter with rates in the 130s and 140s.  She was treated briefly with diltiazem drip in the emergency room but this was able to be stopped after brief time.  She then remained in sinus rhythm.  Because of her recent problems with bradycardia while on rate controlling agents and now rapid atrial fib without rate controlling agents, pacemaker has been recommended by Dr. Garcia.  Patient is in agreement.  She will be scheduled as an outpatient next week.  In the meantime she will stay off of metoprolol and amiodarone.  At the time of discharge she is in sinus rhythm.  Even when she was in rapid A-fib she was essentially asymptomatic.  Her INR is elevated so warfarin has been discontinued until after her pacemaker.    Procedures Performed         Consults:   Consults       Date and Time Order Name Status Description    4/5/2024  7:57 AM Inpatient Cardiac Electrophysiology Consult      4/5/2024 12:39 AM Inpatient Cardiology Consult      4/4/2024  6:02  PM Family Medicine Consult      3/25/2024  5:56 PM Inpatient Cardiology Consult Completed             Pertinent Test Results:    Lab Results (most recent)       Procedure Component Value Units Date/Time    Basic Metabolic Panel [[id number]]  (Abnormal) Collected: 04/05/24 0351    Specimen: Blood Updated: 04/05/24 0433     Glucose 104 mg/dL      BUN 30 mg/dL      Creatinine 0.85 mg/dL      Sodium 142 mmol/L      Potassium 3.4 mmol/L      Chloride 106 mmol/L      CO2 26.0 mmol/L      Calcium 8.8 mg/dL      BUN/Creatinine Ratio 35.3     Anion Gap 10.0 mmol/L      eGFR 62.0 mL/min/1.73     Narrative:      GFR Normal >60  Chronic Kidney Disease <60  Kidney Failure <15    The GFR formula is only valid for adults with stable renal function between ages 18 and 70.    CBC (No Diff) [[id number]]  (Abnormal) Collected: 04/05/24 0351    Specimen: Blood Updated: 04/05/24 0412     WBC 6.87 10*3/mm3      RBC 4.22 10*6/mm3      Hemoglobin 11.9 g/dL      Hematocrit 38.4 %      MCV 91.0 fL      MCH 28.2 pg      MCHC 31.0 g/dL      RDW 15.6 %      RDW-SD 51.2 fl      MPV 8.9 fL      Platelets 250 10*3/mm3     Extra Tubes [[id number]] Collected: 04/04/24 1641    Specimen: Blood, Venous Line Updated: 04/04/24 1745    Narrative:      The following orders were created for panel order Extra Tubes.  Procedure                               Abnormality         Status                     ---------                               -----------         ------                     Gold Top - SST[[id number]]                                   Final result                 Please view results for these tests on the individual orders.    Gold Top - SST [[id number]] Collected: 04/04/24 1641    Specimen: Blood Updated: 04/04/24 1745     Extra Tube Hold for add-ons.     Comment: Auto resulted.       Basic Metabolic Panel [[id number]]  (Abnormal) Collected: 04/04/24 1641    Specimen: Blood Updated: 04/04/24 1710     Glucose 136 mg/dL      BUN 35 mg/dL       Creatinine 1.08 mg/dL      Sodium 141 mmol/L      Potassium 3.7 mmol/L      Chloride 102 mmol/L      CO2 23.0 mmol/L      Calcium 9.5 mg/dL      BUN/Creatinine Ratio 32.4     Anion Gap 16.0 mmol/L      eGFR 46.5 mL/min/1.73     Narrative:      GFR Normal >60  Chronic Kidney Disease <60  Kidney Failure <15    The GFR formula is only valid for adults with stable renal function between ages 18 and 70.    Single High Sensitivity Troponin T [[id number]]  (Abnormal) Collected: 04/04/24 1641    Specimen: Blood Updated: 04/04/24 1710     HS Troponin T 33 ng/L     Narrative:      High Sensitive Troponin T Reference Range:  <14.0 ng/L- Negative Female for AMI  <22.0 ng/L- Negative Male for AMI  >=14 - Abnormal Female indicating possible myocardial injury.  >=22 - Abnormal Male indicating possible myocardial injury.   Clinicians would have to utilize clinical acumen, EKG, Troponin, and serial changes to determine if it is an Acute Myocardial Infarction or myocardial injury due to an underlying chronic condition.         aPTT [[id number]]  (Abnormal) Collected: 04/04/24 1641    Specimen: Blood Updated: 04/04/24 1703     PTT 53.5 seconds     Protime-INR [[id number]]  (Abnormal) Collected: 04/04/24 1641    Specimen: Blood Updated: 04/04/24 1703     Protime 44.2 Seconds      INR 4.54    CBC & Differential [[id number]]  (Abnormal) Collected: 04/04/24 1641    Specimen: Blood Updated: 04/04/24 1647    Narrative:      The following orders were created for panel order CBC & Differential.  Procedure                               Abnormality         Status                     ---------                               -----------         ------                     CBC Auto Differential[[id number]]        Abnormal            Final result                 Please view results for these tests on the individual orders.    CBC Auto Differential [[id number]]  (Abnormal) Collected: 04/04/24 1641    Specimen: Blood Updated: 04/04/24 1647     WBC 8.81  10*3/mm3      RBC 4.76 10*6/mm3      Hemoglobin 13.5 g/dL      Hematocrit 42.8 %      MCV 89.9 fL      MCH 28.4 pg      MCHC 31.5 g/dL      RDW 15.6 %      RDW-SD 51.1 fl      MPV 9.2 fL      Platelets 289 10*3/mm3      Neutrophil % 75.0 %      Lymphocyte % 13.3 %      Monocyte % 9.1 %      Eosinophil % 0.9 %      Basophil % 0.7 %      Immature Grans % 1.0 %      Neutrophils, Absolute 6.61 10*3/mm3      Lymphocytes, Absolute 1.17 10*3/mm3      Monocytes, Absolute 0.80 10*3/mm3      Eosinophils, Absolute 0.08 10*3/mm3      Basophils, Absolute 0.06 10*3/mm3      Immature Grans, Absolute 0.09 10*3/mm3      nRBC 0.0 /100 WBC              Results for orders placed during the hospital encounter of 10/02/21    Adult Transthoracic Echo Complete W/ Cont if Necessary Per Protocol    Interpretation Summary  · Estimated left ventricular EF was in agreement with the calculated left ventricular EF. Left ventricular ejection fraction appears to be 56 - 60%. Left ventricular systolic function is normal.  · Left ventricular diastolic function is consistent with (grade II w/high LAP) pseudonormalization.  · Left atrial volume is mildly increased.  · Estimated right ventricular systolic pressure from tricuspid regurgitation is normal (<35 mmHg).              Condition on Discharge: Stable    Vital Signs  Temp:  [97 °F (36.1 °C)-98.1 °F (36.7 °C)] 97.7 °F (36.5 °C)  Heart Rate:  [] 79  Resp:  [14-20] 16  BP: ()/(54-89) 137/68    Physical Exam:     General Appearance:    Alert, cooperative, in no acute distress, much younger than stated age   Head:    Normocephalic, without obvious abnormality, atraumatic   Eyes:            Lids and lashes normal, conjunctivae and sclerae normal, no   icterus, no pallor, corneas clear, PERRLA   Ears:    Ears appear intact with no abnormalities noted   Throat:   No oral lesions, no thrush, oral mucosa moist   Neck:   No adenopathy, supple, trachea midline, no thyromegaly, no   carotid bruit,  no JVD   Lungs:     Clear to auscultation,respirations regular, even and                  unlabored    Heart:    Regular rhythm and normal rate, normal S1 and S2, no            murmur, no gallop, no rub, no click   Chest Wall:    No abnormalities observed   Abdomen:     Normal bowel sounds, no masses, no organomegaly, soft        non-tender, non-distended, no guarding, no rebound                tenderness   Extremities:   Moves all extremities well, no edema, no cyanosis, no             redness   Pulses:   Pulses palpable and equal bilaterally   Skin:   No bleeding, bruising or rash   Lymph nodes:   No palpable adenopathy   Neurologic:   Cranial nerves 2 - 12 grossly intact, sensation intact, DTR       present and equal bilaterally       Discharge Disposition  Home or Self Care    Discharge Medications     Discharge Medications        Continue These Medications        Instructions Start Date   furosemide 20 MG tablet  Commonly known as: LASIX   20 mg, Oral, 2 Times Daily      isosorbide mononitrate 30 MG 24 hr tablet  Commonly known as: IMDUR   Take 1 tablet by mouth once daily             Stop These Medications      warfarin 2 MG tablet  Commonly known as: COUMADIN              Discharge Diet: Regular    Activity at Discharge: No restrictions    Follow-up Appointments  Future Appointments   Date Time Provider Department Center   4/15/2024 11:15 AM Dee Angela MD [facility]   9/17/2024  1:45 PM Gavin Quiroz MD [facility]     Additional Instructions for the Follow-ups that You Need to Schedule       Discharge Follow-up with PCP   As directed       Currently Documented PCP:    Monster Myrick MD    PCP Phone Number:    [phone]     Follow Up Details: Keep next regularly scheduled appointment.        Discharge Follow-up with Specified Provider: Dr. Jose Hernandez April 9th for pacemaker placement.   As directed      To: Dr. Jose Hernandez April 9th for pacemaker  placement.                Test Results Pending at Discharge       Ashley Livingston MD  04/05/24  15:40 EDT    Time: Discharge 25 min

## 2024-04-06 ENCOUNTER — READMISSION MANAGEMENT (OUTPATIENT)
Dept: CALL CENTER | Facility: HOSPITAL | Age: 89
End: 2024-04-06
Payer: MEDICARE

## 2024-04-06 NOTE — OUTREACH NOTE
Prep Survey      Flowsheet Row Responses   [facility] facility patient discharged from? [facility]   Is LACE score < 7 ? No   Eligibility Readm Mgmt   Discharge diagnosis *Atrial flutter with rapid ventricular response   Does the patient have one of the following disease processes/diagnoses(primary or secondary)? Other   Does the patient have Home health ordered? No   Is there a DME ordered? No   Prep survey completed? Yes            DANIELLA DE GUZMAN - Registered Nurse

## 2024-04-08 ENCOUNTER — ANESTHESIA EVENT (OUTPATIENT)
Dept: CARDIOLOGY | Facility: HOSPITAL | Age: 89
End: 2024-04-08
Payer: MEDICARE

## 2024-04-08 PROBLEM — I49.5 SICK SINUS SYNDROME: Status: ACTIVE | Noted: 2024-04-05

## 2024-04-09 ENCOUNTER — ANESTHESIA (OUTPATIENT)
Dept: CARDIOLOGY | Facility: HOSPITAL | Age: 89
End: 2024-04-09
Payer: MEDICARE

## 2024-04-09 ENCOUNTER — READMISSION MANAGEMENT (OUTPATIENT)
Dept: CALL CENTER | Facility: HOSPITAL | Age: 89
End: 2024-04-09
Payer: MEDICARE

## 2024-04-09 ENCOUNTER — HOSPITAL ENCOUNTER (OUTPATIENT)
Facility: HOSPITAL | Age: 89
Discharge: HOME | End: 2024-04-10
Attending: INTERNAL MEDICINE | Admitting: INTERNAL MEDICINE
Payer: MEDICARE

## 2024-04-09 ENCOUNTER — APPOINTMENT (OUTPATIENT)
Dept: GENERAL RADIOLOGY | Facility: HOSPITAL | Age: 89
End: 2024-04-09
Payer: MEDICARE

## 2024-04-09 DIAGNOSIS — I49.5 SICK SINUS SYNDROME: ICD-10-CM

## 2024-04-09 PROCEDURE — 25510000001 IOPAMIDOL PER 1 ML: Performed by: INTERNAL MEDICINE

## 2024-04-09 PROCEDURE — C1894 INTRO/SHEATH, NON-LASER: HCPCS | Performed by: INTERNAL MEDICINE

## 2024-04-09 PROCEDURE — 25810000003 SODIUM CHLORIDE 0.9 % SOLUTION 250 ML FLEX CONT: Performed by: INTERNAL MEDICINE

## 2024-04-09 PROCEDURE — 25010000002 VANCOMYCIN 750 MG RECONSTITUTED SOLUTION 1 EACH VIAL: Performed by: INTERNAL MEDICINE

## 2024-04-09 PROCEDURE — 71045 X-RAY EXAM CHEST 1 VIEW: CPT

## 2024-04-09 PROCEDURE — 25010000002 FENTANYL CITRATE (PF) 100 MCG/2ML SOLUTION

## 2024-04-09 PROCEDURE — 33208 INSRT HEART PM ATRIAL & VENT: CPT | Performed by: INTERNAL MEDICINE

## 2024-04-09 PROCEDURE — C1898 LEAD, PMKR, OTHER THAN TRANS: HCPCS | Performed by: INTERNAL MEDICINE

## 2024-04-09 PROCEDURE — 25010000002 PROPOFOL 200 MG/20ML EMULSION

## 2024-04-09 PROCEDURE — 25810000003 SODIUM CHLORIDE 0.9 % SOLUTION

## 2024-04-09 PROCEDURE — C1887 CATHETER, GUIDING: HCPCS | Performed by: INTERNAL MEDICINE

## 2024-04-09 PROCEDURE — C1785 PMKR, DUAL, RATE-RESP: HCPCS | Performed by: INTERNAL MEDICINE

## 2024-04-09 DEVICE — IMPLANTABLE DEVICE: Type: IMPLANTABLE DEVICE | Site: HEART | Status: FUNCTIONAL

## 2024-04-09 DEVICE — GEN PM AZURE XT SURESCAN DR MRI: Type: IMPLANTABLE DEVICE | Site: HEART | Status: FUNCTIONAL

## 2024-04-09 DEVICE — LD PM CAPSUREFIX NOVUS IS1 MEDTR 407652: Type: IMPLANTABLE DEVICE | Site: HEART | Status: FUNCTIONAL

## 2024-04-09 RX ORDER — ALBUTEROL SULFATE 2.5 MG/3ML
2.5 SOLUTION RESPIRATORY (INHALATION) ONCE AS NEEDED
Status: CANCELLED | OUTPATIENT
Start: 2024-04-09

## 2024-04-09 RX ORDER — PROCHLORPERAZINE EDISYLATE 5 MG/ML
10 INJECTION INTRAMUSCULAR; INTRAVENOUS ONCE AS NEEDED
Status: CANCELLED | OUTPATIENT
Start: 2024-04-09

## 2024-04-09 RX ORDER — LIDOCAINE HYDROCHLORIDE AND EPINEPHRINE BITARTRATE 20; .01 MG/ML; MG/ML
INJECTION, SOLUTION SUBCUTANEOUS
Status: DISCONTINUED | OUTPATIENT
Start: 2024-04-09 | End: 2024-04-09 | Stop reason: HOSPADM

## 2024-04-09 RX ORDER — HYDROCODONE BITARTRATE AND ACETAMINOPHEN 5; 325 MG/1; MG/1
1 TABLET ORAL ONCE AS NEEDED
Status: CANCELLED | OUTPATIENT
Start: 2024-04-09

## 2024-04-09 RX ORDER — PHENYLEPHRINE HCL IN 0.9% NACL 1 MG/10 ML
SYRINGE (ML) INTRAVENOUS AS NEEDED
Status: DISCONTINUED | OUTPATIENT
Start: 2024-04-09 | End: 2024-04-09 | Stop reason: SURG

## 2024-04-09 RX ORDER — ACETAMINOPHEN 325 MG/1
650 TABLET ORAL ONCE AS NEEDED
Status: CANCELLED | OUTPATIENT
Start: 2024-04-09

## 2024-04-09 RX ORDER — DIPHENHYDRAMINE HYDROCHLORIDE 50 MG/ML
12.5 INJECTION INTRAMUSCULAR; INTRAVENOUS
Status: CANCELLED | OUTPATIENT
Start: 2024-04-09

## 2024-04-09 RX ORDER — PROPOFOL 10 MG/ML
INJECTION, EMULSION INTRAVENOUS CONTINUOUS PRN
Status: DISCONTINUED | OUTPATIENT
Start: 2024-04-09 | End: 2024-04-09 | Stop reason: SURG

## 2024-04-09 RX ORDER — NALOXONE HCL 0.4 MG/ML
0.4 VIAL (ML) INJECTION AS NEEDED
Status: CANCELLED | OUTPATIENT
Start: 2024-04-09

## 2024-04-09 RX ORDER — ONDANSETRON 2 MG/ML
4 INJECTION INTRAMUSCULAR; INTRAVENOUS ONCE AS NEEDED
Status: CANCELLED | OUTPATIENT
Start: 2024-04-09

## 2024-04-09 RX ORDER — HYDROCODONE BITARTRATE AND ACETAMINOPHEN 5; 325 MG/1; MG/1
1 TABLET ORAL EVERY 4 HOURS PRN
Status: DISCONTINUED | OUTPATIENT
Start: 2024-04-09 | End: 2024-04-10 | Stop reason: HOSPADM

## 2024-04-09 RX ORDER — ISOSORBIDE MONONITRATE 30 MG/1
30 TABLET, EXTENDED RELEASE ORAL DAILY
Status: DISCONTINUED | OUTPATIENT
Start: 2024-04-10 | End: 2024-04-10 | Stop reason: HOSPADM

## 2024-04-09 RX ORDER — FENTANYL CITRATE 50 UG/ML
25 INJECTION, SOLUTION INTRAMUSCULAR; INTRAVENOUS
Status: CANCELLED | OUTPATIENT
Start: 2024-04-09 | End: 2024-04-11

## 2024-04-09 RX ORDER — HYDROCODONE BITARTRATE AND ACETAMINOPHEN 7.5; 325 MG/1; MG/1
1 TABLET ORAL EVERY 4 HOURS PRN
Status: CANCELLED | OUTPATIENT
Start: 2024-04-09 | End: 2024-04-16

## 2024-04-09 RX ORDER — SODIUM CHLORIDE 9 MG/ML
INJECTION, SOLUTION INTRAVENOUS CONTINUOUS PRN
Status: DISCONTINUED | OUTPATIENT
Start: 2024-04-09 | End: 2024-04-09 | Stop reason: SURG

## 2024-04-09 RX ORDER — FENTANYL CITRATE 50 UG/ML
50 INJECTION, SOLUTION INTRAMUSCULAR; INTRAVENOUS
Status: CANCELLED | OUTPATIENT
Start: 2024-04-09 | End: 2024-04-11

## 2024-04-09 RX ORDER — ACETAMINOPHEN 650 MG/1
325 SUPPOSITORY RECTAL EVERY 4 HOURS PRN
Status: CANCELLED | OUTPATIENT
Start: 2024-04-09

## 2024-04-09 RX ORDER — HYDRALAZINE HYDROCHLORIDE 20 MG/ML
5 INJECTION INTRAMUSCULAR; INTRAVENOUS
Status: CANCELLED | OUTPATIENT
Start: 2024-04-09

## 2024-04-09 RX ORDER — FENTANYL CITRATE 50 UG/ML
INJECTION, SOLUTION INTRAMUSCULAR; INTRAVENOUS AS NEEDED
Status: DISCONTINUED | OUTPATIENT
Start: 2024-04-09 | End: 2024-04-09 | Stop reason: SURG

## 2024-04-09 RX ORDER — EPHEDRINE SULFATE 5 MG/ML
5 INJECTION INTRAVENOUS ONCE AS NEEDED
Status: CANCELLED | OUTPATIENT
Start: 2024-04-09

## 2024-04-09 RX ADMIN — Medication 100 MCG: at 17:00

## 2024-04-09 RX ADMIN — VANCOMYCIN HYDROCHLORIDE 750 MG: 750 INJECTION, POWDER, LYOPHILIZED, FOR SOLUTION INTRAVENOUS at 16:00

## 2024-04-09 RX ADMIN — FENTANYL CITRATE 25 MCG: 50 INJECTION, SOLUTION INTRAMUSCULAR; INTRAVENOUS at 17:02

## 2024-04-09 RX ADMIN — HYDROCODONE BITARTRATE AND ACETAMINOPHEN 1 TABLET: 5; 325 TABLET ORAL at 20:56

## 2024-04-09 RX ADMIN — FENTANYL CITRATE 25 MCG: 50 INJECTION, SOLUTION INTRAMUSCULAR; INTRAVENOUS at 16:36

## 2024-04-09 RX ADMIN — FENTANYL CITRATE 25 MCG: 50 INJECTION, SOLUTION INTRAMUSCULAR; INTRAVENOUS at 17:31

## 2024-04-09 RX ADMIN — Medication 100 MCG: at 17:29

## 2024-04-09 RX ADMIN — VANCOMYCIN HYDROCHLORIDE 750 MG: 750 INJECTION, POWDER, LYOPHILIZED, FOR SOLUTION INTRAVENOUS at 19:53

## 2024-04-09 RX ADMIN — PROPOFOL 100 MCG/KG/MIN: 10 INJECTION, EMULSION INTRAVENOUS at 16:15

## 2024-04-09 RX ADMIN — FENTANYL CITRATE 25 MCG: 50 INJECTION, SOLUTION INTRAMUSCULAR; INTRAVENOUS at 17:16

## 2024-04-09 RX ADMIN — PROPOFOL 20 MG: 10 INJECTION, EMULSION INTRAVENOUS at 17:15

## 2024-04-09 RX ADMIN — SODIUM CHLORIDE: 9 INJECTION, SOLUTION INTRAVENOUS at 16:03

## 2024-04-09 RX ADMIN — PROPOFOL 30 MG: 10 INJECTION, EMULSION INTRAVENOUS at 16:20

## 2024-04-09 NOTE — ANESTHESIA POSTPROCEDURE EVALUATION
Patient: Carrie Golden    Procedure Summary       Date: 04/09/24 Room / Location: [facility] /  [facility]ATION    Anesthesia Start: 1603 Anesthesia Stop: 1758    Procedure: Pacemaker DC new, [employer] reps emailed -ML Diagnosis:       Sick sinus syndrome      (symptomatic bradycardia)    Providers: Bakari Garcia MD Provider: Andrea Batista MD    Anesthesia Type: MAC ASA Status: 3            Anesthesia Type: MAC    Vitals  Vitals Value Taken Time   /83 04/09/24 1902   Temp     Pulse 73 04/09/24 1906   Resp     SpO2 98 % 04/09/24 1906   Vitals shown include unfiled device data.        Post Anesthesia Care and Evaluation    Patient location during evaluation: PACU  Patient participation: complete - patient participated  Level of consciousness: awake  Pain scale: See nurse's notes for pain score.  Pain management: adequate    Airway patency: patent  Anesthetic complications: No anesthetic complications  PONV Status: none  Cardiovascular status: acceptable  Respiratory status: acceptable and spontaneous ventilation  Hydration status: acceptable    Comments: Patient seen and examined postoperatively; vital signs stable; SpO2 greater than or equal to 90%; cardiopulmonary status stable; nausea/vomiting adequately controlled; pain adequately controlled; no apparent anesthesia complications; patient discharged from anesthesia care when discharge criteria were met

## 2024-04-09 NOTE — ANESTHESIA PREPROCEDURE EVALUATION
Anesthesia Evaluation     history of anesthetic complications:  PONV  NPO Solid Status: > 8 hours  NPO Liquid Status: > 8 hours           Airway   Mallampati: I  TM distance: >3 FB  Neck ROM: full  No difficulty expected  Dental - normal exam     Pulmonary - normal exam   (+) pulmonary embolism,  Cardiovascular - normal exam    (+) hypertension, past MI , CAD, dysrhythmias, DVT, hyperlipidemia      Neuro/Psych  (+) numbness  GI/Hepatic/Renal/Endo    (+) GERD    Musculoskeletal     Abdominal  - normal exam    Bowel sounds: normal.   Substance History      OB/GYN          Other   arthritis,       Other Comment:      PONV (postoperative nausea and vomiting)  Arthritis   History of transfusion  Hypertension   Past heart attack X2 MILD LAST ONE OCT 2019 Hearing loss bilateral hearing aides  History of hepatitis pt not sure which 1  contracted at age 8 GERD (gastroesophageal reflux disease)   Frequent UTI  Drooping eyelid, bilateral   [place] (hard of hearing)  Coronary artery disease   Hyperlipidemia  Dermatochalasis of right upper eyelid   Dermatochalasis of left upper eyelid  Conjunctival hemorrhage   Ectropion  Amblyopia, left   Presence of intraocular lens  Amblyopia   Epiretinal membrane (ERM) of right eye  Deformity of left foot Added automatically from request for surgery [id number]  Hallux rigidus, left foot Added automatically from request for surgery [id number] Deep vein thrombosis   Myocardial infarction           ROS/Med Hx Other: HX AFIB SP CARDIOVERSION  EF 56-60 10/2021              Anesthesia Plan    ASA 3     MAC   total IV anesthesia  intravenous induction     Anesthetic plan, risks, benefits, and alternatives have been provided, discussed and informed consent has been obtained with: patient.  Pre-procedure education provided  Plan discussed with CRNA.    CODE STATUS:

## 2024-04-10 VITALS
OXYGEN SATURATION: 93 % | DIASTOLIC BLOOD PRESSURE: 73 MMHG | BODY MASS INDEX: 21.58 KG/M2 | TEMPERATURE: 97.7 F | SYSTOLIC BLOOD PRESSURE: 132 MMHG | WEIGHT: 117.28 LBS | RESPIRATION RATE: 22 BRPM | HEART RATE: 73 BPM | HEIGHT: 62 IN

## 2024-04-10 PROCEDURE — 99024 POSTOP FOLLOW-UP VISIT: CPT | Performed by: NURSE PRACTITIONER

## 2024-04-10 RX ORDER — DOXYCYCLINE HYCLATE 100 MG/1
100 CAPSULE ORAL 2 TIMES DAILY
Qty: 6 CAPSULE | Refills: 0 | Status: SHIPPED | OUTPATIENT
Start: 2024-04-10 | End: 2024-04-13

## 2024-04-10 RX ORDER — METOPROLOL SUCCINATE 25 MG/1
25 TABLET, EXTENDED RELEASE ORAL DAILY
Qty: 90 TABLET | Refills: 3 | Status: SHIPPED | OUTPATIENT
Start: 2024-04-10

## 2024-04-10 RX ADMIN — ISOSORBIDE MONONITRATE 30 MG: 30 TABLET, EXTENDED RELEASE ORAL at 08:37

## 2024-04-10 NOTE — DISCHARGE SUMMARY
[facility]    Date of Admission: 4/9/2024    Date of Discharge:  4/10/2024    Length of stay:  LOS: 0 days     Admission Diagnosis: sick sinus syndrome    Discharge Diagnosis: same, status post pacemaker implantation    Presenting Problem/History of Present Illness  Active Hospital Problems    Diagnosis  POA    **Sick sinus syndrome [I49.5]  Unknown      Resolved Hospital Problems   No resolved problems to display.          Hospital Course  Carrie Golden is a 89 y.o. female presented for an elective Medtronic dual-chamber pacemaker implantation for sick sinus syndrome on 4/9/2024. There were no procedural complications, patient was seen and examined this morning, no bleeding or hematoma at surgical incision site.  Device interrogation showed appropriate function and chest x-ray showed good lead positioning.  Patient will be discharged home today in a stable condition with Doxycycline for 3 days.  We will do a 2-week wound check appointment and then follow up with primary cardiologist, Dr. Quiroz.      The discharge process took about 35 minutes during which we discussed about PM monitoring, wound care, medications as well as arm restrictions.  The patient voiced understanding and all her questions were answered to her satisfaction.  .      Past Medical History:   Diagnosis Date    Amblyopia 10/31/2012    Amblyopia, left 03/12/2020    Arthritis     Conjunctival hemorrhage 10/08/2014    Coronary artery disease     Deep vein thrombosis     Deformity of left foot 08/18/2020    Added automatically from request for surgery [id number]    Dermatochalasis of left upper eyelid 03/12/2020    Dermatochalasis of right upper eyelid 03/12/2020    Drooping eyelid, bilateral     Ectropion 03/12/2020    Epiretinal membrane (ERM) of right eye 03/12/2020    Frequent UTI     GERD (gastroesophageal reflux disease)     Hallux rigidus, left foot 08/18/2020    Added automatically from request for surgery [id number]    Hearing loss     bilateral  hearing aides    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 sure which 1  contracted at age 8    History of transfusion     [place] (hard of hearing)     Hyperlipidemia     Hypertension     Myocardial infarction     Past heart attack     X2 MILD LAST ONE OCT 2019    PONV (postoperative nausea and vomiting)     Presence of intraocular lens 03/12/2020     Past Surgical History:   Procedure Laterality Date    ANTERIOR AND POSTERIOR VAGINAL REPAIR      BACK SURGERY      BLEPHAROPLASTY Bilateral 5/23/2019    Procedure: bilateral upper lid blepharoplasty;  Surgeon: Mauricio Pennington MD;  Location:  [facility];  Service: Ophthalmology    BROW LIFT Bilateral 2/13/2020    Procedure: BILATERAL TEMPORAL DIRECT BROWLIFT;  Surgeon: Sreekanth Bird MD;  [facility];  Service: Ophthalmology;  Laterality: Bilateral;    CARDIAC CATHETERIZATION N/A 12/3/2019    Procedure: Left Heart Cath;  Surgeon: Puma Briseno MD;  Location: [facility] CATH INVASIVE LOCATION;  Service: Cardiovascular    CARDIAC CATHETERIZATION N/A 12/3/2019    Procedure: Coronary angiography;  Surgeon: Puma Briseno MD;  Location: [facility] CATH INVASIVE LOCATION;  Service: Cardiovascular    CARDIAC CATHETERIZATION N/A 12/3/2019    Procedure: Left ventriculography;  Surgeon: Puma Briseno MD;  Location: [facility] CATH INVASIVE LOCATION;  Service: Cardiovascular    CARDIAC ELECTROPHYSIOLOGY PROCEDURE N/A 4/9/2024    Procedure: Pacemaker DC new, [employer] reps emailed -ML;  Surgeon: Bakari Garcia MD;  Location: [facility] CATH INVASIVE LOCATION;  Service: Cardiovascular;  Laterality: N/A;    CATARACT EXTRACTION EXTRACAPSULAR W/ INTRAOCULAR LENS IMPLANTATION Bilateral     CERVICAL SPINE ANTERIOR      with instrumentation    CHEILECTOMY Left 8/28/2020    Procedure: CHEILECTOMY;  Surgeon: GAB Rees DPM;  Location: [facility] MAIN OR;  Service: Podiatry;  Laterality: Left;    COLON SURGERY      for obstruction    ALBERTO TUBE INSERTION Bilateral 5/23/2019    Procedure:  ALBERTO TUBE;  Surgeon: Mauricio Pennington MD;  Location: [facility];  Service: Ophthalmology    ECTROPION REPAIR Bilateral 2019    Procedure: bilateral lower lid ectropion repair, bilateral punctoplasty;  Surgeon: Mauricio Pennington MD;  [facility];  Service: Ophthalmology    ENDOSCOPY N/A 2022    Procedure: ESOPHAGOGASTRODUODENOSCOPY WITH DILATATION (BALLOON 15MM-18MM, UP TO 18MM) AND BOUGIE #48;  Surgeon: Wiley Parks MD;  Location: [facility] ENDOSCOPY;  Service: Gastroenterology;  Laterality: N/A;  Post: DYSPHAGIA    EYE SURGERY      FOOT FUSION Left 2016    Procedure: LEFT HALLUX METATARSALPHALANGEAL ARTHRODESIS SILVER BUNIONECTOMY METATARSAL HEAD RESECTION 2-5 CALCANEAL BONE GRAFT;  Surgeon: Monster Harper Jr., MD;  Location: [facility] MAIN OR;  Service:     HYSTERECTOMY      INGUINAL HERNIA REPAIR Bilateral     METATARSAL OSTEOTOMY Left 2020    Procedure: Metatarsal head resection 5;  Surgeon: GAB Rees DPM;  Location: [facility] MAIN OR;  Service: Podiatry;  Laterality: Left;    ROTATOR CUFF REPAIR Right     SIGMOIDOSCOPY N/A 10/6/2021    Procedure: SIGMOIDOSCOPY WITH BIOPSY X1 AREA;  Surgeon: Uche Vaz MD;  Location: [facility] ENDOSCOPY;  Service: Gastroenterology;  Laterality: N/A;  ischemic colitis    STOMACH SURGERY      for ulcer     Social History     Socioeconomic History    Marital status:    Tobacco Use    Smoking status: Former     Current packs/day: 0.00     Average packs/day: 0.3 packs/day for 10.0 years (2.5 ttl pk-yrs)     Types: Cigarettes     Start date: 1980     Quit date: 1990     Years since quittin.2    Smokeless tobacco: Never   Vaping Use    Vaping status: Never Used   Substance and Sexual Activity    Alcohol use: No    Drug use: No    Sexual activity: Defer       Procedures Performed  Implantation of a dual-chamber/direct conduction system pacemaker with left bundle area pacing lead and right atrial lead      Consults:   Consulting Physician(s)                     None                Pertinent Test Results:     Lab Results (last 72 hours)       ** No results found for the last 72 hours. **            Results for orders placed during the hospital encounter of 10/02/21    Adult Transthoracic Echo Complete W/ Cont if Necessary Per Protocol    Interpretation Summary  · Estimated left ventricular EF was in agreement with the calculated left ventricular EF. Left ventricular ejection fraction appears to be 56 - 60%. Left ventricular systolic function is normal.  · Left ventricular diastolic function is consistent with (grade II w/high LAP) pseudonormalization.  · Left atrial volume is mildly increased.  · Estimated right ventricular systolic pressure from tricuspid regurgitation is normal (<35 mmHg).      Imaging Results (Last 72 Hours)       Procedure Component Value Units Date/Time    XR Chest 1 View [[id number]] Collected: 04/09/24 1854     Updated: 04/09/24 1857    Narrative:      XR CHEST 1 VW    Date of Exam: 4/9/2024 6:44 PM EDT    Indication: Post ICD / Pacer Implant    Comparison: Chest radiograph performed on March 25, 2024    Findings:  Ice pack overlying the left hemithorax limits diagnostic sensitivity and obscures the left lung base. Patient is post left subclavian dual-lead pacemaker placement. There is no evidence of pneumothorax. No focal consolidation or effusion visualized.   Atherosclerotic tissues of the thoracic aorta are visualized. The pulmonary vasculature appears within normal limits. Cardiac silhouette appears mildly enlarged. No acute osseous abnormality identified.      Impression:      Impression:  Post left subclavian dual-lead pacemaker placement. No evidence of pneumothorax.      Electronically Signed: Mark Zhang MD    4/9/2024 6:55 PM EDT    Workstation ID: [id number]              Condition on Discharge:  stable    Vital Signs  /73 (BP Location: Right arm, Patient Position: Lying)    "Pulse 73   Temp 97.7 °F (36.5 °C) (Oral)   Resp 22   Ht 157.5 cm (62\")   Wt 53.2 kg (117 lb 4.6 oz)   SpO2 93%   BMI 21.45 kg/m²     Physical Exam  Vitals reviewed.   Constitutional:       Appearance: Normal appearance.   HENT:      Head: Normocephalic.   Eyes:      Pupils: Pupils are equal, round, and reactive to light.   Cardiovascular:      Rate and Rhythm: Normal rate and regular rhythm.      Pulses: Normal pulses.      Heart sounds: Normal heart sounds.      Comments: Left chest incision site clean and dry with steri-strips intact. No bleeding or hematoma noted at site. Minor bruising noted in left chest/axilla area.    Pulmonary:      Effort: Pulmonary effort is normal.      Breath sounds: Normal breath sounds.   Abdominal:      General: Bowel sounds are normal.   Musculoskeletal:         General: Normal range of motion.   Skin:     General: Skin is warm and dry.   Neurological:      Mental Status: She is alert and oriented to person, place, and time. Mental status is at baseline.         Discharge Disposition  Home or Self Care    Discharge Medications     Discharge Medications        New Medications        Instructions Start Date   doxycycline 100 MG capsule  Commonly known as: VIBRAMYCIN   100 mg, Oral, 2 Times Daily      metoprolol succinate XL 25 MG 24 hr tablet  Commonly known as: TOPROL-XL   25 mg, Oral, Daily             Continue These Medications        Instructions Start Date   furosemide 20 MG tablet  Commonly known as: LASIX   20 mg, Oral, 2 Times Daily      isosorbide mononitrate 30 MG 24 hr tablet  Commonly known as: IMDUR   Take 1 tablet by mouth once daily               Discharge Diet:  cardiac    Activity at Discharge:  physical limitations and restrictions discussed with patient      Follow-up Appointments  Future Appointments   Date Time Provider Department Center   4/15/2024 11:15 AM Dee Angela MD [facility]   4/22/2024 11:30 AM [facility] DEVICE CHECK [facility] " [facility] NA   9/17/2024  1:45 PM Gavin Quiroz MD [facility]       Risk for Readmission (LACE) Score: 5 (4/10/2024  6:00 AM)      EFREN Hardy  04/10/24  08:49 EDT  Electronically signed by EFREN Hardy, 04/10/24, 10:45 AM EDT.

## 2024-04-10 NOTE — CASE MANAGEMENT/SOCIAL WORK
Case Management Discharge Note      Final Note: Patient was planned readmission for procedure. DC home prior to CM assessment.         Selected Continued Care - Discharged on 4/10/2024 Admission date: 4/9/2024 - Discharge disposition: Home or Self Care              Transportation Services  Private: Car    Final Discharge Disposition Code: 01 - home or self-care

## 2024-04-10 NOTE — DISCHARGE INSTRUCTIONS
Do not raise left arm ABOVE shoulder for 1 month.  No heavy lifting (greater than 10 lbs) or driving for 2 weeks.  Ok to shower in 3 days. Steri-strips may get wet.   No swimming or hot tub use for 1 month.  Use ice packs for swelling or pain.  Monitor for infection at incision site (redness, pain, oozing, fever, etc)  Take all of antibiotic (may take with food if upset stomach occurs)

## 2024-04-10 NOTE — OUTREACH NOTE
Medical Week 1 Survey      Flowsheet Row Responses   [facility] facility patient discharged from? [facility]   Does the patient have one of the following disease processes/diagnoses(primary or secondary)? Other   Week 1 attempt successful? No   Unsuccessful attempts Attempt 1   Revoke Readmitted            DANIELLA DE GUZMAN - Registered Nurse

## 2024-04-10 NOTE — CASE MANAGEMENT/SOCIAL WORK
Case Management Discharge Note                Selected Continued Care - Discharged on 4/5/2024 Admission date: 4/4/2024 - Discharge disposition: Home or Self Care   Transportation Services  Private: Car    Final Discharge Disposition Code: 81 - home or self care w/planned readmission

## 2024-04-10 NOTE — PLAN OF CARE
Problem: Adult Inpatient Plan of Care  Goal: Plan of Care Review  Outcome: Met  Goal: Patient-Specific Goal (Individualized)  Outcome: Met  Goal: Absence of Hospital-Acquired Illness or Injury  Outcome: Met  Intervention: Identify and Manage Fall Risk  Recent Flowsheet Documentation  Taken 4/10/2024 0828 by Rochelle Augustin RN  Safety Promotion/Fall Prevention: safety round/check completed  Intervention: Prevent Skin Injury  Recent Flowsheet Documentation  Taken 4/10/2024 0828 by Rochelle Augustin RN  Body Position: position changed independently  Goal: Optimal Comfort and Wellbeing  Outcome: Met  Goal: Readiness for Transition of Care  Outcome: Met     Problem: Hypertension Comorbidity  Goal: Blood Pressure in Desired Range  Outcome: Met   Goal Outcome Evaluation:

## 2024-04-10 NOTE — PLAN OF CARE
Goal Outcome Evaluation:  Plan of Care Reviewed With: patient        Progress: no change       Pt came from [facility] to PCU. Pt has a dual chamber pacemaker and has keep left arm in sling with ice. Only C/O pain once and has been resting.

## 2024-04-15 ENCOUNTER — OFFICE VISIT (OUTPATIENT)
Dept: CARDIOLOGY | Facility: CLINIC | Age: 89
End: 2024-04-15
Payer: MEDICARE

## 2024-04-15 VITALS
SYSTOLIC BLOOD PRESSURE: 132 MMHG | HEART RATE: 66 BPM | RESPIRATION RATE: 18 BRPM | WEIGHT: 113 LBS | OXYGEN SATURATION: 97 % | DIASTOLIC BLOOD PRESSURE: 70 MMHG | BODY MASS INDEX: 20.8 KG/M2 | HEIGHT: 62 IN

## 2024-04-15 DIAGNOSIS — Z95.0 STATUS POST PLACEMENT OF CARDIAC PACEMAKER: ICD-10-CM

## 2024-04-15 DIAGNOSIS — I48.0 PAROXYSMAL ATRIAL FIBRILLATION: Primary | ICD-10-CM

## 2024-04-15 PROCEDURE — 1159F MED LIST DOCD IN RCRD: CPT | Performed by: STUDENT IN AN ORGANIZED HEALTH CARE EDUCATION/TRAINING PROGRAM

## 2024-04-15 PROCEDURE — 99214 OFFICE O/P EST MOD 30 MIN: CPT | Performed by: STUDENT IN AN ORGANIZED HEALTH CARE EDUCATION/TRAINING PROGRAM

## 2024-04-15 PROCEDURE — 1160F RVW MEDS BY RX/DR IN RCRD: CPT | Performed by: STUDENT IN AN ORGANIZED HEALTH CARE EDUCATION/TRAINING PROGRAM

## 2024-04-15 RX ORDER — WARFARIN SODIUM 2 MG/1
TABLET ORAL
COMMUNITY

## 2024-04-15 NOTE — PROGRESS NOTES
"      Subjective:     Encounter Date:04/15/2024      Patient ID: Carrie Golden is a 89 y.o. female.    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 Quiroz  Patient overall feels well, pacemaker site looks well-healed.  She has significant bruising in her lower extremities and upper extremities with Coumadin use and does not like frequent INR checks along with fluctuations with diet.  Interested in left atrial appendage occluder device implantation.  Discussed risk and benefits of the same, will schedule transesophageal echocardiogram to evaluate candidacy and will be seeing electrophysiologist later this week    Objective:         /70 (BP Location: Right arm, Patient Position: Sitting)   Pulse 66   Resp 18   Ht 157.5 cm (62\")   Wt 51.3 kg (113 lb)   SpO2 97%   BMI 20.67 kg/m²     Physical exam  General-no acute distress  CVS-S1-S2 normal, no murmur  Respiratory-clear breath sounds  GI-soft nontender abdomen  No pedal edema  Alert oriented X3    ROS:  14 point review of systems negative except as mentioned above    Current Outpatient Medications:     furosemide (LASIX) 20 MG tablet, Take 1 tablet by mouth 2 (Two) Times a Day., Disp: 180 tablet, Rfl: 1    isosorbide mononitrate (IMDUR) 30 MG 24 hr tablet, Take 1 tablet by mouth once daily, Disp: 90 tablet, Rfl: 0    metoprolol succinate XL (TOPROL-XL) 25 MG 24 hr tablet, Take 1 tablet by mouth Daily., Disp: 90 tablet, Rfl: 3    warfarin (COUMADIN) 2 MG tablet, Daily. 2mg daily except MWF 3mg, Disp: , Rfl:     Problem List:  Patient Active Problem List   Diagnosis    Chest pain    Dyslipidemia    Hypertension    Gastroesophageal reflux disease    NSTEMI, initial " episode of care    Tear film insufficiency    After-cataract with vision obscured    Cervical radiculopathy    Lumbar radiculopathy    Metatarsalgia of left foot    Localized, primary osteoarthritis    Mixed hyperlipidemia    Colitis    Degeneration of lumbar intervertebral disc    Osteoarthritis of knee    Right leg pain    Pulmonary embolism    Acute deep vein thrombosis (DVT) of distal vein of right lower extremity    Iron deficiency anemia    Fall, initial encounter    Pelvic fracture    PAF (paroxysmal atrial fibrillation)    Coronary artery disease involving native coronary artery of native heart without angina pectoris    Bradycardia    Flutter-fibrillation    Atrial flutter with rapid ventricular response    Sick sinus syndrome     Past Medical History:  Past Medical History:   Diagnosis Date    Amblyopia 10/31/2012    Amblyopia, left 03/12/2020    Arthritis     Conjunctival hemorrhage 10/08/2014    Coronary artery disease     Deep vein thrombosis     Deformity of left foot 08/18/2020    Added automatically from request for surgery [id number]    Dermatochalasis of left upper eyelid 03/12/2020    Dermatochalasis of right upper eyelid 03/12/2020    Drooping eyelid, bilateral     Ectropion 03/12/2020    Epiretinal membrane (ERM) of right eye 03/12/2020    Frequent UTI     GERD (gastroesophageal reflux disease)     Hallux rigidus, left foot 08/18/2020    Added automatically from request for surgery [id number]    Hearing loss     bilateral hearing aides    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 sure which 1  contracted at age 8    History of transfusion     [place] (hard of hearing)     Hyperlipidemia     Hypertension     Myocardial infarction     Past heart attack     X2 MILD LAST ONE OCT 2019    PONV (postoperative nausea and vomiting)     Presence of intraocular lens 03/12/2020     Past Surgical History:  Past Surgical History:   Procedure Laterality Date    ANTERIOR AND POSTERIOR VAGINAL REPAIR      BACK SURGERY       BLEPHAROPLASTY Bilateral 5/23/2019    Procedure: bilateral upper lid blepharoplasty;  Surgeon: Mauricio Pennington MD;  [facility];  Service: Ophthalmology    BROW LIFT Bilateral 2/13/2020    Procedure: BILATERAL TEMPORAL DIRECT BROWLIFT;  Surgeon: Sreekanth Bird MD;  [facility];  Service: Ophthalmology;  Laterality: Bilateral;    CARDIAC CATHETERIZATION N/A 12/3/2019    Procedure: Left Heart Cath;  Surgeon: Puma Briseno MD;  Location: [facility] CATH INVASIVE LOCATION;  Service: Cardiovascular    CARDIAC CATHETERIZATION N/A 12/3/2019    Procedure: Coronary angiography;  Surgeon: Puma Briseno MD;  Location: [facility] CATH INVASIVE LOCATION;  Service: Cardiovascular    CARDIAC CATHETERIZATION N/A 12/3/2019    Procedure: Left ventriculography;  Surgeon: Puma Briseno MD;  Location: [facility] CATH INVASIVE LOCATION;  Service: Cardiovascular    CARDIAC ELECTROPHYSIOLOGY PROCEDURE N/A 4/9/2024    Procedure: Pacemaker DC new, [employer] reps emailed -ML;  Surgeon: Bakari Garcia MD;  Location: [facility] CATH INVASIVE LOCATION;  Service: Cardiovascular;  Laterality: N/A;    CATARACT EXTRACTION EXTRACAPSULAR W/ INTRAOCULAR LENS IMPLANTATION Bilateral     CERVICAL SPINE ANTERIOR      with instrumentation    CHEILECTOMY Left 8/28/2020    Procedure: CHEILECTOMY;  Surgeon: GAB Rees DPM;  Location: [facility] MAIN OR;  Service: Podiatry;  Laterality: Left;    COLON SURGERY      for obstruction    ALBERTO TUBE INSERTION Bilateral 5/23/2019    Procedure: ALBERTO TUBE;  Surgeon: Mauricio Pennington MD;  [facility] [facility] [place] [facility];  Service: Ophthalmology    ECTROPION REPAIR Bilateral 5/23/2019    Procedure: bilateral lower lid ectropion repair, bilateral punctoplasty;  Surgeon: Mauricio Pennington MD;  [place] [facility] [place] [facility];  Service: Ophthalmology    ENDOSCOPY N/A 2/18/2022    Procedure: ESOPHAGOGASTRODUODENOSCOPY WITH DILATATION (BALLOON 15MM-18MM, UP TO 18MM) AND BOUGIE #[id number];  Surgeon: Wiley Parks  MD Won;  Location: [facility] ENDOSCOPY;  Service: Gastroenterology;  Laterality: N/A;  Post: DYSPHAGIA    EYE SURGERY      FOOT FUSION Left 2016    Procedure: LEFT HALLUX METATARSALPHALANGEAL ARTHRODESIS SILVER BUNIONECTOMY METATARSAL HEAD RESECTION 2-5 CALCANEAL BONE GRAFT;  Surgeon: Monster Harper Jr., MD;  Location: [facility] MAIN OR;  Service:     HYSTERECTOMY      INGUINAL HERNIA REPAIR Bilateral     METATARSAL OSTEOTOMY Left 2020    Procedure: Metatarsal head resection 5;  Surgeon: GAB Rees DPM;  Location: [facility] MAIN OR;  Service: Podiatry;  Laterality: Left;    ROTATOR CUFF REPAIR Right     SIGMOIDOSCOPY N/A 10/6/2021    Procedure: SIGMOIDOSCOPY WITH BIOPSY X1 AREA;  Surgeon: Uche Vaz MD;  Location: [facility] ENDOSCOPY;  Service: Gastroenterology;  Laterality: N/A;  ischemic colitis    STOMACH SURGERY      for ulcer     Social History:  Social History     Socioeconomic History    Marital status:    Tobacco Use    Smoking status: Former     Current packs/day: 0.00     Average packs/day: 0.3 packs/day for 10.0 years (2.5 ttl pk-yrs)     Types: Cigarettes     Start date: 1980     Quit date: 1990     Years since quittin.3    Smokeless tobacco: Never   Vaping Use    Vaping status: Never Used   Substance and Sexual Activity    Alcohol use: No    Drug use: No    Sexual activity: Defer     Allergies:  Allergies   Allergen Reactions    Amoxicillin Itching    Codeine Nausea And Vomiting and Dizziness    Lortab [Hydrocodone-Acetaminophen] Nausea And Vomiting    Nitrofurantoin Hives    Sulfa Antibiotics Rash     Immunizations:  Immunization History   Administered Date(s) Administered    COVID-19 (MODERNA) 1st,2nd,3rd Dose Monovalent 2021, 2021    TD Preservative Free (Tenivac) 2020            In-Office Procedure(s):  No orders to display        ASCVD RIsk Score::  The ASCVD Risk score (Erica ANTON, et al., 2019) failed to calculate for the following reasons:     The 2019 ASCVD risk score is only valid for ages 40 to 79    The patient has a prior MI or stroke diagnosis    Imaging:    Results for orders placed during the hospital encounter of 04/09/24    XR Chest 1 View    Narrative  XR CHEST 1 VW    Date of Exam: 4/9/2024 6:44 PM EDT    Indication: Post ICD / Pacer Implant    Comparison: Chest radiograph performed on March 25, 2024    Findings:  Ice pack overlying the left hemithorax limits diagnostic sensitivity and obscures the left lung base. Patient is post left subclavian dual-lead pacemaker placement. There is no evidence of pneumothorax. No focal consolidation or effusion visualized.  Atherosclerotic tissues of the thoracic aorta are visualized. The pulmonary vasculature appears within normal limits. Cardiac silhouette appears mildly enlarged. No acute osseous abnormality identified.    Impression  Impression:  Post left subclavian dual-lead pacemaker placement. No evidence of pneumothorax.      Electronically Signed: Mark Zhang MD  4/9/2024 6:55 PM EDT  Workstation ID: [id number]       Results for orders placed during the hospital encounter of 03/25/24    CT Head Without Contrast    Narrative  CT HEAD WO CONTRAST    Date of Exam: 3/25/2024 3:21 PM EDT    Indication: general weakness, headache.    Comparison: 7/17/2023    Technique: Axial CT images were obtained of the head without contrast administration.  Coronal reconstructions were performed.  Automated exposure control and iterative reconstruction methods were used.      Findings:  There is no evidence of acute territorial infarction.    There is no acute intracranial hemorrhage. There are no extra-axial collections.    Ventricles and CSF spaces are symmetric. No mass effect nor hydrocephalus.    Brain parenchyma appears similar.    Paranasal sinuses and left mastoid air cells are well aerated. There is partial opacification of the right mastoid air cells. Findings are similar to prior examination.    Osseous  structures and orbits appear intact.    Impression  Impression:  No acute intracranial process identified.      Electronically Signed: Christ Miranda MD  3/25/2024 3:37 PM EDT  Workstation ID: [id number]      Results for orders placed during the hospital encounter of 03/25/24    CT Head Without Contrast    Narrative  CT HEAD WO CONTRAST    Date of Exam: 3/25/2024 3:21 PM EDT    Indication: general weakness, headache.    Comparison: 7/17/2023    Technique: Axial CT images were obtained of the head without contrast administration.  Coronal reconstructions were performed.  Automated exposure control and iterative reconstruction methods were used.      Findings:  There is no evidence of acute territorial infarction.    There is no acute intracranial hemorrhage. There are no extra-axial collections.    Ventricles and CSF spaces are symmetric. No mass effect nor hydrocephalus.    Brain parenchyma appears similar.    Paranasal sinuses and left mastoid air cells are well aerated. There is partial opacification of the right mastoid air cells. Findings are similar to prior examination.    Osseous structures and orbits appear intact.    Impression  Impression:  No acute intracranial process identified.      Electronically Signed: Christ Miranda MD  3/25/2024 3:37 PM EDT  Workstation ID: [id number]        Most recent EKG as reviewed and interpreted by me:  Procedures     Most recent echo as reviewed and interpreted by me:  Results for orders placed during the hospital encounter of 10/02/21    Adult Transthoracic Echo Complete W/ Cont if Necessary Per Protocol    Interpretation Summary  · Estimated left ventricular EF was in agreement with the calculated left ventricular EF. Left ventricular ejection fraction appears to be 56 - 60%. Left ventricular systolic function is normal.  · Left ventricular diastolic function is consistent with (grade II w/high LAP) pseudonormalization.  · Left atrial volume is mildly increased.  · Estimated  right ventricular systolic pressure from tricuspid regurgitation is normal (<35 mmHg).      Most recent stress test as reviewed and interpreted by me:      Most recent cardiac catheterization as reviewed interpreted by me:  Results for orders placed during the hospital encounter of 12/01/19    Cardiac Catheterization/Vascular Study    Narrative  CARDIAC CATHETERIZATION REPORT      DATE OF PROCEDURE:  12/3/2019    INDICATION FOR PROCEDURE:    Non-ST elevation myocardial infarction  CAD  Angina pectoris    PROCEDURE PERFORMED:    Left heart catheterization  coronary angiography  left ventriculography    PROCEDURE COMMENTS:    After informed consent was obtained, the patient was prepped and draped in the usual sterile manner.  Mild to moderate sedation was administered.  Right femoral artery was accessed without difficulty and 6 [place] arterial sheath was inserted.  Sheath was flushed with heparinized saline.    Using 6 [place] Rebeca catheters, first left coronary artery and the right coronary was electively engaged and appropriate views were taken.  A 6 [place] JR4 catheter was used to cross aortic valve and left heart catheterization was performed.  Left ventriculography was done in a review.    Patient tolerated the procedure well.    FINDINGS:    1. HEMODYNAMICS:    Aortic pressure: 145/72    LVEDP: 10 mmHg    Gradient across aortic valve on pullback: No gradient across aortic valve      2. LEFT VENTRICULOGRAPHY: 50 to 55% mild global hypokinesis      3. CORONARY ANGIOGRAPHY:    A: Left main coronary artery: Calcified and mild disease up to 10 to 20% in the ostium.  TIESHA-3 flow was present    B: Left anterior descending artery: Diffuse disease up to 30% in proximal mid LAD at the origin of D1 branch of LAD.  D1 branch of LAD has 60 to 70% ostial stenosis which is calcified.  This has not changed from cardiac catheterization done in 2017.  TIESHA-3 flow was present in both vessels    C: Left circumflex coronary  artery: Diffuse 25 to 30% mid LCx stenosis    D: Right coronary artery: 20% proximal RCA stenosis.  It is large and dominant vessel.    SUMMARY:    Single-vessel coronary artery disease  Intermediate lesion of ostial D1 branch of LAD.  This was not different from cardiac catheterization done in 2017  Preserved left ventricular function with mild global hypokinesis    RECOMMENDATIONS:    Recommend medical treatment      Assessment & Plan :         Tachybradycardia syndrome  Status post pacemaker implantation  Will be seeing electrophysiologist next week  Site looks clean without signs of infection/bleeding    History of atrial flutter  [id number] score of 4 points  Significant bruising in lower and upper extremities, interested in watchman implantation  Will be seeing electrophysiologist later this week  SCOTTY to assess candidacy for watchman implantation    History of DVT  Clarify with primary care physician duration of warfarin for the same      Part of this note may be an electronic transcription/translation of spoken language to printed text using the Dragon Dictation System.    Dee Angela MD  04/15/24  .

## 2024-04-16 ENCOUNTER — TELEPHONE (OUTPATIENT)
Dept: CARDIOLOGY | Facility: CLINIC | Age: 89
End: 2024-04-16

## 2024-04-16 ENCOUNTER — TELEPHONE (OUTPATIENT)
Dept: CARDIOLOGY | Facility: CLINIC | Age: 89
End: 2024-04-16
Payer: MEDICARE

## 2024-04-16 NOTE — TELEPHONE ENCOUNTER
Caller: Carrie Golden    Relationship to patient: Self    Best call back number:  [phone]    Patient is needing: PATIENT RECEIVED A CALL FROM [employer] THAT THE DOCTOR NEEDS TO COMPLETE THE ORDER FOR HER TO GET HER DEVICE. PATIENT SEEMED A LITTLE CONFUSED ABOUT THE DEVICE.

## 2024-04-16 NOTE — TELEPHONE ENCOUNTER
Please call the patient to make an appointment with me.  She wants to discuss about her blood thinners.

## 2024-04-23 ENCOUNTER — CLINICAL SUPPORT NO REQUIREMENTS (OUTPATIENT)
Dept: CARDIOLOGY | Facility: CLINIC | Age: 89
End: 2024-04-23
Payer: MEDICARE

## 2024-04-23 ENCOUNTER — OFFICE VISIT (OUTPATIENT)
Dept: CARDIOLOGY | Facility: CLINIC | Age: 89
End: 2024-04-23
Payer: MEDICARE

## 2024-04-23 VITALS
HEART RATE: 60 BPM | OXYGEN SATURATION: 99 % | SYSTOLIC BLOOD PRESSURE: 131 MMHG | DIASTOLIC BLOOD PRESSURE: 74 MMHG | HEIGHT: 62 IN | WEIGHT: 112.5 LBS | BODY MASS INDEX: 20.7 KG/M2

## 2024-04-23 DIAGNOSIS — Z95.0 STATUS POST PLACEMENT OF CARDIAC PACEMAKER: ICD-10-CM

## 2024-04-23 DIAGNOSIS — I49.5 SICK SINUS SYNDROME: ICD-10-CM

## 2024-04-23 DIAGNOSIS — I48.0 PAROXYSMAL ATRIAL FIBRILLATION: Primary | ICD-10-CM

## 2024-04-23 DIAGNOSIS — Z79.01 CHRONIC ANTICOAGULATION: ICD-10-CM

## 2024-04-23 DIAGNOSIS — R00.1 BRADYCARDIA: Primary | ICD-10-CM

## 2024-04-23 NOTE — PROGRESS NOTES
Progress note      Name: Carrie Golden ADMIT: (Not on file)   : 1925  PCP: Monster Myrick MD    MRN: [id number] LOS: 0 days   AGE/SEX: 89 y.o. female  ROOM: Room/bed info not found     Chief Complaint   Patient presents with    Follow-up     Wound check       Subjective       History of present illness  Carrie Golden is a 89-year-old female patient who has prior history of paroxysmal atrial fibrillation, DVT, CAD with NSTEMI, hypertension, sick sinus syndrome, is here today for follow-up.  Patient was admitted to the hospital in February, she had cardioversion due to A-fib and was placed on amiodarone 100 mg twice a day and Toprol 25 once a day.  She presented to the hospital due to bradycardia heart rate in the 40s.  Amiodarone was discontinued and she was discharged home.  She then however presented back to the hospital with A-fib RVR and upon conversion she was bradycardic.  Patient then received a dual-chamber/conduction system pacemaker on 2024 and is here today for follow-up.  She is doing much better, denies having any syncopal episodes, no palpitations no other symptoms.    Past Medical History:   Diagnosis Date    Amblyopia 10/31/2012    Amblyopia, left 2020    Arthritis     Conjunctival hemorrhage 10/08/2014    Coronary artery disease     Deep vein thrombosis     Deformity of left foot 2020    Added automatically from request for surgery [id number]    Dermatochalasis of left upper eyelid 2020    Dermatochalasis of right upper eyelid 2020    Drooping eyelid, bilateral     Ectropion 2020    Epiretinal membrane (ERM) of right eye 2020    Frequent UTI     GERD (gastroesophageal reflux disease)     Hallux rigidus, left foot 2020    Added automatically from request for surgery [id number]    Hearing loss     bilateral hearing aides    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 sure which 1  contracted at age 8    History of transfusion     [place] (hard of hearing)      Hyperlipidemia     Hypertension     Myocardial infarction     Past heart attack     X2 MILD LAST ONE OCT 2019    PONV (postoperative nausea and vomiting)     Presence of intraocular lens 03/12/2020     Past Surgical History:   Procedure Laterality Date    ANTERIOR AND POSTERIOR VAGINAL REPAIR      BACK SURGERY      BLEPHAROPLASTY Bilateral 5/23/2019    Procedure: bilateral upper lid blepharoplasty;  Surgeon: Mauricio Pennington MD;  Location: [facility] OR [facility] [place];  Service: Ophthalmology    BROW LIFT Bilateral 2/13/2020    Procedure: BILATERAL TEMPORAL DIRECT BROWLIFT;  Surgeon: Sreekanth Bird MD;  Location: [facility] OR [facility] [place];  Service: Ophthalmology;  Laterality: Bilateral;    CARDIAC CATHETERIZATION N/A 12/3/2019    Procedure: Left Heart Cath;  Surgeon: Puma Briseno MD;  Location: [facility] CATH INVASIVE LOCATION;  Service: Cardiovascular    CARDIAC CATHETERIZATION N/A 12/3/2019    Procedure: Coronary angiography;  Surgeon: Puma Briseno MD;  Location: [facility] CATH INVASIVE LOCATION;  Service: Cardiovascular    CARDIAC CATHETERIZATION N/A 12/3/2019    Procedure: Left ventriculography;  Surgeon: Puma Briseno MD;  Location: [facility] CATH INVASIVE LOCATION;  Service: Cardiovascular    CARDIAC ELECTROPHYSIOLOGY PROCEDURE N/A 4/9/2024    Procedure: Pacemaker DC new, [employer] reps emailed -ML;  Surgeon: Bakari Garcia MD;  Location: [facility] CATH INVASIVE LOCATION;  Service: Cardiovascular;  Laterality: N/A;    CATARACT EXTRACTION EXTRACAPSULAR W/ INTRAOCULAR LENS IMPLANTATION Bilateral     CERVICAL SPINE ANTERIOR      with instrumentation    CHEILECTOMY Left 8/28/2020    Procedure: CHEILECTOMY;  Surgeon: GAB Rees DPM;  Location: [facility] MAIN OR;  Service: Podiatry;  Laterality: Left;    COLON SURGERY      for obstruction    ALBERTO TUBE INSERTION Bilateral 5/23/2019    Procedure: ALBERTO TUBE;  Surgeon: Mauricio Pennington MD;  Location: [facility] OR [facility] [place];  Service: Ophthalmology    ECTROPION REPAIR Bilateral 5/23/2019     Procedure: bilateral lower lid ectropion repair, bilateral punctoplasty;  Surgeon: Mauricio Pennington MD;  Location:  [facility];  Service: Ophthalmology    ENDOSCOPY N/A 2022    Procedure: ESOPHAGOGASTRODUODENOSCOPY WITH DILATATION (BALLOON 15MM-18MM, UP TO 18MM) AND BOUGIE #48;  Surgeon: Wiley Parks MD;  Location: [facility] ENDOSCOPY;  Service: Gastroenterology;  Laterality: N/A;  Post: DYSPHAGIA    EYE SURGERY      FOOT FUSION Left 2016    Procedure: LEFT HALLUX METATARSALPHALANGEAL ARTHRODESIS SILVER BUNIONECTOMY METATARSAL HEAD RESECTION 2-5 CALCANEAL BONE GRAFT;  Surgeon: Monster Harper Jr., MD;  Location: [facility] MAIN OR;  Service:     HYSTERECTOMY      INGUINAL HERNIA REPAIR Bilateral     METATARSAL OSTEOTOMY Left 2020    Procedure: Metatarsal head resection 5;  Surgeon: GAB Rees DPM;  Location: [facility] MAIN OR;  Service: Podiatry;  Laterality: Left;    ROTATOR CUFF REPAIR Right     SIGMOIDOSCOPY N/A 10/6/2021    Procedure: SIGMOIDOSCOPY WITH BIOPSY X1 AREA;  Surgeon: Uche Vaz MD;  Location: [facility] ENDOSCOPY;  Service: Gastroenterology;  Laterality: N/A;  ischemic colitis    STOMACH SURGERY      for ulcer     Family History   Problem Relation Age of Onset    No Known Problems Mother     No Known Problems Father     Malig Hyperthermia Neg Hx      Social History     Tobacco Use    Smoking status: Former     Current packs/day: 0.00     Average packs/day: 0.3 packs/day for 10.0 years (2.5 ttl pk-yrs)     Types: Cigarettes     Start date: 1980     Quit date: 1990     Years since quittin.3    Smokeless tobacco: Never   Vaping Use    Vaping status: Never Used   Substance Use Topics    Alcohol use: No    Drug use: No       Current Outpatient Medications:     furosemide (LASIX) 20 MG tablet, Take 1 tablet by mouth 2 (Two) Times a Day., Disp: 180 tablet, Rfl: 1    isosorbide mononitrate (IMDUR) 30 MG 24 hr tablet, Take 1 tablet by mouth once daily, Disp: 90  tablet, Rfl: 0    metoprolol succinate XL (TOPROL-XL) 25 MG 24 hr tablet, Take 1 tablet by mouth Daily., Disp: 90 tablet, Rfl: 3    warfarin (COUMADIN) 2 MG tablet, Daily. 2mg daily except MWF 3mg, Disp: , Rfl:   Allergies:  Amoxicillin, Codeine, Lortab [hydrocodone-acetaminophen], Nitrofurantoin, and Sulfa antibiotics      Physical Exam  VITALS REVIEWED    General:      well developed, in no acute distress.    Head:      normocephalic and atraumatic.    Eyes:      PERRL/EOM intact, conjunctiva and sclera clear with out nystagmus.    Neck:      no masses, thyromegaly,  trachea central with normal respiratory effort and PMI displaced laterally  Lungs:      Clear to auscultation bilaterally  Heart:       Regular rate and rhythm  Msk:      no deformity or scoliosis noted of thoracic or lumbar spine.    Pulses:      pulses normal in all 4 extremities.    Extremities:       No lower extremity edema  Neurologic:      no focal deficits.   alert oriented x3  Skin:      intact without lesions or rashes.    Psych:      alert and cooperative; normal mood and affect; normal attention span and concentration.      Result Review :               Pertinent cardiac workup          Procedures        Assessment and Plan      Carrie Golden is a 89-year-old female patient who has sick sinus syndrome, paroxysmal atrial fibrillation with bradycardia upon conversion.  Patient received a dual-chamber left bundle area pacemaker on 4/9/2024.  Device interrogation today showed no atrial fibrillation, she is a paced 75% of the time.  She is on metoprolol.  The reason for visit today was not only device check but also patient had concerns about anticoagulation and was considering watchman.  At this time however patient is not experiencing any bleeding, tolerating Coumadin without problems.  In fact she also takes Coumadin due to history of DVT and PE.  She does not necessarily qualify for watchman and in fact if she develops bleeding issues with  Coumadin, my recommendation would be to switch to baby aspirin.  I do not think there is enough evidence to support watchman implantation in very advanced age patients as the cumulative risk for stroke may in fact be lower than the risk of the procedure itself.  Patient was satisfied with the visit today, all her questions were answered to her satisfaction.  She will continue to follow-up with Dr. Quiroz and I will see her on as-needed basis.      Diagnoses and all orders for this visit:    1. Paroxysmal atrial fibrillation (Primary)    2. Sick sinus syndrome    3. Status post placement of cardiac pacemaker    4. Chronic anticoagulation           Return if symptoms worsen or fail to improve.  Patient was given instructions and counseling regarding her condition or for health maintenance advice. Please see specific information pulled into the AVS if appropriate.

## 2024-05-09 ENCOUNTER — TELEPHONE (OUTPATIENT)
Dept: CARDIOLOGY | Facility: CLINIC | Age: 89
End: 2024-05-09

## 2024-05-09 NOTE — TELEPHONE ENCOUNTER
Caller: Carrie Golden    Relationship: Self    Best call back number: [phone]    What is the best time to reach you: ANYTIME    Who are you requesting to speak with (clinical staff, provider,  specific staff member): CLINICAL        What was the call regarding: PT IS CURRENTLY TAKING WARFARIN.  SHE IS COVERED IN BRUISES.  DR RIVAS SAID SHE COULD GO OFF OF WARFARIN AND COULD TAKE A BABY ASPIRIN. CAN SHE GO OFF WARFARIN?  PLEASE CALL BACK TO DISCUSS  WITH PT

## 2024-05-29 ENCOUNTER — OFFICE VISIT (OUTPATIENT)
Dept: CARDIOLOGY | Facility: CLINIC | Age: 89
End: 2024-05-29
Payer: MEDICARE

## 2024-05-29 VITALS
HEART RATE: 82 BPM | SYSTOLIC BLOOD PRESSURE: 149 MMHG | WEIGHT: 115 LBS | BODY MASS INDEX: 21.16 KG/M2 | RESPIRATION RATE: 18 BRPM | HEIGHT: 62 IN | OXYGEN SATURATION: 97 % | DIASTOLIC BLOOD PRESSURE: 90 MMHG

## 2024-05-29 DIAGNOSIS — I48.92 ATRIAL FLUTTER WITH RAPID VENTRICULAR RESPONSE: Primary | ICD-10-CM

## 2024-05-29 DIAGNOSIS — E78.5 DYSLIPIDEMIA: ICD-10-CM

## 2024-05-29 DIAGNOSIS — I10 HYPERTENSION, UNSPECIFIED TYPE: ICD-10-CM

## 2024-05-29 RX ORDER — ASPIRIN 81 MG/1
81 TABLET ORAL DAILY
COMMUNITY

## 2024-05-29 NOTE — PROGRESS NOTES
"      Subjective:     Encounter Date:05/29/2024      Patient ID: Carrie Golden is a 89 y.o. female.    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 Quiroz  Patient overall feels well, pacemaker site looks well-healed.  She has significant bruising in her lower extremities and upper extremities with Coumadin use and does not like frequent INR checks along with fluctuations with diet.  Interested in left atrial appendage occluder device implantation.  Discussed risk and benefits of the same, will schedule transesophageal echocardiogram to evaluate candidacy and will be seeing electrophysiologist later this week    5/29/2024-spoke to primary care physician, DVT was in the remote past, seen by electrophysiology, not candidate for Watchman device given advanced age and increased risk of stroke from procedure rather than atrial fibrillation.  Advised to start baby aspirin and stop Coumadin.  Overall feels well, complaining of some left shoulder pain and is concerned about prior rotator cuff injury, will be seeing primary care physician for the same.  Continues to have bruising on her lower extremity and that is bothering her.          Objective:         /90 (BP Location: Left arm, Patient Position: Sitting)   Pulse 82   Resp 18   Ht 157.5 cm (62\")   Wt 52.2 kg (115 lb)   SpO2 97%   BMI 21.03 kg/m²     Physical exam  General-no acute distress  CVS-S1-S2 normal, no murmur  Respiratory-clear breath sounds  GI-soft nontender abdomen  No pedal edema  Alert oriented X3    ROS:  14 point review of systems negative except as mentioned above    Current Outpatient Medications:     aspirin 81 MG EC tablet, Take 1 tablet by mouth Daily., " Disp: , Rfl:     furosemide (LASIX) 20 MG tablet, Take 1 tablet by mouth 2 (Two) Times a Day., Disp: 180 tablet, Rfl: 1    isosorbide mononitrate (IMDUR) 30 MG 24 hr tablet, Take 1 tablet by mouth once daily, Disp: 90 tablet, Rfl: 0    metoprolol succinate XL (TOPROL-XL) 25 MG 24 hr tablet, Take 1 tablet by mouth Daily., Disp: 90 tablet, Rfl: 3    Problem List:  Patient Active Problem List   Diagnosis    Chest pain    Dyslipidemia    Hypertension    Gastroesophageal reflux disease    NSTEMI, initial episode of care    Tear film insufficiency    After-cataract with vision obscured    Cervical radiculopathy    Lumbar radiculopathy    Metatarsalgia of left foot    Localized, primary osteoarthritis    Mixed hyperlipidemia    Colitis    Degeneration of lumbar intervertebral disc    Osteoarthritis of knee    Right leg pain    Pulmonary embolism    Acute deep vein thrombosis (DVT) of distal vein of right lower extremity    Iron deficiency anemia    Fall, initial encounter    Pelvic fracture    Paroxysmal atrial fibrillation    Coronary artery disease involving native coronary artery of native heart without angina pectoris    Bradycardia    Flutter-fibrillation    Atrial flutter with rapid ventricular response    Sick sinus syndrome    Status post placement of cardiac pacemaker    Chronic anticoagulation     Past Medical History:  Past Medical History:   Diagnosis Date    Amblyopia 10/31/2012    Amblyopia, left 03/12/2020    Arthritis     Conjunctival hemorrhage 10/08/2014    Coronary artery disease     Deep vein thrombosis     Deformity of left foot 08/18/2020    Added automatically from request for surgery [id number]    Dermatochalasis of left upper eyelid 03/12/2020    Dermatochalasis of right upper eyelid 03/12/2020    Drooping eyelid, bilateral     Ectropion 03/12/2020    Epiretinal membrane (ERM) of right eye 03/12/2020    Frequent UTI     GERD (gastroesophageal reflux disease)     Hallux rigidus, left foot 08/18/2020     Added automatically from request for surgery [id number]    Hearing loss     bilateral hearing aides    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 sure which 1  contracted at age 8    History of transfusion     [place] (hard of hearing)     Hyperlipidemia     Hypertension     Myocardial infarction     Past heart attack     X2 MILD LAST ONE OCT 2019    PONV (postoperative nausea and vomiting)     Presence of intraocular lens 03/12/2020     Past Surgical History:  Past Surgical History:   Procedure Laterality Date    ANTERIOR AND POSTERIOR VAGINAL REPAIR      BACK SURGERY      BLEPHAROPLASTY Bilateral 5/23/2019    Procedure: bilateral upper lid blepharoplasty;  Surgeon: Mauricio Pennington MD;  Location:  [facility];  Service: Ophthalmology    BROW LIFT Bilateral 2/13/2020    Procedure: BILATERAL TEMPORAL DIRECT BROWLIFT;  Surgeon: Sreekanth Bird MD;  Location:  [facility];  Service: Ophthalmology;  Laterality: Bilateral;    CARDIAC CATHETERIZATION N/A 12/3/2019    Procedure: Left Heart Cath;  Surgeon: Puma Briseno MD;  Location: [facility] CATH INVASIVE LOCATION;  Service: Cardiovascular    CARDIAC CATHETERIZATION N/A 12/3/2019    Procedure: Coronary angiography;  Surgeon: Puma Briseno MD;  Location: [facility] CATH INVASIVE LOCATION;  Service: Cardiovascular    CARDIAC CATHETERIZATION N/A 12/3/2019    Procedure: Left ventriculography;  Surgeon: Puma Briseno MD;  Location: [facility] CATH INVASIVE LOCATION;  Service: Cardiovascular    CARDIAC ELECTROPHYSIOLOGY PROCEDURE N/A 4/9/2024    Procedure: Pacemaker DC new, [employer] reps emailed -ML;  Surgeon: Bakari Garcia MD;  Location: [facility] CATH INVASIVE LOCATION;  Service: Cardiovascular;  Laterality: N/A;    CATARACT EXTRACTION EXTRACAPSULAR W/ INTRAOCULAR LENS IMPLANTATION Bilateral     CERVICAL SPINE ANTERIOR      with instrumentation    CHEILECTOMY Left 8/28/2020    Procedure: CHEILECTOMY;  Surgeon: GAB Rees DPM;  Location: [facility] MAIN OR;  Service: Podiatry;   Laterality: Left;    COLON SURGERY      for obstruction    ALBERTO TUBE INSERTION Bilateral 2019    Procedure: ALBERTO TUBE;  Surgeon: Mauricio Pennington MD;  Location: [facility];  Service: Ophthalmology    ECTROPION REPAIR Bilateral 2019    Procedure: bilateral lower lid ectropion repair, bilateral punctoplasty;  Surgeon: Mauricio Pennington MD;  Location: [facility];  Service: Ophthalmology    ENDOSCOPY N/A 2022    Procedure: ESOPHAGOGASTRODUODENOSCOPY WITH DILATATION (BALLOON 15MM-18MM, UP TO 18MM) AND BOUGIE #48;  Surgeon: Wiley Parks MD;  Location: [facility] ENDOSCOPY;  Service: Gastroenterology;  Laterality: N/A;  Post: DYSPHAGIA    EYE SURGERY      FOOT FUSION Left 2016    Procedure: LEFT HALLUX METATARSALPHALANGEAL ARTHRODESIS SILVER BUNIONECTOMY METATARSAL HEAD RESECTION 2-5 CALCANEAL BONE GRAFT;  Surgeon: Monster Harper Jr., MD;  Location: [facility] MAIN OR;  Service:     HYSTERECTOMY      INGUINAL HERNIA REPAIR Bilateral     METATARSAL OSTEOTOMY Left 2020    Procedure: Metatarsal head resection 5;  Surgeon: GAB Rees DPM;  Location: [facility] MAIN OR;  Service: Podiatry;  Laterality: Left;    ROTATOR CUFF REPAIR Right     SIGMOIDOSCOPY N/A 10/6/2021    Procedure: SIGMOIDOSCOPY WITH BIOPSY X1 AREA;  Surgeon: Uche Vaz MD;  Location: [facility] ENDOSCOPY;  Service: Gastroenterology;  Laterality: N/A;  ischemic colitis    STOMACH SURGERY      for ulcer     Social History:  Social History     Socioeconomic History    Marital status:    Tobacco Use    Smoking status: Former     Current packs/day: 0.00     Average packs/day: 0.3 packs/day for 10.0 years (2.5 ttl pk-yrs)     Types: Cigarettes     Start date: 1980     Quit date: 1990     Years since quittin.4    Smokeless tobacco: Never   Vaping Use    Vaping status: Never Used   Substance and Sexual Activity    Alcohol use: No    Drug use: No    Sexual activity: Defer     Allergies:  Allergies    Allergen Reactions    Amoxicillin Itching    Codeine Nausea And Vomiting and Dizziness    Lortab [Hydrocodone-Acetaminophen] Nausea And Vomiting    Nitrofurantoin Hives    Sulfa Antibiotics Rash     Immunizations:  Immunization History   Administered Date(s) Administered    COVID-19 (MODERNA) 1st,2nd,3rd Dose Monovalent 02/01/2021, 03/06/2021    TD Preservative Free (Tenivac) 03/14/2020            In-Office Procedure(s):  No orders to display        ASCVD RIsk Score::  The ASCVD Risk score (Erica ANTON, et al., 2019) failed to calculate for the following reasons:    The 2019 ASCVD risk score is only valid for ages 40 to 79    The patient has a prior MI or stroke diagnosis    Imaging:    Results for orders placed during the hospital encounter of 04/09/24    XR Chest 1 View    Narrative  XR CHEST 1 VW    Date of Exam: 4/9/2024 6:44 PM EDT    Indication: Post ICD / Pacer Implant    Comparison: Chest radiograph performed on March 25, 2024    Findings:  Ice pack overlying the left hemithorax limits diagnostic sensitivity and obscures the left lung base. Patient is post left subclavian dual-lead pacemaker placement. There is no evidence of pneumothorax. No focal consolidation or effusion visualized.  Atherosclerotic tissues of the thoracic aorta are visualized. The pulmonary vasculature appears within normal limits. Cardiac silhouette appears mildly enlarged. No acute osseous abnormality identified.    Impression  Impression:  Post left subclavian dual-lead pacemaker placement. No evidence of pneumothorax.      Electronically Signed: Mark Zhang MD  4/9/2024 6:55 PM EDT  Workstation ID: [id number]       Results for orders placed during the hospital encounter of 03/25/24    CT Head Without Contrast    Narrative  CT HEAD WO CONTRAST    Date of Exam: 3/25/2024 3:21 PM EDT    Indication: general weakness, headache.    Comparison: 7/17/2023    Technique: Axial CT images were obtained of the head without contrast  administration.  Coronal reconstructions were performed.  Automated exposure control and iterative reconstruction methods were used.      Findings:  There is no evidence of acute territorial infarction.    There is no acute intracranial hemorrhage. There are no extra-axial collections.    Ventricles and CSF spaces are symmetric. No mass effect nor hydrocephalus.    Brain parenchyma appears similar.    Paranasal sinuses and left mastoid air cells are well aerated. There is partial opacification of the right mastoid air cells. Findings are similar to prior examination.    Osseous structures and orbits appear intact.    Impression  Impression:  No acute intracranial process identified.      Electronically Signed: Christ Miranda MD  3/25/2024 3:37 PM EDT  Workstation ID: [id number]      Results for orders placed during the hospital encounter of 03/25/24    CT Head Without Contrast    Narrative  CT HEAD WO CONTRAST    Date of Exam: 3/25/2024 3:21 PM EDT    Indication: general weakness, headache.    Comparison: 7/17/2023    Technique: Axial CT images were obtained of the head without contrast administration.  Coronal reconstructions were performed.  Automated exposure control and iterative reconstruction methods were used.      Findings:  There is no evidence of acute territorial infarction.    There is no acute intracranial hemorrhage. There are no extra-axial collections.    Ventricles and CSF spaces are symmetric. No mass effect nor hydrocephalus.    Brain parenchyma appears similar.    Paranasal sinuses and left mastoid air cells are well aerated. There is partial opacification of the right mastoid air cells. Findings are similar to prior examination.    Osseous structures and orbits appear intact.    Impression  Impression:  No acute intracranial process identified.      Electronically Signed: Christ Miranda MD  3/25/2024 3:37 PM EDT  Workstation ID: [id number]        Most recent EKG as reviewed and interpreted by  me:  Procedures     Most recent echo as reviewed and interpreted by me:  Results for orders placed during the hospital encounter of 10/02/21    Adult Transthoracic Echo Complete W/ Cont if Necessary Per Protocol    Interpretation Summary  · Estimated left ventricular EF was in agreement with the calculated left ventricular EF. Left ventricular ejection fraction appears to be 56 - 60%. Left ventricular systolic function is normal.  · Left ventricular diastolic function is consistent with (grade II w/high LAP) pseudonormalization.  · Left atrial volume is mildly increased.  · Estimated right ventricular systolic pressure from tricuspid regurgitation is normal (<35 mmHg).      Most recent stress test as reviewed and interpreted by me:      Most recent cardiac catheterization as reviewed interpreted by me:  Results for orders placed during the hospital encounter of 12/01/19    Cardiac Catheterization/Vascular Study    Narrative  CARDIAC CATHETERIZATION REPORT      DATE OF PROCEDURE:  12/3/2019    INDICATION FOR PROCEDURE:    Non-ST elevation myocardial infarction  CAD  Angina pectoris    PROCEDURE PERFORMED:    Left heart catheterization  coronary angiography  left ventriculography    PROCEDURE COMMENTS:    After informed consent was obtained, the patient was prepped and draped in the usual sterile manner.  Mild to moderate sedation was administered.  Right femoral artery was accessed without difficulty and 6 Polish arterial sheath was inserted.  Sheath was flushed with heparinized saline.    Using 6 Polish Rebeca catheters, first left coronary artery and the right coronary was electively engaged and appropriate views were taken.  A 6 Polish JR4 catheter was used to cross aortic valve and left heart catheterization was performed.  Left ventriculography was done in a review.    Patient tolerated the procedure well.    FINDINGS:    1. HEMODYNAMICS:    Aortic pressure: 145/72    LVEDP: 10 mmHg    Gradient across aortic  valve on pullback: No gradient across aortic valve      2. LEFT VENTRICULOGRAPHY: 50 to 55% mild global hypokinesis      3. CORONARY ANGIOGRAPHY:    A: Left main coronary artery: Calcified and mild disease up to 10 to 20% in the ostium.  TIESHA-3 flow was present    B: Left anterior descending artery: Diffuse disease up to 30% in proximal mid LAD at the origin of D1 branch of LAD.  D1 branch of LAD has 60 to 70% ostial stenosis which is calcified.  This has not changed from cardiac catheterization done in 2017.  TIESHA-3 flow was present in both vessels    C: Left circumflex coronary artery: Diffuse 25 to 30% mid LCx stenosis    D: Right coronary artery: 20% proximal RCA stenosis.  It is large and dominant vessel.    SUMMARY:    Single-vessel coronary artery disease  Intermediate lesion of ostial D1 branch of LAD.  This was not different from cardiac catheterization done in 2017  Preserved left ventricular function with mild global hypokinesis    RECOMMENDATIONS:    Recommend medical treatment      Assessment & Plan :         Tachybradycardia syndrome  Status post pacemaker implantation  Follows with Dr Garcia  Site looks clean without signs of infection/bleeding     History of atrial flutter  IHS4AS3-GOKk score of 4 points  Significant bruising in lower and upper extremities, interested in watchman implantation  Stopped Coumadin after seeing electrophysiology, currently on baby aspirin  Not a candidate for left atrial appendage occluder device     History of DVT  Spoke with primary care physician, DVT was in the remote past, currently off Coumadin and on baby aspirin           Part of this note may be an electronic transcription/translation of spoken language to printed text using the Dragon Dictation System.    Dee Angela MD  05/29/24  .

## 2024-06-27 RX ORDER — ISOSORBIDE MONONITRATE 30 MG/1
TABLET, EXTENDED RELEASE ORAL
Qty: 90 TABLET | Refills: 0 | Status: SHIPPED | OUTPATIENT
Start: 2024-06-27

## 2024-07-05 ENCOUNTER — HOSPITAL ENCOUNTER (EMERGENCY)
Facility: HOSPITAL | Age: 89
Discharge: LEFT BEFORE DISCHARGE | End: 2024-07-05
Attending: EMERGENCY MEDICINE
Payer: MEDICARE

## 2024-07-05 ENCOUNTER — TELEPHONE (OUTPATIENT)
Dept: CARDIOLOGY | Facility: CLINIC | Age: 89
End: 2024-07-05
Payer: MEDICARE

## 2024-07-05 VITALS
DIASTOLIC BLOOD PRESSURE: 70 MMHG | HEART RATE: 66 BPM | BODY MASS INDEX: 21.16 KG/M2 | HEIGHT: 62 IN | SYSTOLIC BLOOD PRESSURE: 144 MMHG | TEMPERATURE: 97.8 F | OXYGEN SATURATION: 98 % | RESPIRATION RATE: 16 BRPM | WEIGHT: 115 LBS

## 2024-07-05 LAB
QT INTERVAL: 396 MS
QTC INTERVAL: 443 MS

## 2024-07-05 PROCEDURE — 93005 ELECTROCARDIOGRAM TRACING: CPT | Performed by: EMERGENCY MEDICINE

## 2024-07-05 PROCEDURE — 99211 OFF/OP EST MAY X REQ PHY/QHP: CPT | Performed by: EMERGENCY MEDICINE

## 2024-07-05 PROCEDURE — 93005 ELECTROCARDIOGRAM TRACING: CPT

## 2024-07-05 NOTE — TELEPHONE ENCOUNTER
Pt called stating she was having active symptoms of pain in the neck, arm, shoulder blade and the area where her pacemaker is located. I checked with Shea and per Shea pt was advised that we do not have a provider in clinic that can evaluate the pt and that the pt should go to the ER so she can be evaluated and properly treated. This message was relayed to the pt. Pt agreed and voiced understanding.

## 2024-07-25 ENCOUNTER — TELEPHONE (OUTPATIENT)
Dept: CARDIOLOGY | Facility: CLINIC | Age: 89
End: 2024-07-25
Payer: MEDICARE

## 2024-07-25 NOTE — TELEPHONE ENCOUNTER
REQUEST FOR CARDIAC CLEARANCE    Caller name: Carrie Golden     Phone Number: [phone]    Surgeon's name: DR. JALLOH    Type of planned surgery: TOOTH REMOVAL    Date of planned surgery: 7/30/24    Type of anesthesia: UNSURE    Have you been experiencing chest pain or shortness of breath? NO    Is your doctor requesting for you to stop any of your medications prior to your surgery? YES, ASPIRIN FOR 3 DAYS    Where should we fax the clearance to? [phone]

## 2024-08-19 PROCEDURE — 93294 REM INTERROG EVL PM/LDLS PM: CPT | Performed by: NURSE PRACTITIONER

## 2024-08-19 PROCEDURE — 93296 REM INTERROG EVL PM/IDS: CPT | Performed by: NURSE PRACTITIONER

## 2024-09-23 RX ORDER — ISOSORBIDE MONONITRATE 30 MG/1
TABLET, EXTENDED RELEASE ORAL
Qty: 90 TABLET | Refills: 0 | Status: SHIPPED | OUTPATIENT
Start: 2024-09-23

## 2024-10-09 NOTE — PROGRESS NOTES
Remote device interrogation received.    Patient noted to have paroxysmal atrial fibrillation on October 6 for approximately 7 hours.  Her overall burden of A-fib is 1.2%.    Patient previously was on warfarin but this was previously discontinued due to her advanced age, problems with increased bruising.    Will review with primary cardiologist

## 2024-11-11 NOTE — ANESTHESIA POSTPROCEDURE EVALUATION
Patient Education  Age appropriate physical activity and nutritional counseling were completed during today's visit.  Encourage more physical activity  Discussed HPV     Well Child Exam 11 to 14 Years   About this topic   Your child's well child exam is a visit with the doctor to check your child's health. The doctor measures your child's weight and height, and may measure your child's body mass index (BMI). The doctor plots these numbers on a growth curve. The growth curve gives a picture of your child's growth at each visit. The doctor may listen to your child's heart, lungs, and belly. Your doctor will do a full exam of your child from the head to the toes.  Your child may also need shots or blood tests during this visit.  General   Growth and Development   Your doctor will ask you how your child is developing. The doctor will focus on the skills that most children your child's age are expected to do. During this time of your child's life, here are some things you can expect.  Physical development ? Your child may:  Show signs of maturing physically  Need reminders about drinking water when playing  Be a little clumsy while growing  Hearing, seeing, and talking ? Your child may:  Be able to see the long-term effects of actions  Understand many viewpoints  Begin to question and challenge existing rules  Want to help set household rules  Feelings and behavior ? Your child may:  Want to spend time alone or with friends rather than with family  Have an interest in dating and the opposite sex  Value the opinions of friends over parents' thoughts or ideas  Want to push the limits of what is allowed  Believe bad things wont happen to them  Feeding ? Your child needs:  To learn to make healthy choices when eating. Serve healthy foods like lean meats, fruits, vegetables, and whole grains. Help your child choose healthy foods when out to eat.  To start each day with a healthy breakfast  To limit soda, chips, candy, and  Patient: Carrie Golden    Procedure Summary     Date: 10/06/21 Room / Location: [facility] ENDOSCOPY 1 / [facility] ENDOSCOPY    Anesthesia Start: 1518 Anesthesia Stop: 1539    Procedure: SIGMOIDOSCOPY WITH BIOPSY X1 AREA (N/A Anus) Diagnosis:       Rectal bleeding      (Rectal bleeding [K62.5])    Surgeons: Uche Vaz MD Provider: Elmer Deshpande MD    Anesthesia Type: MAC ASA Status: 4          Anesthesia Type: MAC    Vitals  Vitals Value Taken Time   /55 10/06/21 1553   Temp     Pulse 82 10/06/21 1554   Resp 21 10/06/21 1548   SpO2 92 % 10/06/21 1554   Vitals shown include unvalidated device data.        Post Anesthesia Care and Evaluation    Patient location during evaluation: PACU  Patient participation: complete - patient participated  Level of consciousness: awake  Pain scale: See nurse's notes for pain score.  Pain management: adequate  Airway patency: patent  Anesthetic complications: No anesthetic complications  PONV Status: none  Cardiovascular status: acceptable  Respiratory status: acceptable  Hydration status: acceptable    Comments: Patient seen and examined postoperatively; vital signs stable; SpO2 greater than or equal to 90%; cardiopulmonary status stable; nausea/vomiting adequately controlled; pain adequately controlled; no apparent anesthesia complications; patient discharged from anesthesia care when discharge criteria were met       foods that are high in fats and sugar  Healthy snacks available like fruit, cheese and crackers, or peanut butter  To eat meals as a part of the family. Turn the TV and cell phones off while eating. Talk about your day, rather than focusing on what your child is eating.  Sleep ? Your child:  Needs more sleep  Is likely sleeping about 8 to 10 hours in a row at night  Should be allowed to read each night before bed. Have your child brush and floss the teeth before going to bed as well.  Should limit TV and computers for the hour before bedtime  Keep cell phones, tablets, televisions, and other electronic devices out of bedrooms overnight. They interfere with sleep.  Needs a routine to make week nights easier. Encourage your child to get up at a normal time on weekends instead of sleeping late.  Shots or vaccines ? It is important for your child to get shots on time. This protects your child from very serious illnesses like pneumonia, blood and brain infections, tetanus, flu, or cancer. Your child may need:  HPV or human papillomavirus vaccine  Tdap or tetanus, diphtheria, and pertussis vaccine  Meningococcal vaccine  Influenza vaccine  Help for Parents   Activities.  Encourage your child to spend at least 1 hour each day being physically active.  Offer your child a variety of activities to take part in. Include music, sports, arts and crafts, and other things your child is interested in. Take care not to over schedule your child. One to 2 activities a week outside of school is often a good number for your child.  Make sure your child wears a helmet when using anything with wheels like skates, skateboard, bike, etc.  Encourage time spent with friends. Provide a safe area for this.  Here are some things you can do to help keep your child safe and healthy.  Talk to your child about the dangers of smoking, drinking alcohol, and using drugs. Do not allow anyone to smoke in your home or around your child.  Make sure your  child uses a seat belt when riding in the car. Your child should ride in the back seat until 13 years of age.  Talk with your child about peer pressure. Help your child learn how to handle risky things friends may want to do.  Remind your child to use headphones responsibly. Limit how loud the volume is turned up. Never wear headphones, text, or use a cell phone while riding a bike or crossing the street.  Protect your child from gun injuries. If you have a gun, use a trigger lock. Keep the gun locked up and the bullets kept in a separate place.  Limit screen time for children to 1 to 2 hours per day. This includes TV, phones, computers, and video games.  Discuss social media safety  Parents need to think about:  Monitoring your child's computer use, especially when on the Internet  How to keep open lines of communication about unwanted touch, sex, and dating  How to continue to talk about puberty  Having your child help with some family chores to encourage responsibility within the family  Helping children make healthy choices  The next well child visit will most likely be in 1 year. At this visit, your doctor may:  Do a full check up on your child  Talk about school, friends, and social skills  Talk about sexuality and sexually-transmitted diseases  Talk about driving and safety  When do I need to call the doctor?   Fever of 100.4°F (38°C) or higher  Your child has not started puberty by age 14  Low mood, suddenly getting poor grades, or missing school  You are worried about your child's development  Where can I learn more?   Centers for Disease Control and Prevention  https://www.cdc.gov/ncbddd/childdevelopment/positiveparenting/adolescence.html   Centers for Disease Control and Prevention  https://www.cdc.gov/vaccines/parents/diseases/teen/index.html   KidsHealth  http://kidshealth.org/parent/growth/medical/checkup_11yrs.html#mjr802    KidsHealth  http://kidshealth.org/parent/growth/medical/checkup_12yrs.html#jdp995   KidsHealth  http://kidshealth.org/parent/growth/medical/checkup_13yrs.html#ewe856   KidsHealth  http://kidshealth.org/parent/growth/medical/checkup_14yrs.html#   Last Reviewed Date   2019-10-14  Consumer Information Use and Disclaimer   This information is not specific medical advice and does not replace information you receive from your health care provider. This is only a brief summary of general information. It does NOT include all information about conditions, illnesses, injuries, tests, procedures, treatments, therapies, discharge instructions or life-style choices that may apply to you. You must talk with your health care provider for complete information about your health and treatment options. This information should not be used to decide whether or not to accept your health care providers advice, instructions or recommendations. Only your health care provider has the knowledge and training to provide advice that is right for you.  Copyright   Copyright © 2021 [employer] and its affiliates and/or licensors. All rights reserved.    At 9 years old, children who have outgrown the booster seat may use the adult safety belt fastened correctly.   If you have an active [facility] account, please look for your well child questionnaire to come to your [facility] account before your next well child visit.

## 2024-12-02 ENCOUNTER — CLINICAL SUPPORT NO REQUIREMENTS (OUTPATIENT)
Dept: CARDIOLOGY | Facility: CLINIC | Age: 89
End: 2024-12-02
Payer: MEDICARE

## 2024-12-02 ENCOUNTER — OFFICE VISIT (OUTPATIENT)
Dept: CARDIOLOGY | Facility: CLINIC | Age: 89
End: 2024-12-02
Payer: MEDICARE

## 2024-12-02 VITALS
WEIGHT: 120 LBS | OXYGEN SATURATION: 96 % | SYSTOLIC BLOOD PRESSURE: 153 MMHG | HEART RATE: 80 BPM | DIASTOLIC BLOOD PRESSURE: 84 MMHG | BODY MASS INDEX: 22.08 KG/M2 | HEIGHT: 62 IN | RESPIRATION RATE: 18 BRPM

## 2024-12-02 DIAGNOSIS — I49.5 SICK SINUS SYNDROME: Primary | ICD-10-CM

## 2024-12-02 DIAGNOSIS — Z95.0 STATUS POST PLACEMENT OF CARDIAC PACEMAKER: ICD-10-CM

## 2024-12-02 DIAGNOSIS — I49.5 SICK SINUS SYNDROME: ICD-10-CM

## 2024-12-02 DIAGNOSIS — I48.0 PAROXYSMAL ATRIAL FIBRILLATION: ICD-10-CM

## 2024-12-02 DIAGNOSIS — E78.5 DYSLIPIDEMIA: ICD-10-CM

## 2024-12-02 DIAGNOSIS — I10 HYPERTENSION, UNSPECIFIED TYPE: ICD-10-CM

## 2024-12-02 DIAGNOSIS — I48.92 ATRIAL FLUTTER WITH RAPID VENTRICULAR RESPONSE: Primary | ICD-10-CM

## 2024-12-02 NOTE — PROGRESS NOTES
Cardiology Clinic Note  Gavin Quiroz MD, PhD    Subjective:     Encounter Date:12/02/2024      Patient ID: Carrie Golden is a 89 y.o. female.    Chief Complaint:  Chief Complaint   Patient presents with    Follow-up     6 months       HPI:           I the pleasure to see this 89-year-old female who remains high functioning for age, she has a [employer]tronic dual-chamber pacemaker in place with tachybradycardia syndrome device interrogation below.  She has history of DVT right lower extremity, CAD, history of NSTEMI, essential hypertension dyslipidemia with preserved EF.  She denies any anginal chest pain, shortness of breath much improved since her hospitalization.  She presents back today for follow-up .  She is off beta-blocker and amiodarone, she is off Coumadin as well . no unexplained syncope dizziness,  no unstable angina swelling heart failure center symptoms or other complaints       Device interrogation reveals mode switching, DDDR, tracking rate 60/130, less than 2% atrial fibrillation, no VT, 12 years of battery life remaining     Review of systems otherwise negative x 14 point review of systems except as mentioned above  Historical data copied forward from previous encounters in EMR including the history, exam, and assessment/plan has been reviewed and is unchanged unless noted otherwise.     Cardiac medicines reviewed with risk, benefits, and necessity of each discussed.     Risk and benefit of cardiac testing reviewed including death heart attack stroke pain bleeding infection need for vascular /cardiovascular surgery were discussed and the patient      Objective:         Vitals reviewed below     Physical Exam  Regular rate and rhythm no gallop heave or lift, device site clean and dry thin skin no erosion  No new murmurs  No clubbing cyanosis with trace to 1+ edema at the ankles only stable  No carotid bruits or JVD  Clear to auscultation  Device site, thin skin no erosions  Assessment:      Independently  "manage medical conditions as follows     Paroxysmal A. fib, recurrent recurrent paroxysmal atrial fibrillation  Status post DC cardioversion  Pacemaker in situ  Continue to monitor  1.4% burden of atrial fibrillation  Off amiodarone  Off metoprolol to decrease pacing burden  Off Cardizem at this point, will consider in the future if blood pressures remain elevated         Blood pressure up today, advised her to check blood pressure twice a day at home and call these results to clinic over the next couple weeks for further titration of afterload     CAD, angina free, secondary prevention, long-acting nitrates on board  No orthostasis observed     Hypertension  Atherogenic dyslipidemia  stable     Off Cardizem  Lasix 20 twice daily as needed for swelling  Avoid dehydration     Preserved LV systolic function  Continue medical management and optimization of medical therapy and rate and rhythm control strategy     Multiple CV comorbidities reviewed individually and managed independently as above     Gavin Quiroz MD, PhD     Follow-up 6 months, no new issues, no indication for further cardiac testing    Objective:         /84 (BP Location: Right arm, Patient Position: Sitting, Cuff Size: Large Adult)   Pulse 80   Resp 18   Ht 157.5 cm (62\")   Wt 54.4 kg (120 lb)   SpO2 96%   BMI 21.95 kg/m²             The pleasure to be involved in this patient's cardiovascular care.  Please call with any questions or concerns  Gavin Quiroz MD, PhD    Most recent EKG as reviewed and interpreted by me:  Procedures     Most recent echo as reviewed and interpreted by me:  Results for orders placed during the hospital encounter of 10/02/21    Adult Transthoracic Echo Complete W/ Cont if Necessary Per Protocol    Interpretation Summary  · Estimated left ventricular EF was in agreement with the calculated left ventricular EF. Left ventricular ejection fraction appears to be 56 - 60%. Left ventricular systolic function is " normal.  · Left ventricular diastolic function is consistent with (grade II w/high LAP) pseudonormalization.  · Left atrial volume is mildly increased.  · Estimated right ventricular systolic pressure from tricuspid regurgitation is normal (<35 mmHg).      Most recent stress test as reviewed and interpreted by me:      Most recent cardiac catheterization as reviewed interpreted by me:  Results for orders placed during the hospital encounter of 12/01/19    Cardiac Catheterization/Vascular Study    Narrative  CARDIAC CATHETERIZATION REPORT      DATE OF PROCEDURE:  12/3/2019    INDICATION FOR PROCEDURE:    Non-ST elevation myocardial infarction  CAD  Angina pectoris    PROCEDURE PERFORMED:    Left heart catheterization  coronary angiography  left ventriculography    PROCEDURE COMMENTS:    After informed consent was obtained, the patient was prepped and draped in the usual sterile manner.  Mild to moderate sedation was administered.  Right femoral artery was accessed without difficulty and 6 Persian arterial sheath was inserted.  Sheath was flushed with heparinized saline.    Using 6 Persian Rebeac catheters, first left coronary artery and the right coronary was electively engaged and appropriate views were taken.  A 6 Persian JR4 catheter was used to cross aortic valve and left heart catheterization was performed.  Left ventriculography was done in a review.    Patient tolerated the procedure well.    FINDINGS:    1. HEMODYNAMICS:    Aortic pressure: 145/72    LVEDP: 10 mmHg    Gradient across aortic valve on pullback: No gradient across aortic valve      2. LEFT VENTRICULOGRAPHY: 50 to 55% mild global hypokinesis      3. CORONARY ANGIOGRAPHY:    A: Left main coronary artery: Calcified and mild disease up to 10 to 20% in the ostium.  TIESHA-3 flow was present    B: Left anterior descending artery: Diffuse disease up to 30% in proximal mid LAD at the origin of D1 branch of LAD.  D1 branch of LAD has 60 to 70% ostial  stenosis which is calcified.  This has not changed from cardiac catheterization done in 2017.  TIESHA-3 flow was present in both vessels    C: Left circumflex coronary artery: Diffuse 25 to 30% mid LCx stenosis    D: Right coronary artery: 20% proximal RCA stenosis.  It is large and dominant vessel.    SUMMARY:    Single-vessel coronary artery disease  Intermediate lesion of ostial D1 branch of LAD.  This was not different from cardiac catheterization done in 2017  Preserved left ventricular function with mild global hypokinesis    RECOMMENDATIONS:    Recommend medical treatment    The following portions of the patient's history were reviewed and updated as appropriate: allergies, current medications, past family history, past medical history, past social history, past surgical history, and problem list.      ROS:  14 point review of systems negative except as mentioned above    Current Outpatient Medications:     aspirin 81 MG EC tablet, Take 1 tablet by mouth Daily., Disp: , Rfl:     furosemide (LASIX) 20 MG tablet, Take 1 tablet by mouth 2 (Two) Times a Day., Disp: 180 tablet, Rfl: 1    isosorbide mononitrate (IMDUR) 30 MG 24 hr tablet, Take 1 tablet by mouth once daily, Disp: 90 tablet, Rfl: 0    Problem List:  Patient Active Problem List   Diagnosis    Chest pain    Dyslipidemia    Hypertension    Gastroesophageal reflux disease    NSTEMI, initial episode of care    Tear film insufficiency    After-cataract with vision obscured    Cervical radiculopathy    Lumbar radiculopathy    Metatarsalgia of left foot    Localized, primary osteoarthritis    Mixed hyperlipidemia    Colitis    Degeneration of lumbar intervertebral disc    Osteoarthritis of knee    Right leg pain    Pulmonary embolism    Acute deep vein thrombosis (DVT) of distal vein of right lower extremity    Iron deficiency anemia    Fall, initial encounter    Pelvic fracture    Paroxysmal atrial fibrillation    Coronary artery disease involving native  coronary artery of native heart without angina pectoris    Bradycardia    Flutter-fibrillation    Atrial flutter with rapid ventricular response    Sick sinus syndrome    Status post placement of cardiac pacemaker    Chronic anticoagulation     Past Medical History:  Past Medical History:   Diagnosis Date    Amblyopia 10/31/2012    Amblyopia, left 03/12/2020    Arthritis     Conjunctival hemorrhage 10/08/2014    Coronary artery disease     Deep vein thrombosis     Deformity of left foot 08/18/2020    Added automatically from request for surgery [id number]    Dermatochalasis of left upper eyelid 03/12/2020    Dermatochalasis of right upper eyelid 03/12/2020    Drooping eyelid, bilateral     Ectropion 03/12/2020    Epiretinal membrane (ERM) of right eye 03/12/2020    Frequent UTI     GERD (gastroesophageal reflux disease)     Hallux rigidus, left foot 08/18/2020    Added automatically from request for surgery [id number]    Hearing loss     bilateral hearing aides    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 sure which 1  contracted at age 8    History of transfusion     [place] (hard of hearing)     Hyperlipidemia     Hypertension     Myocardial infarction     Past heart attack     X2 MILD LAST ONE OCT 2019    PONV (postoperative nausea and vomiting)     Presence of intraocular lens 03/12/2020     Past Surgical History:  Past Surgical History:   Procedure Laterality Date    ANTERIOR AND POSTERIOR VAGINAL REPAIR      BACK SURGERY      BLEPHAROPLASTY Bilateral 5/23/2019    Procedure: bilateral upper lid blepharoplasty;  Surgeon: Mauricio Pennington MD;  [place] [facility] [place] [facility] [place];  Service: Ophthalmology    BROW LIFT Bilateral 2/13/2020    Procedure: BILATERAL TEMPORAL DIRECT BROWLIFT;  Surgeon: Sreekanth Bird MD;  Location: [facility] [place];  Service: Ophthalmology;  Laterality: Bilateral;    CARDIAC CATHETERIZATION N/A 12/3/2019    Procedure: Left Heart Cath;  Surgeon: Puma Briseno MD;  Location: [facility] CATH INVASIVE LOCATION;  Service:  Cardiovascular    CARDIAC CATHETERIZATION N/A 12/3/2019    Procedure: Coronary angiography;  Surgeon: Puma Briseno MD;  Location: [facility] CATH INVASIVE LOCATION;  Service: Cardiovascular    CARDIAC CATHETERIZATION N/A 12/3/2019    Procedure: Left ventriculography;  Surgeon: Puma Briseno MD;  Location: [facility] CATH INVASIVE LOCATION;  Service: Cardiovascular    CARDIAC ELECTROPHYSIOLOGY PROCEDURE N/A 4/9/2024    Procedure: Pacemaker DC new, [employer] reps emailed -ML;  Surgeon: Bakari Garcia MD;  Location: [facility] CATH INVASIVE LOCATION;  Service: Cardiovascular;  Laterality: N/A;    CATARACT EXTRACTION EXTRACAPSULAR W/ INTRAOCULAR LENS IMPLANTATION Bilateral     CERVICAL SPINE ANTERIOR      with instrumentation    CHEILECTOMY Left 8/28/2020    Procedure: CHEILECTOMY;  Surgeon: GAB Rees DPM;  Location: [facility] MAIN OR;  Service: Podiatry;  Laterality: Left;    COLON SURGERY      for obstruction    ALBERTO TUBE INSERTION Bilateral 5/23/2019    Procedure: ALBERTO TUBE;  Surgeon: Mauricio Pennington MD;  [facility];  Service: Ophthalmology    ECTROPION REPAIR Bilateral 5/23/2019    Procedure: bilateral lower lid ectropion repair, bilateral punctoplasty;  Surgeon: Mauricio Pennington MD;  [facility];  Service: Ophthalmology    ENDOSCOPY N/A 2/18/2022    Procedure: ESOPHAGOGASTRODUODENOSCOPY WITH DILATATION (BALLOON 15MM-18MM, UP TO 18MM) AND BOUGIE #48;  Surgeon: Wiley Parks MD;  Location: [facility] ENDOSCOPY;  Service: Gastroenterology;  Laterality: N/A;  Post: DYSPHAGIA    EYE SURGERY      FOOT FUSION Left 12/30/2016    Procedure: LEFT HALLUX METATARSALPHALANGEAL ARTHRODESIS SILVER BUNIONECTOMY METATARSAL HEAD RESECTION 2-5 CALCANEAL BONE GRAFT;  Surgeon: Monster Harper Jr., MD;  Location: [facility] MAIN OR;  Service:     HYSTERECTOMY      INGUINAL HERNIA REPAIR Bilateral     METATARSAL OSTEOTOMY Left 8/28/2020    Procedure: Metatarsal head resection 5;  Surgeon: GAB Rees  CELESTE Weaver;  Location: [facility] MAIN OR;  Service: Podiatry;  Laterality: Left;    ROTATOR CUFF REPAIR Right     SIGMOIDOSCOPY N/A 10/6/2021    Procedure: SIGMOIDOSCOPY WITH BIOPSY X1 AREA;  Surgeon: Uche Vaz MD;  Location: [facility] ENDOSCOPY;  Service: Gastroenterology;  Laterality: N/A;  ischemic colitis    STOMACH SURGERY      for ulcer     Social History:  Social History     Socioeconomic History    Marital status:    Tobacco Use    Smoking status: Former     Current packs/day: 0.00     Average packs/day: 0.3 packs/day for 10.0 years (2.5 ttl pk-yrs)     Types: Cigarettes     Start date: 1980     Quit date: 1990     Years since quittin.9     Passive exposure: Past    Smokeless tobacco: Never   Vaping Use    Vaping status: Never Used   Substance and Sexual Activity    Alcohol use: No    Drug use: No    Sexual activity: Defer     Allergies:  Allergies   Allergen Reactions    Amoxicillin Itching    Codeine Nausea And Vomiting and Dizziness    Lortab [Hydrocodone-Acetaminophen] Nausea And Vomiting    Nitrofurantoin Hives    Sulfa Antibiotics Rash     Immunizations:  Immunization History   Administered Date(s) Administered    COVID-19 (MODERNA) 1st,2nd,3rd Dose Monovalent 2021, 2021    TD Preservative Free (Tenivac) 2020            In-Office Procedure(s):  No orders to display        ASCVD RIsk Score::  The ASCVD Risk score (Erica ANTON, et al., 2019) failed to calculate for the following reasons:    The 2019 ASCVD risk score is only valid for ages 40 to 79    Risk score cannot be calculated because patient has a medical history suggesting prior/existing ASCVD    Imaging:    Results for orders placed during the hospital encounter of 24    XR Chest 1 View    Narrative  XR CHEST 1 VW    Date of Exam: 2024 6:44 PM EDT    Indication: Post ICD / Pacer Implant    Comparison: Chest radiograph performed on 2024    Findings:  Ice pack overlying the left hemithorax  limits diagnostic sensitivity and obscures the left lung base. Patient is post left subclavian dual-lead pacemaker placement. There is no evidence of pneumothorax. No focal consolidation or effusion visualized.  Atherosclerotic tissues of the thoracic aorta are visualized. The pulmonary vasculature appears within normal limits. Cardiac silhouette appears mildly enlarged. No acute osseous abnormality identified.    Impression  Impression:  Post left subclavian dual-lead pacemaker placement. No evidence of pneumothorax.      Electronically Signed: Mark Zhang MD  4/9/2024 6:55 PM EDT  Workstation ID: [id number]       Results for orders placed during the hospital encounter of 03/25/24    CT Head Without Contrast    Narrative  CT HEAD WO CONTRAST    Date of Exam: 3/25/2024 3:21 PM EDT    Indication: general weakness, headache.    Comparison: 7/17/2023    Technique: Axial CT images were obtained of the head without contrast administration.  Coronal reconstructions were performed.  Automated exposure control and iterative reconstruction methods were used.      Findings:  There is no evidence of acute territorial infarction.    There is no acute intracranial hemorrhage. There are no extra-axial collections.    Ventricles and CSF spaces are symmetric. No mass effect nor hydrocephalus.    Brain parenchyma appears similar.    Paranasal sinuses and left mastoid air cells are well aerated. There is partial opacification of the right mastoid air cells. Findings are similar to prior examination.    Osseous structures and orbits appear intact.    Impression  Impression:  No acute intracranial process identified.      Electronically Signed: Christ Miranda MD  3/25/2024 3:37 PM EDT  Workstation ID: [id number]      Results for orders placed during the hospital encounter of 03/25/24    CT Head Without Contrast    Narrative  CT HEAD WO CONTRAST    Date of Exam: 3/25/2024 3:21 PM EDT    Indication: general weakness,  headache.    Comparison: 7/17/2023    Technique: Axial CT images were obtained of the head without contrast administration.  Coronal reconstructions were performed.  Automated exposure control and iterative reconstruction methods were used.      Findings:  There is no evidence of acute territorial infarction.    There is no acute intracranial hemorrhage. There are no extra-axial collections.    Ventricles and CSF spaces are symmetric. No mass effect nor hydrocephalus.    Brain parenchyma appears similar.    Paranasal sinuses and left mastoid air cells are well aerated. There is partial opacification of the right mastoid air cells. Findings are similar to prior examination.    Osseous structures and orbits appear intact.    Impression  Impression:  No acute intracranial process identified.      Electronically Signed: Christ Miranda MD  3/25/2024 3:37 PM EDT  Workstation ID: [id number]      Lab Review:   Admission on 07/05/2024, Discharged on 07/05/2024   Component Date Value    QT Interval 07/05/2024 396     QTC Interval 07/05/2024 443      Recent labs reviewed and interpreted for clinical significance and application            Level of Care:           Gavin Quiroz MD  12/02/24  .

## 2024-12-04 NOTE — PROGRESS NOTES
DEVICE INTERROGATION:  IN OFFICE    DEVICE TYPE:   Dual-chamber pacemaker    :   [employer]    BATTERY:  Stable    TIME TO ELECTIVE REPLACEMENT INDICATORS:   12.9 years    CHARGE TIME:   Not applicable        LEAD DATA:       DEVICE REPROGRAMMED FOR TESTING      Atrial:   1.9 mV, 361 ohms, 0.625 V@0.4 ms    Ventricular:     10.1 mV, 475 ohms, 0.875 V@0.4 ms    LV:      Pacemaker dependent:   No      Atrial pacing percentage: 85%    Ventricular pacing percentage: Less than 0.1%      Arrhythmia Logbook Reviewed: No A-fib        Summary:    Stable Device Function    No significant arhythmia burden.     Battery status is stable.    Reviewed with: Dr. Quiroz      NEXT IN OFFICE DEVICE CHECK DUE: 6-month    REMOTE DEVICE INTERROGATIONS: Ongoing

## 2024-12-19 RX ORDER — ISOSORBIDE MONONITRATE 30 MG/1
TABLET, EXTENDED RELEASE ORAL
Qty: 90 TABLET | Refills: 0 | Status: SHIPPED | OUTPATIENT
Start: 2024-12-19

## 2024-12-26 ENCOUNTER — TELEPHONE (OUTPATIENT)
Dept: CARDIOLOGY | Facility: CLINIC | Age: 89
End: 2024-12-26

## 2024-12-26 NOTE — TELEPHONE ENCOUNTER
Caller: Carrie Golden    Relationship: Self    Best call back number: [phone]    Requested Prescriptions:   Requested Prescriptions      No prescriptions requested or ordered in this encounter        Pharmacy where request should be sent: [address] [phone] [place] [phone] [facility]     Last office visit with prescribing clinician: 12/2/2024   Last telemedicine visit with prescribing clinician: Visit date not found   Next office visit with prescribing clinician: 6/3/2025     Additional details provided by patient: PATIENT WAS GIVEN METOPROLOL 25MG IN HOSPITAL BY DR THAYER. PATIENT OUT OF MEDICATION. PLEASE ADVISE NO MEDICATION ON LIST FOR METOPROLOL.    Does the patient have less than a 3 day supply:  [x] Yes  [] No    Would you like a call back once the refill request has been completed: [x] Yes [] No    If the office needs to give you a call back, can they leave a voicemail: [x] Yes [] No    Santi Huang Rep   12/26/24 08:41 EST

## 2025-03-18 RX ORDER — ISOSORBIDE MONONITRATE 30 MG/1
30 TABLET, EXTENDED RELEASE ORAL DAILY
Qty: 90 TABLET | Refills: 0 | Status: SHIPPED | OUTPATIENT
Start: 2025-03-18

## 2025-04-04 ENCOUNTER — TELEPHONE (OUTPATIENT)
Dept: CARDIOLOGY | Facility: CLINIC | Age: OVER 89
End: 2025-04-04
Payer: MEDICARE

## 2025-04-04 NOTE — TELEPHONE ENCOUNTER
Patient returned phone call and stated that she has not had any symptoms and that her daily HR measurement that she takes has been in a normal range.

## 2025-04-04 NOTE — TELEPHONE ENCOUNTER
Remote Device transmission reviewed.    Patient has had 12 episodes of Afib/Aflutter with longest lasting 9 hours 26 minutes and the current episode is ongoing.    Average HR during these episodes appears to be .  However she has had several instances of RVR with a max rate of 167.    There are 4 very brief episodes of SVT less than 1 second.    There is 1 episode of NSVT 7 beats.    Left message for patient to call back so I can ask her about symptoms.  Will review with provider.

## 2025-06-03 ENCOUNTER — CLINICAL SUPPORT NO REQUIREMENTS (OUTPATIENT)
Dept: CARDIOLOGY | Facility: CLINIC | Age: OVER 89
End: 2025-06-03
Payer: MEDICARE

## 2025-06-03 ENCOUNTER — OFFICE VISIT (OUTPATIENT)
Dept: CARDIOLOGY | Facility: CLINIC | Age: OVER 89
End: 2025-06-03
Payer: MEDICARE

## 2025-06-03 VITALS
RESPIRATION RATE: 18 BRPM | DIASTOLIC BLOOD PRESSURE: 88 MMHG | OXYGEN SATURATION: 94 % | SYSTOLIC BLOOD PRESSURE: 163 MMHG | BODY MASS INDEX: 21.35 KG/M2 | WEIGHT: 116 LBS | HEIGHT: 62 IN | HEART RATE: 70 BPM

## 2025-06-03 DIAGNOSIS — E78.5 DYSLIPIDEMIA: ICD-10-CM

## 2025-06-03 DIAGNOSIS — I10 HYPERTENSION, UNSPECIFIED TYPE: ICD-10-CM

## 2025-06-03 DIAGNOSIS — Z95.0 STATUS POST PLACEMENT OF CARDIAC PACEMAKER: ICD-10-CM

## 2025-06-03 DIAGNOSIS — I48.0 PAROXYSMAL ATRIAL FIBRILLATION: ICD-10-CM

## 2025-06-03 DIAGNOSIS — I49.5 SICK SINUS SYNDROME: Primary | ICD-10-CM

## 2025-06-03 DIAGNOSIS — I48.92 ATRIAL FLUTTER WITH RAPID VENTRICULAR RESPONSE: ICD-10-CM

## 2025-06-03 RX ORDER — AMIODARONE HYDROCHLORIDE 200 MG/1
100 TABLET ORAL DAILY
Qty: 15 TABLET | Refills: 5 | Status: SHIPPED | OUTPATIENT
Start: 2025-06-03

## 2025-06-03 NOTE — PROGRESS NOTES
Cardiology Clinic Note  Gavin Quiroz MD, PhD    Subjective:     Encounter Date:06/03/2025      Patient ID: Carrie Golden is a 89 y.o. female.    Chief Complaint:  Chief Complaint   Patient presents with    Follow-up       HPI:           I the pleasure to see this 89-year-old female who remains high functioning for age, she has a [employer] dual-chamber pacemaker in place with tachybradycardia syndrome device interrogation below.  She has history of DVT right lower extremity, CAD, history of NSTEMI, essential hypertension dyslipidemia with preserved EF.  She denies any anginal chest pain, shortness of breath much improved since her hospitalization.  She presents back today for follow-up .  She is off beta-blocker and amiodarone, she is off Coumadin as well . no unexplained syncope dizziness,  no unstable angina swelling heart failure center symptoms or other complaints on today's repeat encounter.  Reports some bruising, she is off anticoagulation on aspirin alone.  We discussed decreasing her amiodarone to 100 daily.        Device interrogation reveals mode switching, DDDR, tracking rate 60/130, less than 2% atrial fibrillation, no VT, 10 years of battery life remaining     Review of systems otherwise negative x 14 point review of systems except as mentioned above  Historical data copied forward from previous encounters in EMR including the history, exam, and assessment/plan has been reviewed and is unchanged unless noted otherwise.     Cardiac medicines reviewed with risk, benefits, and necessity of each discussed.     Risk and benefit of cardiac testing reviewed including death heart attack stroke pain bleeding infection need for vascular /cardiovascular surgery were discussed and the patient      Objective:         Vitals reviewed below     Physical Exam  Regular rate and rhythm no gallop heave or lift, device site clean and dry thin skin no erosion  No new murmurs  No clubbing cyanosis with trace to 1+ edema  "at the ankles only stable  No carotid bruits or JVD  Clear to auscultation  Device site, thin skin no erosions  Assessment:      Independently manage medical conditions as follows     Paroxysmal A. fib, recurrent recurrent paroxysmal atrial fibrillation  Status post DC cardioversion  Pacemaker in situ  Continue to monitor  1.4% burden of atrial fibrillation  Amiodarone decreased to 100 daily  Off metoprolol to decrease pacing burden  Off Cardizem     Blood pressures better 120-130 systolic      CAD, angina free, secondary prevention, long-acting nitrates on board  No orthostasis observed     Hypertension  Atherogenic dyslipidemia  stable     Off Cardizem  Lasix 20 once/twice daily as needed for swelling  Avoid dehydration     Preserved LV systolic function  Continue medical management and optimization of medical therapy and rate and rhythm control strategy     Multiple CV comorbidities reviewed individually and managed independently as above     Gavin Quiroz MD, PhD     Follow-up 6 months, no new issues, no indication for further cardiac testing    Objective:         /88 (BP Location: Right arm, Patient Position: Sitting)   Pulse 70   Resp 18   Ht 157.5 cm (62\")   Wt 52.6 kg (116 lb)   SpO2 94%   BMI 21.22 kg/m²     Physical Exam    Assessment:         There are no diagnoses linked to this encounter.       Plan:              The pleasure to be involved in this patient's cardiovascular care.  Please call with any questions or concerns  Gavin Quiroz MD, PhD    Most recent EKG as reviewed and interpreted by me:  Procedures     Most recent echo as reviewed and interpreted by me:  Results for orders placed during the hospital encounter of 10/02/21    Adult Transthoracic Echo Complete W/ Cont if Necessary Per Protocol    Interpretation Summary  · Estimated left ventricular EF was in agreement with the calculated left ventricular EF. Left ventricular ejection fraction appears to be 56 - 60%. Left " ventricular systolic function is normal.  · Left ventricular diastolic function is consistent with (grade II w/high LAP) pseudonormalization.  · Left atrial volume is mildly increased.  · Estimated right ventricular systolic pressure from tricuspid regurgitation is normal (<35 mmHg).      Most recent stress test as reviewed and interpreted by me:      Most recent cardiac catheterization as reviewed interpreted by me:  Results for orders placed during the hospital encounter of 12/01/19    Cardiac Catheterization/Vascular Study    Narrative  CARDIAC CATHETERIZATION REPORT      DATE OF PROCEDURE:  12/3/2019    INDICATION FOR PROCEDURE:    Non-ST elevation myocardial infarction  CAD  Angina pectoris    PROCEDURE PERFORMED:    Left heart catheterization  coronary angiography  left ventriculography    PROCEDURE COMMENTS:    After informed consent was obtained, the patient was prepped and draped in the usual sterile manner.  Mild to moderate sedation was administered.  Right femoral artery was accessed without difficulty and 6 Irish arterial sheath was inserted.  Sheath was flushed with heparinized saline.    Using 6 Irish Rebeca catheters, first left coronary artery and the right coronary was electively engaged and appropriate views were taken.  A 6 Irish JR4 catheter was used to cross aortic valve and left heart catheterization was performed.  Left ventriculography was done in a review.    Patient tolerated the procedure well.    FINDINGS:    1. HEMODYNAMICS:    Aortic pressure: 145/72    LVEDP: 10 mmHg    Gradient across aortic valve on pullback: No gradient across aortic valve      2. LEFT VENTRICULOGRAPHY: 50 to 55% mild global hypokinesis      3. CORONARY ANGIOGRAPHY:    A: Left main coronary artery: Calcified and mild disease up to 10 to 20% in the ostium.  TIESHA-3 flow was present    B: Left anterior descending artery: Diffuse disease up to 30% in proximal mid LAD at the origin of D1 branch of LAD.  D1 branch of  LAD has 60 to 70% ostial stenosis which is calcified.  This has not changed from cardiac catheterization done in 2017.  TIESHA-3 flow was present in both vessels    C: Left circumflex coronary artery: Diffuse 25 to 30% mid LCx stenosis    D: Right coronary artery: 20% proximal RCA stenosis.  It is large and dominant vessel.    SUMMARY:    Single-vessel coronary artery disease  Intermediate lesion of ostial D1 branch of LAD.  This was not different from cardiac catheterization done in 2017  Preserved left ventricular function with mild global hypokinesis    RECOMMENDATIONS:    Recommend medical treatment    The following portions of the patient's history were reviewed and updated as appropriate: allergies, current medications, past family history, past medical history, past social history, past surgical history, and problem list.      ROS:  14 point review of systems negative except as mentioned above    Current Outpatient Medications:     amiodarone (PACERONE) 200 MG tablet, Take 1 tablet by mouth Daily., Disp: 30 tablet, Rfl: 5    aspirin 81 MG EC tablet, Take 1 tablet by mouth Daily., Disp: , Rfl:     furosemide (LASIX) 20 MG tablet, Take 1 tablet by mouth 2 (Two) Times a Day., Disp: 180 tablet, Rfl: 1    isosorbide mononitrate (IMDUR) 30 MG 24 hr tablet, Take 1 tablet by mouth once daily, Disp: 90 tablet, Rfl: 0    Problem List:  Patient Active Problem List   Diagnosis    Chest pain    Dyslipidemia    Hypertension    Gastroesophageal reflux disease    NSTEMI, initial episode of care    Tear film insufficiency    After-cataract with vision obscured    Cervical radiculopathy    Lumbar radiculopathy    Metatarsalgia of left foot    Localized, primary osteoarthritis    Mixed hyperlipidemia    Colitis    Degeneration of lumbar intervertebral disc    Osteoarthritis of knee    Right leg pain    Pulmonary embolism    Acute deep vein thrombosis (DVT) of distal vein of right lower extremity    Iron deficiency anemia    Fall,  initial encounter    Pelvic fracture    Paroxysmal atrial fibrillation    Coronary artery disease involving native coronary artery of native heart without angina pectoris    Bradycardia    Flutter-fibrillation    Atrial flutter with rapid ventricular response    Sick sinus syndrome    Status post placement of cardiac pacemaker    Chronic anticoagulation     Past Medical History:  Past Medical History:   Diagnosis Date    Amblyopia 10/31/2012    Amblyopia, left 03/12/2020    Arthritis     Conjunctival hemorrhage 10/08/2014    Coronary artery disease     Deep vein thrombosis     Deformity of left foot 08/18/2020    Added automatically from request for surgery [id number]    Dermatochalasis of left upper eyelid 03/12/2020    Dermatochalasis of right upper eyelid 03/12/2020    Drooping eyelid, bilateral     Ectropion 03/12/2020    Epiretinal membrane (ERM) of right eye 03/12/2020    Frequent UTI     GERD (gastroesophageal reflux disease)     Hallux rigidus, left foot 08/18/2020    Added automatically from request for surgery [id number]    Hearing loss     bilateral hearing aides    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 sure which 1  contracted at age 8    History of transfusion     [place] (hard of hearing)     Hyperlipidemia     Hypertension     Myocardial infarction     Past heart attack     X2 MILD LAST ONE OCT 2019    PONV (postoperative nausea and vomiting)     Presence of intraocular lens 03/12/2020     Past Surgical History:  Past Surgical History:   Procedure Laterality Date    ANTERIOR AND POSTERIOR VAGINAL REPAIR      BACK SURGERY      BLEPHAROPLASTY Bilateral 5/23/2019    Procedure: bilateral upper lid blepharoplasty;  Surgeon: Mauricio Pennington MD;  Location: [facility] OR [facility];  Service: Ophthalmology    BROW LIFT Bilateral 2/13/2020    Procedure: BILATERAL TEMPORAL DIRECT BROWLIFT;  Surgeon: Sreekanth Bird MD;  Location: [facility] OR [facility];  Service: Ophthalmology;  Laterality: Bilateral;    CARDIAC CATHETERIZATION N/A  12/3/2019    Procedure: Left Heart Cath;  Surgeon: Puma Briseno MD;  Location: [facility] CATH INVASIVE LOCATION;  Service: Cardiovascular    CARDIAC CATHETERIZATION N/A 12/3/2019    Procedure: Coronary angiography;  Surgeon: Puma Briseno MD;  Location: [facility] CATH INVASIVE LOCATION;  Service: Cardiovascular    CARDIAC CATHETERIZATION N/A 12/3/2019    Procedure: Left ventriculography;  Surgeon: Puma Briseno MD;  Location: [facility] CATH INVASIVE LOCATION;  Service: Cardiovascular    CARDIAC ELECTROPHYSIOLOGY PROCEDURE N/A 4/9/2024    Procedure: Pacemaker DC new, [employer] reps emailed -ML;  Surgeon: Bakari Garcia MD;  Location: [facility] CATH INVASIVE LOCATION;  Service: Cardiovascular;  Laterality: N/A;    CATARACT EXTRACTION EXTRACAPSULAR W/ INTRAOCULAR LENS IMPLANTATION Bilateral     CERVICAL SPINE ANTERIOR      with instrumentation    CHEILECTOMY Left 8/28/2020    Procedure: CHEILECTOMY;  Surgeon: GAB Rees DPM;  Location: [facility] MAIN OR;  Service: Podiatry;  Laterality: Left;    COLON SURGERY      for obstruction    ALBERTO TUBE INSERTION Bilateral 5/23/2019    Procedure: ALBERTO TUBE;  Surgeon: Mauricio Pennington MD;  Location: [facility] OR [place];  Service: Ophthalmology    ECTROPION REPAIR Bilateral 5/23/2019    Procedure: bilateral lower lid ectropion repair, bilateral punctoplasty;  Surgeon: Mauricio Pennington MD;  Location: [facility] OR [facility];  Service: Ophthalmology    ENDOSCOPY N/A 2/18/2022    Procedure: ESOPHAGOGASTRODUODENOSCOPY WITH DILATATION (BALLOON 15MM-18MM, UP TO 18MM) AND BOUGIE #48;  Surgeon: Wiley Parks MD;  Location: [facility] ENDOSCOPY;  Service: Gastroenterology;  Laterality: N/A;  Post: DYSPHAGIA    EYE SURGERY      FOOT FUSION Left 12/30/2016    Procedure: LEFT HALLUX METATARSALPHALANGEAL ARTHRODESIS SILVER BUNIONECTOMY METATARSAL HEAD RESECTION 2-5 CALCANEAL BONE GRAFT;  Surgeon: Monster Harper Jr., MD;  Location: [facility] MAIN OR;  Service:     HYSTERECTOMY      INGUINAL HERNIA  REPAIR Bilateral     METATARSAL OSTEOTOMY Left 2020    Procedure: Metatarsal head resection 5;  Surgeon: GAB Rees DPM;  Location: [facility] MAIN OR;  Service: Podiatry;  Laterality: Left;    ROTATOR CUFF REPAIR Right     SIGMOIDOSCOPY N/A 10/6/2021    Procedure: SIGMOIDOSCOPY WITH BIOPSY X1 AREA;  Surgeon: Uche Vaz MD;  Location: [facility] ENDOSCOPY;  Service: Gastroenterology;  Laterality: N/A;  ischemic colitis    STOMACH SURGERY      for ulcer     Social History:  Social History     Socioeconomic History    Marital status:    Tobacco Use    Smoking status: Former     Current packs/day: 0.00     Average packs/day: 0.3 packs/day for 10.0 years (2.5 ttl pk-yrs)     Types: Cigarettes     Start date: 1980     Quit date: 1990     Years since quittin.4     Passive exposure: Past    Smokeless tobacco: Never   Vaping Use    Vaping status: Never Used   Substance and Sexual Activity    Alcohol use: No    Drug use: No    Sexual activity: Defer     Allergies:  Allergies   Allergen Reactions    Amoxicillin Itching    Codeine Nausea And Vomiting and Dizziness    Lortab [Hydrocodone-Acetaminophen] Nausea And Vomiting    Nitrofurantoin Hives    Sulfa Antibiotics Rash     Immunizations:  Immunization History   Administered Date(s) Administered    COVID-19 (MODERNA) 1st,2nd,3rd Dose Monovalent 2021, 2021    TD Preservative Free (Tenivac) 2020            In-Office Procedure(s):  No orders to display        ASCVD RIsk Score::  The ASCVD Risk score (Erica DK, et al., 2019) failed to calculate for the following reasons:    The 2019 ASCVD risk score is only valid for ages 40 to 79    Risk score cannot be calculated because patient has a medical history suggesting prior/existing ASCVD    Imaging:    Results for orders placed during the hospital encounter of 24    XR Chest 1 View    Narrative  XR CHEST 1 VW    Date of Exam: 2024 6:44 PM EDT    Indication: Post ICD / Pacer  Implant    Comparison: Chest radiograph performed on March 25, 2024    Findings:  Ice pack overlying the left hemithorax limits diagnostic sensitivity and obscures the left lung base. Patient is post left subclavian dual-lead pacemaker placement. There is no evidence of pneumothorax. No focal consolidation or effusion visualized.  Atherosclerotic tissues of the thoracic aorta are visualized. The pulmonary vasculature appears within normal limits. Cardiac silhouette appears mildly enlarged. No acute osseous abnormality identified.    Impression  Impression:  Post left subclavian dual-lead pacemaker placement. No evidence of pneumothorax.      Electronically Signed: Mark Zhang MD  4/9/2024 6:55 PM EDT  Workstation ID: [id number]       Results for orders placed during the hospital encounter of 03/25/24    CT Head Without Contrast    Narrative  CT HEAD WO CONTRAST    Date of Exam: 3/25/2024 3:21 PM EDT    Indication: general weakness, headache.    Comparison: 7/17/2023    Technique: Axial CT images were obtained of the head without contrast administration.  Coronal reconstructions were performed.  Automated exposure control and iterative reconstruction methods were used.      Findings:  There is no evidence of acute territorial infarction.    There is no acute intracranial hemorrhage. There are no extra-axial collections.    Ventricles and CSF spaces are symmetric. No mass effect nor hydrocephalus.    Brain parenchyma appears similar.    Paranasal sinuses and left mastoid air cells are well aerated. There is partial opacification of the right mastoid air cells. Findings are similar to prior examination.    Osseous structures and orbits appear intact.    Impression  Impression:  No acute intracranial process identified.      Electronically Signed: Christ Miranda MD  3/25/2024 3:37 PM EDT  Workstation ID: [id number]      Results for orders placed during the hospital encounter of 03/25/24    CT Head Without  Contrast    Narrative  CT HEAD WO CONTRAST    Date of Exam: 3/25/2024 3:21 PM EDT    Indication: general weakness, headache.    Comparison: 7/17/2023    Technique: Axial CT images were obtained of the head without contrast administration.  Coronal reconstructions were performed.  Automated exposure control and iterative reconstruction methods were used.      Findings:  There is no evidence of acute territorial infarction.    There is no acute intracranial hemorrhage. There are no extra-axial collections.    Ventricles and CSF spaces are symmetric. No mass effect nor hydrocephalus.    Brain parenchyma appears similar.    Paranasal sinuses and left mastoid air cells are well aerated. There is partial opacification of the right mastoid air cells. Findings are similar to prior examination.    Osseous structures and orbits appear intact.    Impression  Impression:  No acute intracranial process identified.      Electronically Signed: Christ Miranda MD  3/25/2024 3:37 PM EDT  Workstation ID: [id number]      Lab Review:   No visits with results within 6 Month(s) from this visit.   Latest known visit with results is:   Admission on 07/05/2024, Discharged on 07/05/2024   Component Date Value    QT Interval 07/05/2024 396     QTC Interval 07/05/2024 443      Recent labs reviewed and interpreted for clinical significance and application            Level of Care:           Gavin Quiroz MD  06/03/25  .

## 2025-06-04 NOTE — PROGRESS NOTES
DEVICE INTERROGATION:  IN OFFICE    DEVICE TYPE:   Dual-chamber pacemaker    :   2359 Media    BATTERY:  Stable    TIME TO ELECTIVE REPLACEMENT INDICATORS:   12.60    CHARGE TIME:   Not applicable        LEAD DATA:       DEVICE REPROGRAMMED FOR TESTING      Atrial:   1.0 mV, 61 ohms, 0.75 V@0.4 ms    Ventricular:     11.1 mV, 646 ohms, 1.25 V@0.4 ms    LV:      Pacemaker dependent:   No      Atrial pacing percentage: 53.8%    Ventricular pacing percentage: Less than 0.1%      Arrhythmia Logbook Reviewed: Very brief SVT        Summary:    Stable Device Function    No significant arhythmia burden.     Battery status is stable.    Reviewed with: Dr. Quiroz      NEXT IN OFFICE DEVICE CHECK DUE: 1 year    REMOTE DEVICE INTERROGATIONS: Ongoing

## 2025-06-16 RX ORDER — ISOSORBIDE MONONITRATE 30 MG/1
30 TABLET, EXTENDED RELEASE ORAL DAILY
Qty: 90 TABLET | Refills: 3 | Status: SHIPPED | OUTPATIENT
Start: 2025-06-16

## 2025-07-10 LAB
MC_CV_MDC_IDC_RATE_1: 150
MC_CV_MDC_IDC_RATE_1: 171
MC_CV_MDC_IDC_THERAPIES: NORMAL
MC_CV_MDC_IDC_ZONE_ID: 2
MC_CV_MDC_IDC_ZONE_ID: 6
MDC_IDC_MSMT_BATTERY_REMAINING_LONGEVITY: 150 MO
MDC_IDC_MSMT_BATTERY_RRT_TRIGGER: 2.62
MDC_IDC_MSMT_BATTERY_STATUS: NORMAL
MDC_IDC_MSMT_BATTERY_VOLTAGE: 3.03
MDC_IDC_MSMT_LEADCHNL_RA_DTM: NORMAL
MDC_IDC_MSMT_LEADCHNL_RA_IMPEDANCE_VALUE: 342
MDC_IDC_MSMT_LEADCHNL_RA_PACING_THRESHOLD_AMPLITUDE: 0.62
MDC_IDC_MSMT_LEADCHNL_RA_PACING_THRESHOLD_POLARITY: NORMAL
MDC_IDC_MSMT_LEADCHNL_RA_PACING_THRESHOLD_PULSEWIDTH: 0.4
MDC_IDC_MSMT_LEADCHNL_RA_SENSING_INTR_AMPL: 0.75
MDC_IDC_MSMT_LEADCHNL_RV_DTM: NORMAL
MDC_IDC_MSMT_LEADCHNL_RV_IMPEDANCE_VALUE: 646
MDC_IDC_MSMT_LEADCHNL_RV_PACING_THRESHOLD_AMPLITUDE: 1.25
MDC_IDC_MSMT_LEADCHNL_RV_PACING_THRESHOLD_POLARITY: NORMAL
MDC_IDC_MSMT_LEADCHNL_RV_PACING_THRESHOLD_PULSEWIDTH: 0.4
MDC_IDC_MSMT_LEADCHNL_RV_SENSING_INTR_AMPL: 10.25
MDC_IDC_PG_IMPLANT_DTM: NORMAL
MDC_IDC_PG_MFG: NORMAL
MDC_IDC_PG_MODEL: NORMAL
MDC_IDC_PG_SERIAL: NORMAL
MDC_IDC_PG_TYPE: NORMAL
MDC_IDC_SESS_DTM: NORMAL
MDC_IDC_SESS_TYPE: NORMAL
MDC_IDC_SET_BRADY_AT_MODE_SWITCH_RATE: 171
MDC_IDC_SET_BRADY_LOWRATE: 60
MDC_IDC_SET_BRADY_MAX_SENSOR_RATE: 130
MDC_IDC_SET_BRADY_MAX_TRACKING_RATE: 130
MDC_IDC_SET_BRADY_MODE: NORMAL
MDC_IDC_SET_BRADY_PAV_DELAY: 180
MDC_IDC_SET_BRADY_SAV_DELAY: 150
MDC_IDC_SET_LEADCHNL_RA_PACING_AMPLITUDE: 1.5
MDC_IDC_SET_LEADCHNL_RA_PACING_POLARITY: NORMAL
MDC_IDC_SET_LEADCHNL_RA_PACING_PULSEWIDTH: 0.4
MDC_IDC_SET_LEADCHNL_RA_SENSING_POLARITY: NORMAL
MDC_IDC_SET_LEADCHNL_RA_SENSING_SENSITIVITY: 0.3
MDC_IDC_SET_LEADCHNL_RV_PACING_AMPLITUDE: 2.75
MDC_IDC_SET_LEADCHNL_RV_PACING_POLARITY: NORMAL
MDC_IDC_SET_LEADCHNL_RV_PACING_PULSEWIDTH: 0.4
MDC_IDC_SET_LEADCHNL_RV_SENSING_POLARITY: NORMAL
MDC_IDC_SET_LEADCHNL_RV_SENSING_SENSITIVITY: 0.9
MDC_IDC_SET_ZONE_STATUS: NORMAL
MDC_IDC_SET_ZONE_STATUS: NORMAL
MDC_IDC_SET_ZONE_TYPE: NORMAL
MDC_IDC_SET_ZONE_TYPE: NORMAL
MDC_IDC_STAT_AT_BURDEN_PERCENT: 0
MDC_IDC_STAT_BRADY_RA_PERCENT_PACED: 91.1
MDC_IDC_STAT_BRADY_RV_PERCENT_PACED: 0.06

## 2025-07-22 ENCOUNTER — HOSPITAL ENCOUNTER (OUTPATIENT)
Dept: CARDIOLOGY | Facility: HOSPITAL | Age: OVER 89
Discharge: HOME | End: 2025-07-22
Payer: MEDICARE

## 2025-07-22 ENCOUNTER — LAB (OUTPATIENT)
Dept: LAB | Facility: HOSPITAL | Age: OVER 89
End: 2025-07-22
Payer: MEDICARE

## 2025-07-22 ENCOUNTER — TRANSCRIBE ORDERS (OUTPATIENT)
Dept: ADMINISTRATIVE | Facility: HOSPITAL | Age: OVER 89
End: 2025-07-22
Payer: MEDICARE

## 2025-07-22 DIAGNOSIS — Z01.818 PRE-OP EVALUATION: Primary | ICD-10-CM

## 2025-07-22 DIAGNOSIS — Z01.818 PRE-OP EVALUATION: ICD-10-CM

## 2025-07-22 LAB
ANION GAP SERPL CALCULATED.3IONS-SCNC: 8.1 MMOL/L (ref 5–15)
BASOPHILS # BLD AUTO: 0.03 10*3/MM3 (ref 0–0.2)
BASOPHILS NFR BLD AUTO: 0.5 % (ref 0–1.5)
BUN SERPL-MCNC: 17 MG/DL (ref 8–23)
BUN/CREAT SERPL: 16.5 (ref 7–25)
CALCIUM SPEC-SCNC: 8.8 MG/DL (ref 8.2–9.6)
CHLORIDE SERPL-SCNC: 107 MMOL/L (ref 98–107)
CO2 SERPL-SCNC: 26.9 MMOL/L (ref 22–29)
CREAT SERPL-MCNC: 1.03 MG/DL (ref 0.57–1)
DEPRECATED RDW RBC AUTO: 45.5 FL (ref 37–54)
EGFRCR SERPLBLD CKD-EPI 2021: 48.6 ML/MIN/1.73
EOSINOPHIL # BLD AUTO: 0.15 10*3/MM3 (ref 0–0.4)
EOSINOPHIL NFR BLD AUTO: 2.6 % (ref 0.3–6.2)
ERYTHROCYTE [DISTWIDTH] IN BLOOD BY AUTOMATED COUNT: 13.8 % (ref 12.3–15.4)
GLUCOSE SERPL-MCNC: 122 MG/DL (ref 65–99)
HCT VFR BLD AUTO: 36.9 % (ref 34–46.6)
HGB BLD-MCNC: 11.7 G/DL (ref 12–15.9)
IMM GRANULOCYTES # BLD AUTO: 0.02 10*3/MM3 (ref 0–0.05)
IMM GRANULOCYTES NFR BLD AUTO: 0.4 % (ref 0–0.5)
LYMPHOCYTES # BLD AUTO: 1.47 10*3/MM3 (ref 0.7–3.1)
LYMPHOCYTES NFR BLD AUTO: 25.8 % (ref 19.6–45.3)
MCH RBC QN AUTO: 28.7 PG (ref 26.6–33)
MCHC RBC AUTO-ENTMCNC: 31.7 G/DL (ref 31.5–35.7)
MCV RBC AUTO: 90.4 FL (ref 79–97)
MONOCYTES # BLD AUTO: 0.43 10*3/MM3 (ref 0.1–0.9)
MONOCYTES NFR BLD AUTO: 7.6 % (ref 5–12)
NEUTROPHILS NFR BLD AUTO: 3.59 10*3/MM3 (ref 1.7–7)
NEUTROPHILS NFR BLD AUTO: 63.1 % (ref 42.7–76)
NRBC BLD AUTO-RTO: 0 /100 WBC (ref 0–0.2)
PLATELET # BLD AUTO: 267 10*3/MM3 (ref 140–450)
PMV BLD AUTO: 9.1 FL (ref 6–12)
POTASSIUM SERPL-SCNC: 3.8 MMOL/L (ref 3.5–5.2)
QT INTERVAL: 433 MS
QTC INTERVAL: 461 MS
RBC # BLD AUTO: 4.08 10*6/MM3 (ref 3.77–5.28)
SODIUM SERPL-SCNC: 142 MMOL/L (ref 136–145)
WBC NRBC COR # BLD AUTO: 5.69 10*3/MM3 (ref 3.4–10.8)

## 2025-07-22 PROCEDURE — 93005 ELECTROCARDIOGRAM TRACING: CPT | Performed by: OTOLARYNGOLOGY

## 2025-07-22 PROCEDURE — 36415 COLL VENOUS BLD VENIPUNCTURE: CPT

## 2025-07-22 PROCEDURE — 80048 BASIC METABOLIC PNL TOTAL CA: CPT

## 2025-07-22 PROCEDURE — 85025 COMPLETE CBC W/AUTO DIFF WBC: CPT

## (undated) DEVICE — SUT VIC 5/0 P3 18IN J493G

## (undated) DEVICE — APPL CHLORAPREP HI/LITE TINTED 10.5ML ORNG

## (undated) DEVICE — GLV SURG BIOGEL SENSR LTX PF SZ7.5

## (undated) DEVICE — CATH DIAG IMPULSE PIG .056 6F 110CM

## (undated) DEVICE — 3M™ STERI-STRIP™ BLEND TONE SKIN CLOSURES, B1557, TAN, 1/2 IN X 4 IN (12MM X 100MM), 6 STRIPS/ENVELOPE: Brand: 3M™ STERI-STRIP™

## (undated) DEVICE — CROUCH CORNEAL PROTECTOR: Brand: BAUSCH + LOMB

## (undated) DEVICE — PK ENT 40

## (undated) DEVICE — INTENDED FOR TISSUE SEPARATION, AND OTHER PROCEDURES THAT REQUIRE A SHARP SURGICAL BLADE TO PUNCTURE OR CUT.: Brand: BARD-PARKER ® CARBON RIB-BACK BLADES

## (undated) DEVICE — STERILE COTTON TIP 6IN 10PK: Brand: MEDLINE

## (undated) DEVICE — PACEMAKER CDS: Brand: MEDLINE INDUSTRIES, INC.

## (undated) DEVICE — LACRIMAL INTUBATION SET CRAWFORD
Type: IMPLANTABLE DEVICE | Site: EYE | Status: NON-FUNCTIONAL
Brand: CRAWFORD
Removed: 2019-05-23

## (undated) DEVICE — NDL HYPO PRECISIONGLIDE REG 25G 1 1/2

## (undated) DEVICE — ESOPHAGEAL BALLOON DILATATION CATHETER: Brand: CRE FIXED WIRE

## (undated) DEVICE — PK EXTREM 50

## (undated) DEVICE — NDL PERC 1PRT THNWALL W/BASEPLT 18G 7CM

## (undated) DEVICE — SUT ETHLN 3/0 FS1 30IN 669H

## (undated) DEVICE — SINGLE-USE BIOPSY FORCEPS: Brand: RADIAL JAW 4

## (undated) DEVICE — GLV SURG BIOGEL M LTX PF 7 1/2

## (undated) DEVICE — ADHS LIQ MASTISOL 2/3ML

## (undated) DEVICE — DEV INFL CRE STERIFLATE 60CC DISP

## (undated) DEVICE — GOWN,NON-REINFORCED,SIRUS,SET IN SLV,XXL: Brand: MEDLINE

## (undated) DEVICE — SUT VIC 2/0 SH 27IN

## (undated) DEVICE — ANTIBACTERIAL UNDYED BRAIDED (POLYGLACTIN 910), SYNTHETIC ABSORBABLE SUTURE: Brand: COATED VICRYL

## (undated) DEVICE — PINNACLE INTRODUCER SHEATH: Brand: PINNACLE

## (undated) DEVICE — GLV SURG BIOGEL ECLPS LTX PF 7.5

## (undated) DEVICE — SUT VIC 3/0 SH 27IN J416H

## (undated) DEVICE — WIPE INST MEROCEL

## (undated) DEVICE — KT SURG TURNOVER 050

## (undated) DEVICE — CATH DIAG IMPULSE FL3.5 6F 100CM

## (undated) DEVICE — CATH DIAG IMPULSE FL4 6F 100CM

## (undated) DEVICE — IMMOB HD UNIV CLR DISP

## (undated) DEVICE — GAUZE,SPONGE,4"X4",16PLY,XRAY,STRL,LF: Brand: MEDLINE

## (undated) DEVICE — ELECTRD BLD EZ CLN MOD 2.5IN

## (undated) DEVICE — CODMAN® SURGICAL PATTIES 1/2" X 3" (1.27CM X 7.62CM): Brand: CODMAN®

## (undated) DEVICE — PK ENDO GI 50

## (undated) DEVICE — CABL BIPOL W/ALLGTR CLIP/SM 12FT

## (undated) DEVICE — SUT MNCRYL 2/0 CT1 36IN

## (undated) DEVICE — DRAPE,U/ SHT,SPLIT,PLAS,STERIL: Brand: MEDLINE

## (undated) DEVICE — IMMOB SHLDR CUT/AWAY UNIV

## (undated) DEVICE — STERILE TOOTHPICK SET: Brand: CENTURION

## (undated) DEVICE — CATH DIAG IMPULSE FR4 6F 100CM

## (undated) DEVICE — SUT ETHIB 0/0 MO6 I8IN CX45D

## (undated) DEVICE — UNDYED BRAIDED (POLYGLACTIN 910), SYNTHETIC ABSORBABLE SUTURE: Brand: COATED VICRYL

## (undated) DEVICE — SOL IRRIG H2O 1000ML STRL

## (undated) DEVICE — CUFF TOURNI 1BLADDER 1PRT 12IN STRL

## (undated) DEVICE — PK TRY HEART CATH 50

## (undated) DEVICE — INTRO SHEATH PRELUDE SNAP .038 7F 13CM W/SDPRT

## (undated) DEVICE — CATH GUIDE RIGHTSITE C315HIS-02

## (undated) DEVICE — TRY SKINPREP DRYPREP

## (undated) DEVICE — BANDAGE,GAUZE,BULKEE II,4.5"X4.1YD,STRL: Brand: MEDLINE

## (undated) DEVICE — SUT GUT PLN FAST ABS 5/0 PC1 18IN 1915G

## (undated) DEVICE — BNDG ESMARK 4IN 12FT LF STRL BLU

## (undated) DEVICE — DECANTER: Brand: UNBRANDED

## (undated) DEVICE — ST ACC MICROPUNCTURE STFF/CANN PLAT/TP 4F 21G 40CM

## (undated) DEVICE — SWABSTK SKINPREP PVPI LF PK/3

## (undated) DEVICE — SUT PROLN 6/0 P3 18IN 8695G

## (undated) DEVICE — 1010 S-DRAPE TOWEL DRAPE 10/BX: Brand: STERI-DRAPE™

## (undated) DEVICE — VIOLET BRAIDED (POLYGLACTIN 910), SYNTHETIC ABSORBABLE SUTURE: Brand: COATED VICRYL

## (undated) DEVICE — DRAPE,INSTRUMENT,MAGNETIC,10X16: Brand: MEDLINE

## (undated) DEVICE — ELECTRD DEFIB M/FUNC PROPADZ RADIOL 2PK

## (undated) DEVICE — UNDERGLV SURG BIOGEL INDICAT PI SZ8 BLU

## (undated) DEVICE — MICRO SAGITTAL BLADE (4.1 X 0.4 X 25.5MM)

## (undated) DEVICE — TBG PENCL TELESCP MEGADYNE SMOKE EVAC 10FT

## (undated) DEVICE — SUT VIC 2/0 CT2 27IN J269H

## (undated) DEVICE — OCCLUSIVE GAUZE STRIP OVERWRAP,3% BISMUTH TRIBROMOPHENATE IN PETROLATUM BLEND: Brand: XEROFORM

## (undated) DEVICE — 3M™ PATIENT PLATE, CORDED, SPLIT, LARGE, 40 PER CASE, 1179: Brand: 3M™

## (undated) DEVICE — GW PTFE EMERALD HEPCOAT FC J TIP STD .035 3MM 150CM

## (undated) DEVICE — BITEBLOCK ENDO W/STRAP 60F A/ LF DISP

## (undated) DEVICE — SOL IRRIG NACL 9PCT 1000ML BTL

## (undated) DEVICE — BNDG ELAS ELITE V/CLOSE 4IN 5YD LF STRL

## (undated) DEVICE — SPNG GZ WOVN 4X4IN 12PLY 10/BX STRL

## (undated) DEVICE — GOWN,REINFORCE,POLY,SIRUS,BREATH SLV,XLG: Brand: MEDLINE

## (undated) DEVICE — SLITTER CATH GUIDE ATTAIN ADJ

## (undated) DEVICE — SUT GUT PLN 5/0 P3 18IN 686G